# Patient Record
Sex: FEMALE | Race: WHITE | NOT HISPANIC OR LATINO | URBAN - METROPOLITAN AREA
[De-identification: names, ages, dates, MRNs, and addresses within clinical notes are randomized per-mention and may not be internally consistent; named-entity substitution may affect disease eponyms.]

---

## 2017-02-07 ENCOUNTER — INPATIENT (INPATIENT)
Facility: HOSPITAL | Age: 82
LOS: 5 days | Discharge: HOME HEALTH | End: 2017-02-13
Attending: INTERNAL MEDICINE | Admitting: INTERNAL MEDICINE
Payer: MEDICARE

## 2017-02-07 VITALS
RESPIRATION RATE: 18 BRPM | OXYGEN SATURATION: 98 % | TEMPERATURE: 98 F | SYSTOLIC BLOOD PRESSURE: 104 MMHG | HEART RATE: 75 BPM | HEIGHT: 59 IN | WEIGHT: 125 LBS | DIASTOLIC BLOOD PRESSURE: 43 MMHG

## 2017-02-07 LAB
ALBUMIN SERPL ELPH-MCNC: 3.6 G/DL — SIGNIFICANT CHANGE UP (ref 3.3–5)
ALP SERPL-CCNC: 57 U/L — SIGNIFICANT CHANGE UP (ref 40–120)
ALT FLD-CCNC: 18 U/L — SIGNIFICANT CHANGE UP (ref 12–78)
ANION GAP SERPL CALC-SCNC: 8 MMOL/L — SIGNIFICANT CHANGE UP (ref 5–17)
APPEARANCE UR: CLEAR — SIGNIFICANT CHANGE UP
APTT BLD: 27.6 SEC — SIGNIFICANT CHANGE UP (ref 27.5–37.4)
AST SERPL-CCNC: 24 U/L — SIGNIFICANT CHANGE UP (ref 15–37)
BASOPHILS # BLD AUTO: 0 K/UL — SIGNIFICANT CHANGE UP (ref 0–0.2)
BASOPHILS NFR BLD AUTO: 0.8 % — SIGNIFICANT CHANGE UP (ref 0–2)
BILIRUB SERPL-MCNC: 0.4 MG/DL — SIGNIFICANT CHANGE UP (ref 0.2–1.2)
BILIRUB UR-MCNC: NEGATIVE — SIGNIFICANT CHANGE UP
BUN SERPL-MCNC: 57 MG/DL — HIGH (ref 7–23)
CALCIUM SERPL-MCNC: 8.5 MG/DL — SIGNIFICANT CHANGE UP (ref 8.5–10.1)
CHLORIDE SERPL-SCNC: 102 MMOL/L — SIGNIFICANT CHANGE UP (ref 96–108)
CO2 SERPL-SCNC: 28 MMOL/L — SIGNIFICANT CHANGE UP (ref 22–31)
COLOR SPEC: YELLOW — SIGNIFICANT CHANGE UP
CREAT SERPL-MCNC: 2.01 MG/DL — HIGH (ref 0.5–1.3)
DIFF PNL FLD: NEGATIVE — SIGNIFICANT CHANGE UP
EOSINOPHIL # BLD AUTO: 0.4 K/UL — SIGNIFICANT CHANGE UP (ref 0–0.5)
EOSINOPHIL NFR BLD AUTO: 6.7 % — HIGH (ref 0–6)
GLUCOSE SERPL-MCNC: 213 MG/DL — HIGH (ref 70–99)
GLUCOSE UR QL: NEGATIVE MG/DL — SIGNIFICANT CHANGE UP
HCT VFR BLD CALC: 32.8 % — LOW (ref 34.5–45)
HGB BLD-MCNC: 12 G/DL — SIGNIFICANT CHANGE UP (ref 11.5–15.5)
INR BLD: 0.98 RATIO — SIGNIFICANT CHANGE UP (ref 0.88–1.16)
KETONES UR-MCNC: NEGATIVE — SIGNIFICANT CHANGE UP
LEUKOCYTE ESTERASE UR-ACNC: NEGATIVE — SIGNIFICANT CHANGE UP
LIDOCAIN IGE QN: 175 U/L — SIGNIFICANT CHANGE UP (ref 73–393)
LYMPHOCYTES # BLD AUTO: 1.9 K/UL — SIGNIFICANT CHANGE UP (ref 1–3.3)
LYMPHOCYTES # BLD AUTO: 35.2 % — SIGNIFICANT CHANGE UP (ref 13–44)
MCHC RBC-ENTMCNC: 32.6 PG — SIGNIFICANT CHANGE UP (ref 27–34)
MCHC RBC-ENTMCNC: 36.4 GM/DL — HIGH (ref 32–36)
MCV RBC AUTO: 89.6 FL — SIGNIFICANT CHANGE UP (ref 80–100)
MONOCYTES # BLD AUTO: 0.5 K/UL — SIGNIFICANT CHANGE UP (ref 0–0.9)
MONOCYTES NFR BLD AUTO: 10 % — SIGNIFICANT CHANGE UP (ref 2–14)
NEUTROPHILS # BLD AUTO: 2.5 K/UL — SIGNIFICANT CHANGE UP (ref 1.8–7.4)
NEUTROPHILS NFR BLD AUTO: 47.3 % — SIGNIFICANT CHANGE UP (ref 43–77)
NITRITE UR-MCNC: NEGATIVE — SIGNIFICANT CHANGE UP
NT-PROBNP SERPL-SCNC: 660 PG/ML — HIGH (ref 0–450)
PH UR: 7 — SIGNIFICANT CHANGE UP (ref 4.8–8)
PLATELET # BLD AUTO: 154 K/UL — SIGNIFICANT CHANGE UP (ref 150–400)
POTASSIUM SERPL-MCNC: 4.9 MMOL/L — SIGNIFICANT CHANGE UP (ref 3.5–5.3)
POTASSIUM SERPL-SCNC: 4.9 MMOL/L — SIGNIFICANT CHANGE UP (ref 3.5–5.3)
PROT SERPL-MCNC: 7.1 GM/DL — SIGNIFICANT CHANGE UP (ref 6–8.3)
PROT UR-MCNC: NEGATIVE MG/DL — SIGNIFICANT CHANGE UP
PROTHROM AB SERPL-ACNC: 11 SEC — SIGNIFICANT CHANGE UP (ref 10–13.1)
RBC # BLD: 3.67 M/UL — LOW (ref 3.8–5.2)
RBC # FLD: 11.2 % — SIGNIFICANT CHANGE UP (ref 11–15)
SODIUM SERPL-SCNC: 138 MMOL/L — SIGNIFICANT CHANGE UP (ref 135–145)
SP GR SPEC: 1 — LOW (ref 1.01–1.02)
TROPONIN I SERPL-MCNC: <.015 NG/ML — SIGNIFICANT CHANGE UP (ref 0.01–0.04)
UROBILINOGEN FLD QL: NEGATIVE MG/DL — SIGNIFICANT CHANGE UP
WBC # BLD: 5.4 K/UL — SIGNIFICANT CHANGE UP (ref 3.8–10.5)
WBC # FLD AUTO: 5.4 K/UL — SIGNIFICANT CHANGE UP (ref 3.8–10.5)

## 2017-02-07 PROCEDURE — 71010: CPT | Mod: 26

## 2017-02-07 PROCEDURE — 99285 EMERGENCY DEPT VISIT HI MDM: CPT

## 2017-02-07 RX ORDER — SIMVASTATIN 20 MG/1
20 TABLET, FILM COATED ORAL AT BEDTIME
Qty: 0 | Refills: 0 | Status: DISCONTINUED | OUTPATIENT
Start: 2017-02-07 | End: 2017-02-13

## 2017-02-07 RX ORDER — CLOPIDOGREL BISULFATE 75 MG/1
75 TABLET, FILM COATED ORAL DAILY
Qty: 0 | Refills: 0 | Status: DISCONTINUED | OUTPATIENT
Start: 2017-02-07 | End: 2017-02-13

## 2017-02-07 RX ORDER — SENNA PLUS 8.6 MG/1
2 TABLET ORAL AT BEDTIME
Qty: 0 | Refills: 0 | Status: DISCONTINUED | OUTPATIENT
Start: 2017-02-07 | End: 2017-02-13

## 2017-02-07 RX ORDER — PANTOPRAZOLE SODIUM 20 MG/1
40 TABLET, DELAYED RELEASE ORAL
Qty: 0 | Refills: 0 | Status: DISCONTINUED | OUTPATIENT
Start: 2017-02-07 | End: 2017-02-13

## 2017-02-07 RX ORDER — ASPIRIN/CALCIUM CARB/MAGNESIUM 324 MG
81 TABLET ORAL DAILY
Qty: 0 | Refills: 0 | Status: DISCONTINUED | OUTPATIENT
Start: 2017-02-07 | End: 2017-02-13

## 2017-02-07 RX ORDER — SODIUM CHLORIDE 9 MG/ML
3 INJECTION INTRAMUSCULAR; INTRAVENOUS; SUBCUTANEOUS EVERY 8 HOURS
Qty: 0 | Refills: 0 | Status: DISCONTINUED | OUTPATIENT
Start: 2017-02-07 | End: 2017-02-13

## 2017-02-07 RX ORDER — HEPARIN SODIUM 5000 [USP'U]/ML
5000 INJECTION INTRAVENOUS; SUBCUTANEOUS EVERY 12 HOURS
Qty: 0 | Refills: 0 | Status: DISCONTINUED | OUTPATIENT
Start: 2017-02-07 | End: 2017-02-13

## 2017-02-07 RX ORDER — METOPROLOL TARTRATE 50 MG
25 TABLET ORAL
Qty: 0 | Refills: 0 | Status: DISCONTINUED | OUTPATIENT
Start: 2017-02-07 | End: 2017-02-13

## 2017-02-07 RX ORDER — SPIRONOLACTONE 25 MG/1
25 TABLET, FILM COATED ORAL DAILY
Qty: 0 | Refills: 0 | Status: DISCONTINUED | OUTPATIENT
Start: 2017-02-07 | End: 2017-02-11

## 2017-02-07 RX ORDER — ONDANSETRON 8 MG/1
4 TABLET, FILM COATED ORAL EVERY 6 HOURS
Qty: 0 | Refills: 0 | Status: DISCONTINUED | OUTPATIENT
Start: 2017-02-07 | End: 2017-02-13

## 2017-02-07 RX ORDER — LISINOPRIL 2.5 MG/1
5 TABLET ORAL DAILY
Qty: 0 | Refills: 0 | Status: DISCONTINUED | OUTPATIENT
Start: 2017-02-07 | End: 2017-02-13

## 2017-02-07 RX ADMIN — SODIUM CHLORIDE 3 MILLILITER(S): 9 INJECTION INTRAMUSCULAR; INTRAVENOUS; SUBCUTANEOUS at 23:26

## 2017-02-07 RX ADMIN — SIMVASTATIN 20 MILLIGRAM(S): 20 TABLET, FILM COATED ORAL at 23:35

## 2017-02-07 NOTE — ED ADULT NURSE NOTE - PSH
Cataract  removal with lens implant right in 2000  Detached Retina, Left  laser surgery in 1995  S/P Carpal Tunnel Release  bilateral hands in 1990  S/P Laparoscopic Cholecystectomy  2008  S/P TKR (Total Knee Replacement)  left knee  Trigger Finger  release of middle and ring finger of right hand in 1990, and middle finger left hand in 2008

## 2017-02-07 NOTE — ED PROVIDER NOTE - OBJECTIVE STATEMENT
Pt is a 87 yo lady with a pmhx of HL, DM2, CAD sp CABG and MI, valve abnormality unspec who presents to the ED with an episode of syncope. She denies any chest pain, sob, weakness, lightheadedness, but when she went to the restroom she had an episode of syncope. No seizure activity, no head strike, no fall, son found her on toilet.

## 2017-02-07 NOTE — ED ADULT TRIAGE NOTE - CHIEF COMPLAINT QUOTE
dizziness with syncopal episode. Pt c/o LLQ abdominal pain with diarrhea. Pt had the flu and pneumonia

## 2017-02-07 NOTE — ED ADULT NURSE NOTE - OBJECTIVE STATEMENT
pt received alert and oriented x3, pt son states pt woke and went to bathroom, and she become spaced out on bathroom, after c/o dizziness, son denies pt falling, no visinle trauma. pt states he doctor diagnosed her with flu this past week,

## 2017-02-07 NOTE — ED PROVIDER NOTE - CARDIAC, MLM
Normal rate, regular rhythm.  Heart sounds S1, S2.  Has systolic murmur throughout precordium 4/6, no rubs or gallops.

## 2017-02-07 NOTE — ED PROVIDER NOTE - MEDICAL DECISION MAKING DETAILS
Ddx: Syncope/ Back to baseline mental status/ may be due to valvular abnormality  Plan: labs, troponin, ecg, cxr, admit

## 2017-02-07 NOTE — ED ADULT NURSE NOTE - PMH
Acute Mucous Pneumonia  4/2010, resolved ; no residual problems  Angina  in 2005, s/p angioplasty with stent placement, no recurrance, no sequalae  Arthritis, Infective, Knee    Basal Cell Cancer  removed from left neck 2009  Detached Retina  right eye  Diabetes Mellitus  type 2  History of Back Surgery  2010  Spinal Stenosis- lumbar

## 2017-02-08 DIAGNOSIS — E11.9 TYPE 2 DIABETES MELLITUS WITHOUT COMPLICATIONS: ICD-10-CM

## 2017-02-08 DIAGNOSIS — R55 SYNCOPE AND COLLAPSE: ICD-10-CM

## 2017-02-08 LAB
CULTURE RESULTS: NO GROWTH — SIGNIFICANT CHANGE UP
SPECIMEN SOURCE: SIGNIFICANT CHANGE UP

## 2017-02-08 RX ORDER — DEXTROSE 50 % IN WATER 50 %
12.5 SYRINGE (ML) INTRAVENOUS ONCE
Qty: 0 | Refills: 0 | Status: DISCONTINUED | OUTPATIENT
Start: 2017-02-08 | End: 2017-02-13

## 2017-02-08 RX ORDER — GLUCAGON INJECTION, SOLUTION 0.5 MG/.1ML
1 INJECTION, SOLUTION SUBCUTANEOUS ONCE
Qty: 0 | Refills: 0 | Status: DISCONTINUED | OUTPATIENT
Start: 2017-02-08 | End: 2017-02-13

## 2017-02-08 RX ORDER — DEXTROSE 50 % IN WATER 50 %
25 SYRINGE (ML) INTRAVENOUS ONCE
Qty: 0 | Refills: 0 | Status: DISCONTINUED | OUTPATIENT
Start: 2017-02-08 | End: 2017-02-13

## 2017-02-08 RX ORDER — ACETAMINOPHEN 500 MG
650 TABLET ORAL EVERY 6 HOURS
Qty: 0 | Refills: 0 | Status: DISCONTINUED | OUTPATIENT
Start: 2017-02-08 | End: 2017-02-13

## 2017-02-08 RX ORDER — INSULIN LISPRO 100/ML
VIAL (ML) SUBCUTANEOUS
Qty: 0 | Refills: 0 | Status: DISCONTINUED | OUTPATIENT
Start: 2017-02-08 | End: 2017-02-13

## 2017-02-08 RX ORDER — DEXTROSE 50 % IN WATER 50 %
1 SYRINGE (ML) INTRAVENOUS ONCE
Qty: 0 | Refills: 0 | Status: DISCONTINUED | OUTPATIENT
Start: 2017-02-08 | End: 2017-02-13

## 2017-02-08 RX ORDER — SODIUM CHLORIDE 9 MG/ML
1000 INJECTION, SOLUTION INTRAVENOUS
Qty: 0 | Refills: 0 | Status: DISCONTINUED | OUTPATIENT
Start: 2017-02-08 | End: 2017-02-13

## 2017-02-08 RX ADMIN — HEPARIN SODIUM 5000 UNIT(S): 5000 INJECTION INTRAVENOUS; SUBCUTANEOUS at 17:07

## 2017-02-08 RX ADMIN — CLOPIDOGREL BISULFATE 75 MILLIGRAM(S): 75 TABLET, FILM COATED ORAL at 11:14

## 2017-02-08 RX ADMIN — Medication 81 MILLIGRAM(S): at 11:14

## 2017-02-08 RX ADMIN — SIMVASTATIN 20 MILLIGRAM(S): 20 TABLET, FILM COATED ORAL at 22:38

## 2017-02-08 RX ADMIN — PANTOPRAZOLE SODIUM 40 MILLIGRAM(S): 20 TABLET, DELAYED RELEASE ORAL at 07:09

## 2017-02-08 RX ADMIN — SODIUM CHLORIDE 3 MILLILITER(S): 9 INJECTION INTRAMUSCULAR; INTRAVENOUS; SUBCUTANEOUS at 14:24

## 2017-02-08 RX ADMIN — Medication 4: at 16:30

## 2017-02-08 RX ADMIN — Medication 25 MILLIGRAM(S): at 17:07

## 2017-02-08 RX ADMIN — SPIRONOLACTONE 25 MILLIGRAM(S): 25 TABLET, FILM COATED ORAL at 07:09

## 2017-02-08 RX ADMIN — Medication 4: at 11:14

## 2017-02-08 RX ADMIN — SODIUM CHLORIDE 3 MILLILITER(S): 9 INJECTION INTRAMUSCULAR; INTRAVENOUS; SUBCUTANEOUS at 22:37

## 2017-02-08 RX ADMIN — SODIUM CHLORIDE 3 MILLILITER(S): 9 INJECTION INTRAMUSCULAR; INTRAVENOUS; SUBCUTANEOUS at 06:06

## 2017-02-08 RX ADMIN — HEPARIN SODIUM 5000 UNIT(S): 5000 INJECTION INTRAVENOUS; SUBCUTANEOUS at 07:09

## 2017-02-08 RX ADMIN — LISINOPRIL 5 MILLIGRAM(S): 2.5 TABLET ORAL at 07:09

## 2017-02-08 NOTE — PATIENT PROFILE ADULT. - VISION (WITH CORRECTIVE LENSES IF THE PATIENT USUALLY WEARS THEM):
right eye cataract surgery; glasses for reading and distance/Partially impaired: cannot see medication labels or newsprint, but can see obstacles in path, and the surrounding layout; can count fingers at arm's length

## 2017-02-08 NOTE — PHYSICAL THERAPY INITIAL EVALUATION ADULT - TINETTI BALANCE TEST, REHAB EVAL
Sitting Balance - 1  Rises From Chair -0  Attempts to rise -0   Immediate Standing Balance -1   Standing Balance - 1  Nudged - 1  Eyes Closed -0   Turning 360 Deg - 1  Sitting down - 1  Balance Score - 6/16

## 2017-02-08 NOTE — PHYSICAL THERAPY INITIAL EVALUATION ADULT - CRITERIA FOR SKILLED THERAPEUTIC INTERVENTIONS
rehab potential/functional limitations in following categories/therapy frequency/predicted duration of therapy intervention/impairments found/risk reduction/prevention

## 2017-02-08 NOTE — PHYSICAL THERAPY INITIAL EVALUATION ADULT - MODIFIED CLINICAL TEST OF SENSORY INTEGRATION IN BALANCE TEST
Barthel Index: Feeding Score- 10  Bathing Score- 0  Grooming Score- 5  Dressing Score- 5  Bowels Score- 10  Bladder Score- 10  Toilet Score- 5  Transfers Score- 5  Mobility Score-0   Stairs Score- 0    Total Score-  45/100

## 2017-02-08 NOTE — PHYSICAL THERAPY INITIAL EVALUATION ADULT - TINETTI GAIT TEST, REHAB EVAL
Indication of gait - 1  Step Length and height -2   Foot Clearance -2   Step Symmetry -0   Step Continuity -0   Path - 1   Trunk -1   Walking Time -0   Total Score - 7/12

## 2017-02-08 NOTE — PHYSICAL THERAPY INITIAL EVALUATION ADULT - ADDITIONAL COMMENTS
Pt lives in 2 story private house with approximately 8 stairs to enter with her two sons. Pt ambulated with rolling walker and needed assist for stairs and bathing prior to admission.

## 2017-02-09 LAB — HBA1C BLD-MCNC: 7.7 % — HIGH (ref 4–5.6)

## 2017-02-09 RX ADMIN — Medication 25 MILLIGRAM(S): at 06:39

## 2017-02-09 RX ADMIN — SODIUM CHLORIDE 3 MILLILITER(S): 9 INJECTION INTRAMUSCULAR; INTRAVENOUS; SUBCUTANEOUS at 22:31

## 2017-02-09 RX ADMIN — SPIRONOLACTONE 25 MILLIGRAM(S): 25 TABLET, FILM COATED ORAL at 06:39

## 2017-02-09 RX ADMIN — HEPARIN SODIUM 5000 UNIT(S): 5000 INJECTION INTRAVENOUS; SUBCUTANEOUS at 18:03

## 2017-02-09 RX ADMIN — SIMVASTATIN 20 MILLIGRAM(S): 20 TABLET, FILM COATED ORAL at 22:32

## 2017-02-09 RX ADMIN — Medication 25 MILLIGRAM(S): at 18:04

## 2017-02-09 RX ADMIN — SODIUM CHLORIDE 3 MILLILITER(S): 9 INJECTION INTRAMUSCULAR; INTRAVENOUS; SUBCUTANEOUS at 06:39

## 2017-02-09 RX ADMIN — Medication 81 MILLIGRAM(S): at 11:16

## 2017-02-09 RX ADMIN — SODIUM CHLORIDE 3 MILLILITER(S): 9 INJECTION INTRAMUSCULAR; INTRAVENOUS; SUBCUTANEOUS at 14:04

## 2017-02-09 RX ADMIN — Medication 4: at 12:30

## 2017-02-09 RX ADMIN — Medication 650 MILLIGRAM(S): at 22:34

## 2017-02-09 RX ADMIN — CLOPIDOGREL BISULFATE 75 MILLIGRAM(S): 75 TABLET, FILM COATED ORAL at 11:16

## 2017-02-09 RX ADMIN — LISINOPRIL 5 MILLIGRAM(S): 2.5 TABLET ORAL at 06:39

## 2017-02-09 RX ADMIN — HEPARIN SODIUM 5000 UNIT(S): 5000 INJECTION INTRAVENOUS; SUBCUTANEOUS at 06:38

## 2017-02-09 RX ADMIN — Medication 650 MILLIGRAM(S): at 23:34

## 2017-02-09 RX ADMIN — PANTOPRAZOLE SODIUM 40 MILLIGRAM(S): 20 TABLET, DELAYED RELEASE ORAL at 06:39

## 2017-02-10 RX ADMIN — CLOPIDOGREL BISULFATE 75 MILLIGRAM(S): 75 TABLET, FILM COATED ORAL at 14:04

## 2017-02-10 RX ADMIN — SPIRONOLACTONE 25 MILLIGRAM(S): 25 TABLET, FILM COATED ORAL at 05:55

## 2017-02-10 RX ADMIN — Medication 6: at 17:57

## 2017-02-10 RX ADMIN — Medication 81 MILLIGRAM(S): at 14:03

## 2017-02-10 RX ADMIN — SODIUM CHLORIDE 3 MILLILITER(S): 9 INJECTION INTRAMUSCULAR; INTRAVENOUS; SUBCUTANEOUS at 05:54

## 2017-02-10 RX ADMIN — Medication 6: at 14:05

## 2017-02-10 RX ADMIN — SIMVASTATIN 20 MILLIGRAM(S): 20 TABLET, FILM COATED ORAL at 22:30

## 2017-02-10 RX ADMIN — Medication 25 MILLIGRAM(S): at 17:58

## 2017-02-10 RX ADMIN — HEPARIN SODIUM 5000 UNIT(S): 5000 INJECTION INTRAVENOUS; SUBCUTANEOUS at 05:55

## 2017-02-10 RX ADMIN — Medication 650 MILLIGRAM(S): at 11:08

## 2017-02-10 RX ADMIN — Medication 25 MILLIGRAM(S): at 05:55

## 2017-02-10 RX ADMIN — Medication 650 MILLIGRAM(S): at 14:01

## 2017-02-10 RX ADMIN — PANTOPRAZOLE SODIUM 40 MILLIGRAM(S): 20 TABLET, DELAYED RELEASE ORAL at 05:55

## 2017-02-10 RX ADMIN — SODIUM CHLORIDE 3 MILLILITER(S): 9 INJECTION INTRAMUSCULAR; INTRAVENOUS; SUBCUTANEOUS at 14:05

## 2017-02-10 RX ADMIN — HEPARIN SODIUM 5000 UNIT(S): 5000 INJECTION INTRAVENOUS; SUBCUTANEOUS at 17:57

## 2017-02-10 RX ADMIN — SODIUM CHLORIDE 3 MILLILITER(S): 9 INJECTION INTRAMUSCULAR; INTRAVENOUS; SUBCUTANEOUS at 22:28

## 2017-02-10 RX ADMIN — LISINOPRIL 5 MILLIGRAM(S): 2.5 TABLET ORAL at 05:55

## 2017-02-11 PROCEDURE — 99223 1ST HOSP IP/OBS HIGH 75: CPT

## 2017-02-11 RX ADMIN — SODIUM CHLORIDE 3 MILLILITER(S): 9 INJECTION INTRAMUSCULAR; INTRAVENOUS; SUBCUTANEOUS at 13:51

## 2017-02-11 RX ADMIN — Medication 81 MILLIGRAM(S): at 13:50

## 2017-02-11 RX ADMIN — SPIRONOLACTONE 25 MILLIGRAM(S): 25 TABLET, FILM COATED ORAL at 05:42

## 2017-02-11 RX ADMIN — Medication 650 MILLIGRAM(S): at 15:10

## 2017-02-11 RX ADMIN — PANTOPRAZOLE SODIUM 40 MILLIGRAM(S): 20 TABLET, DELAYED RELEASE ORAL at 05:42

## 2017-02-11 RX ADMIN — SIMVASTATIN 20 MILLIGRAM(S): 20 TABLET, FILM COATED ORAL at 21:35

## 2017-02-11 RX ADMIN — SODIUM CHLORIDE 3 MILLILITER(S): 9 INJECTION INTRAMUSCULAR; INTRAVENOUS; SUBCUTANEOUS at 05:43

## 2017-02-11 RX ADMIN — HEPARIN SODIUM 5000 UNIT(S): 5000 INJECTION INTRAVENOUS; SUBCUTANEOUS at 05:41

## 2017-02-11 RX ADMIN — LISINOPRIL 5 MILLIGRAM(S): 2.5 TABLET ORAL at 05:40

## 2017-02-11 RX ADMIN — Medication 650 MILLIGRAM(S): at 23:20

## 2017-02-11 RX ADMIN — SODIUM CHLORIDE 3 MILLILITER(S): 9 INJECTION INTRAMUSCULAR; INTRAVENOUS; SUBCUTANEOUS at 21:33

## 2017-02-11 RX ADMIN — Medication 650 MILLIGRAM(S): at 16:24

## 2017-02-11 RX ADMIN — CLOPIDOGREL BISULFATE 75 MILLIGRAM(S): 75 TABLET, FILM COATED ORAL at 13:50

## 2017-02-11 RX ADMIN — Medication 25 MILLIGRAM(S): at 05:40

## 2017-02-11 RX ADMIN — HEPARIN SODIUM 5000 UNIT(S): 5000 INJECTION INTRAVENOUS; SUBCUTANEOUS at 18:48

## 2017-02-11 RX ADMIN — Medication 2: at 13:51

## 2017-02-12 PROCEDURE — 99232 SBSQ HOSP IP/OBS MODERATE 35: CPT

## 2017-02-12 RX ORDER — AZITHROMYCIN 500 MG/1
0 TABLET, FILM COATED ORAL
Qty: 0 | Refills: 0 | COMMUNITY

## 2017-02-12 RX ORDER — PIOGLITAZONE HYDROCHLORIDE 15 MG/1
1 TABLET ORAL
Qty: 0 | Refills: 0 | COMMUNITY

## 2017-02-12 RX ORDER — SPIRONOLACTONE 25 MG/1
0 TABLET, FILM COATED ORAL
Qty: 0 | Refills: 0 | COMMUNITY

## 2017-02-12 RX ADMIN — Medication 81 MILLIGRAM(S): at 11:16

## 2017-02-12 RX ADMIN — Medication 25 MILLIGRAM(S): at 05:46

## 2017-02-12 RX ADMIN — SODIUM CHLORIDE 3 MILLILITER(S): 9 INJECTION INTRAMUSCULAR; INTRAVENOUS; SUBCUTANEOUS at 05:41

## 2017-02-12 RX ADMIN — SIMVASTATIN 20 MILLIGRAM(S): 20 TABLET, FILM COATED ORAL at 21:05

## 2017-02-12 RX ADMIN — CLOPIDOGREL BISULFATE 75 MILLIGRAM(S): 75 TABLET, FILM COATED ORAL at 11:16

## 2017-02-12 RX ADMIN — Medication 650 MILLIGRAM(S): at 00:00

## 2017-02-12 RX ADMIN — Medication 25 MILLIGRAM(S): at 19:02

## 2017-02-12 RX ADMIN — SODIUM CHLORIDE 3 MILLILITER(S): 9 INJECTION INTRAMUSCULAR; INTRAVENOUS; SUBCUTANEOUS at 21:03

## 2017-02-12 RX ADMIN — Medication 100 MILLIGRAM(S): at 11:21

## 2017-02-12 RX ADMIN — HEPARIN SODIUM 5000 UNIT(S): 5000 INJECTION INTRAVENOUS; SUBCUTANEOUS at 05:46

## 2017-02-12 RX ADMIN — Medication 650 MILLIGRAM(S): at 15:43

## 2017-02-12 RX ADMIN — PANTOPRAZOLE SODIUM 40 MILLIGRAM(S): 20 TABLET, DELAYED RELEASE ORAL at 06:49

## 2017-02-12 RX ADMIN — LISINOPRIL 5 MILLIGRAM(S): 2.5 TABLET ORAL at 05:46

## 2017-02-12 RX ADMIN — Medication 100 MILLIGRAM(S): at 19:04

## 2017-02-12 RX ADMIN — SODIUM CHLORIDE 3 MILLILITER(S): 9 INJECTION INTRAMUSCULAR; INTRAVENOUS; SUBCUTANEOUS at 13:36

## 2017-02-12 RX ADMIN — Medication 4: at 11:19

## 2017-02-12 RX ADMIN — Medication 650 MILLIGRAM(S): at 14:43

## 2017-02-12 RX ADMIN — Medication 2: at 17:27

## 2017-02-12 RX ADMIN — HEPARIN SODIUM 5000 UNIT(S): 5000 INJECTION INTRAVENOUS; SUBCUTANEOUS at 19:01

## 2017-02-12 NOTE — DIETITIAN INITIAL EVALUATION ADULT. - OTHER INFO
Pt seen today due to RN referral. Pt c HgbA1c 7.7. She lives @ home c her son whom does the food shopping/cooking following low sugar/fat. He bakes and broils all foods for his mom. Pt does not weigh herself @ home and unaware of her usul weight. Her clothing all fits the same. She has decreased physical activity due to back problems. Last BM today 2/12. negative edema. Consuming 100% of meals.

## 2017-02-12 NOTE — DISCHARGE NOTE ADULT - MEDICATION SUMMARY - MEDICATIONS TO TAKE
I will START or STAY ON the medications listed below when I get home from the hospital:    aspirin 81 mg oral tablet  -- 1 tab(s) by mouth once a day  -- Indication: For SYNCOPE    traMADol 50 mg oral tablet  --  by mouth prn  -- Indication: For SYNCOPE    lisinopril 5 mg oral tablet  -- 1 tab(s) by mouth once a day  -- Indication: For SYNCOPE    simvastatin 20 mg oral tablet  -- 1 tab(s) by mouth once a day (at bedtime)  -- Indication: For Diabetes Mellitus    clopidogrel 75 mg oral tablet  -- 1 tab(s) by mouth once a day  -- Indication: For SYNCOPE    metoprolol tartrate 50 mg oral tablet  -- 40 milligram(s) by mouth once a day  -- Indication: For SYNCOPE    pantoprazole 40 mg oral delayed release tablet  --  by mouth   -- Indication: For Diabetes Mellitus    nitrofurantoin  --  by mouth   -- Indication: For Diabetes Mellitus

## 2017-02-12 NOTE — DISCHARGE NOTE ADULT - CARE PLAN
Principal Discharge DX:	Syncope, unspecified syncope type  Goal:	Adjust meds  Instructions for follow-up, activity and diet:	watch salt

## 2017-02-12 NOTE — DISCHARGE NOTE ADULT - PATIENT PORTAL LINK FT
“You can access the FollowHealth Patient Portal, offered by [facility], by registering with the following website: [email or web address]”

## 2017-02-12 NOTE — PROGRESS NOTE ADULT - BACK
No deformity or limitation of movement

## 2017-02-12 NOTE — DISCHARGE NOTE ADULT - VISION (WITH CORRECTIVE LENSES IF THE PATIENT USUALLY WEARS THEM):
Partially impaired: cannot see medication labels or newsprint, but can see obstacles in path, and the surrounding layout; can count fingers at arm's length/right eye cataract surgery; glasses for reading and distance

## 2017-02-13 VITALS
RESPIRATION RATE: 18 BRPM | SYSTOLIC BLOOD PRESSURE: 98 MMHG | TEMPERATURE: 98 F | DIASTOLIC BLOOD PRESSURE: 38 MMHG | OXYGEN SATURATION: 96 % | HEART RATE: 78 BPM

## 2017-02-13 RX ADMIN — CLOPIDOGREL BISULFATE 75 MILLIGRAM(S): 75 TABLET, FILM COATED ORAL at 11:24

## 2017-02-13 RX ADMIN — PANTOPRAZOLE SODIUM 40 MILLIGRAM(S): 20 TABLET, DELAYED RELEASE ORAL at 06:15

## 2017-02-13 RX ADMIN — Medication 81 MILLIGRAM(S): at 11:24

## 2017-02-13 RX ADMIN — Medication 4: at 11:24

## 2017-02-13 RX ADMIN — Medication 25 MILLIGRAM(S): at 05:17

## 2017-02-13 RX ADMIN — HEPARIN SODIUM 5000 UNIT(S): 5000 INJECTION INTRAVENOUS; SUBCUTANEOUS at 05:17

## 2017-02-13 RX ADMIN — LISINOPRIL 5 MILLIGRAM(S): 2.5 TABLET ORAL at 05:17

## 2017-02-13 RX ADMIN — SODIUM CHLORIDE 3 MILLILITER(S): 9 INJECTION INTRAMUSCULAR; INTRAVENOUS; SUBCUTANEOUS at 05:13

## 2017-02-13 NOTE — PROGRESS NOTE ADULT - CARDIOVASCULAR
Regular rate & rhythm, normal S1, S2; no murmurs, gallops or rubs; no S3, S4

## 2017-02-13 NOTE — PROGRESS NOTE ADULT - RESPIRATORY
Breath Sounds equal & clear to percussion & auscultation, no accessory muscle use

## 2017-02-13 NOTE — PROGRESS NOTE ADULT - EXTREMITIES
No cyanosis, clubbing or edema

## 2017-02-15 DIAGNOSIS — R55 SYNCOPE AND COLLAPSE: ICD-10-CM

## 2017-02-15 DIAGNOSIS — E11.9 TYPE 2 DIABETES MELLITUS WITHOUT COMPLICATIONS: ICD-10-CM

## 2017-02-15 DIAGNOSIS — Z96.652 PRESENCE OF LEFT ARTIFICIAL KNEE JOINT: ICD-10-CM

## 2017-02-15 DIAGNOSIS — I25.10 ATHEROSCLEROTIC HEART DISEASE OF NATIVE CORONARY ARTERY WITHOUT ANGINA PECTORIS: ICD-10-CM

## 2017-02-15 DIAGNOSIS — I35.0 NONRHEUMATIC AORTIC (VALVE) STENOSIS: ICD-10-CM

## 2017-02-15 DIAGNOSIS — Z79.82 LONG TERM (CURRENT) USE OF ASPIRIN: ICD-10-CM

## 2017-02-15 DIAGNOSIS — Z88.0 ALLERGY STATUS TO PENICILLIN: ICD-10-CM

## 2017-02-15 DIAGNOSIS — Z88.5 ALLERGY STATUS TO NARCOTIC AGENT: ICD-10-CM

## 2017-02-15 DIAGNOSIS — I25.2 OLD MYOCARDIAL INFARCTION: ICD-10-CM

## 2017-02-15 DIAGNOSIS — I10 ESSENTIAL (PRIMARY) HYPERTENSION: ICD-10-CM

## 2017-02-15 DIAGNOSIS — Z95.1 PRESENCE OF AORTOCORONARY BYPASS GRAFT: ICD-10-CM

## 2017-02-15 DIAGNOSIS — Z85.828 PERSONAL HISTORY OF OTHER MALIGNANT NEOPLASM OF SKIN: ICD-10-CM

## 2017-09-11 ENCOUNTER — INPATIENT (INPATIENT)
Facility: HOSPITAL | Age: 82
LOS: 3 days | Discharge: HOME | End: 2017-09-15
Attending: INTERNAL MEDICINE | Admitting: INTERNAL MEDICINE
Payer: MEDICARE

## 2017-09-11 VITALS
WEIGHT: 126.1 LBS | SYSTOLIC BLOOD PRESSURE: 67 MMHG | HEART RATE: 88 BPM | OXYGEN SATURATION: 95 % | HEIGHT: 59 IN | RESPIRATION RATE: 20 BRPM | TEMPERATURE: 97 F | DIASTOLIC BLOOD PRESSURE: 31 MMHG

## 2017-09-11 LAB
HCT VFR BLD CALC: 33.2 % — LOW (ref 34.5–45)
HGB BLD-MCNC: 11.2 G/DL — LOW (ref 11.5–15.5)
LACTATE SERPL-SCNC: 1.8 MMOL/L — SIGNIFICANT CHANGE UP (ref 0.7–2)
LIDOCAIN IGE QN: 55 U/L — LOW (ref 73–393)
MCHC RBC-ENTMCNC: 33 PG — SIGNIFICANT CHANGE UP (ref 27–34)
MCHC RBC-ENTMCNC: 33.7 GM/DL — SIGNIFICANT CHANGE UP (ref 32–36)
MCV RBC AUTO: 97.9 FL — SIGNIFICANT CHANGE UP (ref 80–100)
PLATELET # BLD AUTO: 220 K/UL — SIGNIFICANT CHANGE UP (ref 150–400)
RBC # BLD: 3.4 M/UL — LOW (ref 3.8–5.2)
RBC # FLD: 11.5 % — SIGNIFICANT CHANGE UP (ref 11–15)
WBC # BLD: 7.9 K/UL — SIGNIFICANT CHANGE UP (ref 3.8–10.5)
WBC # FLD AUTO: 7.9 K/UL — SIGNIFICANT CHANGE UP (ref 3.8–10.5)

## 2017-09-11 PROCEDURE — 99285 EMERGENCY DEPT VISIT HI MDM: CPT

## 2017-09-11 PROCEDURE — 71010: CPT | Mod: 26

## 2017-09-11 RX ORDER — SODIUM CHLORIDE 9 MG/ML
500 INJECTION INTRAMUSCULAR; INTRAVENOUS; SUBCUTANEOUS ONCE
Qty: 0 | Refills: 0 | Status: COMPLETED | OUTPATIENT
Start: 2017-09-11 | End: 2017-09-11

## 2017-09-11 RX ORDER — IOHEXOL 300 MG/ML
30 INJECTION, SOLUTION INTRAVENOUS ONCE
Qty: 0 | Refills: 0 | Status: COMPLETED | OUTPATIENT
Start: 2017-09-11 | End: 2017-09-11

## 2017-09-11 RX ORDER — SODIUM CHLORIDE 9 MG/ML
3 INJECTION INTRAMUSCULAR; INTRAVENOUS; SUBCUTANEOUS ONCE
Qty: 0 | Refills: 0 | Status: COMPLETED | OUTPATIENT
Start: 2017-09-11 | End: 2017-09-11

## 2017-09-11 RX ORDER — PANTOPRAZOLE SODIUM 20 MG/1
40 TABLET, DELAYED RELEASE ORAL ONCE
Qty: 0 | Refills: 0 | Status: COMPLETED | OUTPATIENT
Start: 2017-09-11 | End: 2017-09-11

## 2017-09-11 RX ORDER — ONDANSETRON 8 MG/1
8 TABLET, FILM COATED ORAL ONCE
Qty: 0 | Refills: 0 | Status: COMPLETED | OUTPATIENT
Start: 2017-09-11 | End: 2017-09-11

## 2017-09-11 RX ADMIN — ONDANSETRON 8 MILLIGRAM(S): 8 TABLET, FILM COATED ORAL at 22:22

## 2017-09-11 RX ADMIN — SODIUM CHLORIDE 3 MILLILITER(S): 9 INJECTION INTRAMUSCULAR; INTRAVENOUS; SUBCUTANEOUS at 22:24

## 2017-09-11 RX ADMIN — PANTOPRAZOLE SODIUM 40 MILLIGRAM(S): 20 TABLET, DELAYED RELEASE ORAL at 22:22

## 2017-09-11 RX ADMIN — IOHEXOL 30 MILLILITER(S): 300 INJECTION, SOLUTION INTRAVENOUS at 22:30

## 2017-09-11 RX ADMIN — SODIUM CHLORIDE 500 MILLILITER(S): 9 INJECTION INTRAMUSCULAR; INTRAVENOUS; SUBCUTANEOUS at 22:22

## 2017-09-11 NOTE — ED ADULT NURSE NOTE - OBJECTIVE STATEMENT
Pt c/o mid chest pain with N/V since friday. Denies diarrhea. No BM since friday pt reports. Derrick SOB. Weakness today, s/p fall today at home was caught by son. Denies hitting head. Abrasion to right shoulder noted. No active bleeding,

## 2017-09-11 NOTE — ED ADULT TRIAGE NOTE - CHIEF COMPLAINT QUOTE
Patient co sharp chest pain with no radiation since friday, dizziness, and vomiting  has not been eating since Friday, As per son, patient "gets delusional and shaky" FS at home 101 at 20:30 hours. Hx of open heart surgery last year.

## 2017-09-12 DIAGNOSIS — N17.9 ACUTE KIDNEY FAILURE, UNSPECIFIED: ICD-10-CM

## 2017-09-12 DIAGNOSIS — E11.9 TYPE 2 DIABETES MELLITUS WITHOUT COMPLICATIONS: ICD-10-CM

## 2017-09-12 LAB
ALBUMIN SERPL ELPH-MCNC: 3.8 G/DL — SIGNIFICANT CHANGE UP (ref 3.3–5)
ALP SERPL-CCNC: 70 U/L — SIGNIFICANT CHANGE UP (ref 40–120)
ALT FLD-CCNC: 33 U/L — SIGNIFICANT CHANGE UP (ref 12–78)
ANION GAP SERPL CALC-SCNC: 11 MMOL/L — SIGNIFICANT CHANGE UP (ref 5–17)
ANION GAP SERPL CALC-SCNC: 15 MMOL/L — SIGNIFICANT CHANGE UP (ref 5–17)
ANION GAP SERPL CALC-SCNC: 9 MMOL/L — SIGNIFICANT CHANGE UP (ref 5–17)
ANISOCYTOSIS BLD QL: SLIGHT — SIGNIFICANT CHANGE UP
APPEARANCE UR: ABNORMAL
APTT BLD: 24.4 SEC — LOW (ref 27.5–37.4)
AST SERPL-CCNC: 92 U/L — HIGH (ref 15–37)
BACTERIA # UR AUTO: ABNORMAL
BASOPHILS NFR BLD AUTO: 1 % — SIGNIFICANT CHANGE UP (ref 0–2)
BILIRUB SERPL-MCNC: 0.5 MG/DL — SIGNIFICANT CHANGE UP (ref 0.2–1.2)
BILIRUB UR-MCNC: NEGATIVE — SIGNIFICANT CHANGE UP
BUN SERPL-MCNC: 76 MG/DL — HIGH (ref 7–23)
BUN SERPL-MCNC: 83 MG/DL — HIGH (ref 7–23)
BUN SERPL-MCNC: 88 MG/DL — HIGH (ref 7–23)
CALCIUM SERPL-MCNC: 10.1 MG/DL — SIGNIFICANT CHANGE UP (ref 8.5–10.1)
CALCIUM SERPL-MCNC: 8.9 MG/DL — SIGNIFICANT CHANGE UP (ref 8.5–10.1)
CALCIUM SERPL-MCNC: 9.8 MG/DL — SIGNIFICANT CHANGE UP (ref 8.5–10.1)
CHLORIDE SERPL-SCNC: 103 MMOL/L — SIGNIFICANT CHANGE UP (ref 96–108)
CHLORIDE SERPL-SCNC: 108 MMOL/L — SIGNIFICANT CHANGE UP (ref 96–108)
CHLORIDE SERPL-SCNC: 98 MMOL/L — SIGNIFICANT CHANGE UP (ref 96–108)
CK MB BLD-MCNC: 0.4 % — SIGNIFICANT CHANGE UP (ref 0–3.5)
CK MB CFR SERPL CALC: 8.9 NG/ML — HIGH (ref 0.5–3.6)
CK SERPL-CCNC: 2127 U/L — HIGH (ref 26–192)
CO2 SERPL-SCNC: 21 MMOL/L — LOW (ref 22–31)
CO2 SERPL-SCNC: 23 MMOL/L — SIGNIFICANT CHANGE UP (ref 22–31)
CO2 SERPL-SCNC: 24 MMOL/L — SIGNIFICANT CHANGE UP (ref 22–31)
COLOR SPEC: YELLOW — SIGNIFICANT CHANGE UP
CREAT SERPL-MCNC: 4.61 MG/DL — HIGH (ref 0.5–1.3)
CREAT SERPL-MCNC: 4.91 MG/DL — HIGH (ref 0.5–1.3)
CREAT SERPL-MCNC: 5.33 MG/DL — HIGH (ref 0.5–1.3)
DIFF PNL FLD: ABNORMAL
EOSINOPHIL NFR BLD AUTO: 4 % — SIGNIFICANT CHANGE UP (ref 0–6)
EPI CELLS # UR: ABNORMAL
GLUCOSE SERPL-MCNC: 207 MG/DL — HIGH (ref 70–99)
GLUCOSE SERPL-MCNC: 82 MG/DL — SIGNIFICANT CHANGE UP (ref 70–99)
GLUCOSE SERPL-MCNC: 98 MG/DL — SIGNIFICANT CHANGE UP (ref 70–99)
GLUCOSE UR QL: 100 MG/DL
HYPOCHROMIA BLD QL: SLIGHT — SIGNIFICANT CHANGE UP
INR BLD: 1.02 RATIO — SIGNIFICANT CHANGE UP (ref 0.88–1.16)
KETONES UR-MCNC: NEGATIVE — SIGNIFICANT CHANGE UP
LEUKOCYTE ESTERASE UR-ACNC: ABNORMAL
LYMPHOCYTES # BLD AUTO: 17 % — SIGNIFICANT CHANGE UP (ref 13–44)
MACROCYTES BLD QL: SLIGHT — SIGNIFICANT CHANGE UP
MICROCYTES BLD QL: SLIGHT — SIGNIFICANT CHANGE UP
MONOCYTES NFR BLD AUTO: 14 % — SIGNIFICANT CHANGE UP (ref 2–14)
NEUTROPHILS NFR BLD AUTO: 64 % — SIGNIFICANT CHANGE UP (ref 43–77)
NITRITE UR-MCNC: NEGATIVE — SIGNIFICANT CHANGE UP
NT-PROBNP SERPL-SCNC: 5615 PG/ML — HIGH (ref 0–450)
PH UR: 6.5 — SIGNIFICANT CHANGE UP (ref 5–8)
PLAT MORPH BLD: NORMAL — SIGNIFICANT CHANGE UP
POTASSIUM SERPL-MCNC: 4.7 MMOL/L — SIGNIFICANT CHANGE UP (ref 3.5–5.3)
POTASSIUM SERPL-MCNC: 5.2 MMOL/L — SIGNIFICANT CHANGE UP (ref 3.5–5.3)
POTASSIUM SERPL-MCNC: 6.2 MMOL/L — CRITICAL HIGH (ref 3.5–5.3)
POTASSIUM SERPL-SCNC: 4.7 MMOL/L — SIGNIFICANT CHANGE UP (ref 3.5–5.3)
POTASSIUM SERPL-SCNC: 5.2 MMOL/L — SIGNIFICANT CHANGE UP (ref 3.5–5.3)
POTASSIUM SERPL-SCNC: 6.2 MMOL/L — CRITICAL HIGH (ref 3.5–5.3)
PROT SERPL-MCNC: 7.6 GM/DL — SIGNIFICANT CHANGE UP (ref 6–8.3)
PROT UR-MCNC: 30 MG/DL
PROTHROM AB SERPL-ACNC: 11.1 SEC — SIGNIFICANT CHANGE UP (ref 9.8–12.7)
RBC BLD AUTO: ABNORMAL
RBC CASTS # UR COMP ASSIST: ABNORMAL /HPF (ref 0–4)
SODIUM SERPL-SCNC: 134 MMOL/L — LOW (ref 135–145)
SODIUM SERPL-SCNC: 138 MMOL/L — SIGNIFICANT CHANGE UP (ref 135–145)
SODIUM SERPL-SCNC: 140 MMOL/L — SIGNIFICANT CHANGE UP (ref 135–145)
SP GR SPEC: 1.01 — SIGNIFICANT CHANGE UP (ref 1.01–1.02)
TROPONIN I SERPL-MCNC: 0.03 NG/ML — SIGNIFICANT CHANGE UP (ref 0.01–0.04)
UROBILINOGEN FLD QL: NEGATIVE MG/DL — SIGNIFICANT CHANGE UP
WBC UR QL: >50

## 2017-09-12 PROCEDURE — 93010 ELECTROCARDIOGRAM REPORT: CPT

## 2017-09-12 PROCEDURE — 74176 CT ABD & PELVIS W/O CONTRAST: CPT | Mod: 26

## 2017-09-12 RX ORDER — CLOPIDOGREL BISULFATE 75 MG/1
75 TABLET, FILM COATED ORAL DAILY
Qty: 0 | Refills: 0 | Status: DISCONTINUED | OUTPATIENT
Start: 2017-09-12 | End: 2017-09-15

## 2017-09-12 RX ORDER — ONDANSETRON 8 MG/1
4 TABLET, FILM COATED ORAL EVERY 6 HOURS
Qty: 0 | Refills: 0 | Status: DISCONTINUED | OUTPATIENT
Start: 2017-09-12 | End: 2017-09-15

## 2017-09-12 RX ORDER — INSULIN HUMAN 100 [IU]/ML
10 INJECTION, SOLUTION SUBCUTANEOUS ONCE
Qty: 0 | Refills: 0 | Status: COMPLETED | OUTPATIENT
Start: 2017-09-12 | End: 2017-09-12

## 2017-09-12 RX ORDER — INFLUENZA VIRUS VACCINE 15; 15; 15; 15 UG/.5ML; UG/.5ML; UG/.5ML; UG/.5ML
0.5 SUSPENSION INTRAMUSCULAR ONCE
Qty: 0 | Refills: 0 | Status: DISCONTINUED | OUTPATIENT
Start: 2017-09-12 | End: 2017-09-15

## 2017-09-12 RX ORDER — PANTOPRAZOLE SODIUM 20 MG/1
40 TABLET, DELAYED RELEASE ORAL
Qty: 0 | Refills: 0 | Status: DISCONTINUED | OUTPATIENT
Start: 2017-09-12 | End: 2017-09-15

## 2017-09-12 RX ORDER — DEXTROSE 50 % IN WATER 50 %
50 SYRINGE (ML) INTRAVENOUS ONCE
Qty: 0 | Refills: 0 | Status: COMPLETED | OUTPATIENT
Start: 2017-09-12 | End: 2017-09-12

## 2017-09-12 RX ORDER — ENOXAPARIN SODIUM 100 MG/ML
30 INJECTION SUBCUTANEOUS EVERY 24 HOURS
Qty: 0 | Refills: 0 | Status: DISCONTINUED | OUTPATIENT
Start: 2017-09-12 | End: 2017-09-15

## 2017-09-12 RX ORDER — SODIUM BICARBONATE 1 MEQ/ML
50 SYRINGE (ML) INTRAVENOUS ONCE
Qty: 0 | Refills: 0 | Status: COMPLETED | OUTPATIENT
Start: 2017-09-12 | End: 2017-09-12

## 2017-09-12 RX ORDER — CIPROFLOXACIN LACTATE 400MG/40ML
400 VIAL (ML) INTRAVENOUS ONCE
Qty: 0 | Refills: 0 | Status: COMPLETED | OUTPATIENT
Start: 2017-09-12 | End: 2017-09-12

## 2017-09-12 RX ORDER — SODIUM CHLORIDE 9 MG/ML
1000 INJECTION, SOLUTION INTRAVENOUS
Qty: 0 | Refills: 0 | Status: DISCONTINUED | OUTPATIENT
Start: 2017-09-12 | End: 2017-09-14

## 2017-09-12 RX ORDER — CALCIUM CHLORIDE
1000 POWDER (GRAM) MISCELLANEOUS ONCE
Qty: 0 | Refills: 0 | Status: COMPLETED | OUTPATIENT
Start: 2017-09-12 | End: 2017-09-12

## 2017-09-12 RX ORDER — SODIUM CHLORIDE 9 MG/ML
500 INJECTION INTRAMUSCULAR; INTRAVENOUS; SUBCUTANEOUS ONCE
Qty: 0 | Refills: 0 | Status: COMPLETED | OUTPATIENT
Start: 2017-09-12 | End: 2017-09-12

## 2017-09-12 RX ORDER — ASPIRIN/CALCIUM CARB/MAGNESIUM 324 MG
81 TABLET ORAL DAILY
Qty: 0 | Refills: 0 | Status: DISCONTINUED | OUTPATIENT
Start: 2017-09-12 | End: 2017-09-15

## 2017-09-12 RX ORDER — SODIUM CHLORIDE 9 MG/ML
1000 INJECTION INTRAMUSCULAR; INTRAVENOUS; SUBCUTANEOUS
Qty: 0 | Refills: 0 | Status: COMPLETED | OUTPATIENT
Start: 2017-09-12 | End: 2017-09-12

## 2017-09-12 RX ORDER — MIDODRINE HYDROCHLORIDE 2.5 MG/1
10 TABLET ORAL EVERY 8 HOURS
Qty: 0 | Refills: 0 | Status: DISCONTINUED | OUTPATIENT
Start: 2017-09-12 | End: 2017-09-15

## 2017-09-12 RX ORDER — SIMVASTATIN 20 MG/1
20 TABLET, FILM COATED ORAL AT BEDTIME
Qty: 0 | Refills: 0 | Status: DISCONTINUED | OUTPATIENT
Start: 2017-09-12 | End: 2017-09-15

## 2017-09-12 RX ORDER — SODIUM POLYSTYRENE SULFONATE 4.1 MEQ/G
30 POWDER, FOR SUSPENSION ORAL ONCE
Qty: 0 | Refills: 0 | Status: COMPLETED | OUTPATIENT
Start: 2017-09-12 | End: 2017-09-12

## 2017-09-12 RX ORDER — SENNA PLUS 8.6 MG/1
2 TABLET ORAL AT BEDTIME
Qty: 0 | Refills: 0 | Status: DISCONTINUED | OUTPATIENT
Start: 2017-09-12 | End: 2017-09-15

## 2017-09-12 RX ADMIN — Medication 1000 MILLIGRAM(S): at 01:05

## 2017-09-12 RX ADMIN — Medication 200 MILLIGRAM(S): at 03:17

## 2017-09-12 RX ADMIN — SODIUM CHLORIDE 500 MILLILITER(S): 9 INJECTION INTRAMUSCULAR; INTRAVENOUS; SUBCUTANEOUS at 16:48

## 2017-09-12 RX ADMIN — SIMVASTATIN 20 MILLIGRAM(S): 20 TABLET, FILM COATED ORAL at 22:06

## 2017-09-12 RX ADMIN — Medication 50 MILLIEQUIVALENT(S): at 01:05

## 2017-09-12 RX ADMIN — SODIUM CHLORIDE 2000 MILLILITER(S): 9 INJECTION INTRAMUSCULAR; INTRAVENOUS; SUBCUTANEOUS at 09:14

## 2017-09-12 RX ADMIN — MIDODRINE HYDROCHLORIDE 10 MILLIGRAM(S): 2.5 TABLET ORAL at 16:58

## 2017-09-12 RX ADMIN — INSULIN HUMAN 10 UNIT(S): 100 INJECTION, SOLUTION SUBCUTANEOUS at 01:06

## 2017-09-12 RX ADMIN — SODIUM CHLORIDE 500 MILLILITER(S): 9 INJECTION INTRAMUSCULAR; INTRAVENOUS; SUBCUTANEOUS at 01:06

## 2017-09-12 RX ADMIN — SODIUM CHLORIDE 2000 MILLILITER(S): 9 INJECTION INTRAMUSCULAR; INTRAVENOUS; SUBCUTANEOUS at 06:54

## 2017-09-12 RX ADMIN — ENOXAPARIN SODIUM 30 MILLIGRAM(S): 100 INJECTION SUBCUTANEOUS at 18:36

## 2017-09-12 RX ADMIN — SODIUM POLYSTYRENE SULFONATE 30 GRAM(S): 4.1 POWDER, FOR SUSPENSION ORAL at 01:48

## 2017-09-12 RX ADMIN — Medication 50 MILLILITER(S): at 01:05

## 2017-09-12 NOTE — H&P ADULT - HISTORY OF PRESENT ILLNESS
Patient is a 86 yo lady with AS and HTN with CABG now presents with SAE and dehydration. No complaints other than N and V. No diarrhea or F and Chills.

## 2017-09-12 NOTE — ED PROVIDER NOTE - CARE PLAN
Principal Discharge DX:	ARF (acute renal failure)  Secondary Diagnosis:	UTI (urinary tract infection)  Secondary Diagnosis:	UTI (urinary tract infection)

## 2017-09-12 NOTE — H&P ADULT - NSHPPHYSICALEXAM_GEN_ALL_CORE
Patient is mildly hypotensive with normal mentation    No JVD or LN    Chest is CTA with mild AS murmur.     Abdo is soft nontender.     No ECC     Neuro is grossly normal.

## 2017-09-12 NOTE — ED PROVIDER NOTE - OBJECTIVE STATEMENT
87yoF; with pmh signif for CAD (s/p 4V-CABG, 4 stents, s/p valvular replacement), HTN, HLD; now p/w chest pain--sscp, radiating from epigastric abd, to chest, burning, associated with nausea/vomiting x3 days.  diarrhea intermittently.  denies fever. c/o chills. c/o increased frequency and dysuria.  denies hematuria.  denies sick contacts. denies trauma. denies sick contacts.   denies sob/palp.

## 2017-09-12 NOTE — H&P ADULT - ASSESSMENT
Patient with new onset renal failure. with SAE. Will cont to hold ACEI and Diuretics. Cont with her other meds.

## 2017-09-12 NOTE — ED ADULT NURSE REASSESSMENT NOTE - NS ED NURSE REASSESS COMMENT FT1
Patient is a/o x3 with persistent low BP as documented on flow sheet, she denies dizziness or chest pains, only has mild 3/10 pains to right hip which she states might be from lying on the stretcher for long time. patient was repositioned and hygiene needs cared for(incontinent of liquid yellow stool). [place] catheter in place draining yellow urine, ivf infusing as ordered. DR. Pulido is aware of low BP. Attending is also aware.

## 2017-09-13 LAB
ALBUMIN SERPL ELPH-MCNC: 2.6 G/DL — LOW (ref 3.3–5)
ALP SERPL-CCNC: 50 U/L — SIGNIFICANT CHANGE UP (ref 40–120)
ALT FLD-CCNC: 23 U/L — SIGNIFICANT CHANGE UP (ref 12–78)
ANION GAP SERPL CALC-SCNC: 9 MMOL/L — SIGNIFICANT CHANGE UP (ref 5–17)
AST SERPL-CCNC: 43 U/L — HIGH (ref 15–37)
BILIRUB SERPL-MCNC: 0.3 MG/DL — SIGNIFICANT CHANGE UP (ref 0.2–1.2)
BUN SERPL-MCNC: 58 MG/DL — HIGH (ref 7–23)
CALCIUM SERPL-MCNC: 8 MG/DL — LOW (ref 8.5–10.1)
CHLORIDE SERPL-SCNC: 111 MMOL/L — HIGH (ref 96–108)
CO2 SERPL-SCNC: 22 MMOL/L — SIGNIFICANT CHANGE UP (ref 22–31)
CREAT SERPL-MCNC: 3.25 MG/DL — HIGH (ref 0.5–1.3)
GLUCOSE SERPL-MCNC: 165 MG/DL — HIGH (ref 70–99)
HCT VFR BLD CALC: 24.9 % — LOW (ref 34.5–45)
HGB BLD-MCNC: 8.4 G/DL — LOW (ref 11.5–15.5)
MCHC RBC-ENTMCNC: 32.4 PG — SIGNIFICANT CHANGE UP (ref 27–34)
MCHC RBC-ENTMCNC: 33.8 GM/DL — SIGNIFICANT CHANGE UP (ref 32–36)
MCV RBC AUTO: 95.6 FL — SIGNIFICANT CHANGE UP (ref 80–100)
PLATELET # BLD AUTO: 186 K/UL — SIGNIFICANT CHANGE UP (ref 150–400)
POTASSIUM SERPL-MCNC: 4.5 MMOL/L — SIGNIFICANT CHANGE UP (ref 3.5–5.3)
POTASSIUM SERPL-SCNC: 4.5 MMOL/L — SIGNIFICANT CHANGE UP (ref 3.5–5.3)
PROT SERPL-MCNC: 5.3 GM/DL — LOW (ref 6–8.3)
RBC # BLD: 2.6 M/UL — LOW (ref 3.8–5.2)
RBC # FLD: 11.6 % — SIGNIFICANT CHANGE UP (ref 11–15)
SODIUM SERPL-SCNC: 142 MMOL/L — SIGNIFICANT CHANGE UP (ref 135–145)
WBC # BLD: 7.4 K/UL — SIGNIFICANT CHANGE UP (ref 3.8–10.5)
WBC # FLD AUTO: 7.4 K/UL — SIGNIFICANT CHANGE UP (ref 3.8–10.5)

## 2017-09-13 RX ORDER — NYSTATIN CREAM 100000 [USP'U]/G
1 CREAM TOPICAL EVERY 12 HOURS
Qty: 0 | Refills: 0 | Status: DISCONTINUED | OUTPATIENT
Start: 2017-09-13 | End: 2017-09-15

## 2017-09-13 RX ORDER — ACETAMINOPHEN 500 MG
650 TABLET ORAL EVERY 6 HOURS
Qty: 0 | Refills: 0 | Status: DISCONTINUED | OUTPATIENT
Start: 2017-09-13 | End: 2017-09-15

## 2017-09-13 RX ADMIN — CLOPIDOGREL BISULFATE 75 MILLIGRAM(S): 75 TABLET, FILM COATED ORAL at 12:01

## 2017-09-13 RX ADMIN — SODIUM CHLORIDE 50 MILLILITER(S): 9 INJECTION, SOLUTION INTRAVENOUS at 06:26

## 2017-09-13 RX ADMIN — ENOXAPARIN SODIUM 30 MILLIGRAM(S): 100 INJECTION SUBCUTANEOUS at 18:28

## 2017-09-13 RX ADMIN — SIMVASTATIN 20 MILLIGRAM(S): 20 TABLET, FILM COATED ORAL at 21:53

## 2017-09-13 RX ADMIN — Medication 81 MILLIGRAM(S): at 12:01

## 2017-09-13 RX ADMIN — PANTOPRAZOLE SODIUM 40 MILLIGRAM(S): 20 TABLET, DELAYED RELEASE ORAL at 08:20

## 2017-09-13 RX ADMIN — NYSTATIN CREAM 1 APPLICATION(S): 100000 CREAM TOPICAL at 18:27

## 2017-09-13 NOTE — PHYSICAL THERAPY INITIAL EVALUATION ADULT - MODIFIED CLINICAL TEST OF SENSORY INTEGRATION IN BALANCE TEST
Barthel Index: Feeding Score _10__, Bathing Score _0__, Grooming Score _0__, Dressing Score _5__, Bowels Score _10__, Bladder Score _0__, Toilet Score _5__, Transfers Score _10__, Mobility Score _0__, Stairs Score _5__,     Total Score _45__

## 2017-09-13 NOTE — PHYSICAL THERAPY INITIAL EVALUATION ADULT - ADDITIONAL COMMENTS
Patient lives in a private house with 6 steps to enter with B handrails which are too far to utilize simultaneously. Patient's son Frank is the patient's primary caregiver. Patient lives with her 2 sons  including Frank. Patient prior to admission was able to ambulate homebound distances independently with use of rolling walker. Patient required assistance from her son to negotiate stairs prior to admission. Patient when exiting her home utilizes a wheelchair for activities that require community distance ambulation (attending [employer], shopping etc.).

## 2017-09-13 NOTE — PHYSICAL THERAPY INITIAL EVALUATION ADULT - CRITERIA FOR SKILLED THERAPEUTIC INTERVENTIONS
rehab potential/predicted duration of therapy intervention/anticipated discharge recommendation/Home with Home P.T./risk reduction/prevention/impairments found/functional limitations in following categories/therapy frequency

## 2017-09-13 NOTE — PHYSICAL THERAPY INITIAL EVALUATION ADULT - GENERAL OBSERVATIONS, REHAB EVAL
Patient seen supine in bed with IV and allen with coccygeal foam dressing clean, dry, and intact, with son at bedside with latonya

## 2017-09-13 NOTE — PHYSICAL THERAPY INITIAL EVALUATION ADULT - PLANNED THERAPY INTERVENTIONS, PT EVAL
balance training/bed mobility training/postural re-education/strengthening/gait training/transfer training

## 2017-09-14 DIAGNOSIS — E13.628 OTHER SPECIFIED DIABETES MELLITUS WITH OTHER SKIN COMPLICATIONS: ICD-10-CM

## 2017-09-14 LAB
-  AMIKACIN: SIGNIFICANT CHANGE UP
-  AMPICILLIN/SULBACTAM: SIGNIFICANT CHANGE UP
-  AMPICILLIN: SIGNIFICANT CHANGE UP
-  AZTREONAM: SIGNIFICANT CHANGE UP
-  CEFAZOLIN: SIGNIFICANT CHANGE UP
-  CEFEPIME: SIGNIFICANT CHANGE UP
-  CEFOXITIN: SIGNIFICANT CHANGE UP
-  CEFTAZIDIME: SIGNIFICANT CHANGE UP
-  CEFTRIAXONE: SIGNIFICANT CHANGE UP
-  CIPROFLOXACIN: SIGNIFICANT CHANGE UP
-  ERTAPENEM: SIGNIFICANT CHANGE UP
-  GENTAMICIN: SIGNIFICANT CHANGE UP
-  IMIPENEM: SIGNIFICANT CHANGE UP
-  LEVOFLOXACIN: SIGNIFICANT CHANGE UP
-  MEROPENEM: SIGNIFICANT CHANGE UP
-  NITROFURANTOIN: SIGNIFICANT CHANGE UP
-  PIPERACILLIN/TAZOBACTAM: SIGNIFICANT CHANGE UP
-  TOBRAMYCIN: SIGNIFICANT CHANGE UP
-  TRIMETHOPRIM/SULFAMETHOXAZOLE: SIGNIFICANT CHANGE UP
ANION GAP SERPL CALC-SCNC: 8 MMOL/L — SIGNIFICANT CHANGE UP (ref 5–17)
BUN SERPL-MCNC: 32 MG/DL — HIGH (ref 7–23)
CALCIUM SERPL-MCNC: 7.8 MG/DL — LOW (ref 8.5–10.1)
CHLORIDE SERPL-SCNC: 112 MMOL/L — HIGH (ref 96–108)
CO2 SERPL-SCNC: 23 MMOL/L — SIGNIFICANT CHANGE UP (ref 22–31)
CREAT SERPL-MCNC: 1.98 MG/DL — HIGH (ref 0.5–1.3)
CULTURE RESULTS: SIGNIFICANT CHANGE UP
GLUCOSE SERPL-MCNC: 198 MG/DL — HIGH (ref 70–99)
HCT VFR BLD CALC: 26.3 % — LOW (ref 34.5–45)
HGB BLD-MCNC: 8.6 G/DL — LOW (ref 11.5–15.5)
MCHC RBC-ENTMCNC: 32.4 PG — SIGNIFICANT CHANGE UP (ref 27–34)
MCHC RBC-ENTMCNC: 32.8 GM/DL — SIGNIFICANT CHANGE UP (ref 32–36)
MCV RBC AUTO: 98.9 FL — SIGNIFICANT CHANGE UP (ref 80–100)
METHOD TYPE: SIGNIFICANT CHANGE UP
ORGANISM # SPEC MICROSCOPIC CNT: SIGNIFICANT CHANGE UP
ORGANISM # SPEC MICROSCOPIC CNT: SIGNIFICANT CHANGE UP
PLATELET # BLD AUTO: 167 K/UL — SIGNIFICANT CHANGE UP (ref 150–400)
POTASSIUM SERPL-MCNC: 4.1 MMOL/L — SIGNIFICANT CHANGE UP (ref 3.5–5.3)
POTASSIUM SERPL-SCNC: 4.1 MMOL/L — SIGNIFICANT CHANGE UP (ref 3.5–5.3)
RBC # BLD: 2.66 M/UL — LOW (ref 3.8–5.2)
RBC # FLD: 11.8 % — SIGNIFICANT CHANGE UP (ref 11–15)
SODIUM SERPL-SCNC: 143 MMOL/L — SIGNIFICANT CHANGE UP (ref 135–145)
SPECIMEN SOURCE: SIGNIFICANT CHANGE UP
WBC # BLD: 6.4 K/UL — SIGNIFICANT CHANGE UP (ref 3.8–10.5)
WBC # FLD AUTO: 6.4 K/UL — SIGNIFICANT CHANGE UP (ref 3.8–10.5)

## 2017-09-14 RX ADMIN — PANTOPRAZOLE SODIUM 40 MILLIGRAM(S): 20 TABLET, DELAYED RELEASE ORAL at 08:33

## 2017-09-14 RX ADMIN — NYSTATIN CREAM 1 APPLICATION(S): 100000 CREAM TOPICAL at 05:28

## 2017-09-14 RX ADMIN — Medication 81 MILLIGRAM(S): at 12:01

## 2017-09-14 RX ADMIN — SODIUM CHLORIDE 50 MILLILITER(S): 9 INJECTION, SOLUTION INTRAVENOUS at 05:28

## 2017-09-14 RX ADMIN — CLOPIDOGREL BISULFATE 75 MILLIGRAM(S): 75 TABLET, FILM COATED ORAL at 12:01

## 2017-09-14 RX ADMIN — Medication 650 MILLIGRAM(S): at 00:24

## 2017-09-14 RX ADMIN — NYSTATIN CREAM 1 APPLICATION(S): 100000 CREAM TOPICAL at 17:36

## 2017-09-14 RX ADMIN — SIMVASTATIN 20 MILLIGRAM(S): 20 TABLET, FILM COATED ORAL at 21:34

## 2017-09-14 RX ADMIN — ENOXAPARIN SODIUM 30 MILLIGRAM(S): 100 INJECTION SUBCUTANEOUS at 17:36

## 2017-09-15 ENCOUNTER — TRANSCRIPTION ENCOUNTER (OUTPATIENT)
Age: 82
End: 2017-09-15

## 2017-09-15 VITALS
SYSTOLIC BLOOD PRESSURE: 128 MMHG | DIASTOLIC BLOOD PRESSURE: 70 MMHG | RESPIRATION RATE: 18 BRPM | OXYGEN SATURATION: 98 % | HEART RATE: 80 BPM | TEMPERATURE: 99 F

## 2017-09-15 LAB
ALBUMIN SERPL ELPH-MCNC: 2.6 G/DL — LOW (ref 3.3–5)
ALP SERPL-CCNC: 62 U/L — SIGNIFICANT CHANGE UP (ref 40–120)
ALT FLD-CCNC: 19 U/L — SIGNIFICANT CHANGE UP (ref 12–78)
ANION GAP SERPL CALC-SCNC: 10 MMOL/L — SIGNIFICANT CHANGE UP (ref 5–17)
AST SERPL-CCNC: 23 U/L — SIGNIFICANT CHANGE UP (ref 15–37)
BILIRUB SERPL-MCNC: 0.4 MG/DL — SIGNIFICANT CHANGE UP (ref 0.2–1.2)
BUN SERPL-MCNC: 17 MG/DL — SIGNIFICANT CHANGE UP (ref 7–23)
CALCIUM SERPL-MCNC: 8.1 MG/DL — LOW (ref 8.5–10.1)
CHLORIDE SERPL-SCNC: 111 MMOL/L — HIGH (ref 96–108)
CO2 SERPL-SCNC: 22 MMOL/L — SIGNIFICANT CHANGE UP (ref 22–31)
CREAT SERPL-MCNC: 1.41 MG/DL — HIGH (ref 0.5–1.3)
GLUCOSE SERPL-MCNC: 105 MG/DL — HIGH (ref 70–99)
HCT VFR BLD CALC: 28.4 % — LOW (ref 34.5–45)
HGB BLD-MCNC: 9.6 G/DL — LOW (ref 11.5–15.5)
MCHC RBC-ENTMCNC: 32 PG — SIGNIFICANT CHANGE UP (ref 27–34)
MCHC RBC-ENTMCNC: 33.8 GM/DL — SIGNIFICANT CHANGE UP (ref 32–36)
MCV RBC AUTO: 94.8 FL — SIGNIFICANT CHANGE UP (ref 80–100)
PLATELET # BLD AUTO: 199 K/UL — SIGNIFICANT CHANGE UP (ref 150–400)
POTASSIUM SERPL-MCNC: 4.1 MMOL/L — SIGNIFICANT CHANGE UP (ref 3.5–5.3)
POTASSIUM SERPL-SCNC: 4.1 MMOL/L — SIGNIFICANT CHANGE UP (ref 3.5–5.3)
PROT SERPL-MCNC: 5.8 GM/DL — LOW (ref 6–8.3)
RBC # BLD: 2.99 M/UL — LOW (ref 3.8–5.2)
RBC # FLD: 11.8 % — SIGNIFICANT CHANGE UP (ref 11–15)
SODIUM SERPL-SCNC: 143 MMOL/L — SIGNIFICANT CHANGE UP (ref 135–145)
WBC # BLD: 9 K/UL — SIGNIFICANT CHANGE UP (ref 3.8–10.5)
WBC # FLD AUTO: 9 K/UL — SIGNIFICANT CHANGE UP (ref 3.8–10.5)

## 2017-09-15 RX ORDER — LISINOPRIL 2.5 MG/1
2.5 TABLET ORAL DAILY
Qty: 0 | Refills: 0 | Status: DISCONTINUED | OUTPATIENT
Start: 2017-09-15 | End: 2017-09-15

## 2017-09-15 RX ORDER — SPIRONOLACTONE 25 MG/1
1 TABLET, FILM COATED ORAL
Qty: 0 | Refills: 0 | COMMUNITY

## 2017-09-15 RX ORDER — LISINOPRIL 2.5 MG/1
1 TABLET ORAL
Qty: 0 | Refills: 0 | COMMUNITY

## 2017-09-15 RX ORDER — SENNA PLUS 8.6 MG/1
2 TABLET ORAL
Qty: 0 | Refills: 0 | COMMUNITY
Start: 2017-09-15

## 2017-09-15 RX ORDER — NITROFURANTOIN MACROCRYSTAL 50 MG
50 CAPSULE ORAL
Qty: 0 | Refills: 0 | COMMUNITY

## 2017-09-15 RX ORDER — FUROSEMIDE 40 MG
1 TABLET ORAL
Qty: 0 | Refills: 0 | COMMUNITY

## 2017-09-15 RX ORDER — METOCLOPRAMIDE HCL 10 MG
1 TABLET ORAL
Qty: 10 | Refills: 0
Start: 2017-09-15 | End: 2017-09-20

## 2017-09-15 RX ORDER — LISINOPRIL 2.5 MG/1
1 TABLET ORAL
Qty: 0 | Refills: 0 | DISCHARGE
Start: 2017-09-15

## 2017-09-15 RX ORDER — METOCLOPRAMIDE HCL 10 MG
1 TABLET ORAL
Qty: 0 | Refills: 0 | COMMUNITY
Start: 2017-09-15

## 2017-09-15 RX ORDER — METOCLOPRAMIDE HCL 10 MG
10 TABLET ORAL
Qty: 0 | Refills: 0 | Status: DISCONTINUED | OUTPATIENT
Start: 2017-09-15 | End: 2017-09-15

## 2017-09-15 RX ADMIN — Medication 81 MILLIGRAM(S): at 12:45

## 2017-09-15 RX ADMIN — ONDANSETRON 4 MILLIGRAM(S): 8 TABLET, FILM COATED ORAL at 09:11

## 2017-09-15 RX ADMIN — NYSTATIN CREAM 1 APPLICATION(S): 100000 CREAM TOPICAL at 06:00

## 2017-09-15 RX ADMIN — PANTOPRAZOLE SODIUM 40 MILLIGRAM(S): 20 TABLET, DELAYED RELEASE ORAL at 08:18

## 2017-09-15 RX ADMIN — CLOPIDOGREL BISULFATE 75 MILLIGRAM(S): 75 TABLET, FILM COATED ORAL at 12:45

## 2017-09-15 NOTE — DISCHARGE NOTE ADULT - CARE PROVIDER_API CALL
Tomás Jauregui (MD), Medicine  [address] [place] [address]  Phone: [phone]  Fax: [phone] Tomás Jauregui (MD), Medicine  [address] [place] [address]  Phone: [phone]  Fax: [phone]    [facility],   [phone]  IN 1 WEEK  Phone: (   )    -  Fax: (   )    -

## 2017-09-15 NOTE — DISCHARGE NOTE ADULT - PATIENT PORTAL LINK FT
“You can access the FollowHealth Patient Portal, offered by [facility], by registering with the following website: [email or web address]”

## 2017-09-15 NOTE — DISCHARGE NOTE ADULT - HOSPITAL COURSE
88 yo lady with AS and HTN with CABG now presents with SAE and dehydration. Patient with new onset renal failure. with SAE. Will cont to hold ACEI and Diuretics. Cont with her other meds.

## 2017-09-15 NOTE — PROGRESS NOTE ADULT - PROBLEM SELECTOR PROBLEM 2
Diabetes Mellitus
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

## 2017-09-15 NOTE — DISCHARGE NOTE ADULT - MEDICATION SUMMARY - MEDICATIONS TO STOP TAKING
I will STOP taking the medications listed below when I get home from the hospital:    spironolactone 25 mg oral tablet  -- 1 tab(s) by mouth 2 times a day    Lasix 40 mg oral tablet  -- 1 tab(s) by mouth once a day

## 2017-09-15 NOTE — DISCHARGE NOTE ADULT - PROVIDER TOKENS
TOKEN:'[id number]' TOKEN:'[id number]',FREE:[LAST:[[facility]],PHONE:[(   )    -],FAX:[(   )    -],ADDRESS:[[phone]  IN 1 WEEK]]

## 2017-09-15 NOTE — PROGRESS NOTE ADULT - SUBJECTIVE AND OBJECTIVE BOX
86 yo lady with Nausea this am. Patient is better with her renal function.
88 yo lady with AS and SAE now doing better.
Patient is better this am. Less nausea.     labs noted with decrease in Bun/Crt. Also drop in Hct.

## 2017-09-15 NOTE — DISCHARGE NOTE ADULT - MEDICATION SUMMARY - MEDICATIONS TO TAKE
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, without long-term current use of insulin    lisinopril 2.5 mg oral tablet  -- 1 tab(s) by mouth once a day  -- Indication: For HTN    glimepiride 4 mg oral tablet  -- 1 tab(s) by mouth once a day  -- Indication: For Dm    metoclopramide 10 mg oral tablet  -- 1 tab(s) by mouth 2 times a day  -- Indication: For GI Stimulant    simvastatin 20 mg oral tablet  -- 1 tab(s) by mouth once a day (at bedtime)  -- Indication: For HLD    clopidogrel 75 mg oral tablet  -- 1 tab(s) by mouth once a day  -- Indication: For HTN    metoprolol tartrate 50 mg oral tablet  -- 50 milligram(s) by mouth once a day  -- Indication: For HTN    docusate sodium 100 mg oral capsule  -- 1 cap(s) by mouth 2 times a day  -- Indication: For Stool Softner    senna oral tablet  -- 2 tab(s) by mouth once a day (at bedtime), As needed, Constipation  -- Indication: For laxative    pantoprazole 40 mg oral delayed release tablet  -- 1 tab(s) by mouth once a day (before a meal)  -- Indication: For GERD

## 2017-09-15 NOTE — PROGRESS NOTE ADULT - ASSESSMENT
86 yo lady on meds. Labs improving. Renal function better. Cont with IVF. Hct to be repeated.
Patient is doing well. Cont with meds. DC IVF.
Mild nausea this am. Add reglan.

## 2017-09-15 NOTE — DISCHARGE NOTE ADULT - SECONDARY DIAGNOSIS.
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

## 2017-09-15 NOTE — DISCHARGE NOTE ADULT - CARE PLAN
Principal Discharge DX:	Acute renal failure, unspecified acute renal failure type  Goal:	improved  Instructions for follow-up, activity and diet:	Stop diuretics for now until you follow up with Dr. 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

## 2017-09-15 NOTE — DISCHARGE NOTE ADULT - PLAN OF CARE
improved Stop diuretics for now until you follow up with Dr. Jauregui in 1-2 weeks stable continue home medication resolved

## 2017-09-17 LAB
CULTURE RESULTS: SIGNIFICANT CHANGE UP
CULTURE RESULTS: SIGNIFICANT CHANGE UP
SPECIMEN SOURCE: SIGNIFICANT CHANGE UP
SPECIMEN SOURCE: SIGNIFICANT CHANGE UP

## 2017-09-18 DIAGNOSIS — Z95.5 PRESENCE OF CORONARY ANGIOPLASTY IMPLANT AND GRAFT: ICD-10-CM

## 2017-09-18 DIAGNOSIS — I35.0 NONRHEUMATIC AORTIC (VALVE) STENOSIS: ICD-10-CM

## 2017-09-18 DIAGNOSIS — Z91.040 LATEX ALLERGY STATUS: ICD-10-CM

## 2017-09-18 DIAGNOSIS — Z85.828 PERSONAL HISTORY OF OTHER MALIGNANT NEOPLASM OF SKIN: ICD-10-CM

## 2017-09-18 DIAGNOSIS — N17.9 ACUTE KIDNEY FAILURE, UNSPECIFIED: ICD-10-CM

## 2017-09-18 DIAGNOSIS — E78.5 HYPERLIPIDEMIA, UNSPECIFIED: ICD-10-CM

## 2017-09-18 DIAGNOSIS — I10 ESSENTIAL (PRIMARY) HYPERTENSION: ICD-10-CM

## 2017-09-18 DIAGNOSIS — Z79.82 LONG TERM (CURRENT) USE OF ASPIRIN: ICD-10-CM

## 2017-09-18 DIAGNOSIS — Z79.84 LONG TERM (CURRENT) USE OF ORAL HYPOGLYCEMIC DRUGS: ICD-10-CM

## 2017-09-18 DIAGNOSIS — E11.628 TYPE 2 DIABETES MELLITUS WITH OTHER SKIN COMPLICATIONS: ICD-10-CM

## 2017-09-18 DIAGNOSIS — Z95.2 PRESENCE OF PROSTHETIC HEART VALVE: ICD-10-CM

## 2017-09-18 DIAGNOSIS — E86.0 DEHYDRATION: ICD-10-CM

## 2017-09-18 DIAGNOSIS — Z95.1 PRESENCE OF AORTOCORONARY BYPASS GRAFT: ICD-10-CM

## 2017-09-18 DIAGNOSIS — R11.2 NAUSEA WITH VOMITING, UNSPECIFIED: ICD-10-CM

## 2017-09-18 DIAGNOSIS — Z91.041 RADIOGRAPHIC DYE ALLERGY STATUS: ICD-10-CM

## 2017-09-18 DIAGNOSIS — I25.10 ATHEROSCLEROTIC HEART DISEASE OF NATIVE CORONARY ARTERY WITHOUT ANGINA PECTORIS: ICD-10-CM

## 2017-09-18 DIAGNOSIS — Z88.0 ALLERGY STATUS TO PENICILLIN: ICD-10-CM

## 2017-09-18 DIAGNOSIS — N39.0 URINARY TRACT INFECTION, SITE NOT SPECIFIED: ICD-10-CM

## 2017-09-18 DIAGNOSIS — M48.06 SPINAL STENOSIS, LUMBAR REGION: ICD-10-CM

## 2017-09-18 DIAGNOSIS — Z88.5 ALLERGY STATUS TO NARCOTIC AGENT: ICD-10-CM

## 2017-09-18 DIAGNOSIS — Z91.09 OTHER ALLERGY STATUS, OTHER THAN TO DRUGS AND BIOLOGICAL SUBSTANCES: ICD-10-CM

## 2017-09-18 DIAGNOSIS — E87.5 HYPERKALEMIA: ICD-10-CM

## 2017-10-26 ENCOUNTER — APPOINTMENT (OUTPATIENT)
Dept: CARDIOLOGY | Facility: CLINIC | Age: 82
End: 2017-10-26

## 2017-11-21 ENCOUNTER — NON-APPOINTMENT (OUTPATIENT)
Age: 82
End: 2017-11-21

## 2017-11-21 ENCOUNTER — APPOINTMENT (OUTPATIENT)
Dept: CARDIOLOGY | Facility: CLINIC | Age: 82
End: 2017-11-21
Payer: MEDICARE

## 2017-11-21 VITALS
BODY MASS INDEX: 27.01 KG/M2 | WEIGHT: 134 LBS | HEART RATE: 81 BPM | OXYGEN SATURATION: 98 % | SYSTOLIC BLOOD PRESSURE: 110 MMHG | DIASTOLIC BLOOD PRESSURE: 60 MMHG | HEIGHT: 59 IN

## 2017-11-21 DIAGNOSIS — I35.0 NONRHEUMATIC AORTIC (VALVE) STENOSIS: ICD-10-CM

## 2017-11-21 DIAGNOSIS — R06.00 DYSPNEA, UNSPECIFIED: ICD-10-CM

## 2017-11-21 DIAGNOSIS — R06.09 OTHER FORMS OF DYSPNEA: ICD-10-CM

## 2017-11-21 DIAGNOSIS — I25.10 ATHEROSCLEROTIC HEART DISEASE OF NATIVE CORONARY ARTERY W/OUT ANGINA PECTORIS: ICD-10-CM

## 2017-11-21 DIAGNOSIS — D64.9 ANEMIA, UNSPECIFIED: ICD-10-CM

## 2017-11-21 PROCEDURE — 93000 ELECTROCARDIOGRAM COMPLETE: CPT

## 2017-11-21 PROCEDURE — 36415 COLL VENOUS BLD VENIPUNCTURE: CPT

## 2017-11-21 PROCEDURE — 99213 OFFICE O/P EST LOW 20 MIN: CPT

## 2017-11-22 LAB
ALBUMIN SERPL ELPH-MCNC: 4.5 G/DL
ALP BLD-CCNC: 83 U/L
ALT SERPL-CCNC: 19 U/L
ANION GAP SERPL CALC-SCNC: 18 MMOL/L
AST SERPL-CCNC: 25 U/L
BILIRUB SERPL-MCNC: 0.4 MG/DL
BUN SERPL-MCNC: 44 MG/DL
CHLORIDE SERPL-SCNC: 95 MMOL/L
CO2 SERPL-SCNC: 25 MMOL/L
CREAT SERPL-MCNC: 1.75 MG/DL
GLUCOSE SERPL-MCNC: 256 MG/DL
POTASSIUM SERPL-SCNC: 5 MMOL/L
PROT SERPL-MCNC: 7.3 G/DL
SODIUM SERPL-SCNC: 138 MMOL/L

## 2017-11-29 ENCOUNTER — APPOINTMENT (OUTPATIENT)
Dept: CARDIOLOGY | Facility: CLINIC | Age: 82
End: 2017-11-29
Payer: MEDICARE

## 2017-11-29 PROCEDURE — 93306 TTE W/DOPPLER COMPLETE: CPT

## 2017-12-06 LAB
BASOPHILS # BLD AUTO: 0.06 K/UL
BASOPHILS NFR BLD AUTO: 0.8 %
EOSINOPHIL # BLD AUTO: 0.19 K/UL
EOSINOPHIL NFR BLD AUTO: 2.6 %
HCT VFR BLD CALC: 37.7 %
HGB BLD-MCNC: 12 G/DL
IMM GRANULOCYTES NFR BLD AUTO: 0 %
LYMPHOCYTES # BLD AUTO: 2.98 K/UL
LYMPHOCYTES NFR BLD AUTO: 40.1 %
MAN DIFF?: NORMAL
MCHC RBC-ENTMCNC: 31.3 PG
MCHC RBC-ENTMCNC: 31.8 GM/DL
MCV RBC AUTO: 98.2 FL
MONOCYTES # BLD AUTO: 0.43 K/UL
MONOCYTES NFR BLD AUTO: 5.8 %
NEUTROPHILS # BLD AUTO: 3.78 K/UL
NEUTROPHILS NFR BLD AUTO: 50.7 %
PLATELET # BLD AUTO: 241 K/UL
RBC # BLD: 3.84 M/UL
RBC # FLD: 13.5 %
WBC # FLD AUTO: 7.44 K/UL

## 2018-07-09 ENCOUNTER — INPATIENT (INPATIENT)
Facility: HOSPITAL | Age: 83
LOS: 4 days | Discharge: HOME HEALTH | End: 2018-07-14
Attending: INTERNAL MEDICINE | Admitting: INTERNAL MEDICINE
Payer: MEDICARE

## 2018-07-09 VITALS
HEART RATE: 89 BPM | WEIGHT: 130.07 LBS | SYSTOLIC BLOOD PRESSURE: 181 MMHG | TEMPERATURE: 99 F | DIASTOLIC BLOOD PRESSURE: 73 MMHG | RESPIRATION RATE: 19 BRPM | HEIGHT: 59 IN | OXYGEN SATURATION: 96 %

## 2018-07-09 PROCEDURE — 99284 EMERGENCY DEPT VISIT MOD MDM: CPT | Mod: 25

## 2018-07-09 NOTE — ED ADULT TRIAGE NOTE - CHIEF COMPLAINT QUOTE
Pt was at home and she became nauseous, pt vomited one time earlier. Pt was then found by the son earlier this evening, laying on the floor. Unsure if the patient fell or placed herself on the floor. Pt was confused when she was found on the floor by her son, but quickly came back to, but she was mumbling. Normally the patient is verbal and conversive.

## 2018-07-10 LAB
ALBUMIN SERPL ELPH-MCNC: 3.8 G/DL — SIGNIFICANT CHANGE UP (ref 3.3–5)
ALP SERPL-CCNC: 85 U/L — SIGNIFICANT CHANGE UP (ref 40–120)
ALT FLD-CCNC: 23 U/L — SIGNIFICANT CHANGE UP (ref 12–78)
ANION GAP SERPL CALC-SCNC: 9 MMOL/L — SIGNIFICANT CHANGE UP (ref 5–17)
ANION GAP SERPL CALC-SCNC: 9 MMOL/L — SIGNIFICANT CHANGE UP (ref 5–17)
APPEARANCE UR: CLEAR — SIGNIFICANT CHANGE UP
APTT BLD: 30.7 SEC — SIGNIFICANT CHANGE UP (ref 27.5–37.4)
AST SERPL-CCNC: 30 U/L — SIGNIFICANT CHANGE UP (ref 15–37)
BASOPHILS # BLD AUTO: 0.03 K/UL — SIGNIFICANT CHANGE UP (ref 0–0.2)
BASOPHILS NFR BLD AUTO: 0.2 % — SIGNIFICANT CHANGE UP (ref 0–2)
BILIRUB SERPL-MCNC: 0.5 MG/DL — SIGNIFICANT CHANGE UP (ref 0.2–1.2)
BILIRUB UR-MCNC: NEGATIVE — SIGNIFICANT CHANGE UP
BUN SERPL-MCNC: 23 MG/DL — SIGNIFICANT CHANGE UP (ref 7–23)
BUN SERPL-MCNC: 26 MG/DL — HIGH (ref 7–23)
CALCIUM SERPL-MCNC: 8.2 MG/DL — LOW (ref 8.5–10.1)
CALCIUM SERPL-MCNC: 8.4 MG/DL — LOW (ref 8.5–10.1)
CHLORIDE SERPL-SCNC: 101 MMOL/L — SIGNIFICANT CHANGE UP (ref 96–108)
CHLORIDE SERPL-SCNC: 102 MMOL/L — SIGNIFICANT CHANGE UP (ref 96–108)
CK MB BLD-MCNC: 1.4 % — SIGNIFICANT CHANGE UP (ref 0–3.5)
CK MB CFR SERPL CALC: 2.3 NG/ML — SIGNIFICANT CHANGE UP (ref 0.5–3.6)
CK SERPL-CCNC: 168 U/L — SIGNIFICANT CHANGE UP (ref 26–192)
CO2 SERPL-SCNC: 24 MMOL/L — SIGNIFICANT CHANGE UP (ref 22–31)
CO2 SERPL-SCNC: 25 MMOL/L — SIGNIFICANT CHANGE UP (ref 22–31)
COLOR SPEC: YELLOW — SIGNIFICANT CHANGE UP
CREAT SERPL-MCNC: 1.34 MG/DL — HIGH (ref 0.5–1.3)
CREAT SERPL-MCNC: 1.52 MG/DL — HIGH (ref 0.5–1.3)
DIFF PNL FLD: ABNORMAL
EOSINOPHIL # BLD AUTO: 0 K/UL — SIGNIFICANT CHANGE UP (ref 0–0.5)
EOSINOPHIL NFR BLD AUTO: 0 % — SIGNIFICANT CHANGE UP (ref 0–6)
GLUCOSE SERPL-MCNC: 134 MG/DL — HIGH (ref 70–99)
GLUCOSE SERPL-MCNC: 137 MG/DL — HIGH (ref 70–99)
GLUCOSE UR QL: 50 MG/DL
HCT VFR BLD CALC: 31.5 % — LOW (ref 34.5–45)
HCT VFR BLD CALC: 34.2 % — LOW (ref 34.5–45)
HGB BLD-MCNC: 10.5 G/DL — LOW (ref 11.5–15.5)
HGB BLD-MCNC: 11.4 G/DL — LOW (ref 11.5–15.5)
IMM GRANULOCYTES NFR BLD AUTO: 0.4 % — SIGNIFICANT CHANGE UP (ref 0–1.5)
INR BLD: 0.98 RATIO — SIGNIFICANT CHANGE UP (ref 0.88–1.16)
KETONES UR-MCNC: ABNORMAL
LACTATE SERPL-SCNC: 1 MMOL/L — SIGNIFICANT CHANGE UP (ref 0.7–2)
LEUKOCYTE ESTERASE UR-ACNC: NEGATIVE — SIGNIFICANT CHANGE UP
LYMPHOCYTES # BLD AUTO: 17.2 % — SIGNIFICANT CHANGE UP (ref 13–44)
LYMPHOCYTES # BLD AUTO: 2.11 K/UL — SIGNIFICANT CHANGE UP (ref 1–3.3)
MCHC RBC-ENTMCNC: 31.4 PG — SIGNIFICANT CHANGE UP (ref 27–34)
MCHC RBC-ENTMCNC: 31.6 PG — SIGNIFICANT CHANGE UP (ref 27–34)
MCHC RBC-ENTMCNC: 33.3 GM/DL — SIGNIFICANT CHANGE UP (ref 32–36)
MCHC RBC-ENTMCNC: 33.3 GM/DL — SIGNIFICANT CHANGE UP (ref 32–36)
MCV RBC AUTO: 94.3 FL — SIGNIFICANT CHANGE UP (ref 80–100)
MCV RBC AUTO: 94.7 FL — SIGNIFICANT CHANGE UP (ref 80–100)
MONOCYTES # BLD AUTO: 0.59 K/UL — SIGNIFICANT CHANGE UP (ref 0–0.9)
MONOCYTES NFR BLD AUTO: 4.8 % — SIGNIFICANT CHANGE UP (ref 2–14)
NEUTROPHILS # BLD AUTO: 9.52 K/UL — HIGH (ref 1.8–7.4)
NEUTROPHILS NFR BLD AUTO: 77.4 % — HIGH (ref 43–77)
NITRITE UR-MCNC: NEGATIVE — SIGNIFICANT CHANGE UP
NRBC # BLD: 0 /100 WBCS — SIGNIFICANT CHANGE UP (ref 0–0)
NRBC # BLD: 0 /100 WBCS — SIGNIFICANT CHANGE UP (ref 0–0)
NT-PROBNP SERPL-SCNC: 1609 PG/ML — HIGH (ref 0–450)
PH UR: 6 — SIGNIFICANT CHANGE UP (ref 5–8)
PLATELET # BLD AUTO: 232 K/UL — SIGNIFICANT CHANGE UP (ref 150–400)
PLATELET # BLD AUTO: 236 K/UL — SIGNIFICANT CHANGE UP (ref 150–400)
POTASSIUM SERPL-MCNC: 4.2 MMOL/L — SIGNIFICANT CHANGE UP (ref 3.5–5.3)
POTASSIUM SERPL-MCNC: 4.5 MMOL/L — SIGNIFICANT CHANGE UP (ref 3.5–5.3)
POTASSIUM SERPL-SCNC: 4.2 MMOL/L — SIGNIFICANT CHANGE UP (ref 3.5–5.3)
POTASSIUM SERPL-SCNC: 4.5 MMOL/L — SIGNIFICANT CHANGE UP (ref 3.5–5.3)
PROT SERPL-MCNC: 7.6 GM/DL — SIGNIFICANT CHANGE UP (ref 6–8.3)
PROT UR-MCNC: 100 MG/DL
PROTHROM AB SERPL-ACNC: 10.7 SEC — SIGNIFICANT CHANGE UP (ref 9.8–12.7)
RBC # BLD: 3.34 M/UL — LOW (ref 3.8–5.2)
RBC # BLD: 3.61 M/UL — LOW (ref 3.8–5.2)
RBC # FLD: 13.8 % — SIGNIFICANT CHANGE UP (ref 10.3–14.5)
RBC # FLD: 14 % — SIGNIFICANT CHANGE UP (ref 10.3–14.5)
RBC CASTS # UR COMP ASSIST: SIGNIFICANT CHANGE UP /HPF (ref 0–4)
SODIUM SERPL-SCNC: 135 MMOL/L — SIGNIFICANT CHANGE UP (ref 135–145)
SODIUM SERPL-SCNC: 135 MMOL/L — SIGNIFICANT CHANGE UP (ref 135–145)
SP GR SPEC: 1.01 — SIGNIFICANT CHANGE UP (ref 1.01–1.02)
TROPONIN I SERPL-MCNC: <.015 NG/ML — SIGNIFICANT CHANGE UP (ref 0.01–0.04)
UROBILINOGEN FLD QL: NEGATIVE MG/DL — SIGNIFICANT CHANGE UP
WBC # BLD: 11.8 K/UL — HIGH (ref 3.8–10.5)
WBC # BLD: 12.3 K/UL — HIGH (ref 3.8–10.5)
WBC # FLD AUTO: 11.8 K/UL — HIGH (ref 3.8–10.5)
WBC # FLD AUTO: 12.3 K/UL — HIGH (ref 3.8–10.5)

## 2018-07-10 PROCEDURE — 74176 CT ABD & PELVIS W/O CONTRAST: CPT | Mod: 26

## 2018-07-10 PROCEDURE — 93010 ELECTROCARDIOGRAM REPORT: CPT

## 2018-07-10 PROCEDURE — 71045 X-RAY EXAM CHEST 1 VIEW: CPT | Mod: 26

## 2018-07-10 PROCEDURE — 70450 CT HEAD/BRAIN W/O DYE: CPT | Mod: 26

## 2018-07-10 PROCEDURE — 71250 CT THORAX DX C-: CPT | Mod: 26

## 2018-07-10 RX ORDER — CIPROFLOXACIN LACTATE 400MG/40ML
200 VIAL (ML) INTRAVENOUS EVERY 24 HOURS
Qty: 0 | Refills: 0 | Status: DISCONTINUED | OUTPATIENT
Start: 2018-07-11 | End: 2018-07-14

## 2018-07-10 RX ORDER — CIPROFLOXACIN LACTATE 400MG/40ML
200 VIAL (ML) INTRAVENOUS ONCE
Qty: 0 | Refills: 0 | Status: COMPLETED | OUTPATIENT
Start: 2018-07-10 | End: 2018-07-10

## 2018-07-10 RX ORDER — SODIUM CHLORIDE 9 MG/ML
1000 INJECTION INTRAMUSCULAR; INTRAVENOUS; SUBCUTANEOUS ONCE
Qty: 0 | Refills: 0 | Status: COMPLETED | OUTPATIENT
Start: 2018-07-10 | End: 2018-07-10

## 2018-07-10 RX ORDER — ONDANSETRON 8 MG/1
4 TABLET, FILM COATED ORAL ONCE
Qty: 0 | Refills: 0 | Status: COMPLETED | OUTPATIENT
Start: 2018-07-10 | End: 2018-07-10

## 2018-07-10 RX ORDER — SENNA PLUS 8.6 MG/1
2 TABLET ORAL AT BEDTIME
Qty: 0 | Refills: 0 | Status: DISCONTINUED | OUTPATIENT
Start: 2018-07-10 | End: 2018-07-14

## 2018-07-10 RX ORDER — CIPROFLOXACIN LACTATE 400MG/40ML
VIAL (ML) INTRAVENOUS
Qty: 0 | Refills: 0 | Status: DISCONTINUED | OUTPATIENT
Start: 2018-07-10 | End: 2018-07-14

## 2018-07-10 RX ORDER — METRONIDAZOLE 500 MG
500 TABLET ORAL ONCE
Qty: 0 | Refills: 0 | Status: COMPLETED | OUTPATIENT
Start: 2018-07-10 | End: 2018-07-10

## 2018-07-10 RX ORDER — SODIUM CHLORIDE 9 MG/ML
1000 INJECTION INTRAMUSCULAR; INTRAVENOUS; SUBCUTANEOUS
Qty: 0 | Refills: 0 | Status: DISCONTINUED | OUTPATIENT
Start: 2018-07-10 | End: 2018-07-14

## 2018-07-10 RX ORDER — CIPROFLOXACIN LACTATE 400MG/40ML
250 VIAL (ML) INTRAVENOUS EVERY 24 HOURS
Qty: 0 | Refills: 0 | Status: DISCONTINUED | OUTPATIENT
Start: 2018-07-10 | End: 2018-07-10

## 2018-07-10 RX ADMIN — Medication 100 MILLIGRAM(S): at 09:12

## 2018-07-10 RX ADMIN — SODIUM CHLORIDE 1000 MILLILITER(S): 9 INJECTION INTRAMUSCULAR; INTRAVENOUS; SUBCUTANEOUS at 01:05

## 2018-07-10 RX ADMIN — ONDANSETRON 4 MILLIGRAM(S): 8 TABLET, FILM COATED ORAL at 04:58

## 2018-07-10 RX ADMIN — Medication 100 MILLIGRAM(S): at 10:33

## 2018-07-10 RX ADMIN — SODIUM CHLORIDE 50 MILLILITER(S): 9 INJECTION INTRAMUSCULAR; INTRAVENOUS; SUBCUTANEOUS at 14:14

## 2018-07-10 RX ADMIN — Medication 100 MILLIGRAM(S): at 20:24

## 2018-07-10 RX ADMIN — SODIUM CHLORIDE 50 MILLILITER(S): 9 INJECTION INTRAMUSCULAR; INTRAVENOUS; SUBCUTANEOUS at 16:03

## 2018-07-10 NOTE — ED ADULT NURSE NOTE - OBJECTIVE STATEMENT
Pt was at home and she became nauseous, pt vomited one time earlier. Pt was then found by the son earlier this evening, laying on the floor. Unsure if the patient fell or placed herself on the floor. Pt was confused when she was found on the floor by her son, but quickly came back to, but she was mumbling. Normally the patient is verbal and conversive.  Patient unable to provide any information.

## 2018-07-10 NOTE — ED PROVIDER NOTE - OBJECTIVE STATEMENT
Pertinent PMH/PSH/FHx/SHx and Review of Systems contained within:  Patient presents to the ED for altered mental status.  Patient accompanied by son at bedside who says that about 24 hours ago patient started showing signs of lethargy and confusion, not getting out of bed, not verbally communicative as usual.  Patient slipped and fell out of her bed earlier in the evening.  Per son she has been having intermittent nonbloody diarrhea over the last week, had one episode of nonbloody vomit in the ER.

## 2018-07-10 NOTE — ED PROVIDER NOTE - MEDICAL DECISION MAKING DETAILS
Patient presents for altered mental status.  VSS.  Labs reviewed.  CT findings reviewed.  Colitis suggested on CTAP, patient receiving antibiotics and IVF.  Patient is to be admitted to the hospital and the case was discussed with the admitting physician.  Any changes in plan, additional imaging/labs, and further work up will be at the discretion of the admitting physician.

## 2018-07-10 NOTE — ED PROVIDER NOTE - PHYSICAL EXAMINATION
Gen: Altered, keeps eyes closed  Head: NC, AT, PERRL, EOMI, normal lids/conjunctiva  ENT: dry mucus membranes, unable to view oropharynx  Neck: +supple, no JVD, +Trachea midline  Pulm: Bilateral BS, normal resp effort, no wheeze/stridor/retractions  CV: RRR, no M/R/G, +dist pulses  Abd: soft, ND, +BS, no palpable masses  Mskel: no edema/erythema/cyanosis  Skin: no rash, warm/dry  Neuro: Moving all extremities, withdraws from pain

## 2018-07-11 DIAGNOSIS — K52.9 NONINFECTIVE GASTROENTERITIS AND COLITIS, UNSPECIFIED: ICD-10-CM

## 2018-07-11 DIAGNOSIS — E08.29 DIABETES MELLITUS DUE TO UNDERLYING CONDITION WITH OTHER DIABETIC KIDNEY COMPLICATION: ICD-10-CM

## 2018-07-11 LAB
CULTURE RESULTS: NO GROWTH — SIGNIFICANT CHANGE UP
SPECIMEN SOURCE: SIGNIFICANT CHANGE UP

## 2018-07-11 RX ADMIN — Medication 100 MILLIGRAM(S): at 18:53

## 2018-07-11 RX ADMIN — SODIUM CHLORIDE 50 MILLILITER(S): 9 INJECTION INTRAMUSCULAR; INTRAVENOUS; SUBCUTANEOUS at 13:56

## 2018-07-11 NOTE — PHYSICAL THERAPY INITIAL EVALUATION ADULT - ADDITIONAL COMMENTS
Pt ambulates c rolling walker. Pt has 7 steps to enter home c bilateral railings and 12 steps to enter second floor c L railing up/ R railing down.

## 2018-07-11 NOTE — PHYSICAL THERAPY INITIAL EVALUATION ADULT - GAIT DEVIATIONS NOTED, PT EVAL
increased time in double stance/decreased velocity of limb motion/decreased weight-shifting ability/decreased step length/decreased shavon/decreased stride length

## 2018-07-11 NOTE — PHYSICAL THERAPY INITIAL EVALUATION ADULT - CRITERIA FOR SKILLED THERAPEUTIC INTERVENTIONS
therapy frequency/predicted duration of therapy intervention/anticipated discharge recommendation/functional limitations in following categories/risk reduction/prevention/rehab potential/impairments found/subacute rehab

## 2018-07-12 LAB
ALBUMIN SERPL ELPH-MCNC: 3.4 G/DL — SIGNIFICANT CHANGE UP (ref 3.3–5)
ALP SERPL-CCNC: 62 U/L — SIGNIFICANT CHANGE UP (ref 40–120)
ALT FLD-CCNC: 19 U/L — SIGNIFICANT CHANGE UP (ref 12–78)
ANION GAP SERPL CALC-SCNC: 10 MMOL/L — SIGNIFICANT CHANGE UP (ref 5–17)
AST SERPL-CCNC: 16 U/L — SIGNIFICANT CHANGE UP (ref 15–37)
BILIRUB SERPL-MCNC: 0.4 MG/DL — SIGNIFICANT CHANGE UP (ref 0.2–1.2)
BUN SERPL-MCNC: 25 MG/DL — HIGH (ref 7–23)
CALCIUM SERPL-MCNC: 8.5 MG/DL — SIGNIFICANT CHANGE UP (ref 8.5–10.1)
CHLORIDE SERPL-SCNC: 106 MMOL/L — SIGNIFICANT CHANGE UP (ref 96–108)
CO2 SERPL-SCNC: 24 MMOL/L — SIGNIFICANT CHANGE UP (ref 22–31)
CREAT SERPL-MCNC: 1.43 MG/DL — HIGH (ref 0.5–1.3)
GLUCOSE SERPL-MCNC: 298 MG/DL — HIGH (ref 70–99)
HCT VFR BLD CALC: 30.8 % — LOW (ref 34.5–45)
HGB BLD-MCNC: 10.1 G/DL — LOW (ref 11.5–15.5)
MCHC RBC-ENTMCNC: 31.7 PG — SIGNIFICANT CHANGE UP (ref 27–34)
MCHC RBC-ENTMCNC: 32.8 GM/DL — SIGNIFICANT CHANGE UP (ref 32–36)
MCV RBC AUTO: 96.6 FL — SIGNIFICANT CHANGE UP (ref 80–100)
NRBC # BLD: 0 /100 WBCS — SIGNIFICANT CHANGE UP (ref 0–0)
PLATELET # BLD AUTO: 200 K/UL — SIGNIFICANT CHANGE UP (ref 150–400)
POTASSIUM SERPL-MCNC: 4.2 MMOL/L — SIGNIFICANT CHANGE UP (ref 3.5–5.3)
POTASSIUM SERPL-SCNC: 4.2 MMOL/L — SIGNIFICANT CHANGE UP (ref 3.5–5.3)
PROT SERPL-MCNC: 6.7 GM/DL — SIGNIFICANT CHANGE UP (ref 6–8.3)
RBC # BLD: 3.19 M/UL — LOW (ref 3.8–5.2)
RBC # FLD: 13.9 % — SIGNIFICANT CHANGE UP (ref 10.3–14.5)
SODIUM SERPL-SCNC: 140 MMOL/L — SIGNIFICANT CHANGE UP (ref 135–145)
WBC # BLD: 5.93 K/UL — SIGNIFICANT CHANGE UP (ref 3.8–10.5)
WBC # FLD AUTO: 5.93 K/UL — SIGNIFICANT CHANGE UP (ref 3.8–10.5)

## 2018-07-12 PROCEDURE — 70450 CT HEAD/BRAIN W/O DYE: CPT | Mod: 26

## 2018-07-12 PROCEDURE — 73522 X-RAY EXAM HIPS BI 3-4 VIEWS: CPT | Mod: 26,LT

## 2018-07-12 RX ORDER — ACETAMINOPHEN 500 MG
650 TABLET ORAL EVERY 6 HOURS
Qty: 0 | Refills: 0 | Status: DISCONTINUED | OUTPATIENT
Start: 2018-07-12 | End: 2018-07-14

## 2018-07-12 RX ADMIN — Medication 650 MILLIGRAM(S): at 16:00

## 2018-07-12 RX ADMIN — Medication 650 MILLIGRAM(S): at 15:29

## 2018-07-12 RX ADMIN — Medication 100 MILLIGRAM(S): at 19:00

## 2018-07-12 RX ADMIN — SODIUM CHLORIDE 50 MILLILITER(S): 9 INJECTION INTRAMUSCULAR; INTRAVENOUS; SUBCUTANEOUS at 22:40

## 2018-07-12 NOTE — CONSULT NOTE ADULT - ASSESSMENT
Subjective Complaints:      Consult requested by ER doctor:                  Attending:     History of Present Illness:  Chief Complaint/Reason for Admission:  History of Present Illness:  HPI: awake alert speech flue t no headache ct head shows   tiny left falx tiny subdural bleed no mass effect   no seziure hx of diabetes   arm leg 4/5         PAST MEDICAL & SURGICAL HISTORY:  Basal Cell Cancer: removed from left neck 2009  History of Back Surgery: 2010  Spinal Stenosis- lumbar  Acute Mucous Pneumonia: 4/2010, resolved ; no residual problems  Detached Retina: right eye  Arthritis, Infective, Knee  Diabetes Mellitus: type 2  Angina: in 2005, s/p angioplasty with stent placement, no recurrance, no sequalae  Cataract: removal with lens implant right in 2000  S/P TKR (Total Knee Replacement): left knee  S/P Laparoscopic Cholecystectomy: 2008  Trigger Finger: release of middle and ring finger of right hand in 1990, and middle finger left hand in 2008  S/P Carpal Tunnel Release: bilateral hands in 1990  Detached Retina, Left: laser surgery in 1995  88yFemale    MEDICATIONS  (STANDING):  ciprofloxacin   IVPB      ciprofloxacin   IVPB 200 milliGRAM(s) IV Intermittent every 24 hours  sodium chloride 0.9%. 1000 milliLiter(s) (50 mL/Hr) IV Continuous <Continuous>    MEDICATIONS  (PRN):  acetaminophen   Tablet. 650 milliGRAM(s) Oral every 6 hours PRN Mild Pain (1 - 3)  aluminum hydroxide/magnesium hydroxide/simethicone Suspension 30 milliLiter(s) Oral every 4 hours PRN Dyspepsia  senna 2 Tablet(s) Oral at bedtime PRN Constipation      Allergies    codeine (Vomiting)  contrast dye -  rash (Other)  iodine (Other)  latex (Rash)  penicillin (Rash)    Intolerances      FAMILY HISTORY:      REVIEW OF SYSTEMS:  General:  No wt loss, fevers, chills, night sweats  Eyes:  Good vision, no reported pain  ENT:  No sore throat, pain, runny nose, dysphagia  CV:  No pain, palpitatioins, hypo/hypertension  Resp:  No dyspnea, cough, tachypnea, wheezing  GI:  No pain, nausea, vomiting, diarrhea, constipatiion  :  No pain, bleeding, incontinence, nocturia  Muscle:  No pain, weakness  Breast:  No pain, abscess, mass, discharge  Neuro:  No weakness, tingling, memory problems  Psych:  No fatigue, insomnia, mood problems, depression  Endocrine:  No polyuria, polydypsia, cold/heat intolerance  Heme:  No petechiae, ecchymosis, easy bruisability  Skin:  No rash, tattoos, scars, edema      Vital Signs Last 24 Hrs  T(C): 36.9 (12 Jul 2018 18:01), Max: 36.9 (12 Jul 2018 18:01)  T(F): 98.5 (12 Jul 2018 18:01), Max: 98.5 (12 Jul 2018 18:01)  HR: 67 (12 Jul 2018 18:01) (67 - 86)  BP: 147/59 (12 Jul 2018 18:01) (136/55 - 156/71)  BP(mean): --  RR: 17 (12 Jul 2018 18:01) (16 - 18)  SpO2: 99% (12 Jul 2018 18:01) (84% - 99%)    GENERAL PHYSICAL EXAM:  General:  Appears stated age, well-groomed, well-nourished, no distress  HEENT:  NC/AT, patent nares w/ pink mucosa, OP clear w/o lesions, PERRL, EOMI, conjunctivae clear, no thyromegaly, nodules, adenopathy, no JVD  Chest:  Full & symmetric excursion, no increased effort, breath sounds clear  Cardiovascular:  Regular rhythm, S1, S2, no murmur/rub/S3/S4, no carotid/femoral/abdominal bruit, radial/pedal pulses 2+, no edema  Abdomen:  Soft, non-tender, non-distended, normoactive bowel sounds, no HSM  Extremities:  Gait & station:   Digits:   Nails:   Joints, Bones, Muscles:   ROM:   Stability:  Skin:  No rash/erythema/ecchymoses/petechiae/wounds/abscess/warm/dry  Musculoskeletal:  Full ROM in all joints w/o swelling/tenderness/effusion    NEUROLOGICAL EXAM:  HENT:  Normocephalic head; atraumatic head.  Neck supple.  ENT: normal looking.  Mental State:    Alert.  Fully oriented to person, place and date.  Coherent.  Speech clear and intact.  Cooperative.  Responds appropriately.    Cranial Nerves:  II-XII:   Pupils round and reactive to light and accommodation.  Extraocular movements full.  Visual fields full (no homonymous hemianopsia).  Visual acuity wnl.  Facial symmetry intact.  Tongue midline.  Motor Functions:  Moves all extremities.  No pronator drift of UE.  Claps hand well.  Hand  intact bilaterally.  Ambulatory.    Sensory Functions:   Intact to touch and pinprick to face and extremities.    Reflexes:  Deep tendon reflexes normoactive to biceps, knees and ankles.  Babinski absent (present).  Cerebellar Testing:    Finger to nose intact.  Nystagmus absent.  Neurovascular: Carotid auscultation full without bruits.      LABS:                        10.1   5.93  )-----------( 200      ( 12 Jul 2018 10:37 )             30.8     07-12    140  |  106  |  25<H>  ----------------------------<  298<H>  4.2   |  24  |  1.43<H>    Ca    8.5      12 Jul 2018 10:37    TPro  6.7  /  Alb  3.4  /  TBili  0.4  /  DBili  x   /  AST  16  /  ALT  19  /  AlkPhos  62  07-12            RADIOLOGY & ADDITIONAL STUDIES:      Assessment & Opinion: events noted  ct ehad noted v/ tiny left falx subdural bleed no shift  no seizure no headache  arm leg 4/5 hx of diabetes  will follow     Recommendations:  for  sub acute rehab

## 2018-07-12 NOTE — CHART NOTE - NSCHARTNOTEFT_GEN_A_CORE
Medicine Hospitalist PA    Was notified by RN pt fell. Witnessed by Aide. Aide states pt was sitting in a chair, then wanted to use the bathroom so stood her up by the sink, pt then lost balance and fell backwards on b/l buttocks with head trauma (no bleeding/open wound). Pt alert and oriented but with unsteady gait. States she feels ok, denies any pain on extremities or head, only with touch at left occipital lobe. Denies cp, sob, palpitations, headache, blurry vision, n/v, abd pain, pain in extremities, numbness, tingling, fevers, chills.    Vital Signs Last 24 Hrs  T(C): 35.9 (12 Jul 2018 11:20), Max: 36.8 (11 Jul 2018 17:47)  T(F): 96.7 (12 Jul 2018 11:20), Max: 98.3 (11 Jul 2018 17:47)  HR: 86 (12 Jul 2018 13:04) (65 - 86)  BP: 156/71 (12 Jul 2018 13:04) (133/48 - 156/71)  RR: 16 (12 Jul 2018 11:20) (16 - 18)  SpO2: 95% (12 Jul 2018 11:20) (84% - 98%)    General: awake, alert & oriented x 3  HEENT: EOM intact, BRENNEN, tongue midline   CV: S1 S2  Resp: Good air entry b/l, no wheezing, no rhonchi  Abd: Soft, NT/ND, BS+  Ext: no LE edema, pulses intact, no calf tenderness, no TTP on all 4 extremities. Mild TTP on Left hip  Neuro: good finger , no arm/leg drift, sensations intact, no facial droop   Head: left occipital lobe bump with ecchymosis s/p fall, no open wound or abrasions    A/P: 88 YOF admitted with multiple falls and colitis now s/p fall with head trauma.  - STAT CT HEAD  - STAT b/l hip Xrays  - Neuro checks q4hrs  - FALL RISK  - Enhanced observation  - Continue to monitor     Notified Dr. Jauregui Medicine Hospitalist PA    Was notified by RN pt fell. Witnessed by Aide. Aide states pt was sitting in a chair, then wanted to use the bathroom so stood her up by the sink, pt then lost balance and fell backwards on b/l buttocks with head trauma (no bleeding/open wound). Pt alert and oriented but with unsteady gait. States she feels ok, denies any pain on extremities or head, only with touch at left occipital lobe. Denies cp, sob, palpitations, headache, blurry vision, n/v, abd pain, pain in extremities, numbness, tingling, fevers, chills.    Vital Signs Last 24 Hrs  T(C): 35.9 (12 Jul 2018 11:20), Max: 36.8 (11 Jul 2018 17:47)  T(F): 96.7 (12 Jul 2018 11:20), Max: 98.3 (11 Jul 2018 17:47)  HR: 86 (12 Jul 2018 13:04) (65 - 86)  BP: 156/71 (12 Jul 2018 13:04) (133/48 - 156/71)  RR: 16 (12 Jul 2018 11:20) (16 - 18)  SpO2: 95% (12 Jul 2018 11:20) (84% - 98%)    General: awake, alert & oriented x 3  HEENT: EOM intact, BRENNEN, tongue midline   CV: S1 S2  Resp: Good air entry b/l, no wheezing, no rhonchi  Abd: Soft, NT/ND, BS+  Ext: no LE edema, pulses intact, no calf tenderness, no TTP on all 4 extremities. Mild TTP on Left hip  Neuro: good finger , no arm/leg drift, sensations intact, no facial droop   Head: left occipital lobe bump with ecchymosis s/p fall, no open wound or abrasions    A/P: 88 YOF admitted with multiple falls and colitis now s/p fall with head trauma.  - STAT CT HEAD  - STAT b/l hip Xrays  - Neuro checks q4hrs  - FALL RISK  - Enhanced observation  - Continue to monitor     Notified Dr. Jauregui    Addendum  CT head with tiny subdural hematoma. Notified Dr Solorzano neuro who will see pt. Dr Jauregui aware. Continue fall precautions.

## 2018-07-13 LAB
ANION GAP SERPL CALC-SCNC: 7 MMOL/L — SIGNIFICANT CHANGE UP (ref 5–17)
BUN SERPL-MCNC: 16 MG/DL — SIGNIFICANT CHANGE UP (ref 7–23)
CALCIUM SERPL-MCNC: 8.7 MG/DL — SIGNIFICANT CHANGE UP (ref 8.5–10.1)
CHLORIDE SERPL-SCNC: 109 MMOL/L — HIGH (ref 96–108)
CO2 SERPL-SCNC: 26 MMOL/L — SIGNIFICANT CHANGE UP (ref 22–31)
CREAT SERPL-MCNC: 0.99 MG/DL — SIGNIFICANT CHANGE UP (ref 0.5–1.3)
GLUCOSE BLDC GLUCOMTR-MCNC: 170 MG/DL — HIGH (ref 70–99)
GLUCOSE BLDC GLUCOMTR-MCNC: 202 MG/DL — HIGH (ref 70–99)
GLUCOSE BLDC GLUCOMTR-MCNC: 218 MG/DL — HIGH (ref 70–99)
GLUCOSE SERPL-MCNC: 195 MG/DL — HIGH (ref 70–99)
HCT VFR BLD CALC: 30.8 % — LOW (ref 34.5–45)
HGB BLD-MCNC: 9.9 G/DL — LOW (ref 11.5–15.5)
MCHC RBC-ENTMCNC: 31.2 PG — SIGNIFICANT CHANGE UP (ref 27–34)
MCHC RBC-ENTMCNC: 32.1 GM/DL — SIGNIFICANT CHANGE UP (ref 32–36)
MCV RBC AUTO: 97.2 FL — SIGNIFICANT CHANGE UP (ref 80–100)
NRBC # BLD: 0 /100 WBCS — SIGNIFICANT CHANGE UP (ref 0–0)
PLATELET # BLD AUTO: 205 K/UL — SIGNIFICANT CHANGE UP (ref 150–400)
POTASSIUM SERPL-MCNC: 5.2 MMOL/L — SIGNIFICANT CHANGE UP (ref 3.5–5.3)
POTASSIUM SERPL-SCNC: 5.2 MMOL/L — SIGNIFICANT CHANGE UP (ref 3.5–5.3)
RBC # BLD: 3.17 M/UL — LOW (ref 3.8–5.2)
RBC # FLD: 14 % — SIGNIFICANT CHANGE UP (ref 10.3–14.5)
SODIUM SERPL-SCNC: 142 MMOL/L — SIGNIFICANT CHANGE UP (ref 135–145)
WBC # BLD: 7.04 K/UL — SIGNIFICANT CHANGE UP (ref 3.8–10.5)
WBC # FLD AUTO: 7.04 K/UL — SIGNIFICANT CHANGE UP (ref 3.8–10.5)

## 2018-07-13 PROCEDURE — 70450 CT HEAD/BRAIN W/O DYE: CPT | Mod: 26

## 2018-07-13 RX ORDER — INSULIN LISPRO 100/ML
VIAL (ML) SUBCUTANEOUS AT BEDTIME
Qty: 0 | Refills: 0 | Status: DISCONTINUED | OUTPATIENT
Start: 2018-07-13 | End: 2018-07-14

## 2018-07-13 RX ORDER — DEXTROSE 50 % IN WATER 50 %
25 SYRINGE (ML) INTRAVENOUS ONCE
Qty: 0 | Refills: 0 | Status: DISCONTINUED | OUTPATIENT
Start: 2018-07-13 | End: 2018-07-14

## 2018-07-13 RX ORDER — ASPIRIN/CALCIUM CARB/MAGNESIUM 324 MG
81 TABLET ORAL DAILY
Qty: 0 | Refills: 0 | Status: DISCONTINUED | OUTPATIENT
Start: 2018-07-13 | End: 2018-07-14

## 2018-07-13 RX ORDER — SODIUM CHLORIDE 9 MG/ML
1000 INJECTION, SOLUTION INTRAVENOUS
Qty: 0 | Refills: 0 | Status: DISCONTINUED | OUTPATIENT
Start: 2018-07-13 | End: 2018-07-14

## 2018-07-13 RX ORDER — PANTOPRAZOLE SODIUM 20 MG/1
40 TABLET, DELAYED RELEASE ORAL
Qty: 0 | Refills: 0 | Status: DISCONTINUED | OUTPATIENT
Start: 2018-07-13 | End: 2018-07-14

## 2018-07-13 RX ORDER — DEXTROSE 50 % IN WATER 50 %
15 SYRINGE (ML) INTRAVENOUS ONCE
Qty: 0 | Refills: 0 | Status: DISCONTINUED | OUTPATIENT
Start: 2018-07-13 | End: 2018-07-14

## 2018-07-13 RX ORDER — INSULIN LISPRO 100/ML
VIAL (ML) SUBCUTANEOUS
Qty: 0 | Refills: 0 | Status: DISCONTINUED | OUTPATIENT
Start: 2018-07-13 | End: 2018-07-14

## 2018-07-13 RX ORDER — METOPROLOL TARTRATE 50 MG
50 TABLET ORAL DAILY
Qty: 0 | Refills: 0 | Status: DISCONTINUED | OUTPATIENT
Start: 2018-07-13 | End: 2018-07-14

## 2018-07-13 RX ORDER — SENNA PLUS 8.6 MG/1
2 TABLET ORAL AT BEDTIME
Qty: 0 | Refills: 0 | Status: DISCONTINUED | OUTPATIENT
Start: 2018-07-13 | End: 2018-07-13

## 2018-07-13 RX ORDER — DEXTROSE 50 % IN WATER 50 %
12.5 SYRINGE (ML) INTRAVENOUS ONCE
Qty: 0 | Refills: 0 | Status: DISCONTINUED | OUTPATIENT
Start: 2018-07-13 | End: 2018-07-14

## 2018-07-13 RX ORDER — CLOPIDOGREL BISULFATE 75 MG/1
75 TABLET, FILM COATED ORAL DAILY
Qty: 0 | Refills: 0 | Status: DISCONTINUED | OUTPATIENT
Start: 2018-07-13 | End: 2018-07-14

## 2018-07-13 RX ORDER — GLUCAGON INJECTION, SOLUTION 0.5 MG/.1ML
1 INJECTION, SOLUTION SUBCUTANEOUS ONCE
Qty: 0 | Refills: 0 | Status: DISCONTINUED | OUTPATIENT
Start: 2018-07-13 | End: 2018-07-14

## 2018-07-13 RX ADMIN — Medication 50 MILLIGRAM(S): at 16:24

## 2018-07-13 RX ADMIN — Medication 81 MILLIGRAM(S): at 11:19

## 2018-07-13 RX ADMIN — SODIUM CHLORIDE 50 MILLILITER(S): 9 INJECTION INTRAMUSCULAR; INTRAVENOUS; SUBCUTANEOUS at 15:46

## 2018-07-13 RX ADMIN — CLOPIDOGREL BISULFATE 75 MILLIGRAM(S): 75 TABLET, FILM COATED ORAL at 11:19

## 2018-07-13 RX ADMIN — Medication 100 MILLIGRAM(S): at 19:09

## 2018-07-13 RX ADMIN — PANTOPRAZOLE SODIUM 40 MILLIGRAM(S): 20 TABLET, DELAYED RELEASE ORAL at 07:55

## 2018-07-13 RX ADMIN — Medication 2: at 15:46

## 2018-07-13 NOTE — H&P ADULT - PROBLEM SELECTOR PROBLEM 2
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

## 2018-07-13 NOTE — PROGRESS NOTE ADULT - ASSESSMENT
Subjective Complaints:  Historian:         REVIEW OF SYSTEMS:  Eyes:  Good vision, no reported pain  ENT:  No sore throat, pain, runny nose, dysphagia  CV:  No pain, palpitatioins, hypo/hypertension  Resp:  No dyspnea, cough, tachypnea, wheezing  GI:  No pain, nausea, vomiting, diarrhea, constipatiion  Muscle:  No pain, weakness  Neuro:  No weakness, tingling, memory problems  Psych:  No fatigue, insomnia, mood problems, depression  Endocrine:  No polyuria, polydypsia, cold/heat intolerance    Vital Signs Last 24 Hrs  T(C): 36.7 (13 Jul 2018 12:20), Max: 36.9 (12 Jul 2018 18:01)  T(F): 98 (13 Jul 2018 12:20), Max: 98.5 (12 Jul 2018 18:01)  HR: 70 (13 Jul 2018 12:20) (65 - 70)  BP: 150/70 (13 Jul 2018 12:20) (147/59 - 152/58)  BP(mean): --  RR: 18 (13 Jul 2018 12:20) (17 - 18)  SpO2: 99% (13 Jul 2018 12:20) (99% - 100%)    GENERAL PHYSICAL EXAM:  General:  Appears stated age, well-groomed, well-nourished, no distress  HEENT:  NC/AT, patent nares w/ pink mucosa, OP clear w/o lesions, PERRL, EOMI, conjunctivae clear, no thyromegaly, nodules, adenopathy, no JVD  Chest:  Full & symmetric excursion, no increased effort, breath sounds clear  Cardiovascular:  Regular rhythm, S1, S2, no murmur/rub/S3/S4, no carotid/femoral/abdominal bruit, radial/pedal pulses 2+, no edema  Abdomen:  Soft, non-tender, non-distended, normoactive bowel sounds, no HSM  Extremities:  Gait & station:   Digits:   Nails:   Joints, Bones, Muscles:   ROM:   Stability:  Skin:  No rash/erythema/ecchymoses/petechiae/wounds/abscess/warm/dry  Musculoskeletal:  Full ROM in all joints w/o swelling/tenderness/effusion    NEUROLOGICAL EXAM:  HENT:  Normocephalic head; atraumatic head.  Neck supple.  ENT: normal looking.  Mental State:    Alert.  Fully oriented to person, place and date.  Coherent.  Speech clear and intact.  Cooperative.  Responds appropriately.    Cranial Nerves:  II-XII:   Pupils round and reactive to light and accommodation.  Extraocular movements full.  Visual fields full (no homonymous hemianopsia).  Visual acuity wnl.  Facial symmetry intact.  Tongue midline.  Motor Functions:  Moves all extremities.  No pronator drift of UE.  Claps hand well.  Hand  intact bilaterally.  Ambulatory.    Sensory Functions:   Intact to touch and pinprick to face and extremities.    Reflexes:  Deep tendon reflexes normoactive to biceps, knees and ankles.  Babinski absent (present).  Cerebellar Testing:    Finger to nose intact.  Nystagmus absent.  Neurovascular: Carotid auscultation full without bruits.      LABS:                        9.9    7.04  )-----------( 205      ( 13 Jul 2018 10:05 )             30.8     07-13    142  |  109<H>  |  16  ----------------------------<  195<H>  5.2   |  26  |  0.99    Ca    8.7      13 Jul 2018 09:58    TPro  6.7  /  Alb  3.4  /  TBili  0.4  /  DBili  x   /  AST  16  /  ALT  19  /  AlkPhos  62  07-12     ass= awake alert    speech flue t arm leg 3/5 no seziure repeat ctb head noted small left falx small subbdural no shift   will follow       RADIOLOGY & ADDITIONAL STUDIES:        Recommendations: for sub acute rehab no headache
Patient with probable viral gastroenteritis doing better.
87 yo lady with apparent GI complaints with dehydration. Improving.
Patient fell. Now noted to have small subdural. Will watch for today.

## 2018-07-13 NOTE — PROGRESS NOTE ADULT - PROBLEM SELECTOR PROBLEM 2
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

## 2018-07-13 NOTE — PROGRESS NOTE ADULT - SUBJECTIVE AND OBJECTIVE BOX
87 yo lady feeling better today. Poor appetite.
89 yo lady has been doing well since admission.
87 yo lady is feeling better. Apparently fell yesterday. CT noted.

## 2018-07-14 ENCOUNTER — TRANSCRIPTION ENCOUNTER (OUTPATIENT)
Age: 83
End: 2018-07-14

## 2018-07-14 VITALS
TEMPERATURE: 98 F | DIASTOLIC BLOOD PRESSURE: 78 MMHG | RESPIRATION RATE: 18 BRPM | OXYGEN SATURATION: 99 % | SYSTOLIC BLOOD PRESSURE: 150 MMHG | HEART RATE: 78 BPM

## 2018-07-14 LAB
GLUCOSE BLDC GLUCOMTR-MCNC: 135 MG/DL — HIGH (ref 70–99)
GLUCOSE BLDC GLUCOMTR-MCNC: 301 MG/DL — HIGH (ref 70–99)
HBA1C BLD-MCNC: 6.3 % — HIGH (ref 4–5.6)

## 2018-07-14 RX ADMIN — CLOPIDOGREL BISULFATE 75 MILLIGRAM(S): 75 TABLET, FILM COATED ORAL at 11:05

## 2018-07-14 RX ADMIN — Medication 50 MILLIGRAM(S): at 05:17

## 2018-07-14 RX ADMIN — SODIUM CHLORIDE 50 MILLILITER(S): 9 INJECTION INTRAMUSCULAR; INTRAVENOUS; SUBCUTANEOUS at 05:17

## 2018-07-14 RX ADMIN — Medication 4: at 11:05

## 2018-07-14 RX ADMIN — PANTOPRAZOLE SODIUM 40 MILLIGRAM(S): 20 TABLET, DELAYED RELEASE ORAL at 07:33

## 2018-07-14 RX ADMIN — Medication 81 MILLIGRAM(S): at 11:05

## 2018-07-14 NOTE — DISCHARGE NOTE ADULT - MEDICATION SUMMARY - MEDICATIONS TO TAKE
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, with long-term current use of insulin    metoclopramide 10 mg oral tablet  -- 1 tab(s) by mouth 2 times a day  -- Indication: For 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, with long-term current use of insulin    simvastatin 20 mg oral tablet  -- 1 tab(s) by mouth once a day (at bedtime)  -- Indication: For 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, with long-term current use of insulin    clopidogrel 75 mg oral tablet  -- 1 tab(s) by mouth once a day  -- Indication: For 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, with long-term current use of insulin    metoprolol tartrate 50 mg oral tablet  -- 50 milligram(s) by mouth once a day  -- Indication: For 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

## 2018-07-14 NOTE — DISCHARGE NOTE ADULT - MEDICATION SUMMARY - MEDICATIONS TO STOP TAKING
I will STOP taking the medications listed below when I get home from the hospital:    senna oral tablet  -- 2 tab(s) by mouth once a day (at bedtime), As needed, Constipation

## 2018-07-14 NOTE — DISCHARGE NOTE ADULT - CARE PROVIDER_API CALL
Tomás Jauregui (MD), Medicine  [address] [place] [address]  Phone: [phone]  Fax: [phone]

## 2018-07-14 NOTE — DISCHARGE NOTE ADULT - PATIENT PORTAL LINK FT
You can access the [facility] Patient Portal, offered by [facility], by registering with the following website: [email or web address]

## 2018-07-14 NOTE — DISCHARGE NOTE ADULT - HOSPITAL COURSE
Patient presented to the ED with change in MS and diarrhea. Started on ivf and cipro. She has responded well. She fell an suffered a small subdural. She has had no change in MS since admission. She cont with PT.

## 2018-07-14 NOTE — DISCHARGE NOTE ADULT - CARE PLAN
Principal Discharge DX:	Altered mental status  Goal:	Patient is back to baseline  Assessment and plan of treatment:	cont hydration  Secondary Diagnosis:	Colitis

## 2018-07-14 NOTE — DISCHARGE NOTE ADULT - NSFTFSERV1RD_GEN_ALL_CORE
rehabilitation services observation and assessment/medication teaching and assessment/rehabilitation services

## 2018-07-20 DIAGNOSIS — Z79.4 LONG TERM (CURRENT) USE OF INSULIN: ICD-10-CM

## 2018-07-20 DIAGNOSIS — R80.9 PROTEINURIA, UNSPECIFIED: ICD-10-CM

## 2018-07-20 DIAGNOSIS — Z79.02 LONG TERM (CURRENT) USE OF ANTITHROMBOTICS/ANTIPLATELETS: ICD-10-CM

## 2018-07-20 DIAGNOSIS — M48.061 SPINAL STENOSIS, LUMBAR REGION WITHOUT NEUROGENIC CLAUDICATION: ICD-10-CM

## 2018-07-20 DIAGNOSIS — Z95.5 PRESENCE OF CORONARY ANGIOPLASTY IMPLANT AND GRAFT: ICD-10-CM

## 2018-07-20 DIAGNOSIS — A08.4 VIRAL INTESTINAL INFECTION, UNSPECIFIED: ICD-10-CM

## 2018-07-20 DIAGNOSIS — Z91.81 HISTORY OF FALLING: ICD-10-CM

## 2018-07-20 DIAGNOSIS — Z88.0 ALLERGY STATUS TO PENICILLIN: ICD-10-CM

## 2018-07-20 DIAGNOSIS — Z91.041 RADIOGRAPHIC DYE ALLERGY STATUS: ICD-10-CM

## 2018-07-20 DIAGNOSIS — Z91.040 LATEX ALLERGY STATUS: ICD-10-CM

## 2018-07-20 DIAGNOSIS — S06.5X0A TRAUMATIC SUBDURAL HEMORRHAGE WITHOUT LOSS OF CONSCIOUSNESS, INITIAL ENCOUNTER: ICD-10-CM

## 2018-07-20 DIAGNOSIS — Z85.828 PERSONAL HISTORY OF OTHER MALIGNANT NEOPLASM OF SKIN: ICD-10-CM

## 2018-07-20 DIAGNOSIS — E08.9 DIABETES MELLITUS DUE TO UNDERLYING CONDITION WITHOUT COMPLICATIONS: ICD-10-CM

## 2018-07-20 DIAGNOSIS — Z79.82 LONG TERM (CURRENT) USE OF ASPIRIN: ICD-10-CM

## 2018-07-20 DIAGNOSIS — R41.82 ALTERED MENTAL STATUS, UNSPECIFIED: ICD-10-CM
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
## 2018-07-20 DIAGNOSIS — E86.0 DEHYDRATION: ICD-10-CM
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
## 2018-08-04 ENCOUNTER — EMERGENCY (EMERGENCY)
Facility: HOSPITAL | Age: 83
LOS: 0 days | Discharge: HOME | End: 2018-08-05
Attending: EMERGENCY MEDICINE
Payer: MEDICARE

## 2018-08-04 VITALS
WEIGHT: 130.07 LBS | OXYGEN SATURATION: 99 % | TEMPERATURE: 98 F | HEART RATE: 72 BPM | RESPIRATION RATE: 18 BRPM | SYSTOLIC BLOOD PRESSURE: 169 MMHG | DIASTOLIC BLOOD PRESSURE: 97 MMHG

## 2018-08-04 DIAGNOSIS — R40.0 SOMNOLENCE: ICD-10-CM

## 2018-08-04 DIAGNOSIS — I10 ESSENTIAL (PRIMARY) HYPERTENSION: ICD-10-CM

## 2018-08-04 DIAGNOSIS — I25.10 ATHEROSCLEROTIC HEART DISEASE OF NATIVE CORONARY ARTERY WITHOUT ANGINA PECTORIS: ICD-10-CM

## 2018-08-04 DIAGNOSIS — E11.9 TYPE 2 DIABETES MELLITUS WITHOUT COMPLICATIONS: ICD-10-CM

## 2018-08-04 DIAGNOSIS — R41.82 ALTERED MENTAL STATUS, UNSPECIFIED: ICD-10-CM

## 2018-08-04 DIAGNOSIS — Z88.0 ALLERGY STATUS TO PENICILLIN: ICD-10-CM

## 2018-08-04 DIAGNOSIS — Z91.040 LATEX ALLERGY STATUS: ICD-10-CM

## 2018-08-04 LAB
ALBUMIN SERPL ELPH-MCNC: 3.5 G/DL — SIGNIFICANT CHANGE UP (ref 3.3–5)
ALP SERPL-CCNC: 71 U/L — SIGNIFICANT CHANGE UP (ref 40–120)
ALT FLD-CCNC: 19 U/L — SIGNIFICANT CHANGE UP (ref 12–78)
ANION GAP SERPL CALC-SCNC: 13 MMOL/L — SIGNIFICANT CHANGE UP (ref 5–17)
APPEARANCE UR: CLEAR — SIGNIFICANT CHANGE UP
AST SERPL-CCNC: 17 U/L — SIGNIFICANT CHANGE UP (ref 15–37)
BASOPHILS # BLD AUTO: 0.07 K/UL — SIGNIFICANT CHANGE UP (ref 0–0.2)
BASOPHILS NFR BLD AUTO: 1 % — SIGNIFICANT CHANGE UP (ref 0–2)
BILIRUB SERPL-MCNC: 0.4 MG/DL — SIGNIFICANT CHANGE UP (ref 0.2–1.2)
BILIRUB UR-MCNC: NEGATIVE — SIGNIFICANT CHANGE UP
BUN SERPL-MCNC: 24 MG/DL — HIGH (ref 7–23)
CALCIUM SERPL-MCNC: 8.7 MG/DL — SIGNIFICANT CHANGE UP (ref 8.5–10.1)
CHLORIDE SERPL-SCNC: 106 MMOL/L — SIGNIFICANT CHANGE UP (ref 96–108)
CK MB BLD-MCNC: 2.4 % — SIGNIFICANT CHANGE UP (ref 0–3.5)
CK MB CFR SERPL CALC: 1.7 NG/ML — SIGNIFICANT CHANGE UP (ref 0.5–3.6)
CK SERPL-CCNC: 72 U/L — SIGNIFICANT CHANGE UP (ref 26–192)
CO2 SERPL-SCNC: 22 MMOL/L — SIGNIFICANT CHANGE UP (ref 22–31)
COLOR SPEC: YELLOW — SIGNIFICANT CHANGE UP
CREAT SERPL-MCNC: 1.15 MG/DL — SIGNIFICANT CHANGE UP (ref 0.5–1.3)
DIFF PNL FLD: NEGATIVE — SIGNIFICANT CHANGE UP
EOSINOPHIL # BLD AUTO: 0.26 K/UL — SIGNIFICANT CHANGE UP (ref 0–0.5)
EOSINOPHIL NFR BLD AUTO: 3.5 % — SIGNIFICANT CHANGE UP (ref 0–6)
EPI CELLS # UR: SIGNIFICANT CHANGE UP
GLUCOSE SERPL-MCNC: 127 MG/DL — HIGH (ref 70–99)
GLUCOSE UR QL: NEGATIVE MG/DL — SIGNIFICANT CHANGE UP
HCT VFR BLD CALC: 32.2 % — LOW (ref 34.5–45)
HGB BLD-MCNC: 10.5 G/DL — LOW (ref 11.5–15.5)
IMM GRANULOCYTES NFR BLD AUTO: 0.3 % — SIGNIFICANT CHANGE UP (ref 0–1.5)
INR BLD: 1 RATIO — SIGNIFICANT CHANGE UP (ref 0.88–1.16)
KETONES UR-MCNC: NEGATIVE — SIGNIFICANT CHANGE UP
LACTATE SERPL-SCNC: 1.1 MMOL/L — SIGNIFICANT CHANGE UP (ref 0.7–2)
LEUKOCYTE ESTERASE UR-ACNC: NEGATIVE — SIGNIFICANT CHANGE UP
LIDOCAIN IGE QN: 36 U/L — LOW (ref 73–393)
LYMPHOCYTES # BLD AUTO: 2.13 K/UL — SIGNIFICANT CHANGE UP (ref 1–3.3)
LYMPHOCYTES # BLD AUTO: 29.1 % — SIGNIFICANT CHANGE UP (ref 13–44)
MCHC RBC-ENTMCNC: 32 PG — SIGNIFICANT CHANGE UP (ref 27–34)
MCHC RBC-ENTMCNC: 32.6 GM/DL — SIGNIFICANT CHANGE UP (ref 32–36)
MCV RBC AUTO: 98.2 FL — SIGNIFICANT CHANGE UP (ref 80–100)
MONOCYTES # BLD AUTO: 0.77 K/UL — SIGNIFICANT CHANGE UP (ref 0–0.9)
MONOCYTES NFR BLD AUTO: 10.5 % — SIGNIFICANT CHANGE UP (ref 2–14)
NEUTROPHILS # BLD AUTO: 4.08 K/UL — SIGNIFICANT CHANGE UP (ref 1.8–7.4)
NEUTROPHILS NFR BLD AUTO: 55.6 % — SIGNIFICANT CHANGE UP (ref 43–77)
NITRITE UR-MCNC: NEGATIVE — SIGNIFICANT CHANGE UP
NRBC # BLD: 0 /100 WBCS — SIGNIFICANT CHANGE UP (ref 0–0)
NT-PROBNP SERPL-SCNC: 1879 PG/ML — HIGH (ref 0–450)
PH UR: 8 — SIGNIFICANT CHANGE UP (ref 5–8)
PLATELET # BLD AUTO: 232 K/UL — SIGNIFICANT CHANGE UP (ref 150–400)
POTASSIUM SERPL-MCNC: 4.5 MMOL/L — SIGNIFICANT CHANGE UP (ref 3.5–5.3)
POTASSIUM SERPL-SCNC: 4.5 MMOL/L — SIGNIFICANT CHANGE UP (ref 3.5–5.3)
PROT SERPL-MCNC: 7.3 GM/DL — SIGNIFICANT CHANGE UP (ref 6–8.3)
PROT UR-MCNC: 15 MG/DL
PROTHROM AB SERPL-ACNC: 10.9 SEC — SIGNIFICANT CHANGE UP (ref 9.8–12.7)
RBC # BLD: 3.28 M/UL — LOW (ref 3.8–5.2)
RBC # FLD: 13.2 % — SIGNIFICANT CHANGE UP (ref 10.3–14.5)
SODIUM SERPL-SCNC: 141 MMOL/L — SIGNIFICANT CHANGE UP (ref 135–145)
SP GR SPEC: 1.01 — SIGNIFICANT CHANGE UP (ref 1.01–1.02)
TROPONIN I SERPL-MCNC: <.015 NG/ML — SIGNIFICANT CHANGE UP (ref 0.01–0.04)
UROBILINOGEN FLD QL: NEGATIVE MG/DL — SIGNIFICANT CHANGE UP
WBC # BLD: 7.33 K/UL — SIGNIFICANT CHANGE UP (ref 3.8–10.5)
WBC # FLD AUTO: 7.33 K/UL — SIGNIFICANT CHANGE UP (ref 3.8–10.5)

## 2018-08-04 PROCEDURE — 71045 X-RAY EXAM CHEST 1 VIEW: CPT | Mod: 26

## 2018-08-04 PROCEDURE — 99284 EMERGENCY DEPT VISIT MOD MDM: CPT

## 2018-08-04 RX ORDER — SODIUM CHLORIDE 9 MG/ML
1000 INJECTION INTRAMUSCULAR; INTRAVENOUS; SUBCUTANEOUS ONCE
Qty: 0 | Refills: 0 | Status: COMPLETED | OUTPATIENT
Start: 2018-08-04 | End: 2018-08-04

## 2018-08-04 NOTE — ED ADULT NURSE REASSESSMENT NOTE - NS ED NURSE REASSESS COMMENT FT1
first encounter with pt, would barely open eyes, would not speak but followed commands, would eventually say she is tired, at this time, pt is alert and oriented to person, baseline per son, will continue to monitor

## 2018-08-04 NOTE — ED ADULT TRIAGE NOTE - CHIEF COMPLAINT QUOTE
BIB son with c/o with altered mental status ongoing since 2pm, denies fever chills, recent hospitalization for stomach issues and dehydration

## 2018-08-05 VITALS — TEMPERATURE: 99 F

## 2018-08-05 RX ADMIN — SODIUM CHLORIDE 1000 MILLILITER(S): 9 INJECTION INTRAMUSCULAR; INTRAVENOUS; SUBCUTANEOUS at 00:00

## 2018-08-05 NOTE — ED PROVIDER NOTE - OBJECTIVE STATEMENT
Pt is a 87 yo lady with a pmhx of HTN, HL, CAD, NIDM who presents to the ED with increased drowsiness. Son says she slid down off her bed and couldn't get back up. Did not hit her head, did not have loc. Has been on treatment for UTI. No fevers, no chest pain, no cough, no abdominal pain.

## 2018-08-05 NOTE — ED PROVIDER NOTE - PROGRESS NOTE DETAILS
Pt awoke, is alert and awake, and says she was just very tired, and now is feeling better. Says she wants to go home, denies any symptoms. Labs wnl, ua and cxr normal, pt is ok for d/c with her son, will f/u w pmd Dr. Arriola.

## 2018-08-06 LAB
CULTURE RESULTS: NO GROWTH — SIGNIFICANT CHANGE UP
SPECIMEN SOURCE: SIGNIFICANT CHANGE UP

## 2018-10-09 NOTE — PROGRESS NOTE ADULT - PROBLEM SELECTOR PROBLEM 1
Drug use: Yes     Types: Cocaine, Marijuana    Sexual activity: Yes     Birth control/ protection: Condom     Other Topics Concern    None     Social History Narrative    None           REVIEW OF SYSTEMS      No Known Allergies    No current facility-administered medications on file prior to encounter. No current outpatient prescriptions on file prior to encounter. General health:  Fairly good. No fever or chills. Skin:  No itching, redness or rash. Head, eyes, ears, nose, throat:  No headache, epistaxis, rhinorrhea hearing loss or sore throat. Neck:  No pain, stiffness or masses. Cardiovascular/Respiratory system:  No chest pain, palpitation, shortness of breath, coughing or expectoration. Gastrointestinal tract: No abdominal pain, nausea, vomiting, diarrhea or constipation. Genitourinary:  No burning on micturition. No hesitancy, urgency, frequency or discoloration of urine. Locomotor:  No bone or joint pains. No swelling or deformities. Neuropsychiatric:  See HPI. GENERAL PHYSICAL EXAM:     Vitals: /71   Pulse 90   Temp 98.2 °F (36.8 °C) (Oral)   Resp 14   Ht 5' 1\" (1.549 m)   Wt 168 lb (76.2 kg)   LMP 10/07/2018   SpO2 99%   BMI 31.74 kg/m²  Body mass index is 31.74 kg/m². Pt was examined with a nurse present in the room. GENERAL APPEARANCE:  Tamika Gandhi is 23 y.o.,  female, moderately obese, nourished, conscious, alert. Does not appear to be distress or pain at this time. SKIN:  Warm, dry, no cyanosis or jaundice. HEAD:  Normocephalic, atraumatic, no swelling or tenderness. EYES:  Pupils equal, reactive to light, Conjunctiva is clear, EOMs intact vic. eyelids WNL. EARS:  No discharge, no marked hearing loss. NOSE:  No rhinorrhea, epistaxis or septal deformity. THROAT:  Not congested. No ulceration bleeding or discharge.      NECK:  No stiffness, trachea central.
Syncope, unspecified syncope type

## 2019-03-16 NOTE — ED ADULT NURSE NOTE - CHIEF COMPLAINT
RX PROGRESS NOTE: Vancomycin Therapeutic Drug Monitoring      Indication for therapy: Skin and soft tissue infection and Osteomyelitis    ALLERGIES:  No Known Allergies    Most recent height and weight information:  Weight: 67.7 kg (03/16/19 1401)  Height: 5' 7\" (170.2 cm) (03/16/19 1401)    The Following are the calculated  Current Weights for Delaney Rdz     Adjusted Ideal        64 kg 61.6 kg               Labs:  Serum Creatinine and Creatinine Clearance:  Serum creatinine: 0.5 mg/dL (L) 03/16/19 1300  Estimated creatinine clearance: 114.9 mL/min (A)    Maximum Temperature (last 24 hours)     Value Max    Temp  98 °F (36.7 °C)        WBC (K/mcL)   Date/Time Value   03/16/2019 1300 7.0     Microbiology Results  (Last 10 results in the past 7 days)    Specimen   Gram Smear   Culture Result   Status      03/16/19  1320   03/16/19  1320  03/16/19  1320     BLOOD HAND, LEFT   PENDING PENDING    03/16/19  1300   03/16/19  1300  03/16/19  1300     BLOOD ANTECUBITAL,RIGHT   PENDING PENDING            Assessment/Plan:  Briefly, this is a 61 year old female started on vancomycin for Skin and soft tissue infection and Osteomyelitis, with a target serum trough concentration of 12-18 mcg/mL.  Patient received a dose of vancomycin 1250 mg at 1332 today.  Initial dosing regimen will be 1500 mg every 12 hours (maintenance dose).    Pharmacy will monitor levels as appropriate.    Pharmacy will continue to monitor patient (renal function, microbiology data, risk factors for adverse events, appropriate duration of therapy), will order/monitor serum levels as appropriate, and will adjust dose if/when necessary.      Thank you,    Thad Clarke [facility]  3/16/2019 3:10 PM     The patient is a 88y Female complaining of altered mental status.

## 2019-07-18 NOTE — PROGRESS NOTE ADULT - SUBJECTIVE AND OBJECTIVE BOX
Chief Complaint:  Patient is a 86y old  Female who presents with a chief complaint of Syncope (08 Feb 2017 13:28)      HPI:  87 yo with HTN, CAD, CABG, Type 2 DM, Moderate Aortic Stenosis now with acute episode of syncope in the bathroom (08 Feb 2017 13:28) Dry cough. Does not recall events leading up to syncope except she was using her walker and passed out while walking into the bathroom. Son placed her in wheelchair and took her to ED. No issues now.    Review of Systems:  General:  No wt loss, fevers, chills, night sweats  Eyes:  Good vision, no reported pain  ENT:  No sore throat, pain, runny nose, dysphagia  CV:  No pain, palpitations hypo/hypertension  Resp:  No dyspnea, cough, tachypnea, wheezing  GI:  No pain, nausea, vomiting, diarrhea, constipation  :  No pain, bleeding, incontinence, nocturia  Muscle:  No pain, weakness  Breast:  No pain, abscess, mass, discharge  Neuro:  No weakness, tingling, memory problems  Psych:  No fatigue, insomnia, mood problems, depression  Endocrine:  No polyuria, polydipsia cold/heat intolerance  Heme:  No petechiae, ecchymosis, easy bruisability  Skin:  No rash, edema      Physical Exam:    Vital Signs Last 24 Hrs  T(C): 37, Max: 37.1 (02-11 @ 18:34)  T(F): 98.6, Max: 98.8 (02-11 @ 18:34)  HR: 66 (66 - 78)  BP: 122/48 (98/42 - 141/54)  RR: 18 (16 - 18)  SpO2: 96% (96% - 99%)    Tele: off tele now.  General:  Appears stated age, well-groomed, well-nourished, no distress  HEENT:  NC/AT, patent nares w/ pink mucosa, OP clear w/o lesions, EOMI, conjunctivae clear, no thyromegaly, no JVD  Chest:  Full & symmetric excursion, no increased effort, breath sounds clear  Cardiovascular:  Regular rhythm, S1, S2, no murmur/rub/S3/S4, no carotid bruit, radial/pedal pulses 2+, no edema  Abdomen:  Soft, non-tender, non-distended, normoactive bowel sounds  Extremities:  No edema.  Skin:  No rash/erythema. Skin is warm/dry  Musculoskeletal:  Full ROM in all joints w/o swelling/tenderness/effusion  Neuro/Psych:  Alert, oriented, normal      Imaging:  cxr: IMPRESSION:  Clear  lungs.  No acute airspace disease suggested.    ECG: SR otherwise wnl.    Echo:    1. Left ventricular ejection fraction, by visual estimation, is 55 to   60%.   2. Spectral Doppler shows impaired relaxation pattern of left   ventricular myocardial filling (Grade I diastolic dysfunction).   3. Moderate mitral annular calcification.   4. Peak transaortic gradient equals 26.2 mmHg, mean transaortic gradient   equals 15.7 mmHg, the calculated aortic valve area equals 1.20 cm² by the   continuity equation consistent with moderate aortic stenosis.    Assessment:87 yo with HTN, CAD, CABG, Type 2 DM, Moderate Aortic Stenosis now with acute episode of syncope in the bathroom. Feeling better.    Plan:   Con't with:  heparin  Injectable 5000Unit(s) SubCutaneous every 12 hours  sodium chloride 0.9% lock flush 3milliLiter(s) IV Push every 8 hours  aspirin  chewable 81milliGRAM(s) Oral daily  lisinopril 5milliGRAM(s) Oral daily  simvastatin 20milliGRAM(s) Oral at bedtime  clopidogrel Tablet 75milliGRAM(s) Oral daily  pantoprazole    Tablet 40milliGRAM(s) Oral before breakfast  metoprolol 25milliGRAM(s) Oral two times a day  insulin lispro (HumaLOG) corrective regimen sliding scale  SubCutaneous three times a day before meals    MEDICATIONS  (PRN):  ondansetron Injectable 4milliGRAM(s) IV Push every 6 hours PRN Nausea  senna 2Tablet(s) Oral at bedtime PRN Constipation  acetaminophen   Tablet. 650milliGRAM(s) Oral every 6 hours PRN Mild Pain (1 - 3)  dextrose Gel 1Dose(s) Oral once PRN Blood Glucose LESS THAN 70 milliGRAM(s)/deciliter  glucagon  Injectable 1milliGRAM(s) IntraMuscular once PRN Glucose LESS THAN 70 milligrams/deciliter  benzonatate 100milliGRAM(s) Oral three times a day PRN Cough    Supportive care.    Ashok Christianson MD, FACC, FASE, FASNC, FACP  Director, Heart Failure Services  [facility]  , Department of Cardiology  [facility] [phone]

## 2020-12-17 NOTE — ED ADULT NURSE NOTE - ED STAT RN HANDOFF DETAILS
Called and left a voice message to schedule patient for an 8 week follow up visit and also labwork to be done as soon as possible. Patient left without stopping at  to make these appointments.  
Report given to Debra ACEVES for continuation of care.

## 2021-01-01 NOTE — PHYSICAL THERAPY INITIAL EVALUATION ADULT - FUNCTIONAL LIMITATIONS, PT EVAL
Please make an appointment to follow up with your pediatrician for 1-2 days after discharge.     Please dab alcohol wipes on the umbilicus for the next 4 days    Please allow the umbilicus dry in the air    Please return to the hospital if the patient has a temperature of 100.4 or more has stools that are pale or bloody.
self-care/home management/community/leisure

## 2021-03-02 NOTE — DIETITIAN INITIAL EVALUATION ADULT. - WEIGHT CHANGE
A self-expanding covered  stent was deployed in the left common iliac artery.   The stent was deployed at 3/2/2021 10:20 AM.   22 X 140 OVATION ILIAC STENT GRAFT  no

## 2021-04-14 NOTE — PHYSICAL THERAPY INITIAL EVALUATION ADULT - GAIT PATTERN USED, PT EVAL
04/14/21 0005   Team Members   Responding MD Muñoz   STAT VIET CAMP   Primary RN Kathryn CAMP   Rapid Response/ Neuro Alert   Alert Type Neuro Alert / Stroke   Significant Events admit to Neuro ICU, R SDH   Location / Unit at time of call 2L NICU    Team Arrival 0005   End Time 0015   Primary Reason for Call Suspected Stroke, signs and symptoms;Staff Concern   Call initiated by Team Member   Interventions During Call Other  (NIHSS; Dysphagia Screen)   Labs Drawn During Call Metered Blood Glucose   Recommendations/ Outcomes Stabilized remains on current unit   Admission Source TAP (Tertiary Access Program)  ([facility])   Stroke Last Known Well Date and Time   Last Known Well Date 04/13/21   Last Known Well Time   (?? - unwitnessed fall with unknown downtime per ; pt with hx of dementia)   Vitals   Temp 98.3 °F (36.8 °C)   Temp src Oral   Heart Rate 73   Heart Rate Source Monitor   Heart Rhythm NSR   Resp 16   SpO2 97 %   /65   MAP (mmHg) 93   BP Location RUE - Right upper extremity   BP Method Automatic   Patient Position Semi-Milton's   Activity Response No abnormal symptoms     Sepsis Screening Value (Date Time : User)   Is the history and exam suggestive of a new infection? Choose all that apply. No   Choose all manifestations of systemic infection (SIRS criteria) that apply.  None present (04/14/21 0005 : Ml Uribe RN)   Are any of the following organ dysfunction criteria present at a site remote from the site of the infection that are NOT considered to be chronic conditions? None present(AMS at baseline) (04/14/21 0005 : Ml Uribe RN)   Severe sepsis criteria met? No (04/14/21 0005 : Ml Uribe RN)     NIH Stroke Scale 0 (04/14/21 0005)    Assessment Interval  Initial   Level of Consciousness 0 Alert   LOC Questions 0 Answers both questions correctly   LOC Commands 0 Performs both tasks correctly   Best Gaze 0 Normal   Visual 0 No visual loss   Facial Palsy 0  Normal   Motor Arm-Left 0 No drift   Motor Arm-Right 0 No drift   Motor Leg-Left 0 No drift   Motor Leg-Right 0 No drift   Limb Ataxia 0 Absent   Sensory 0 Normal   Best Language 0 No aphasia   Dysarthria 0 Normal articulation   Extinction and Inattention 0 No abnormality   NIHSS Score 0        TAP patient seen on arrival from [facility]. Patient reportedly had an unwitnessed at home while letting her dog out but does not remember event. NIHSS as noted above. RN to continue to monitor.     3-point gait

## 2021-04-21 NOTE — ED PROVIDER NOTE - EYES NEGATIVE STATEMENT, MLM
no discharge, no irritation, no pain, no redness, and no visual changes. Cimetidine Counseling:  I discussed with the patient the risks of Cimetidine including but not limited to gynecomastia, headache, diarrhea, nausea, drowsiness, arrhythmias, pancreatitis, skin rashes, psychosis, bone marrow suppression and kidney toxicity.

## 2022-04-10 ENCOUNTER — INPATIENT (INPATIENT)
Facility: HOSPITAL | Age: 87
LOS: 5 days | Discharge: NURSING, REHAB OR LONG-TERM CARE | End: 2022-04-16
Attending: INTERNAL MEDICINE | Admitting: INTERNAL MEDICINE
Payer: MEDICARE

## 2022-04-10 VITALS
RESPIRATION RATE: 18 BRPM | DIASTOLIC BLOOD PRESSURE: 90 MMHG | OXYGEN SATURATION: 98 % | HEART RATE: 98 BPM | HEIGHT: 59 IN | WEIGHT: 130.07 LBS | SYSTOLIC BLOOD PRESSURE: 190 MMHG

## 2022-04-10 LAB
ALBUMIN SERPL ELPH-MCNC: 3.9 G/DL — SIGNIFICANT CHANGE UP (ref 3.3–5)
ALP SERPL-CCNC: 77 U/L — SIGNIFICANT CHANGE UP (ref 40–120)
ALT FLD-CCNC: 16 U/L — SIGNIFICANT CHANGE UP (ref 12–78)
ANION GAP SERPL CALC-SCNC: 6 MMOL/L — SIGNIFICANT CHANGE UP (ref 5–17)
APPEARANCE UR: ABNORMAL
APTT BLD: 27.1 SEC — LOW (ref 27.5–35.5)
AST SERPL-CCNC: 19 U/L — SIGNIFICANT CHANGE UP (ref 15–37)
BACTERIA # UR AUTO: ABNORMAL
BASOPHILS # BLD AUTO: 0.07 K/UL — SIGNIFICANT CHANGE UP (ref 0–0.2)
BASOPHILS NFR BLD AUTO: 0.8 % — SIGNIFICANT CHANGE UP (ref 0–2)
BILIRUB SERPL-MCNC: 0.4 MG/DL — SIGNIFICANT CHANGE UP (ref 0.2–1.2)
BILIRUB UR-MCNC: NEGATIVE — SIGNIFICANT CHANGE UP
BUN SERPL-MCNC: 37 MG/DL — HIGH (ref 7–23)
CALCIUM SERPL-MCNC: 9.8 MG/DL — SIGNIFICANT CHANGE UP (ref 8.5–10.1)
CHLORIDE SERPL-SCNC: 104 MMOL/L — SIGNIFICANT CHANGE UP (ref 96–108)
CK MB BLD-MCNC: 1.9 % — SIGNIFICANT CHANGE UP (ref 0–3.5)
CK MB CFR SERPL CALC: 1.1 NG/ML — SIGNIFICANT CHANGE UP (ref 0.5–3.6)
CK SERPL-CCNC: 59 U/L — SIGNIFICANT CHANGE UP (ref 26–192)
CO2 SERPL-SCNC: 27 MMOL/L — SIGNIFICANT CHANGE UP (ref 22–31)
COLOR SPEC: YELLOW — SIGNIFICANT CHANGE UP
CREAT SERPL-MCNC: 1.42 MG/DL — HIGH (ref 0.5–1.3)
DIFF PNL FLD: ABNORMAL
EGFR: 35 ML/MIN/1.73M2 — LOW
EOSINOPHIL # BLD AUTO: 0.11 K/UL — SIGNIFICANT CHANGE UP (ref 0–0.5)
EOSINOPHIL NFR BLD AUTO: 1.2 % — SIGNIFICANT CHANGE UP (ref 0–6)
EPI CELLS # UR: SIGNIFICANT CHANGE UP
FLUAV AG NPH QL: SIGNIFICANT CHANGE UP
FLUBV AG NPH QL: SIGNIFICANT CHANGE UP
GLUCOSE BLDC GLUCOMTR-MCNC: 188 MG/DL — HIGH (ref 70–99)
GLUCOSE SERPL-MCNC: 174 MG/DL — HIGH (ref 70–99)
GLUCOSE UR QL: NEGATIVE MG/DL — SIGNIFICANT CHANGE UP
HCT VFR BLD CALC: 39.1 % — SIGNIFICANT CHANGE UP (ref 34.5–45)
HGB BLD-MCNC: 13.3 G/DL — SIGNIFICANT CHANGE UP (ref 11.5–15.5)
IMM GRANULOCYTES NFR BLD AUTO: 0.2 % — SIGNIFICANT CHANGE UP (ref 0–1.5)
INR BLD: 0.95 RATIO — SIGNIFICANT CHANGE UP (ref 0.88–1.16)
KETONES UR-MCNC: NEGATIVE — SIGNIFICANT CHANGE UP
LACTATE SERPL-SCNC: 1.3 MMOL/L — SIGNIFICANT CHANGE UP (ref 0.7–2)
LEUKOCYTE ESTERASE UR-ACNC: ABNORMAL
LYMPHOCYTES # BLD AUTO: 3.12 K/UL — SIGNIFICANT CHANGE UP (ref 1–3.3)
LYMPHOCYTES # BLD AUTO: 34.4 % — SIGNIFICANT CHANGE UP (ref 13–44)
MCHC RBC-ENTMCNC: 32 PG — SIGNIFICANT CHANGE UP (ref 27–34)
MCHC RBC-ENTMCNC: 34 G/DL — SIGNIFICANT CHANGE UP (ref 32–36)
MCV RBC AUTO: 94.2 FL — SIGNIFICANT CHANGE UP (ref 80–100)
MONOCYTES # BLD AUTO: 0.65 K/UL — SIGNIFICANT CHANGE UP (ref 0–0.9)
MONOCYTES NFR BLD AUTO: 7.2 % — SIGNIFICANT CHANGE UP (ref 2–14)
NEUTROPHILS # BLD AUTO: 5.09 K/UL — SIGNIFICANT CHANGE UP (ref 1.8–7.4)
NEUTROPHILS NFR BLD AUTO: 56.2 % — SIGNIFICANT CHANGE UP (ref 43–77)
NITRITE UR-MCNC: NEGATIVE — SIGNIFICANT CHANGE UP
NRBC # BLD: 0 /100 WBCS — SIGNIFICANT CHANGE UP (ref 0–0)
PH UR: 7 — SIGNIFICANT CHANGE UP (ref 5–8)
PLATELET # BLD AUTO: 247 K/UL — SIGNIFICANT CHANGE UP (ref 150–400)
POTASSIUM SERPL-MCNC: 4.1 MMOL/L — SIGNIFICANT CHANGE UP (ref 3.5–5.3)
POTASSIUM SERPL-SCNC: 4.1 MMOL/L — SIGNIFICANT CHANGE UP (ref 3.5–5.3)
PROT SERPL-MCNC: 8 GM/DL — SIGNIFICANT CHANGE UP (ref 6–8.3)
PROT UR-MCNC: 100 MG/DL
PROTHROM AB SERPL-ACNC: 11.3 SEC — SIGNIFICANT CHANGE UP (ref 10.5–13.4)
RBC # BLD: 4.15 M/UL — SIGNIFICANT CHANGE UP (ref 3.8–5.2)
RBC # FLD: 12.7 % — SIGNIFICANT CHANGE UP (ref 10.3–14.5)
RBC CASTS # UR COMP ASSIST: ABNORMAL /HPF (ref 0–4)
SARS-COV-2 RNA SPEC QL NAA+PROBE: DETECTED
SODIUM SERPL-SCNC: 137 MMOL/L — SIGNIFICANT CHANGE UP (ref 135–145)
SP GR SPEC: 1.01 — SIGNIFICANT CHANGE UP (ref 1.01–1.02)
TROPONIN I, HIGH SENSITIVITY RESULT: 18.9 NG/L — SIGNIFICANT CHANGE UP
UROBILINOGEN FLD QL: NEGATIVE MG/DL — SIGNIFICANT CHANGE UP
WBC # BLD: 9.06 K/UL — SIGNIFICANT CHANGE UP (ref 3.8–10.5)
WBC # FLD AUTO: 9.06 K/UL — SIGNIFICANT CHANGE UP (ref 3.8–10.5)
WBC UR QL: >50

## 2022-04-10 PROCEDURE — 71045 X-RAY EXAM CHEST 1 VIEW: CPT | Mod: 26

## 2022-04-10 PROCEDURE — 93010 ELECTROCARDIOGRAM REPORT: CPT

## 2022-04-10 PROCEDURE — 99285 EMERGENCY DEPT VISIT HI MDM: CPT

## 2022-04-10 PROCEDURE — 99222 1ST HOSP IP/OBS MODERATE 55: CPT

## 2022-04-10 PROCEDURE — 74176 CT ABD & PELVIS W/O CONTRAST: CPT | Mod: 26,MA

## 2022-04-10 PROCEDURE — 70450 CT HEAD/BRAIN W/O DYE: CPT | Mod: 26,MA

## 2022-04-10 RX ORDER — DEXTROSE 50 % IN WATER 50 %
25 SYRINGE (ML) INTRAVENOUS ONCE
Refills: 0 | Status: DISCONTINUED | OUTPATIENT
Start: 2022-04-10 | End: 2022-04-16

## 2022-04-10 RX ORDER — HEPARIN SODIUM 5000 [USP'U]/ML
5000 INJECTION INTRAVENOUS; SUBCUTANEOUS EVERY 12 HOURS
Refills: 0 | Status: DISCONTINUED | OUTPATIENT
Start: 2022-04-10 | End: 2022-04-16

## 2022-04-10 RX ORDER — INSULIN LISPRO 100/ML
VIAL (ML) SUBCUTANEOUS
Refills: 0 | Status: DISCONTINUED | OUTPATIENT
Start: 2022-04-10 | End: 2022-04-16

## 2022-04-10 RX ORDER — GLUCAGON INJECTION, SOLUTION 0.5 MG/.1ML
1 INJECTION, SOLUTION SUBCUTANEOUS ONCE
Refills: 0 | Status: DISCONTINUED | OUTPATIENT
Start: 2022-04-10 | End: 2022-04-16

## 2022-04-10 RX ORDER — ASPIRIN/CALCIUM CARB/MAGNESIUM 324 MG
81 TABLET ORAL DAILY
Refills: 0 | Status: DISCONTINUED | OUTPATIENT
Start: 2022-04-10 | End: 2022-04-16

## 2022-04-10 RX ORDER — LISINOPRIL 2.5 MG/1
5 TABLET ORAL DAILY
Refills: 0 | Status: DISCONTINUED | OUTPATIENT
Start: 2022-04-10 | End: 2022-04-15

## 2022-04-10 RX ORDER — CEFTRIAXONE 500 MG/1
1000 INJECTION, POWDER, FOR SOLUTION INTRAMUSCULAR; INTRAVENOUS ONCE
Refills: 0 | Status: COMPLETED | OUTPATIENT
Start: 2022-04-10 | End: 2022-04-10

## 2022-04-10 RX ORDER — SODIUM CHLORIDE 9 MG/ML
1000 INJECTION, SOLUTION INTRAVENOUS
Refills: 0 | Status: DISCONTINUED | OUTPATIENT
Start: 2022-04-10 | End: 2022-04-16

## 2022-04-10 RX ORDER — SIMVASTATIN 20 MG/1
20 TABLET, FILM COATED ORAL AT BEDTIME
Refills: 0 | Status: DISCONTINUED | OUTPATIENT
Start: 2022-04-10 | End: 2022-04-16

## 2022-04-10 RX ORDER — DEXTROSE 50 % IN WATER 50 %
15 SYRINGE (ML) INTRAVENOUS ONCE
Refills: 0 | Status: DISCONTINUED | OUTPATIENT
Start: 2022-04-10 | End: 2022-04-16

## 2022-04-10 RX ORDER — SODIUM CHLORIDE 9 MG/ML
500 INJECTION, SOLUTION INTRAVENOUS ONCE
Refills: 0 | Status: COMPLETED | OUTPATIENT
Start: 2022-04-10 | End: 2022-04-10

## 2022-04-10 RX ORDER — DEXTROSE 50 % IN WATER 50 %
12.5 SYRINGE (ML) INTRAVENOUS ONCE
Refills: 0 | Status: DISCONTINUED | OUTPATIENT
Start: 2022-04-10 | End: 2022-04-16

## 2022-04-10 RX ORDER — METOPROLOL TARTRATE 50 MG
50 TABLET ORAL
Refills: 0 | Status: DISCONTINUED | OUTPATIENT
Start: 2022-04-10 | End: 2022-04-16

## 2022-04-10 RX ORDER — ONDANSETRON 8 MG/1
4 TABLET, FILM COATED ORAL ONCE
Refills: 0 | Status: COMPLETED | OUTPATIENT
Start: 2022-04-10 | End: 2022-04-10

## 2022-04-10 RX ORDER — CLOPIDOGREL BISULFATE 75 MG/1
75 TABLET, FILM COATED ORAL DAILY
Refills: 0 | Status: DISCONTINUED | OUTPATIENT
Start: 2022-04-10 | End: 2022-04-16

## 2022-04-10 RX ADMIN — SODIUM CHLORIDE 500 MILLILITER(S): 9 INJECTION, SOLUTION INTRAVENOUS at 21:42

## 2022-04-10 RX ADMIN — ONDANSETRON 4 MILLIGRAM(S): 8 TABLET, FILM COATED ORAL at 17:11

## 2022-04-10 RX ADMIN — CEFTRIAXONE 1000 MILLIGRAM(S): 500 INJECTION, POWDER, FOR SOLUTION INTRAMUSCULAR; INTRAVENOUS at 21:36

## 2022-04-10 RX ADMIN — CEFTRIAXONE 100 MILLIGRAM(S): 500 INJECTION, POWDER, FOR SOLUTION INTRAMUSCULAR; INTRAVENOUS at 21:04

## 2022-04-10 NOTE — ED ADULT TRIAGE NOTE - CHIEF COMPLAINT QUOTE
pt BIBA with c/o acute onset of AMS 3 hours ago, GCS of 11 in the field  in the field, need rectal temp

## 2022-04-10 NOTE — ED PROVIDER NOTE - NS ED MD DISPO SPECIAL CONSIDERATION1
1:1 Sitter/Fall Precaution/Suicide Precautions/Hearing Impaired/Dementia/Geriatrics/Frailty/Confirmed COVID-19 1:1 Sitter/Fall Precaution/Suicide Precautions/Geriatrics/Frailty/Confirmed COVID-19

## 2022-04-10 NOTE — ED PROVIDER NOTE - EMPLOYMENT
ED Chief Complaint/HPI





- Patient Information


Date Seen:: 07/31/19


Time Seen:: 11:00


Chief Complaint:: Abdominal Pain


History of Present Illness:: 





onset x one day of intermittent, dull, diffuse, crampy abdominal pain, N/V/D; 

no report of/pt denies trauma, H/As, S/T, neck pain, cough, C/P, SOB, A/C, fever

, chills, or urinary s/s


Allergies:: 


 Allergies











Allergy/AdvReac Type Severity Reaction Status Date / Time


 


No Known Allergies Allergy   Verified 07/31/19 11:15











Historian:: Patient, EMS


Review:: Nurse's Note Reviewed, Old Chart Reviewed, EMS run form Reviewed





ED Review of Systems





- Review of Systems


General/Constitutional: No fever, No chills, No weight loss, No weakness, No 

diaphoresis, No edema, No loss of appetite


Skin: No skin lesions, No rash, No bruising


Head: No headache, No light-headedness


Eyes: No loss of vision, No pain, No diplopia


ENT: No earache, No nasal drainage, No sore throat, No tinnitus


Neck: No neck pain, No swelling, No thyromegaly, No stiffness, No mass noted


Cardio Vascular: No chest pain, No palpitations, No PND, No orthopnea, No edema


Pulmonary: No SOB, No cough, No sputum, No wheezing


GI: Nausea, Vomiting, Diarrhea, Pain, No melena, No hematochezia, No 

constipation, No hematemesis


G/U: No dysuria, No frequency, No hematuria, No nacturia


Ob/Gyn: No vaginal discharge, No abnormal vaginal bleed, No contraction


Musculoskeletal: No bone or joint pain, No back pain, No muscle pain


Endocrine: No polyuria, No polydipsia


Psychiatric: No prior psych history, No depression, No anxiety, No suicidal 

ideation, No homicidal ideation, No auditory hallucination, No visual 

hallucination


Hematopoietic: No bruising, No lymphadenopathy


Allergic/Immuno: No urticaria, No angioedema


Neurological: No syncope, No focal symptoms, No weakness, No paresthesia, No 

headache, No seizure, No dizziness, No confusion, No vertigo





ED Past Medical History





- Past Medical History


Obtainable: Yes


Past Medical History: HTN, PUD/GERD, Other (Cirrhosis)


Family History: HTN


Social History: Smoker, Alcohol, No Drug Use, Single


Surgical History: Cholecystectomy


Psychiatricy History: None


Medication: Reviewed





Family Medical History





- Family Member


  ** Mother


History Unknown: Yes





ED Physical Exam





- Physical Examination


General/Constitutional: Awake, Well-developed, well-nourished, Alert, No 

distress, GCS 15, Non-toxic appearing, Ambulatory


Head: Atraumatic


Eyes: Lids, conjuctiva normal, PERRL, EOMI


Skin: Nl inspection, No rash, No skin lesions, No ecchymosis, Well hydrated, No 

lymphadenopathy


ENMT: External ears, nose nl, TM canals nl, Nasal exam nl, Lips, teeth, gums nl

, Oropharynx nl, Tonsils nl


Neck: Nontender, Full ROM w/o pain, No JVD, No nuchal rigidity, No bruit, No 

mass, No stridor


Respiratory: Nl effort/Exclusion, Clear to Auscultation, No Wheeze/Rhonchi/Rales


Cardio Vascular: RRR, No murmur, gallop, rubs, NL S1 S2, Carotid/Femoral/Distal 

pulses equal bilaterally


GI: No tenderness/rebounding/guarding, No organomegaly, No hernia, Normal BS's, 

Nondistended, No mass/bruits, No McBurney tenderness, Rectum exam nl


: No CVA tenderness


Extremities: No tenderness or effusion, Full ROM, normal strength in all 

extremities, No edema, Normal digits & nails


Neuro/Psych: Alert/oriented, DTR's symmetric, Normal sensory exam, Normal motor 

strength, Judgement/insight normal, Mood normal, Normal gait, No focal deficits


Misc: Normal back, No paraspinal tenderness





ED Labs/Radiology/EKG Results





- Lab Results


Comments:: 





Reviewed





- Radiology Results


Comments:: 





+ Air/Fluid Levels; + Cirrhosis





- EKG Interpretations


EKG Time:: 11:19


Rate & Rhythm: 73; NSR


Comments:: 





non-specific st-t changes





ED Septic Shock





- .


Is Septic Shock (SBP<90, OR Lactate>4 mmol\L) present?: No





ED Reassessment (Disposition)





- Reassessment


Reassessment Condition:: Improved





- Diagnosis


Diagnosis:: 





Abdominal Pain; N/V/D; AGE; Anemia; Leukopenia; Partial SBO; Hypoalbuminemia; 

Elevated LFTs; Hypokalemia; UTI; Sepsis; Cirrhosis; Substance Abuse





- Aftercare/Follow up Instructions


Aftercare/Follow-Up Instructions:: Counseled pt regarding lab results/diagnosis 

& need follow up, Counseled pt & family regarding lab results/diagnosis & need 

follow up





- Patient Disposition


Discharge/Transfer:: Acute Care w/in this hosp


Accepting Physician:: Dr. Fischer


Time Called:: 1300


Time Responded:: 13:00


Admitted to:: Telemetry


Spoke to:: Dr. Fischer


Admitting Medical Physician:: Dr. Fischer


Condition at Disposition:: Stable, Improved
N/A

## 2022-04-10 NOTE — ED PROVIDER NOTE - CARE PLAN
1 Principal Discharge DX:	Acute UTI  Secondary Diagnosis:	AMS (altered mental status)  Secondary Diagnosis:	Suicidal ideation  Secondary Diagnosis:	Pneumonia due to COVID-19 virus

## 2022-04-10 NOTE — ED PROVIDER NOTE - PHYSICAL EXAMINATION
Gen: no acute distress  Cardiac: regular rate and rhythm, +S1S2  Pulm: Clear to auscultation bilaterally  Abd: soft, nontender, nondistended, no guarding  Neuro: confused

## 2022-04-10 NOTE — ED ADULT NURSE NOTE - NSIMPLEMENTINTERV_GEN_ALL_ED
Implemented All Fall with Harm Risk Interventions:  [place] to call system. Call bell, personal items and telephone within reach. Instruct patient to call for assistance. Room bathroom lighting operational. Non-slip footwear when patient is off stretcher. Physically safe environment: no spills, clutter or unnecessary equipment. Stretcher in lowest position, wheels locked, appropriate side rails in place. Provide visual cue, wrist band, yellow gown, etc. Monitor gait and stability. Monitor for mental status changes and reorient to person, place, and time. Review medications for side effects contributing to fall risk. Reinforce activity limits and safety measures with patient and family. Provide visual clues: red socks.

## 2022-04-10 NOTE — ED ADULT NURSE NOTE - ED STAT RN HANDOFF DETAILS 2
Report given to receiving ED Hold RN Danette Baker, pts history, current condition and reason for admission discussed, safety concerns addressed and reviewed, pt currently in stable condition, IV flushes for patency and site shows no signs or symptoms of infiltrate, dressing is clean dry and intact. Pt is AMS, nonverbal a this time, responds to verbal. Pt is maintaining on 3L NC. Skin is intact. 1:1 observation ongoing for SI.

## 2022-04-10 NOTE — ED PROVIDER NOTE - PROGRESS NOTE DETAILS
SUSANNA SHEPPARD: discussed with ortho about t11 compression fx, to be seen in AM. No recommendations at this time. To be admitted for UTI and AMS. Placed on 1:1 for apparent SI shouting "I want to die". Questionable if secondary to dementia or confusion/ams from UTI. Otherwise CT HEAd negative. to be admitted, hemodynamically stable, update son in room.

## 2022-04-10 NOTE — ED PROVIDER NOTE - OBJECTIVE STATEMENT
89 yo female with PMH CABG, back surgery, knee replacement, DM, ?CVA presents to ED for evaluation   Per son, patient was altered  Awakened, took shower, ate breakfast and then became progressively more altered.   Last known normal 8am, around 9am she became more altered.   Denies fevers, chills. States she is on antibiotics for UTI. Denies abd pain, nausea vomiting, diarrhea. Normal speech  Baseline mental status: forgetful  Baseline ambulation: walker  Staying in bed today  Allergies: pcn  Has been vaccinated for covid. Denies recent travel or sick contacts.   Denies smoking, occasional EtOH use, denies illicit drug use  Son: Frank Burnham [phone] 89 yo female with PMH CABG, back surgery, knee replacement, DM, ?CVA presents to ED for evaluation ams  Per son, patient was altered  Awakened, took shower, ate breakfast and then became progressively more altered.   Last known normal 8am, around 9am she became more altered.   Denies fevers, chills. States she is on antibiotics for UTI. Denies abd pain, nausea vomiting, diarrhea. Normal speech  Baseline mental status: forgetful  Baseline ambulation: walker  Staying in bed today  Allergies: pcn  Has been vaccinated for covid. Denies recent travel or sick contacts.   Denies smoking, occasional EtOH use, denies illicit drug use  Son: Frank Burnham [phone]

## 2022-04-10 NOTE — ED ADULT NURSE NOTE - ED STAT RN HANDOFF DETAILS
Report received from KETAN Sales at 7pm. Assessment available on [facility]. Pt resting comfortably in bed, on cardiac monitor, pt started on 3L NC. 1:1 constant observation initiated for Suicidal ideation. Pending blood cultures. Family at bedside.  will continue to monitor.

## 2022-04-10 NOTE — ED ADULT NURSE NOTE - OBJECTIVE STATEMENT
Patient alert and responsive, oriented X 1,  son states patient was noted with altered mental status early this afternoon. Denies fevers, chills vomiting or diarrhea.

## 2022-04-11 ENCOUNTER — TRANSCRIPTION ENCOUNTER (OUTPATIENT)
Age: 87
End: 2022-04-11

## 2022-04-11 DIAGNOSIS — N39.0 URINARY TRACT INFECTION, SITE NOT SPECIFIED: ICD-10-CM

## 2022-04-11 DIAGNOSIS — Z29.9 ENCOUNTER FOR PROPHYLACTIC MEASURES, UNSPECIFIED: ICD-10-CM

## 2022-04-11 DIAGNOSIS — G93.41 METABOLIC ENCEPHALOPATHY: ICD-10-CM

## 2022-04-11 DIAGNOSIS — U07.1 COVID-19: ICD-10-CM

## 2022-04-11 DIAGNOSIS — E11.9 TYPE 2 DIABETES MELLITUS WITHOUT COMPLICATIONS: ICD-10-CM

## 2022-04-11 DIAGNOSIS — E78.5 HYPERLIPIDEMIA, UNSPECIFIED: ICD-10-CM

## 2022-04-11 DIAGNOSIS — I10 ESSENTIAL (PRIMARY) HYPERTENSION: ICD-10-CM

## 2022-04-11 DIAGNOSIS — S32.000A WEDGE COMPRESSION FRACTURE OF UNSPECIFIED LUMBAR VERTEBRA, INITIAL ENCOUNTER FOR CLOSED FRACTURE: ICD-10-CM

## 2022-04-11 DIAGNOSIS — K59.00 CONSTIPATION, UNSPECIFIED: ICD-10-CM

## 2022-04-11 LAB
A1C WITH ESTIMATED AVERAGE GLUCOSE RESULT: 8.8 % — HIGH (ref 4–5.6)
ESTIMATED AVERAGE GLUCOSE: 206 MG/DL — HIGH (ref 68–114)
GLUCOSE BLDC GLUCOMTR-MCNC: 133 MG/DL — HIGH (ref 70–99)
GLUCOSE BLDC GLUCOMTR-MCNC: 154 MG/DL — HIGH (ref 70–99)
GLUCOSE BLDC GLUCOMTR-MCNC: 165 MG/DL — HIGH (ref 70–99)
GLUCOSE BLDC GLUCOMTR-MCNC: 219 MG/DL — HIGH (ref 70–99)
GLUCOSE BLDC GLUCOMTR-MCNC: 92 MG/DL — SIGNIFICANT CHANGE UP (ref 70–99)

## 2022-04-11 PROCEDURE — 73562 X-RAY EXAM OF KNEE 3: CPT | Mod: 26,LT

## 2022-04-11 PROCEDURE — 72110 X-RAY EXAM L-2 SPINE 4/>VWS: CPT | Mod: 26

## 2022-04-11 PROCEDURE — 99233 SBSQ HOSP IP/OBS HIGH 50: CPT

## 2022-04-11 PROCEDURE — 72070 X-RAY EXAM THORAC SPINE 2VWS: CPT | Mod: 26

## 2022-04-11 PROCEDURE — 73590 X-RAY EXAM OF LOWER LEG: CPT | Mod: 26,LT

## 2022-04-11 RX ORDER — POLYETHYLENE GLYCOL 3350 17 G/17G
17 POWDER, FOR SOLUTION ORAL ONCE
Refills: 0 | Status: COMPLETED | OUTPATIENT
Start: 2022-04-11 | End: 2022-04-12

## 2022-04-11 RX ORDER — ACETAMINOPHEN 500 MG
650 TABLET ORAL EVERY 6 HOURS
Refills: 0 | Status: DISCONTINUED | OUTPATIENT
Start: 2022-04-11 | End: 2022-04-16

## 2022-04-11 RX ORDER — CEFTRIAXONE 500 MG/1
1000 INJECTION, POWDER, FOR SOLUTION INTRAMUSCULAR; INTRAVENOUS EVERY 24 HOURS
Refills: 0 | Status: COMPLETED | OUTPATIENT
Start: 2022-04-11 | End: 2022-04-13

## 2022-04-11 RX ORDER — METOPROLOL TARTRATE 50 MG
50 TABLET ORAL
Qty: 0 | Refills: 0 | DISCHARGE

## 2022-04-11 RX ADMIN — Medication 50 MILLIGRAM(S): at 06:54

## 2022-04-11 RX ADMIN — Medication 81 MILLIGRAM(S): at 13:28

## 2022-04-11 RX ADMIN — Medication 1: at 09:19

## 2022-04-11 RX ADMIN — CLOPIDOGREL BISULFATE 75 MILLIGRAM(S): 75 TABLET, FILM COATED ORAL at 13:28

## 2022-04-11 RX ADMIN — HEPARIN SODIUM 5000 UNIT(S): 5000 INJECTION INTRAVENOUS; SUBCUTANEOUS at 06:54

## 2022-04-11 RX ADMIN — HEPARIN SODIUM 5000 UNIT(S): 5000 INJECTION INTRAVENOUS; SUBCUTANEOUS at 18:42

## 2022-04-11 RX ADMIN — LISINOPRIL 5 MILLIGRAM(S): 2.5 TABLET ORAL at 06:54

## 2022-04-11 RX ADMIN — SIMVASTATIN 20 MILLIGRAM(S): 20 TABLET, FILM COATED ORAL at 21:56

## 2022-04-11 RX ADMIN — Medication 50 MILLIGRAM(S): at 18:42

## 2022-04-11 RX ADMIN — CEFTRIAXONE 100 MILLIGRAM(S): 500 INJECTION, POWDER, FOR SOLUTION INTRAMUSCULAR; INTRAVENOUS at 13:29

## 2022-04-11 NOTE — H&P ADULT - NSHPPHYSICALEXAM_GEN_ALL_CORE
Physical exam:  General: patient in no acute distress, resting comfortably  Head:  Atraumatic, Normocephalic  Eyes: EOMI, PERRLA, clear sclera  Neck: Supple, thyroid nontender, non enlarged  Cardio: deferred  Resp: deferred  GI: abdomen soft, nontender, non distended, no guarding, BS +ve x 4  Ext: no significant pedal edema  Neuro: awake but not alert, responds to light touch  Skin: No rashes or lesions

## 2022-04-11 NOTE — PROGRESS NOTE ADULT - SUBJECTIVE AND OBJECTIVE BOX
89 yo lady with multiple issues now with AMS and COVID. Remains confused on abx.     Vital Signs Last 24 Hrs  T(C): 36.7 (11 Apr 2022 17:00), Max: 37.1 (10 Apr 2022 23:02)  T(F): 98 (11 Apr 2022 17:00), Max: 98.7 (10 Apr 2022 23:02)  HR: 84 (11 Apr 2022 17:00) (57 - 95)  BP: 153/83 (11 Apr 2022 17:00) (101/40 - 173/73)  BP(mean): --  RR: 18 (11 Apr 2022 17:00) (12 - 19)  SpO2: 98% (11 Apr 2022 17:00) (93% - 100%)    MEDICATIONS  (STANDING):  aspirin  chewable 81 milliGRAM(s) Oral daily  cefTRIAXone   IVPB 1000 milliGRAM(s) IV Intermittent every 24 hours  clopidogrel Tablet 75 milliGRAM(s) Oral daily  dextrose 5%. 1000 milliLiter(s) (50 mL/Hr) IV Continuous <Continuous>  dextrose 5%. 1000 milliLiter(s) (100 mL/Hr) IV Continuous <Continuous>  dextrose 50% Injectable 25 Gram(s) IV Push once  dextrose 50% Injectable 12.5 Gram(s) IV Push once  dextrose 50% Injectable 25 Gram(s) IV Push once  glucagon  Injectable 1 milliGRAM(s) IntraMuscular once  heparin   Injectable 5000 Unit(s) SubCutaneous every 12 hours  insulin lispro (ADMELOG) corrective regimen sliding scale   SubCutaneous three times a day before meals  lisinopril 5 milliGRAM(s) Oral daily  metoprolol tartrate 50 milliGRAM(s) Oral two times a day  polyethylene glycol 3350 17 Gram(s) Oral once  simvastatin 20 milliGRAM(s) Oral at bedtime    MEDICATIONS  (PRN):  acetaminophen     Tablet .. 650 milliGRAM(s) Oral every 6 hours PRN Temp greater or equal to 38C (100.4F), Moderate Pain (4 - 6)  dextrose Oral Gel 15 Gram(s) Oral once PRN Blood Glucose LESS THAN 70 milliGRAM(s)/deciliter

## 2022-04-11 NOTE — PATIENT PROFILE ADULT - FUNCTIONAL SCREEN CURRENT LEVEL: COMMUNICATION, MLM
3 = unable to understand (not related to language barrier) 2 = difficulty understanding and speaking (not related to language barrier)

## 2022-04-11 NOTE — DISCHARGE NOTE NURSING/CASE MANAGEMENT/SOCIAL WORK - NSDCVIVACCINE_GEN_ALL_CORE_FT
Tdap; 02-May-2014 19:23; Eric Perez (RN); [id number]; IntraMuscular; Deltoid Left.; 0.5 milliLiter(s);

## 2022-04-11 NOTE — CONSULT NOTE ADULT - ASSESSMENT
89F with AI T11 VCF, left knee pain    Plan:  Medical management appreciated  Imaging reviewed with above findings appreciated  No evidence of acute cord compression at this time. Actively moving all extremities although muscle grading not possible at this time given altered mental status and inability to follow commands. No UMN signs present. No midline TTP suggests more likely chronic fracture than acute, as well as no reported trauma/falls, more likely incidental finding. Patient unable to provide much history but no reports of urinary/bowel incontinence, although currently being treated for UTI.   XR T/LSpine ordered for further evaluate VCF and for comparison at outpatient follow up  XR L Knee/TibFib ordered due to pain on secondary exam  Will discuss with Dr. Mccain regarding need for MRI imaging to determine if acute v subacute v chronic fracture, as well as if candidate for LSO brace (but no pain at this time)  Recommend pain control PRN  DVT ppx per primary team; however consider risks v benefits given possible acute VCF that could bleed and cause epidural hematoma (although suspicion for subacute/chronic VCF more likely)  If no further acute findings on imaging, No acute orthopedic surgical intervention indicated at this time. Ortho stable for discharge when medically appropriate. Patient to follow up with Dr. Mccain or previous spine surgeon as outpatient for further evaluation and treatment.  Will discuss with attending Dr. Mccain and advise if any changes to plan 89F with AI T11 VCF, left knee pain    Plan:  Medical management appreciated  Imaging reviewed with above findings appreciated  No evidence of acute cord compression at this time. Actively moving all extremities although muscle grading not possible at this time given altered mental status and inability to follow commands. No UMN signs present. No midline TTP suggests more likely chronic fracture than acute, as well as no reported trauma/falls, more likely incidental finding. Patient unable to provide much history but no reports of urinary/bowel incontinence, although currently being treated for UTI.   XR T/LSpine ordered for further evaluate VCF and for comparison at outpatient follow up  XR L Knee/TibFib ordered due to pain on secondary exam  No need for MRI imaging as no apparent cord compression or middle/posterior column involvement.   Not a good candidate for LSO bracing given dementia and age, as well as no complaint of back pain at this time, likely chronic incidental finding.  Recommend pain control PRN  DVT ppx per primary team; however consider risks v benefits given possible acute VCF that could bleed and cause epidural hematoma (although suspicion for subacute/chronic VCF more likely)  If no further acute findings on imaging, No acute orthopedic surgical intervention indicated at this time. Ortho stable for discharge when medically appropriate. Patient to follow up with Dr. Mccain or previous spine surgeon as outpatient for further evaluation and treatment.  Discussed with attending Dr. Mccain who agrees with plan

## 2022-04-11 NOTE — H&P ADULT - HISTORY OF PRESENT ILLNESS
Patient is a 89F with a PMH of HTN, HLD, CAD, DM who presents to the ED for AMS.  Patient currently awake but not alert, unable to provide history.  Per ED attending who spoke to family, patient was at her baseline mental status this morning but became worse.  Reportedly stated that she wanted to die to EMS personnel on the way to the ED, did not have a suicide plan.  Reportedly being treated for UTI at home.  Hypertensive in triage - 190/90.  Labs benign.  COVID swab positive in ED.  Will admit to tele.

## 2022-04-11 NOTE — PATIENT PROFILE ADULT - FALL HARM RISK - HARM RISK INTERVENTIONS
Communicate Risk of Fall with Harm to all staff/Reinforce activity limits and safety measures with patient and family/Tailored Fall Risk Interventions/Visual Cue: Yellow wristband and red socks/Bed in lowest position, wheels locked, appropriate side rails in place/Call bell, personal items and telephone in reach/Instruct patient to call for assistance before getting out of bed or chair/Non-slip footwear when patient is out of bed/[place] to call system/Physically safe environment - no spills, clutter or unnecessary equipment/Purposeful Proactive Rounding/Room/bathroom lighting operational, light cord in reach Assistance with ambulation/Assistance OOB with selected safe patient handling equipment/Communicate Risk of Fall with Harm to all staff/Discuss with provider need for PT consult/Monitor gait and stability/Provide patient with walking aids - walker, cane, crutches/Reinforce activity limits and safety measures with patient and family/Tailored Fall Risk Interventions/Visual Cue: Yellow wristband and red socks/Bed in lowest position, wheels locked, appropriate side rails in place/Call bell, personal items and telephone in reach/Instruct patient to call for assistance before getting out of bed or chair/Non-slip footwear when patient is out of bed/[place] to call system/Physically safe environment - no spills, clutter or unnecessary equipment/Purposeful Proactive Rounding/Room/bathroom lighting operational, light cord in reach

## 2022-04-11 NOTE — DISCHARGE NOTE NURSING/CASE MANAGEMENT/SOCIAL WORK - PATIENT PORTAL LINK FT
You can access the FollowMyHealth Patient Portal offered by [facility] by registering at the following website: [email or web address]. By joining [employer]’s FollowMyHealth portal, you will also be able to view your health information using other applications (apps) compatible with our system.

## 2022-04-11 NOTE — H&P ADULT - ASSESSMENT
Patient is a 89F with a PMH of HTN, HLD, CAD, DM who presents to the ED for AMS.  Patient currently awake but not alert, unable to provide history.  Per ED attending who spoke to family, patient was at her baseline mental status this morning but became worse.  Reportedly stated that she wanted to die to EMS personnel on the way to the ED, did not have a suicide plan.  Reportedly being treated for UTI at home.  Hypertensive in triage - 190/90.  Labs benign.  COVID swab positive in ED.  Will admit to tele.      IMPROVE VTE Individual Risk Assessment          RISK                                                          Points  [  ] Previous VTE                                                3  [  ] Thrombophilia                                             2  [  ] Lower limb paralysis                                    2        (unable to hold up >15 seconds)    [  ] Current Cancer                                             2         (within 6 months)  [  ] Immobilization > 24 hrs                              1  [  ] ICU/CCU stay > 24 hours                            1  [  ] Age > 60                                                    1    IMPROVE VTE Score - 1

## 2022-04-11 NOTE — PROGRESS NOTE ADULT - SUBJECTIVE AND OBJECTIVE BOX
Patient is a 89y old  Female who presents with a chief complaint of AMS (2022 17:05)    Patient    SUBJECTIVE & OBJECTIVE: Pt seen and examined at bedside.   PHYSICAL EXAM:  Vital Signs Last 24 Hrs  T(C): 36.7 (2022 17:00), Max: 37.1 (10 Apr 2022 23:02)  T(F): 98 (2022 17:00), Max: 98.7 (10 Apr 2022 23:02)  HR: 84 (2022 17:00) (57 - 95)  BP: 153/83 (2022 17:00) (101/40 - 173/73)  BP(mean): --  RR: 18 (:00) (12 - 19)  SpO2: 98% (:00) (93% - 100%)   Daily     Daily I&O's Detail    GENERAL: NAD, well-groomed, well-developed  HEAD:  Atraumatic, Normocephalic  EYES: EOMI, PERRLA, conjunctiva and sclera clear  ENMT: Moist mucous membranes  NECK: Supple, No JVD  NERVOUS SYSTEM:  Alert & Oriented X3, Motor Strength 5/5 B/L upper and lower extremities; DTRs 2+ intact and symmetric  CHEST/LUNG: Clear to auscultation bilaterally; No rales, rhonchi, wheezing, or rubs  HEART: Regular rate and rhythm; No murmurs, rubs, or gallops  ABDOMEN: Soft, Nontender, Nondistended; Bowel sounds present  EXTREMITIES:  2+ Peripheral Pulses, No clubbing, cyanosis, or edema  LABS:                        13.3   9.06  )-----------( 247      ( 10 Apr 2022 15:57 )             39.1   PT/INR - ( 10 Apr 2022 16:29 )   PT: 11.3 sec;   INR: 0.95 ratio         PTT - ( 10 Apr 2022 16:29 )  PTT:27.1 secUrinalysis Basic - ( 10 Apr 2022 18:24 )    Color: Yellow / Appearance: Slightly Turbid / S.010 / pH: x  Gluc: x / Ketone: Negative  / Bili: Negative / Urobili: Negative mg/dL   Blood: x / Protein: 100 mg/dL / Nitrite: Negative   Leuk Esterase: Moderate / RBC: 3-5 /HPF / WBC >50   Sq Epi: x / Non Sq Epi: Few / Bacteria: Many    CAPILLARY BLOOD GLUCOSE      POCT Blood Glucose.: 133 mg/dL (2022 12:01)  POCT Blood Glucose.: 165 mg/dL (2022 08:41)  POCT Blood Glucose.: 219 mg/dL (2022 01:24)        RADIOLOGY & ADDITIONAL TESTS:   Patient is a 89y old  Female who presents with a chief complaint of AMS (2022 17:05)  Patient seen and examined at bedside. No acute overnight events. Case discussed with nurse at bedside.   Unable to conduct ROS, patient only answers yes or no.    SUBJECTIVE & OBJECTIVE: Pt seen and examined at bedside.   PHYSICAL EXAM:  Vital Signs Last 24 Hrs  T(C): 36.7 (2022 17:00), Max: 37.1 (10 Apr 2022 23:02)  T(F): 98 (2022 17:00), Max: 98.7 (10 Apr 2022 23:02)  HR: 84 (:00) (57 - 95)  BP: 153/83 (:00) (101/40 - 173/73)  BP(mean): --  RR: 18 (2022 17:00) (12 - 19)  SpO2: 98% (:00) (93% - 100%)   Daily     Daily I&O's Detail    GENERAL: NAD, well-groomed, well-developed  HEAD:  Atraumatic, Normocephalic  EYES: EOMI, PERRLA, conjunctiva and sclera clear  ENMT: Moist mucous membranes  NECK: Supple, No JVD  NERVOUS SYSTEM:  Alert, no focal deficits.  CHEST/LUNG: Diminished at the bases, poor air movement; No rales, rhonchi, wheezing, or rubs  HEART: Regular rate and rhythm; No murmurs, rubs, or gallops  ABDOMEN: Soft, Nontender, Nondistended; Bowel sounds present  EXTREMITIES:  2+ Peripheral Pulses, No clubbing, cyanosis, or edema  LABS:       04-10    137  |  104  |  37<H>  ----------------------------<  174<H>  4.1   |  27  |  1.42<H>    Ca    9.8      10 Apr 2022 15:57    TPro  8.0  /  Alb  3.9  /  TBili  0.4  /  DBili  x   /  AST  19  /  ALT  16  /  AlkPhos  77  04-10                   13.3   9.06  )-----------( 247      ( 10 Apr 2022 15:57 )             39.1   PT/INR - ( 10 Apr 2022 16:29 )   PT: 11.3 sec;   INR: 0.95 ratio         PTT - ( 10 Apr 2022 16:29 )  PTT:27.1 secUrinalysis Basic - ( 10 Apr 2022 18:24 )    Color: Yellow / Appearance: Slightly Turbid / S.010 / pH: x  Gluc: x / Ketone: Negative  / Bili: Negative / Urobili: Negative mg/dL   Blood: x / Protein: 100 mg/dL / Nitrite: Negative   Leuk Esterase: Moderate / RBC: 3-5 /HPF / WBC >50   Sq Epi: x / Non Sq Epi: Few / Bacteria: Many    CAPILLARY BLOOD GLUCOSE  POCT Blood Glucose.: 133 mg/dL (2022 12:01)  POCT Blood Glucose.: 165 mg/dL (2022 08:41)  POCT Blood Glucose.: 219 mg/dL (2022 01:24)    RADIOLOGY & ADDITIONAL TESTS:  < from: CT Brain Stroke Protocol (04.10.22 @ 15:42) >  IMPRESSION: Moderate atrophy and small vessel white matter ischemic   changes. No hemorrhage. No change since 2018.    < end of copied text >      MEDICATIONS  (STANDING):  aspirin  chewable 81 milliGRAM(s) Oral daily  cefTRIAXone   IVPB 1000 milliGRAM(s) IV Intermittent every 24 hours  clopidogrel Tablet 75 milliGRAM(s) Oral daily  dextrose 5%. 1000 milliLiter(s) (50 mL/Hr) IV Continuous <Continuous>  dextrose 5%. 1000 milliLiter(s) (100 mL/Hr) IV Continuous <Continuous>  dextrose 50% Injectable 25 Gram(s) IV Push once  dextrose 50% Injectable 12.5 Gram(s) IV Push once  dextrose 50% Injectable 25 Gram(s) IV Push once  glucagon  Injectable 1 milliGRAM(s) IntraMuscular once  heparin   Injectable 5000 Unit(s) SubCutaneous every 12 hours  insulin lispro (ADMELOG) corrective regimen sliding scale   SubCutaneous three times a day before meals  lisinopril 5 milliGRAM(s) Oral daily  metoprolol tartrate 50 milliGRAM(s) Oral two times a day  polyethylene glycol 3350 17 Gram(s) Oral once  simvastatin 20 milliGRAM(s) Oral at bedtime    MEDICATIONS  (PRN):  acetaminophen     Tablet .. 650 milliGRAM(s) Oral every 6 hours PRN Temp greater or equal to 38C (100.4F), Moderate Pain (4 - 6)  dextrose Oral Gel 15 Gram(s) Oral once PRN Blood Glucose LESS THAN 70 milliGRAM(s)/deciliter

## 2022-04-11 NOTE — H&P ADULT - PROBLEM SELECTOR PLAN 3
COVID 19 swabbed in ED - +ve results.  Isolation precautions  Tylenol PRN fever  Will hold dexamethasone and remdesivir as patient not requiring supplemental oxygen

## 2022-04-11 NOTE — H&P ADULT - NSICDXPASTSURGICALHX_GEN_ALL_CORE_FT
PAST SURGICAL HISTORY:  Cataract removal with lens implant right in 2000    Detached Retina, Left laser surgery in 1995    S/P Carpal Tunnel Release bilateral hands in 1990    S/P Laparoscopic Cholecystectomy 2008    S/P TKR (Total Knee Replacement) left knee    Trigger Finger release of middle and ring finger of right hand in 1990, and middle finger left hand in 2008

## 2022-04-11 NOTE — DISCHARGE NOTE NURSING/CASE MANAGEMENT/SOCIAL WORK - NSDCPEFALRISK_GEN_ALL_CORE
For information on Fall & Injury Prevention, visit: [email or web address] OR  [email or web address] OR  https://www.cdc.gov/steadi/patient.html

## 2022-04-11 NOTE — H&P ADULT - NSHPLABSRESULTS_GEN_ALL_CORE
Recent Vitals  T(C): 37.1 (04-10-22 @ 23:02), Max: 37.1 (04-10-22 @ 23:02)  HR: 95 (04-10-22 @ 23:02) (88 - 98)  BP: 166/71 (04-10-22 @ 23:02) (166/71 - 190/90)  RR: 18 (04-10-22 @ 23:02) (18 - 19)  SpO2: 93% (04-10-22 @ 23:02) (93% - 98%)                        13.3   9.06  )-----------( 247      ( 10 Apr 2022 15:57 )             39.1     04-10    137  |  104  |  37<H>  ----------------------------<  174<H>  4.1   |  27  |  1.42<H>    Ca    9.8      10 Apr 2022 15:57    TPro  8.0  /  Alb  3.9  /  TBili  0.4  /  DBili  x   /  AST  19  /  ALT  16  /  AlkPhos  77  04-10    PT/INR - ( 10 Apr 2022 16:29 )   PT: 11.3 sec;   INR: 0.95 ratio         PTT - ( 10 Apr 2022 16:29 )  PTT:27.1 sec  LIVER FUNCTIONS - ( 10 Apr 2022 15:57 )  Alb: 3.9 g/dL / Pro: 8.0 gm/dL / ALK PHOS: 77 U/L / ALT: 16 U/L / AST: 19 U/L / GGT: x           Urinalysis Basic - ( 10 Apr 2022 18:24 )    Color: Yellow / Appearance: Slightly Turbid / S.010 / pH: x  Gluc: x / Ketone: Negative  / Bili: Negative / Urobili: Negative mg/dL   Blood: x / Protein: 100 mg/dL / Nitrite: Negative   Leuk Esterase: Moderate / RBC: 3-5 /HPF / WBC >50   Sq Epi: x / Non Sq Epi: Few / Bacteria: Many        Home Medications:  aspirin 81 mg oral tablet, chewable: 1 tab(s) orally once a day (10 Apr 2022 16:23)  clopidogrel 75 mg oral tablet: 1 tab(s) orally once a day (10 Apr 2022 16:23)  docusate sodium 100 mg oral capsule: 1 cap(s) orally 2 times a day (10 Apr 2022 16:23)  glimepiride 4 mg oral tablet: 1 tab(s) orally once a day (10 Apr 2022 16:23)  lisinopril 2.5 mg oral tablet: 1 tab(s) orally once a day (10 Apr 2022 16:23)  metoprolol tartrate 50 mg oral tablet: 50 milligram(s) orally once a day (04 Aug 2018 23:35)  pantoprazole 40 mg oral delayed release tablet: 1 tab(s) orally once a day (before a meal) (04 Aug 2018 23:35)  simvastatin 20 mg oral tablet: 1 tab(s) orally once a day (at bedtime) (04 Aug 2018 23:35)

## 2022-04-11 NOTE — H&P ADULT - NSICDXPASTMEDICALHX_GEN_ALL_CORE_FT
PAST MEDICAL HISTORY:  Acute Mucous Pneumonia 4/2010, resolved ; no residual problems    Angina in 2005, s/p angioplasty with stent placement, no recurrance, no sequalae    Arthritis, Infective, Knee     Basal Cell Cancer removed from left neck 2009    Detached Retina right eye    Diabetes Mellitus type 2    History of Back Surgery 2010    Spinal Stenosis- lumbar

## 2022-04-11 NOTE — CONSULT NOTE ADULT - SUBJECTIVE AND OBJECTIVE BOX
89F hx dementia/forgetfullness, CAD s/p stent on Plavix, DM, L TKA, L3-4 Laminectomy ('10) and LSp Spinal Stimulator ('11) who presents to [facility] Emergency Department for evaluation of altered mental status. History limited from patient due to altered mental status, increased from baseline per ED attending. Per ER attending and chart review, patient has been receiving treatment for UTI as outpatient. She became increasingly confused this morning per family so was brought in for evaluation. While in the ED, patient was found to be vomiting so CT A/P was ordered for evaluation. CT imaging revealed incidental AI T11 VCF new compared to previous imaging from 2018. Patient without complaint of back pain at this time. She only complains of left knee pain. Otherwise denies falls/trauma, numbness/tingling, weakness, or any other acute orthopedic complaints.     Vital Signs Last 24 Hrs  T(C): 36.8 (11 Apr 2022 00:44), Max: 37.1 (10 Apr 2022 23:02)  T(F): 98.3 (11 Apr 2022 00:44), Max: 98.7 (10 Apr 2022 23:02)  HR: 95 (11 Apr 2022 00:44) (88 - 98)  BP: 152/62 (11 Apr 2022 00:44) (152/62 - 190/90)  BP(mean): --  RR: 18 (11 Apr 2022 00:44) (18 - 19)  SpO2: 99% (11 Apr 2022 00:44) (93% - 99%)                          13.3   9.06  )-----------( 247      ( 10 Apr 2022 15:57 )             39.1     Exam:  General: Sleeping but arousable, confused, poor historian, no acute distress  Spine:   Skin intact. No obvious abrasions/lacerations or evidence of trauma.  No C/T/LSpine midline TTP. No paraspinal TTP. No palpable step off or deformity.   Negative Reyes. Negative Clonus. Negative Babinski.   Unable to follow commands to SLR but able to flex hips against resistance  Compartments soft and compressible  No calf TTP bilaterally    Motor exam limited due to patient inability to follow commands but grossly intact. Moving UEs in all nerve distributions. Grossly moving LEs but unable to grade muscle strength due to patient inability to follow commands. No motor deficit suspected.     Sensory:            C5         C6         C7      C8       T1        (0=absent, 1=impaired, 2=normal, NT=not testable)  R         2            2           2        2         2  L          2            2           2        2         2               L2          L3         L4      L5       S1         (0=absent, 1=impaired, 2=normal, NT=not testable)  R         2            2            2        2        2  L          2            2           2        2         2    Secondary Assessment:  NC/AT, NTTP of clavicles, NTTP of Pelvis  UEs: NTTP of Shoulders, Elbows, Wrists, Hands; NT with AROM/PROM of Shoulders, Elbows, Wrists, Hands; AIN/PIN/Med/Uln/Msc/Rad/Ax intact  LEs: NT with Log Roll, NT with Heel Strike, (+) diffuse TTP surrounding left knee increased over proximal tibia, (+) well healed anterior knee incision consistent with prior surgery; NTTP of R Knee, BL Hips, Ankles, Feet; NT with AROM/PROM of Hips, Knees, Ankles, Feet; Q/H/Gsc/TA/EHL/FHL intact    CT imaging reviewed with AI T11 VCF, no bony retropulsion

## 2022-04-12 DIAGNOSIS — F03.90 UNSPECIFIED DEMENTIA WITHOUT BEHAVIORAL DISTURBANCE: ICD-10-CM

## 2022-04-12 LAB
GLUCOSE BLDC GLUCOMTR-MCNC: 114 MG/DL — HIGH (ref 70–99)
GLUCOSE BLDC GLUCOMTR-MCNC: 143 MG/DL — HIGH (ref 70–99)
GLUCOSE BLDC GLUCOMTR-MCNC: 235 MG/DL — HIGH (ref 70–99)
GLUCOSE BLDC GLUCOMTR-MCNC: 261 MG/DL — HIGH (ref 70–99)

## 2022-04-12 PROCEDURE — 99232 SBSQ HOSP IP/OBS MODERATE 35: CPT

## 2022-04-12 PROCEDURE — 90792 PSYCH DIAG EVAL W/MED SRVCS: CPT

## 2022-04-12 RX ADMIN — Medication 50 MILLIGRAM(S): at 17:31

## 2022-04-12 RX ADMIN — Medication 50 MILLIGRAM(S): at 05:03

## 2022-04-12 RX ADMIN — SIMVASTATIN 20 MILLIGRAM(S): 20 TABLET, FILM COATED ORAL at 22:29

## 2022-04-12 RX ADMIN — HEPARIN SODIUM 5000 UNIT(S): 5000 INJECTION INTRAVENOUS; SUBCUTANEOUS at 17:31

## 2022-04-12 RX ADMIN — POLYETHYLENE GLYCOL 3350 17 GRAM(S): 17 POWDER, FOR SOLUTION ORAL at 06:53

## 2022-04-12 RX ADMIN — LISINOPRIL 5 MILLIGRAM(S): 2.5 TABLET ORAL at 05:03

## 2022-04-12 RX ADMIN — Medication 2: at 12:06

## 2022-04-12 RX ADMIN — HEPARIN SODIUM 5000 UNIT(S): 5000 INJECTION INTRAVENOUS; SUBCUTANEOUS at 05:03

## 2022-04-12 RX ADMIN — CLOPIDOGREL BISULFATE 75 MILLIGRAM(S): 75 TABLET, FILM COATED ORAL at 11:23

## 2022-04-12 RX ADMIN — CEFTRIAXONE 100 MILLIGRAM(S): 500 INJECTION, POWDER, FOR SOLUTION INTRAMUSCULAR; INTRAVENOUS at 15:14

## 2022-04-12 RX ADMIN — Medication 81 MILLIGRAM(S): at 11:22

## 2022-04-12 NOTE — BH CONSULTATION LIAISON ASSESSMENT NOTE - RISK ASSESSMENT
low risk for intentional, planned out and executed harm to self or others given advanced age, physical frailty and multi-domain cognitive deficits. More likely to unintentionally/accidentally lash out at caretakers during ADL assistance or hurt self by trying to climb out of bed/pulls lines etc secondary to her confused state.

## 2022-04-12 NOTE — BH CONSULTATION LIAISON ASSESSMENT NOTE - NSBHCHARTREVIEWLAB_PSY_A_CORE FT
04-10    137  |  104  |  37<H>  ----------------------------<  174<H>  4.1   |  27  |  1.42<H>    Ca    9.8      10 Apr 2022 15:57    TPro  8.0  /  Alb  3.9  /  TBili  0.4  /  DBili  x   /  AST  19  /  ALT  16  /  AlkPhos  77  04-10

## 2022-04-12 NOTE — BH CONSULTATION LIAISON ASSESSMENT NOTE - OTHER
impaired  lower end of fair  tenuous  deferred at this time  "ok ok"  varying  slightly confused but otherwise euthymic

## 2022-04-12 NOTE — PHYSICAL THERAPY INITIAL EVALUATION ADULT - TRANSFER TRAINING, PT EVAL
Patient will perform sit to stand transfers in 3-4 weeks with minimal assistance x 1 with rolling walker.

## 2022-04-12 NOTE — BH CONSULTATION LIAISON ASSESSMENT NOTE - NSBHCONSULTRECOMMENDOTHER_PSY_A_CORE FT
fast discharge home to familiar environment when medically cleared  fast discharge home to familiar environment when medically cleared   - pain management for compression fracture as needed  - bowel regimen given high stool burden in colon  - supportive care; assisted feeds; out of bed to chair

## 2022-04-12 NOTE — BH CONSULTATION LIAISON ASSESSMENT NOTE - SUMMARY
No clinical indication that Patient is actively suicidal. Made statement to verbalize her feelings and thoughts of getting hospitalized, being physically frail and relying on others for caretaking.  No clinical indication that Patient is actively suicidal. Made statement to verbalize her feelings and thoughts of getting hospitalized, being physically frail and relying on others for caretaking. Combination of dementia with juxtaposed delirium.

## 2022-04-12 NOTE — BH CONSULTATION LIAISON ASSESSMENT NOTE - CURRENT MEDICATION
MEDICATIONS  (STANDING):  aspirin  chewable 81 milliGRAM(s) Oral daily  cefTRIAXone   IVPB 1000 milliGRAM(s) IV Intermittent every 24 hours  clopidogrel Tablet 75 milliGRAM(s) Oral daily  dextrose 5%. 1000 milliLiter(s) (50 mL/Hr) IV Continuous <Continuous>  dextrose 5%. 1000 milliLiter(s) (100 mL/Hr) IV Continuous <Continuous>  dextrose 50% Injectable 25 Gram(s) IV Push once  dextrose 50% Injectable 12.5 Gram(s) IV Push once  dextrose 50% Injectable 25 Gram(s) IV Push once  glucagon  Injectable 1 milliGRAM(s) IntraMuscular once  heparin   Injectable 5000 Unit(s) SubCutaneous every 12 hours  insulin lispro (ADMELOG) corrective regimen sliding scale   SubCutaneous three times a day before meals  lisinopril 5 milliGRAM(s) Oral daily  metoprolol tartrate 50 milliGRAM(s) Oral two times a day  simvastatin 20 milliGRAM(s) Oral at bedtime    MEDICATIONS  (PRN):  acetaminophen     Tablet .. 650 milliGRAM(s) Oral every 6 hours PRN Temp greater or equal to 38C (100.4F), Moderate Pain (4 - 6)  dextrose Oral Gel 15 Gram(s) Oral once PRN Blood Glucose LESS THAN 70 milliGRAM(s)/deciliter

## 2022-04-12 NOTE — BH CONSULTATION LIAISON ASSESSMENT NOTE - HPI (INCLUDE ILLNESS QUALITY, SEVERITY, DURATION, TIMING, CONTEXT, MODIFYING FACTORS, ASSOCIATED SIGNS AND SYMPTOMS)
89yo  female, , noncaregiver, retired, domiciled with her sons in a private home, with Dementia/forgetfulness, CAD s/p stent on Plavix, DM, L TKA, L3-4 Laminectomy ('10) and LSp Spinal Stimulator ('11), recent UTI with ongoing tx, hx of subdural hematoma, BIB EMS from home for altered mental status after family found her more confused then usual.  Found to have Large fecal load on CT; + COVID-19 with no respiratory sxs; incidental finding of Lumbar compression fracture. Reportedly stated that she wanted to die to EMS personnel on the way to the ED, did not have a suicide plan, thus triggering a psychiatric consultation.     EXAM: awake, alert, lying on her side in bed, in no acute distress. Denies pain apart from a headache. Patient states that she is hungry - declined "apple sauce...I always get apple sauce." Patient asked about suicidal statement which she explained as not having any problems should she die as she does not want to suffer. She is "old and tired." Patient denies active suicidal ideation, intent or plan and names protective factors (family, jennifer).

## 2022-04-12 NOTE — BH CONSULTATION LIAISON ASSESSMENT NOTE - NSBHCHARTREVIEWVS_PSY_A_CORE FT
Vital Signs Last 24 Hrs  T(C): 36.9 (12 Apr 2022 10:44), Max: 37.3 (11 Apr 2022 23:53)  T(F): 98.4 (12 Apr 2022 10:44), Max: 99.2 (11 Apr 2022 23:53)  HR: 73 (12 Apr 2022 10:44) (65 - 84)  BP: 111/78 (12 Apr 2022 10:44) (111/78 - 160/61)  BP(mean): --  RR: 18 (12 Apr 2022 10:44) (18 - 18)  SpO2: 95% (12 Apr 2022 10:44) (93% - 100%)

## 2022-04-12 NOTE — PHYSICAL THERAPY INITIAL EVALUATION ADULT - GAIT TRAINING, PT EVAL
Patient will ambulate 200 feet with rolling walker with minimal assistance x 1 for community ambulation in 3-4 weeks.

## 2022-04-12 NOTE — PHYSICAL THERAPY INITIAL EVALUATION ADULT - PERTINENT HX OF CURRENT PROBLEM, REHAB EVAL
Patient admitted with AMS, found to be covid-19+. Patient found to have metabolic encephalopathy. Patient with noted multiple vertebral body compression fractures of indeterminate age, not a candidate for LSO per ortho due to age/dementia and no noted complaints of back pain. Patient also noted to have L distal femur osteomyelitis, per orthopedic resident RADHA Rodriguez.

## 2022-04-12 NOTE — PHYSICAL THERAPY INITIAL EVALUATION ADULT - ADDITIONAL COMMENTS
Per care coordination: Patient lives in a house, has a rolling walker, wheelchair (in disrepair) and rollator.

## 2022-04-12 NOTE — BH CONSULTATION LIAISON ASSESSMENT NOTE - NSBHCHARTREVIEWINVESTIGATE_PSY_A_CORE FT
CT head, Abdomen, pelvis (04.10.22) Moderate fecal burden throughout the colon; New compression fracture of T11; stable 3 mm deep left frontal falx subdural hematoma.

## 2022-04-12 NOTE — PHYSICAL THERAPY INITIAL EVALUATION ADULT - BED MOBILITY TRAINING, PT EVAL
Pt will perform all aspects of bed mobility with minimal assistance x 1 to help prevent pressure ulcers, by 2-3 weeks.

## 2022-04-12 NOTE — PROGRESS NOTE ADULT - SUBJECTIVE AND OBJECTIVE BOX
89 yo lady remains quite confused on abx. COVID positive.    Vital Signs Last 24 Hrs  T(C): 36.9 (12 Apr 2022 10:44), Max: 37.3 (11 Apr 2022 23:53)  T(F): 98.4 (12 Apr 2022 10:44), Max: 99.2 (11 Apr 2022 23:53)  HR: 73 (12 Apr 2022 10:44) (65 - 84)  BP: 111/78 (12 Apr 2022 10:44) (111/78 - 160/61)  BP(mean): --  RR: 18 (12 Apr 2022 10:44) (18 - 18)  SpO2: 95% (12 Apr 2022 10:44) (93% - 100%)    MEDICATIONS  (STANDING):  aspirin  chewable 81 milliGRAM(s) Oral daily  cefTRIAXone   IVPB 1000 milliGRAM(s) IV Intermittent every 24 hours  clopidogrel Tablet 75 milliGRAM(s) Oral daily  dextrose 5%. 1000 milliLiter(s) (50 mL/Hr) IV Continuous <Continuous>  dextrose 5%. 1000 milliLiter(s) (100 mL/Hr) IV Continuous <Continuous>  dextrose 50% Injectable 25 Gram(s) IV Push once  dextrose 50% Injectable 12.5 Gram(s) IV Push once  dextrose 50% Injectable 25 Gram(s) IV Push once  glucagon  Injectable 1 milliGRAM(s) IntraMuscular once  heparin   Injectable 5000 Unit(s) SubCutaneous every 12 hours  insulin lispro (ADMELOG) corrective regimen sliding scale   SubCutaneous three times a day before meals  lisinopril 5 milliGRAM(s) Oral daily  metoprolol tartrate 50 milliGRAM(s) Oral two times a day  simvastatin 20 milliGRAM(s) Oral at bedtime    MEDICATIONS  (PRN):  acetaminophen     Tablet .. 650 milliGRAM(s) Oral every 6 hours PRN Temp greater or equal to 38C (100.4F), Moderate Pain (4 - 6)  dextrose Oral Gel 15 Gram(s) Oral once PRN Blood Glucose LESS THAN 70 milliGRAM(s)/deciliter

## 2022-04-12 NOTE — BH CONSULTATION LIAISON ASSESSMENT NOTE - NSSUICPROTFACT_PSY_ALL_CORE
Responsibility to children, family, or others/Identifies reasons for living/Supportive social network of family or friends/Cultural, spiritual and/or moral attitudes against suicide/Orthodox beliefs

## 2022-04-12 NOTE — PROGRESS NOTE ADULT - SUBJECTIVE AND OBJECTIVE BOX
Patient is a 89y old  Female who presents with a chief complaint of AMS (2022 17:11)    Patient seen and examined at bedside. No acute overnight events. Case discussed with nurse at bedside.   Unable to do ROS as patient is not confused, states that she is doing and the President of the [place] is Luis Mtz. Patient states that she is now at her regular domicile.    SUBJECTIVE & OBJECTIVE: Pt seen and examined at bedside.   PHYSICAL EXAM:  Vital Signs Last 24 Hrs  T(C): 36.9 (2022 10:44), Max: 37.3 (2022 23:53)  T(F): 98.4 (2022 10:44), Max: 99.2 (2022 23:53)  HR: 73 (2022 10:44) (65 - 84)  BP: 111/78 (2022 10:44) (111/78 - 160/61)  BP(mean): --  RR: 18 (2022 10:44) (15 - 18)  SpO2: 95% (2022 10:44) (93% - 100%)   Daily     Daily Weight in k.2 (2022 04:04)I&O's Detail    2022 07:01  -  2022 07:00  --------------------------------------------------------  IN:    Oral Fluid: 118 mL  Total IN: 118 mL    OUT:  Total OUT: 0 mL    Total NET: 118 mL    Patient is calm and comfortable, alert but confused, pleasant and corporative.  Patient is able speak fluently but confused, trying to climb out of bed.    GENERAL: NAD, well-groomed, frail elderly lady   HEAD:  Atraumatic, Normocephalic  EYES: EOMI, PERRLA, conjunctiva and sclera clear  ENMT: Moist mucous membranes, missing teeth   NECK: Supple, No JVD  NERVOUS SYSTEM:  Alert but confused, Motor Strength 5/5 B/L upper and lower extremities; DTRs 2+ intact and symmetric  CHEST/LUNG: Clear to auscultation bilaterally; No rales, rhonchi, wheezing, or rubs  HEART: Regular rate and rhythm; No murmurs, rubs, or gallops  ABDOMEN: Soft, Nontender, Nondistended; Bowel sounds present  EXTREMITIES:  2+ Peripheral Pulses, No clubbing, cyanosis, or edema  SKIN: right shoulder with small skin tear.  LABS:           04-10    137  |  104  |  37<H>  ----------------------------<  174<H>  4.1   |  27  |  1.42<H>    Ca    9.8      10 Apr 2022 15:57  A1C with Estimated Average Glucose Result: 8.8:     TPro  8.0  /  Alb  3.9  /  TBili  0.4  /  DBili  x   /  AST  19  /  ALT  16  /  AlkPhos  77  04-10               13.3   9.06  )-----------( 247      ( 10 Apr 2022 15:57 )             39.1   PT/INR - ( 10 Apr 2022 16:29 )   PT: 11.3 sec;   INR: 0.95 ratio    PTT - ( 10 Apr 2022 16:29 )  PTT:27.1 secUrinalysis Basic - ( 10 Apr 2022 18:24 )    Color: Yellow / Appearance: Slightly Turbid / S.010 / pH: x  Gluc: x / Ketone: Negative  / Bili: Negative / Urobili: Negative mg/dL   Blood: x / Protein: 100 mg/dL / Nitrite: Negative   Leuk Esterase: Moderate / RBC: 3-5 /HPF / WBC >50   Sq Epi: x / Non Sq Epi: Few / Bacteria: Many    CAPILLARY BLOOD GLUCOSE  POCT Blood Glucose.: 114 mg/dL (2022 07:43)  POCT Blood Glucose.: 92 mg/dL (2022 21:12)  POCT Blood Glucose.: 154 mg/dL (2022 18:12)  POCT Blood Glucose.: 133 mg/dL (2022 12:01)      Culture - Urine (collected 2022 00:54)  Source: Clean Catch Clean Catch (Midstream)  Preliminary Report (2022 22:23):    >100,000 CFU/ml Escherichia coli    Culture - Blood (collected 2022 00:41)  Source: .Blood Blood-Peripheral  Preliminary Report (2022 01:01):    No growth to date.    Culture - Blood (collected 2022 00:41)  Source: .Blood Blood-Peripheral  Preliminary Report (2022 01:01):    No growth to date.    MEDICATIONS  (STANDING):  aspirin  chewable 81 milliGRAM(s) Oral daily  cefTRIAXone   IVPB 1000 milliGRAM(s) IV Intermittent every 24 hours  clopidogrel Tablet 75 milliGRAM(s) Oral daily  dextrose 5%. 1000 milliLiter(s) (50 mL/Hr) IV Continuous <Continuous>  dextrose 5%. 1000 milliLiter(s) (100 mL/Hr) IV Continuous <Continuous>  dextrose 50% Injectable 25 Gram(s) IV Push once  dextrose 50% Injectable 12.5 Gram(s) IV Push once  dextrose 50% Injectable 25 Gram(s) IV Push once  glucagon  Injectable 1 milliGRAM(s) IntraMuscular once  heparin   Injectable 5000 Unit(s) SubCutaneous every 12 hours  insulin lispro (ADMELOG) corrective regimen sliding scale   SubCutaneous three times a day before meals  lisinopril 5 milliGRAM(s) Oral daily  metoprolol tartrate 50 milliGRAM(s) Oral two times a day  simvastatin 20 milliGRAM(s) Oral at bedtime    MEDICATIONS  (PRN):  acetaminophen     Tablet .. 650 milliGRAM(s) Oral every 6 hours PRN Temp greater or equal to 38C (100.4F), Moderate Pain (4 - 6)  dextrose Oral Gel 15 Gram(s) Oral once PRN Blood Glucose LESS THAN 70 milliGRAM(s)/deciliter

## 2022-04-13 LAB
-  AMIKACIN: SIGNIFICANT CHANGE UP
-  AMOXICILLIN/CLAVULANIC ACID: SIGNIFICANT CHANGE UP
-  AMPICILLIN/SULBACTAM: SIGNIFICANT CHANGE UP
-  AMPICILLIN: SIGNIFICANT CHANGE UP
-  AZTREONAM: SIGNIFICANT CHANGE UP
-  CEFAZOLIN: SIGNIFICANT CHANGE UP
-  CEFEPIME: SIGNIFICANT CHANGE UP
-  CEFOXITIN: SIGNIFICANT CHANGE UP
-  CEFTRIAXONE: SIGNIFICANT CHANGE UP
-  CIPROFLOXACIN: SIGNIFICANT CHANGE UP
-  ERTAPENEM: SIGNIFICANT CHANGE UP
-  GENTAMICIN: SIGNIFICANT CHANGE UP
-  IMIPENEM: SIGNIFICANT CHANGE UP
-  LEVOFLOXACIN: SIGNIFICANT CHANGE UP
-  MEROPENEM: SIGNIFICANT CHANGE UP
-  NITROFURANTOIN: SIGNIFICANT CHANGE UP
-  PIPERACILLIN/TAZOBACTAM: SIGNIFICANT CHANGE UP
-  TIGECYCLINE: SIGNIFICANT CHANGE UP
-  TOBRAMYCIN: SIGNIFICANT CHANGE UP
-  TRIMETHOPRIM/SULFAMETHOXAZOLE: SIGNIFICANT CHANGE UP
ALBUMIN SERPL ELPH-MCNC: 2.9 G/DL — LOW (ref 3.3–5)
ALP SERPL-CCNC: 54 U/L — SIGNIFICANT CHANGE UP (ref 40–120)
ALT FLD-CCNC: 21 U/L — SIGNIFICANT CHANGE UP (ref 12–78)
ANION GAP SERPL CALC-SCNC: 8 MMOL/L — SIGNIFICANT CHANGE UP (ref 5–17)
AST SERPL-CCNC: 27 U/L — SIGNIFICANT CHANGE UP (ref 15–37)
BILIRUB SERPL-MCNC: 0.3 MG/DL — SIGNIFICANT CHANGE UP (ref 0.2–1.2)
BUN SERPL-MCNC: 32 MG/DL — HIGH (ref 7–23)
CALCIUM SERPL-MCNC: 9.2 MG/DL — SIGNIFICANT CHANGE UP (ref 8.5–10.1)
CHLORIDE SERPL-SCNC: 105 MMOL/L — SIGNIFICANT CHANGE UP (ref 96–108)
CO2 SERPL-SCNC: 26 MMOL/L — SIGNIFICANT CHANGE UP (ref 22–31)
CREAT SERPL-MCNC: 1.31 MG/DL — HIGH (ref 0.5–1.3)
CRP SERPL-MCNC: 36 MG/L — HIGH
CULTURE RESULTS: SIGNIFICANT CHANGE UP
EGFR: 38 ML/MIN/1.73M2 — LOW
ERYTHROCYTE [SEDIMENTATION RATE] IN BLOOD: 23 MM/HR — HIGH (ref 0–20)
FERRITIN SERPL-MCNC: 128 NG/ML — SIGNIFICANT CHANGE UP (ref 15–150)
GLUCOSE BLDC GLUCOMTR-MCNC: 130 MG/DL — HIGH (ref 70–99)
GLUCOSE BLDC GLUCOMTR-MCNC: 145 MG/DL — HIGH (ref 70–99)
GLUCOSE BLDC GLUCOMTR-MCNC: 159 MG/DL — HIGH (ref 70–99)
GLUCOSE BLDC GLUCOMTR-MCNC: 187 MG/DL — HIGH (ref 70–99)
GLUCOSE SERPL-MCNC: 165 MG/DL — HIGH (ref 70–99)
HCT VFR BLD CALC: 33.3 % — LOW (ref 34.5–45)
HGB BLD-MCNC: 11.4 G/DL — LOW (ref 11.5–15.5)
MCHC RBC-ENTMCNC: 32.3 PG — SIGNIFICANT CHANGE UP (ref 27–34)
MCHC RBC-ENTMCNC: 34.2 G/DL — SIGNIFICANT CHANGE UP (ref 32–36)
MCV RBC AUTO: 94.3 FL — SIGNIFICANT CHANGE UP (ref 80–100)
METHOD TYPE: SIGNIFICANT CHANGE UP
NRBC # BLD: 0 /100 WBCS — SIGNIFICANT CHANGE UP (ref 0–0)
ORGANISM # SPEC MICROSCOPIC CNT: SIGNIFICANT CHANGE UP
ORGANISM # SPEC MICROSCOPIC CNT: SIGNIFICANT CHANGE UP
PLATELET # BLD AUTO: 215 K/UL — SIGNIFICANT CHANGE UP (ref 150–400)
POTASSIUM SERPL-MCNC: 3.9 MMOL/L — SIGNIFICANT CHANGE UP (ref 3.5–5.3)
POTASSIUM SERPL-SCNC: 3.9 MMOL/L — SIGNIFICANT CHANGE UP (ref 3.5–5.3)
PROT SERPL-MCNC: 6.6 GM/DL — SIGNIFICANT CHANGE UP (ref 6–8.3)
RBC # BLD: 3.53 M/UL — LOW (ref 3.8–5.2)
RBC # FLD: 12.7 % — SIGNIFICANT CHANGE UP (ref 10.3–14.5)
SODIUM SERPL-SCNC: 139 MMOL/L — SIGNIFICANT CHANGE UP (ref 135–145)
SPECIMEN SOURCE: SIGNIFICANT CHANGE UP
WBC # BLD: 7.91 K/UL — SIGNIFICANT CHANGE UP (ref 3.8–10.5)
WBC # FLD AUTO: 7.91 K/UL — SIGNIFICANT CHANGE UP (ref 3.8–10.5)

## 2022-04-13 PROCEDURE — 99231 SBSQ HOSP IP/OBS SF/LOW 25: CPT

## 2022-04-13 PROCEDURE — 99232 SBSQ HOSP IP/OBS MODERATE 35: CPT

## 2022-04-13 RX ADMIN — CLOPIDOGREL BISULFATE 75 MILLIGRAM(S): 75 TABLET, FILM COATED ORAL at 11:38

## 2022-04-13 RX ADMIN — LISINOPRIL 5 MILLIGRAM(S): 2.5 TABLET ORAL at 05:49

## 2022-04-13 RX ADMIN — CEFTRIAXONE 100 MILLIGRAM(S): 500 INJECTION, POWDER, FOR SOLUTION INTRAMUSCULAR; INTRAVENOUS at 14:36

## 2022-04-13 RX ADMIN — HEPARIN SODIUM 5000 UNIT(S): 5000 INJECTION INTRAVENOUS; SUBCUTANEOUS at 05:48

## 2022-04-13 RX ADMIN — Medication 1: at 08:14

## 2022-04-13 RX ADMIN — Medication 1: at 17:45

## 2022-04-13 RX ADMIN — SIMVASTATIN 20 MILLIGRAM(S): 20 TABLET, FILM COATED ORAL at 22:25

## 2022-04-13 RX ADMIN — Medication 50 MILLIGRAM(S): at 17:46

## 2022-04-13 RX ADMIN — HEPARIN SODIUM 5000 UNIT(S): 5000 INJECTION INTRAVENOUS; SUBCUTANEOUS at 17:45

## 2022-04-13 RX ADMIN — Medication 50 MILLIGRAM(S): at 05:49

## 2022-04-13 RX ADMIN — Medication 81 MILLIGRAM(S): at 11:37

## 2022-04-13 NOTE — BH CONSULTATION LIAISON PROGRESS NOTE - NSBHFUPINTERVALHXFT_PSY_A_CORE
No significant interval events. Patient has not made any subsequent suicidal statements not did she demonstrate any such behavior. Same clinical presentation - EXAM: awake, alert, lying on her side in bed, in no acute distress. Pleasantly confused; but able to say she would not want to live is she was suffering.  She is "old and tired." Patient denies active suicidal ideation, intent or plan and names protective factors (family, jennifer).

## 2022-04-13 NOTE — PROGRESS NOTE ADULT - SUBJECTIVE AND OBJECTIVE BOX
Patient is a 89y old  Female who presents with a chief complaint of AMS (13 Apr 2022 12:27)    INTERVAL HPI/OVERNIGHT EVENTS:  Pt was seen and examined, no acute events.    MEDICATIONS  (STANDING):  aspirin  chewable 81 milliGRAM(s) Oral daily  cefTRIAXone   IVPB 1000 milliGRAM(s) IV Intermittent every 24 hours  clopidogrel Tablet 75 milliGRAM(s) Oral daily  dextrose 5%. 1000 milliLiter(s) (50 mL/Hr) IV Continuous <Continuous>  dextrose 5%. 1000 milliLiter(s) (100 mL/Hr) IV Continuous <Continuous>  dextrose 50% Injectable 25 Gram(s) IV Push once  dextrose 50% Injectable 12.5 Gram(s) IV Push once  dextrose 50% Injectable 25 Gram(s) IV Push once  glucagon  Injectable 1 milliGRAM(s) IntraMuscular once  heparin   Injectable 5000 Unit(s) SubCutaneous every 12 hours  insulin lispro (ADMELOG) corrective regimen sliding scale   SubCutaneous three times a day before meals  lisinopril 5 milliGRAM(s) Oral daily  metoprolol tartrate 50 milliGRAM(s) Oral two times a day  simvastatin 20 milliGRAM(s) Oral at bedtime    MEDICATIONS  (PRN):  acetaminophen     Tablet .. 650 milliGRAM(s) Oral every 6 hours PRN Temp greater or equal to 38C (100.4F), Moderate Pain (4 - 6)  dextrose Oral Gel 15 Gram(s) Oral once PRN Blood Glucose LESS THAN 70 milliGRAM(s)/deciliter      Allergies  codeine (Vomiting)  contrast dye -  rash (Other)  iodine (Other)  latex (Rash)  penicillin (Rash)        Vital Signs Last 24 Hrs  T(C): 36.4 (13 Apr 2022 11:09), Max: 37.1 (12 Apr 2022 17:00)  T(F): 97.6 (13 Apr 2022 11:09), Max: 98.7 (12 Apr 2022 17:00)  HR: 67 (13 Apr 2022 11:09) (65 - 72)  BP: 167/73 (13 Apr 2022 11:09) (137/69 - 167/73)  BP(mean): --  RR: 17 (13 Apr 2022 11:09) (17 - 18)  SpO2: 96% (13 Apr 2022 11:09) (94% - 97%)      PHYSICAL EXAM:  GENERAL: NAD  HEAD:  Atraumatic  EYES: PERRLA  NERVOUS SYSTEM:  Awake, confused.  CHEST/LUNG: Clear  HEART: RRR  ABDOMEN: Soft, non tender  EXTREMITIES:  no edema      LABS:                        11.4   7.91  )-----------( 215      ( 13 Apr 2022 09:05 )             33.3     04-13    139  |  105  |  32<H>  ----------------------------<  165<H>  3.9   |  26  |  1.31<H>    Ca    9.2      13 Apr 2022 09:05    TPro  6.6  /  Alb  2.9<L>  /  TBili  0.3  /  DBili  x   /  AST  27  /  ALT  21  /  AlkPhos  54  04-13        CAPILLARY BLOOD GLUCOSE      POCT Blood Glucose.: 130 mg/dL (13 Apr 2022 11:10)  POCT Blood Glucose.: 187 mg/dL (13 Apr 2022 07:53)  POCT Blood Glucose.: 261 mg/dL (12 Apr 2022 21:41)  POCT Blood Glucose.: 143 mg/dL (12 Apr 2022 16:57)      Culture - Urine (collected 11 Apr 2022 00:54)  Source: Clean Catch Clean Catch (Midstream)  Final Report (13 Apr 2022 10:24):    >100,000 CFU/ml Escherichia coli  Organism: Escherichia coli (13 Apr 2022 10:24)  Organism: Escherichia coli (13 Apr 2022 10:24)    Culture - Blood (collected 11 Apr 2022 00:41)  Source: .Blood Blood-Peripheral  Preliminary Report (12 Apr 2022 01:01):    No growth to date.    Culture - Blood (collected 11 Apr 2022 00:41)  Source: .Blood Blood-Peripheral  Preliminary Report (12 Apr 2022 01:01):    No growth to date.      RADIOLOGY & ADDITIONAL TESTS:    Imaging Personally Reviewed:  [ ] YES  [ ] NO    Consultant(s) Notes Reviewed:  [ ] YES  [ ] NO    Care Discussed with Consultants/Other Providers [ ] YES  [ ] NO

## 2022-04-13 NOTE — BH CONSULTATION LIAISON PROGRESS NOTE - NSBHCHARTREVIEWVS_PSY_A_CORE FT
Vital Signs Last 24 Hrs  T(C): 36.4 (13 Apr 2022 11:09), Max: 37.1 (12 Apr 2022 17:00)  T(F): 97.6 (13 Apr 2022 11:09), Max: 98.7 (12 Apr 2022 17:00)  HR: 67 (13 Apr 2022 11:09) (65 - 72)  BP: 167/73 (13 Apr 2022 11:09) (137/69 - 167/73)  BP(mean): --  RR: 17 (13 Apr 2022 11:09) (17 - 18)  SpO2: 96% (13 Apr 2022 11:09) (94% - 97%)

## 2022-04-13 NOTE — BH CONSULTATION LIAISON PROGRESS NOTE - NSBHASSESSMENTFT_PSY_ALL_CORE
No clinical indication that Patient is actively suicidal. Made statement to verbalize her feelings and thoughts of getting hospitalized, being physically frail and relying on others for caretaking. Combination of dementia with juxtaposed delirium.

## 2022-04-13 NOTE — BH CONSULTATION LIAISON PROGRESS NOTE - NSBHCONSULTRECOMMENDOTHER_PSY_A_CORE FT
fast discharge home to familiar environment when medically cleared   - pain management for compression fracture as needed  - bowel regimen given high stool burden in colon  - supportive care; assisted feeds; out of bed to chair

## 2022-04-13 NOTE — BH CONSULTATION LIAISON PROGRESS NOTE - NSBHCHARTREVIEWLAB_PSY_A_CORE FT
04-13    139  |  105  |  32<H>  ----------------------------<  165<H>  3.9   |  26  |  1.31<H>    Ca    9.2      13 Apr 2022 09:05    TPro  6.6  /  Alb  2.9<L>  /  TBili  0.3  /  DBili  x   /  AST  27  /  ALT  21  /  AlkPhos  54  04-13

## 2022-04-13 NOTE — BH CONSULTATION LIAISON PROGRESS NOTE - CURRENT MEDICATION
Patient is seen by Dr. Waldrop today.  Refill patient's Norco, Valium and Stool softener, sent to Jeff MENDIETA to sign and authorize.    Kwaku Romano RN     MEDICATIONS  (STANDING):  aspirin  chewable 81 milliGRAM(s) Oral daily  clopidogrel Tablet 75 milliGRAM(s) Oral daily  dextrose 5%. 1000 milliLiter(s) (50 mL/Hr) IV Continuous <Continuous>  dextrose 5%. 1000 milliLiter(s) (100 mL/Hr) IV Continuous <Continuous>  dextrose 50% Injectable 25 Gram(s) IV Push once  dextrose 50% Injectable 12.5 Gram(s) IV Push once  dextrose 50% Injectable 25 Gram(s) IV Push once  glucagon  Injectable 1 milliGRAM(s) IntraMuscular once  heparin   Injectable 5000 Unit(s) SubCutaneous every 12 hours  insulin lispro (ADMELOG) corrective regimen sliding scale   SubCutaneous three times a day before meals  lisinopril 5 milliGRAM(s) Oral daily  metoprolol tartrate 50 milliGRAM(s) Oral two times a day  simvastatin 20 milliGRAM(s) Oral at bedtime    MEDICATIONS  (PRN):  acetaminophen     Tablet .. 650 milliGRAM(s) Oral every 6 hours PRN Temp greater or equal to 38C (100.4F), Moderate Pain (4 - 6)  dextrose Oral Gel 15 Gram(s) Oral once PRN Blood Glucose LESS THAN 70 milliGRAM(s)/deciliter

## 2022-04-13 NOTE — PROGRESS NOTE ADULT - SUBJECTIVE AND OBJECTIVE BOX
89 yo lady much improved today. Responding appropriately.     ICU Vital Signs Last 24 Hrs  T(C): 36.4 (13 Apr 2022 11:09), Max: 37.1 (12 Apr 2022 17:00)  T(F): 97.6 (13 Apr 2022 11:09), Max: 98.7 (12 Apr 2022 17:00)  HR: 67 (13 Apr 2022 11:09) (65 - 72)  BP: 167/73 (13 Apr 2022 11:09) (137/69 - 167/73)  BP(mean): --  ABP: --  ABP(mean): --  RR: 17 (13 Apr 2022 11:09) (17 - 18)  SpO2: 96% (13 Apr 2022 11:09) (94% - 97%)    MEDICATIONS  (STANDING):  aspirin  chewable 81 milliGRAM(s) Oral daily  cefTRIAXone   IVPB 1000 milliGRAM(s) IV Intermittent every 24 hours  clopidogrel Tablet 75 milliGRAM(s) Oral daily  dextrose 5%. 1000 milliLiter(s) (50 mL/Hr) IV Continuous <Continuous>  dextrose 5%. 1000 milliLiter(s) (100 mL/Hr) IV Continuous <Continuous>  dextrose 50% Injectable 25 Gram(s) IV Push once  dextrose 50% Injectable 12.5 Gram(s) IV Push once  dextrose 50% Injectable 25 Gram(s) IV Push once  glucagon  Injectable 1 milliGRAM(s) IntraMuscular once  heparin   Injectable 5000 Unit(s) SubCutaneous every 12 hours  insulin lispro (ADMELOG) corrective regimen sliding scale   SubCutaneous three times a day before meals  lisinopril 5 milliGRAM(s) Oral daily  metoprolol tartrate 50 milliGRAM(s) Oral two times a day  simvastatin 20 milliGRAM(s) Oral at bedtime    MEDICATIONS  (PRN):  acetaminophen     Tablet .. 650 milliGRAM(s) Oral every 6 hours PRN Temp greater or equal to 38C (100.4F), Moderate Pain (4 - 6)  dextrose Oral Gel 15 Gram(s) Oral once PRN Blood Glucose LESS THAN 70 milliGRAM(s)/deciliter

## 2022-04-13 NOTE — BH CONSULTATION LIAISON PROGRESS NOTE - NSBHMSERELATED_PSY_A_CORE
Counseling and Referral of Other Preventative  (Italic type indicates deductible and co-insurance are waived)    Patient Name: Kalee Spain  Today's Date: 7/21/2020    Health Maintenance       Date Due Completion Date    Shingles Vaccine (2 of 3) 06/25/2015 4/30/2015    Influenza Vaccine (1) 09/01/2020 11/30/2019    Mammogram 12/26/2020 12/26/2018    Override on 12/15/2015: Done (Dari Emerson sent to scanning)    DEXA SCAN 12/21/2021 12/21/2018    Override on 12/15/2016: Done (Dr Sumit Emerson  [facility]  report sent to scanning   kb)    Override on 12/10/2014: Done (Dr Emerson sent to scanning)    Colorectal Cancer Screening 12/06/2024 12/6/2019    Override on 10/31/2014: Done (Dr Keene sent to scanning)    Override on 9/4/2009: Done    Lipid Panel 03/17/2025 3/17/2020    TETANUS VACCINE 02/18/2029 2/18/2019        No orders of the defined types were placed in this encounter.    The following information is provided to all patients.  This information is to help you find resources for any of the problems found today that may be affecting your health:                Living healthy guide: www.[facility].[place].gov      Understanding Diabetes: www.diabetes.org      Eating healthy: www.cdc.gov/healthyweight      CDC home safety checklist: www.cdc.gov/steadi/patient.html      Agency on Aging: www.goea.[place].[facility]      Alcoholics anonymous (AA): www.aa.org      Physical Activity: www.veda.nih.gov/tr0yapy      Tobacco use: www.quitwithusla.org      Other

## 2022-04-13 NOTE — BH CONSULTATION LIAISON PROGRESS NOTE - OTHER
deferred at this time  varying  lower end of fair  impaired  tenuous  slightly confused but otherwise euthymic  "ok ok"

## 2022-04-14 LAB
ANION GAP SERPL CALC-SCNC: 8 MMOL/L — SIGNIFICANT CHANGE UP (ref 5–17)
BUN SERPL-MCNC: 28 MG/DL — HIGH (ref 7–23)
CALCIUM SERPL-MCNC: 9.2 MG/DL — SIGNIFICANT CHANGE UP (ref 8.5–10.1)
CHLORIDE SERPL-SCNC: 106 MMOL/L — SIGNIFICANT CHANGE UP (ref 96–108)
CO2 SERPL-SCNC: 26 MMOL/L — SIGNIFICANT CHANGE UP (ref 22–31)
CREAT SERPL-MCNC: 1.37 MG/DL — HIGH (ref 0.5–1.3)
EGFR: 36 ML/MIN/1.73M2 — LOW
FLUAV AG NPH QL: SIGNIFICANT CHANGE UP
FLUBV AG NPH QL: SIGNIFICANT CHANGE UP
GLUCOSE BLDC GLUCOMTR-MCNC: 174 MG/DL — HIGH (ref 70–99)
GLUCOSE BLDC GLUCOMTR-MCNC: 180 MG/DL — HIGH (ref 70–99)
GLUCOSE BLDC GLUCOMTR-MCNC: 182 MG/DL — HIGH (ref 70–99)
GLUCOSE BLDC GLUCOMTR-MCNC: 263 MG/DL — HIGH (ref 70–99)
GLUCOSE SERPL-MCNC: 159 MG/DL — HIGH (ref 70–99)
HCT VFR BLD CALC: 35.1 % — SIGNIFICANT CHANGE UP (ref 34.5–45)
HGB BLD-MCNC: 11.8 G/DL — SIGNIFICANT CHANGE UP (ref 11.5–15.5)
MCHC RBC-ENTMCNC: 32 PG — SIGNIFICANT CHANGE UP (ref 27–34)
MCHC RBC-ENTMCNC: 33.6 G/DL — SIGNIFICANT CHANGE UP (ref 32–36)
MCV RBC AUTO: 95.1 FL — SIGNIFICANT CHANGE UP (ref 80–100)
NRBC # BLD: 0 /100 WBCS — SIGNIFICANT CHANGE UP (ref 0–0)
PLATELET # BLD AUTO: 231 K/UL — SIGNIFICANT CHANGE UP (ref 150–400)
POTASSIUM SERPL-MCNC: 4.1 MMOL/L — SIGNIFICANT CHANGE UP (ref 3.5–5.3)
POTASSIUM SERPL-SCNC: 4.1 MMOL/L — SIGNIFICANT CHANGE UP (ref 3.5–5.3)
RBC # BLD: 3.69 M/UL — LOW (ref 3.8–5.2)
RBC # FLD: 12.8 % — SIGNIFICANT CHANGE UP (ref 10.3–14.5)
SARS-COV-2 RNA SPEC QL NAA+PROBE: SIGNIFICANT CHANGE UP
SODIUM SERPL-SCNC: 140 MMOL/L — SIGNIFICANT CHANGE UP (ref 135–145)
WBC # BLD: 8.65 K/UL — SIGNIFICANT CHANGE UP (ref 3.8–10.5)
WBC # FLD AUTO: 8.65 K/UL — SIGNIFICANT CHANGE UP (ref 3.8–10.5)

## 2022-04-14 PROCEDURE — 99232 SBSQ HOSP IP/OBS MODERATE 35: CPT

## 2022-04-14 RX ADMIN — Medication 650 MILLIGRAM(S): at 23:19

## 2022-04-14 RX ADMIN — Medication 50 MILLIGRAM(S): at 17:52

## 2022-04-14 RX ADMIN — SIMVASTATIN 20 MILLIGRAM(S): 20 TABLET, FILM COATED ORAL at 21:52

## 2022-04-14 RX ADMIN — Medication 1: at 17:45

## 2022-04-14 RX ADMIN — Medication 50 MILLIGRAM(S): at 06:09

## 2022-04-14 RX ADMIN — Medication 3: at 12:19

## 2022-04-14 RX ADMIN — LISINOPRIL 5 MILLIGRAM(S): 2.5 TABLET ORAL at 06:09

## 2022-04-14 RX ADMIN — Medication 81 MILLIGRAM(S): at 12:20

## 2022-04-14 RX ADMIN — Medication 1: at 08:37

## 2022-04-14 RX ADMIN — CLOPIDOGREL BISULFATE 75 MILLIGRAM(S): 75 TABLET, FILM COATED ORAL at 12:20

## 2022-04-14 RX ADMIN — HEPARIN SODIUM 5000 UNIT(S): 5000 INJECTION INTRAVENOUS; SUBCUTANEOUS at 06:09

## 2022-04-14 RX ADMIN — HEPARIN SODIUM 5000 UNIT(S): 5000 INJECTION INTRAVENOUS; SUBCUTANEOUS at 17:53

## 2022-04-15 LAB
GLUCOSE BLDC GLUCOMTR-MCNC: 200 MG/DL — HIGH (ref 70–99)
RAPID RVP RESULT: SIGNIFICANT CHANGE UP
SARS-COV-2 RNA SPEC QL NAA+PROBE: SIGNIFICANT CHANGE UP

## 2022-04-15 PROCEDURE — 99233 SBSQ HOSP IP/OBS HIGH 50: CPT

## 2022-04-15 RX ORDER — SIMVASTATIN 20 MG/1
1 TABLET, FILM COATED ORAL
Qty: 0 | Refills: 0 | DISCHARGE
Start: 2022-04-15

## 2022-04-15 RX ORDER — CLOPIDOGREL BISULFATE 75 MG/1
1 TABLET, FILM COATED ORAL
Qty: 0 | Refills: 0 | DISCHARGE

## 2022-04-15 RX ORDER — SIMVASTATIN 20 MG/1
1 TABLET, FILM COATED ORAL
Qty: 0 | Refills: 0 | DISCHARGE

## 2022-04-15 RX ORDER — METOPROLOL TARTRATE 50 MG
1 TABLET ORAL
Qty: 0 | Refills: 0 | DISCHARGE
Start: 2022-04-15

## 2022-04-15 RX ORDER — CLOPIDOGREL BISULFATE 75 MG/1
1 TABLET, FILM COATED ORAL
Qty: 0 | Refills: 0 | DISCHARGE
Start: 2022-04-15

## 2022-04-15 RX ORDER — ACETAMINOPHEN 500 MG
2 TABLET ORAL
Qty: 0 | Refills: 0 | DISCHARGE
Start: 2022-04-15

## 2022-04-15 RX ORDER — NITROFURANTOIN MACROCRYSTAL 50 MG
0 CAPSULE ORAL
Qty: 0 | Refills: 0 | DISCHARGE

## 2022-04-15 RX ORDER — LISINOPRIL 2.5 MG/1
10 TABLET ORAL DAILY
Refills: 0 | Status: DISCONTINUED | OUTPATIENT
Start: 2022-04-16 | End: 2022-04-16

## 2022-04-15 RX ORDER — METOPROLOL TARTRATE 50 MG
1 TABLET ORAL
Qty: 0 | Refills: 0 | DISCHARGE

## 2022-04-15 RX ADMIN — Medication 50 MILLIGRAM(S): at 18:56

## 2022-04-15 RX ADMIN — HEPARIN SODIUM 5000 UNIT(S): 5000 INJECTION INTRAVENOUS; SUBCUTANEOUS at 06:03

## 2022-04-15 RX ADMIN — HEPARIN SODIUM 5000 UNIT(S): 5000 INJECTION INTRAVENOUS; SUBCUTANEOUS at 19:01

## 2022-04-15 RX ADMIN — LISINOPRIL 5 MILLIGRAM(S): 2.5 TABLET ORAL at 06:03

## 2022-04-15 RX ADMIN — Medication 650 MILLIGRAM(S): at 00:45

## 2022-04-15 RX ADMIN — Medication 1: at 11:39

## 2022-04-15 RX ADMIN — Medication 81 MILLIGRAM(S): at 11:40

## 2022-04-15 RX ADMIN — SIMVASTATIN 20 MILLIGRAM(S): 20 TABLET, FILM COATED ORAL at 21:46

## 2022-04-15 RX ADMIN — Medication 50 MILLIGRAM(S): at 06:03

## 2022-04-15 RX ADMIN — CLOPIDOGREL BISULFATE 75 MILLIGRAM(S): 75 TABLET, FILM COATED ORAL at 11:40

## 2022-04-15 NOTE — PROGRESS NOTE ADULT - PROBLEM SELECTOR PLAN 1
Likely 2/2  uti and covid infection  Improved
Likely 2/2  uti and covid infection  Improved
AMS - found to have uti and covid infection
Likely 2/2  uti and covid infection  Improved
AMS - found to have uti and covid infection

## 2022-04-15 NOTE — PROGRESS NOTE ADULT - PROBLEM SELECTOR PLAN 6
lisinopril 5 milliGRAM(s) Oral daily  metoprolol tartrate 50 milliGRAM(s) Oral two times a day
lisinopril 5 milliGRAM(s) Oral daily  metoprolol tartrate 50 milliGRAM(s) Oral two times a day
metoprolol, lisinopril
lisinopril 5 milliGRAM(s) Oral daily  metoprolol tartrate 50 milliGRAM(s) Oral two times a day
lisinopril 5 milliGRAM(s) Oral daily  metoprolol tartrate 50 milliGRAM(s) Oral two times a day

## 2022-04-15 NOTE — PROGRESS NOTE ADULT - PROBLEM SELECTOR PROBLEM 5
Lumbar compression fracture

## 2022-04-15 NOTE — PROGRESS NOTE ADULT - PROBLEM SELECTOR PLAN 8
simvastatin 20 milliGRAM(s) Oral at bedtime
simvastatin 20 milliGRAM(s) Oral at bedtime
simvastatin
simvastatin 20 milliGRAM(s) Oral at bedtime
simvastatin 20 milliGRAM(s) Oral at bedtime

## 2022-04-15 NOTE — PROGRESS NOTE ADULT - SUBJECTIVE AND OBJECTIVE BOX
Patient is a 89y old  Female who presents with a chief complaint of AMS (15 Apr 2022 08:56)    INTERVAL HPI/OVERNIGHT EVENTS:  Pt was seen and examined, no acute events.    MEDICATIONS  (STANDING):  aspirin  chewable 81 milliGRAM(s) Oral daily  clopidogrel Tablet 75 milliGRAM(s) Oral daily  dextrose 5%. 1000 milliLiter(s) (50 mL/Hr) IV Continuous <Continuous>  dextrose 5%. 1000 milliLiter(s) (100 mL/Hr) IV Continuous <Continuous>  dextrose 50% Injectable 25 Gram(s) IV Push once  dextrose 50% Injectable 12.5 Gram(s) IV Push once  dextrose 50% Injectable 25 Gram(s) IV Push once  glucagon  Injectable 1 milliGRAM(s) IntraMuscular once  heparin   Injectable 5000 Unit(s) SubCutaneous every 12 hours  insulin lispro (ADMELOG) corrective regimen sliding scale   SubCutaneous three times a day before meals  lisinopril 5 milliGRAM(s) Oral daily  metoprolol tartrate 50 milliGRAM(s) Oral two times a day  simvastatin 20 milliGRAM(s) Oral at bedtime    MEDICATIONS  (PRN):  acetaminophen     Tablet .. 650 milliGRAM(s) Oral every 6 hours PRN Temp greater or equal to 38C (100.4F), Moderate Pain (4 - 6)  dextrose Oral Gel 15 Gram(s) Oral once PRN Blood Glucose LESS THAN 70 milliGRAM(s)/deciliter      Allergies    codeine (Vomiting)  contrast dye -  rash (Other)  iodine (Other)  latex (Rash)  penicillin (Rash)    Intolerances          Vital Signs Last 24 Hrs  T(C): 36.7 (15 Apr 2022 10:37), Max: 37.5 (14 Apr 2022 14:04)  T(F): 98.1 (15 Apr 2022 10:37), Max: 99.5 (14 Apr 2022 14:04)  HR: 62 (15 Apr 2022 10:37) (62 - 71)  BP: 171/69 (15 Apr 2022 10:37) (132/74 - 173/97)  BP(mean): --  RR: 18 (15 Apr 2022 10:37) (17 - 18)  SpO2: 93% (15 Apr 2022 10:37) (93% - 96%)    PHYSICAL EXAM:  GENERAL: NAD  HEAD:  Atraumatic  EYES: PERRLA  NERVOUS SYSTEM:  Awake, confused.  CHEST/LUNG: Clear  HEART: RRR  ABDOMEN: Soft, non tender  EXTREMITIES:  no edema    LABS:                        11.8   8.65  )-----------( 231      ( 14 Apr 2022 07:57 )             35.1     04-14    140  |  106  |  28<H>  ----------------------------<  159<H>  4.1   |  26  |  1.37<H>    Ca    9.2      14 Apr 2022 07:57          CAPILLARY BLOOD GLUCOSE      POCT Blood Glucose.: 200 mg/dL (15 Apr 2022 11:37)  POCT Blood Glucose.: 182 mg/dL (14 Apr 2022 21:47)  POCT Blood Glucose.: 180 mg/dL (14 Apr 2022 17:10)      Culture - Urine (collected 11 Apr 2022 00:54)  Source: Clean Catch Clean Catch (Midstream)  Final Report (13 Apr 2022 10:24):    >100,000 CFU/ml Escherichia coli  Organism: Escherichia coli (13 Apr 2022 10:24)  Organism: Escherichia coli (13 Apr 2022 10:24)    Culture - Blood (collected 11 Apr 2022 00:41)  Source: .Blood Blood-Peripheral  Preliminary Report (12 Apr 2022 01:01):    No growth to date.    Culture - Blood (collected 11 Apr 2022 00:41)  Source: .Blood Blood-Peripheral  Preliminary Report (12 Apr 2022 01:01):    No growth to date.      RADIOLOGY & ADDITIONAL TESTS:    Imaging Personally Reviewed:  [ ] YES  [ ] NO    Consultant(s) Notes Reviewed:  [ ] YES  [ ] NO    Care Discussed with Consultants/Other Providers [ ] YES  [ ] NO

## 2022-04-15 NOTE — PROGRESS NOTE ADULT - PROBLEM SELECTOR PLAN 9
VTE prop: heparin sc q12 hrs

## 2022-04-15 NOTE — PROGRESS NOTE ADULT - PROBLEM SELECTOR PLAN 2
Urine clx with E coli  Continue ceftriaxone
Urine clx with E coli  Continue ceftriaxone
WBCs and leuk esterase in urine  Continue ceftriaxone for now as this is a recurrent infection need to check Urine Cx results and Sensitivity to adjust antibiotics if necessary.
WBCs and leuk esterase in urine  Deescalate antibiotic when cultures return
Urine clx with E coli  Completed ceftriaxone

## 2022-04-15 NOTE — PROGRESS NOTE ADULT - PROBLEM SELECTOR PLAN 7
A1C with Estimated Average Glucose Result: 8.8:   insulin corrective scale
A1C with Estimated Average Glucose Result: 8.8:   insulin corrective scale
insulin corrective scale
A1C with Estimated Average Glucose Result: 8.8:   insulin corrective scale
A1C with Estimated Average Glucose Result: 8.8:   insulin corrective scale

## 2022-04-15 NOTE — PROGRESS NOTE ADULT - PROBLEM SELECTOR PLAN 5
incidentally found on imaging  Ortho input appreciated, no intervention.  Outpatient follow up.

## 2022-04-15 NOTE — PROGRESS NOTE ADULT - PROBLEM SELECTOR PLAN 4
Large fecal load on CT  miralax in am  monitor for BM
Large fecal load on CT  had bowel movement
Large fecal load on CT  miralax in am  monitor for BM

## 2022-04-16 ENCOUNTER — TRANSCRIPTION ENCOUNTER (OUTPATIENT)
Age: 87
End: 2022-04-16

## 2022-04-16 VITALS
HEART RATE: 71 BPM | DIASTOLIC BLOOD PRESSURE: 69 MMHG | SYSTOLIC BLOOD PRESSURE: 125 MMHG | TEMPERATURE: 98 F | RESPIRATION RATE: 18 BRPM | OXYGEN SATURATION: 96 %

## 2022-04-16 LAB
ANION GAP SERPL CALC-SCNC: 8 MMOL/L — SIGNIFICANT CHANGE UP (ref 5–17)
BUN SERPL-MCNC: 27 MG/DL — HIGH (ref 7–23)
CALCIUM SERPL-MCNC: 9.3 MG/DL — SIGNIFICANT CHANGE UP (ref 8.5–10.1)
CHLORIDE SERPL-SCNC: 104 MMOL/L — SIGNIFICANT CHANGE UP (ref 96–108)
CO2 SERPL-SCNC: 25 MMOL/L — SIGNIFICANT CHANGE UP (ref 22–31)
CREAT SERPL-MCNC: 1.58 MG/DL — HIGH (ref 0.5–1.3)
CULTURE RESULTS: SIGNIFICANT CHANGE UP
CULTURE RESULTS: SIGNIFICANT CHANGE UP
EGFR: 31 ML/MIN/1.73M2 — LOW
GLUCOSE BLDC GLUCOMTR-MCNC: 207 MG/DL — HIGH (ref 70–99)
GLUCOSE BLDC GLUCOMTR-MCNC: 293 MG/DL — HIGH (ref 70–99)
GLUCOSE SERPL-MCNC: 211 MG/DL — HIGH (ref 70–99)
POTASSIUM SERPL-MCNC: 4.5 MMOL/L — SIGNIFICANT CHANGE UP (ref 3.5–5.3)
POTASSIUM SERPL-SCNC: 4.5 MMOL/L — SIGNIFICANT CHANGE UP (ref 3.5–5.3)
SODIUM SERPL-SCNC: 137 MMOL/L — SIGNIFICANT CHANGE UP (ref 135–145)
SPECIMEN SOURCE: SIGNIFICANT CHANGE UP
SPECIMEN SOURCE: SIGNIFICANT CHANGE UP

## 2022-04-16 PROCEDURE — 99239 HOSP IP/OBS DSCHRG MGMT >30: CPT

## 2022-04-16 RX ORDER — AMLODIPINE BESYLATE 2.5 MG/1
1 TABLET ORAL
Qty: 0 | Refills: 0 | DISCHARGE
Start: 2022-04-16

## 2022-04-16 RX ORDER — LISINOPRIL 2.5 MG/1
1 TABLET ORAL
Qty: 0 | Refills: 0 | DISCHARGE

## 2022-04-16 RX ORDER — AMLODIPINE BESYLATE 2.5 MG/1
5 TABLET ORAL DAILY
Refills: 0 | Status: DISCONTINUED | OUTPATIENT
Start: 2022-04-16 | End: 2022-04-16

## 2022-04-16 RX ORDER — LISINOPRIL 2.5 MG/1
1 TABLET ORAL
Qty: 0 | Refills: 0 | DISCHARGE
Start: 2022-04-16

## 2022-04-16 RX ADMIN — Medication 50 MILLIGRAM(S): at 06:00

## 2022-04-16 RX ADMIN — Medication 2: at 08:19

## 2022-04-16 RX ADMIN — Medication 3: at 12:36

## 2022-04-16 RX ADMIN — LISINOPRIL 10 MILLIGRAM(S): 2.5 TABLET ORAL at 06:00

## 2022-04-16 RX ADMIN — AMLODIPINE BESYLATE 5 MILLIGRAM(S): 2.5 TABLET ORAL at 10:21

## 2022-04-16 RX ADMIN — Medication 81 MILLIGRAM(S): at 12:37

## 2022-04-16 RX ADMIN — CLOPIDOGREL BISULFATE 75 MILLIGRAM(S): 75 TABLET, FILM COATED ORAL at 12:37

## 2022-04-16 RX ADMIN — HEPARIN SODIUM 5000 UNIT(S): 5000 INJECTION INTRAVENOUS; SUBCUTANEOUS at 06:00

## 2022-04-16 NOTE — PROGRESS NOTE ADULT - ASSESSMENT
Patient is a 89F with a PMH of HTN, HLD, CAD, DM who presents to the ED for AMS.  Patient is COVID positive not requiring any supplement oxygen thus not a candidate for dexa or remdisivir. Patient is stable but becoming delirious due to change in environment and metabolic encephalopathy secondary to UTI and COVID.  Patient has underlying dementia. Both caretakers Frank and Toby are not ready to take patient back home yet as Frank has tested positive for COVID himself and Toby is going for surgical procedure next week.   the family wants patient to go to [facility]. As patient is COVID positive will need to get negative COVID test before acceptance to [facility].  Neg X 1, will repeat in am.
Patient is a 89F with a PMH of HTN, HLD, CAD, DM who presents to the ED for AMS.  Patient is COVID positive not requiring any supplement oxygen thus not a candidate for dexa or remdisivir. Patient is stable but becoming delirious due to change in environment and metabolic encephalopathy secondary to UTI and COVID.  Patient has underlying dementia. Both caretakers Frank and Toby are not ready to take patient back home yet as Frank has tested positive for COVID himself and Toby is going for surgical procedure next week.   the family wants patient to go to [facility]. As patient is COVID positive will need to get negative COVID test before acceptance to [facility].  Now covid Neg X 2
Patient is stable on meds. ID evaluation fo r COVID treatment 
89 yo lady with DM and CAD now with acute confusion and COVID. 
89 yo lady slowly improving with abx. Urosepsis with septic shock is best fit for symptoms. ?COVID seems less important.
Patient is a 89F with a PMH of HTN, HLD, CAD, DM who presents to the ED for AMS.  Patient currently awake but not alert, unable to provide history.  Per ED attending who spoke to family, patient was at her baseline mental status this morning but became worse.  Reportedly stated that she wanted to die to EMS personnel on the way to the ED, did not have a suicide plan.  Reportedly being treated for UTI at home.  COVID swab positive in ED.  
Patient is a 89F with a PMH of HTN, HLD, CAD, DM who presents to the ED for AMS.  Patient is COVID positive not requiring any supplement oxygen thus not a candidate for dexa or remdisivir. Patient is stable but becoming delirious due to change in environment and metabolic encephalopathy secondary to UTI and COVID.  Patient has underlying dementia. Both caretakers Frank and Toby are not ready to take patient back home yet as Frank has tested positive for COVID himself and Tomsharri is going for surgical procedure next week.  Patient can not be discharged today. Thus, the family wants patient to go to [facility]. As patient is COVID positive will need to get negative COVID test before acceptance to [facility]. Explained in details to Toby the plan of care. 
89 yo lady stable for DC. Will follow up as OPT 
89 yo lady COVID pos. ?urinary infection. Remains on abx
Patient is a 89F with a PMH of HTN, HLD, CAD, DM who presents to the ED for AMS.  Patient is COVID positive not requiring any supplement oxygen thus not a candidate for dexa or remdisivir. Patient is stable but becoming delirious due to change in environment and metabolic encephalopathy secondary to UTI and COVID.  Patient has underlying dementia. Both caretakers Frank and Toby are not ready to take patient back home yet as Frank has tested positive for COVID himself and Toby is going for surgical procedure next week.   the family wants patient to go to [facility]. As patient is COVID positive will need to get negative COVID test before acceptance to [facility].

## 2022-04-16 NOTE — PROGRESS NOTE ADULT - SUBJECTIVE AND OBJECTIVE BOX
89 yo lady doing better. Going to rehab    Vital Signs Last 24 Hrs  T(C): 36.9 (16 Apr 2022 10:27), Max: 37.1 (15 Apr 2022 23:45)  T(F): 98.4 (16 Apr 2022 10:27), Max: 98.7 (15 Apr 2022 23:45)  HR: 71 (16 Apr 2022 10:27) (62 - 71)  BP: 125/69 (16 Apr 2022 10:27) (125/69 - 201/73)  BP(mean): --  RR: 18 (16 Apr 2022 10:27) (18 - 18)  SpO2: 96% (16 Apr 2022 10:27) (94% - 96%)    MEDICATIONS  (STANDING):  amLODIPine   Tablet 5 milliGRAM(s) Oral daily  aspirin  chewable 81 milliGRAM(s) Oral daily  clopidogrel Tablet 75 milliGRAM(s) Oral daily  dextrose 5%. 1000 milliLiter(s) (50 mL/Hr) IV Continuous <Continuous>  dextrose 5%. 1000 milliLiter(s) (100 mL/Hr) IV Continuous <Continuous>  dextrose 50% Injectable 25 Gram(s) IV Push once  dextrose 50% Injectable 12.5 Gram(s) IV Push once  dextrose 50% Injectable 25 Gram(s) IV Push once  glucagon  Injectable 1 milliGRAM(s) IntraMuscular once  heparin   Injectable 5000 Unit(s) SubCutaneous every 12 hours  insulin lispro (ADMELOG) corrective regimen sliding scale   SubCutaneous three times a day before meals  lisinopril 10 milliGRAM(s) Oral daily  metoprolol tartrate 50 milliGRAM(s) Oral two times a day  simvastatin 20 milliGRAM(s) Oral at bedtime    MEDICATIONS  (PRN):  acetaminophen     Tablet .. 650 milliGRAM(s) Oral every 6 hours PRN Temp greater or equal to 38C (100.4F), Moderate Pain (4 - 6)  dextrose Oral Gel 15 Gram(s) Oral once PRN Blood Glucose LESS THAN 70 milliGRAM(s)/deciliter

## 2022-04-16 NOTE — DISCHARGE NOTE PROVIDER - NSDCMRMEDTOKEN_GEN_ALL_CORE_FT
acetaminophen 325 mg oral tablet: 2 tab(s) orally every 6 hours, As needed, Temp greater or equal to 38C (100.4F), Moderate Pain (4 - 6)  amLODIPine 5 mg oral tablet: 1 tab(s) orally once a day  aspirin 81 mg oral tablet, chewable: 1 tab(s) orally once a day  clopidogrel 75 mg oral tablet: 1 tab(s) orally once a day  docusate sodium 100 mg oral capsule: 1 cap(s) orally 2 times a day  glimepiride 4 mg oral tablet: 1 tab(s) orally once a day  lisinopril 10 mg oral tablet: 1 tab(s) orally once a day  metoprolol tartrate 50 mg oral tablet: 1 tab(s) orally 2 times a day  pantoprazole 40 mg oral delayed release tablet: 1 tab(s) orally once a day (before a meal)  simvastatin 20 mg oral tablet: 1 tab(s) orally once a day (at bedtime)

## 2022-04-16 NOTE — DISCHARGE NOTE PROVIDER - NSDCCPCAREPLAN_GEN_ALL_CORE_FT
PRINCIPAL DISCHARGE DIAGNOSIS  Diagnosis: Acute UTI  Assessment and Plan of Treatment: Patient is a 89F with a PMH of HTN, HLD, CAD, DM who presents to the ED for AMS.  Patient is COVID positive not requiring any supplement oxygen thus not a candidate for dexa or remdisivir. Patient is stable but becoming delirious due to change in environment and metabolic encephalopathy secondary to UTI and COVID.  Patient has underlying dementia. Both caretakers Frank and Toby are not ready to take patient back home yet as Frank has tested positive for COVID himself and Toby is going for surgical procedure next week.   the family wants patient to go to [facility]. As patient is COVID positive will need to get negative COVID test before acceptance to [facility].  Now covid Neg X 2   Problem/Plan - 1:  ·  Problem: Metabolic encephalopathy.   ·  Plan: Likely 2/2  uti and covid infection  Improved.   Problem/Plan - 2:  ·  Problem: UTI (urinary tract infection).   ·  Plan: Urine clx with E coli  Completed ceftriaxone.   Problem/Plan - 3:  ·  Problem: 2019 novel coronavirus disease (COVID-19).   ·  Plan: COVID 19 swabbed in ED - +ve results.  Isolation precautions  Tylenol PRN fever  Will hold dexamethasone and remdesivir as patient not requiring supplemental oxygen.  Now neg   Problem/Plan - 4:  ·  Problem: Constipation.   ·  Plan: Large fecal load on CT  had bowel movement.   Problem/Plan - 5:  ·  Problem: Lumbar compression fracture.   ·  Plan: incidentally found on imaging  Ortho input appreciated, no intervention.  Outpatient follow up.   Problem/Plan - 6:  ·  Problem: Essential hypertension.   ·  Plan: lisinopril 5 milliGRAM(s) Oral daily  metoprolol tartrate 50 milliGRAM(s) Oral two times a day.  BP elevated , added amlodipine   Problem/Plan - 7:  ·  Problem: Diabetes mellitus.   ·  Plan: A1C with Estimated Average Glucose Result: 8.8:   Resume home med   Problem/Plan - 8:  ·  Problem: Hyperlipidemia.   ·  Plan: simvastatin 20 milliGRAM(s) Oral at bedtime.   Problem/Plan - 9:  ·  Problem: Preventive measure.   ·  Plan: VTE prop: heparin sc q12 hrs.        SECONDARY DISCHARGE DIAGNOSES  Diagnosis: AMS (altered mental status)  Assessment and Plan of Treatment:     Diagnosis: Suicidal ideation  Assessment and Plan of Treatment:     Diagnosis: Pneumonia due to COVID-19 virus  Assessment and Plan of Treatment:      PRINCIPAL DISCHARGE DIAGNOSIS  Diagnosis: Acute UTI  Assessment and Plan of Treatment: Patient is a 89F with a PMH of HTN, HLD, CAD, DM who presents to the ED for AMS.  Patient is COVID positive not requiring any supplement oxygen thus not a candidate for dexa or remdisivir. Patient is stable but becoming delirious due to change in environment and metabolic encephalopathy secondary to UTI and COVID.  Patient has underlying dementia. Both caretakers Frank and Toby are not ready to take patient back home yet as Frank has tested positive for COVID himself and Toby is going for surgical procedure next week.   the family wants patient to go to [facility]. As patient is COVID positive will need to get negative COVID test before acceptance to [facility].  Now covid Neg X 2   Problem/Plan - 1:  ·  Problem: Metabolic encephalopathy.   ·  Plan: Likely 2/2  uti and covid infection  Improved.   Problem/Plan - 2:  ·  Problem: UTI (urinary tract infection).   ·  Plan: Urine clx with E coli  Completed ceftriaxone.   Problem/Plan - 3:  ·  Problem: 2019 novel coronavirus disease (COVID-19).   ·  Plan: COVID 19 swabbed in ED - +ve results.  Isolation precautions  Tylenol PRN fever  Will hold dexamethasone and remdesivir as patient not requiring supplemental oxygen.  Now neg   Problem/Plan - 4:  ·  Problem: Constipation.   ·  Plan: Large fecal load on CT  had bowel movement.   Problem/Plan - 5:  ·  Problem: Lumbar compression fracture.   ·  Plan: incidentally found on imaging  Ortho input appreciated, no intervention.  Outpatient follow up.   Problem/Plan - 6:  ·  Problem: Essential hypertension.   ·  Plan: lisinopril 5 milliGRAM(s) Oral daily  metoprolol tartrate 50 milliGRAM(s) Oral two times a day.  BP elevated , added amlodipine   Problem/Plan - 7:  ·  Problem: Diabetes mellitus.   ·  Plan: A1C with Estimated Average Glucose Result: 8.8:   Resume home med   Problem/Plan - 8:  ·  Problem: Hyperlipidemia.   ·  Plan: simvastatin 20 milliGRAM(s) Oral at bedtime.   Problem/Plan - 9:  ·  Problem: Preventive measure.   ·  Plan: VTE prop: heparin sc q12 hrs.        SECONDARY DISCHARGE DIAGNOSES  Diagnosis: AMS (altered mental status)  Assessment and Plan of Treatment:     Diagnosis: COVID-19  Assessment and Plan of Treatment:

## 2022-04-16 NOTE — PROGRESS NOTE ADULT - RS GEN PE MLT RESP DETAILS PC
diminished breath sounds, L/diminished breath sounds, R

## 2022-04-16 NOTE — PROGRESS NOTE ADULT - PROBLEM SELECTOR PROBLEM 2
UTI (urinary tract infection)
UTI (urinary tract infection)
2019 novel coronavirus disease (COVID-19)
2019 novel coronavirus disease (COVID-19)
UTI (urinary tract infection)

## 2022-04-16 NOTE — DISCHARGE NOTE PROVIDER - HOSPITAL COURSE
Patient is a 89F with a PMH of HTN, HLD, CAD, DM who presents to the ED for AMS.  Patient is COVID positive not requiring any supplement oxygen thus not a candidate for dexa or remdisivir. Patient is stable but becoming delirious due to change in environment and metabolic encephalopathy secondary to UTI and COVID.  Patient has underlying dementia. Both caretakers Frank and Toby are not ready to take patient back home yet as Frank has tested positive for COVID himself and Toby is going for surgical procedure next week.   the family wants patient to go to [facility]. As patient is COVID positive will need to get negative COVID test before acceptance to [facility].  Now covid Neg X 2     Problem/Plan - 1:  ·  Problem: Metabolic encephalopathy.   ·  Plan: Likely 2/2  uti and covid infection  Improved.     Problem/Plan - 2:  ·  Problem: UTI (urinary tract infection).   ·  Plan: Urine clx with E coli  Completed ceftriaxone.     Problem/Plan - 3:  ·  Problem: 2019 novel coronavirus disease (COVID-19).   ·  Plan: COVID 19 swabbed in ED - +ve results.  Isolation precautions  Tylenol PRN fever  Will hold dexamethasone and remdesivir as patient not requiring supplemental oxygen.  Now neg     Problem/Plan - 4:  ·  Problem: Constipation.   ·  Plan: Large fecal load on CT  had bowel movement.     Problem/Plan - 5:  ·  Problem: Lumbar compression fracture.   ·  Plan: incidentally found on imaging  Ortho input appreciated, no intervention.  Outpatient follow up.     Problem/Plan - 6:  ·  Problem: Essential hypertension.   ·  Plan: lisinopril 5 milliGRAM(s) Oral daily  metoprolol tartrate 50 milliGRAM(s) Oral two times a day.  BP elevated , added amlodipine     Problem/Plan - 7:  ·  Problem: Diabetes mellitus.   ·  Plan: A1C with Estimated Average Glucose Result: 8.8:   Resume home med     Problem/Plan - 8:  ·  Problem: Hyperlipidemia.   ·  Plan: simvastatin 20 milliGRAM(s) Oral at bedtime.     Problem/Plan - 9:  ·  Problem: Preventive measure.   ·  Plan: VTE prop: heparin sc q12 hrs.
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
## 2022-04-16 NOTE — PROGRESS NOTE ADULT - PROBLEM SELECTOR PROBLEM 3
2019 novel coronavirus disease (COVID-19)
2019 novel coronavirus disease (COVID-19)
Essential hypertension
2019 novel coronavirus disease (COVID-19)
2019 novel coronavirus disease (COVID-19)
Essential hypertension
2019 novel coronavirus disease (COVID-19)

## 2022-04-16 NOTE — PROGRESS NOTE ADULT - RESPIRATORY
detailed exam
Breath Sounds equal & clear to percussion & auscultation, no accessory muscle use
detailed exam
Breath Sounds equal & clear to percussion & auscultation, no accessory muscle use
detailed exam

## 2022-04-16 NOTE — PROGRESS NOTE ADULT - PROVIDER SPECIALTY LIST ADULT
Hospitalist
Pulmonology
Hospitalist
Pulmonology
Internal Medicine
Pulmonology
Pulmonology
Hospitalist

## 2022-04-16 NOTE — PROGRESS NOTE ADULT - PROBLEM SELECTOR PROBLEM 1
Metabolic encephalopathy

## 2022-04-25 LAB — GLUCOSE BLDC GLUCOMTR-MCNC: 169 MG/DL — HIGH (ref 70–99)

## 2022-04-26 DIAGNOSIS — N39.0 URINARY TRACT INFECTION, SITE NOT SPECIFIED: ICD-10-CM

## 2022-04-26 DIAGNOSIS — M48.061 SPINAL STENOSIS, LUMBAR REGION WITHOUT NEUROGENIC CLAUDICATION: ICD-10-CM

## 2022-04-26 DIAGNOSIS — R45.851 SUICIDAL IDEATIONS: ICD-10-CM

## 2022-04-26 DIAGNOSIS — Z95.1 PRESENCE OF AORTOCORONARY BYPASS GRAFT: ICD-10-CM

## 2022-04-26 DIAGNOSIS — U07.1 COVID-19: ICD-10-CM

## 2022-04-26 DIAGNOSIS — E78.5 HYPERLIPIDEMIA, UNSPECIFIED: ICD-10-CM

## 2022-04-26 DIAGNOSIS — Z79.82 LONG TERM (CURRENT) USE OF ASPIRIN: ICD-10-CM

## 2022-04-26 DIAGNOSIS — I10 ESSENTIAL (PRIMARY) HYPERTENSION: ICD-10-CM

## 2022-04-26 DIAGNOSIS — I25.10 ATHEROSCLEROTIC HEART DISEASE OF NATIVE CORONARY ARTERY WITHOUT ANGINA PECTORIS: ICD-10-CM

## 2022-04-26 DIAGNOSIS — R41.0 DISORIENTATION, UNSPECIFIED: ICD-10-CM

## 2022-04-26 DIAGNOSIS — F03.90 UNSPECIFIED DEMENTIA WITHOUT BEHAVIORAL DISTURBANCE: ICD-10-CM

## 2022-04-26 DIAGNOSIS — Z88.0 ALLERGY STATUS TO PENICILLIN: ICD-10-CM

## 2022-04-26 DIAGNOSIS — R41.82 ALTERED MENTAL STATUS, UNSPECIFIED: ICD-10-CM

## 2022-04-26 DIAGNOSIS — K59.00 CONSTIPATION, UNSPECIFIED: ICD-10-CM

## 2022-04-26 DIAGNOSIS — J12.82 PNEUMONIA DUE TO CORONAVIRUS DISEASE 2019: ICD-10-CM

## 2022-04-26 DIAGNOSIS — G93.41 METABOLIC ENCEPHALOPATHY: ICD-10-CM

## 2022-04-26 DIAGNOSIS — Z79.02 LONG TERM (CURRENT) USE OF ANTITHROMBOTICS/ANTIPLATELETS: ICD-10-CM

## 2022-04-26 DIAGNOSIS — E11.9 TYPE 2 DIABETES MELLITUS WITHOUT COMPLICATIONS: ICD-10-CM

## 2023-01-01 ENCOUNTER — EMERGENCY (EMERGENCY)
Facility: HOSPITAL | Age: 88
LOS: 0 days | Discharge: HOME | End: 2023-10-30
Payer: MEDICARE

## 2023-01-01 ENCOUNTER — INPATIENT (INPATIENT)
Facility: HOSPITAL | Age: 88
LOS: 17 days | Discharge: NURSING, REHAB OR LONG-TERM CARE | End: 2024-01-02
Attending: STUDENT IN AN ORGANIZED HEALTH CARE EDUCATION/TRAINING PROGRAM | Admitting: STUDENT IN AN ORGANIZED HEALTH CARE EDUCATION/TRAINING PROGRAM
Payer: MEDICARE

## 2023-01-01 ENCOUNTER — TRANSCRIPTION ENCOUNTER (OUTPATIENT)
Age: 88
End: 2023-01-01

## 2023-01-01 VITALS
HEIGHT: 59 IN | TEMPERATURE: 98 F | OXYGEN SATURATION: 100 % | DIASTOLIC BLOOD PRESSURE: 56 MMHG | HEART RATE: 84 BPM | SYSTOLIC BLOOD PRESSURE: 165 MMHG | WEIGHT: 119.93 LBS | RESPIRATION RATE: 21 BRPM

## 2023-01-01 VITALS
DIASTOLIC BLOOD PRESSURE: 80 MMHG | HEIGHT: 59 IN | SYSTOLIC BLOOD PRESSURE: 130 MMHG | RESPIRATION RATE: 24 BRPM | HEART RATE: 73 BPM | OXYGEN SATURATION: 99 % | TEMPERATURE: 98 F | WEIGHT: 130.07 LBS

## 2023-01-01 DIAGNOSIS — E11.9 TYPE 2 DIABETES MELLITUS WITHOUT COMPLICATIONS: ICD-10-CM

## 2023-01-01 DIAGNOSIS — E78.5 HYPERLIPIDEMIA, UNSPECIFIED: ICD-10-CM

## 2023-01-01 DIAGNOSIS — M17.10 UNILATERAL PRIMARY OSTEOARTHRITIS, UNSPECIFIED KNEE: ICD-10-CM

## 2023-01-01 DIAGNOSIS — F03.90 UNSPECIFIED DEMENTIA WITHOUT BEHAVIORAL DISTURBANCE: ICD-10-CM

## 2023-01-01 DIAGNOSIS — I25.10 ATHEROSCLEROTIC HEART DISEASE OF NATIVE CORONARY ARTERY WITHOUT ANGINA PECTORIS: ICD-10-CM

## 2023-01-01 DIAGNOSIS — R63.0 ANOREXIA: ICD-10-CM

## 2023-01-01 DIAGNOSIS — Z88.5 ALLERGY STATUS TO NARCOTIC AGENT: ICD-10-CM

## 2023-01-01 DIAGNOSIS — Z79.82 LONG TERM (CURRENT) USE OF ASPIRIN: ICD-10-CM

## 2023-01-01 DIAGNOSIS — Z91.041 RADIOGRAPHIC DYE ALLERGY STATUS: ICD-10-CM

## 2023-01-01 DIAGNOSIS — R45.1 RESTLESSNESS AND AGITATION: ICD-10-CM

## 2023-01-01 DIAGNOSIS — R45.0 NERVOUSNESS: ICD-10-CM

## 2023-01-01 DIAGNOSIS — R41.82 ALTERED MENTAL STATUS, UNSPECIFIED: ICD-10-CM

## 2023-01-01 DIAGNOSIS — N39.0 URINARY TRACT INFECTION, SITE NOT SPECIFIED: ICD-10-CM

## 2023-01-01 DIAGNOSIS — R53.81 OTHER MALAISE: ICD-10-CM

## 2023-01-01 DIAGNOSIS — E44.0 MODERATE PROTEIN-CALORIE MALNUTRITION: ICD-10-CM

## 2023-01-01 DIAGNOSIS — Z95.1 PRESENCE OF AORTOCORONARY BYPASS GRAFT: ICD-10-CM

## 2023-01-01 DIAGNOSIS — Z88.0 ALLERGY STATUS TO PENICILLIN: ICD-10-CM

## 2023-01-01 DIAGNOSIS — Z79.84 LONG TERM (CURRENT) USE OF ORAL HYPOGLYCEMIC DRUGS: ICD-10-CM

## 2023-01-01 DIAGNOSIS — F03.90 UNSPECIFIED DEMENTIA, UNSPECIFIED SEVERITY, WITHOUT BEHAVIORAL DISTURBANCE, PSYCHOTIC DISTURBANCE, MOOD DISTURBANCE, AND ANXIETY: ICD-10-CM

## 2023-01-01 DIAGNOSIS — Z51.5 ENCOUNTER FOR PALLIATIVE CARE: ICD-10-CM

## 2023-01-01 DIAGNOSIS — I10 ESSENTIAL (PRIMARY) HYPERTENSION: ICD-10-CM

## 2023-01-01 DIAGNOSIS — N17.9 ACUTE KIDNEY FAILURE, UNSPECIFIED: ICD-10-CM

## 2023-01-01 DIAGNOSIS — E11.8 TYPE 2 DIABETES MELLITUS WITH UNSPECIFIED COMPLICATIONS: ICD-10-CM

## 2023-01-01 DIAGNOSIS — L89.90 PRESSURE ULCER OF UNSPECIFIED SITE, UNSPECIFIED STAGE: ICD-10-CM

## 2023-01-01 DIAGNOSIS — M48.061 SPINAL STENOSIS, LUMBAR REGION WITHOUT NEUROGENIC CLAUDICATION: ICD-10-CM

## 2023-01-01 DIAGNOSIS — D53.9 NUTRITIONAL ANEMIA, UNSPECIFIED: ICD-10-CM

## 2023-01-01 DIAGNOSIS — Z96.652 PRESENCE OF LEFT ARTIFICIAL KNEE JOINT: ICD-10-CM

## 2023-01-01 DIAGNOSIS — Z79.02 LONG TERM (CURRENT) USE OF ANTITHROMBOTICS/ANTIPLATELETS: ICD-10-CM

## 2023-01-01 DIAGNOSIS — E43 UNSPECIFIED SEVERE PROTEIN-CALORIE MALNUTRITION: ICD-10-CM

## 2023-01-01 DIAGNOSIS — R29.6 REPEATED FALLS: ICD-10-CM

## 2023-01-01 DIAGNOSIS — J96.01 ACUTE RESPIRATORY FAILURE WITH HYPOXIA: ICD-10-CM

## 2023-01-01 LAB
-  AMOXICILLIN/CLAVULANIC ACID: SIGNIFICANT CHANGE UP
-  AMOXICILLIN/CLAVULANIC ACID: SIGNIFICANT CHANGE UP
-  AMPICILLIN/SULBACTAM: SIGNIFICANT CHANGE UP
-  AMPICILLIN: SIGNIFICANT CHANGE UP
-  AMPICILLIN: SIGNIFICANT CHANGE UP
-  AZTREONAM: SIGNIFICANT CHANGE UP
-  AZTREONAM: SIGNIFICANT CHANGE UP
-  CEFAZOLIN: SIGNIFICANT CHANGE UP
-  CEFEPIME: SIGNIFICANT CHANGE UP
-  CEFEPIME: SIGNIFICANT CHANGE UP
-  CEFOXITIN: SIGNIFICANT CHANGE UP
-  CEFOXITIN: SIGNIFICANT CHANGE UP
-  CEFTRIAXONE: SIGNIFICANT CHANGE UP
-  CEFTRIAXONE: SIGNIFICANT CHANGE UP
-  CIPROFLOXACIN: SIGNIFICANT CHANGE UP
-  CIPROFLOXACIN: SIGNIFICANT CHANGE UP
-  CLINDAMYCIN: SIGNIFICANT CHANGE UP
-  CLINDAMYCIN: SIGNIFICANT CHANGE UP
-  ERTAPENEM: SIGNIFICANT CHANGE UP
-  ERTAPENEM: SIGNIFICANT CHANGE UP
-  ERYTHROMYCIN: SIGNIFICANT CHANGE UP
-  ERYTHROMYCIN: SIGNIFICANT CHANGE UP
-  GENTAMICIN: SIGNIFICANT CHANGE UP
-  IMIPENEM: SIGNIFICANT CHANGE UP
-  IMIPENEM: SIGNIFICANT CHANGE UP
-  LEVOFLOXACIN: SIGNIFICANT CHANGE UP
-  LEVOFLOXACIN: SIGNIFICANT CHANGE UP
-  MEROPENEM: SIGNIFICANT CHANGE UP
-  MEROPENEM: SIGNIFICANT CHANGE UP
-  OXACILLIN: SIGNIFICANT CHANGE UP
-  OXACILLIN: SIGNIFICANT CHANGE UP
-  PENICILLIN: SIGNIFICANT CHANGE UP
-  PENICILLIN: SIGNIFICANT CHANGE UP
-  PIPERACILLIN/TAZOBACTAM: SIGNIFICANT CHANGE UP
-  PIPERACILLIN/TAZOBACTAM: SIGNIFICANT CHANGE UP
-  RIFAMPIN: SIGNIFICANT CHANGE UP
-  RIFAMPIN: SIGNIFICANT CHANGE UP
-  TETRACYCLINE: SIGNIFICANT CHANGE UP
-  TETRACYCLINE: SIGNIFICANT CHANGE UP
-  TOBRAMYCIN: SIGNIFICANT CHANGE UP
-  TOBRAMYCIN: SIGNIFICANT CHANGE UP
-  TRIMETHOPRIM/SULFAMETHOXAZOLE: SIGNIFICANT CHANGE UP
-  VANCOMYCIN: SIGNIFICANT CHANGE UP
-  VANCOMYCIN: SIGNIFICANT CHANGE UP
A1C WITH ESTIMATED AVERAGE GLUCOSE RESULT: 6.6 % — HIGH (ref 4–5.6)
A1C WITH ESTIMATED AVERAGE GLUCOSE RESULT: 6.6 % — HIGH (ref 4–5.6)
ALBUMIN SERPL ELPH-MCNC: 1.9 G/DL — LOW (ref 3.3–5)
ALBUMIN SERPL ELPH-MCNC: 1.9 G/DL — LOW (ref 3.3–5)
ALBUMIN SERPL ELPH-MCNC: 2.7 G/DL — LOW (ref 3.3–5)
ALBUMIN SERPL ELPH-MCNC: 2.7 G/DL — LOW (ref 3.3–5)
ALBUMIN SERPL ELPH-MCNC: 3.1 G/DL — LOW (ref 3.3–5)
ALBUMIN SERPL ELPH-MCNC: 3.1 G/DL — LOW (ref 3.3–5)
ALBUMIN SERPL ELPH-MCNC: 3.7 G/DL — SIGNIFICANT CHANGE UP (ref 3.3–5)
ALBUMIN SERPL ELPH-MCNC: 3.7 G/DL — SIGNIFICANT CHANGE UP (ref 3.3–5)
ALP SERPL-CCNC: 76 U/L — SIGNIFICANT CHANGE UP (ref 40–120)
ALP SERPL-CCNC: 84 U/L — SIGNIFICANT CHANGE UP (ref 40–120)
ALP SERPL-CCNC: 84 U/L — SIGNIFICANT CHANGE UP (ref 40–120)
ALP SERPL-CCNC: 94 U/L — SIGNIFICANT CHANGE UP (ref 40–120)
ALP SERPL-CCNC: 94 U/L — SIGNIFICANT CHANGE UP (ref 40–120)
ALT FLD-CCNC: 19 U/L — SIGNIFICANT CHANGE UP (ref 12–78)
ALT FLD-CCNC: 19 U/L — SIGNIFICANT CHANGE UP (ref 12–78)
ALT FLD-CCNC: 23 U/L — SIGNIFICANT CHANGE UP (ref 12–78)
ALT FLD-CCNC: 23 U/L — SIGNIFICANT CHANGE UP (ref 12–78)
ALT FLD-CCNC: 24 U/L — SIGNIFICANT CHANGE UP (ref 12–78)
ALT FLD-CCNC: 24 U/L — SIGNIFICANT CHANGE UP (ref 12–78)
ALT FLD-CCNC: 33 U/L — SIGNIFICANT CHANGE UP (ref 12–78)
ALT FLD-CCNC: 33 U/L — SIGNIFICANT CHANGE UP (ref 12–78)
ANION GAP SERPL CALC-SCNC: 10 MMOL/L — SIGNIFICANT CHANGE UP (ref 5–17)
ANION GAP SERPL CALC-SCNC: 10 MMOL/L — SIGNIFICANT CHANGE UP (ref 5–17)
ANION GAP SERPL CALC-SCNC: 11 MMOL/L — SIGNIFICANT CHANGE UP (ref 5–17)
ANION GAP SERPL CALC-SCNC: 11 MMOL/L — SIGNIFICANT CHANGE UP (ref 5–17)
ANION GAP SERPL CALC-SCNC: 6 MMOL/L — SIGNIFICANT CHANGE UP (ref 5–17)
ANION GAP SERPL CALC-SCNC: 7 MMOL/L — SIGNIFICANT CHANGE UP (ref 5–17)
ANION GAP SERPL CALC-SCNC: 8 MMOL/L — SIGNIFICANT CHANGE UP (ref 5–17)
ANION GAP SERPL CALC-SCNC: 8 MMOL/L — SIGNIFICANT CHANGE UP (ref 5–17)
APPEARANCE UR: ABNORMAL
APPEARANCE UR: ABNORMAL
APPEARANCE UR: CLEAR — SIGNIFICANT CHANGE UP
APPEARANCE UR: CLEAR — SIGNIFICANT CHANGE UP
AST SERPL-CCNC: 18 U/L — SIGNIFICANT CHANGE UP (ref 15–37)
AST SERPL-CCNC: 18 U/L — SIGNIFICANT CHANGE UP (ref 15–37)
AST SERPL-CCNC: 27 U/L — SIGNIFICANT CHANGE UP (ref 15–37)
AST SERPL-CCNC: 27 U/L — SIGNIFICANT CHANGE UP (ref 15–37)
AST SERPL-CCNC: 33 U/L — SIGNIFICANT CHANGE UP (ref 15–37)
AST SERPL-CCNC: 33 U/L — SIGNIFICANT CHANGE UP (ref 15–37)
AST SERPL-CCNC: 35 U/L — SIGNIFICANT CHANGE UP (ref 15–37)
AST SERPL-CCNC: 35 U/L — SIGNIFICANT CHANGE UP (ref 15–37)
BACTERIA # UR AUTO: ABNORMAL
BACTERIA # UR AUTO: ABNORMAL
BACTERIA # UR AUTO: ABNORMAL /HPF
BACTERIA # UR AUTO: ABNORMAL /HPF
BASOPHILS # BLD AUTO: 0.07 K/UL — SIGNIFICANT CHANGE UP (ref 0–0.2)
BASOPHILS # BLD AUTO: 0.07 K/UL — SIGNIFICANT CHANGE UP (ref 0–0.2)
BASOPHILS # BLD AUTO: 0.12 K/UL — SIGNIFICANT CHANGE UP (ref 0–0.2)
BASOPHILS # BLD AUTO: 0.12 K/UL — SIGNIFICANT CHANGE UP (ref 0–0.2)
BASOPHILS NFR BLD AUTO: 0.8 % — SIGNIFICANT CHANGE UP (ref 0–2)
BILIRUB SERPL-MCNC: 0.4 MG/DL — SIGNIFICANT CHANGE UP (ref 0.2–1.2)
BILIRUB SERPL-MCNC: 0.6 MG/DL — SIGNIFICANT CHANGE UP (ref 0.2–1.2)
BILIRUB SERPL-MCNC: 0.6 MG/DL — SIGNIFICANT CHANGE UP (ref 0.2–1.2)
BILIRUB SERPL-MCNC: 0.7 MG/DL — SIGNIFICANT CHANGE UP (ref 0.2–1.2)
BILIRUB SERPL-MCNC: 0.7 MG/DL — SIGNIFICANT CHANGE UP (ref 0.2–1.2)
BILIRUB UR-MCNC: ABNORMAL
BILIRUB UR-MCNC: ABNORMAL
BILIRUB UR-MCNC: NEGATIVE — SIGNIFICANT CHANGE UP
BILIRUB UR-MCNC: NEGATIVE — SIGNIFICANT CHANGE UP
BLD GP AB SCN SERPL QL: SIGNIFICANT CHANGE UP
BUN SERPL-MCNC: 22 MG/DL — SIGNIFICANT CHANGE UP (ref 7–23)
BUN SERPL-MCNC: 23 MG/DL — SIGNIFICANT CHANGE UP (ref 7–23)
BUN SERPL-MCNC: 23 MG/DL — SIGNIFICANT CHANGE UP (ref 7–23)
BUN SERPL-MCNC: 24 MG/DL — HIGH (ref 7–23)
BUN SERPL-MCNC: 25 MG/DL — HIGH (ref 7–23)
BUN SERPL-MCNC: 25 MG/DL — HIGH (ref 7–23)
BUN SERPL-MCNC: 31 MG/DL — HIGH (ref 7–23)
BUN SERPL-MCNC: 31 MG/DL — HIGH (ref 7–23)
BUN SERPL-MCNC: 36 MG/DL — HIGH (ref 7–23)
BUN SERPL-MCNC: 36 MG/DL — HIGH (ref 7–23)
CALCIUM SERPL-MCNC: 8.4 MG/DL — LOW (ref 8.5–10.1)
CALCIUM SERPL-MCNC: 8.5 MG/DL — SIGNIFICANT CHANGE UP (ref 8.5–10.1)
CALCIUM SERPL-MCNC: 8.5 MG/DL — SIGNIFICANT CHANGE UP (ref 8.5–10.1)
CALCIUM SERPL-MCNC: 8.6 MG/DL — SIGNIFICANT CHANGE UP (ref 8.5–10.1)
CALCIUM SERPL-MCNC: 8.8 MG/DL — SIGNIFICANT CHANGE UP (ref 8.5–10.1)
CALCIUM SERPL-MCNC: 9 MG/DL — SIGNIFICANT CHANGE UP (ref 8.5–10.1)
CALCIUM SERPL-MCNC: 9 MG/DL — SIGNIFICANT CHANGE UP (ref 8.5–10.1)
CALCIUM SERPL-MCNC: 9.4 MG/DL — SIGNIFICANT CHANGE UP (ref 8.5–10.1)
CALCIUM SERPL-MCNC: 9.4 MG/DL — SIGNIFICANT CHANGE UP (ref 8.5–10.1)
CALCIUM SERPL-MCNC: 9.9 MG/DL — SIGNIFICANT CHANGE UP (ref 8.5–10.1)
CALCIUM SERPL-MCNC: 9.9 MG/DL — SIGNIFICANT CHANGE UP (ref 8.5–10.1)
CHLORIDE SERPL-SCNC: 100 MMOL/L — SIGNIFICANT CHANGE UP (ref 96–108)
CHLORIDE SERPL-SCNC: 100 MMOL/L — SIGNIFICANT CHANGE UP (ref 96–108)
CHLORIDE SERPL-SCNC: 103 MMOL/L — SIGNIFICANT CHANGE UP (ref 96–108)
CHLORIDE SERPL-SCNC: 105 MMOL/L — SIGNIFICANT CHANGE UP (ref 96–108)
CHLORIDE SERPL-SCNC: 105 MMOL/L — SIGNIFICANT CHANGE UP (ref 96–108)
CHLORIDE SERPL-SCNC: 106 MMOL/L — SIGNIFICANT CHANGE UP (ref 96–108)
CHLORIDE SERPL-SCNC: 106 MMOL/L — SIGNIFICANT CHANGE UP (ref 96–108)
CHLORIDE SERPL-SCNC: 108 MMOL/L — SIGNIFICANT CHANGE UP (ref 96–108)
CHLORIDE SERPL-SCNC: 109 MMOL/L — HIGH (ref 96–108)
CHLORIDE SERPL-SCNC: 109 MMOL/L — HIGH (ref 96–108)
CO2 SERPL-SCNC: 21 MMOL/L — LOW (ref 22–31)
CO2 SERPL-SCNC: 21 MMOL/L — LOW (ref 22–31)
CO2 SERPL-SCNC: 22 MMOL/L — SIGNIFICANT CHANGE UP (ref 22–31)
CO2 SERPL-SCNC: 22 MMOL/L — SIGNIFICANT CHANGE UP (ref 22–31)
CO2 SERPL-SCNC: 24 MMOL/L — SIGNIFICANT CHANGE UP (ref 22–31)
CO2 SERPL-SCNC: 25 MMOL/L — SIGNIFICANT CHANGE UP (ref 22–31)
CO2 SERPL-SCNC: 26 MMOL/L — SIGNIFICANT CHANGE UP (ref 22–31)
CO2 SERPL-SCNC: 26 MMOL/L — SIGNIFICANT CHANGE UP (ref 22–31)
CO2 SERPL-SCNC: 27 MMOL/L — SIGNIFICANT CHANGE UP (ref 22–31)
CO2 SERPL-SCNC: 27 MMOL/L — SIGNIFICANT CHANGE UP (ref 22–31)
CO2 SERPL-SCNC: 29 MMOL/L — SIGNIFICANT CHANGE UP (ref 22–31)
CO2 SERPL-SCNC: 29 MMOL/L — SIGNIFICANT CHANGE UP (ref 22–31)
CO2 SERPL-SCNC: 30 MMOL/L — SIGNIFICANT CHANGE UP (ref 22–31)
CO2 SERPL-SCNC: 30 MMOL/L — SIGNIFICANT CHANGE UP (ref 22–31)
COLOR SPEC: SIGNIFICANT CHANGE UP
COLOR SPEC: SIGNIFICANT CHANGE UP
COLOR SPEC: YELLOW — SIGNIFICANT CHANGE UP
COLOR SPEC: YELLOW — SIGNIFICANT CHANGE UP
CREAT SERPL-MCNC: 0.93 MG/DL — SIGNIFICANT CHANGE UP (ref 0.5–1.3)
CREAT SERPL-MCNC: 0.93 MG/DL — SIGNIFICANT CHANGE UP (ref 0.5–1.3)
CREAT SERPL-MCNC: 1.04 MG/DL — SIGNIFICANT CHANGE UP (ref 0.5–1.3)
CREAT SERPL-MCNC: 1.04 MG/DL — SIGNIFICANT CHANGE UP (ref 0.5–1.3)
CREAT SERPL-MCNC: 1.06 MG/DL — SIGNIFICANT CHANGE UP (ref 0.5–1.3)
CREAT SERPL-MCNC: 1.06 MG/DL — SIGNIFICANT CHANGE UP (ref 0.5–1.3)
CREAT SERPL-MCNC: 1.09 MG/DL — SIGNIFICANT CHANGE UP (ref 0.5–1.3)
CREAT SERPL-MCNC: 1.09 MG/DL — SIGNIFICANT CHANGE UP (ref 0.5–1.3)
CREAT SERPL-MCNC: 1.2 MG/DL — SIGNIFICANT CHANGE UP (ref 0.5–1.3)
CREAT SERPL-MCNC: 1.2 MG/DL — SIGNIFICANT CHANGE UP (ref 0.5–1.3)
CREAT SERPL-MCNC: 1.25 MG/DL — SIGNIFICANT CHANGE UP (ref 0.5–1.3)
CREAT SERPL-MCNC: 1.25 MG/DL — SIGNIFICANT CHANGE UP (ref 0.5–1.3)
CREAT SERPL-MCNC: 1.28 MG/DL — SIGNIFICANT CHANGE UP (ref 0.5–1.3)
CREAT SERPL-MCNC: 1.28 MG/DL — SIGNIFICANT CHANGE UP (ref 0.5–1.3)
CREAT SERPL-MCNC: 1.31 MG/DL — HIGH (ref 0.5–1.3)
CREAT SERPL-MCNC: 1.42 MG/DL — HIGH (ref 0.5–1.3)
CREAT SERPL-MCNC: 1.42 MG/DL — HIGH (ref 0.5–1.3)
CRP SERPL-MCNC: 172 MG/L — HIGH
CRP SERPL-MCNC: 172 MG/L — HIGH
CULTURE RESULTS: ABNORMAL
CULTURE RESULTS: ABNORMAL
CULTURE RESULTS: NO GROWTH — SIGNIFICANT CHANGE UP
CULTURE RESULTS: NO GROWTH — SIGNIFICANT CHANGE UP
CULTURE RESULTS: SIGNIFICANT CHANGE UP
DIFF PNL FLD: NEGATIVE — SIGNIFICANT CHANGE UP
EGFR: 34 ML/MIN/1.73M2 — LOW
EGFR: 34 ML/MIN/1.73M2 — LOW
EGFR: 38 ML/MIN/1.73M2 — LOW
EGFR: 39 ML/MIN/1.73M2 — LOW
EGFR: 39 ML/MIN/1.73M2 — LOW
EGFR: 40 ML/MIN/1.73M2 — LOW
EGFR: 40 ML/MIN/1.73M2 — LOW
EGFR: 42 ML/MIN/1.73M2 — LOW
EGFR: 42 ML/MIN/1.73M2 — LOW
EGFR: 47 ML/MIN/1.73M2 — LOW
EGFR: 47 ML/MIN/1.73M2 — LOW
EGFR: 49 ML/MIN/1.73M2 — LOW
EGFR: 49 ML/MIN/1.73M2 — LOW
EGFR: 50 ML/MIN/1.73M2 — LOW
EGFR: 50 ML/MIN/1.73M2 — LOW
EGFR: 57 ML/MIN/1.73M2 — LOW
EGFR: 57 ML/MIN/1.73M2 — LOW
EOSINOPHIL # BLD AUTO: 0.21 K/UL — SIGNIFICANT CHANGE UP (ref 0–0.5)
EOSINOPHIL # BLD AUTO: 0.21 K/UL — SIGNIFICANT CHANGE UP (ref 0–0.5)
EOSINOPHIL # BLD AUTO: 0.31 K/UL — SIGNIFICANT CHANGE UP (ref 0–0.5)
EOSINOPHIL # BLD AUTO: 0.31 K/UL — SIGNIFICANT CHANGE UP (ref 0–0.5)
EOSINOPHIL NFR BLD AUTO: 2.1 % — SIGNIFICANT CHANGE UP (ref 0–6)
EOSINOPHIL NFR BLD AUTO: 2.1 % — SIGNIFICANT CHANGE UP (ref 0–6)
EOSINOPHIL NFR BLD AUTO: 2.5 % — SIGNIFICANT CHANGE UP (ref 0–6)
EOSINOPHIL NFR BLD AUTO: 2.5 % — SIGNIFICANT CHANGE UP (ref 0–6)
EPI CELLS # UR: SIGNIFICANT CHANGE UP
EPI CELLS # UR: SIGNIFICANT CHANGE UP
ERYTHROCYTE [SEDIMENTATION RATE] IN BLOOD: 81 MM/HR — HIGH (ref 0–20)
ERYTHROCYTE [SEDIMENTATION RATE] IN BLOOD: 81 MM/HR — HIGH (ref 0–20)
ESTIMATED AVERAGE GLUCOSE: 143 MG/DL — HIGH (ref 68–114)
ESTIMATED AVERAGE GLUCOSE: 143 MG/DL — HIGH (ref 68–114)
FERRITIN SERPL-MCNC: 365 NG/ML — HIGH (ref 13–330)
FERRITIN SERPL-MCNC: 365 NG/ML — HIGH (ref 13–330)
FLUAV AG NPH QL: SIGNIFICANT CHANGE UP
FLUAV AG NPH QL: SIGNIFICANT CHANGE UP
FLUBV AG NPH QL: SIGNIFICANT CHANGE UP
FLUBV AG NPH QL: SIGNIFICANT CHANGE UP
FOLATE SERPL-MCNC: >20 NG/ML — SIGNIFICANT CHANGE UP
FOLATE SERPL-MCNC: >20 NG/ML — SIGNIFICANT CHANGE UP
GLUCOSE BLDC GLUCOMTR-MCNC: 106 MG/DL — HIGH (ref 70–99)
GLUCOSE BLDC GLUCOMTR-MCNC: 106 MG/DL — HIGH (ref 70–99)
GLUCOSE BLDC GLUCOMTR-MCNC: 108 MG/DL — HIGH (ref 70–99)
GLUCOSE BLDC GLUCOMTR-MCNC: 108 MG/DL — HIGH (ref 70–99)
GLUCOSE BLDC GLUCOMTR-MCNC: 109 MG/DL — HIGH (ref 70–99)
GLUCOSE BLDC GLUCOMTR-MCNC: 109 MG/DL — HIGH (ref 70–99)
GLUCOSE BLDC GLUCOMTR-MCNC: 111 MG/DL — HIGH (ref 70–99)
GLUCOSE BLDC GLUCOMTR-MCNC: 111 MG/DL — HIGH (ref 70–99)
GLUCOSE BLDC GLUCOMTR-MCNC: 119 MG/DL — HIGH (ref 70–99)
GLUCOSE BLDC GLUCOMTR-MCNC: 119 MG/DL — HIGH (ref 70–99)
GLUCOSE BLDC GLUCOMTR-MCNC: 124 MG/DL — HIGH (ref 70–99)
GLUCOSE BLDC GLUCOMTR-MCNC: 124 MG/DL — HIGH (ref 70–99)
GLUCOSE BLDC GLUCOMTR-MCNC: 130 MG/DL — HIGH (ref 70–99)
GLUCOSE BLDC GLUCOMTR-MCNC: 130 MG/DL — HIGH (ref 70–99)
GLUCOSE BLDC GLUCOMTR-MCNC: 140 MG/DL — HIGH (ref 70–99)
GLUCOSE BLDC GLUCOMTR-MCNC: 143 MG/DL — HIGH (ref 70–99)
GLUCOSE BLDC GLUCOMTR-MCNC: 143 MG/DL — HIGH (ref 70–99)
GLUCOSE BLDC GLUCOMTR-MCNC: 148 MG/DL — HIGH (ref 70–99)
GLUCOSE BLDC GLUCOMTR-MCNC: 148 MG/DL — HIGH (ref 70–99)
GLUCOSE BLDC GLUCOMTR-MCNC: 149 MG/DL — HIGH (ref 70–99)
GLUCOSE BLDC GLUCOMTR-MCNC: 149 MG/DL — HIGH (ref 70–99)
GLUCOSE BLDC GLUCOMTR-MCNC: 152 MG/DL — HIGH (ref 70–99)
GLUCOSE BLDC GLUCOMTR-MCNC: 154 MG/DL — HIGH (ref 70–99)
GLUCOSE BLDC GLUCOMTR-MCNC: 154 MG/DL — HIGH (ref 70–99)
GLUCOSE BLDC GLUCOMTR-MCNC: 156 MG/DL — HIGH (ref 70–99)
GLUCOSE BLDC GLUCOMTR-MCNC: 156 MG/DL — HIGH (ref 70–99)
GLUCOSE BLDC GLUCOMTR-MCNC: 157 MG/DL — HIGH (ref 70–99)
GLUCOSE BLDC GLUCOMTR-MCNC: 162 MG/DL — HIGH (ref 70–99)
GLUCOSE BLDC GLUCOMTR-MCNC: 163 MG/DL — HIGH (ref 70–99)
GLUCOSE BLDC GLUCOMTR-MCNC: 163 MG/DL — HIGH (ref 70–99)
GLUCOSE BLDC GLUCOMTR-MCNC: 164 MG/DL — HIGH (ref 70–99)
GLUCOSE BLDC GLUCOMTR-MCNC: 164 MG/DL — HIGH (ref 70–99)
GLUCOSE BLDC GLUCOMTR-MCNC: 165 MG/DL — HIGH (ref 70–99)
GLUCOSE BLDC GLUCOMTR-MCNC: 165 MG/DL — HIGH (ref 70–99)
GLUCOSE BLDC GLUCOMTR-MCNC: 166 MG/DL — HIGH (ref 70–99)
GLUCOSE BLDC GLUCOMTR-MCNC: 166 MG/DL — HIGH (ref 70–99)
GLUCOSE BLDC GLUCOMTR-MCNC: 167 MG/DL — HIGH (ref 70–99)
GLUCOSE BLDC GLUCOMTR-MCNC: 169 MG/DL — HIGH (ref 70–99)
GLUCOSE BLDC GLUCOMTR-MCNC: 169 MG/DL — HIGH (ref 70–99)
GLUCOSE BLDC GLUCOMTR-MCNC: 170 MG/DL — HIGH (ref 70–99)
GLUCOSE BLDC GLUCOMTR-MCNC: 171 MG/DL — HIGH (ref 70–99)
GLUCOSE BLDC GLUCOMTR-MCNC: 171 MG/DL — HIGH (ref 70–99)
GLUCOSE BLDC GLUCOMTR-MCNC: 173 MG/DL — HIGH (ref 70–99)
GLUCOSE BLDC GLUCOMTR-MCNC: 173 MG/DL — HIGH (ref 70–99)
GLUCOSE BLDC GLUCOMTR-MCNC: 175 MG/DL — HIGH (ref 70–99)
GLUCOSE BLDC GLUCOMTR-MCNC: 175 MG/DL — HIGH (ref 70–99)
GLUCOSE BLDC GLUCOMTR-MCNC: 179 MG/DL — HIGH (ref 70–99)
GLUCOSE BLDC GLUCOMTR-MCNC: 179 MG/DL — HIGH (ref 70–99)
GLUCOSE BLDC GLUCOMTR-MCNC: 180 MG/DL — HIGH (ref 70–99)
GLUCOSE BLDC GLUCOMTR-MCNC: 180 MG/DL — HIGH (ref 70–99)
GLUCOSE BLDC GLUCOMTR-MCNC: 181 MG/DL — HIGH (ref 70–99)
GLUCOSE BLDC GLUCOMTR-MCNC: 181 MG/DL — HIGH (ref 70–99)
GLUCOSE BLDC GLUCOMTR-MCNC: 182 MG/DL — HIGH (ref 70–99)
GLUCOSE BLDC GLUCOMTR-MCNC: 182 MG/DL — HIGH (ref 70–99)
GLUCOSE BLDC GLUCOMTR-MCNC: 183 MG/DL — HIGH (ref 70–99)
GLUCOSE BLDC GLUCOMTR-MCNC: 183 MG/DL — HIGH (ref 70–99)
GLUCOSE BLDC GLUCOMTR-MCNC: 184 MG/DL — HIGH (ref 70–99)
GLUCOSE BLDC GLUCOMTR-MCNC: 185 MG/DL — HIGH (ref 70–99)
GLUCOSE BLDC GLUCOMTR-MCNC: 186 MG/DL — HIGH (ref 70–99)
GLUCOSE BLDC GLUCOMTR-MCNC: 186 MG/DL — HIGH (ref 70–99)
GLUCOSE BLDC GLUCOMTR-MCNC: 187 MG/DL — HIGH (ref 70–99)
GLUCOSE BLDC GLUCOMTR-MCNC: 199 MG/DL — HIGH (ref 70–99)
GLUCOSE BLDC GLUCOMTR-MCNC: 199 MG/DL — HIGH (ref 70–99)
GLUCOSE BLDC GLUCOMTR-MCNC: 200 MG/DL — HIGH (ref 70–99)
GLUCOSE BLDC GLUCOMTR-MCNC: 200 MG/DL — HIGH (ref 70–99)
GLUCOSE BLDC GLUCOMTR-MCNC: 202 MG/DL — HIGH (ref 70–99)
GLUCOSE BLDC GLUCOMTR-MCNC: 202 MG/DL — HIGH (ref 70–99)
GLUCOSE BLDC GLUCOMTR-MCNC: 209 MG/DL — HIGH (ref 70–99)
GLUCOSE BLDC GLUCOMTR-MCNC: 209 MG/DL — HIGH (ref 70–99)
GLUCOSE BLDC GLUCOMTR-MCNC: 212 MG/DL — HIGH (ref 70–99)
GLUCOSE BLDC GLUCOMTR-MCNC: 212 MG/DL — HIGH (ref 70–99)
GLUCOSE BLDC GLUCOMTR-MCNC: 214 MG/DL — HIGH (ref 70–99)
GLUCOSE BLDC GLUCOMTR-MCNC: 214 MG/DL — HIGH (ref 70–99)
GLUCOSE BLDC GLUCOMTR-MCNC: 217 MG/DL — HIGH (ref 70–99)
GLUCOSE BLDC GLUCOMTR-MCNC: 217 MG/DL — HIGH (ref 70–99)
GLUCOSE BLDC GLUCOMTR-MCNC: 225 MG/DL — HIGH (ref 70–99)
GLUCOSE BLDC GLUCOMTR-MCNC: 225 MG/DL — HIGH (ref 70–99)
GLUCOSE BLDC GLUCOMTR-MCNC: 230 MG/DL — HIGH (ref 70–99)
GLUCOSE BLDC GLUCOMTR-MCNC: 230 MG/DL — HIGH (ref 70–99)
GLUCOSE BLDC GLUCOMTR-MCNC: 235 MG/DL — HIGH (ref 70–99)
GLUCOSE BLDC GLUCOMTR-MCNC: 235 MG/DL — HIGH (ref 70–99)
GLUCOSE BLDC GLUCOMTR-MCNC: 238 MG/DL — HIGH (ref 70–99)
GLUCOSE BLDC GLUCOMTR-MCNC: 238 MG/DL — HIGH (ref 70–99)
GLUCOSE BLDC GLUCOMTR-MCNC: 240 MG/DL — HIGH (ref 70–99)
GLUCOSE BLDC GLUCOMTR-MCNC: 240 MG/DL — HIGH (ref 70–99)
GLUCOSE BLDC GLUCOMTR-MCNC: 268 MG/DL — HIGH (ref 70–99)
GLUCOSE BLDC GLUCOMTR-MCNC: 268 MG/DL — HIGH (ref 70–99)
GLUCOSE BLDC GLUCOMTR-MCNC: 273 MG/DL — HIGH (ref 70–99)
GLUCOSE BLDC GLUCOMTR-MCNC: 273 MG/DL — HIGH (ref 70–99)
GLUCOSE BLDC GLUCOMTR-MCNC: 277 MG/DL — HIGH (ref 70–99)
GLUCOSE BLDC GLUCOMTR-MCNC: 277 MG/DL — HIGH (ref 70–99)
GLUCOSE BLDC GLUCOMTR-MCNC: 57 MG/DL — LOW (ref 70–99)
GLUCOSE BLDC GLUCOMTR-MCNC: 57 MG/DL — LOW (ref 70–99)
GLUCOSE BLDC GLUCOMTR-MCNC: 64 MG/DL — LOW (ref 70–99)
GLUCOSE BLDC GLUCOMTR-MCNC: 64 MG/DL — LOW (ref 70–99)
GLUCOSE BLDC GLUCOMTR-MCNC: 75 MG/DL — SIGNIFICANT CHANGE UP (ref 70–99)
GLUCOSE BLDC GLUCOMTR-MCNC: 75 MG/DL — SIGNIFICANT CHANGE UP (ref 70–99)
GLUCOSE BLDC GLUCOMTR-MCNC: 80 MG/DL — SIGNIFICANT CHANGE UP (ref 70–99)
GLUCOSE BLDC GLUCOMTR-MCNC: 80 MG/DL — SIGNIFICANT CHANGE UP (ref 70–99)
GLUCOSE BLDC GLUCOMTR-MCNC: 96 MG/DL — SIGNIFICANT CHANGE UP (ref 70–99)
GLUCOSE BLDC GLUCOMTR-MCNC: 96 MG/DL — SIGNIFICANT CHANGE UP (ref 70–99)
GLUCOSE BLDC GLUCOMTR-MCNC: 98 MG/DL — SIGNIFICANT CHANGE UP (ref 70–99)
GLUCOSE BLDC GLUCOMTR-MCNC: 98 MG/DL — SIGNIFICANT CHANGE UP (ref 70–99)
GLUCOSE SERPL-MCNC: 101 MG/DL — HIGH (ref 70–99)
GLUCOSE SERPL-MCNC: 101 MG/DL — HIGH (ref 70–99)
GLUCOSE SERPL-MCNC: 121 MG/DL — HIGH (ref 70–99)
GLUCOSE SERPL-MCNC: 121 MG/DL — HIGH (ref 70–99)
GLUCOSE SERPL-MCNC: 138 MG/DL — HIGH (ref 70–99)
GLUCOSE SERPL-MCNC: 138 MG/DL — HIGH (ref 70–99)
GLUCOSE SERPL-MCNC: 150 MG/DL — HIGH (ref 70–99)
GLUCOSE SERPL-MCNC: 161 MG/DL — HIGH (ref 70–99)
GLUCOSE SERPL-MCNC: 161 MG/DL — HIGH (ref 70–99)
GLUCOSE SERPL-MCNC: 164 MG/DL — HIGH (ref 70–99)
GLUCOSE SERPL-MCNC: 164 MG/DL — HIGH (ref 70–99)
GLUCOSE SERPL-MCNC: 181 MG/DL — HIGH (ref 70–99)
GLUCOSE SERPL-MCNC: 181 MG/DL — HIGH (ref 70–99)
GLUCOSE SERPL-MCNC: 184 MG/DL — HIGH (ref 70–99)
GLUCOSE SERPL-MCNC: 184 MG/DL — HIGH (ref 70–99)
GLUCOSE SERPL-MCNC: 208 MG/DL — HIGH (ref 70–99)
GLUCOSE SERPL-MCNC: 208 MG/DL — HIGH (ref 70–99)
GLUCOSE UR QL: NEGATIVE MG/DL — SIGNIFICANT CHANGE UP
GRAM STN FLD: ABNORMAL
HCT VFR BLD CALC: 23.2 % — LOW (ref 34.5–45)
HCT VFR BLD CALC: 23.2 % — LOW (ref 34.5–45)
HCT VFR BLD CALC: 25 % — LOW (ref 34.5–45)
HCT VFR BLD CALC: 25.2 % — LOW (ref 34.5–45)
HCT VFR BLD CALC: 25.2 % — LOW (ref 34.5–45)
HCT VFR BLD CALC: 26 % — LOW (ref 34.5–45)
HCT VFR BLD CALC: 27.7 % — LOW (ref 34.5–45)
HCT VFR BLD CALC: 27.7 % — LOW (ref 34.5–45)
HCT VFR BLD CALC: 29.6 % — LOW (ref 34.5–45)
HCT VFR BLD CALC: 29.6 % — LOW (ref 34.5–45)
HCT VFR BLD CALC: 30.6 % — LOW (ref 34.5–45)
HCT VFR BLD CALC: 30.6 % — LOW (ref 34.5–45)
HCT VFR BLD CALC: 31.4 % — LOW (ref 34.5–45)
HCT VFR BLD CALC: 31.4 % — LOW (ref 34.5–45)
HCT VFR BLD CALC: 36 % — SIGNIFICANT CHANGE UP (ref 34.5–45)
HCT VFR BLD CALC: 36 % — SIGNIFICANT CHANGE UP (ref 34.5–45)
HGB BLD-MCNC: 10.1 G/DL — LOW (ref 11.5–15.5)
HGB BLD-MCNC: 10.1 G/DL — LOW (ref 11.5–15.5)
HGB BLD-MCNC: 10.5 G/DL — LOW (ref 11.5–15.5)
HGB BLD-MCNC: 10.5 G/DL — LOW (ref 11.5–15.5)
HGB BLD-MCNC: 12.4 G/DL — SIGNIFICANT CHANGE UP (ref 11.5–15.5)
HGB BLD-MCNC: 12.4 G/DL — SIGNIFICANT CHANGE UP (ref 11.5–15.5)
HGB BLD-MCNC: 7.6 G/DL — LOW (ref 11.5–15.5)
HGB BLD-MCNC: 7.6 G/DL — LOW (ref 11.5–15.5)
HGB BLD-MCNC: 8.3 G/DL — LOW (ref 11.5–15.5)
HGB BLD-MCNC: 8.3 G/DL — LOW (ref 11.5–15.5)
HGB BLD-MCNC: 8.4 G/DL — LOW (ref 11.5–15.5)
HGB BLD-MCNC: 8.4 G/DL — LOW (ref 11.5–15.5)
HGB BLD-MCNC: 8.5 G/DL — LOW (ref 11.5–15.5)
HGB BLD-MCNC: 8.6 G/DL — LOW (ref 11.5–15.5)
HGB BLD-MCNC: 8.6 G/DL — LOW (ref 11.5–15.5)
HGB BLD-MCNC: 9.1 G/DL — LOW (ref 11.5–15.5)
HGB BLD-MCNC: 9.1 G/DL — LOW (ref 11.5–15.5)
HGB BLD-MCNC: 9.7 G/DL — LOW (ref 11.5–15.5)
HGB BLD-MCNC: 9.7 G/DL — LOW (ref 11.5–15.5)
IMM GRANULOCYTES NFR BLD AUTO: 0.4 % — SIGNIFICANT CHANGE UP (ref 0–0.9)
IMM GRANULOCYTES NFR BLD AUTO: 0.4 % — SIGNIFICANT CHANGE UP (ref 0–0.9)
IMM GRANULOCYTES NFR BLD AUTO: 0.5 % — SIGNIFICANT CHANGE UP (ref 0–0.9)
IMM GRANULOCYTES NFR BLD AUTO: 0.5 % — SIGNIFICANT CHANGE UP (ref 0–0.9)
IRON SATN MFR SERPL: 22 % — SIGNIFICANT CHANGE UP (ref 14–50)
IRON SATN MFR SERPL: 22 % — SIGNIFICANT CHANGE UP (ref 14–50)
IRON SATN MFR SERPL: 45 UG/DL — SIGNIFICANT CHANGE UP (ref 30–160)
IRON SATN MFR SERPL: 45 UG/DL — SIGNIFICANT CHANGE UP (ref 30–160)
KETONES UR-MCNC: 40 MG/DL
KETONES UR-MCNC: 40 MG/DL
KETONES UR-MCNC: NEGATIVE — SIGNIFICANT CHANGE UP
KETONES UR-MCNC: NEGATIVE — SIGNIFICANT CHANGE UP
LEUKOCYTE ESTERASE UR-ACNC: ABNORMAL
LEUKOCYTE ESTERASE UR-ACNC: ABNORMAL
LEUKOCYTE ESTERASE UR-ACNC: NEGATIVE — SIGNIFICANT CHANGE UP
LEUKOCYTE ESTERASE UR-ACNC: NEGATIVE — SIGNIFICANT CHANGE UP
LYMPHOCYTES # BLD AUTO: 18.5 % — SIGNIFICANT CHANGE UP (ref 13–44)
LYMPHOCYTES # BLD AUTO: 18.5 % — SIGNIFICANT CHANGE UP (ref 13–44)
LYMPHOCYTES # BLD AUTO: 2.67 K/UL — SIGNIFICANT CHANGE UP (ref 1–3.3)
LYMPHOCYTES # BLD AUTO: 2.67 K/UL — SIGNIFICANT CHANGE UP (ref 1–3.3)
LYMPHOCYTES # BLD AUTO: 2.85 K/UL — SIGNIFICANT CHANGE UP (ref 1–3.3)
LYMPHOCYTES # BLD AUTO: 2.85 K/UL — SIGNIFICANT CHANGE UP (ref 1–3.3)
LYMPHOCYTES # BLD AUTO: 33.6 % — SIGNIFICANT CHANGE UP (ref 13–44)
LYMPHOCYTES # BLD AUTO: 33.6 % — SIGNIFICANT CHANGE UP (ref 13–44)
MAGNESIUM SERPL-MCNC: 1.7 MG/DL — SIGNIFICANT CHANGE UP (ref 1.6–2.6)
MAGNESIUM SERPL-MCNC: 1.7 MG/DL — SIGNIFICANT CHANGE UP (ref 1.6–2.6)
MAGNESIUM SERPL-MCNC: 1.8 MG/DL — SIGNIFICANT CHANGE UP (ref 1.6–2.6)
MAGNESIUM SERPL-MCNC: 1.8 MG/DL — SIGNIFICANT CHANGE UP (ref 1.6–2.6)
MAGNESIUM SERPL-MCNC: 1.9 MG/DL — SIGNIFICANT CHANGE UP (ref 1.6–2.6)
MAGNESIUM SERPL-MCNC: 1.9 MG/DL — SIGNIFICANT CHANGE UP (ref 1.6–2.6)
MAGNESIUM SERPL-MCNC: 2.2 MG/DL — SIGNIFICANT CHANGE UP (ref 1.6–2.6)
MAGNESIUM SERPL-MCNC: 2.2 MG/DL — SIGNIFICANT CHANGE UP (ref 1.6–2.6)
MAGNESIUM SERPL-MCNC: 2.3 MG/DL — SIGNIFICANT CHANGE UP (ref 1.6–2.6)
MAGNESIUM SERPL-MCNC: 2.3 MG/DL — SIGNIFICANT CHANGE UP (ref 1.6–2.6)
MCHC RBC-ENTMCNC: 31.9 G/DL — LOW (ref 32–36)
MCHC RBC-ENTMCNC: 31.9 G/DL — LOW (ref 32–36)
MCHC RBC-ENTMCNC: 32.4 PG — SIGNIFICANT CHANGE UP (ref 27–34)
MCHC RBC-ENTMCNC: 32.4 PG — SIGNIFICANT CHANGE UP (ref 27–34)
MCHC RBC-ENTMCNC: 32.6 PG — SIGNIFICANT CHANGE UP (ref 27–34)
MCHC RBC-ENTMCNC: 32.6 PG — SIGNIFICANT CHANGE UP (ref 27–34)
MCHC RBC-ENTMCNC: 32.7 G/DL — SIGNIFICANT CHANGE UP (ref 32–36)
MCHC RBC-ENTMCNC: 32.7 G/DL — SIGNIFICANT CHANGE UP (ref 32–36)
MCHC RBC-ENTMCNC: 32.8 G/DL — SIGNIFICANT CHANGE UP (ref 32–36)
MCHC RBC-ENTMCNC: 32.8 PG — SIGNIFICANT CHANGE UP (ref 27–34)
MCHC RBC-ENTMCNC: 32.8 PG — SIGNIFICANT CHANGE UP (ref 27–34)
MCHC RBC-ENTMCNC: 32.9 G/DL — SIGNIFICANT CHANGE UP (ref 32–36)
MCHC RBC-ENTMCNC: 32.9 G/DL — SIGNIFICANT CHANGE UP (ref 32–36)
MCHC RBC-ENTMCNC: 33 G/DL — SIGNIFICANT CHANGE UP (ref 32–36)
MCHC RBC-ENTMCNC: 33 G/DL — SIGNIFICANT CHANGE UP (ref 32–36)
MCHC RBC-ENTMCNC: 33 PG — SIGNIFICANT CHANGE UP (ref 27–34)
MCHC RBC-ENTMCNC: 33 PG — SIGNIFICANT CHANGE UP (ref 27–34)
MCHC RBC-ENTMCNC: 33.4 G/DL — SIGNIFICANT CHANGE UP (ref 32–36)
MCHC RBC-ENTMCNC: 33.4 G/DL — SIGNIFICANT CHANGE UP (ref 32–36)
MCHC RBC-ENTMCNC: 33.4 PG — SIGNIFICANT CHANGE UP (ref 27–34)
MCHC RBC-ENTMCNC: 33.4 PG — SIGNIFICANT CHANGE UP (ref 27–34)
MCHC RBC-ENTMCNC: 33.5 PG — SIGNIFICANT CHANGE UP (ref 27–34)
MCHC RBC-ENTMCNC: 33.5 PG — SIGNIFICANT CHANGE UP (ref 27–34)
MCHC RBC-ENTMCNC: 33.6 G/DL — SIGNIFICANT CHANGE UP (ref 32–36)
MCHC RBC-ENTMCNC: 33.6 G/DL — SIGNIFICANT CHANGE UP (ref 32–36)
MCHC RBC-ENTMCNC: 33.6 PG — SIGNIFICANT CHANGE UP (ref 27–34)
MCHC RBC-ENTMCNC: 33.6 PG — SIGNIFICANT CHANGE UP (ref 27–34)
MCHC RBC-ENTMCNC: 33.7 PG — SIGNIFICANT CHANGE UP (ref 27–34)
MCHC RBC-ENTMCNC: 33.8 PG — SIGNIFICANT CHANGE UP (ref 27–34)
MCHC RBC-ENTMCNC: 33.8 PG — SIGNIFICANT CHANGE UP (ref 27–34)
MCHC RBC-ENTMCNC: 34 G/DL — SIGNIFICANT CHANGE UP (ref 32–36)
MCHC RBC-ENTMCNC: 34 G/DL — SIGNIFICANT CHANGE UP (ref 32–36)
MCHC RBC-ENTMCNC: 34.1 G/DL — SIGNIFICANT CHANGE UP (ref 32–36)
MCHC RBC-ENTMCNC: 34.1 G/DL — SIGNIFICANT CHANGE UP (ref 32–36)
MCHC RBC-ENTMCNC: 34.3 PG — HIGH (ref 27–34)
MCHC RBC-ENTMCNC: 34.3 PG — HIGH (ref 27–34)
MCHC RBC-ENTMCNC: 34.4 G/DL — SIGNIFICANT CHANGE UP (ref 32–36)
MCHC RBC-ENTMCNC: 34.4 G/DL — SIGNIFICANT CHANGE UP (ref 32–36)
MCV RBC AUTO: 100.4 FL — HIGH (ref 80–100)
MCV RBC AUTO: 100.6 FL — HIGH (ref 80–100)
MCV RBC AUTO: 100.6 FL — HIGH (ref 80–100)
MCV RBC AUTO: 101.3 FL — HIGH (ref 80–100)
MCV RBC AUTO: 101.3 FL — HIGH (ref 80–100)
MCV RBC AUTO: 101.6 FL — HIGH (ref 80–100)
MCV RBC AUTO: 101.6 FL — HIGH (ref 80–100)
MCV RBC AUTO: 102.8 FL — HIGH (ref 80–100)
MCV RBC AUTO: 102.8 FL — HIGH (ref 80–100)
MCV RBC AUTO: 98.1 FL — SIGNIFICANT CHANGE UP (ref 80–100)
MCV RBC AUTO: 98.1 FL — SIGNIFICANT CHANGE UP (ref 80–100)
MCV RBC AUTO: 98.8 FL — SIGNIFICANT CHANGE UP (ref 80–100)
MCV RBC AUTO: 98.8 FL — SIGNIFICANT CHANGE UP (ref 80–100)
MCV RBC AUTO: 99.6 FL — SIGNIFICANT CHANGE UP (ref 80–100)
METHOD TYPE: SIGNIFICANT CHANGE UP
MONOCYTES # BLD AUTO: 0.62 K/UL — SIGNIFICANT CHANGE UP (ref 0–0.9)
MONOCYTES # BLD AUTO: 0.62 K/UL — SIGNIFICANT CHANGE UP (ref 0–0.9)
MONOCYTES # BLD AUTO: 1.12 K/UL — HIGH (ref 0–0.9)
MONOCYTES # BLD AUTO: 1.12 K/UL — HIGH (ref 0–0.9)
MONOCYTES NFR BLD AUTO: 7.3 % — SIGNIFICANT CHANGE UP (ref 2–14)
MONOCYTES NFR BLD AUTO: 7.3 % — SIGNIFICANT CHANGE UP (ref 2–14)
MONOCYTES NFR BLD AUTO: 7.8 % — SIGNIFICANT CHANGE UP (ref 2–14)
MONOCYTES NFR BLD AUTO: 7.8 % — SIGNIFICANT CHANGE UP (ref 2–14)
MRSA PCR RESULT.: SIGNIFICANT CHANGE UP
MRSA PCR RESULT.: SIGNIFICANT CHANGE UP
NEUTROPHILS # BLD AUTO: 10.16 K/UL — HIGH (ref 1.8–7.4)
NEUTROPHILS # BLD AUTO: 10.16 K/UL — HIGH (ref 1.8–7.4)
NEUTROPHILS # BLD AUTO: 4.69 K/UL — SIGNIFICANT CHANGE UP (ref 1.8–7.4)
NEUTROPHILS # BLD AUTO: 4.69 K/UL — SIGNIFICANT CHANGE UP (ref 1.8–7.4)
NEUTROPHILS NFR BLD AUTO: 55.4 % — SIGNIFICANT CHANGE UP (ref 43–77)
NEUTROPHILS NFR BLD AUTO: 55.4 % — SIGNIFICANT CHANGE UP (ref 43–77)
NEUTROPHILS NFR BLD AUTO: 70.3 % — SIGNIFICANT CHANGE UP (ref 43–77)
NEUTROPHILS NFR BLD AUTO: 70.3 % — SIGNIFICANT CHANGE UP (ref 43–77)
NITRITE UR-MCNC: NEGATIVE — SIGNIFICANT CHANGE UP
NITRITE UR-MCNC: NEGATIVE — SIGNIFICANT CHANGE UP
NITRITE UR-MCNC: POSITIVE
NITRITE UR-MCNC: POSITIVE
NRBC # BLD: 0 /100 WBCS — SIGNIFICANT CHANGE UP (ref 0–0)
ORGANISM # SPEC MICROSCOPIC CNT: ABNORMAL
ORGANISM # SPEC MICROSCOPIC CNT: SIGNIFICANT CHANGE UP
ORGANISM # SPEC MICROSCOPIC CNT: SIGNIFICANT CHANGE UP
PH UR: 7 — SIGNIFICANT CHANGE UP (ref 5–8)
PH UR: 7 — SIGNIFICANT CHANGE UP (ref 5–8)
PH UR: 8 — SIGNIFICANT CHANGE UP (ref 5–8)
PH UR: 8 — SIGNIFICANT CHANGE UP (ref 5–8)
PHOSPHATE SERPL-MCNC: 2.4 MG/DL — LOW (ref 2.5–4.5)
PHOSPHATE SERPL-MCNC: 2.4 MG/DL — LOW (ref 2.5–4.5)
PHOSPHATE SERPL-MCNC: 2.6 MG/DL — SIGNIFICANT CHANGE UP (ref 2.5–4.5)
PHOSPHATE SERPL-MCNC: 2.6 MG/DL — SIGNIFICANT CHANGE UP (ref 2.5–4.5)
PHOSPHATE SERPL-MCNC: 3.1 MG/DL — SIGNIFICANT CHANGE UP (ref 2.5–4.5)
PHOSPHATE SERPL-MCNC: 3.1 MG/DL — SIGNIFICANT CHANGE UP (ref 2.5–4.5)
PHOSPHATE SERPL-MCNC: 3.2 MG/DL — SIGNIFICANT CHANGE UP (ref 2.5–4.5)
PHOSPHATE SERPL-MCNC: 3.2 MG/DL — SIGNIFICANT CHANGE UP (ref 2.5–4.5)
PLATELET # BLD AUTO: 300 K/UL — SIGNIFICANT CHANGE UP (ref 150–400)
PLATELET # BLD AUTO: 300 K/UL — SIGNIFICANT CHANGE UP (ref 150–400)
PLATELET # BLD AUTO: 312 K/UL — SIGNIFICANT CHANGE UP (ref 150–400)
PLATELET # BLD AUTO: 312 K/UL — SIGNIFICANT CHANGE UP (ref 150–400)
PLATELET # BLD AUTO: 321 K/UL — SIGNIFICANT CHANGE UP (ref 150–400)
PLATELET # BLD AUTO: 321 K/UL — SIGNIFICANT CHANGE UP (ref 150–400)
PLATELET # BLD AUTO: 335 K/UL — SIGNIFICANT CHANGE UP (ref 150–400)
PLATELET # BLD AUTO: 335 K/UL — SIGNIFICANT CHANGE UP (ref 150–400)
PLATELET # BLD AUTO: 342 K/UL — SIGNIFICANT CHANGE UP (ref 150–400)
PLATELET # BLD AUTO: 342 K/UL — SIGNIFICANT CHANGE UP (ref 150–400)
PLATELET # BLD AUTO: 361 K/UL — SIGNIFICANT CHANGE UP (ref 150–400)
PLATELET # BLD AUTO: 361 K/UL — SIGNIFICANT CHANGE UP (ref 150–400)
PLATELET # BLD AUTO: 373 K/UL — SIGNIFICANT CHANGE UP (ref 150–400)
PLATELET # BLD AUTO: 373 K/UL — SIGNIFICANT CHANGE UP (ref 150–400)
PLATELET # BLD AUTO: 394 K/UL — SIGNIFICANT CHANGE UP (ref 150–400)
PLATELET # BLD AUTO: 394 K/UL — SIGNIFICANT CHANGE UP (ref 150–400)
PLATELET # BLD AUTO: 411 K/UL — HIGH (ref 150–400)
PLATELET # BLD AUTO: 411 K/UL — HIGH (ref 150–400)
PLATELET # BLD AUTO: 413 K/UL — HIGH (ref 150–400)
PLATELET # BLD AUTO: 413 K/UL — HIGH (ref 150–400)
PLATELET # BLD AUTO: 431 K/UL — HIGH (ref 150–400)
PLATELET # BLD AUTO: 431 K/UL — HIGH (ref 150–400)
POTASSIUM SERPL-MCNC: 3.5 MMOL/L — SIGNIFICANT CHANGE UP (ref 3.5–5.3)
POTASSIUM SERPL-MCNC: 3.5 MMOL/L — SIGNIFICANT CHANGE UP (ref 3.5–5.3)
POTASSIUM SERPL-MCNC: 3.6 MMOL/L — SIGNIFICANT CHANGE UP (ref 3.5–5.3)
POTASSIUM SERPL-MCNC: 3.7 MMOL/L — SIGNIFICANT CHANGE UP (ref 3.5–5.3)
POTASSIUM SERPL-MCNC: 3.7 MMOL/L — SIGNIFICANT CHANGE UP (ref 3.5–5.3)
POTASSIUM SERPL-MCNC: 3.8 MMOL/L — SIGNIFICANT CHANGE UP (ref 3.5–5.3)
POTASSIUM SERPL-MCNC: 3.9 MMOL/L — SIGNIFICANT CHANGE UP (ref 3.5–5.3)
POTASSIUM SERPL-MCNC: 4.1 MMOL/L — SIGNIFICANT CHANGE UP (ref 3.5–5.3)
POTASSIUM SERPL-MCNC: 4.1 MMOL/L — SIGNIFICANT CHANGE UP (ref 3.5–5.3)
POTASSIUM SERPL-MCNC: 4.6 MMOL/L — SIGNIFICANT CHANGE UP (ref 3.5–5.3)
POTASSIUM SERPL-MCNC: 4.6 MMOL/L — SIGNIFICANT CHANGE UP (ref 3.5–5.3)
POTASSIUM SERPL-SCNC: 3.5 MMOL/L — SIGNIFICANT CHANGE UP (ref 3.5–5.3)
POTASSIUM SERPL-SCNC: 3.5 MMOL/L — SIGNIFICANT CHANGE UP (ref 3.5–5.3)
POTASSIUM SERPL-SCNC: 3.6 MMOL/L — SIGNIFICANT CHANGE UP (ref 3.5–5.3)
POTASSIUM SERPL-SCNC: 3.7 MMOL/L — SIGNIFICANT CHANGE UP (ref 3.5–5.3)
POTASSIUM SERPL-SCNC: 3.7 MMOL/L — SIGNIFICANT CHANGE UP (ref 3.5–5.3)
POTASSIUM SERPL-SCNC: 3.8 MMOL/L — SIGNIFICANT CHANGE UP (ref 3.5–5.3)
POTASSIUM SERPL-SCNC: 3.9 MMOL/L — SIGNIFICANT CHANGE UP (ref 3.5–5.3)
POTASSIUM SERPL-SCNC: 4.1 MMOL/L — SIGNIFICANT CHANGE UP (ref 3.5–5.3)
POTASSIUM SERPL-SCNC: 4.1 MMOL/L — SIGNIFICANT CHANGE UP (ref 3.5–5.3)
POTASSIUM SERPL-SCNC: 4.6 MMOL/L — SIGNIFICANT CHANGE UP (ref 3.5–5.3)
POTASSIUM SERPL-SCNC: 4.6 MMOL/L — SIGNIFICANT CHANGE UP (ref 3.5–5.3)
PROT SERPL-MCNC: 5.9 GM/DL — LOW (ref 6–8.3)
PROT SERPL-MCNC: 5.9 GM/DL — LOW (ref 6–8.3)
PROT SERPL-MCNC: 6.5 GM/DL — SIGNIFICANT CHANGE UP (ref 6–8.3)
PROT SERPL-MCNC: 6.5 GM/DL — SIGNIFICANT CHANGE UP (ref 6–8.3)
PROT SERPL-MCNC: 7.1 GM/DL — SIGNIFICANT CHANGE UP (ref 6–8.3)
PROT SERPL-MCNC: 7.1 GM/DL — SIGNIFICANT CHANGE UP (ref 6–8.3)
PROT SERPL-MCNC: 8 GM/DL — SIGNIFICANT CHANGE UP (ref 6–8.3)
PROT SERPL-MCNC: 8 GM/DL — SIGNIFICANT CHANGE UP (ref 6–8.3)
PROT UR-MCNC: 100 MG/DL
PROT UR-MCNC: 100 MG/DL
PROT UR-MCNC: 300 MG/DL
PROT UR-MCNC: 300 MG/DL
RBC # BLD: 2.33 M/UL — LOW (ref 3.8–5.2)
RBC # BLD: 2.33 M/UL — LOW (ref 3.8–5.2)
RBC # BLD: 2.51 M/UL — LOW (ref 3.8–5.2)
RBC # BLD: 2.53 M/UL — LOW (ref 3.8–5.2)
RBC # BLD: 2.53 M/UL — LOW (ref 3.8–5.2)
RBC # BLD: 2.56 M/UL — LOW (ref 3.8–5.2)
RBC # BLD: 2.56 M/UL — LOW (ref 3.8–5.2)
RBC # BLD: 2.59 M/UL — LOW (ref 3.8–5.2)
RBC # BLD: 2.59 M/UL — LOW (ref 3.8–5.2)
RBC # BLD: 2.76 M/UL — LOW (ref 3.8–5.2)
RBC # BLD: 2.76 M/UL — LOW (ref 3.8–5.2)
RBC # BLD: 2.88 M/UL — LOW (ref 3.8–5.2)
RBC # BLD: 2.88 M/UL — LOW (ref 3.8–5.2)
RBC # BLD: 3.02 M/UL — LOW (ref 3.8–5.2)
RBC # BLD: 3.02 M/UL — LOW (ref 3.8–5.2)
RBC # BLD: 3.12 M/UL — LOW (ref 3.8–5.2)
RBC # BLD: 3.12 M/UL — LOW (ref 3.8–5.2)
RBC # BLD: 3.67 M/UL — LOW (ref 3.8–5.2)
RBC # BLD: 3.67 M/UL — LOW (ref 3.8–5.2)
RBC # FLD: 12.6 % — SIGNIFICANT CHANGE UP (ref 10.3–14.5)
RBC # FLD: 12.6 % — SIGNIFICANT CHANGE UP (ref 10.3–14.5)
RBC # FLD: 12.8 % — SIGNIFICANT CHANGE UP (ref 10.3–14.5)
RBC # FLD: 12.9 % — SIGNIFICANT CHANGE UP (ref 10.3–14.5)
RBC # FLD: 13 % — SIGNIFICANT CHANGE UP (ref 10.3–14.5)
RBC # FLD: 13.1 % — SIGNIFICANT CHANGE UP (ref 10.3–14.5)
RBC # FLD: 13.2 % — SIGNIFICANT CHANGE UP (ref 10.3–14.5)
RBC # FLD: 13.2 % — SIGNIFICANT CHANGE UP (ref 10.3–14.5)
RBC CASTS # UR COMP ASSIST: NEGATIVE /HPF — SIGNIFICANT CHANGE UP (ref 0–4)
RBC CASTS # UR COMP ASSIST: NEGATIVE /HPF — SIGNIFICANT CHANGE UP (ref 0–4)
RBC CASTS # UR COMP ASSIST: SIGNIFICANT CHANGE UP /HPF
RBC CASTS # UR COMP ASSIST: SIGNIFICANT CHANGE UP /HPF
S AUREUS DNA NOSE QL NAA+PROBE: DETECTED
S AUREUS DNA NOSE QL NAA+PROBE: DETECTED
SARS-COV-2 RNA SPEC QL NAA+PROBE: SIGNIFICANT CHANGE UP
SARS-COV-2 RNA SPEC QL NAA+PROBE: SIGNIFICANT CHANGE UP
SODIUM SERPL-SCNC: 134 MMOL/L — LOW (ref 135–145)
SODIUM SERPL-SCNC: 134 MMOL/L — LOW (ref 135–145)
SODIUM SERPL-SCNC: 136 MMOL/L — SIGNIFICANT CHANGE UP (ref 135–145)
SODIUM SERPL-SCNC: 136 MMOL/L — SIGNIFICANT CHANGE UP (ref 135–145)
SODIUM SERPL-SCNC: 137 MMOL/L — SIGNIFICANT CHANGE UP (ref 135–145)
SODIUM SERPL-SCNC: 137 MMOL/L — SIGNIFICANT CHANGE UP (ref 135–145)
SODIUM SERPL-SCNC: 138 MMOL/L — SIGNIFICANT CHANGE UP (ref 135–145)
SODIUM SERPL-SCNC: 139 MMOL/L — SIGNIFICANT CHANGE UP (ref 135–145)
SODIUM SERPL-SCNC: 141 MMOL/L — SIGNIFICANT CHANGE UP (ref 135–145)
SODIUM SERPL-SCNC: 141 MMOL/L — SIGNIFICANT CHANGE UP (ref 135–145)
SP GR SPEC: 1.01 — SIGNIFICANT CHANGE UP (ref 1.01–1.02)
SP GR SPEC: 1.01 — SIGNIFICANT CHANGE UP (ref 1.01–1.02)
SP GR SPEC: >1.03 — HIGH (ref 1–1.03)
SP GR SPEC: >1.03 — HIGH (ref 1–1.03)
SPECIMEN SOURCE: SIGNIFICANT CHANGE UP
TIBC SERPL-MCNC: 211 UG/DL — LOW (ref 220–430)
TIBC SERPL-MCNC: 211 UG/DL — LOW (ref 220–430)
TROPONIN I, HIGH SENSITIVITY RESULT: 18.1 NG/L — SIGNIFICANT CHANGE UP
TROPONIN I, HIGH SENSITIVITY RESULT: 18.1 NG/L — SIGNIFICANT CHANGE UP
TSH SERPL-MCNC: 1.46 UIU/ML — SIGNIFICANT CHANGE UP (ref 0.36–3.74)
TSH SERPL-MCNC: 1.46 UIU/ML — SIGNIFICANT CHANGE UP (ref 0.36–3.74)
UIBC SERPL-MCNC: 166 UG/DL — SIGNIFICANT CHANGE UP (ref 110–370)
UIBC SERPL-MCNC: 166 UG/DL — SIGNIFICANT CHANGE UP (ref 110–370)
UROBILINOGEN FLD QL: 1 MG/DL — SIGNIFICANT CHANGE UP (ref 0.2–1)
UROBILINOGEN FLD QL: 1 MG/DL — SIGNIFICANT CHANGE UP (ref 0.2–1)
UROBILINOGEN FLD QL: NEGATIVE MG/DL — SIGNIFICANT CHANGE UP
UROBILINOGEN FLD QL: NEGATIVE MG/DL — SIGNIFICANT CHANGE UP
VANCOMYCIN TROUGH SERPL-MCNC: 14.3 UG/ML — SIGNIFICANT CHANGE UP (ref 10–20)
VANCOMYCIN TROUGH SERPL-MCNC: 14.3 UG/ML — SIGNIFICANT CHANGE UP (ref 10–20)
VIT B12 SERPL-MCNC: 1018 PG/ML — SIGNIFICANT CHANGE UP (ref 232–1245)
VIT B12 SERPL-MCNC: 1018 PG/ML — SIGNIFICANT CHANGE UP (ref 232–1245)
WBC # BLD: 10.76 K/UL — HIGH (ref 3.8–10.5)
WBC # BLD: 10.76 K/UL — HIGH (ref 3.8–10.5)
WBC # BLD: 10.85 K/UL — HIGH (ref 3.8–10.5)
WBC # BLD: 10.85 K/UL — HIGH (ref 3.8–10.5)
WBC # BLD: 11.43 K/UL — HIGH (ref 3.8–10.5)
WBC # BLD: 11.43 K/UL — HIGH (ref 3.8–10.5)
WBC # BLD: 14.32 K/UL — HIGH (ref 3.8–10.5)
WBC # BLD: 14.32 K/UL — HIGH (ref 3.8–10.5)
WBC # BLD: 14.45 K/UL — HIGH (ref 3.8–10.5)
WBC # BLD: 14.45 K/UL — HIGH (ref 3.8–10.5)
WBC # BLD: 15.25 K/UL — HIGH (ref 3.8–10.5)
WBC # BLD: 15.25 K/UL — HIGH (ref 3.8–10.5)
WBC # BLD: 15.41 K/UL — HIGH (ref 3.8–10.5)
WBC # BLD: 15.41 K/UL — HIGH (ref 3.8–10.5)
WBC # BLD: 16.27 K/UL — HIGH (ref 3.8–10.5)
WBC # BLD: 16.27 K/UL — HIGH (ref 3.8–10.5)
WBC # BLD: 17.61 K/UL — HIGH (ref 3.8–10.5)
WBC # BLD: 17.61 K/UL — HIGH (ref 3.8–10.5)
WBC # BLD: 8.47 K/UL — SIGNIFICANT CHANGE UP (ref 3.8–10.5)
WBC # BLD: 8.47 K/UL — SIGNIFICANT CHANGE UP (ref 3.8–10.5)
WBC # BLD: 9.46 K/UL — SIGNIFICANT CHANGE UP (ref 3.8–10.5)
WBC # BLD: 9.46 K/UL — SIGNIFICANT CHANGE UP (ref 3.8–10.5)
WBC # FLD AUTO: 10.76 K/UL — HIGH (ref 3.8–10.5)
WBC # FLD AUTO: 10.76 K/UL — HIGH (ref 3.8–10.5)
WBC # FLD AUTO: 10.85 K/UL — HIGH (ref 3.8–10.5)
WBC # FLD AUTO: 10.85 K/UL — HIGH (ref 3.8–10.5)
WBC # FLD AUTO: 11.43 K/UL — HIGH (ref 3.8–10.5)
WBC # FLD AUTO: 11.43 K/UL — HIGH (ref 3.8–10.5)
WBC # FLD AUTO: 14.32 K/UL — HIGH (ref 3.8–10.5)
WBC # FLD AUTO: 14.32 K/UL — HIGH (ref 3.8–10.5)
WBC # FLD AUTO: 14.45 K/UL — HIGH (ref 3.8–10.5)
WBC # FLD AUTO: 14.45 K/UL — HIGH (ref 3.8–10.5)
WBC # FLD AUTO: 15.25 K/UL — HIGH (ref 3.8–10.5)
WBC # FLD AUTO: 15.25 K/UL — HIGH (ref 3.8–10.5)
WBC # FLD AUTO: 15.41 K/UL — HIGH (ref 3.8–10.5)
WBC # FLD AUTO: 15.41 K/UL — HIGH (ref 3.8–10.5)
WBC # FLD AUTO: 16.27 K/UL — HIGH (ref 3.8–10.5)
WBC # FLD AUTO: 16.27 K/UL — HIGH (ref 3.8–10.5)
WBC # FLD AUTO: 17.61 K/UL — HIGH (ref 3.8–10.5)
WBC # FLD AUTO: 17.61 K/UL — HIGH (ref 3.8–10.5)
WBC # FLD AUTO: 8.47 K/UL — SIGNIFICANT CHANGE UP (ref 3.8–10.5)
WBC # FLD AUTO: 8.47 K/UL — SIGNIFICANT CHANGE UP (ref 3.8–10.5)
WBC # FLD AUTO: 9.46 K/UL — SIGNIFICANT CHANGE UP (ref 3.8–10.5)
WBC # FLD AUTO: 9.46 K/UL — SIGNIFICANT CHANGE UP (ref 3.8–10.5)
WBC UR QL: ABNORMAL /HPF (ref 0–5)
WBC UR QL: ABNORMAL /HPF (ref 0–5)
WBC UR QL: SIGNIFICANT CHANGE UP
WBC UR QL: SIGNIFICANT CHANGE UP

## 2023-01-01 PROCEDURE — 72125 CT NECK SPINE W/O DYE: CPT | Mod: 26,MA

## 2023-01-01 PROCEDURE — 99232 SBSQ HOSP IP/OBS MODERATE 35: CPT

## 2023-01-01 PROCEDURE — 99222 1ST HOSP IP/OBS MODERATE 55: CPT

## 2023-01-01 PROCEDURE — 73590 X-RAY EXAM OF LOWER LEG: CPT | Mod: 26,LT

## 2023-01-01 PROCEDURE — 73552 X-RAY EXAM OF FEMUR 2/>: CPT | Mod: 26,LT

## 2023-01-01 PROCEDURE — 74176 CT ABD & PELVIS W/O CONTRAST: CPT | Mod: 26,MA

## 2023-01-01 PROCEDURE — 99285 EMERGENCY DEPT VISIT HI MDM: CPT

## 2023-01-01 PROCEDURE — 70450 CT HEAD/BRAIN W/O DYE: CPT | Mod: 26,MA

## 2023-01-01 PROCEDURE — 73610 X-RAY EXAM OF ANKLE: CPT | Mod: 26,LT

## 2023-01-01 PROCEDURE — 93010 ELECTROCARDIOGRAM REPORT: CPT

## 2023-01-01 PROCEDURE — 93971 EXTREMITY STUDY: CPT | Mod: 26,LT

## 2023-01-01 PROCEDURE — 71250 CT THORAX DX C-: CPT | Mod: 26,MA

## 2023-01-01 PROCEDURE — 99233 SBSQ HOSP IP/OBS HIGH 50: CPT

## 2023-01-01 PROCEDURE — 71045 X-RAY EXAM CHEST 1 VIEW: CPT | Mod: 26

## 2023-01-01 PROCEDURE — 99223 1ST HOSP IP/OBS HIGH 75: CPT

## 2023-01-01 PROCEDURE — 73630 X-RAY EXAM OF FOOT: CPT | Mod: 26,LT

## 2023-01-01 PROCEDURE — 73502 X-RAY EXAM HIP UNI 2-3 VIEWS: CPT | Mod: 26,LT

## 2023-01-01 PROCEDURE — 99497 ADVNCD CARE PLAN 30 MIN: CPT | Mod: 25

## 2023-01-01 RX ORDER — CEFAZOLIN SODIUM 1 G
1000 VIAL (EA) INJECTION ONCE
Refills: 0 | Status: COMPLETED | OUTPATIENT
Start: 2023-01-01 | End: 2023-01-01

## 2023-01-01 RX ORDER — MORPHINE SULFATE 50 MG/1
2.5 CAPSULE, EXTENDED RELEASE ORAL EVERY 4 HOURS
Refills: 0 | Status: DISCONTINUED | OUTPATIENT
Start: 2023-01-01 | End: 2023-01-01

## 2023-01-01 RX ORDER — SODIUM CHLORIDE 9 MG/ML
1000 INJECTION, SOLUTION INTRAVENOUS
Refills: 0 | Status: DISCONTINUED | OUTPATIENT
Start: 2023-01-01 | End: 2023-01-01

## 2023-01-01 RX ORDER — DEXTROSE 50 % IN WATER 50 %
12.5 SYRINGE (ML) INTRAVENOUS ONCE
Refills: 0 | Status: DISCONTINUED | OUTPATIENT
Start: 2023-01-01 | End: 2024-01-01

## 2023-01-01 RX ORDER — DEXTROSE 50 % IN WATER 50 %
15 SYRINGE (ML) INTRAVENOUS ONCE
Refills: 0 | Status: DISCONTINUED | OUTPATIENT
Start: 2023-01-01 | End: 2024-01-01

## 2023-01-01 RX ORDER — ASPIRIN/CALCIUM CARB/MAGNESIUM 324 MG
81 TABLET ORAL DAILY
Refills: 0 | Status: DISCONTINUED | OUTPATIENT
Start: 2023-01-01 | End: 2024-01-01

## 2023-01-01 RX ORDER — MORPHINE SULFATE 50 MG/1
0.5 CAPSULE, EXTENDED RELEASE ORAL EVERY 8 HOURS
Refills: 0 | Status: DISCONTINUED | OUTPATIENT
Start: 2023-01-01 | End: 2023-01-01

## 2023-01-01 RX ORDER — SODIUM CHLORIDE 9 MG/ML
1000 INJECTION, SOLUTION INTRAVENOUS
Refills: 0 | Status: DISCONTINUED | OUTPATIENT
Start: 2023-01-01 | End: 2024-01-01

## 2023-01-01 RX ORDER — ATORVASTATIN CALCIUM 80 MG/1
10 TABLET, FILM COATED ORAL AT BEDTIME
Refills: 0 | Status: DISCONTINUED | OUTPATIENT
Start: 2023-01-01 | End: 2024-01-01

## 2023-01-01 RX ORDER — VANCOMYCIN HCL 1 G
750 VIAL (EA) INTRAVENOUS EVERY 24 HOURS
Refills: 0 | Status: DISCONTINUED | OUTPATIENT
Start: 2023-01-01 | End: 2023-01-01

## 2023-01-01 RX ORDER — CEFEPIME 1 G/1
INJECTION, POWDER, FOR SOLUTION INTRAMUSCULAR; INTRAVENOUS
Refills: 0 | Status: DISCONTINUED | OUTPATIENT
Start: 2023-01-01 | End: 2023-01-01

## 2023-01-01 RX ORDER — FUROSEMIDE 40 MG
40 TABLET ORAL DAILY
Refills: 0 | Status: DISCONTINUED | OUTPATIENT
Start: 2023-01-01 | End: 2024-01-01

## 2023-01-01 RX ORDER — AMLODIPINE BESYLATE 2.5 MG/1
10 TABLET ORAL DAILY
Refills: 0 | Status: DISCONTINUED | OUTPATIENT
Start: 2023-01-01 | End: 2024-01-01

## 2023-01-01 RX ORDER — HYDRALAZINE HCL 50 MG
10 TABLET ORAL EVERY 6 HOURS
Refills: 0 | Status: DISCONTINUED | OUTPATIENT
Start: 2023-01-01 | End: 2023-01-01

## 2023-01-01 RX ORDER — INSULIN LISPRO 100/ML
VIAL (ML) SUBCUTANEOUS AT BEDTIME
Refills: 0 | Status: DISCONTINUED | OUTPATIENT
Start: 2023-01-01 | End: 2023-01-01

## 2023-01-01 RX ORDER — MORPHINE SULFATE 50 MG/1
0.5 CAPSULE, EXTENDED RELEASE ORAL EVERY 4 HOURS
Refills: 0 | Status: DISCONTINUED | OUTPATIENT
Start: 2023-01-01 | End: 2023-01-01

## 2023-01-01 RX ORDER — SODIUM CHLORIDE 9 MG/ML
250 INJECTION, SOLUTION INTRAVENOUS
Refills: 0 | Status: COMPLETED | OUTPATIENT
Start: 2023-01-01 | End: 2023-01-01

## 2023-01-01 RX ORDER — GLUCAGON INJECTION, SOLUTION 0.5 MG/.1ML
1 INJECTION, SOLUTION SUBCUTANEOUS ONCE
Refills: 0 | Status: DISCONTINUED | OUTPATIENT
Start: 2023-01-01 | End: 2024-01-01

## 2023-01-01 RX ORDER — CEFUROXIME AXETIL 250 MG
500 TABLET ORAL EVERY 24 HOURS
Refills: 0 | Status: DISCONTINUED | OUTPATIENT
Start: 2023-01-01 | End: 2023-01-01

## 2023-01-01 RX ORDER — COLLAGENASE CLOSTRIDIUM HIST. 250 UNIT/G
1 OINTMENT (GRAM) TOPICAL DAILY
Refills: 0 | Status: DISCONTINUED | OUTPATIENT
Start: 2023-01-01 | End: 2024-01-01

## 2023-01-01 RX ORDER — IPRATROPIUM/ALBUTEROL SULFATE 18-103MCG
3 AEROSOL WITH ADAPTER (GRAM) INHALATION EVERY 6 HOURS
Refills: 0 | Status: DISCONTINUED | OUTPATIENT
Start: 2023-01-01 | End: 2024-01-01

## 2023-01-01 RX ORDER — AMLODIPINE BESYLATE 2.5 MG/1
5 TABLET ORAL DAILY
Refills: 0 | Status: DISCONTINUED | OUTPATIENT
Start: 2023-01-01 | End: 2023-01-01

## 2023-01-01 RX ORDER — FUROSEMIDE 40 MG
40 TABLET ORAL ONCE
Refills: 0 | Status: COMPLETED | OUTPATIENT
Start: 2023-01-01 | End: 2023-01-01

## 2023-01-01 RX ORDER — DEXTROSE 50 % IN WATER 50 %
12.5 SYRINGE (ML) INTRAVENOUS ONCE
Refills: 0 | Status: COMPLETED | OUTPATIENT
Start: 2023-01-01 | End: 2023-01-01

## 2023-01-01 RX ORDER — MORPHINE SULFATE 50 MG/1
5 CAPSULE, EXTENDED RELEASE ORAL EVERY 4 HOURS
Refills: 0 | Status: DISCONTINUED | OUTPATIENT
Start: 2023-01-01 | End: 2023-01-01

## 2023-01-01 RX ORDER — METOPROLOL TARTRATE 50 MG
5 TABLET ORAL ONCE
Refills: 0 | Status: COMPLETED | OUTPATIENT
Start: 2023-01-01 | End: 2023-01-01

## 2023-01-01 RX ORDER — POLYETHYLENE GLYCOL 3350 17 G/17G
17 POWDER, FOR SOLUTION ORAL DAILY
Refills: 0 | Status: DISCONTINUED | OUTPATIENT
Start: 2023-01-01 | End: 2024-01-01

## 2023-01-01 RX ORDER — MORPHINE SULFATE 50 MG/1
2.5 CAPSULE, EXTENDED RELEASE ORAL EVERY 4 HOURS
Refills: 0 | Status: DISCONTINUED | OUTPATIENT
Start: 2023-01-01 | End: 2024-01-01

## 2023-01-01 RX ORDER — FUROSEMIDE 40 MG
20 TABLET ORAL ONCE
Refills: 0 | Status: COMPLETED | OUTPATIENT
Start: 2023-01-01 | End: 2023-01-01

## 2023-01-01 RX ORDER — CEFEPIME 1 G/1
1000 INJECTION, POWDER, FOR SOLUTION INTRAMUSCULAR; INTRAVENOUS EVERY 24 HOURS
Refills: 0 | Status: DISCONTINUED | OUTPATIENT
Start: 2023-01-01 | End: 2023-01-01

## 2023-01-01 RX ORDER — AMLODIPINE BESYLATE 2.5 MG/1
5 TABLET ORAL ONCE
Refills: 0 | Status: DISCONTINUED | OUTPATIENT
Start: 2023-01-01 | End: 2023-01-01

## 2023-01-01 RX ORDER — ACETAMINOPHEN 500 MG
1000 TABLET ORAL ONCE
Refills: 0 | Status: COMPLETED | OUTPATIENT
Start: 2023-01-01 | End: 2023-01-01

## 2023-01-01 RX ORDER — MORPHINE SULFATE 50 MG/1
5 CAPSULE, EXTENDED RELEASE ORAL EVERY 4 HOURS
Refills: 0 | Status: DISCONTINUED | OUTPATIENT
Start: 2023-01-01 | End: 2024-01-01

## 2023-01-01 RX ORDER — CEFAZOLIN SODIUM 1 G
1000 VIAL (EA) INJECTION EVERY 12 HOURS
Refills: 0 | Status: DISCONTINUED | OUTPATIENT
Start: 2023-01-01 | End: 2024-01-01

## 2023-01-01 RX ORDER — LANOLIN ALCOHOL/MO/W.PET/CERES
3 CREAM (GRAM) TOPICAL AT BEDTIME
Refills: 0 | Status: DISCONTINUED | OUTPATIENT
Start: 2023-01-01 | End: 2024-01-01

## 2023-01-01 RX ORDER — PANTOPRAZOLE SODIUM 20 MG/1
40 TABLET, DELAYED RELEASE ORAL
Refills: 0 | Status: DISCONTINUED | OUTPATIENT
Start: 2023-01-01 | End: 2024-01-01

## 2023-01-01 RX ORDER — NYSTATIN 500MM UNIT
500000 POWDER (EA) MISCELLANEOUS
Refills: 0 | Status: COMPLETED | OUTPATIENT
Start: 2023-01-01 | End: 2023-01-01

## 2023-01-01 RX ORDER — SENNA PLUS 8.6 MG/1
2 TABLET ORAL AT BEDTIME
Refills: 0 | Status: DISCONTINUED | OUTPATIENT
Start: 2023-01-01 | End: 2024-01-01

## 2023-01-01 RX ORDER — INSULIN LISPRO 100/ML
VIAL (ML) SUBCUTANEOUS
Refills: 0 | Status: DISCONTINUED | OUTPATIENT
Start: 2023-01-01 | End: 2024-01-01

## 2023-01-01 RX ORDER — FUROSEMIDE 40 MG
10 TABLET ORAL
Refills: 0 | Status: DISCONTINUED | OUTPATIENT
Start: 2023-01-01 | End: 2023-01-01

## 2023-01-01 RX ORDER — CLOPIDOGREL BISULFATE 75 MG/1
75 TABLET, FILM COATED ORAL DAILY
Refills: 0 | Status: DISCONTINUED | OUTPATIENT
Start: 2023-01-01 | End: 2024-01-01

## 2023-01-01 RX ORDER — ACETAMINOPHEN 500 MG
650 TABLET ORAL EVERY 6 HOURS
Refills: 0 | Status: DISCONTINUED | OUTPATIENT
Start: 2023-01-01 | End: 2024-01-01

## 2023-01-01 RX ORDER — DEXTROSE 50 % IN WATER 50 %
25 SYRINGE (ML) INTRAVENOUS ONCE
Refills: 0 | Status: DISCONTINUED | OUTPATIENT
Start: 2023-01-01 | End: 2024-01-01

## 2023-01-01 RX ORDER — CEFAZOLIN SODIUM 1 G
VIAL (EA) INJECTION
Refills: 0 | Status: DISCONTINUED | OUTPATIENT
Start: 2023-01-01 | End: 2024-01-01

## 2023-01-01 RX ORDER — METOPROLOL TARTRATE 50 MG
50 TABLET ORAL
Refills: 0 | Status: DISCONTINUED | OUTPATIENT
Start: 2023-01-01 | End: 2024-01-01

## 2023-01-01 RX ORDER — MUPIROCIN 20 MG/G
1 OINTMENT TOPICAL
Qty: 30 | Refills: 0
Start: 2023-01-01 | End: 2023-01-01

## 2023-01-01 RX ORDER — SODIUM CHLORIDE 9 MG/ML
500 INJECTION INTRAMUSCULAR; INTRAVENOUS; SUBCUTANEOUS ONCE
Refills: 0 | Status: COMPLETED | OUTPATIENT
Start: 2023-01-01 | End: 2023-01-01

## 2023-01-01 RX ORDER — CEFEPIME 1 G/1
1000 INJECTION, POWDER, FOR SOLUTION INTRAMUSCULAR; INTRAVENOUS ONCE
Refills: 0 | Status: COMPLETED | OUTPATIENT
Start: 2023-01-01 | End: 2023-01-01

## 2023-01-01 RX ORDER — HEPARIN SODIUM 5000 [USP'U]/ML
5000 INJECTION INTRAVENOUS; SUBCUTANEOUS EVERY 12 HOURS
Refills: 0 | Status: DISCONTINUED | OUTPATIENT
Start: 2023-01-01 | End: 2024-01-01

## 2023-01-01 RX ORDER — NITROFURANTOIN MACROCRYSTAL 50 MG
100 CAPSULE ORAL
Refills: 0 | Status: DISCONTINUED | OUTPATIENT
Start: 2023-01-01 | End: 2023-01-01

## 2023-01-01 RX ORDER — VANCOMYCIN HCL 1 G
1000 VIAL (EA) INTRAVENOUS ONCE
Refills: 0 | Status: COMPLETED | OUTPATIENT
Start: 2023-01-01 | End: 2023-01-01

## 2023-01-01 RX ORDER — LABETALOL HCL 100 MG
10 TABLET ORAL EVERY 6 HOURS
Refills: 0 | Status: DISCONTINUED | OUTPATIENT
Start: 2023-01-01 | End: 2023-01-01

## 2023-01-01 RX ADMIN — MORPHINE SULFATE 0.5 MILLIGRAM(S): 50 CAPSULE, EXTENDED RELEASE ORAL at 07:03

## 2023-01-01 RX ADMIN — Medication 2: at 08:20

## 2023-01-01 RX ADMIN — CEFEPIME 100 MILLIGRAM(S): 1 INJECTION, POWDER, FOR SOLUTION INTRAMUSCULAR; INTRAVENOUS at 22:37

## 2023-01-01 RX ADMIN — MORPHINE SULFATE 0.5 MILLIGRAM(S): 50 CAPSULE, EXTENDED RELEASE ORAL at 02:16

## 2023-01-01 RX ADMIN — AMLODIPINE BESYLATE 10 MILLIGRAM(S): 2.5 TABLET ORAL at 06:51

## 2023-01-01 RX ADMIN — Medication 81 MILLIGRAM(S): at 13:13

## 2023-01-01 RX ADMIN — POLYETHYLENE GLYCOL 3350 17 GRAM(S): 17 POWDER, FOR SOLUTION ORAL at 12:03

## 2023-01-01 RX ADMIN — Medication 1000 MILLIGRAM(S): at 12:46

## 2023-01-01 RX ADMIN — Medication 0.5 MILLIGRAM(S): at 20:30

## 2023-01-01 RX ADMIN — Medication 1 APPLICATION(S): at 12:03

## 2023-01-01 RX ADMIN — Medication 50 MILLIGRAM(S): at 08:25

## 2023-01-01 RX ADMIN — HEPARIN SODIUM 5000 UNIT(S): 5000 INJECTION INTRAVENOUS; SUBCUTANEOUS at 18:48

## 2023-01-01 RX ADMIN — HEPARIN SODIUM 5000 UNIT(S): 5000 INJECTION INTRAVENOUS; SUBCUTANEOUS at 05:42

## 2023-01-01 RX ADMIN — Medication 1: at 16:53

## 2023-01-01 RX ADMIN — MORPHINE SULFATE 0.5 MILLIGRAM(S): 50 CAPSULE, EXTENDED RELEASE ORAL at 22:13

## 2023-01-01 RX ADMIN — Medication 50 MILLIGRAM(S): at 05:33

## 2023-01-01 RX ADMIN — Medication 81 MILLIGRAM(S): at 12:03

## 2023-01-01 RX ADMIN — SENNA PLUS 2 TABLET(S): 8.6 TABLET ORAL at 21:42

## 2023-01-01 RX ADMIN — POLYETHYLENE GLYCOL 3350 17 GRAM(S): 17 POWDER, FOR SOLUTION ORAL at 12:34

## 2023-01-01 RX ADMIN — Medication 81 MILLIGRAM(S): at 12:12

## 2023-01-01 RX ADMIN — SENNA PLUS 2 TABLET(S): 8.6 TABLET ORAL at 21:19

## 2023-01-01 RX ADMIN — Medication 81 MILLIGRAM(S): at 12:36

## 2023-01-01 RX ADMIN — MORPHINE SULFATE 0.5 MILLIGRAM(S): 50 CAPSULE, EXTENDED RELEASE ORAL at 06:05

## 2023-01-01 RX ADMIN — Medication 50 MILLIGRAM(S): at 06:51

## 2023-01-01 RX ADMIN — Medication 1: at 08:45

## 2023-01-01 RX ADMIN — CLOPIDOGREL BISULFATE 75 MILLIGRAM(S): 75 TABLET, FILM COATED ORAL at 13:13

## 2023-01-01 RX ADMIN — POLYETHYLENE GLYCOL 3350 17 GRAM(S): 17 POWDER, FOR SOLUTION ORAL at 12:39

## 2023-01-01 RX ADMIN — MORPHINE SULFATE 0.5 MILLIGRAM(S): 50 CAPSULE, EXTENDED RELEASE ORAL at 02:46

## 2023-01-01 RX ADMIN — Medication 50 MILLIGRAM(S): at 17:17

## 2023-01-01 RX ADMIN — ATORVASTATIN CALCIUM 10 MILLIGRAM(S): 80 TABLET, FILM COATED ORAL at 21:41

## 2023-01-01 RX ADMIN — SENNA PLUS 2 TABLET(S): 8.6 TABLET ORAL at 22:56

## 2023-01-01 RX ADMIN — SODIUM CHLORIDE 1000 MILLILITER(S): 9 INJECTION INTRAMUSCULAR; INTRAVENOUS; SUBCUTANEOUS at 11:04

## 2023-01-01 RX ADMIN — Medication 100 MILLIGRAM(S): at 18:36

## 2023-01-01 RX ADMIN — PANTOPRAZOLE SODIUM 40 MILLIGRAM(S): 20 TABLET, DELAYED RELEASE ORAL at 07:55

## 2023-01-01 RX ADMIN — CLOPIDOGREL BISULFATE 75 MILLIGRAM(S): 75 TABLET, FILM COATED ORAL at 12:25

## 2023-01-01 RX ADMIN — ATORVASTATIN CALCIUM 10 MILLIGRAM(S): 80 TABLET, FILM COATED ORAL at 22:13

## 2023-01-01 RX ADMIN — Medication 1 APPLICATION(S): at 11:42

## 2023-01-01 RX ADMIN — HEPARIN SODIUM 5000 UNIT(S): 5000 INJECTION INTRAVENOUS; SUBCUTANEOUS at 06:04

## 2023-01-01 RX ADMIN — AMLODIPINE BESYLATE 10 MILLIGRAM(S): 2.5 TABLET ORAL at 06:04

## 2023-01-01 RX ADMIN — Medication 1: at 08:50

## 2023-01-01 RX ADMIN — SODIUM CHLORIDE 75 MILLILITER(S): 9 INJECTION, SOLUTION INTRAVENOUS at 22:14

## 2023-01-01 RX ADMIN — Medication 50 MILLIGRAM(S): at 18:24

## 2023-01-01 RX ADMIN — Medication 50 MILLIGRAM(S): at 17:46

## 2023-01-01 RX ADMIN — Medication 40 MILLIGRAM(S): at 05:35

## 2023-01-01 RX ADMIN — MORPHINE SULFATE 0.5 MILLIGRAM(S): 50 CAPSULE, EXTENDED RELEASE ORAL at 22:43

## 2023-01-01 RX ADMIN — Medication 1: at 07:08

## 2023-01-01 RX ADMIN — MORPHINE SULFATE 0.5 MILLIGRAM(S): 50 CAPSULE, EXTENDED RELEASE ORAL at 10:25

## 2023-01-01 RX ADMIN — Medication 500000 UNIT(S): at 00:19

## 2023-01-01 RX ADMIN — Medication 1: at 12:09

## 2023-01-01 RX ADMIN — Medication 81 MILLIGRAM(S): at 13:18

## 2023-01-01 RX ADMIN — Medication 1: at 16:20

## 2023-01-01 RX ADMIN — SODIUM CHLORIDE 75 MILLILITER(S): 9 INJECTION, SOLUTION INTRAVENOUS at 13:24

## 2023-01-01 RX ADMIN — Medication 50 MILLIGRAM(S): at 17:41

## 2023-01-01 RX ADMIN — MORPHINE SULFATE 5 MILLIGRAM(S): 50 CAPSULE, EXTENDED RELEASE ORAL at 12:30

## 2023-01-01 RX ADMIN — MORPHINE SULFATE 0.5 MILLIGRAM(S): 50 CAPSULE, EXTENDED RELEASE ORAL at 10:47

## 2023-01-01 RX ADMIN — MORPHINE SULFATE 0.5 MILLIGRAM(S): 50 CAPSULE, EXTENDED RELEASE ORAL at 11:41

## 2023-01-01 RX ADMIN — Medication 2: at 17:54

## 2023-01-01 RX ADMIN — MORPHINE SULFATE 0.5 MILLIGRAM(S): 50 CAPSULE, EXTENDED RELEASE ORAL at 22:01

## 2023-01-01 RX ADMIN — Medication 100 MILLIGRAM(S): at 17:42

## 2023-01-01 RX ADMIN — Medication 400 MILLIGRAM(S): at 09:59

## 2023-01-01 RX ADMIN — CLOPIDOGREL BISULFATE 75 MILLIGRAM(S): 75 TABLET, FILM COATED ORAL at 12:34

## 2023-01-01 RX ADMIN — PANTOPRAZOLE SODIUM 40 MILLIGRAM(S): 20 TABLET, DELAYED RELEASE ORAL at 06:42

## 2023-01-01 RX ADMIN — MORPHINE SULFATE 2.5 MILLIGRAM(S): 50 CAPSULE, EXTENDED RELEASE ORAL at 15:20

## 2023-01-01 RX ADMIN — Medication 40 MILLIGRAM(S): at 11:29

## 2023-01-01 RX ADMIN — Medication 50 MILLIGRAM(S): at 17:47

## 2023-01-01 RX ADMIN — Medication 100 MILLIGRAM(S): at 06:05

## 2023-01-01 RX ADMIN — Medication 81 MILLIGRAM(S): at 12:34

## 2023-01-01 RX ADMIN — PANTOPRAZOLE SODIUM 40 MILLIGRAM(S): 20 TABLET, DELAYED RELEASE ORAL at 05:14

## 2023-01-01 RX ADMIN — SODIUM CHLORIDE 75 MILLILITER(S): 9 INJECTION, SOLUTION INTRAVENOUS at 17:56

## 2023-01-01 RX ADMIN — Medication 500000 UNIT(S): at 11:42

## 2023-01-01 RX ADMIN — SENNA PLUS 2 TABLET(S): 8.6 TABLET ORAL at 22:27

## 2023-01-01 RX ADMIN — HEPARIN SODIUM 5000 UNIT(S): 5000 INJECTION INTRAVENOUS; SUBCUTANEOUS at 17:43

## 2023-01-01 RX ADMIN — Medication 1: at 16:50

## 2023-01-01 RX ADMIN — Medication 1 APPLICATION(S): at 12:32

## 2023-01-01 RX ADMIN — PANTOPRAZOLE SODIUM 40 MILLIGRAM(S): 20 TABLET, DELAYED RELEASE ORAL at 06:22

## 2023-01-01 RX ADMIN — Medication 50 MILLIGRAM(S): at 18:08

## 2023-01-01 RX ADMIN — POLYETHYLENE GLYCOL 3350 17 GRAM(S): 17 POWDER, FOR SOLUTION ORAL at 11:42

## 2023-01-01 RX ADMIN — Medication 81 MILLIGRAM(S): at 11:39

## 2023-01-01 RX ADMIN — MORPHINE SULFATE 0.5 MILLIGRAM(S): 50 CAPSULE, EXTENDED RELEASE ORAL at 14:40

## 2023-01-01 RX ADMIN — POLYETHYLENE GLYCOL 3350 17 GRAM(S): 17 POWDER, FOR SOLUTION ORAL at 12:12

## 2023-01-01 RX ADMIN — Medication 0.5 MILLIGRAM(S): at 10:59

## 2023-01-01 RX ADMIN — Medication 40 MILLIGRAM(S): at 05:51

## 2023-01-01 RX ADMIN — Medication 20 MILLIGRAM(S): at 15:20

## 2023-01-01 RX ADMIN — Medication 1: at 08:13

## 2023-01-01 RX ADMIN — Medication 1: at 08:17

## 2023-01-01 RX ADMIN — CLOPIDOGREL BISULFATE 75 MILLIGRAM(S): 75 TABLET, FILM COATED ORAL at 11:06

## 2023-01-01 RX ADMIN — Medication 50 MILLIGRAM(S): at 05:35

## 2023-01-01 RX ADMIN — HEPARIN SODIUM 5000 UNIT(S): 5000 INJECTION INTRAVENOUS; SUBCUTANEOUS at 17:36

## 2023-01-01 RX ADMIN — Medication 12.5 GRAM(S): at 21:36

## 2023-01-01 RX ADMIN — HEPARIN SODIUM 5000 UNIT(S): 5000 INJECTION INTRAVENOUS; SUBCUTANEOUS at 18:08

## 2023-01-01 RX ADMIN — Medication 500000 UNIT(S): at 17:17

## 2023-01-01 RX ADMIN — MORPHINE SULFATE 0.5 MILLIGRAM(S): 50 CAPSULE, EXTENDED RELEASE ORAL at 22:30

## 2023-01-01 RX ADMIN — Medication 1: at 11:55

## 2023-01-01 RX ADMIN — Medication 650 MILLIGRAM(S): at 21:06

## 2023-01-01 RX ADMIN — PANTOPRAZOLE SODIUM 40 MILLIGRAM(S): 20 TABLET, DELAYED RELEASE ORAL at 08:00

## 2023-01-01 RX ADMIN — Medication 81 MILLIGRAM(S): at 12:47

## 2023-01-01 RX ADMIN — Medication 2: at 12:03

## 2023-01-01 RX ADMIN — ATORVASTATIN CALCIUM 10 MILLIGRAM(S): 80 TABLET, FILM COATED ORAL at 22:27

## 2023-01-01 RX ADMIN — Medication 250 MILLIGRAM(S): at 17:51

## 2023-01-01 RX ADMIN — SENNA PLUS 2 TABLET(S): 8.6 TABLET ORAL at 22:13

## 2023-01-01 RX ADMIN — HEPARIN SODIUM 5000 UNIT(S): 5000 INJECTION INTRAVENOUS; SUBCUTANEOUS at 06:11

## 2023-01-01 RX ADMIN — AMLODIPINE BESYLATE 5 MILLIGRAM(S): 2.5 TABLET ORAL at 06:21

## 2023-01-01 RX ADMIN — Medication 1: at 11:37

## 2023-01-01 RX ADMIN — SENNA PLUS 2 TABLET(S): 8.6 TABLET ORAL at 21:22

## 2023-01-01 RX ADMIN — Medication 100 MILLIGRAM(S): at 17:43

## 2023-01-01 RX ADMIN — HEPARIN SODIUM 5000 UNIT(S): 5000 INJECTION INTRAVENOUS; SUBCUTANEOUS at 18:52

## 2023-01-01 RX ADMIN — MORPHINE SULFATE 0.5 MILLIGRAM(S): 50 CAPSULE, EXTENDED RELEASE ORAL at 14:20

## 2023-01-01 RX ADMIN — CEFEPIME 100 MILLIGRAM(S): 1 INJECTION, POWDER, FOR SOLUTION INTRAMUSCULAR; INTRAVENOUS at 21:41

## 2023-01-01 RX ADMIN — Medication 2: at 12:31

## 2023-01-01 RX ADMIN — AMLODIPINE BESYLATE 10 MILLIGRAM(S): 2.5 TABLET ORAL at 05:43

## 2023-01-01 RX ADMIN — Medication 250 MILLIGRAM(S): at 16:46

## 2023-01-01 RX ADMIN — Medication 50 MILLIGRAM(S): at 06:04

## 2023-01-01 RX ADMIN — AMLODIPINE BESYLATE 10 MILLIGRAM(S): 2.5 TABLET ORAL at 08:23

## 2023-01-01 RX ADMIN — Medication 100 MILLIGRAM(S): at 18:25

## 2023-01-01 RX ADMIN — SODIUM CHLORIDE 75 MILLILITER(S): 9 INJECTION, SOLUTION INTRAVENOUS at 10:33

## 2023-01-01 RX ADMIN — POLYETHYLENE GLYCOL 3350 17 GRAM(S): 17 POWDER, FOR SOLUTION ORAL at 12:56

## 2023-01-01 RX ADMIN — SENNA PLUS 2 TABLET(S): 8.6 TABLET ORAL at 21:37

## 2023-01-01 RX ADMIN — POLYETHYLENE GLYCOL 3350 17 GRAM(S): 17 POWDER, FOR SOLUTION ORAL at 12:35

## 2023-01-01 RX ADMIN — HEPARIN SODIUM 5000 UNIT(S): 5000 INJECTION INTRAVENOUS; SUBCUTANEOUS at 17:16

## 2023-01-01 RX ADMIN — CLOPIDOGREL BISULFATE 75 MILLIGRAM(S): 75 TABLET, FILM COATED ORAL at 12:39

## 2023-01-01 RX ADMIN — Medication 1: at 21:20

## 2023-01-01 RX ADMIN — Medication 40 MILLIGRAM(S): at 06:52

## 2023-01-01 RX ADMIN — Medication 650 MILLIGRAM(S): at 01:02

## 2023-01-01 RX ADMIN — SENNA PLUS 2 TABLET(S): 8.6 TABLET ORAL at 21:41

## 2023-01-01 RX ADMIN — ATORVASTATIN CALCIUM 10 MILLIGRAM(S): 80 TABLET, FILM COATED ORAL at 21:38

## 2023-01-01 RX ADMIN — HEPARIN SODIUM 5000 UNIT(S): 5000 INJECTION INTRAVENOUS; SUBCUTANEOUS at 18:24

## 2023-01-01 RX ADMIN — Medication 50 MILLIGRAM(S): at 05:43

## 2023-01-01 RX ADMIN — PANTOPRAZOLE SODIUM 40 MILLIGRAM(S): 20 TABLET, DELAYED RELEASE ORAL at 08:18

## 2023-01-01 RX ADMIN — Medication 3 MILLIGRAM(S): at 00:05

## 2023-01-01 RX ADMIN — MORPHINE SULFATE 2.5 MILLIGRAM(S): 50 CAPSULE, EXTENDED RELEASE ORAL at 16:08

## 2023-01-01 RX ADMIN — HEPARIN SODIUM 5000 UNIT(S): 5000 INJECTION INTRAVENOUS; SUBCUTANEOUS at 06:51

## 2023-01-01 RX ADMIN — SENNA PLUS 2 TABLET(S): 8.6 TABLET ORAL at 21:36

## 2023-01-01 RX ADMIN — Medication 50 MILLIGRAM(S): at 17:29

## 2023-01-01 RX ADMIN — Medication 1: at 12:01

## 2023-01-01 RX ADMIN — Medication 50 MILLIGRAM(S): at 06:48

## 2023-01-01 RX ADMIN — CEFEPIME 100 MILLIGRAM(S): 1 INJECTION, POWDER, FOR SOLUTION INTRAMUSCULAR; INTRAVENOUS at 21:22

## 2023-01-01 RX ADMIN — Medication 1: at 08:32

## 2023-01-01 RX ADMIN — HEPARIN SODIUM 5000 UNIT(S): 5000 INJECTION INTRAVENOUS; SUBCUTANEOUS at 17:42

## 2023-01-01 RX ADMIN — MORPHINE SULFATE 0.5 MILLIGRAM(S): 50 CAPSULE, EXTENDED RELEASE ORAL at 17:39

## 2023-01-01 RX ADMIN — HEPARIN SODIUM 5000 UNIT(S): 5000 INJECTION INTRAVENOUS; SUBCUTANEOUS at 06:43

## 2023-01-01 RX ADMIN — HEPARIN SODIUM 5000 UNIT(S): 5000 INJECTION INTRAVENOUS; SUBCUTANEOUS at 17:40

## 2023-01-01 RX ADMIN — MORPHINE SULFATE 0.5 MILLIGRAM(S): 50 CAPSULE, EXTENDED RELEASE ORAL at 18:17

## 2023-01-01 RX ADMIN — CLOPIDOGREL BISULFATE 75 MILLIGRAM(S): 75 TABLET, FILM COATED ORAL at 12:03

## 2023-01-01 RX ADMIN — Medication 1 APPLICATION(S): at 16:48

## 2023-01-01 RX ADMIN — Medication 1: at 12:35

## 2023-01-01 RX ADMIN — Medication 2: at 17:17

## 2023-01-01 RX ADMIN — CEFEPIME 100 MILLIGRAM(S): 1 INJECTION, POWDER, FOR SOLUTION INTRAMUSCULAR; INTRAVENOUS at 22:56

## 2023-01-01 RX ADMIN — Medication 40 MILLIGRAM(S): at 08:24

## 2023-01-01 RX ADMIN — POLYETHYLENE GLYCOL 3350 17 GRAM(S): 17 POWDER, FOR SOLUTION ORAL at 11:21

## 2023-01-01 RX ADMIN — MORPHINE SULFATE 5 MILLIGRAM(S): 50 CAPSULE, EXTENDED RELEASE ORAL at 13:00

## 2023-01-01 RX ADMIN — AMLODIPINE BESYLATE 10 MILLIGRAM(S): 2.5 TABLET ORAL at 05:34

## 2023-01-01 RX ADMIN — Medication 1: at 08:10

## 2023-01-01 RX ADMIN — AMLODIPINE BESYLATE 5 MILLIGRAM(S): 2.5 TABLET ORAL at 06:22

## 2023-01-01 RX ADMIN — Medication 50 MILLIGRAM(S): at 17:30

## 2023-01-01 RX ADMIN — ATORVASTATIN CALCIUM 10 MILLIGRAM(S): 80 TABLET, FILM COATED ORAL at 21:19

## 2023-01-01 RX ADMIN — Medication 250 MILLIGRAM(S): at 17:16

## 2023-01-01 RX ADMIN — PANTOPRAZOLE SODIUM 40 MILLIGRAM(S): 20 TABLET, DELAYED RELEASE ORAL at 08:31

## 2023-01-01 RX ADMIN — MORPHINE SULFATE 0.5 MILLIGRAM(S): 50 CAPSULE, EXTENDED RELEASE ORAL at 10:11

## 2023-01-01 RX ADMIN — Medication 500000 UNIT(S): at 19:00

## 2023-01-01 RX ADMIN — CEFEPIME 100 MILLIGRAM(S): 1 INJECTION, POWDER, FOR SOLUTION INTRAMUSCULAR; INTRAVENOUS at 21:05

## 2023-01-01 RX ADMIN — SENNA PLUS 2 TABLET(S): 8.6 TABLET ORAL at 21:05

## 2023-01-01 RX ADMIN — POLYETHYLENE GLYCOL 3350 17 GRAM(S): 17 POWDER, FOR SOLUTION ORAL at 13:19

## 2023-01-01 RX ADMIN — Medication 650 MILLIGRAM(S): at 05:48

## 2023-01-01 RX ADMIN — HEPARIN SODIUM 5000 UNIT(S): 5000 INJECTION INTRAVENOUS; SUBCUTANEOUS at 18:23

## 2023-01-01 RX ADMIN — Medication 50 MILLIGRAM(S): at 05:34

## 2023-01-01 RX ADMIN — ATORVASTATIN CALCIUM 10 MILLIGRAM(S): 80 TABLET, FILM COATED ORAL at 21:07

## 2023-01-01 RX ADMIN — ATORVASTATIN CALCIUM 10 MILLIGRAM(S): 80 TABLET, FILM COATED ORAL at 21:43

## 2023-01-01 RX ADMIN — Medication 100 MILLIGRAM(S): at 06:02

## 2023-01-01 RX ADMIN — AMLODIPINE BESYLATE 10 MILLIGRAM(S): 2.5 TABLET ORAL at 05:33

## 2023-01-01 RX ADMIN — Medication 81 MILLIGRAM(S): at 11:06

## 2023-01-01 RX ADMIN — POLYETHYLENE GLYCOL 3350 17 GRAM(S): 17 POWDER, FOR SOLUTION ORAL at 13:13

## 2023-01-01 RX ADMIN — CLOPIDOGREL BISULFATE 75 MILLIGRAM(S): 75 TABLET, FILM COATED ORAL at 11:42

## 2023-01-01 RX ADMIN — CLOPIDOGREL BISULFATE 75 MILLIGRAM(S): 75 TABLET, FILM COATED ORAL at 12:36

## 2023-01-01 RX ADMIN — Medication 50 MILLIGRAM(S): at 18:48

## 2023-01-01 RX ADMIN — CLOPIDOGREL BISULFATE 75 MILLIGRAM(S): 75 TABLET, FILM COATED ORAL at 13:18

## 2023-01-01 RX ADMIN — CLOPIDOGREL BISULFATE 75 MILLIGRAM(S): 75 TABLET, FILM COATED ORAL at 12:47

## 2023-01-01 RX ADMIN — AMLODIPINE BESYLATE 10 MILLIGRAM(S): 2.5 TABLET ORAL at 05:14

## 2023-01-01 RX ADMIN — Medication 1 APPLICATION(S): at 13:25

## 2023-01-01 RX ADMIN — Medication 2: at 08:45

## 2023-01-01 RX ADMIN — Medication 81 MILLIGRAM(S): at 12:39

## 2023-01-01 RX ADMIN — Medication 50 MILLIGRAM(S): at 17:37

## 2023-01-01 RX ADMIN — CLOPIDOGREL BISULFATE 75 MILLIGRAM(S): 75 TABLET, FILM COATED ORAL at 11:39

## 2023-01-01 RX ADMIN — Medication 1 APPLICATION(S): at 12:35

## 2023-01-01 RX ADMIN — Medication 100 MILLIGRAM(S): at 18:46

## 2023-01-01 RX ADMIN — ATORVASTATIN CALCIUM 10 MILLIGRAM(S): 80 TABLET, FILM COATED ORAL at 21:22

## 2023-01-01 RX ADMIN — Medication 1: at 16:31

## 2023-01-01 RX ADMIN — Medication 50 MILLIGRAM(S): at 17:23

## 2023-01-01 RX ADMIN — Medication 1: at 16:36

## 2023-01-01 RX ADMIN — Medication 100 MILLIGRAM(S): at 06:52

## 2023-01-01 RX ADMIN — MORPHINE SULFATE 0.5 MILLIGRAM(S): 50 CAPSULE, EXTENDED RELEASE ORAL at 14:15

## 2023-01-01 RX ADMIN — Medication 500000 UNIT(S): at 12:36

## 2023-01-01 RX ADMIN — MORPHINE SULFATE 0.5 MILLIGRAM(S): 50 CAPSULE, EXTENDED RELEASE ORAL at 06:18

## 2023-01-01 RX ADMIN — Medication 10 MILLIGRAM(S): at 18:37

## 2023-01-01 RX ADMIN — Medication 1: at 16:28

## 2023-01-01 RX ADMIN — Medication 50 MILLIGRAM(S): at 05:49

## 2023-01-01 RX ADMIN — AMLODIPINE BESYLATE 10 MILLIGRAM(S): 2.5 TABLET ORAL at 05:49

## 2023-01-01 RX ADMIN — Medication 500000 UNIT(S): at 13:13

## 2023-01-01 RX ADMIN — MORPHINE SULFATE 5 MILLIGRAM(S): 50 CAPSULE, EXTENDED RELEASE ORAL at 00:26

## 2023-01-01 RX ADMIN — POLYETHYLENE GLYCOL 3350 17 GRAM(S): 17 POWDER, FOR SOLUTION ORAL at 11:05

## 2023-01-01 RX ADMIN — HEPARIN SODIUM 5000 UNIT(S): 5000 INJECTION INTRAVENOUS; SUBCUTANEOUS at 06:52

## 2023-01-01 RX ADMIN — Medication 100 MILLIGRAM(S): at 13:00

## 2023-01-01 RX ADMIN — SODIUM CHLORIDE 50 MILLILITER(S): 9 INJECTION, SOLUTION INTRAVENOUS at 17:45

## 2023-01-01 RX ADMIN — Medication 500000 UNIT(S): at 18:19

## 2023-01-01 RX ADMIN — Medication 40 MILLIGRAM(S): at 06:02

## 2023-01-01 RX ADMIN — Medication 500000 UNIT(S): at 13:25

## 2023-01-01 RX ADMIN — CLOPIDOGREL BISULFATE 75 MILLIGRAM(S): 75 TABLET, FILM COATED ORAL at 11:21

## 2023-01-01 RX ADMIN — POLYETHYLENE GLYCOL 3350 17 GRAM(S): 17 POWDER, FOR SOLUTION ORAL at 11:39

## 2023-01-01 RX ADMIN — Medication 81 MILLIGRAM(S): at 12:25

## 2023-01-01 RX ADMIN — Medication 81 MILLIGRAM(S): at 11:42

## 2023-01-01 RX ADMIN — ATORVASTATIN CALCIUM 10 MILLIGRAM(S): 80 TABLET, FILM COATED ORAL at 21:42

## 2023-01-01 RX ADMIN — Medication 50 MILLIGRAM(S): at 06:42

## 2023-01-01 RX ADMIN — Medication 5 MILLIGRAM(S): at 22:18

## 2023-01-01 RX ADMIN — Medication 1: at 17:14

## 2023-01-01 RX ADMIN — MORPHINE SULFATE 0.5 MILLIGRAM(S): 50 CAPSULE, EXTENDED RELEASE ORAL at 06:08

## 2023-01-01 RX ADMIN — Medication 1: at 11:28

## 2023-01-01 RX ADMIN — PANTOPRAZOLE SODIUM 40 MILLIGRAM(S): 20 TABLET, DELAYED RELEASE ORAL at 08:14

## 2023-01-01 RX ADMIN — Medication 250 MILLIGRAM(S): at 22:56

## 2023-01-01 RX ADMIN — PANTOPRAZOLE SODIUM 40 MILLIGRAM(S): 20 TABLET, DELAYED RELEASE ORAL at 08:25

## 2023-01-01 RX ADMIN — Medication 250 MILLIGRAM(S): at 17:45

## 2023-01-01 RX ADMIN — Medication 81 MILLIGRAM(S): at 11:28

## 2023-01-01 RX ADMIN — MORPHINE SULFATE 5 MILLIGRAM(S): 50 CAPSULE, EXTENDED RELEASE ORAL at 01:26

## 2023-01-01 RX ADMIN — Medication 50 MILLIGRAM(S): at 06:20

## 2023-01-01 RX ADMIN — ATORVASTATIN CALCIUM 10 MILLIGRAM(S): 80 TABLET, FILM COATED ORAL at 22:56

## 2023-01-01 RX ADMIN — Medication 50 MILLIGRAM(S): at 17:44

## 2023-01-01 RX ADMIN — Medication 100 MILLIGRAM(S): at 08:24

## 2023-01-01 RX ADMIN — CLOPIDOGREL BISULFATE 75 MILLIGRAM(S): 75 TABLET, FILM COATED ORAL at 11:29

## 2023-01-01 RX ADMIN — Medication 1: at 08:22

## 2023-01-01 RX ADMIN — Medication 40 MILLIGRAM(S): at 11:00

## 2023-01-01 RX ADMIN — Medication 50 MILLIGRAM(S): at 05:14

## 2023-01-01 RX ADMIN — ATORVASTATIN CALCIUM 10 MILLIGRAM(S): 80 TABLET, FILM COATED ORAL at 21:26

## 2023-01-01 RX ADMIN — PANTOPRAZOLE SODIUM 40 MILLIGRAM(S): 20 TABLET, DELAYED RELEASE ORAL at 08:53

## 2023-01-01 RX ADMIN — Medication 100 MILLIGRAM(S): at 05:35

## 2023-01-01 RX ADMIN — Medication 1 APPLICATION(S): at 11:28

## 2023-01-01 RX ADMIN — Medication 650 MILLIGRAM(S): at 16:54

## 2023-01-01 RX ADMIN — Medication 1: at 11:25

## 2023-01-01 RX ADMIN — PANTOPRAZOLE SODIUM 40 MILLIGRAM(S): 20 TABLET, DELAYED RELEASE ORAL at 08:16

## 2023-01-01 RX ADMIN — Medication 1 APPLICATION(S): at 14:07

## 2023-01-01 RX ADMIN — Medication 40 MILLIGRAM(S): at 06:04

## 2023-01-01 RX ADMIN — Medication 50 MILLIGRAM(S): at 19:40

## 2023-01-01 RX ADMIN — Medication 0.5 MILLIGRAM(S): at 21:22

## 2023-01-01 RX ADMIN — MORPHINE SULFATE 0.5 MILLIGRAM(S): 50 CAPSULE, EXTENDED RELEASE ORAL at 17:09

## 2023-01-01 RX ADMIN — POLYETHYLENE GLYCOL 3350 17 GRAM(S): 17 POWDER, FOR SOLUTION ORAL at 12:32

## 2023-01-01 RX ADMIN — Medication 650 MILLIGRAM(S): at 00:02

## 2023-01-01 RX ADMIN — Medication 2: at 12:14

## 2023-01-01 RX ADMIN — AMLODIPINE BESYLATE 10 MILLIGRAM(S): 2.5 TABLET ORAL at 06:42

## 2023-01-01 RX ADMIN — CLOPIDOGREL BISULFATE 75 MILLIGRAM(S): 75 TABLET, FILM COATED ORAL at 12:12

## 2023-01-01 RX ADMIN — ATORVASTATIN CALCIUM 10 MILLIGRAM(S): 80 TABLET, FILM COATED ORAL at 21:36

## 2023-01-01 RX ADMIN — Medication 1: at 16:46

## 2023-01-01 RX ADMIN — HEPARIN SODIUM 5000 UNIT(S): 5000 INJECTION INTRAVENOUS; SUBCUTANEOUS at 05:32

## 2023-01-01 RX ADMIN — Medication 500000 UNIT(S): at 17:42

## 2023-01-01 RX ADMIN — Medication 1: at 08:51

## 2023-01-01 RX ADMIN — Medication 100 MILLIGRAM(S): at 17:39

## 2023-01-01 RX ADMIN — SODIUM CHLORIDE 75 MILLILITER(S): 9 INJECTION, SOLUTION INTRAVENOUS at 06:22

## 2023-01-01 RX ADMIN — Medication 650 MILLIGRAM(S): at 05:42

## 2023-01-01 RX ADMIN — HEPARIN SODIUM 5000 UNIT(S): 5000 INJECTION INTRAVENOUS; SUBCUTANEOUS at 17:49

## 2023-01-01 RX ADMIN — HEPARIN SODIUM 5000 UNIT(S): 5000 INJECTION INTRAVENOUS; SUBCUTANEOUS at 06:01

## 2023-01-01 RX ADMIN — SENNA PLUS 2 TABLET(S): 8.6 TABLET ORAL at 22:00

## 2023-01-01 RX ADMIN — AMLODIPINE BESYLATE 10 MILLIGRAM(S): 2.5 TABLET ORAL at 05:35

## 2023-01-01 RX ADMIN — Medication 50 MILLIGRAM(S): at 17:55

## 2023-01-01 RX ADMIN — Medication 81 MILLIGRAM(S): at 11:21

## 2023-01-01 RX ADMIN — Medication 1: at 17:58

## 2023-01-01 RX ADMIN — ATORVASTATIN CALCIUM 10 MILLIGRAM(S): 80 TABLET, FILM COATED ORAL at 22:00

## 2023-01-01 RX ADMIN — Medication 500000 UNIT(S): at 05:32

## 2023-01-01 RX ADMIN — MORPHINE SULFATE 0.5 MILLIGRAM(S): 50 CAPSULE, EXTENDED RELEASE ORAL at 06:30

## 2023-01-01 RX ADMIN — HEPARIN SODIUM 5000 UNIT(S): 5000 INJECTION INTRAVENOUS; SUBCUTANEOUS at 05:35

## 2023-01-01 RX ADMIN — Medication 62.5 MILLIMOLE(S): at 16:46

## 2023-01-01 RX ADMIN — Medication 50 MILLIGRAM(S): at 18:10

## 2023-01-01 RX ADMIN — Medication 1 APPLICATION(S): at 18:37

## 2023-01-01 RX ADMIN — Medication 500000 UNIT(S): at 12:34

## 2023-01-01 RX ADMIN — Medication 100 MILLIGRAM(S): at 05:50

## 2023-01-01 RX ADMIN — Medication 50 MILLIGRAM(S): at 06:01

## 2023-01-01 RX ADMIN — PANTOPRAZOLE SODIUM 40 MILLIGRAM(S): 20 TABLET, DELAYED RELEASE ORAL at 08:42

## 2023-01-01 RX ADMIN — SENNA PLUS 2 TABLET(S): 8.6 TABLET ORAL at 21:25

## 2023-01-01 RX ADMIN — PANTOPRAZOLE SODIUM 40 MILLIGRAM(S): 20 TABLET, DELAYED RELEASE ORAL at 09:22

## 2023-01-01 RX ADMIN — HEPARIN SODIUM 5000 UNIT(S): 5000 INJECTION INTRAVENOUS; SUBCUTANEOUS at 05:51

## 2023-01-01 RX ADMIN — Medication 500000 UNIT(S): at 06:53

## 2023-01-01 RX ADMIN — HEPARIN SODIUM 5000 UNIT(S): 5000 INJECTION INTRAVENOUS; SUBCUTANEOUS at 08:24

## 2023-01-01 RX ADMIN — Medication 1 APPLICATION(S): at 13:13

## 2023-01-01 RX ADMIN — Medication 500 MILLIGRAM(S): at 21:22

## 2023-05-03 NOTE — ED PROVIDER NOTE - CROS ED CONS ALL NEG
negative... RGUJRAL 66-year-old male history presents today with dyspnea on exertion and persistent cough.  Patient had recent cardiac cath status post 2 stent placement in March. States symptoms have been unchanged and saw Dr. Kumar today and was sent into the hospital for further eval.  Patient denies any chest pain fevers chills.  Denies any abdominal pain nausea vomiting.  On exam, Patient is awake,alert,oriented x 3. Patient is well appearing and in no acute distress. Patient's chest is clear to ausculation, +s1s2. Abdomen is soft nd/nt +BS. Extremity with trace swelling.  Results reviewed from Q doc.  Will start patient on Lasix 20 mg IV obtain CTA to rule out PE versus pneumonia differential diagnosis heart failure.  Monitor and reevaluate.

## 2023-08-01 NOTE — PROGRESS NOTE ADULT - NS NEC GEN PE MLT EXAM PC
No bruits; no thyromegaly or nodules Patient requests all Lab, Cardiology, and Radiology Results on their Discharge Instructions

## 2023-08-10 NOTE — DISCHARGE NOTE ADULT - LAUNCH MEDICATION RECONCILIATION
Wound care to LLE:  Cleanse wound with Dakins.  Apply Dakins damp gauze to wound bed.  Apply ABD pad as cover dressing.  Secure with tape.  Change twice a day.   Elevate your leg at or above the level of the heart the majority of the day.    Monitor wounds for signs and symptoms of infection:   Increased redness, swelling or warmth around wound   Foul odor or increased drainage   Fever/chills/body aches  Nausea/vomiting     Please contact wound clinic with any questions or concerns.   We are available Monday through Friday 8am to 4:30pm.   Our phone number is [phone]    Thank you for choosing [facility] for your healthcare needs. If you receive a patient experience survey, please take the time to complete it, so we can continue to provide the best possible care for you and your family.     Want to Say “Thank You” to a Nurse?  The JAY Award® was created in memory of SEEMA Hammer by his family to say thank you to bedside nurses who provide an outstanding level of care.  Submit a nomination using any method below.     OR    [email or web address]       
<<-----Click here for Discharge Medication Review

## 2023-08-23 NOTE — ED PROVIDER NOTE - WET READ LAUNCH FT
LABOR PROGRESS NOTE     Subjective:  Patient walking around the room. States she is feeling more pressure with contractions, but declines pain at this time. MEREDITH prn. No other complaints.     Objective:  Vitals:    23 0956   BP: 114/67   Pulse: 81   Resp: 18   Temp: 97.9 °F (36.6 °C)       Fetal Heart Tracing:  Time of tracing reviewed: Start: 1150 End: 1250    Contraction frequency-  q2-3 min    Baseline: 130  Variability-  moderate  Accelerations: Present  Decelerations: None    Interpretation: Category: 1    Planned time of reassessment:  1500  Attending Physician Notified: No         SVE: Dilation (cm) : 2 (23 1041)  Effacement (%): 60 (23 104)  Station: -2 (23)      Assessment & Plan:  Kalpesh Rosas is a 32 year old  at 40w0d, presenting for IOL at term, currently in latent labor.    - FWB: Category 1  - GBS: negative  - Labor: latent   -s/p Cytotec(50)   -Pitocin per protocol, currently 6 mU/min  - Pain: MEREDITH prn     Attending: Dr. Slime Zacarias DO, PGY-1  Obstetrics & Gynecology     There are no Wet Read(s) to document. There is 1 Wet Read(s) to document.

## 2023-10-30 NOTE — ED PROVIDER NOTE - PATIENT PORTAL LINK FT
You can access the FollowMyHealth Patient Portal offered by [facility] by registering at the following website: [email or web address]. By joining [employer]’s FollowMyHealth portal, you will also be able to view your health information using other applications (apps) compatible with our system.

## 2023-10-30 NOTE — ED PROVIDER NOTE - OBJECTIVE STATEMENT
89-year-old female with PMH dementia A&O x1-2 at baseline, diabetes, CAD s/p CABG on Plavix/ASA, hyperlipidemia presents with son for increased agitation, decreased appetite for the last week or so.  Ambulatory as per baseline with rolling walker, no change in mental status, urinary symptoms, fever, cough, vomiting, diarrhea, complaints.

## 2023-10-30 NOTE — ED ADULT NURSE NOTE - NSICDXPASTMEDICALHX_GEN_ALL_CORE_FT
PAST MEDICAL HISTORY:  Acute Mucous Pneumonia 4/2010, resolved ; no residual problems    Angina in 2005, s/p angioplasty with stent placement, no recurrance, no sequalae    Arthritis, Infective, Knee     Basal Cell Cancer removed from left neck 2009    Dementia     Detached Retina right eye    Diabetes Mellitus type 2    History of Back Surgery 2010    Spinal Stenosis- lumbar

## 2023-10-30 NOTE — ED ADULT NURSE NOTE - NSFALLHARMRISKINTERV_ED_ALL_ED
Assistance OOB with selected safe patient handling equipment if applicable/Assistance with ambulation/Communicate risk of Fall with Harm to all staff, patient, and family/Monitor gait and stability/Provide patient with walking aids/Provide visual cue: red socks, yellow wristband, yellow gown, etc/Reinforce activity limits and safety measures with patient and family/Toileting schedule using arm’s reach rule for commode and bathroom/Bed in lowest position, wheels locked, appropriate side rails in place/Call bell, personal items and telephone in reach/Instruct patient to call for assistance before getting out of bed/chair/stretcher/Non-slip footwear applied when patient is off stretcher/[place] to call system/Physically safe environment - no spills, clutter or unnecessary equipment/Purposeful Proactive Rounding/Room/bathroom lighting operational, light cord in reach

## 2023-10-30 NOTE — ED PROVIDER NOTE - CLINICAL SUMMARY MEDICAL DECISION MAKING FREE TEXT BOX
+agitation at home with decreased appetite and family concerned she has a uti because of that  no change in baseline mental status-- she is typically A&Ox1-2 and has remained that including here in ed  offered family admission if they feel she is unsafe at home or if they want placement in snf, but they decline.   Reviewed all results and necessity for follow up. Counseled on red flags and to return for them.  Patient appears well on discharge.

## 2023-10-30 NOTE — ED ADULT TRIAGE NOTE - CHIEF COMPLAINT QUOTE
as per EMS " coming from home with increase confusion and lack of appetite x 1 week. " [ hx of dementia, Diabetes]

## 2023-10-30 NOTE — ED PROVIDER NOTE - CARE PROVIDERS DIRECT ADDRESSES
,DirectAddress_Unknown,[email or web address]

## 2023-10-30 NOTE — ED PROVIDER NOTE - CARE PROVIDER_API CALL
your pmd in 1-3 days,   Phone: (   )    -  Fax: (   )    -  Follow Up Time:     Jose Daniel Stanton  Neurology  [address] [facility], [facility] [address]  [place] [address]  Phone: [phone]  Fax: [phone]  Follow Up Time: 1-3 Days

## 2023-10-30 NOTE — ED PROVIDER NOTE - PROVIDER TOKENS
FREE:[LAST:[your pmd in 1-3 days],PHONE:[(   )    -],FAX:[(   )    -]],PROVIDER:[TOKEN:[[id number]],FOLLOWUP:[1-3 Days]]

## 2023-10-30 NOTE — ED PROVIDER NOTE - PHYSICAL EXAMINATION
PHYSICAL EXAM:    GENERAL: Alert, appears stated age, well appearing, non-toxic  SKIN: Warm, and dry.   HEAD: NC, AT, no step offs   EYE: Normal lids/conjunctiva, PERRL, EOMI  ENT: Normal hearing, patent oropharynx   NECK: +supple. No meningismus, or JVD, +Trachea midline. no tenderness/step offs.   Pulm: Bilateral BS, normal resp effort, no wheezes, stridor, or retractions  CV: RRR, no M/R/G, 2+and = radial pulses  Abd: soft, non-tender, non-distended, no rebound/guarding.   Mskel: no erythema, cyanosis, edema. no calf tenderness  Neuro: AAOx2, moving all extremities spontaneously.

## 2023-10-30 NOTE — ED ADULT NURSE NOTE - OBJECTIVE STATEMENT
received er bed 25 biba from home per son pt noted with decreased po intake and more agitated than usual over past 5-7 days denies any fever/chills hx dementia alert oriented to person/place at baseline mentally at present per her son c/o l shoulder pain pt son states chronic pain due to arthritis pt uses assistive devices (rollator, wheelchair) at home denies recent falls

## 2023-12-15 PROBLEM — F03.90 UNSPECIFIED DEMENTIA WITHOUT BEHAVIORAL DISTURBANCE: Chronic | Status: ACTIVE | Noted: 2023-01-01

## 2023-12-15 NOTE — ED PROVIDER NOTE - PHYSICAL EXAMINATION
Gen: aox 1-2 (knows self and situation, does not recall exact years, short term memory loss)  Head: NCAT  ENT: Airway patent, moist mucous membranes, nasal passageways clear   Cardiac: Normal rate, normal rhythm,   Respiratory: Lungs CTA B/L  Gastrointestinal: Abdomen soft, nontender, nondistended, no rebound, no guarding  MSK: no gross deformities, slight shortening and external rotation L hip, + TTP L hip but pt refusing to turn from laying on left side, + ttp left shin and left ankle with mild edema, and left foot with edema and tenderness, +bruising right breast and chest wall, no rib tenderness, no midline c-t tenderness, + TTP midline low  back approx L 3-5, + healing abrasion right shoulder.   HEME: Extremities warm, pulses intact and symmetrical in all four extremities  Skin: multiple bruises over all extremities and right chest wall in varying stages and healing abrasion posterior proximal right shoulder.   Neuro: No gross neurologic deficits

## 2023-12-15 NOTE — ED PROVIDER NOTE - ADMIT DISPOSITION PRESENT ON ADMISSION SEPSIS
Cantharidin Pregnancy And Lactation Text: The use of this medication during pregnancy or lactation is not recommended as there is insufficient data. No

## 2023-12-15 NOTE — H&P ADULT - PROBLEM SELECTOR PLAN 2
2 point drop in Hb compared to recent lab, elevated MCV  ? related to frequent fall resulting in bruises  Monitor H/H  Check B12, folate, iron studies with AM labs  Currently, no evidence of active ongoing bleed 2 point drop in Hb compared to recent lab, elevated MCV  ? related to frequent fall resulting in bruises  Monitor H/H  Check B12, folate, iron studies with AM labs  Currently, no evidence of active ongoing bleed  SCD for DVT prophylaxis

## 2023-12-15 NOTE — PATIENT PROFILE ADULT - FALL HARM RISK - HARM RISK INTERVENTIONS
Assistance with ambulation/Assistance OOB with selected safe patient handling equipment/Communicate Risk of Fall with Harm to all staff/Discuss with provider need for PT consult/Monitor for mental status changes/Monitor gait and stability/Move patient closer to nurses' station/Provide patient with walking aids - walker, cane, crutches/Reinforce activity limits and safety measures with patient and family/Reorient to person, place and time as needed/Tailored Fall Risk Interventions/Toileting schedule using arm’s reach rule for commode and bathroom/Use of alarms - bed, chair and/or voice tab/Visual Cue: Yellow wristband and red socks/Bed in lowest position, wheels locked, appropriate side rails in place/Call bell, personal items and telephone in reach/Instruct patient to call for assistance before getting out of bed or chair/Non-slip footwear when patient is out of bed/[place] to call system/Physically safe environment - no spills, clutter or unnecessary equipment/Purposeful Proactive Rounding/Room/bathroom lighting operational, light cord in reach Assistance with ambulation/Assistance OOB with selected safe patient handling equipment/Communicate Risk of Fall with Harm to all staff/Discuss with provider need for PT consult/Monitor for mental status changes/Monitor gait and stability/Move patient closer to nurses' station/Provide patient with walking aids - walker, cane, crutches/Reinforce activity limits and safety measures with patient and family/Reorient to person, place and time as needed/Tailored Fall Risk Interventions/Toileting schedule using arm’s reach rule for commode and bathroom/Use of alarms - bed, chair and/or voice tab/Visual Cue: Yellow wristband and red socks/Bed in lowest position, wheels locked, appropriate side rails in place/Call bell, personal items and telephone in reach/Instruct patient to call for assistance before getting out of bed or chair/Non-slip footwear when patient is out of bed/[place] to call system/Physically safe environment - no spills, clutter or unnecessary equipment/Purposeful Proactive Rounding/Room/bathroom lighting operational, light cord in reach

## 2023-12-15 NOTE — H&P ADULT - PROBLEM SELECTOR PLAN 1
Per son Toby ( [phone]) who stated that he is HCP. Patient has been having fall over last one years but more frequent lately. Patient normally ambulate short distance with walker with assistance and use wheelchair to go around. Patient usually attempts to get out of bed when family is not around resulting in falls  CT head  ( I personally review) with no acute pathology. CT C spine with no acute fracture. 4mm right upper lobe pul nodule. CT chest/abd/pelvis with no acute pathology. Emphysema +. Chronic fibrotic changes at lung apices. Questionable asymmetrical rectal wall thickening.   PT consult  SW consult---> likely need rehab  Fall precaution  Follow up Xray imaging official read. Wet read dose not appear to have fracture Per son Toby ( [phone]) who stated that he is HCP. Patient has been having fall over last one years but more frequent lately. Patient normally ambulate short distance with walker with assistance and use wheelchair to go around. Patient usually attempts to get out of bed when family is not around resulting in falls  CT head  ( I personally review) with no acute pathology. CT C spine with no acute fracture. 4mm right upper lobe pul nodule. CT chest/abd/pelvis with no acute pathology. Emphysema +. Chronic fibrotic changes at lung apices. Questionable asymmetrical rectal wall thickening.   PT consult  SW consult---> likely need rehab  Fall precaution  Follow up Xray imaging official read. Wet read dose not appear to have fracture

## 2023-12-15 NOTE — H&P ADULT - NSHPPHYSICALEXAM_GEN_ALL_CORE
CONSTITUTIONAL: alert and cooperative, no acute distress  EYES: PERRL, no scleral icterus  ENT: Mucosa moist, tongue normal  NECK: Neck supple, trachea midline, non-tender  CARDIAC: Normal S1 and S2. Regular rate and rhythms. No Pedal edema. Peripheral pulses intact  LUNGS: Equal air entry both lungs. No rales, rhonchi, wheezing. Normal respiratory effort.   ABDOMEN: Soft, nondistended, nontender. No guarding or rebound tenderness. No hepatomegaly or splenomegaly. Bowel sound normal.   MUSCULOSKELETAL: Normocephalic, atraumatic. No significant deformity or joint abnormality. No focal bony tenderness  NEUROLOGICAL: Move all extremities  SKIN: Multiple bruises noted, more on chest area  PSYCHIATRIC: A&O x 1-2, demented

## 2023-12-15 NOTE — ED PROVIDER NOTE - OBJECTIVE STATEMENT
89F pmhx CAD, HTN, HLD, CAD, DM, dementia, who presents with complaint of multiple falls, last about 1 week ago landing on the right shoulder and without hitting head,  with some falls unwitnessed while son was not at home. Patient has been weak and lethargic since last fall, refusing to eat, occasionally throwing food, decreased ambulatory tolerance, with right ankle/ hip/ leg pain and low back pain. Patient denies chest pain, abd pain, vomiting, shortness of breath, headache or neck pain. Patient is somewhat limited historian due to dementia.  [phone] pts son  Frank Burnham at bedside 89F pmhx CAD, HTN, HLD, CAD, DM, dementia, who presents with complaint of multiple falls, last about 1 week ago landing on the right shoulder and without hitting head,  with some falls unwitnessed while son was not at home. Patient has been weak and lethargic since last fall, refusing to eat, occasionally throwing food, decreased ambulatory tolerance, with right ankle/ hip/ leg pain and low back pain. Patient denies chest pain, abd pain, vomiting, shortness of breath, headache or neck pain. Patient is somewhat limited historian due to dementia.  [phone] pts son  Frank Burnham at bedside

## 2023-12-15 NOTE — ED ADULT TRIAGE NOTE - WEIGHT IN KG
---------------------  From: Lucio Crandall LPN   To: Appointment Pool ([id number]);     Sent: 10/26/2021 8:54:23 AM CDT !  Subject: General Message     Please schedule patient for curbside testing today.Scheduled   54.4

## 2023-12-15 NOTE — H&P ADULT - CONVERSATION DETAILS
Discussed with patient's son Toby ( [phone]) over the phone who stated that he is HCP and POA. Per son, patient has a living will stating that that patient wants everything to be done including CPR and intubation. Informed son to bring in copy of HCP, POA and patient's living will. Patient remain full code at this time Discussed with patient's son Toby ( [phone]) over the phone who stated that he is HCP and POA. Per son, patient has a living will stating that that patient wants everything to be done including CPR and intubation. Informed son to bring in copy of HCP, POA and patient's living will. Patient remain full code at this time

## 2023-12-15 NOTE — ED ADULT TRIAGE NOTE - CHIEF COMPLAINT QUOTE
Patient BIB EMS from with complaints of lethargic for the past few weeks, patient A&O 2-3, able to move all limbs. Son Frank reports patient fell last week sustained injury to R shoulder. PMx: DM, Dementia, Fall

## 2023-12-15 NOTE — ED ADULT NURSE NOTE - OBJECTIVE STATEMENT
Received pt in bed PR6. hx Dementia alert oriented to person/place at baseline mentally at present per her son. pt uses assistive devices (rollator, wheelchair) at home. Patient BIB EMS from with complaints of lethargic for the past few weeks, patient A&O 2-3, able to move all limbs. Son Frank reports patient fell last week sustained injury to R shoulder. PMx: DM, Dementia, Fall  Upon assessment multiple bruises noted at different stages of healing. Left ankle swelling noted. Son at bedside

## 2023-12-15 NOTE — H&P ADULT - HISTORY OF PRESENT ILLNESS
89 years old female with h/o HTN, HLD, CAD, DM, dementia, s/p spinal stimulator placement present to ED with generalized weakness and frequent fall. Patient at baseline ambulate with walker with assistance. For last 1-2 weeks, patient has been having generalized weakness and frequent fall. Have multiple bruises on body. Denied any fever, chills, nausea, vomiting or diarrhea.  Per son Toby ( [phone]) who stated that he is HCP. Patient has been having fall over last one years but more frequent lately. Patient normally ambulate short distance with walker with assistance and use wheelchair to go around. Patient usually attempts to get out of bed when family is not around resulting in fall. Patient also has been refusion to eat lately as well  Hemodynamically stable, afebrile, sat well at RA. WBC 14.45, Hb 10.5, .6, plt 413, Cr 1.42. CT head with no acute pathology. CT C spine with no acute fracture. 4mm right upper lobe pul nodule. CT chest/abd/pelvis with no acute pathology. Emphysema +. Chronic fibrotic changes at lung apices. Questional asymmetrical rectal wall thickening.  89 years old female with h/o HTN, HLD, CAD, DM, dementia, s/p spinal stimulator placement present to ED with generalized weakness and frequent fall. Patient at baseline ambulate with walker with assistance. For last 1-2 weeks, patient has been having generalized weakness and frequent fall. Have multiple bruises on body. Denied any fever, chills, nausea, vomiting or diarrhea.  Per son Toby ( [phone]) who stated that he is HCP. Patient has been having fall over last one years but more frequent lately. Patient normally ambulate short distance with walker with assistance and use wheelchair to go around. Patient usually attempts to get out of bed when family is not around resulting in fall. Patient also has been refusion to eat lately as well  Hemodynamically stable, afebrile, sat well at RA. WBC 14.45, Hb 10.5, .6, plt 413, Cr 1.42. CT head with no acute pathology. CT C spine with no acute fracture. 4mm right upper lobe pul nodule. CT chest/abd/pelvis with no acute pathology. Emphysema +. Chronic fibrotic changes at lung apices. Questional asymmetrical rectal wall thickening.

## 2023-12-15 NOTE — PHARMACOTHERAPY INTERVENTION NOTE - COMMENTS
Modified penicillin allergy to state that patient tolerated ceftriaxone 10/2016 and 04/2022 with no documented reaction.

## 2023-12-15 NOTE — H&P ADULT - PROBLEM SELECTOR PLAN 3
mild SAE with elevated BUN  likely due to refusing to eat lately  Gentle IV hydration  Monitor renal function  hold ACEI

## 2023-12-15 NOTE — ED ADULT NURSE NOTE - NSFALLDEVICES_ED_ALL_ED
Problem: At Risk for Falls  Goal: # Patient does not fall  Outcome: Outcome Met, Continue evaluating goal progress toward completion     Problem: Pain  Goal: #Acceptable pain level achieved/maintained at rest using NRS/Faces  Description: This goal is used for patients who can self-report.  Acceptable means the level is at or below the identified comfort/function goal.  Outcome: Outcome Met, Continue evaluating goal progress toward completion     Problem: Pressure Injury, Risk for  Goal: # Skin remains intact  Outcome: Outcome Met, Continue evaluating goal progress toward completion     Problem: Postoperative Care  Goal: Vital signs are maintained within parameters  Outcome: Outcome Met, Continue evaluating goal progress toward completion  Goal: Elimination status is maintained/returned to baseline  Outcome: Outcome Met, Continue evaluating goal progress toward completion     Problem: Activity Intolerance  Goal: # Functional status is maintained or returned to baseline  Outcome: Outcome Met, Continue evaluating goal progress toward completion     Problem: VTE, Risk for  Goal: # No s/s of VTE  Outcome: Outcome Met, Continue evaluating goal progress toward completion     Problem: Delirium, Risk for  Goal: # No symptoms of delirium  Description: Evaluate delirium symptoms under active problem when present  Outcome: Outcome Met, Continue evaluating goal progress toward completion     Problem: Impaired Physical Mobility  Goal: # Bed mobility, ambulation, and ADLs are maintained or returned to baseline during hospitalization  Outcome: Outcome Met, Continue evaluating goal progress toward completion     Problem: Breathing Pattern Ineffective  Goal: Air exchange is effective, demonstrated by Sp02 sat of greater then or = 92% (or as ordered)  Outcome: Outcome Met, Continue evaluating goal progress toward completion      Rollator/Wheelchair

## 2023-12-15 NOTE — ED PROVIDER NOTE - CLINICAL SUMMARY MEDICAL DECISION MAKING FREE TEXT BOX
89F pmhx CAD, HTN, HLD, CAD, DM, dementia, who presents with complaint of multiple falls, last about 1 week ago landing on the right shoulder and without hitting head,  with some falls unwitnessed while son was not at home. Patient has been weak and lethargic since last fall, refusing to eat, occasionally throwing food, decreased ambulatory tolerance, with right ankle/ hip/ leg pain and low back pain. Patient denies chest pain, abd pain, vomiting, shortness of breath, headache or neck pain. Patient is somewhat limited historian due to dementia.  - multiple varying stages of bruising and right leg edema and right hip ext rotation, pt poor historian, unknown if head trauma, more lethargic than usual but here aox2, no focal deficits - plan for trauma ct scans (limited as allergic to IV contrast), xrays lower extremities, analgesia, requires admission 89F pmhx CAD, HTN, HLD, CAD, DM, dementia, who presents with complaint of multiple falls, last about 1 week ago landing on the right shoulder and without hitting head,  with some falls unwitnessed while son was not at home. Patient has been weak and lethargic since last fall, refusing to eat, occasionally throwing food, decreased ambulatory tolerance, with right ankle/ hip/ leg pain and low back pain. Patient denies chest pain, abd pain, vomiting, shortness of breath, headache or neck pain. Patient is somewhat limited historian due to dementia.  - multiple varying stages of bruising and right leg edema and right hip ext rotation, pt poor historian, unknown if head trauma, more lethargic than usual but here aox2, no focal deficits - plan for trauma ct scans (limited as allergic to IV contrast), xrays lower extremities, analgesia, requires admission. pt spoken with alone and denies any abuse or concerns for safety at home, appears to have good relationship with son

## 2023-12-15 NOTE — H&P ADULT - ASSESSMENT
89 years old female with h/o HTN, HLD, CAD, DM, dementia, s/p spinal stimulator placement present to ED with generalized weakness and frequent fall. Patient at baseline ambulate with walker with assistance. For last 1-2 weeks, patient has been having generalized weakness and frequent fall. Have multiple bruises on body. Denied any fever, chills, nausea, vomiting or diarrhea.  Per son Toby ( [phone]) who stated that he is HCP. Patient has been having fall over last one years but more frequent lately. Patient normally ambulate short distance with walker with assistance and use wheelchair to go around. Patient usually attempts to get out of bed when family is not around resulting in fall. Patient also has been refusion to eat lately as well  Hemodynamically stable, afebrile, sat well at RA. WBC 14.45, Hb 10.5, .6, plt 413, Cr 1.42. CT head with no acute pathology. CT C spine with no acute fracture. 4mm right upper lobe pul nodule. CT chest/abd/pelvis with no acute pathology. Emphysema +. Chronic fibrotic changes at lung apices. Questional asymmetrical rectal wall thickening.  89 years old female with h/o HTN, HLD, CAD, DM, dementia, s/p spinal stimulator placement present to ED with generalized weakness and frequent fall. Patient at baseline ambulate with walker with assistance. For last 1-2 weeks, patient has been having generalized weakness and frequent fall. Have multiple bruises on body. Denied any fever, chills, nausea, vomiting or diarrhea.  Per son Toby ( [phone]) who stated that he is HCP. Patient has been having fall over last one years but more frequent lately. Patient normally ambulate short distance with walker with assistance and use wheelchair to go around. Patient usually attempts to get out of bed when family is not around resulting in fall. Patient also has been refusion to eat lately as well  Hemodynamically stable, afebrile, sat well at RA. WBC 14.45, Hb 10.5, .6, plt 413, Cr 1.42. CT head with no acute pathology. CT C spine with no acute fracture. 4mm right upper lobe pul nodule. CT chest/abd/pelvis with no acute pathology. Emphysema +. Chronic fibrotic changes at lung apices. Questional asymmetrical rectal wall thickening.

## 2023-12-15 NOTE — ED PROVIDER NOTE - INPATIENT RECORD SUMMARY
inpatient notes for [facility] april 2022 reviewed, ha UTI and covid as well as lumbar compression fx, also ED note reviwed 10/2023 was ambulatory with walker at time

## 2023-12-15 NOTE — ED ADULT NURSE NOTE - NSFALLHARMRISKINTERV_ED_ALL_ED
Assistance OOB with selected safe patient handling equipment if applicable/Assistance with ambulation/Communicate risk of Fall with Harm to all staff, patient, and family/Monitor gait and stability/Provide patient with walking aids/Provide visual cue: red socks, yellow wristband, yellow gown, etc/Reinforce activity limits and safety measures with patient and family/Toileting schedule using arm’s reach rule for commode and bathroom/Bed in lowest position, wheels locked, appropriate side rails in place/Call bell, personal items and telephone in reach/Instruct patient to call for assistance before getting out of bed/chair/stretcher/Non-slip footwear applied when patient is off stretcher/[place] to call system/Physically safe environment - no spills, clutter or unnecessary equipment/Purposeful Proactive Rounding/Room/bathroom lighting operational, light cord in reach Assistance OOB with selected safe patient handling equipment if applicable/Assistance with ambulation/Communicate risk of Fall with Harm to all staff, patient, and family/Monitor gait and stability/Provide patient with walking aids/Provide visual cue: red socks, yellow wristband, yellow gown, etc/Reinforce activity limits and safety measures with patient and family/Toileting schedule using arm’s reach rule for commode and bathroom/Bed in lowest position, wheels locked, appropriate side rails in place/Call bell, personal items and telephone in reach/Instruct patient to call for assistance before getting out of bed/chair/stretcher/Non-slip footwear applied when patient is off stretcher/[place] to call system/Physically safe environment - no spills, clutter or unnecessary equipment/Purposeful Proactive Rounding/Room/bathroom lighting operational, light cord in reach

## 2023-12-16 NOTE — PHYSICAL THERAPY INITIAL EVALUATION ADULT - PERTINENT HX OF CURRENT PROBLEM, REHAB EVAL
Patient is a 89 y/o female admitted to [facility] due to weakness, multiple falls. Patient is a 89 y/o female admitted to [facility] due to weakness, multiple falls.

## 2023-12-16 NOTE — PROGRESS NOTE ADULT - SUBJECTIVE AND OBJECTIVE BOX
Patient is a 89y old  Female who presents with a chief complaint of frequent fall (16 Dec 2023 10:13)      INTERVAL HPI/OVERNIGHT EVENTS: OVernight no acute events. Patient resting in bed today. Sleeping but arousable.     MEDICATIONS  (STANDING):  amLODIPine   Tablet 5 milliGRAM(s) Oral daily  aspirin  chewable 81 milliGRAM(s) Oral daily  atorvastatin 10 milliGRAM(s) Oral at bedtime  clopidogrel Tablet 75 milliGRAM(s) Oral daily  dextrose 5%. 1000 milliLiter(s) (50 mL/Hr) IV Continuous <Continuous>  dextrose 5%. 1000 milliLiter(s) (100 mL/Hr) IV Continuous <Continuous>  dextrose 50% Injectable 12.5 Gram(s) IV Push once  dextrose 50% Injectable 25 Gram(s) IV Push once  dextrose 50% Injectable 25 Gram(s) IV Push once  glucagon  Injectable 1 milliGRAM(s) IntraMuscular once  insulin lispro (ADMELOG) corrective regimen sliding scale   SubCutaneous three times a day before meals  insulin lispro (ADMELOG) corrective regimen sliding scale   SubCutaneous at bedtime  lactated ringers. 1000 milliLiter(s) (75 mL/Hr) IV Continuous <Continuous>  metoprolol tartrate 50 milliGRAM(s) Oral two times a day  pantoprazole    Tablet 40 milliGRAM(s) Oral before breakfast  polyethylene glycol 3350 17 Gram(s) Oral daily  senna 2 Tablet(s) Oral at bedtime    MEDICATIONS  (PRN):  acetaminophen     Tablet .. 650 milliGRAM(s) Oral every 6 hours PRN Mild Pain (1 - 3), Moderate Pain (4 - 6)  dextrose Oral Gel 15 Gram(s) Oral once PRN Blood Glucose LESS THAN 70 milliGRAM(s)/deciliter  melatonin 3 milliGRAM(s) Oral at bedtime PRN Insomnia      Allergies    iodine (Other)  contrast dye -  rash (Other)  Pineapple (Unknown)  penicillin (Rash)  codeine (Vomiting)  latex (Rash)    Intolerances        REVIEW OF SYSTEMS:  limited, patient tired    Vital Signs Last 24 Hrs  T(C): 36.8 (16 Dec 2023 11:59), Max: 37.8 (15 Dec 2023 23:33)  T(F): 98.2 (16 Dec 2023 11:59), Max: 100 (15 Dec 2023 23:33)  HR: 92 (16 Dec 2023 11:59) (92 - 110)  BP: 148/60 (16 Dec 2023 11:59) (134/77 - 162/63)  BP(mean): --  RR: 17 (16 Dec 2023 11:59) (17 - 18)  SpO2: 97% (16 Dec 2023 11:59) (95% - 97%)    Parameters below as of 16 Dec 2023 05:26  Patient On (Oxygen Delivery Method): room air        PHYSICAL EXAM:  GENERAL: NAD, thin, elderly female   HEAD:  Atraumatic   EYES: EOMI   ENMT: dry mucous membranes  NECK: Supple   NERVOUS SYSTEM:  sleeping  CHEST/LUNG: no accessory muscle use  ABDOMEN: Soft  EXTREMITIES:   No clubbing, cyanosis, or edema      LABS:                        10.1   10.76 )-----------( 361      ( 16 Dec 2023 07:30 )             30.6     12-16    141  |  108  |  31<H>  ----------------------------<  101<H>  3.5   |  27  |  1.20    Ca    9.0      16 Dec 2023 07:30  Phos  3.2     12-16  Mg     2.3     12-16    TPro  6.5  /  Alb  2.7<L>  /  TBili  0.4  /  DBili  x   /  AST  33  /  ALT  23  /  AlkPhos  76  12-16      Urinalysis Basic - ( 16 Dec 2023 07:30 )    Color: x / Appearance: x / SG: x / pH: x  Gluc: 101 mg/dL / Ketone: x  / Bili: x / Urobili: x   Blood: x / Protein: x / Nitrite: x   Leuk Esterase: x / RBC: x / WBC x   Sq Epi: x / Non Sq Epi: x / Bacteria: x      CAPILLARY BLOOD GLUCOSE      POCT Blood Glucose.: 181 mg/dL (16 Dec 2023 11:45)  POCT Blood Glucose.: 111 mg/dL (16 Dec 2023 07:55)  POCT Blood Glucose.: 98 mg/dL (15 Dec 2023 21:33)  POCT Blood Glucose.: 183 mg/dL (15 Dec 2023 17:34)      RADIOLOGY & ADDITIONAL TESTS:    Imaging Personally Reviewed:  [ X] YES  [ ] NO    Consultant(s) Notes Reviewed:  [ X] YES  [ ] NO    Care Discussed with Consultants/Other Providers [X ] YES  [ ] NO

## 2023-12-16 NOTE — PROGRESS NOTE ADULT - PROBLEM SELECTOR PLAN 1
Per son Toby ( [phone]) who stated that he is HCP. Patient has been having fall over last one years but more frequent lately. Patient normally ambulate short distance with walker with assistance and use wheelchair to go around. Patient usually attempts to get out of bed when family is not around resulting in falls  CT head  ( I personally review) with no acute pathology. CT C spine with no acute fracture. 4mm right upper lobe pul nodule. CT chest/abd/pelvis with no acute pathology. Emphysema +. Chronic fibrotic changes at lung apices. Questionable asymmetrical rectal wall thickening.   PT consult  SW consult---> likely need rehab  Fall precaution  Imaging without fracture Per son Toby ( [phone]) who stated that he is HCP. Patient has been having fall over last one years but more frequent lately. Patient normally ambulate short distance with walker with assistance and use wheelchair to go around. Patient usually attempts to get out of bed when family is not around resulting in falls  CT head  ( I personally review) with no acute pathology. CT C spine with no acute fracture. 4mm right upper lobe pul nodule. CT chest/abd/pelvis with no acute pathology. Emphysema +. Chronic fibrotic changes at lung apices. Questionable asymmetrical rectal wall thickening.   PT consult  SW consult---> likely need rehab  Fall precaution  Imaging without fracture

## 2023-12-16 NOTE — PROGRESS NOTE ADULT - PROBLEM SELECTOR PLAN 3
mild ASE with elevated BUN  likely due to refusing to eat lately  Gentle IV hydration  Monitor renal function  hold ACEI mild SAE with elevated BUN  likely due to refusing to eat lately  Gentle IV hydration  Monitor renal function  hold ACEI

## 2023-12-16 NOTE — PROGRESS NOTE ADULT - PROBLEM SELECTOR PLAN 2
2 point drop in Hb compared to recent lab, elevated MCV  ? related to frequent fall resulting in bruises  Monitor H/H  Iron panel relatively unremarkable, pending B12 and folate   Currently, no evidence of active ongoing bleed  SCD for DVT prophylaxis

## 2023-12-16 NOTE — PROGRESS NOTE ADULT - ASSESSMENT
89 years old female with h/o HTN, HLD, CAD, DM, dementia, s/p spinal stimulator placement present to ED with generalized weakness and frequent fall. Patient at baseline ambulate with walker with assistance. For last 1-2 weeks, patient has been having generalized weakness and frequent fall. Have multiple bruises on body. Denied any fever, chills, nausea, vomiting or diarrhea.  Per son Toby ( [phone]) who stated that he is HCP. Patient has been having fall over last one years but more frequent lately. Patient normally ambulate short distance with walker with assistance and use wheelchair to go around. Patient usually attempts to get out of bed when family is not around resulting in fall. Patient also has been refusion to eat lately as well  Hemodynamically stable, afebrile, sat well at RA. WBC 14.45, Hb 10.5, .6, plt 413, Cr 1.42. CT head with no acute pathology. CT C spine with no acute fracture. 4mm right upper lobe pul nodule. CT chest/abd/pelvis with no acute pathology. Emphysema +. Chronic fibrotic changes at lung apices. Questional asymmetrical rectal wall thickening.  89 years old female with h/o HTN, HLD, CAD, DM, dementia, s/p spinal stimulator placement present to ED with generalized weakness and frequent fall. Patient at baseline ambulate with walker with assistance. For last 1-2 weeks, patient has been having generalized weakness and frequent fall. Have multiple bruises on body. Denied any fever, chills, nausea, vomiting or diarrhea.  Per son Toby ( [phone]) who stated that he is HCP. Patient has been having fall over last one years but more frequent lately. Patient normally ambulate short distance with walker with assistance and use wheelchair to go around. Patient usually attempts to get out of bed when family is not around resulting in fall. Patient also has been refusion to eat lately as well  Hemodynamically stable, afebrile, sat well at RA. WBC 14.45, Hb 10.5, .6, plt 413, Cr 1.42. CT head with no acute pathology. CT C spine with no acute fracture. 4mm right upper lobe pul nodule. CT chest/abd/pelvis with no acute pathology. Emphysema +. Chronic fibrotic changes at lung apices. Questional asymmetrical rectal wall thickening.

## 2023-12-16 NOTE — PHYSICAL THERAPY INITIAL EVALUATION ADULT - RANGE OF MOTION EXAMINATION, REHAB EVAL
except LLE limited due to c/o pain./bilateral upper extremity ROM was WFL (within functional limits)/bilateral lower extremity ROM was WFL (within functional limits)/deficits as listed below

## 2023-12-17 NOTE — PROGRESS NOTE ADULT - SUBJECTIVE AND OBJECTIVE BOX
Patient is a 89y old  Female who presents with a chief complaint of frequent fall (17 Dec 2023 12:54)      INTERVAL HPI/OVERNIGHT EVENTS: Overnight no acute events. Resting in bed this AM. No complaints at this time. Answers questions appropriately. Son at bedside. Updated on plan of care.     MEDICATIONS  (STANDING):  amLODIPine   Tablet 5 milliGRAM(s) Oral once  amLODIPine   Tablet 10 milliGRAM(s) Oral daily  aspirin  chewable 81 milliGRAM(s) Oral daily  atorvastatin 10 milliGRAM(s) Oral at bedtime  clopidogrel Tablet 75 milliGRAM(s) Oral daily  dextrose 5%. 1000 milliLiter(s) (50 mL/Hr) IV Continuous <Continuous>  dextrose 5%. 1000 milliLiter(s) (100 mL/Hr) IV Continuous <Continuous>  dextrose 50% Injectable 12.5 Gram(s) IV Push once  dextrose 50% Injectable 25 Gram(s) IV Push once  dextrose 50% Injectable 25 Gram(s) IV Push once  glucagon  Injectable 1 milliGRAM(s) IntraMuscular once  insulin lispro (ADMELOG) corrective regimen sliding scale   SubCutaneous at bedtime  insulin lispro (ADMELOG) corrective regimen sliding scale   SubCutaneous three times a day before meals  lactated ringers. 1000 milliLiter(s) (75 mL/Hr) IV Continuous <Continuous>  metoprolol tartrate 50 milliGRAM(s) Oral two times a day  pantoprazole    Tablet 40 milliGRAM(s) Oral before breakfast  polyethylene glycol 3350 17 Gram(s) Oral daily  senna 2 Tablet(s) Oral at bedtime    MEDICATIONS  (PRN):  acetaminophen     Tablet .. 650 milliGRAM(s) Oral every 6 hours PRN Mild Pain (1 - 3), Moderate Pain (4 - 6)  dextrose Oral Gel 15 Gram(s) Oral once PRN Blood Glucose LESS THAN 70 milliGRAM(s)/deciliter  labetalol Injectable 10 milliGRAM(s) IV Push every 6 hours PRN Systolic blood pressure >160  melatonin 3 milliGRAM(s) Oral at bedtime PRN Insomnia      Allergies    iodine (Other)  contrast dye -  rash (Other)  Pineapple (Unknown)  penicillin (Rash)  codeine (Vomiting)  latex (Rash)    Intolerances        REVIEW OF SYSTEMS:  Limited due to clinical status    Vital Signs Last 24 Hrs  T(C): 37.8 (17 Dec 2023 10:28), Max: 37.8 (17 Dec 2023 10:28)  T(F): 100 (17 Dec 2023 10:28), Max: 100 (17 Dec 2023 10:28)  HR: 92 (17 Dec 2023 10:28) (91 - 97)  BP: 146/54 (17 Dec 2023 10:28) (146/54 - 170/88)  BP(mean): --  RR: 17 (17 Dec 2023 10:28) (17 - 18)  SpO2: 97% (17 Dec 2023 10:28) (95% - 98%)    Parameters below as of 16 Dec 2023 15:05  Patient On (Oxygen Delivery Method): room air        PHYSICAL EXAM:  GENERAL: NAD, thin, elderly female   HEAD:  Atraumatic   EYES: EOMI   ENMT: dry mucous membranes  NECK: Supple   NERVOUS SYSTEM:  sleeping  CHEST/LUNG: no accessory muscle use  ABDOMEN: Soft  EXTREMITIES:   LLE slightly tender to palpation, no swelling or erythema, some bruising    LABS:                        9.7    11.43 )-----------( 312      ( 17 Dec 2023 07:10 )             29.6     12-17    139  |  109<H>  |  22  ----------------------------<  150<H>  3.8   |  24  |  0.93    Ca    8.8      17 Dec 2023 07:10  Phos  3.2     12-16  Mg     2.3     12-16    TPro  6.5  /  Alb  2.7<L>  /  TBili  0.4  /  DBili  x   /  AST  33  /  ALT  23  /  AlkPhos  76  12-16      Urinalysis Basic - ( 17 Dec 2023 07:10 )    Color: x / Appearance: x / SG: x / pH: x  Gluc: 150 mg/dL / Ketone: x  / Bili: x / Urobili: x   Blood: x / Protein: x / Nitrite: x   Leuk Esterase: x / RBC: x / WBC x   Sq Epi: x / Non Sq Epi: x / Bacteria: x      CAPILLARY BLOOD GLUCOSE      POCT Blood Glucose.: 106 mg/dL (17 Dec 2023 11:28)  POCT Blood Glucose.: 163 mg/dL (17 Dec 2023 07:51)  POCT Blood Glucose.: 170 mg/dL (16 Dec 2023 21:23)  POCT Blood Glucose.: 156 mg/dL (16 Dec 2023 15:55)      RADIOLOGY & ADDITIONAL TESTS:    Imaging Personally Reviewed:  [ X] YES  [ ] NO    Consultant(s) Notes Reviewed:  [ X] YES  [ ] NO    Care Discussed with Consultants/Other Providers [X ] YES  [ ] NO

## 2023-12-17 NOTE — DIETITIAN INITIAL EVALUATION ADULT - ETIOLOGY
Inadequate protein-energy intake & increased needs related to hx of dementia, frequent falls, & pressure ulcer

## 2023-12-17 NOTE — DIETITIAN NUTRITION RISK NOTIFICATION - TREATMENT: THE FOLLOWING DIET HAS BEEN RECOMMENDED
Diet, Soft and Bite Sized:   Consistent Carbohydrate {Evening Snack}  Low Sodium  Supplement Feeding Modality:  Oral  Glucerna Shake Cans or Servings Per Day:  1       Frequency:  Three Times a day (12-17-23 @ 09:53) [Pending Verification By Attending]  Diet, Soft and Bite Sized:   Consistent Carbohydrate {No Snacks}  DASH/TLC {Sodium & Cholesterol Restricted} (12-15-23 @ 12:43) [Active]

## 2023-12-17 NOTE — DIETITIAN INITIAL EVALUATION ADULT - OTHER INFO
Pt with PMH of HTN, HLD, CAD, DM, dementia, & hx of frequent falls. Admitted for weakness and multiple falls.  Unable to interview pt due to cognitive impairment; no family present at bedside.  Per medical record, pt lives with son, requires assistance with most ADLs and uses assistive equipment. Per medical record, pt's son reports pt refusing to eat lately. Pt continues with poor PO intake during admission; consuming <50% of documented meals as per flow sheets. Requires total feeding assistance. Will add nutrition supplement for additional nutrition. Appears to be tolerating soft and bite sized consistency. No reports of any N/V/D/C.  Pt with T2DM (ChsU1q=2.6%); per home medications list, pt takes Glimepiride to control BG levels at home. Pt with PMH of HTN, HLD, CAD, DM, dementia, & hx of frequent falls. Admitted for weakness and multiple falls.  Unable to interview pt due to cognitive impairment; no family present at bedside.  Per medical record, pt lives with son, requires assistance with most ADLs and uses assistive equipment. Per medical record, pt's son reports pt refusing to eat lately. Pt continues with poor PO intake during admission; consuming <50% of documented meals as per flow sheets. Requires total feeding assistance. Will add nutrition supplement for additional nutrition. Appears to be tolerating soft and bite sized consistency. No reports of any N/V/D/C.  Pt with T2DM (UwhE9a=9.6%); per home medications list, pt takes Glimepiride to control BG levels at home.

## 2023-12-17 NOTE — PROGRESS NOTE ADULT - ASSESSMENT
89 years old female with h/o HTN, HLD, CAD, DM, dementia, s/p spinal stimulator placement present to ED with generalized weakness and frequent fall. Patient at baseline ambulate with walker with assistance. For last 1-2 weeks, patient has been having generalized weakness and frequent fall. Have multiple bruises on body. Denied any fever, chills, nausea, vomiting or diarrhea.  Per son Toby ( [phone]) who stated that he is HCP. Patient has been having fall over last one years but more frequent lately. Patient normally ambulate short distance with walker with assistance and use wheelchair to go around. Patient usually attempts to get out of bed when family is not around resulting in fall. Patient also has been refusion to eat lately as well  Hemodynamically stable, afebrile, sat well at RA. WBC 14.45, Hb 10.5, .6, plt 413, Cr 1.42. CT head with no acute pathology. CT C spine with no acute fracture. 4mm right upper lobe pul nodule. CT chest/abd/pelvis with no acute pathology. Emphysema +. Chronic fibrotic changes at lung apices. Questional asymmetrical rectal wall thickening.  89 years old female with h/o HTN, HLD, CAD, DM, dementia, s/p spinal stimulator placement present to ED with generalized weakness and frequent fall. Patient at baseline ambulate with walker with assistance. For last 1-2 weeks, patient has been having generalized weakness and frequent fall. Have multiple bruises on body. Denied any fever, chills, nausea, vomiting or diarrhea.  Per son Toby ( [phone]) who stated that he is HCP. Patient has been having fall over last one years but more frequent lately. Patient normally ambulate short distance with walker with assistance and use wheelchair to go around. Patient usually attempts to get out of bed when family is not around resulting in fall. Patient also has been refusion to eat lately as well  Hemodynamically stable, afebrile, sat well at RA. WBC 14.45, Hb 10.5, .6, plt 413, Cr 1.42. CT head with no acute pathology. CT C spine with no acute fracture. 4mm right upper lobe pul nodule. CT chest/abd/pelvis with no acute pathology. Emphysema +. Chronic fibrotic changes at lung apices. Questional asymmetrical rectal wall thickening.

## 2023-12-17 NOTE — DIETITIAN INITIAL EVALUATION ADULT - PHYSCIAL ASSESSMENT
Pt was lying on left side, however moved to straight position and was able to visually observe for signs of muscle wasting and fat depletion as follows: temples(mild), orbital(moderate), buccal(moderate), clavicle(moderate) Pt was lying on left side, however moved to straight position and was able to visually observe for signs of muscle wasting and fat depletion as follows: temples(mild), orbital(moderate), clavicle(moderate), triceps(moderate)

## 2023-12-17 NOTE — DIETITIAN INITIAL EVALUATION ADULT - NS FNS DIET ORDER
Diet, Soft and Bite Sized:   Consistent Carbohydrate {No Snacks}  DASH/TLC {Sodium & Cholesterol Restricted} (12-15-23 @ 12:43) [Active]

## 2023-12-17 NOTE — DIETITIAN INITIAL EVALUATION ADULT - PROBLEM SELECTOR PLAN 1
Per son Toby ( [phone]) who stated that he is HCP. Patient has been having fall over last one years but more frequent lately. Patient normally ambulate short distance with walker with assistance and use wheelchair to go around. Patient usually attempts to get out of bed when family is not around resulting in falls  CT head  ( I personally review) with no acute pathology. CT C spine with no acute fracture. 4mm right upper lobe pul nodule. CT chest/abd/pelvis with no acute pathology. Emphysema +. Chronic fibrotic changes at lung apices. Questionable asymmetrical rectal wall thickening.   PT consult  SW consult---> likely need rehab  Fall precaution  Follow up Xray imaging official read. Wet read dose not appear to have fracture Per son Toby ( [phone]) who stated that he is HCP. Patient has been having fall over last one years but more frequent lately. Patient normally ambulate short distance with walker with assistance and use wheelchair to go around. Patient usually attempts to get out of bed when family is not around resulting in falls  CT head  ( I personally review) with no acute pathology. CT C spine with no acute fracture. 4mm right upper lobe pul nodule. CT chest/abd/pelvis with no acute pathology. Emphysema +. Chronic fibrotic changes at lung apices. Questionable asymmetrical rectal wall thickening.   PT consult  SW consult---> likely need rehab  Fall precaution  Follow up Xray imaging official read. Wet read dose not appear to have fracture

## 2023-12-17 NOTE — PROGRESS NOTE ADULT - PROBLEM SELECTOR PLAN 1
Per son Toby ( [phone]) who stated that he is HCP. Patient has been having fall over last one years but more frequent lately. Patient normally ambulate short distance with walker with assistance and use wheelchair to go around. Patient usually attempts to get out of bed when family is not around resulting in falls  CT head  ( I personally review) with no acute pathology. CT C spine with no acute fracture. 4mm right upper lobe pul nodule. CT chest/abd/pelvis with no acute pathology. Emphysema +. Chronic fibrotic changes at lung apices. Questionable asymmetrical rectal wall thickening.   PT consult  SW consult---> likely need rehab  Fall precaution  Imaging without fracture  US doppler ordered for LLE tenderness Per son Toby ( [phone]) who stated that he is HCP. Patient has been having fall over last one years but more frequent lately. Patient normally ambulate short distance with walker with assistance and use wheelchair to go around. Patient usually attempts to get out of bed when family is not around resulting in falls  CT head  ( I personally review) with no acute pathology. CT C spine with no acute fracture. 4mm right upper lobe pul nodule. CT chest/abd/pelvis with no acute pathology. Emphysema +. Chronic fibrotic changes at lung apices. Questionable asymmetrical rectal wall thickening.   PT consult  SW consult---> likely need rehab  Fall precaution  Imaging without fracture  US doppler ordered for LLE tenderness

## 2023-12-17 NOTE — DIETITIAN INITIAL EVALUATION ADULT - PROBLEM SELECTOR PLAN 2
2 point drop in Hb compared to recent lab, elevated MCV  ? related to frequent fall resulting in bruises  Monitor H/H  Check B12, folate, iron studies with AM labs  Currently, no evidence of active ongoing bleed  SCD for DVT prophylaxis

## 2023-12-17 NOTE — DIETITIAN INITIAL EVALUATION ADULT - PERTINENT LABORATORY DATA
12-17    139  |  109<H>  |  22  ----------------------------<  150<H>  3.8   |  24  |  0.93    Ca    8.8      17 Dec 2023 07:10  Phos  3.2     12-16  Mg     2.3     12-16    TPro  6.5  /  Alb  2.7<L>  /  TBili  0.4  /  DBili  x   /  AST  33  /  ALT  23  /  AlkPhos  76  12-16  POCT Blood Glucose.: 163 mg/dL (12-17-23 @ 07:51)  A1C with Estimated Average Glucose Result: 6.6 % (12-16-23 @ 07:30)

## 2023-12-18 NOTE — PROGRESS NOTE ADULT - PROBLEM SELECTOR PLAN 2
2 point drop in Hb compared to recent lab, elevated MCV  Monitor H/H  Iron panel relatively unremarkable, b12, folate WNL--no role for supplementation   Currently, no evidence of active ongoing bleed  SCD for DVT prophylaxis

## 2023-12-18 NOTE — PROGRESS NOTE ADULT - PROBLEM SELECTOR PLAN 1
Per son Toby ( [phone]) who stated that he is HCP. Patient has been having fall over last one years but more frequent lately. Patient normally ambulate short distance with walker with assistance and use wheelchair to go around. Patient usually attempts to get out of bed when family is not around resulting in falls  CT head  ( I personally review) with no acute pathology. CT C spine with no acute fracture. 4mm right upper lobe pul nodule. CT chest/abd/pelvis with no acute pathology. Emphysema +. Chronic fibrotic changes at lung apices. Questionable asymmetrical rectal wall thickening.   PT consult- AR  SW consult  Fall precaution  Imaging without fracture  US doppler ordered for LLE tenderness-- negative for DVT Per son Toby ( [phone]) who stated that he is HCP. Patient has been having fall over last one years but more frequent lately. Patient normally ambulate short distance with walker with assistance and use wheelchair to go around. Patient usually attempts to get out of bed when family is not around resulting in falls  CT head  ( I personally review) with no acute pathology. CT C spine with no acute fracture. 4mm right upper lobe pul nodule. CT chest/abd/pelvis with no acute pathology. Emphysema +. Chronic fibrotic changes at lung apices. Questionable asymmetrical rectal wall thickening.   PT consult- AR  SW consult  Fall precaution  Imaging without fracture  US doppler ordered for LLE tenderness-- negative for DVT

## 2023-12-18 NOTE — PROGRESS NOTE ADULT - PROBLEM SELECTOR PLAN 3
mild SAE with elevated BUN  likely due to refusing to eat lately  Gentle IV hydration  Monitor renal function   Resolved

## 2023-12-18 NOTE — PROGRESS NOTE ADULT - ASSESSMENT
89 years old female with h/o HTN, HLD, CAD, DM, dementia, s/p spinal stimulator placement present to ED with generalized weakness and frequent fall. Patient at baseline ambulate with walker with assistance. For last 1-2 weeks, patient has been having generalized weakness and frequent fall. Have multiple bruises on body. Denied any fever, chills, nausea, vomiting or diarrhea.  Per son Toby ( [phone]) who stated that he is HCP. Patient has been having fall over last one years but more frequent lately. Patient normally ambulate short distance with walker with assistance and use wheelchair to go around. Patient usually attempts to get out of bed when family is not around resulting in fall. Patient also has been refusion to eat lately as well  Hemodynamically stable, afebrile, sat well at RA. WBC 14.45, Hb 10.5, .6, plt 413, Cr 1.42. CT head with no acute pathology. CT C spine with no acute fracture. 4mm right upper lobe pul nodule. CT chest/abd/pelvis with no acute pathology. Emphysema +. Chronic fibrotic changes at lung apices. Questional asymmetrical rectal wall thickening.  89 years old female with h/o HTN, HLD, CAD, DM, dementia, s/p spinal stimulator placement present to ED with generalized weakness and frequent fall. Patient at baseline ambulate with walker with assistance. For last 1-2 weeks, patient has been having generalized weakness and frequent fall. Have multiple bruises on body. Denied any fever, chills, nausea, vomiting or diarrhea.  Per son Toby ( [phone]) who stated that he is HCP. Patient has been having fall over last one years but more frequent lately. Patient normally ambulate short distance with walker with assistance and use wheelchair to go around. Patient usually attempts to get out of bed when family is not around resulting in fall. Patient also has been refusion to eat lately as well  Hemodynamically stable, afebrile, sat well at RA. WBC 14.45, Hb 10.5, .6, plt 413, Cr 1.42. CT head with no acute pathology. CT C spine with no acute fracture. 4mm right upper lobe pul nodule. CT chest/abd/pelvis with no acute pathology. Emphysema +. Chronic fibrotic changes at lung apices. Questional asymmetrical rectal wall thickening.

## 2023-12-19 NOTE — PROGRESS NOTE ADULT - PROBLEM SELECTOR PLAN 8
supportive care  At risk for delirium

## 2023-12-19 NOTE — PROGRESS NOTE ADULT - SUBJECTIVE AND OBJECTIVE BOX
Patient is a 89y old  Female who presents with a chief complaint of frequent fall (18 Dec 2023 09:36)      INTERVAL HPI/OVERNIGHT EVENTS: Overnight no acute events. Febrile overnight Tmax 100.7F with leukocytosis on AM labs. Patient endorses discomfort on urination.     MEDICATIONS  (STANDING):  amLODIPine   Tablet 5 milliGRAM(s) Oral once  amLODIPine   Tablet 10 milliGRAM(s) Oral daily  aspirin  chewable 81 milliGRAM(s) Oral daily  atorvastatin 10 milliGRAM(s) Oral at bedtime  clopidogrel Tablet 75 milliGRAM(s) Oral daily  dextrose 5%. 1000 milliLiter(s) (50 mL/Hr) IV Continuous <Continuous>  dextrose 5%. 1000 milliLiter(s) (100 mL/Hr) IV Continuous <Continuous>  dextrose 50% Injectable 12.5 Gram(s) IV Push once  dextrose 50% Injectable 25 Gram(s) IV Push once  dextrose 50% Injectable 25 Gram(s) IV Push once  glucagon  Injectable 1 milliGRAM(s) IntraMuscular once  insulin lispro (ADMELOG) corrective regimen sliding scale   SubCutaneous at bedtime  insulin lispro (ADMELOG) corrective regimen sliding scale   SubCutaneous three times a day before meals  lactated ringers. 1000 milliLiter(s) (75 mL/Hr) IV Continuous <Continuous>  metoprolol tartrate 50 milliGRAM(s) Oral two times a day  nitrofurantoin monohydrate/macrocrystals (MACROBID) 100 milliGRAM(s) Oral two times a day  pantoprazole    Tablet 40 milliGRAM(s) Oral before breakfast  polyethylene glycol 3350 17 Gram(s) Oral daily  senna 2 Tablet(s) Oral at bedtime    MEDICATIONS  (PRN):  acetaminophen     Tablet .. 650 milliGRAM(s) Oral every 6 hours PRN Mild Pain (1 - 3), Moderate Pain (4 - 6)  dextrose Oral Gel 15 Gram(s) Oral once PRN Blood Glucose LESS THAN 70 milliGRAM(s)/deciliter  labetalol Injectable 10 milliGRAM(s) IV Push every 6 hours PRN Systolic blood pressure >160  melatonin 3 milliGRAM(s) Oral at bedtime PRN Insomnia      Allergies    iodine (Other)  contrast dye -  rash (Other)  Pineapple (Unknown)  penicillin (Rash)  codeine (Vomiting)  latex (Rash)    Intolerances        REVIEW OF SYSTEMS:  Limited due to patient mentation.     Vital Signs Last 24 Hrs  T(C): 37.3 (19 Dec 2023 11:25), Max: 38.2 (19 Dec 2023 05:22)  T(F): 99.1 (19 Dec 2023 11:25), Max: 100.7 (19 Dec 2023 05:22)  HR: 84 (19 Dec 2023 11:25) (84 - 124)  BP: 129/68 (19 Dec 2023 11:25) (114/63 - 141/57)  BP(mean): --  RR: 18 (19 Dec 2023 11:25) (16 - 18)  SpO2: 93% (19 Dec 2023 11:25) (93% - 95%)    Parameters below as of 19 Dec 2023 11:25  Patient On (Oxygen Delivery Method): room air        PHYSICAL EXAM:  GENERAL: NAD, thin, elderly female   HEAD:  Atraumatic   EYES: EOMI   ENMT: dry mucous membranes  NECK: Supple   NERVOUS SYSTEM:  sleeping  CHEST/LUNG: no accessory muscle use  ABDOMEN: Soft  EXTREMITIES:   LLE slightly tender to palpation, no swelling or erythema     LABS:                        8.5    15.41 )-----------( 321      ( 19 Dec 2023 07:07 )             26.0     12-19    138  |  106  |  23  ----------------------------<  161<H>  3.7   |  22  |  1.06    Ca    8.5      19 Dec 2023 08:47        Urinalysis Basic - ( 19 Dec 2023 11:46 )    Color: Dark Yellow / Appearance: Cloudy / SG: >1.030 / pH: x  Gluc: x / Ketone: 40 mg/dL  / Bili: Small / Urobili: 1.0 mg/dL   Blood: x / Protein: 300 mg/dL / Nitrite: Positive   Leuk Esterase: Moderate / RBC: x / WBC x   Sq Epi: x / Non Sq Epi: x / Bacteria: x      CAPILLARY BLOOD GLUCOSE      POCT Blood Glucose.: 180 mg/dL (19 Dec 2023 11:21)  POCT Blood Glucose.: 166 mg/dL (19 Dec 2023 08:00)  POCT Blood Glucose.: 164 mg/dL (18 Dec 2023 21:39)  POCT Blood Glucose.: 186 mg/dL (18 Dec 2023 15:57)      RADIOLOGY & ADDITIONAL TESTS:    Imaging Personally Reviewed:  [ X] YES  [ ] NO    Consultant(s) Notes Reviewed:  [ X] YES  [ ] NO    Care Discussed with Consultants/Other Providers [X ] YES  [ ] NO

## 2023-12-19 NOTE — PROGRESS NOTE ADULT - PROBLEM SELECTOR PLAN 1
Per son Toby ( [phone]) who stated that he is HCP. Patient has been having fall over last one years but more frequent lately. Patient normally ambulate short distance with walker with assistance and use wheelchair to go around. Patient usually attempts to get out of bed when family is not around resulting in falls  CT head  ( I personally review) with no acute pathology. CT C spine with no acute fracture. 4mm right upper lobe pul nodule. CT chest/abd/pelvis with no acute pathology. Emphysema +. Chronic fibrotic changes at lung apices. Questionable asymmetrical rectal wall thickening.   PT consult- AR  SW consult  Fall precaution  Imaging without fracture  US doppler ordered for LLE tenderness-- negative for DVT Per son Toby ( [phone]) who stated that he is HCP. Patient has been having fall over last one years but more frequent lately. Patient normally ambulate short distance with walker with assistance and use wheelchair to go around. Patient usually attempts to get out of bed when family is not around resulting in falls  CT head  ( I personally review) with no acute pathology. CT C spine with no acute fracture. 4mm right upper lobe pul nodule. CT chest/abd/pelvis with no acute pathology. Emphysema +. Chronic fibrotic changes at lung apices. Questionable asymmetrical rectal wall thickening.   PT consult- AR  SW consult  Fall precaution  Imaging without fracture  US doppler ordered for LLE tenderness-- negative for DVT

## 2023-12-19 NOTE — PROGRESS NOTE ADULT - PROBLEM SELECTOR PLAN 7
ISS, hypoglycemia protocol, A1c
ISS, hypoglycemia protocol
ISS, hypoglycemia protocol
ISS, hypoglycemia protocol, A1c

## 2023-12-19 NOTE — PROGRESS NOTE ADULT - PROBLEM SELECTOR PLAN 9
Patient febrile with leukocytosis  UA + for leukocyte esterase and nitrites --urine cx ordered    Bladder scan ordered   Macrobid x 5 days   CBC

## 2023-12-19 NOTE — PROGRESS NOTE ADULT - ASSESSMENT
89 years old female with h/o HTN, HLD, CAD, DM, dementia, s/p spinal stimulator placement present to ED with generalized weakness and frequent fall. Patient at baseline ambulate with walker with assistance. For last 1-2 weeks, patient has been having generalized weakness and frequent fall. Have multiple bruises on body. Denied any fever, chills, nausea, vomiting or diarrhea.  Per son Toby ( [phone]) who stated that he is HCP. Patient has been having fall over last one years but more frequent lately. Patient normally ambulate short distance with walker with assistance and use wheelchair to go around. Patient usually attempts to get out of bed when family is not around resulting in fall. Patient also has been refusion to eat lately as well  Hemodynamically stable, afebrile, sat well at RA. WBC 14.45, Hb 10.5, .6, plt 413, Cr 1.42. CT head with no acute pathology. CT C spine with no acute fracture. 4mm right upper lobe pul nodule. CT chest/abd/pelvis with no acute pathology. Emphysema +. Chronic fibrotic changes at lung apices. Questional asymmetrical rectal wall thickening.  89 years old female with h/o HTN, HLD, CAD, DM, dementia, s/p spinal stimulator placement present to ED with generalized weakness and frequent fall. Patient at baseline ambulate with walker with assistance. For last 1-2 weeks, patient has been having generalized weakness and frequent fall. Have multiple bruises on body. Denied any fever, chills, nausea, vomiting or diarrhea.  Per son Toby ( [phone]) who stated that he is HCP. Patient has been having fall over last one years but more frequent lately. Patient normally ambulate short distance with walker with assistance and use wheelchair to go around. Patient usually attempts to get out of bed when family is not around resulting in fall. Patient also has been refusion to eat lately as well  Hemodynamically stable, afebrile, sat well at RA. WBC 14.45, Hb 10.5, .6, plt 413, Cr 1.42. CT head with no acute pathology. CT C spine with no acute fracture. 4mm right upper lobe pul nodule. CT chest/abd/pelvis with no acute pathology. Emphysema +. Chronic fibrotic changes at lung apices. Questional asymmetrical rectal wall thickening.

## 2023-12-20 NOTE — PROGRESS NOTE ADULT - SUBJECTIVE AND OBJECTIVE BOX
Patient is a 89y old  Female who presents with a chief complaint of frequent fall (18 Dec 2023 09:36)      INTERVAL HPI/OVERNIGHT EVENTS: Overnight no acute events. Febrile overnight Tmax 100.7F with leukocytosis on AM labs. Patient endorses discomfort on urination.     MEDICATIONS  (STANDING):  amLODIPine   Tablet 5 milliGRAM(s) Oral once  amLODIPine   Tablet 10 milliGRAM(s) Oral daily  aspirin  chewable 81 milliGRAM(s) Oral daily  atorvastatin 10 milliGRAM(s) Oral at bedtime  clopidogrel Tablet 75 milliGRAM(s) Oral daily  dextrose 5%. 1000 milliLiter(s) (50 mL/Hr) IV Continuous <Continuous>  dextrose 5%. 1000 milliLiter(s) (100 mL/Hr) IV Continuous <Continuous>  dextrose 50% Injectable 12.5 Gram(s) IV Push once  dextrose 50% Injectable 25 Gram(s) IV Push once  dextrose 50% Injectable 25 Gram(s) IV Push once  glucagon  Injectable 1 milliGRAM(s) IntraMuscular once  insulin lispro (ADMELOG) corrective regimen sliding scale   SubCutaneous at bedtime  insulin lispro (ADMELOG) corrective regimen sliding scale   SubCutaneous three times a day before meals  lactated ringers. 1000 milliLiter(s) (75 mL/Hr) IV Continuous <Continuous>  metoprolol tartrate 50 milliGRAM(s) Oral two times a day  nitrofurantoin monohydrate/macrocrystals (MACROBID) 100 milliGRAM(s) Oral two times a day  pantoprazole    Tablet 40 milliGRAM(s) Oral before breakfast  polyethylene glycol 3350 17 Gram(s) Oral daily  senna 2 Tablet(s) Oral at bedtime    MEDICATIONS  (PRN):  acetaminophen     Tablet .. 650 milliGRAM(s) Oral every 6 hours PRN Mild Pain (1 - 3), Moderate Pain (4 - 6)  dextrose Oral Gel 15 Gram(s) Oral once PRN Blood Glucose LESS THAN 70 milliGRAM(s)/deciliter  labetalol Injectable 10 milliGRAM(s) IV Push every 6 hours PRN Systolic blood pressure >160  melatonin 3 milliGRAM(s) Oral at bedtime PRN Insomnia      Allergies    iodine (Other)  contrast dye -  rash (Other)  Pineapple (Unknown)  penicillin (Rash)  codeine (Vomiting)  latex (Rash)    Intolerances        REVIEW OF SYSTEMS:  Limited due to patient mentation.     Vital Signs Last 24 Hrs  T(C): 37.3 (19 Dec 2023 11:25), Max: 38.2 (19 Dec 2023 05:22)  T(F): 99.1 (19 Dec 2023 11:25), Max: 100.7 (19 Dec 2023 05:22)  HR: 84 (19 Dec 2023 11:25) (84 - 124)  BP: 129/68 (19 Dec 2023 11:25) (114/63 - 141/57)  BP(mean): --  RR: 18 (19 Dec 2023 11:25) (16 - 18)  SpO2: 93% (19 Dec 2023 11:25) (93% - 95%)    Parameters below as of 19 Dec 2023 11:25  Patient On (Oxygen Delivery Method): room air        PHYSICAL EXAM:  GENERAL: NAD, thin, elderly female   HEAD:  Atraumatic   EYES: EOMI   ENMT: dry mucous membranes  NECK: Supple   NERVOUS SYSTEM:  sleeping  CHEST/LUNG: no accessory muscle use  ABDOMEN: Soft  EXTREMITIES:   LLE slightly tender to palpation, no swelling or erythema     LABS:                        8.5    15.41 )-----------( 321      ( 19 Dec 2023 07:07 )             26.0     12-19    138  |  106  |  23  ----------------------------<  161<H>  3.7   |  22  |  1.06    Ca    8.5      19 Dec 2023 08:47        Urinalysis Basic - ( 19 Dec 2023 11:46 )    Color: Dark Yellow / Appearance: Cloudy / SG: >1.030 / pH: x  Gluc: x / Ketone: 40 mg/dL  / Bili: Small / Urobili: 1.0 mg/dL   Blood: x / Protein: 300 mg/dL / Nitrite: Positive   Leuk Esterase: Moderate / RBC: x / WBC x   Sq Epi: x / Non Sq Epi: x / Bacteria: x      CAPILLARY BLOOD GLUCOSE      POCT Blood Glucose.: 180 mg/dL (19 Dec 2023 11:21)  POCT Blood Glucose.: 166 mg/dL (19 Dec 2023 08:00)  POCT Blood Glucose.: 164 mg/dL (18 Dec 2023 21:39)  POCT Blood Glucose.: 186 mg/dL (18 Dec 2023 15:57)      RADIOLOGY & ADDITIONAL TESTS:    Imaging Personally Reviewed:  [ X] YES  [ ] NO    Consultant(s) Notes Reviewed:  [ X] YES  [ ] NO    Care Discussed with Consultants/Other Providers [X ] YES  [ ] NO Patient is a 89y old  Female who presents with a chief complaint of frequent fall (18 Dec 2023 09:36)      INTERVAL HPI/OVERNIGHT EVENTS:   Patient seen and examined bedside.   no overnight events   Tmax this .7F   elevated WBC     poor historian     ROS: unable to examine due to [ ] Encephalopathy  [x ] Advanced Dementia  [ ] Expressive Aphasia  [ ] Non-verbal patient     MEDICATIONS  (STANDING):  amLODIPine   Tablet 5 milliGRAM(s) Oral once  amLODIPine   Tablet 10 milliGRAM(s) Oral daily  aspirin  chewable 81 milliGRAM(s) Oral daily  atorvastatin 10 milliGRAM(s) Oral at bedtime  clopidogrel Tablet 75 milliGRAM(s) Oral daily  dextrose 5%. 1000 milliLiter(s) (50 mL/Hr) IV Continuous <Continuous>  dextrose 5%. 1000 milliLiter(s) (100 mL/Hr) IV Continuous <Continuous>  dextrose 50% Injectable 12.5 Gram(s) IV Push once  dextrose 50% Injectable 25 Gram(s) IV Push once  dextrose 50% Injectable 25 Gram(s) IV Push once  glucagon  Injectable 1 milliGRAM(s) IntraMuscular once  insulin lispro (ADMELOG) corrective regimen sliding scale   SubCutaneous at bedtime  insulin lispro (ADMELOG) corrective regimen sliding scale   SubCutaneous three times a day before meals  lactated ringers. 1000 milliLiter(s) (75 mL/Hr) IV Continuous <Continuous>  metoprolol tartrate 50 milliGRAM(s) Oral two times a day  nitrofurantoin monohydrate/macrocrystals (MACROBID) 100 milliGRAM(s) Oral two times a day  pantoprazole    Tablet 40 milliGRAM(s) Oral before breakfast  polyethylene glycol 3350 17 Gram(s) Oral daily  senna 2 Tablet(s) Oral at bedtime    MEDICATIONS  (PRN):  acetaminophen     Tablet .. 650 milliGRAM(s) Oral every 6 hours PRN Mild Pain (1 - 3), Moderate Pain (4 - 6)  dextrose Oral Gel 15 Gram(s) Oral once PRN Blood Glucose LESS THAN 70 milliGRAM(s)/deciliter  labetalol Injectable 10 milliGRAM(s) IV Push every 6 hours PRN Systolic blood pressure >160  melatonin 3 milliGRAM(s) Oral at bedtime PRN Insomnia      Allergies    iodine (Other)  contrast dye -  rash (Other)  Pineapple (Unknown)  penicillin (Rash)  codeine (Vomiting)  latex (Rash)    Intolerances        Vital Signs Last 24 Hrs  T(C): 37.9 (20 Dec 2023 16:46), Max: 37.9 (20 Dec 2023 16:46)  T(F): 100.3 (20 Dec 2023 16:46), Max: 100.3 (20 Dec 2023 16:46)  HR: 115 (20 Dec 2023 16:46) (88 - 115)  BP: 136/55 (20 Dec 2023 16:46) (126/50 - 161/73)  BP(mean): --  RR: 20 (20 Dec 2023 16:46) (16 - 20)  SpO2: 90% (20 Dec 2023 16:46) (90% - 96%)    Parameters below as of 20 Dec 2023 16:46  Patient On (Oxygen Delivery Method): room air            PHYSICAL EXAM:  GENERAL: NAD, thin appearing,  no increased WOB  HEAD:  Atraumatic, Normocephalic  EYES: EOMI, PERRLA, conjunctiva and sclera clear  ENMT: No tonsillar erythema, exudates, or enlargement; Moist mucous membranes   NECK: Supple, No JVD   NERVOUS SYSTEM:  awake, agitated at times, moving extremities   CHEST/LUNG: CTAB;  No rales, rhonchi, wheezing, or rubs  HEART: Regular rate and rhythm; No murmurs, rubs, or gallops  ABDOMEN: Soft, Nontender, Nondistended; Bowel sounds present  EXTREMITIES:  2+ Peripheral Pulses b/l, No clubbing, cyanosis, calf tenderness or edema b/l   unstageable ulcer on left ankle >>draining purulent discharge and foul smell   right shoulder stage 1 appears clean and heeling   sacral IAD         LABS:                                   8.6    16.27 )-----------( 335      ( 20 Dec 2023 06:35 )             25.2   12-20    139  |  108  |  25<H>  ----------------------------<  181<H>  3.6   |  25  |  1.04    Ca    8.8      20 Dec 2023 06:35          Urinalysis Basic - ( 19 Dec 2023 11:46 )    Color: Dark Yellow / Appearance: Cloudy / SG: >1.030 / pH: x  Gluc: x / Ketone: 40 mg/dL  / Bili: Small / Urobili: 1.0 mg/dL   Blood: x / Protein: 300 mg/dL / Nitrite: Positive   Leuk Esterase: Moderate / RBC: x / WBC x   Sq Epi: x / Non Sq Epi: x / Bacteria: x          RADIOLOGY & ADDITIONAL TESTS:    Consultant(s) Notes Reveiwed [x ] Yes     Care Discussed with [x ] Consultants  [x ] Patient  [ x] Family>>son Frank at bedside  [x ] /   [ x] Other; RN  Care plan and all findings were discussed in detail with patient and son Frank .  All questions and concerns addressed

## 2023-12-20 NOTE — PHYSICAL THERAPY INITIAL EVALUATION ADULT - CRITERIA FOR SKILLED THERAPEUTIC INTERVENTIONS
impairments found/anticipated equipment needs at discharge/anticipated discharge recommendation
impairments found/functional limitations in following categories/risk reduction/prevention/rehab potential/therapy frequency/predicted duration of therapy intervention/anticipated discharge recommendation

## 2023-12-20 NOTE — PHYSICAL THERAPY INITIAL EVALUATION ADULT - IMPAIRMENTS FOUND, PT EVAL
integumentary integrity/joint integrity and mobility/muscle strength/poor safety awareness
aerobic capacity/endurance/muscle strength/neuromotor development and sensory integration/poor safety awareness/posture/ROM

## 2023-12-20 NOTE — PHYSICAL THERAPY INITIAL EVALUATION ADULT - MODALITIES TREATMENT COMMENTS
Left ankle- lateral malleolus- stage 4 pressure injury-4x3x0.3cms- 90% slough, 10% bone exposure- dressing recommendations in POC

## 2023-12-20 NOTE — PHYSICAL THERAPY INITIAL EVALUATION ADULT - ADDITIONAL COMMENTS
Pls refer functional eval on 16th, Dec, 2023
As per son, patient lives c son in the 1st floor pvt house(Other son lives on the 2nd floor) c 6 entry steps c B/L rails(far apart), 1 level inside home. Required assistance c most ADL's and bed<>w/c transfer c 1-2 person assist. Non-ambulatory at baseline. Son noted wheelchair is broken and uses the rollator at this time. Pt also has own rolling walker.

## 2023-12-20 NOTE — PHYSICAL THERAPY INITIAL EVALUATION ADULT - GENERAL OBSERVATIONS, REHAB EVAL
Pt was received in left lateral side lying, AxOX2, NAD, answers inconsistently
Chart (EMR) reviewed. Received sidelying to left side c HOB elevated, NAD. +IV intact. Alert and confused. Ox1. Able to follow simple commands/directions.

## 2023-12-20 NOTE — PROGRESS NOTE ADULT - ASSESSMENT
89 years old female with h/o HTN, HLD, CAD, DM, dementia, s/p spinal stimulator placement present to ED with generalized weakness and frequent fall. Patient at baseline ambulate with walker with assistance. For last 1-2 weeks, patient has been having generalized weakness and frequent fall. Have multiple bruises on body. Denied any fever, chills, nausea, vomiting or diarrhea.  Per son Toby ( [phone]) who stated that he is HCP. Patient has been having fall over last one years but more frequent lately. Patient normally ambulate short distance with walker with assistance and use wheelchair to go around. Patient usually attempts to get out of bed when family is not around resulting in fall. Patient also has been refusion to eat lately as well  Hemodynamically stable, afebrile, sat well at RA. WBC 14.45, Hb 10.5, .6, plt 413, Cr 1.42. CT head with no acute pathology. CT C spine with no acute fracture. 4mm right upper lobe pul nodule. CT chest/abd/pelvis with no acute pathology. Emphysema +. Chronic fibrotic changes at lung apices. Questional asymmetrical rectal wall thickening.     Assessment and Plan:   Problem/Plan - 1:  ·  Problem: Frequent falls.   ·  Plan: Per son Toby ( [phone]) who stated that he is HCP. Patient has been having fall over last one years but more frequent lately. Patient normally ambulate short distance with walker with assistance and use wheelchair to go around. Patient usually attempts to get out of bed when family is not around resulting in falls  CT head  ( I personally review) with no acute pathology. CT C spine with no acute fracture. 4mm right upper lobe pul nodule. CT chest/abd/pelvis with no acute pathology. Emphysema +. Chronic fibrotic changes at lung apices. Questionable asymmetrical rectal wall thickening.   PT consult- AR  SW consult  Fall precaution  Imaging without fracture  US doppler ordered for LLE tenderness-- negative for DVT.     Problem/Plan - 2:  ·  Problem: Macrocytic anemia.   ·  Plan: 2 point drop in Hb compared to recent lab, elevated MCV  Monitor H/H  Iron panel relatively unremarkable, b12, folate WNL--no role for supplementation   Currently, no evidence of active ongoing bleed  SCD for DVT prophylaxis.     Problem/Plan - 3:  ·  Problem: SAE (acute kidney injury).   ·  Plan: mild SAE with elevated BUN  likely due to refusing to eat lately  Gentle IV hydration  Monitor renal function   Resolved.     Problem/Plan - 4:  ·  Problem: Benign essential HTN.   ·  Plan: monitor BP and titrate BP med.     Problem/Plan - 5:  ·  Problem: Hyperlipidemia, unspecified.   ·  Plan: on atorvastatin 10mg hs.     Problem/Plan - 6:  ·  Problem: CAD (coronary artery disease).   ·  Plan: CAD s/p PCI s/p CABG  on aspirin, plavix, statin, BB.     Problem/Plan - 7:  ·  Problem: Type 2 diabetes mellitus with unspecified complications.   ·  Plan: ISS, hypoglycemia protocol.     Problem/Plan - 8:  ·  Problem: Dementia.   ·  Plan: supportive care  At risk for delirium.     Problem/Plan - 9:  ·  Problem: UTI (urinary tract infection).   ·  Plan: Patient febrile with leukocytosis  UA + for leukocyte esterase and nitrites --urine cx ordered    Bladder scan ordered   Macrobid x 5 days   CBC.     89 years old female with h/o HTN, HLD, CAD, DM, dementia, s/p spinal stimulator placement present to ED with generalized weakness and frequent fall. Patient at baseline ambulate with walker with assistance. For last 1-2 weeks, patient has been having generalized weakness and frequent fall. Have multiple bruises on body. Denied any fever, chills, nausea, vomiting or diarrhea.  Per son Toby ( [phone]) who stated that he is HCP. Patient has been having fall over last one years but more frequent lately. Patient normally ambulate short distance with walker with assistance and use wheelchair to go around. Patient usually attempts to get out of bed when family is not around resulting in fall. Patient also has been refusion to eat lately as well  Hemodynamically stable, afebrile, sat well at RA. WBC 14.45, Hb 10.5, .6, plt 413, Cr 1.42. CT head with no acute pathology. CT C spine with no acute fracture. 4mm right upper lobe pul nodule. CT chest/abd/pelvis with no acute pathology. Emphysema +. Chronic fibrotic changes at lung apices. Questional asymmetrical rectal wall thickening.     Assessment and Plan:   Problem/Plan - 1:  ·  Problem: Frequent falls.   ·  Plan: Per son Toby ( [phone]) who stated that he is HCP. Patient has been having fall over last one years but more frequent lately. Patient normally ambulate short distance with walker with assistance and use wheelchair to go around. Patient usually attempts to get out of bed when family is not around resulting in falls  CT head  ( I personally review) with no acute pathology. CT C spine with no acute fracture. 4mm right upper lobe pul nodule. CT chest/abd/pelvis with no acute pathology. Emphysema +. Chronic fibrotic changes at lung apices. Questionable asymmetrical rectal wall thickening.   PT consult- AR  SW consult  Fall precaution  Imaging without fracture  US doppler ordered for LLE tenderness-- negative for DVT.     Problem/Plan - 2:  ·  Problem: Macrocytic anemia.   ·  Plan: 2 point drop in Hb compared to recent lab, elevated MCV  Monitor H/H  Iron panel relatively unremarkable, b12, folate WNL--no role for supplementation   Currently, no evidence of active ongoing bleed  SCD for DVT prophylaxis.     Problem/Plan - 3:  ·  Problem: SAE (acute kidney injury).   ·  Plan: mild SAE with elevated BUN  likely due to refusing to eat lately  Gentle IV hydration  Monitor renal function   Resolved.     Problem/Plan - 4:  ·  Problem: Benign essential HTN.   ·  Plan: monitor BP and titrate BP med.     Problem/Plan - 5:  ·  Problem: Hyperlipidemia, unspecified.   ·  Plan: on atorvastatin 10mg hs.     Problem/Plan - 6:  ·  Problem: CAD (coronary artery disease).   ·  Plan: CAD s/p PCI s/p CABG  on aspirin, plavix, statin, BB.     Problem/Plan - 7:  ·  Problem: Type 2 diabetes mellitus with unspecified complications.   ·  Plan: ISS, hypoglycemia protocol.     Problem/Plan - 8:  ·  Problem: Dementia.   ·  Plan: supportive care  At risk for delirium.     Problem/Plan - 9:  ·  Problem: UTI (urinary tract infection).   ·  Plan: Patient febrile with leukocytosis  UA + for leukocyte esterase and nitrites --urine cx ordered    Bladder scan ordered   Macrobid x 5 days   CBC.     89 years old female with h/o HTN, HLD, CAD, DM, dementia, s/p spinal stimulator placement present to ED with generalized weakness and frequent fall. Patient at baseline ambulate with walker with assistance. For last 1-2 weeks, patient has been having generalized weakness and frequent fall. Have multiple bruises on body.  Per son Toby ( [phone]) who stated that he is HCP. Patient has been having fall over last one years but more frequent lately. Patient normally ambulate short distance with walker with assistance and use wheelchair to go around. Patient usually attempts to get out of bed when family is not around resulting in fall. Patient also has been refusion to eat lately as well.   Hemodynamically stable, afebrile, sat well at RA. WBC 14.45, Hb 10.5, .6, plt 413, Cr 1.42. CT head with no acute pathology. CT C spine with no acute fracture. 4mm right upper lobe pul nodule. CT chest/abd/pelvis with no acute pathology. Emphysema +. Chronic fibrotic changes at lung apices. Questional asymmetrical rectal wall thickening.     Assessment and Plan:     Left lateral malleolus infected appearing Ulcer   Sepsis   US doppler ordered for LLE tenderness-- negative for DVT  -- PT wound consulted   --MRSA/MSSA   --abx changed to Cefepime. given 1 dose of Vancomycin   --follow Blood Cx , RVP    Presumed UTI ? patient was complaining of dysuria , unclear though as she is a poor historian   abx as above   follow UCx      Frequent falls  advanced dementia , mostly bedbound , functional quadriplegia (wheelchair bound at home)   CT head    with no acute pathology. CT C spine with no acute fracture.       4mm right upper lobe pul nodule.   CT chest/abd/pelvis with no acute pathology. Emphysema +. Chronic fibrotic changes at lung apices.   no wheezing, lungs are CTAB       Questionable asymmetrical rectal wall thickening.   --per my discussion with son, given age and dementia, family prefers not to pursue further investigation /treatment or aggressive measures. Per family however have not decided on GOC , for now they want her to be FULL CODE       Macrocytic anemia.   Iron panel relatively unremarkable, b12, folate WNL--no role for supplementation   obtain TSH     SAE (acute kidney injury).  POA   resolved with IVF      Benign essential HTN/HLD   continue with current regimen       CAD (coronary artery disease).   --CAD s/p PCI s/p CABG  on aspirin, plavix, statin, BB.    Type 2 diabetes mellitus with unspecified complications. controlled   A1c 6.6%  continue with ISS   avoid hypoglycemia       Dementia. suspected vascular dementia   CT head showing multiple old chronic strokes, including cerebellar strokes which would explain the gait instability    supportive care    Moderate PCM   nutrition consult appreciated     Preventative Measures   heparin SQ-dvt ppx  fall, aspiration precautions   HOBE      89 years old female with h/o HTN, HLD, CAD, DM, dementia, s/p spinal stimulator placement present to ED with generalized weakness and frequent fall. Patient at baseline ambulate with walker with assistance. For last 1-2 weeks, patient has been having generalized weakness and frequent fall. Have multiple bruises on body.  Per son Toby ( [phone]) who stated that he is HCP. Patient has been having fall over last one years but more frequent lately. Patient normally ambulate short distance with walker with assistance and use wheelchair to go around. Patient usually attempts to get out of bed when family is not around resulting in fall. Patient also has been refusion to eat lately as well.   Hemodynamically stable, afebrile, sat well at RA. WBC 14.45, Hb 10.5, .6, plt 413, Cr 1.42. CT head with no acute pathology. CT C spine with no acute fracture. 4mm right upper lobe pul nodule. CT chest/abd/pelvis with no acute pathology. Emphysema +. Chronic fibrotic changes at lung apices. Questional asymmetrical rectal wall thickening.     Assessment and Plan:     Left lateral malleolus infected appearing Ulcer   Sepsis   US doppler ordered for LLE tenderness-- negative for DVT  -- PT wound consulted   --MRSA/MSSA   --abx changed to Cefepime. given 1 dose of Vancomycin   --follow Blood Cx , RVP    Presumed UTI ? patient was complaining of dysuria , unclear though as she is a poor historian   abx as above   follow UCx      Frequent falls  advanced dementia , mostly bedbound , functional quadriplegia (wheelchair bound at home)   CT head    with no acute pathology. CT C spine with no acute fracture.       4mm right upper lobe pul nodule.   CT chest/abd/pelvis with no acute pathology. Emphysema +. Chronic fibrotic changes at lung apices.   no wheezing, lungs are CTAB       Questionable asymmetrical rectal wall thickening.   --per my discussion with son, given age and dementia, family prefers not to pursue further investigation /treatment or aggressive measures. Per family however have not decided on GOC , for now they want her to be FULL CODE       Macrocytic anemia.   Iron panel relatively unremarkable, b12, folate WNL--no role for supplementation   obtain TSH     SAE (acute kidney injury).  POA   resolved with IVF      Benign essential HTN/HLD   continue with current regimen       CAD (coronary artery disease).   --CAD s/p PCI s/p CABG  on aspirin, plavix, statin, BB.    Type 2 diabetes mellitus with unspecified complications. controlled   A1c 6.6%  continue with ISS   avoid hypoglycemia       Dementia. suspected vascular dementia   CT head showing multiple old chronic strokes, including cerebellar strokes which would explain the gait instability    supportive care    Moderate PCM   nutrition consult appreciated     Preventative Measures   heparin SQ-dvt ppx  fall, aspiration precautions   HOBE

## 2023-12-21 NOTE — PROGRESS NOTE ADULT - ASSESSMENT
89 years old female with h/o HTN, HLD, CAD, DM, dementia, s/p spinal stimulator placement present to ED with generalized weakness and frequent fall. Patient at baseline ambulate with walker with assistance. For last 1-2 weeks, patient has been having generalized weakness and frequent fall. Have multiple bruises on body.  Per son Toby ( [phone]) who stated that he is HCP. Patient has been having fall over last one years but more frequent lately. Patient normally ambulate short distance with walker with assistance and use wheelchair to go around. Patient usually attempts to get out of bed when family is not around resulting in fall. Patient also has been refusion to eat lately as well.   Hemodynamically stable, afebrile, sat well at RA. WBC 14.45, Hb 10.5, .6, plt 413, Cr 1.42. CT head with no acute pathology. CT C spine with no acute fracture. 4mm right upper lobe pul nodule. CT chest/abd/pelvis with no acute pathology. Emphysema +. Chronic fibrotic changes at lung apices. Questional asymmetrical rectal wall thickening.     Assessment and Plan:     Left lateral malleolus infected appearing Ulcer   Sepsis   US doppler ordered for LLE tenderness-- negative for DVT  -- PT wound consulted   -ESR/CRP elevated   --MRSA/MSSA   --follow  Blood Cx , RVP  --Vascular , ID consulted   --c/w cefepime and Vancomycin     Presumed UTI ? patient was complaining of dysuria , unclear though as she is a poor historian   abx as above   follow UCx      Frequent falls  advanced dementia , mostly bedbound , functional quadriplegia (wheelchair bound at home)   CT head    with no acute pathology. CT C spine with no acute fracture.       4mm right upper lobe pul nodule.   CT chest/abd/pelvis with no acute pathology. Emphysema +. Chronic fibrotic changes at lung apices.   no wheezing, lungs are CTAB       Questionable asymmetrical rectal wall thickening.   --per my discussion with son, given age and dementia, family prefers not to pursue further investigation /treatment or aggressive measures. Per family however have not decided on GOC , for now they want her to be FULL CODE       Macrocytic anemia.   Iron panel relatively unremarkable, b12, folate WNL--no role for supplementation   TSH OK    SAE (acute kidney injury).  POA   resolved with IVF      Benign essential HTN/HLD   continue with current regimen       CAD (coronary artery disease).   --CAD s/p PCI s/p CABG  on aspirin, plavix, statin, BB.    Type 2 diabetes mellitus with unspecified complications. controlled   A1c 6.6%  continue with ISS   avoid hypoglycemia       Dementia. suspected vascular dementia   CT head showing multiple old chronic strokes, including cerebellar strokes which would explain the gait instability    supportive care    Moderate PCM   nutrition consult appreciated     Preventative Measures   heparin SQ-dvt ppx  fall, aspiration precautions   HOBE      89 years old female with h/o HTN, HLD, CAD, DM, dementia, s/p spinal stimulator placement present to ED with generalized weakness and frequent fall. Patient at baseline ambulate with walker with assistance. For last 1-2 weeks, patient has been having generalized weakness and frequent fall. Have multiple bruises on body.  Per son Toby ( [phone]) who stated that he is HCP. Patient has been having fall over last one years but more frequent lately. Patient normally ambulate short distance with walker with assistance and use wheelchair to go around. Patient usually attempts to get out of bed when family is not around resulting in fall. Patient also has been refusion to eat lately as well.   Hemodynamically stable, afebrile, sat well at RA. WBC 14.45, Hb 10.5, .6, plt 413, Cr 1.42. CT head with no acute pathology. CT C spine with no acute fracture. 4mm right upper lobe pul nodule. CT chest/abd/pelvis with no acute pathology. Emphysema +. Chronic fibrotic changes at lung apices. Questional asymmetrical rectal wall thickening.     Assessment and Plan:     Left lateral malleolus infected appearing Ulcer   Sepsis   US doppler ordered for LLE tenderness-- negative for DVT  -- PT wound consulted   -ESR/CRP elevated   --MRSA/MSSA   --follow  Blood Cx , RVP  --Vascular , ID consulted   --c/w cefepime and Vancomycin     Presumed UTI ? patient was complaining of dysuria , unclear though as she is a poor historian   abx as above   follow UCx      Frequent falls  advanced dementia , mostly bedbound , functional quadriplegia (wheelchair bound at home)   CT head    with no acute pathology. CT C spine with no acute fracture.       4mm right upper lobe pul nodule.   CT chest/abd/pelvis with no acute pathology. Emphysema +. Chronic fibrotic changes at lung apices.   no wheezing, lungs are CTAB       Questionable asymmetrical rectal wall thickening.   --per my discussion with son, given age and dementia, family prefers not to pursue further investigation /treatment or aggressive measures. Per family however have not decided on GOC , for now they want her to be FULL CODE       Macrocytic anemia.   Iron panel relatively unremarkable, b12, folate WNL--no role for supplementation   TSH OK    SAE (acute kidney injury).  POA   resolved with IVF      Benign essential HTN/HLD   continue with current regimen       CAD (coronary artery disease).   --CAD s/p PCI s/p CABG  on aspirin, plavix, statin, BB.    Type 2 diabetes mellitus with unspecified complications. controlled   A1c 6.6%  continue with ISS   avoid hypoglycemia       Dementia. suspected vascular dementia   CT head showing multiple old chronic strokes, including cerebellar strokes which would explain the gait instability    supportive care    Moderate PCM   nutrition consult appreciated     Preventative Measures   heparin SQ-dvt ppx  fall, aspiration precautions   HOBE

## 2023-12-21 NOTE — CONSULT NOTE ADULT - ASSESSMENT
Pt with stage 4 ulcer at right lateral malleolus  Poor candidate for OR, no obvious benefit for amputation of lower extremity  GOC discussion needed with son  would continue abx  discussed with Dr House

## 2023-12-21 NOTE — CONSULT NOTE ADULT - NS ATTEND AMEND GEN_ALL_CORE FT
89 yoF with advanced dementia, bedbound, contracted with left leg ulcer.  Patient not participating in physical exam.   Nonpalpable pedal pulses with ulcerated infection left lateral malleolus.   Leukocytosis.     Spoke w/ patient's sons via telephone.  Plan for reevaluation tomorrow w/ sons at bedside.  At this time, recommend continued local wound care, antibiotics.   Doubt benefit from proximal amputation but will reevaluate patient with sons at bedside.

## 2023-12-21 NOTE — CONSULT NOTE ADULT - SUBJECTIVE AND OBJECTIVE BOX
Chief Complaint:  Patient is a 89y old  Female who presents with a chief complaint of frequent fall (21 Dec 2023 09:38)      HPI:  89 years old female with h/o HTN, HLD, CAD, DM, dementia, s/p spinal stimulator placement present to ED with generalized weakness and frequent fall. Patient at baseline ambulate with walker with assistance. For last 1-2 weeks, patient has been having generalized weakness and frequent fall. Have multiple bruises on body. Denied any fever, chills, nausea, vomiting or diarrhea.  Per son Toby ( [phone]) who stated that he is HCP. Patient has been having fall over last one years but more frequent lately. Patient normally ambulate short distance with walker with assistance and use wheelchair to go around. Patient usually attempts to get out of bed when family is not around resulting in fall. Patient also has been refusion to eat lately as well  Hemodynamically stable, afebrile, sat well at RA. WBC 14.45, Hb 10.5, .6, plt 413, Cr 1.42. CT head with no acute pathology. CT C spine with no acute fracture. 4mm right upper lobe pul nodule. CT chest/abd/pelvis with no acute pathology. Emphysema +. Chronic fibrotic changes at lung apices. Questional asymmetrical rectal wall thickening.  (15 Dec 2023 12:56)      Pt seen at bedside with Dr House.  Pt is confused, pleasantly demented.  Son was bedside earlier but not in room now.      PMH/PSH:PAST MEDICAL & SURGICAL HISTORY:  Angina  in , s/p angioplasty with stent placement, no recurrance, no sequalae      Diabetes Mellitus  type 2      Arthritis, Infective, Knee      Detached Retina  right eye      Acute Mucous Pneumonia  2010, resolved ; no residual problems      Spinal Stenosis- lumbar      History of Back Surgery        Basal Cell Cancer  removed from left neck       Dementia      Detached Retina, Left  laser surgery in       S/P Carpal Tunnel Release  bilateral hands in       Trigger Finger  release of middle and ring finger of right hand in , and middle finger left hand in       S/P Laparoscopic Cholecystectomy        S/P TKR (Total Knee Replacement)  left knee      Cataract  removal with lens implant right in           Allergies:  iodine (Other)  contrast dye -  rash (Other)  Pineapple (Unknown)  penicillin (Rash)  Tolerates ceftriaxone, cefepime (Other)  codeine (Vomiting)  latex (Rash)      Medications:  acetaminophen     Tablet .. 650 milliGRAM(s) Oral every 6 hours PRN  amLODIPine   Tablet 10 milliGRAM(s) Oral daily  aspirin  chewable 81 milliGRAM(s) Oral daily  atorvastatin 10 milliGRAM(s) Oral at bedtime  cefepime   IVPB      cefepime   IVPB 1000 milliGRAM(s) IV Intermittent every 24 hours  clopidogrel Tablet 75 milliGRAM(s) Oral daily  collagenase Ointment 1 Application(s) Topical daily  dextrose 5%. 1000 milliLiter(s) IV Continuous <Continuous>  dextrose 5%. 1000 milliLiter(s) IV Continuous <Continuous>  dextrose 50% Injectable 12.5 Gram(s) IV Push once  dextrose 50% Injectable 25 Gram(s) IV Push once  dextrose 50% Injectable 25 Gram(s) IV Push once  dextrose Oral Gel 15 Gram(s) Oral once PRN  glucagon  Injectable 1 milliGRAM(s) IntraMuscular once  heparin   Injectable 5000 Unit(s) SubCutaneous every 12 hours  insulin lispro (ADMELOG) corrective regimen sliding scale   SubCutaneous three times a day before meals  LORazepam   Injectable 0.5 milliGRAM(s) IV Push every 8 hours PRN  melatonin 3 milliGRAM(s) Oral at bedtime PRN  metoprolol tartrate 50 milliGRAM(s) Oral two times a day  pantoprazole    Tablet 40 milliGRAM(s) Oral before breakfast  polyethylene glycol 3350 17 Gram(s) Oral daily  senna 2 Tablet(s) Oral at bedtime  sodium phosphate 15 milliMole(s)/250 mL IVPB 15 milliMole(s) IV Intermittent once  vancomycin  IVPB 750 milliGRAM(s) IV Intermittent every 24 hours      REVIEW OF SYSTEMS:  All other review of systems is negative unless indicated above.    Relevant Family History:   FAMILY HISTORY:  FH: HTN (hypertension)        Relevant Social History:  Denies ETOH or tobacco history    Physical Exam:    Vital Signs:  Vital Signs Last 24 Hrs  T(C): 37.3 (21 Dec 2023 12:43), Max: 37.9 (20 Dec 2023 16:46)  T(F): 99.2 (21 Dec 2023 12:43), Max: 100.3 (20 Dec 2023 16:46)  HR: 102 (21 Dec 2023 12:43) (87 - 115)  BP: 111/61 (21 Dec 2023 12:43) (111/61 - 136/55)  BP(mean): --  RR: 17 (21 Dec 2023 12:43) (17 - 20)  SpO2: 96% (21 Dec 2023 05:52) (90% - 96%)    Parameters below as of 21 Dec 2023 05:52  Patient On (Oxygen Delivery Method): room air      Daily     Daily Weight in k.4 (20 Dec 2023 23:47)    Constitutional: contracted, cachectic  Respiratory: Normal work of breathing  Extremities: contracted LE's with stage 4 ulcer at right lateral malleolus, TTP, stage one ulcer at dorsum ov foot  Vascular: palpable right femoral, DP/PT pulses, non palp on left LE  Skin: warm, dry and intact;     Laboratory:                          9.1    17.61 )-----------( 342      ( 21 Dec 2023 05:50 )             27.7     12    134<L>  |  103  |  24<H>  ----------------------------<  184<H>  3.8   |  24  |  1.31<H>    Ca    8.4<L>      21 Dec 2023 05:50  Phos  2.4       Mg     1.9               Urinalysis Basic - ( 21 Dec 2023 05:50 )    Color: x / Appearance: x / SG: x / pH: x  Gluc: 184 mg/dL / Ketone: x  / Bili: x / Urobili: x   Blood: x / Protein: x / Nitrite: x   Leuk Esterase: x / RBC: x / WBC x   Sq Epi: x / Non Sq Epi: x / Bacteria: x          Intake and Output    23 @ 07:01  -  23 @ 07:00  --------------------------------------------------------  IN: 360 mL / OUT: 0 mL / NET: 360 mL    23 @ 07:01  -  23 @ 16:35  --------------------------------------------------------  IN: 240 mL / OUT: 0 mL / NET: 240 mL       Chief Complaint:  Patient is a 89y old  Female who presents with a chief complaint of frequent fall (21 Dec 2023 09:38)      HPI:  89 years old female with h/o HTN, HLD, CAD, DM, dementia, s/p spinal stimulator placement present to ED with generalized weakness and frequent fall. Patient at baseline ambulate with walker with assistance. For last 1-2 weeks, patient has been having generalized weakness and frequent fall. Have multiple bruises on body. Denied any fever, chills, nausea, vomiting or diarrhea.  Per son Toby ( [phone]) who stated that he is HCP. Patient has been having fall over last one years but more frequent lately. Patient normally ambulate short distance with walker with assistance and use wheelchair to go around. Patient usually attempts to get out of bed when family is not around resulting in fall. Patient also has been refusion to eat lately as well  Hemodynamically stable, afebrile, sat well at RA. WBC 14.45, Hb 10.5, .6, plt 413, Cr 1.42. CT head with no acute pathology. CT C spine with no acute fracture. 4mm right upper lobe pul nodule. CT chest/abd/pelvis with no acute pathology. Emphysema +. Chronic fibrotic changes at lung apices. Questional asymmetrical rectal wall thickening.  (15 Dec 2023 12:56)      Pt seen at bedside with Dr House.  Pt is confused, pleasantly demented.  Son was bedside earlier but not in room now.      PMH/PSH:PAST MEDICAL & SURGICAL HISTORY:  Angina  in , s/p angioplasty with stent placement, no recurrance, no sequalae      Diabetes Mellitus  type 2      Arthritis, Infective, Knee      Detached Retina  right eye      Acute Mucous Pneumonia  2010, resolved ; no residual problems      Spinal Stenosis- lumbar      History of Back Surgery        Basal Cell Cancer  removed from left neck       Dementia      Detached Retina, Left  laser surgery in       S/P Carpal Tunnel Release  bilateral hands in       Trigger Finger  release of middle and ring finger of right hand in , and middle finger left hand in       S/P Laparoscopic Cholecystectomy        S/P TKR (Total Knee Replacement)  left knee      Cataract  removal with lens implant right in           Allergies:  iodine (Other)  contrast dye -  rash (Other)  Pineapple (Unknown)  penicillin (Rash)  Tolerates ceftriaxone, cefepime (Other)  codeine (Vomiting)  latex (Rash)      Medications:  acetaminophen     Tablet .. 650 milliGRAM(s) Oral every 6 hours PRN  amLODIPine   Tablet 10 milliGRAM(s) Oral daily  aspirin  chewable 81 milliGRAM(s) Oral daily  atorvastatin 10 milliGRAM(s) Oral at bedtime  cefepime   IVPB      cefepime   IVPB 1000 milliGRAM(s) IV Intermittent every 24 hours  clopidogrel Tablet 75 milliGRAM(s) Oral daily  collagenase Ointment 1 Application(s) Topical daily  dextrose 5%. 1000 milliLiter(s) IV Continuous <Continuous>  dextrose 5%. 1000 milliLiter(s) IV Continuous <Continuous>  dextrose 50% Injectable 12.5 Gram(s) IV Push once  dextrose 50% Injectable 25 Gram(s) IV Push once  dextrose 50% Injectable 25 Gram(s) IV Push once  dextrose Oral Gel 15 Gram(s) Oral once PRN  glucagon  Injectable 1 milliGRAM(s) IntraMuscular once  heparin   Injectable 5000 Unit(s) SubCutaneous every 12 hours  insulin lispro (ADMELOG) corrective regimen sliding scale   SubCutaneous three times a day before meals  LORazepam   Injectable 0.5 milliGRAM(s) IV Push every 8 hours PRN  melatonin 3 milliGRAM(s) Oral at bedtime PRN  metoprolol tartrate 50 milliGRAM(s) Oral two times a day  pantoprazole    Tablet 40 milliGRAM(s) Oral before breakfast  polyethylene glycol 3350 17 Gram(s) Oral daily  senna 2 Tablet(s) Oral at bedtime  sodium phosphate 15 milliMole(s)/250 mL IVPB 15 milliMole(s) IV Intermittent once  vancomycin  IVPB 750 milliGRAM(s) IV Intermittent every 24 hours      REVIEW OF SYSTEMS:  All other review of systems is negative unless indicated above.    Relevant Family History:   FAMILY HISTORY:  FH: HTN (hypertension)        Relevant Social History:  Denies ETOH or tobacco history    Physical Exam:    Vital Signs:  Vital Signs Last 24 Hrs  T(C): 37.3 (21 Dec 2023 12:43), Max: 37.9 (20 Dec 2023 16:46)  T(F): 99.2 (21 Dec 2023 12:43), Max: 100.3 (20 Dec 2023 16:46)  HR: 102 (21 Dec 2023 12:43) (87 - 115)  BP: 111/61 (21 Dec 2023 12:43) (111/61 - 136/55)  BP(mean): --  RR: 17 (21 Dec 2023 12:43) (17 - 20)  SpO2: 96% (21 Dec 2023 05:52) (90% - 96%)    Parameters below as of 21 Dec 2023 05:52  Patient On (Oxygen Delivery Method): room air      Daily     Daily Weight in k.4 (20 Dec 2023 23:47)    Constitutional: contracted, cachectic  Respiratory: Normal work of breathing  Extremities: contracted LE's with stage 4 ulcer at right lateral malleolus, TTP, stage one ulcer at dorsum ov foot  Vascular: palpable right femoral, DP/PT pulses, non palp on left LE  Skin: warm, dry and intact;     Laboratory:                          9.1    17.61 )-----------( 342      ( 21 Dec 2023 05:50 )             27.7     12    134<L>  |  103  |  24<H>  ----------------------------<  184<H>  3.8   |  24  |  1.31<H>    Ca    8.4<L>      21 Dec 2023 05:50  Phos  2.4       Mg     1.9               Urinalysis Basic - ( 21 Dec 2023 05:50 )    Color: x / Appearance: x / SG: x / pH: x  Gluc: 184 mg/dL / Ketone: x  / Bili: x / Urobili: x   Blood: x / Protein: x / Nitrite: x   Leuk Esterase: x / RBC: x / WBC x   Sq Epi: x / Non Sq Epi: x / Bacteria: x          Intake and Output    23 @ 07:01  -  23 @ 07:00  --------------------------------------------------------  IN: 360 mL / OUT: 0 mL / NET: 360 mL    23 @ 07:01  -  23 @ 16:35  --------------------------------------------------------  IN: 240 mL / OUT: 0 mL / NET: 240 mL

## 2023-12-21 NOTE — CONSULT NOTE ADULT - ASSESSMENT
A/P-  89 year old female with  h/o HTN, HLD, CAD s/p stent, DM, dementia, s/p spinal stimulator placement present to ED with generalized weakness and frequent fall.    + fever   + leukocytosis  + UA- with + nitrates and + LE  Left lateral ankle chronic nonhealing wound    plan-  await urine cx.  check 2 blood cx.  check ESr  advise to get MRI of the Left ankle and rule out OM.  advise podiatry eval .  continue with cefepime pending the urine cx result, however, the source of the fever could also be the nonhealing possibly OM of the left ankle and hence advise to add vanco empirically pending further results.  keep vanco trough <15    All above d/w patient's son who is at bedside and he verbalizes understanding of above and agrees with plan.  also d/w Hospitalist .    Edgar Tobias MD  Infectious Disease Attending    for any questions please do not hesitate to contact me either via teams or by calling [phone]   A/P-  89 year old female with  h/o HTN, HLD, CAD s/p stent, DM, dementia, s/p spinal stimulator placement present to ED with generalized weakness and frequent fall.    + fever   + leukocytosis  + UA- with + nitrates and + LE  Left lateral ankle chronic nonhealing wound    plan-  await urine cx.  check 2 blood cx.  check ESr  advise to get MRI of the Left ankle and rule out OM.  advise podiatry eval .  continue with cefepime pending the urine cx result, however, the source of the fever could also be the nonhealing possibly OM of the left ankle and hence advise to add vanco empirically pending further results.  keep vanco trough <15    All above d/w patient's son who is at bedside and he verbalizes understanding of above and agrees with plan.  also d/w Hospitalist .    Edgar Tobias MD  Infectious Disease Attending    for any questions please do not hesitate to contact me either via teams or by calling [phone]

## 2023-12-21 NOTE — CONSULT NOTE ADULT - SUBJECTIVE AND OBJECTIVE BOX
HPI:  89 years old female with h/o HTN, HLD, CAD, DM, dementia, s/p spinal stimulator placement present to ED with generalized weakness and frequent fall. Patient at baseline ambulate with walker with assistance. For last 1-2 weeks, patient has been having generalized weakness and frequent fall. Have multiple bruises on body. Denied any fever, chills, nausea, vomiting or diarrhea.  Per son Toby ( [phone]) who stated that he is HCP. Patient has been having fall over last one years but more frequent lately. Patient normally ambulate short distance with walker with assistance and use wheelchair to go around. Patient usually attempts to get out of bed when family is not around resulting in fall. Patient also has been refusion to eat lately as well  Hemodynamically stable, afebrile, sat well at RA. WBC 14.45, Hb 10.5, .6, plt 413, Cr 1.42. CT head with no acute pathology. CT C spine with no acute fracture. 4mm right upper lobe pul nodule. CT chest/abd/pelvis with no acute pathology. Emphysema +. Chronic fibrotic changes at lung apices. Questional asymmetrical rectal wall thickening.  (15 Dec 2023 12:56)      PAST MEDICAL & SURGICAL HISTORY:  Angina  in 2005, s/p angioplasty with stent placement, no recurrance, no sequalae      Diabetes Mellitus  type 2      Arthritis, Infective, Knee      Detached Retina  right eye      Acute Mucous Pneumonia  4/2010, resolved ; no residual problems      Spinal Stenosis- lumbar      History of Back Surgery  2010      Basal Cell Cancer  removed from left neck 2009      Dementia      Detached Retina, Left  laser surgery in 1995      S/P Carpal Tunnel Release  bilateral hands in 1990      Trigger Finger  release of middle and ring finger of right hand in 1990, and middle finger left hand in 2008      S/P Laparoscopic Cholecystectomy  2008      S/P TKR (Total Knee Replacement)  left knee      Cataract  removal with lens implant right in 2000          Allergies    iodine (Other)  contrast dye -  rash (Other)  Pineapple (Unknown)  penicillin (Rash)  codeine (Vomiting)  latex (Rash)    Intolerances        ANTIMICROBIALS:  cefepime   IVPB    cefepime   IVPB 1000 every 24 hours      OTHER MEDS:  acetaminophen     Tablet .. 650 milliGRAM(s) Oral every 6 hours PRN  amLODIPine   Tablet 10 milliGRAM(s) Oral daily  aspirin  chewable 81 milliGRAM(s) Oral daily  atorvastatin 10 milliGRAM(s) Oral at bedtime  clopidogrel Tablet 75 milliGRAM(s) Oral daily  collagenase Ointment 1 Application(s) Topical daily  dextrose 5%. 1000 milliLiter(s) IV Continuous <Continuous>  dextrose 5%. 1000 milliLiter(s) IV Continuous <Continuous>  dextrose 50% Injectable 25 Gram(s) IV Push once  dextrose 50% Injectable 12.5 Gram(s) IV Push once  dextrose 50% Injectable 25 Gram(s) IV Push once  dextrose Oral Gel 15 Gram(s) Oral once PRN  glucagon  Injectable 1 milliGRAM(s) IntraMuscular once  heparin   Injectable 5000 Unit(s) SubCutaneous every 12 hours  insulin lispro (ADMELOG) corrective regimen sliding scale   SubCutaneous three times a day before meals  LORazepam   Injectable 0.5 milliGRAM(s) IV Push every 8 hours PRN  melatonin 3 milliGRAM(s) Oral at bedtime PRN  metoprolol tartrate 50 milliGRAM(s) Oral two times a day  pantoprazole    Tablet 40 milliGRAM(s) Oral before breakfast  polyethylene glycol 3350 17 Gram(s) Oral daily  senna 2 Tablet(s) Oral at bedtime      SOCIAL HISTORY:    Marital Status:    Occupation:   Lives with:     Substance Use (street drugs):   Tobacco Usage:    Alcohol Usage: Social EtOH    FAMILY HISTORY:  FH: HTN (hypertension)        ROS:  Unobtainable because:   All other systems negative     Constitutional: no fever, no chills, no weight loss, no night sweats  Eye: no eye pain, no redness, no vision changes  ENT:  no sore throat, no rhinorrhea  Cardiovascular:  no chest pain, no palpitation  Respiratory:  no SOB, no cough  GI:  no abd pain, no vomiting, no diarrhea  urinary: no dysuria, no hematuria, no flank pain  : no  discharge or bleeding  musculoskeletal:  no joint pain, no joint swelling  skin:  no rash  neurology:  no headache, no seizure, no change in mental status  psych: no anxiety, no depression     Physical Exam:    General:    NAD, non toxic  Head: atraumatic, normocephalic  Eyes: normal sclera and conjunctiva  ENT:   no oropharyngeal lesions, no LAD, neck supple  Cardio:    regular S1,S2, no murmur  Respiratory:   clear to auscultation b/l, no wheezing  abd:   soft, BS +, not tender, no hepatosplenomegaly  :     no CVAT, no suprapubic tenderness, no allen  Musculoskeletal : no joint swelling, no edema  Skin:    no rash  vascular:  normal pulses  Neurologic:     no focal deficits  psych: normal affect, no suicidal ideation      Drug Dosing Weight  Height (cm): 149.9 (15 Dec 2023 09:00)  Weight (kg): 54.4 (15 Dec 2023 09:00)  BMI (kg/m2): 24.2 (15 Dec 2023 09:00)  BSA (m2): 1.48 (15 Dec 2023 09:00)    Vital Signs Last 24 Hrs  T(F): 97.5 (12-21-23 @ 05:52), Max: 100.7 (12-19-23 @ 05:22)    Vital Signs Last 24 Hrs  HR: 87 (12-21-23 @ 05:52) (87 - 115)  BP: 136/53 (12-21-23 @ 05:52) (126/50 - 136/55)  RR: 18 (12-21-23 @ 05:52)  SpO2: 96% (12-21-23 @ 05:52) (90% - 96%)  Wt(kg): --                          9.1    17.61 )-----------( 342      ( 21 Dec 2023 05:50 )             27.7       12-21    134<L>  |  103  |  24<H>  ----------------------------<  184<H>  3.8   |  24  |  1.31<H>    Ca    8.4<L>      21 Dec 2023 05:50  Phos  2.4     12-21  Mg     1.9     12-21        Urinalysis Basic - ( 21 Dec 2023 05:50 )    Color: x / Appearance: x / SG: x / pH: x  Gluc: 184 mg/dL / Ketone: x  / Bili: x / Urobili: x   Blood: x / Protein: x / Nitrite: x   Leuk Esterase: x / RBC: x / WBC x   Sq Epi: x / Non Sq Epi: x / Bacteria: x        MICROBIOLOGY:  v              RADIOLOGY:       HPI:  89 years old female with h/o HTN, HLD, CAD, DM, dementia, s/p spinal stimulator placement present to ED with generalized weakness and frequent fall. Patient at baseline ambulate with walker with assistance. For last 1-2 weeks, patient has been having generalized weakness and frequent fall. Have multiple bruises on body. Denied any fever, chills, nausea, vomiting or diarrhea.  Per son Toby ( [phone]) who stated that he is HCP. Patient has been having fall over last one years but more frequent lately. Patient normally ambulate short distance with walker with assistance and use wheelchair to go around. Patient usually attempts to get out of bed when family is not around resulting in fall. Patient also has been refusion to eat lately as well  Hemodynamically stable, afebrile, sat well at RA. WBC 14.45, Hb 10.5, .6, plt 413, Cr 1.42. CT head with no acute pathology. CT C spine with no acute fracture. 4mm right upper lobe pul nodule. CT chest/abd/pelvis with no acute pathology. Emphysema +. Chronic fibrotic changes at lung apices. Questional asymmetrical rectal wall thickening.  (15 Dec 2023 12:56)      PAST MEDICAL & SURGICAL HISTORY:  Angina  in 2005, s/p angioplasty with stent placement, no recurrance, no sequalae      Diabetes Mellitus  type 2      Arthritis, Infective, Knee      Detached Retina  right eye      Acute Mucous Pneumonia  4/2010, resolved ; no residual problems      Spinal Stenosis- lumbar      History of Back Surgery  2010      Basal Cell Cancer  removed from left neck 2009      Dementia      Detached Retina, Left  laser surgery in 1995      S/P Carpal Tunnel Release  bilateral hands in 1990      Trigger Finger  release of middle and ring finger of right hand in 1990, and middle finger left hand in 2008      S/P Laparoscopic Cholecystectomy  2008      S/P TKR (Total Knee Replacement)  left knee      Cataract  removal with lens implant right in 2000          Allergies    iodine (Other)  contrast dye -  rash (Other)  Pineapple (Unknown)  penicillin (Rash)  codeine (Vomiting)  latex (Rash)    Intolerances        ANTIMICROBIALS:  cefepime   IVPB    cefepime   IVPB 1000 every 24 hours      OTHER MEDS:  acetaminophen     Tablet .. 650 milliGRAM(s) Oral every 6 hours PRN  amLODIPine   Tablet 10 milliGRAM(s) Oral daily  aspirin  chewable 81 milliGRAM(s) Oral daily  atorvastatin 10 milliGRAM(s) Oral at bedtime  clopidogrel Tablet 75 milliGRAM(s) Oral daily  collagenase Ointment 1 Application(s) Topical daily  dextrose 5%. 1000 milliLiter(s) IV Continuous <Continuous>  dextrose 5%. 1000 milliLiter(s) IV Continuous <Continuous>  dextrose 50% Injectable 25 Gram(s) IV Push once  dextrose 50% Injectable 12.5 Gram(s) IV Push once  dextrose 50% Injectable 25 Gram(s) IV Push once  dextrose Oral Gel 15 Gram(s) Oral once PRN  glucagon  Injectable 1 milliGRAM(s) IntraMuscular once  heparin   Injectable 5000 Unit(s) SubCutaneous every 12 hours  insulin lispro (ADMELOG) corrective regimen sliding scale   SubCutaneous three times a day before meals  LORazepam   Injectable 0.5 milliGRAM(s) IV Push every 8 hours PRN  melatonin 3 milliGRAM(s) Oral at bedtime PRN  metoprolol tartrate 50 milliGRAM(s) Oral two times a day  pantoprazole    Tablet 40 milliGRAM(s) Oral before breakfast  polyethylene glycol 3350 17 Gram(s) Oral daily  senna 2 Tablet(s) Oral at bedtime      SOCIAL HISTORY:    Marital Status:    Occupation:   Lives with:     Substance Use (street drugs):   Tobacco Usage:    Alcohol Usage: Social EtOH    FAMILY HISTORY:  FH: HTN (hypertension)        ROS:  Unobtainable because:   All other systems negative     Constitutional: no fever, no chills, no weight loss, no night sweats  Eye: no eye pain, no redness, no vision changes  ENT:  no sore throat, no rhinorrhea  Cardiovascular:  no chest pain, no palpitation  Respiratory:  no SOB, no cough  GI:  no abd pain, no vomiting, no diarrhea  urinary: no dysuria, no hematuria, no flank pain  : no  discharge or bleeding  musculoskeletal:  no joint pain, no joint swelling  skin:  no rash  neurology:  no headache, no seizure, no change in mental status  psych: no anxiety, no depression     Physical Exam:    General:    NAD, non toxic  Head: atraumatic, normocephalic  Eyes: normal sclera and conjunctiva  ENT:   no oropharyngeal lesions, no LAD, neck supple  Cardio:    regular S1,S2, no murmur  Respiratory:   clear to auscultation b/l, no wheezing  abd:   soft, BS +, not tender, no hepatosplenomegaly  :     no CVAT, no suprapubic tenderness, no allen  Musculoskeletal : no joint swelling, no edema  Skin:    no rash  vascular:  normal pulses  Neurologic:     no focal deficits  psych: normal affect, no suicidal ideation      Drug Dosing Weight  Height (cm): 149.9 (15 Dec 2023 09:00)  Weight (kg): 54.4 (15 Dec 2023 09:00)  BMI (kg/m2): 24.2 (15 Dec 2023 09:00)  BSA (m2): 1.48 (15 Dec 2023 09:00)    Vital Signs Last 24 Hrs  T(F): 97.5 (12-21-23 @ 05:52), Max: 100.7 (12-19-23 @ 05:22)    Vital Signs Last 24 Hrs  HR: 87 (12-21-23 @ 05:52) (87 - 115)  BP: 136/53 (12-21-23 @ 05:52) (126/50 - 136/55)  RR: 18 (12-21-23 @ 05:52)  SpO2: 96% (12-21-23 @ 05:52) (90% - 96%)  Wt(kg): --                          9.1    17.61 )-----------( 342      ( 21 Dec 2023 05:50 )             27.7       12-21    134<L>  |  103  |  24<H>  ----------------------------<  184<H>  3.8   |  24  |  1.31<H>    Ca    8.4<L>      21 Dec 2023 05:50  Phos  2.4     12-21  Mg     1.9     12-21        Urinalysis Basic - ( 21 Dec 2023 05:50 )    Color: x / Appearance: x / SG: x / pH: x  Gluc: 184 mg/dL / Ketone: x  / Bili: x / Urobili: x   Blood: x / Protein: x / Nitrite: x   Leuk Esterase: x / RBC: x / WBC x   Sq Epi: x / Non Sq Epi: x / Bacteria: x        MICROBIOLOGY:  v              RADIOLOGY:       HPI:  89 years old female with h/o HTN, HLD, CAD, DM, dementia, s/p spinal stimulator placement present to ED with generalized weakness and frequent fall.    Infectious Disease Consultation is requested by hospitalist for left ankle nonhealing wound .  patient awake but seems to have some dementia, son is at her bedside, nurse also at bedside.  patient seems to be in contracted states with her LE.  she denies any fever or chills.  does not answer rest of the questions.        PAST MEDICAL & SURGICAL HISTORY:  Angina  in 2005, s/p angioplasty with stent placement, no recurrance, no sequalae      Diabetes Mellitus  type 2      Arthritis, Infective, Knee      Detached Retina  right eye      Acute Mucous Pneumonia  4/2010, resolved ; no residual problems      Spinal Stenosis- lumbar      History of Back Surgery  2010      Basal Cell Cancer  removed from left neck 2009      Dementia      Detached Retina, Left  laser surgery in 1995      S/P Carpal Tunnel Release  bilateral hands in 1990      Trigger Finger  release of middle and ring finger of right hand in 1990, and middle finger left hand in 2008      S/P Laparoscopic Cholecystectomy  2008      S/P TKR (Total Knee Replacement)  left knee      Cataract  removal with lens implant right in 2000          Allergies    iodine (Other)  contrast dye -  rash (Other)  Pineapple (Unknown)  penicillin (Rash)  codeine (Vomiting)  latex (Rash)    Intolerances        ANTIMICROBIALS:  cefepime   IVPB    cefepime   IVPB 1000 every 24 hours      OTHER MEDS:  acetaminophen     Tablet .. 650 milliGRAM(s) Oral every 6 hours PRN  amLODIPine   Tablet 10 milliGRAM(s) Oral daily  aspirin  chewable 81 milliGRAM(s) Oral daily  atorvastatin 10 milliGRAM(s) Oral at bedtime  clopidogrel Tablet 75 milliGRAM(s) Oral daily  collagenase Ointment 1 Application(s) Topical daily  dextrose 5%. 1000 milliLiter(s) IV Continuous <Continuous>  dextrose 5%. 1000 milliLiter(s) IV Continuous <Continuous>  dextrose 50% Injectable 25 Gram(s) IV Push once  dextrose 50% Injectable 12.5 Gram(s) IV Push once  dextrose 50% Injectable 25 Gram(s) IV Push once  dextrose Oral Gel 15 Gram(s) Oral once PRN  glucagon  Injectable 1 milliGRAM(s) IntraMuscular once  heparin   Injectable 5000 Unit(s) SubCutaneous every 12 hours  insulin lispro (ADMELOG) corrective regimen sliding scale   SubCutaneous three times a day before meals  LORazepam   Injectable 0.5 milliGRAM(s) IV Push every 8 hours PRN  melatonin 3 milliGRAM(s) Oral at bedtime PRN  metoprolol tartrate 50 milliGRAM(s) Oral two times a day  pantoprazole    Tablet 40 milliGRAM(s) Oral before breakfast  polyethylene glycol 3350 17 Gram(s) Oral daily  senna 2 Tablet(s) Oral at bedtime      SOCIAL HISTORY:    Lives with: son    FAMILY HISTORY:  FH: HTN (hypertension)        ROS:  Unobtainable because:   patient does not answer most questions but denies fever        Physical Exam:    General:    NAD, non toxic  Head: atraumatic, normocephalic  Eyes: normal sclera and conjunctiva  ENT:   no oropharyngeal lesions, no LAD, neck supple  Cardio:    regular S1,S2, no murmur  Respiratory:   clear to auscultation b/l, no wheezing  abd:   soft, BS +, not tender, no distention  :     no CVAT, no suprapubic tenderness  Musculoskeletal : Left Lateral malleolar region with open wound round about 4 x 5 cm, no malodor, does have some scant serous dishcarge  Skin:    no rash  vascular:  normal pulses  Neurologic:     contracted in LE        Drug Dosing Weight  Height (cm): 149.9 (15 Dec 2023 09:00)  Weight (kg): 54.4 (15 Dec 2023 09:00)  BMI (kg/m2): 24.2 (15 Dec 2023 09:00)  BSA (m2): 1.48 (15 Dec 2023 09:00)    Vital Signs Last 24 Hrs  T(F): 97.5 (12-21-23 @ 05:52), Max: 100.7 (12-19-23 @ 05:22)    Vital Signs Last 24 Hrs  HR: 87 (12-21-23 @ 05:52) (87 - 115)  BP: 136/53 (12-21-23 @ 05:52) (126/50 - 136/55)  RR: 18 (12-21-23 @ 05:52)  SpO2: 96% (12-21-23 @ 05:52) (90% - 96%)  Wt(kg): --                          9.1    17.61 )-----------( 342      ( 21 Dec 2023 05:50 )             27.7       12-21    134<L>  |  103  |  24<H>  ----------------------------<  184<H>  3.8   |  24  |  1.31<H>    Ca    8.4<L>      21 Dec 2023 05:50  Phos  2.4     12-21  Mg     1.9     12-21        Urinalysis Basic - ( 21 Dec 2023 05:50 )    Color: x / Appearance: x / SG: x / pH: x  Gluc: 184 mg/dL / Ketone: x  / Bili: x / Urobili: x   Blood: x / Protein: x / Nitrite: x   Leuk Esterase: x / RBC: x / WBC x   Sq Epi: x / Non Sq Epi: x / Bacteria: x        MICROBIOLOGY:      RADIOLOGY:  < from: Xray Foot AP + Lateral + Oblique, Left (12.15.23 @ 11:20) >  ACC: [id number] EXAM:  XR TIB FIB AP LAT 2 VIEWS LT   ORDERED BY: LEXII STRINGER     ACC: [id number] EXAM:  XR FOOT COMP MIN 3 VIEWS LT   ORDERED BY: LEXII STRINGER     ACC: [id number] EXAM:  XR ANKLE COMP MIN 3 VIEWS LT   ORDERED BY: LEXII STRIGNER     PROCEDURE DATE:  12/15/2023          INTERPRETATION:  Left ankle 2 views, left foot 2 views, left tib-fib 2   views    CLINICAL HISTORY: Unwitnessed fall with bruising, rule out fracture    FINDINGS: The bone is diffusely demineralized. Degenerative changes of   multiple interphalangeal joints are identified.    Patient has a left total knee replacement with components as visualized   in position.    No evidence of fractures or suspicious lesion on limited projections.    The soft tissues with vascular calcification and soft tissue wasting.    IMPRESSION: Demineralized bone. No definite fractures. Prior left total   knee replacement.    --- End of Report ---            LARS KEVIN MD; Attending Radiologist  This document has been electronically signed. Dec 16 2023  9:12AM    < end of copied text >  < from: CT Abdomen and Pelvis No Cont (12.15.23 @ 11:19) >  IMPRESSION:  Emphysema. Chronic fibrotic changes at the lung apices.    CABG. TAVR. Coronary artery calcifications and/or stents.    Questionable asymmetrical rectal wall thickening. Limited evaluation   without enteric contrast.    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   Atrophic pancreatic tail.            --- End of Report ---    ***Please see the addendum at the top of this report. It may contain   additional important information or changes.****        CHEY LUDWIG MD; Attending Radiologist  This document has been electronically signed. Dec 15 2023 11:43AM  1st Addendum: CHEY LUDWIG MD; Attending Radiologist  The first addendum was electronically signed on: Dec 15 2023 12:08PM.    < end of copied text >       HPI:  89 years old female with h/o HTN, HLD, CAD, DM, dementia, s/p spinal stimulator placement present to ED with generalized weakness and frequent fall.    Infectious Disease Consultation is requested by hospitalist for left ankle nonhealing wound .  patient awake but seems to have some dementia, son is at her bedside, nurse also at bedside.  patient seems to be in contracted states with her LE.  she denies any fever or chills.  does not answer rest of the questions.        PAST MEDICAL & SURGICAL HISTORY:  Angina  in 2005, s/p angioplasty with stent placement, no recurrance, no sequalae      Diabetes Mellitus  type 2      Arthritis, Infective, Knee      Detached Retina  right eye      Acute Mucous Pneumonia  4/2010, resolved ; no residual problems      Spinal Stenosis- lumbar      History of Back Surgery  2010      Basal Cell Cancer  removed from left neck 2009      Dementia      Detached Retina, Left  laser surgery in 1995      S/P Carpal Tunnel Release  bilateral hands in 1990      Trigger Finger  release of middle and ring finger of right hand in 1990, and middle finger left hand in 2008      S/P Laparoscopic Cholecystectomy  2008      S/P TKR (Total Knee Replacement)  left knee      Cataract  removal with lens implant right in 2000          Allergies    iodine (Other)  contrast dye -  rash (Other)  Pineapple (Unknown)  penicillin (Rash)  codeine (Vomiting)  latex (Rash)    Intolerances        ANTIMICROBIALS:  cefepime   IVPB    cefepime   IVPB 1000 every 24 hours      OTHER MEDS:  acetaminophen     Tablet .. 650 milliGRAM(s) Oral every 6 hours PRN  amLODIPine   Tablet 10 milliGRAM(s) Oral daily  aspirin  chewable 81 milliGRAM(s) Oral daily  atorvastatin 10 milliGRAM(s) Oral at bedtime  clopidogrel Tablet 75 milliGRAM(s) Oral daily  collagenase Ointment 1 Application(s) Topical daily  dextrose 5%. 1000 milliLiter(s) IV Continuous <Continuous>  dextrose 5%. 1000 milliLiter(s) IV Continuous <Continuous>  dextrose 50% Injectable 25 Gram(s) IV Push once  dextrose 50% Injectable 12.5 Gram(s) IV Push once  dextrose 50% Injectable 25 Gram(s) IV Push once  dextrose Oral Gel 15 Gram(s) Oral once PRN  glucagon  Injectable 1 milliGRAM(s) IntraMuscular once  heparin   Injectable 5000 Unit(s) SubCutaneous every 12 hours  insulin lispro (ADMELOG) corrective regimen sliding scale   SubCutaneous three times a day before meals  LORazepam   Injectable 0.5 milliGRAM(s) IV Push every 8 hours PRN  melatonin 3 milliGRAM(s) Oral at bedtime PRN  metoprolol tartrate 50 milliGRAM(s) Oral two times a day  pantoprazole    Tablet 40 milliGRAM(s) Oral before breakfast  polyethylene glycol 3350 17 Gram(s) Oral daily  senna 2 Tablet(s) Oral at bedtime      SOCIAL HISTORY:    Lives with: son    FAMILY HISTORY:  FH: HTN (hypertension)        ROS:  Unobtainable because:   patient does not answer most questions but denies fever        Physical Exam:    General:    NAD, non toxic  Head: atraumatic, normocephalic  Eyes: normal sclera and conjunctiva  ENT:   no oropharyngeal lesions, no LAD, neck supple  Cardio:    regular S1,S2, no murmur  Respiratory:   clear to auscultation b/l, no wheezing  abd:   soft, BS +, not tender, no distention  :     no CVAT, no suprapubic tenderness  Musculoskeletal : Left Lateral malleolar region with open wound round about 4 x 5 cm, no malodor, does have some scant serous dishcarge  Skin:    no rash  vascular:  normal pulses  Neurologic:     contracted in LE        Drug Dosing Weight  Height (cm): 149.9 (15 Dec 2023 09:00)  Weight (kg): 54.4 (15 Dec 2023 09:00)  BMI (kg/m2): 24.2 (15 Dec 2023 09:00)  BSA (m2): 1.48 (15 Dec 2023 09:00)    Vital Signs Last 24 Hrs  T(F): 97.5 (12-21-23 @ 05:52), Max: 100.7 (12-19-23 @ 05:22)    Vital Signs Last 24 Hrs  HR: 87 (12-21-23 @ 05:52) (87 - 115)  BP: 136/53 (12-21-23 @ 05:52) (126/50 - 136/55)  RR: 18 (12-21-23 @ 05:52)  SpO2: 96% (12-21-23 @ 05:52) (90% - 96%)  Wt(kg): --                          9.1    17.61 )-----------( 342      ( 21 Dec 2023 05:50 )             27.7       12-21    134<L>  |  103  |  24<H>  ----------------------------<  184<H>  3.8   |  24  |  1.31<H>    Ca    8.4<L>      21 Dec 2023 05:50  Phos  2.4     12-21  Mg     1.9     12-21        Urinalysis Basic - ( 21 Dec 2023 05:50 )    Color: x / Appearance: x / SG: x / pH: x  Gluc: 184 mg/dL / Ketone: x  / Bili: x / Urobili: x   Blood: x / Protein: x / Nitrite: x   Leuk Esterase: x / RBC: x / WBC x   Sq Epi: x / Non Sq Epi: x / Bacteria: x        MICROBIOLOGY:      RADIOLOGY:  < from: Xray Foot AP + Lateral + Oblique, Left (12.15.23 @ 11:20) >  ACC: [id number] EXAM:  XR TIB FIB AP LAT 2 VIEWS LT   ORDERED BY: LEXII STRINGER     ACC: [id number] EXAM:  XR FOOT COMP MIN 3 VIEWS LT   ORDERED BY: LEXII STRINGER     ACC: [id number] EXAM:  XR ANKLE COMP MIN 3 VIEWS LT   ORDERED BY: LEXII STRINGER     PROCEDURE DATE:  12/15/2023          INTERPRETATION:  Left ankle 2 views, left foot 2 views, left tib-fib 2   views    CLINICAL HISTORY: Unwitnessed fall with bruising, rule out fracture    FINDINGS: The bone is diffusely demineralized. Degenerative changes of   multiple interphalangeal joints are identified.    Patient has a left total knee replacement with components as visualized   in position.    No evidence of fractures or suspicious lesion on limited projections.    The soft tissues with vascular calcification and soft tissue wasting.    IMPRESSION: Demineralized bone. No definite fractures. Prior left total   knee replacement.    --- End of Report ---            LARS KEVIN MD; Attending Radiologist  This document has been electronically signed. Dec 16 2023  9:12AM    < end of copied text >  < from: CT Abdomen and Pelvis No Cont (12.15.23 @ 11:19) >  IMPRESSION:  Emphysema. Chronic fibrotic changes at the lung apices.    CABG. TAVR. Coronary artery calcifications and/or stents.    Questionable asymmetrical rectal wall thickening. Limited evaluation   without enteric contrast.    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   Atrophic pancreatic tail.            --- End of Report ---    ***Please see the addendum at the top of this report. It may contain   additional important information or changes.****        CHEY LUDWIG MD; Attending Radiologist  This document has been electronically signed. Dec 15 2023 11:43AM  1st Addendum: CHEY LUDWIG MD; Attending Radiologist  The first addendum was electronically signed on: Dec 15 2023 12:08PM.    < end of copied text >

## 2023-12-21 NOTE — PROGRESS NOTE ADULT - SUBJECTIVE AND OBJECTIVE BOX
Patient is a 89y old  Female who presents with a chief complaint of frequent fall (18 Dec 2023 09:36)      INTERVAL HPI/OVERNIGHT EVENTS:   Patient seen and examined bedside.   no overnight events   afebrile   WBC elevated     poor historian     ROS: unable to examine due to [ ] Encephalopathy  [x ] Advanced Dementia  [ ] Expressive Aphasia  [ ] Non-verbal patient     MEDICATIONS  (STANDING):  amLODIPine   Tablet 5 milliGRAM(s) Oral once  amLODIPine   Tablet 10 milliGRAM(s) Oral daily  aspirin  chewable 81 milliGRAM(s) Oral daily  atorvastatin 10 milliGRAM(s) Oral at bedtime  clopidogrel Tablet 75 milliGRAM(s) Oral daily  dextrose 5%. 1000 milliLiter(s) (50 mL/Hr) IV Continuous <Continuous>  dextrose 5%. 1000 milliLiter(s) (100 mL/Hr) IV Continuous <Continuous>  dextrose 50% Injectable 12.5 Gram(s) IV Push once  dextrose 50% Injectable 25 Gram(s) IV Push once  dextrose 50% Injectable 25 Gram(s) IV Push once  glucagon  Injectable 1 milliGRAM(s) IntraMuscular once  insulin lispro (ADMELOG) corrective regimen sliding scale   SubCutaneous at bedtime  insulin lispro (ADMELOG) corrective regimen sliding scale   SubCutaneous three times a day before meals  lactated ringers. 1000 milliLiter(s) (75 mL/Hr) IV Continuous <Continuous>  metoprolol tartrate 50 milliGRAM(s) Oral two times a day  nitrofurantoin monohydrate/macrocrystals (MACROBID) 100 milliGRAM(s) Oral two times a day  pantoprazole    Tablet 40 milliGRAM(s) Oral before breakfast  polyethylene glycol 3350 17 Gram(s) Oral daily  senna 2 Tablet(s) Oral at bedtime    MEDICATIONS  (PRN):  acetaminophen     Tablet .. 650 milliGRAM(s) Oral every 6 hours PRN Mild Pain (1 - 3), Moderate Pain (4 - 6)  dextrose Oral Gel 15 Gram(s) Oral once PRN Blood Glucose LESS THAN 70 milliGRAM(s)/deciliter  labetalol Injectable 10 milliGRAM(s) IV Push every 6 hours PRN Systolic blood pressure >160  melatonin 3 milliGRAM(s) Oral at bedtime PRN Insomnia      Allergies    iodine (Other)  contrast dye -  rash (Other)  Pineapple (Unknown)  penicillin (Rash)  codeine (Vomiting)  latex (Rash)    Intolerances        Vital Signs Last 24 Hrs  T(C): 37.9 (20 Dec 2023 16:46), Max: 37.9 (20 Dec 2023 16:46)  T(F): 100.3 (20 Dec 2023 16:46), Max: 100.3 (20 Dec 2023 16:46)  HR: 115 (20 Dec 2023 16:46) (88 - 115)  BP: 136/55 (20 Dec 2023 16:46) (126/50 - 161/73)  BP(mean): --  RR: 20 (20 Dec 2023 16:46) (16 - 20)  SpO2: 90% (20 Dec 2023 16:46) (90% - 96%)    Parameters below as of 20 Dec 2023 16:46  Patient On (Oxygen Delivery Method): room air            PHYSICAL EXAM:  GENERAL: NAD, thin appearing,  no increased WOB  HEAD:  Atraumatic, Normocephalic  EYES: EOMI, PERRLA, conjunctiva and sclera clear  ENMT: No tonsillar erythema, exudates, or enlargement; Moist mucous membranes   NECK: Supple, No JVD   NERVOUS SYSTEM:  awake, agitated at times, moving extremities   CHEST/LUNG: CTAB;  No rales, rhonchi, wheezing, or rubs  HEART: Regular rate and rhythm; No murmurs, rubs, or gallops  ABDOMEN: Soft, Nontender, Nondistended; Bowel sounds present  EXTREMITIES:  2+ Peripheral Pulses b/l, No clubbing, cyanosis, calf tenderness or edema b/l   unstageable ulcer on left ankle >>draining purulent discharge and foul smell   right shoulder stage 1 appears clean and heeling   sacral IAD         LABS:                                              9.1    17.61 )-----------( 342      ( 21 Dec 2023 05:50 )             27.7   12-21    134<L>  |  103  |  24<H>  ----------------------------<  184<H>  3.8   |  24  |  1.31<H>    Ca    8.4<L>      21 Dec 2023 05:50  Phos  2.4     12-21  Mg     1.9     12-21        Urinalysis Basic - ( 19 Dec 2023 11:46 )    Color: Dark Yellow / Appearance: Cloudy / SG: >1.030 / pH: x  Gluc: x / Ketone: 40 mg/dL  / Bili: Small / Urobili: 1.0 mg/dL   Blood: x / Protein: 300 mg/dL / Nitrite: Positive   Leuk Esterase: Moderate / RBC: x / WBC x   Sq Epi: x / Non Sq Epi: x / Bacteria: x          RADIOLOGY & ADDITIONAL TESTS:    Consultant(s) Notes Reviewed [x ] Yes     Care Discussed with [x ] Consultants  [x ] Patient  [ x] Family>>jing Marie at bedside  [x ] /   [ x] Other; RN  Care plan and all findings were discussed in detail with patient and son Frank .  All questions and concerns addressed  Patient is a 89y old  Female who presents with a chief complaint of frequent fall (18 Dec 2023 09:36)      INTERVAL HPI/OVERNIGHT EVENTS:   Patient seen and examined bedside.   no overnight events   afebrile   WBC elevated     poor historian     ROS: unable to examine due to [ ] Encephalopathy  [x ] Advanced Dementia  [ ] Expressive Aphasia  [ ] Non-verbal patient     MEDICATIONS  (STANDING):  amLODIPine   Tablet 5 milliGRAM(s) Oral once  amLODIPine   Tablet 10 milliGRAM(s) Oral daily  aspirin  chewable 81 milliGRAM(s) Oral daily  atorvastatin 10 milliGRAM(s) Oral at bedtime  clopidogrel Tablet 75 milliGRAM(s) Oral daily  dextrose 5%. 1000 milliLiter(s) (50 mL/Hr) IV Continuous <Continuous>  dextrose 5%. 1000 milliLiter(s) (100 mL/Hr) IV Continuous <Continuous>  dextrose 50% Injectable 12.5 Gram(s) IV Push once  dextrose 50% Injectable 25 Gram(s) IV Push once  dextrose 50% Injectable 25 Gram(s) IV Push once  glucagon  Injectable 1 milliGRAM(s) IntraMuscular once  insulin lispro (ADMELOG) corrective regimen sliding scale   SubCutaneous at bedtime  insulin lispro (ADMELOG) corrective regimen sliding scale   SubCutaneous three times a day before meals  lactated ringers. 1000 milliLiter(s) (75 mL/Hr) IV Continuous <Continuous>  metoprolol tartrate 50 milliGRAM(s) Oral two times a day  nitrofurantoin monohydrate/macrocrystals (MACROBID) 100 milliGRAM(s) Oral two times a day  pantoprazole    Tablet 40 milliGRAM(s) Oral before breakfast  polyethylene glycol 3350 17 Gram(s) Oral daily  senna 2 Tablet(s) Oral at bedtime    MEDICATIONS  (PRN):  acetaminophen     Tablet .. 650 milliGRAM(s) Oral every 6 hours PRN Mild Pain (1 - 3), Moderate Pain (4 - 6)  dextrose Oral Gel 15 Gram(s) Oral once PRN Blood Glucose LESS THAN 70 milliGRAM(s)/deciliter  labetalol Injectable 10 milliGRAM(s) IV Push every 6 hours PRN Systolic blood pressure >160  melatonin 3 milliGRAM(s) Oral at bedtime PRN Insomnia      Allergies    iodine (Other)  contrast dye -  rash (Other)  Pineapple (Unknown)  penicillin (Rash)  codeine (Vomiting)  latex (Rash)    Intolerances        Vital Signs Last 24 Hrs  T(C): 37.9 (20 Dec 2023 16:46), Max: 37.9 (20 Dec 2023 16:46)  T(F): 100.3 (20 Dec 2023 16:46), Max: 100.3 (20 Dec 2023 16:46)  HR: 115 (20 Dec 2023 16:46) (88 - 115)  BP: 136/55 (20 Dec 2023 16:46) (126/50 - 161/73)  BP(mean): --  RR: 20 (20 Dec 2023 16:46) (16 - 20)  SpO2: 90% (20 Dec 2023 16:46) (90% - 96%)    Parameters below as of 20 Dec 2023 16:46  Patient On (Oxygen Delivery Method): room air            PHYSICAL EXAM:  GENERAL: NAD, thin appearing,  no increased WOB  HEAD:  Atraumatic, Normocephalic  EYES: EOMI, PERRLA, conjunctiva and sclera clear  ENMT: No tonsillar erythema, exudates, or enlargement; Moist mucous membranes   NECK: Supple, No JVD   NERVOUS SYSTEM:  awake, agitated at times, moving extremities   CHEST/LUNG: CTAB;  No rales, rhonchi, wheezing, or rubs  HEART: Regular rate and rhythm; No murmurs, rubs, or gallops  ABDOMEN: Soft, Nontender, Nondistended; Bowel sounds present  EXTREMITIES:  2+ Peripheral Pulses b/l, No clubbing, cyanosis, calf tenderness or edema b/l   unstageable ulcer on left ankle >>draining purulent discharge and foul smell   right shoulder stage 1 appears clean and heeling   sacral IAD         LABS:                                              9.1    17.61 )-----------( 342      ( 21 Dec 2023 05:50 )             27.7   12-21    134<L>  |  103  |  24<H>  ----------------------------<  184<H>  3.8   |  24  |  1.31<H>    Ca    8.4<L>      21 Dec 2023 05:50  Phos  2.4     12-21  Mg     1.9     12-21        Urinalysis Basic - ( 19 Dec 2023 11:46 )    Color: Dark Yellow / Appearance: Cloudy / SG: >1.030 / pH: x  Gluc: x / Ketone: 40 mg/dL  / Bili: Small / Urobili: 1.0 mg/dL   Blood: x / Protein: 300 mg/dL / Nitrite: Positive   Leuk Esterase: Moderate / RBC: x / WBC x   Sq Epi: x / Non Sq Epi: x / Bacteria: x          RADIOLOGY & ADDITIONAL TESTS:    Consultant(s) Notes Reviewed [x ] Yes     Care Discussed with [x ] Consultants  [x ] Patient  [ x] Family>>jing Marei at bedside  [x ] /   [ x] Other; RN  Care plan and all findings were discussed in detail with patient and son Frank .  All questions and concerns addressed

## 2023-12-22 NOTE — CONSULT NOTE ADULT - ASSESSMENT
89 year old female PMH of dementia, HTN, HLD, CAD and DM presented to the ED with weakness and frequent fall.  Patient with advanced stage pressure ulcer of left lateral malleolus not a candidate for amputation. Palliative consulted for complex decision making.

## 2023-12-22 NOTE — PROGRESS NOTE ADULT - ASSESSMENT
A/P-  89 year old female with  h/o HTN, HLD, CAD s/p stent, DM, dementia, s/p spinal stimulator placement present to ED with generalized weakness and frequent fall.    afebrile since 12/20  + leukocytosis - improving 15.25  + UA- with + nitrates and + LE  UC with no growth  BCs x 2 - NGTD  Cr normalized   Left lateral ankle infected wound, probing to the bone   ESR 81  Pt was seen with vascular surgeon, no surgical intervention is planned at this time.     plan-  continue Vancomycin IV  monitor vancomycin levels, required  continue IV Cefepime IV  follow all culture data  trend temperatures and wbc  pt's wound is probing to the bone, MRI of left ankle only if within GOC  vascular surgery evaluation and follow up is appreciated  wound care   please have Zflow boots on at all times   GOC conversation was started by the vascular surgeon, palliative care consult  ordered Nystatin solution for thrush    will discuss with Dr. Tobias  discussed with Dr. House  discussed with palliative care NP  discussed with pt's family at the bedside

## 2023-12-22 NOTE — CONSULT NOTE ADULT - PROBLEM SELECTOR RECOMMENDATION 2
lives at home, frequent falls advancing dementia, lethargic and minimally responsive, mental status likely worse in setting of infection, unfamiliar environment, has been declining, eating less and sleeping more over the past month per son, Edward  promote sleep/wake cycles, family presence and [facility] care  hospice eligible, referral to be placed    Morphine solution 2.5mg q 4 hours PRN for moderate pain  morphine solution 5mg q 4 hours PRN for severe pain advancing dementia, lethargic and minimally responsive, mental status likely worse in setting of infection, unfamiliar environment, has been declining, eating less and sleeping more over the past month per son, Edward  promote sleep/wake cycles, family presence and [facility] care  hospice eligible, referral to be placed    Morphine solution 2.5mg q 4 hours PRN for moderate pain  morphine solution 5mg q 4 hours PRN for severe pain

## 2023-12-22 NOTE — PROGRESS NOTE ADULT - ASSESSMENT
89 years old female with h/o HTN, HLD, CAD, DM, dementia, s/p spinal stimulator placement present to ED with generalized weakness and frequent fall. Patient at baseline ambulate with walker with assistance. For last 1-2 weeks, patient has been having generalized weakness and frequent fall. Have multiple bruises on body.  Per son Toby ( [phone]) who stated that he is HCP. Patient has been having fall over last one years but more frequent lately. Patient normally ambulate short distance with walker with assistance and use wheelchair to go around. Patient usually attempts to get out of bed when family is not around resulting in fall. Patient also has been refusion to eat lately as well.   Hemodynamically stable, afebrile, sat well at RA. WBC 14.45, Hb 10.5, .6, plt 413, Cr 1.42. CT head with no acute pathology. CT C spine with no acute fracture. 4mm right upper lobe pul nodule. CT chest/abd/pelvis with no acute pathology. Emphysema +. Chronic fibrotic changes at lung apices. Questional asymmetrical rectal wall thickening.     Assessment and Plan:     Left lateral malleolus infected appearing Ulcer   Sepsis   leukocytosis improved   US doppler ordered for LLE tenderness-- negative for DVT  -- PT wound consulted, wound recs appreciated    -ESR/CRP elevated   -- MRSA neg   - Blood Cx--NGTD   --Vascular , ID, podiatry consults appreciated, no surgical intervention   --c/w cefepime and Vancomycin     Presumed UTI ? patient was complaining of dysuria , unclear though as she is a poor historian   abx as above   UCx NGTD      Frequent falls  advanced dementia , mostly bedbound , functional quadriplegia (wheelchair bound at home)   CT head    with no acute pathology. CT C spine with no acute fracture.       4mm right upper lobe pul nodule.   CT chest/abd/pelvis with no acute pathology. Emphysema +. Chronic fibrotic changes at lung apices.   no wheezing, lungs are CTAB       Questionable asymmetrical rectal wall thickening.   --per my discussion with son, given age and dementia, family prefers not to pursue further investigation /treatment or aggressive measures.      Macrocytic anemia.   Iron panel relatively unremarkable, b12, folate WNL--no role for supplementation   TSH OK    SAE (acute kidney injury).  POA   resolved with IVF      Benign essential HTN/HLD   continue with current regimen       CAD (coronary artery disease).   --CAD s/p PCI s/p CABG  on aspirin, plavix, statin, BB.    Type 2 diabetes mellitus with unspecified complications. controlled   A1c 6.6%  continue with ISS   avoid hypoglycemia       Dementia. suspected vascular dementia   CT head showing multiple old chronic strokes, including cerebellar strokes which would explain the gait instability    supportive care    Moderate PCM   nutrition consult appreciated     Preventative Measures   heparin SQ-dvt ppx  fall, aspiration precautions   HOBE     palliative consult appreciated >> patient DNR/DNI      89 years old female with h/o HTN, HLD, CAD, DM, dementia, s/p spinal stimulator placement present to ED with generalized weakness and frequent fall. Patient at baseline ambulate with walker with assistance. For last 1-2 weeks, patient has been having generalized weakness and frequent fall. Have multiple bruises on body.  Per son Toby ( [phone]) who stated that he is HCP. Patient has been having fall over last one years but more frequent lately. Patient normally ambulate short distance with walker with assistance and use wheelchair to go around. Patient usually attempts to get out of bed when family is not around resulting in fall. Patient also has been refusion to eat lately as well.   Hemodynamically stable, afebrile, sat well at RA. WBC 14.45, Hb 10.5, .6, plt 413, Cr 1.42. CT head with no acute pathology. CT C spine with no acute fracture. 4mm right upper lobe pul nodule. CT chest/abd/pelvis with no acute pathology. Emphysema +. Chronic fibrotic changes at lung apices. Questional asymmetrical rectal wall thickening.     Assessment and Plan:     Left lateral malleolus infected appearing Ulcer   Sepsis   leukocytosis improved   US doppler ordered for LLE tenderness-- negative for DVT  -- PT wound consulted, wound recs appreciated    -ESR/CRP elevated   -- MRSA neg   - Blood Cx--NGTD   --Vascular , ID, podiatry consults appreciated, no surgical intervention   --c/w cefepime and Vancomycin     Presumed UTI ? patient was complaining of dysuria , unclear though as she is a poor historian   abx as above   UCx NGTD      Frequent falls  advanced dementia , mostly bedbound , functional quadriplegia (wheelchair bound at home)   CT head    with no acute pathology. CT C spine with no acute fracture.       4mm right upper lobe pul nodule.   CT chest/abd/pelvis with no acute pathology. Emphysema +. Chronic fibrotic changes at lung apices.   no wheezing, lungs are CTAB       Questionable asymmetrical rectal wall thickening.   --per my discussion with son, given age and dementia, family prefers not to pursue further investigation /treatment or aggressive measures.      Macrocytic anemia.   Iron panel relatively unremarkable, b12, folate WNL--no role for supplementation   TSH OK    SAE (acute kidney injury).  POA   resolved with IVF      Benign essential HTN/HLD   continue with current regimen       CAD (coronary artery disease).   --CAD s/p PCI s/p CABG  on aspirin, plavix, statin, BB.    Type 2 diabetes mellitus with unspecified complications. controlled   A1c 6.6%  continue with ISS   avoid hypoglycemia       Dementia. suspected vascular dementia   CT head showing multiple old chronic strokes, including cerebellar strokes which would explain the gait instability    supportive care    Moderate PCM   nutrition consult appreciated     Preventative Measures   heparin SQ-dvt ppx  fall, aspiration precautions   HOBE     palliative consult appreciated >> patient DNR/DNI

## 2023-12-22 NOTE — PROGRESS NOTE ADULT - SUBJECTIVE AND OBJECTIVE BOX
Patient is a 89y old  Female who presents with a chief complaint of frequent fall (18 Dec 2023 09:36)      INTERVAL HPI/OVERNIGHT EVENTS:   Patient seen and examined bedside.   no overnight events        poor historian     ROS: unable to examine due to [ ] Encephalopathy  [x ] Advanced Dementia  [ ] Expressive Aphasia  [ ] Non-verbal patient     MEDICATIONS  (STANDING):  amLODIPine   Tablet 5 milliGRAM(s) Oral once  amLODIPine   Tablet 10 milliGRAM(s) Oral daily  aspirin  chewable 81 milliGRAM(s) Oral daily  atorvastatin 10 milliGRAM(s) Oral at bedtime  clopidogrel Tablet 75 milliGRAM(s) Oral daily  dextrose 5%. 1000 milliLiter(s) (50 mL/Hr) IV Continuous <Continuous>  dextrose 5%. 1000 milliLiter(s) (100 mL/Hr) IV Continuous <Continuous>  dextrose 50% Injectable 12.5 Gram(s) IV Push once  dextrose 50% Injectable 25 Gram(s) IV Push once  dextrose 50% Injectable 25 Gram(s) IV Push once  glucagon  Injectable 1 milliGRAM(s) IntraMuscular once  insulin lispro (ADMELOG) corrective regimen sliding scale   SubCutaneous at bedtime  insulin lispro (ADMELOG) corrective regimen sliding scale   SubCutaneous three times a day before meals  lactated ringers. 1000 milliLiter(s) (75 mL/Hr) IV Continuous <Continuous>  metoprolol tartrate 50 milliGRAM(s) Oral two times a day  nitrofurantoin monohydrate/macrocrystals (MACROBID) 100 milliGRAM(s) Oral two times a day  pantoprazole    Tablet 40 milliGRAM(s) Oral before breakfast  polyethylene glycol 3350 17 Gram(s) Oral daily  senna 2 Tablet(s) Oral at bedtime    MEDICATIONS  (PRN):  acetaminophen     Tablet .. 650 milliGRAM(s) Oral every 6 hours PRN Mild Pain (1 - 3), Moderate Pain (4 - 6)  dextrose Oral Gel 15 Gram(s) Oral once PRN Blood Glucose LESS THAN 70 milliGRAM(s)/deciliter  labetalol Injectable 10 milliGRAM(s) IV Push every 6 hours PRN Systolic blood pressure >160  melatonin 3 milliGRAM(s) Oral at bedtime PRN Insomnia      Allergies    iodine (Other)  contrast dye -  rash (Other)  Pineapple (Unknown)  penicillin (Rash)  codeine (Vomiting)  latex (Rash)    Intolerances        Vital Signs Last 24 Hrs  T(C): 36.6 (22 Dec 2023 11:44), Max: 37.1 (22 Dec 2023 00:01)  T(F): 97.9 (22 Dec 2023 11:44), Max: 98.8 (22 Dec 2023 00:01)  HR: 96 (22 Dec 2023 11:44) (96 - 110)  BP: 124/63 (22 Dec 2023 11:44) (100/58 - 132/77)  BP(mean): --  RR: 18 (22 Dec 2023 11:44) (18 - 18)  SpO2: 95% (22 Dec 2023 11:44) (95% - 96%)              PHYSICAL EXAM:  GENERAL: NAD, thin appearing,  no increased WOB  HEAD:  Atraumatic, Normocephalic  EYES: EOMI, PERRLA, conjunctiva and sclera clear  ENMT: No tonsillar erythema, exudates, or enlargement; Moist mucous membranes   NECK: Supple, No JVD   NERVOUS SYSTEM:  awake, agitated at times, moving extremities   CHEST/LUNG: CTAB;  No rales, rhonchi, wheezing, or rubs  HEART: Regular rate and rhythm; No murmurs, rubs, or gallops  ABDOMEN: Soft, Nontender, Nondistended; Bowel sounds present  EXTREMITIES:  2+ Peripheral Pulses b/l, No clubbing, cyanosis, calf tenderness or edema b/l   unstageable ulcer on left ankle >>draining purulent discharge and foul smell   right shoulder stage 1 appears clean and heeling   sacral IAD         LABS:                                                         8.5    15.25 )-----------( 373      ( 22 Dec 2023 09:42 )             25.0   12-22    137  |  105  |  24<H>  ----------------------------<  208<H>  3.6   |  21<L>  |  1.25    Ca    8.4<L>      22 Dec 2023 09:42  Phos  3.1     12-22  Mg     1.7     12-22    TPro  5.9<L>  /  Alb  1.9<L>  /  TBili  0.6  /  DBili  x   /  AST  35  /  ALT  33  /  AlkPhos  94  12-22          Urinalysis Basic - ( 19 Dec 2023 11:46 )    Color: Dark Yellow / Appearance: Cloudy / SG: >1.030 / pH: x  Gluc: x / Ketone: 40 mg/dL  / Bili: Small / Urobili: 1.0 mg/dL   Blood: x / Protein: 300 mg/dL / Nitrite: Positive   Leuk Esterase: Moderate / RBC: x / WBC x   Sq Epi: x / Non Sq Epi: x / Bacteria: x          RADIOLOGY & ADDITIONAL TESTS:    Consultant(s) Notes Reviewed [x ] Yes     Care Discussed with [x ] Consultants  [x ] Patient  [ x] Family>>jing Marie at bedside  [x ] /   [ x] Other; RN  Care plan and all findings were discussed in detail with patient and son Frank .  All questions and concerns addressed

## 2023-12-22 NOTE — CONSULT NOTE ADULT - SUBJECTIVE AND OBJECTIVE BOX
HPI:  89 years old female with h/o HTN, HLD, CAD, DM, dementia, s/p spinal stimulator placement present to ED with generalized weakness and frequent fall. Patient at baseline ambulate with walker with assistance. For last 1-2 weeks, patient has been having generalized weakness and frequent fall. Have multiple bruises on body. Denied any fever, chills, nausea, vomiting or diarrhea.  Per son Toby ( [phone]) who stated that he is HCP. Patient has been having fall over last one years but more frequent lately. Patient normally ambulate short distance with walker with assistance and use wheelchair to go around. Patient usually attempts to get out of bed when family is not around resulting in fall. Patient also has been refusion to eat lately as well  Hemodynamically stable, afebrile, sat well at RA. WBC 14.45, Hb 10.5, .6, plt 413, Cr 1.42. CT head with no acute pathology. CT C spine with no acute fracture. 4mm right upper lobe pul nodule. CT chest/abd/pelvis with no acute pathology. Emphysema +. Chronic fibrotic changes at lung apices. Questional asymmetrical rectal wall thickening.  (15 Dec 2023 12:56)    PERTINENT PM/SXH:   Angina    Diabetes Mellitus    Arthritis, Infective, Knee    Detached Retina    Acute Mucous Pneumonia    Spinal Stenosis- lumbar    History of Back Surgery    Basal Cell Cancer    Dementia      Detached Retina, Left    S/P Carpal Tunnel Release    Trigger Finger    S/P Laparoscopic Cholecystectomy    S/P TKR (Total Knee Replacement)    Cataract      FAMILY HISTORY:  FH: HTN (hypertension)      ITEMS NOT CHECKED ARE NOT PRESENT    SOCIAL HISTORY:   Significant other/partner:  [ ]  Children: yes [ ]  Anglican/Spirituality: Buddhism  Substance hx:  [ ]   Tobacco hx:  [ ]   Alcohol hx: [ ]   Home Opioid hx:  [ ] I-Stop Reference No: Reference #: [id number]  Living Situation: [ ]Home  [ ]Long term care  [ ]Rehab [ ]Other    ADVANCE DIRECTIVES:    DNR  MOLST  [ ]  Living Will  [ ]   DECISION MAKER(s):  [ ] Health Care Proxy(s)  [ x] Surrogate(s)  [ ] Guardian           Name(s): Phone Number(s): Toby [phone]    BASELINE (I)ADL(s) (prior to admission):  [place]: [ ]Total  [ ] Moderate [x ]Dependent    Allergies    iodine (Other)  contrast dye -  rash (Other)  Pineapple (Unknown)  penicillin (Rash)  codeine (Vomiting)  latex (Rash)    Intolerances    Tolerates ceftriaxone, cefepime (Other)  MEDICATIONS  (STANDING):  amLODIPine   Tablet 10 milliGRAM(s) Oral daily  aspirin  chewable 81 milliGRAM(s) Oral daily  atorvastatin 10 milliGRAM(s) Oral at bedtime  cefepime   IVPB 1000 milliGRAM(s) IV Intermittent every 24 hours  cefepime   IVPB      clopidogrel Tablet 75 milliGRAM(s) Oral daily  collagenase Ointment 1 Application(s) Topical daily  dextrose 5%. 1000 milliLiter(s) (50 mL/Hr) IV Continuous <Continuous>  dextrose 5%. 1000 milliLiter(s) (100 mL/Hr) IV Continuous <Continuous>  dextrose 50% Injectable 12.5 Gram(s) IV Push once  dextrose 50% Injectable 25 Gram(s) IV Push once  dextrose 50% Injectable 25 Gram(s) IV Push once  glucagon  Injectable 1 milliGRAM(s) IntraMuscular once  heparin   Injectable 5000 Unit(s) SubCutaneous every 12 hours  insulin lispro (ADMELOG) corrective regimen sliding scale   SubCutaneous three times a day before meals  metoprolol tartrate 50 milliGRAM(s) Oral two times a day  nystatin    Suspension 824427 Unit(s) Oral four times a day  pantoprazole    Tablet 40 milliGRAM(s) Oral before breakfast  polyethylene glycol 3350 17 Gram(s) Oral daily  senna 2 Tablet(s) Oral at bedtime  vancomycin  IVPB 750 milliGRAM(s) IV Intermittent every 24 hours    MEDICATIONS  (PRN):  acetaminophen     Tablet .. 650 milliGRAM(s) Oral every 6 hours PRN Mild Pain (1 - 3), Moderate Pain (4 - 6)  dextrose Oral Gel 15 Gram(s) Oral once PRN Blood Glucose LESS THAN 70 milliGRAM(s)/deciliter  LORazepam   Injectable 0.5 milliGRAM(s) IV Push every 8 hours PRN Agitation  melatonin 3 milliGRAM(s) Oral at bedtime PRN Insomnia    PRESENT SYMPTOMS: [x ]Unable to obtain due to poor mentation   Source if other than patient:  [ ]Family   [ ]Team     Pain: [ ] yes [ ] no  QOL impact -   Location -                    Aggravating factors -  Quality -  Radiation -  Timing-  Severity (0-10 scale):  Minimal acceptable level (0-10 scale):     PAIN AD Score: 1    [email or web address] (press ctrl +  left click to view)    Dyspnea:                           [ ]Mild [ ]Moderate [ ]Severe  Anxiety:                             [ ]Mild [ ]Moderate [ ]Severe  Fatigue:                             [ ]Mild [ ]Moderate [ ]Severe  Nausea:                             [ ]Mild [ ]Moderate [ ]Severe  Loss of appetite:              [ ]Mild [ ]Moderate [ ]Severe  Constipation:                    [ ]Mild [ ]Moderate [ ]Severe    Other Symptoms:  [ ]All other review of systems negative     Karnofsky Performance Score/Palliative Performance Status Version 2:     20-30    %    http://npcrc.org/files/news/palliative_performance_scale_ppsv2.pdf  PHYSICAL EXAM:  Vital Signs Last 24 Hrs  T(C): 36.6 (22 Dec 2023 11:44), Max: 37.1 (22 Dec 2023 00:01)  T(F): 97.9 (22 Dec 2023 11:44), Max: 98.8 (22 Dec 2023 00:01)  HR: 96 (22 Dec 2023 11:44) (96 - 110)  BP: 124/63 (22 Dec 2023 11:44) (100/58 - 132/77)  BP(mean): --  RR: 18 (22 Dec 2023 11:44) (18 - 18)  SpO2: 95% (22 Dec 2023 11:44) (95% - 96%)     I&O's Summary    21 Dec 2023 07:01  -  22 Dec 2023 07:00  --------------------------------------------------------  IN: 240 mL / OUT: 0 mL / NET: 240 mL    GENERAL:  [ ]Alert  [ ]Oriented x   [x ]Lethargic  [ ]Cachexia  [ ]Unarousable  [ ]Verbal  [x ]Non-Verbal  Behavioral:   [ ] Anxiety  [ ] Delirium [ ] Agitation [ ] Other  HEENT:  [ ]Normal   [x ]Dry mouth   [ ]ET Tube/Trach  [ ]Oral lesions  PULMONARY:   [ ]Clear [ ]Tachypnea  [ ]Audible excessive secretions   [ ]Rhonchi        [ ]Right [ ]Left [ ]Bilateral  [ ]Crackles        [ ]Right [ ]Left [ ]Bilateral  [ ]Wheezing     [ ]Right [ ]Left [ ]Bilateral  diminished breath sounds bilaterally   CARDIOVASCULAR:    [x ]Regular [ ]Irregular [ ]Tachy  [ ]Jack [ ]Murmur [ ]Other  GASTROINTESTINAL:  [ x]Soft  [ ]Distended   [ ]+BS  [ ]Non tender [ ]Tender  [ ]PEG [ ]OGT/ NGT  Last BM: 12/22/23 GENITOURINARY:  [ ]Normal [x ] Incontinent   [ ]Oliguria/Anuria   [ ][place]  MUSCULOSKELETAL:   [ ]Normal   [ ]Weakness  [ x]Bed/Wheelchair bound [ ]Edema  NEUROLOGIC:   [ ]No focal deficits  [x ] Cognitive impairment  [ ] Dysphagia [ ]Dysarthria [ ] Paresis [ ]Other   SKIN:   [ ]Normal   [x ]Pressure ulcer(s) sacrum unstageable, left lateral malleolus stage IV [ ]Rash    CRITICAL CARE:  [ ] Shock Present  [ ]Septic [ ]Cardiogenic [ ]Neurologic [ ]Hypovolemic  [ ]  Vasopressors [ ]  Inotropes   [ ] Respiratory failure present [ ] mechanical ventilation [ ] non-invasive ventilatory support [ ] High flow  [ ] Acute  [ ] Chronic [ ] Hypoxic  [ ] Hypercarbic [ ] Other  [ ] Other organ failure     LABS:                        8.5    15.25 )-----------( 373      ( 22 Dec 2023 09:42 )             25.0   12-22    137  |  105  |  24<H>  ----------------------------<  208<H>  3.6   |  21<L>  |  1.25    Ca    8.4<L>      22 Dec 2023 09:42  Phos  3.1     12-22  Mg     1.7     12-22    TPro  5.9<L>  /  Alb  1.9<L>  /  TBili  0.6  /  DBili  x   /  AST  35  /  ALT  33  /  AlkPhos  94  12-22    Culture - Urine (12.20.23 @ 16:50)    Specimen Source: Clean Catch Clean Catch (Midstream)   Culture Results:   No growth    Culture - Blood (12.20.23 @ 21:30)    Specimen Source: .Blood Blood-Peripheral   Culture Results:   No growth at 24 hours    RADIOLOGY & ADDITIONAL STUDIES:  < from: US Duplex Venous Lower Ext Ltd, Left (12.17.23 @ 16:03) >  IMPRESSION:  No evidence of left lower extremity deep venous thrombosis.  -- End of Report ---  < end of copied text >    < from: Xray Foot AP + Lateral + Oblique, Left (12.15.23 @ 11:20) >  IMPRESSION: Demineralized bone. No definite fractures. Prior left total   knee replacement.  --- End of Report ---  < end of copied text >    < from: CT Abdomen and Pelvis No Cont (12.15.23 @ 11:19) >  IMPRESSION:  Emphysema. Chronic fibrotic changes at the lung apices.  CABG. TAVR. Coronary artery calcifications and/or stents.  Questionable asymmetrical rectal wall thickening. Limited evaluation   without enteric contrast.  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   Atrophic pancreatic tail.  --- End of Report ---  < end of copied text >    < from: CT Cervical Spine No Cont (12.15.23 @ 11:18) >  IMPRESSION:  CT head: No acute intracranial hemorrhage or mass effect.  CT cervical spine:  1.  No evidence for acute displaced fracture or malalignment.  2.  A 4 mm right upper lobe pulmonary nodule (2:156).  --- End of Report ---  < end of copied text >    < from: TTE Echo Doppler w/o Cont (02.09.17 @ 12:21) >   Summary:   1. Left ventricular ejection fraction, by visual estimation, is 55 to   60%.   2. Spectral Doppler shows impaired relaxation pattern of left   ventricular myocardial filling (Grade I diastolic dysfunction).   3. Moderate mitral annular calcification.   4. Peak transaortic gradient equals 26.2 mmHg, mean transaortic gradient   equals 15.7 mmHg, the calculated aortic valve area equals 1.20 cm² by the   continuity equation consistent with moderate aortic stenosis.   [id number] Haider Gutierrez MD  Electronically signed on 2/10/2017 at 2:46:40 PM  < end of copied text >    PROTEIN CALORIE MALNUTRITION PRESENT: [x ] Yes [ ] No  [x ] PPSV2 < or = to 30% [ ] significant weight loss  [ x] poor nutritional intake [x ] catabolic state [ ] anasarca     Artificial Nutrition [ ]     REFERRALS:   [ ]Chaplaincy  [ ] Hospice  [ ]Child Life  [ ]Social Work  [ ]Case management [ ]Holistic Therapy     Goals of Care Document:    Progress Notes - Care Coordination [C. Provider] (12-20-23 @ 15:00)   HPI:  89 years old female with h/o HTN, HLD, CAD, DM, dementia, s/p spinal stimulator placement present to ED with generalized weakness and frequent fall. Patient at baseline ambulate with walker with assistance. For last 1-2 weeks, patient has been having generalized weakness and frequent fall. Have multiple bruises on body. Denied any fever, chills, nausea, vomiting or diarrhea.  Per son Toby ( [phone]) who stated that he is HCP. Patient has been having fall over last one years but more frequent lately. Patient normally ambulate short distance with walker with assistance and use wheelchair to go around. Patient usually attempts to get out of bed when family is not around resulting in fall. Patient also has been refusion to eat lately as well  Hemodynamically stable, afebrile, sat well at RA. WBC 14.45, Hb 10.5, .6, plt 413, Cr 1.42. CT head with no acute pathology. CT C spine with no acute fracture. 4mm right upper lobe pul nodule. CT chest/abd/pelvis with no acute pathology. Emphysema +. Chronic fibrotic changes at lung apices. Questional asymmetrical rectal wall thickening.  (15 Dec 2023 12:56)    PERTINENT PM/SXH:   Angina    Diabetes Mellitus    Arthritis, Infective, Knee    Detached Retina    Acute Mucous Pneumonia    Spinal Stenosis- lumbar    History of Back Surgery    Basal Cell Cancer    Dementia      Detached Retina, Left    S/P Carpal Tunnel Release    Trigger Finger    S/P Laparoscopic Cholecystectomy    S/P TKR (Total Knee Replacement)    Cataract      FAMILY HISTORY:  FH: HTN (hypertension)      ITEMS NOT CHECKED ARE NOT PRESENT    SOCIAL HISTORY:   Significant other/partner:  [ ]  Children: yes [ ]  Anabaptism/Spirituality: Mosque  Substance hx:  [ ]   Tobacco hx:  [ ]   Alcohol hx: [ ]   Home Opioid hx:  [ ] I-Stop Reference No: Reference #: [id number]  Living Situation: [ ]Home  [ ]Long term care  [ ]Rehab [ ]Other    ADVANCE DIRECTIVES:    DNR  MOLST  [ ]  Living Will  [ ]   DECISION MAKER(s):  [ ] Health Care Proxy(s)  [ x] Surrogate(s)  [ ] Guardian           Name(s): Phone Number(s): Toby [phone]    BASELINE (I)ADL(s) (prior to admission):  [place]: [ ]Total  [ ] Moderate [x ]Dependent    Allergies    iodine (Other)  contrast dye -  rash (Other)  Pineapple (Unknown)  penicillin (Rash)  codeine (Vomiting)  latex (Rash)    Intolerances    Tolerates ceftriaxone, cefepime (Other)  MEDICATIONS  (STANDING):  amLODIPine   Tablet 10 milliGRAM(s) Oral daily  aspirin  chewable 81 milliGRAM(s) Oral daily  atorvastatin 10 milliGRAM(s) Oral at bedtime  cefepime   IVPB 1000 milliGRAM(s) IV Intermittent every 24 hours  cefepime   IVPB      clopidogrel Tablet 75 milliGRAM(s) Oral daily  collagenase Ointment 1 Application(s) Topical daily  dextrose 5%. 1000 milliLiter(s) (50 mL/Hr) IV Continuous <Continuous>  dextrose 5%. 1000 milliLiter(s) (100 mL/Hr) IV Continuous <Continuous>  dextrose 50% Injectable 12.5 Gram(s) IV Push once  dextrose 50% Injectable 25 Gram(s) IV Push once  dextrose 50% Injectable 25 Gram(s) IV Push once  glucagon  Injectable 1 milliGRAM(s) IntraMuscular once  heparin   Injectable 5000 Unit(s) SubCutaneous every 12 hours  insulin lispro (ADMELOG) corrective regimen sliding scale   SubCutaneous three times a day before meals  metoprolol tartrate 50 milliGRAM(s) Oral two times a day  nystatin    Suspension 222974 Unit(s) Oral four times a day  pantoprazole    Tablet 40 milliGRAM(s) Oral before breakfast  polyethylene glycol 3350 17 Gram(s) Oral daily  senna 2 Tablet(s) Oral at bedtime  vancomycin  IVPB 750 milliGRAM(s) IV Intermittent every 24 hours    MEDICATIONS  (PRN):  acetaminophen     Tablet .. 650 milliGRAM(s) Oral every 6 hours PRN Mild Pain (1 - 3), Moderate Pain (4 - 6)  dextrose Oral Gel 15 Gram(s) Oral once PRN Blood Glucose LESS THAN 70 milliGRAM(s)/deciliter  LORazepam   Injectable 0.5 milliGRAM(s) IV Push every 8 hours PRN Agitation  melatonin 3 milliGRAM(s) Oral at bedtime PRN Insomnia    PRESENT SYMPTOMS: [x ]Unable to obtain due to poor mentation   Source if other than patient:  [ ]Family   [ ]Team     Pain: [ ] yes [ ] no  QOL impact -   Location -                    Aggravating factors -  Quality -  Radiation -  Timing-  Severity (0-10 scale):  Minimal acceptable level (0-10 scale):     PAIN AD Score: 1    [email or web address]/[email or web address].pdf (press ctrl +  left click to view)    Dyspnea:                           [ ]Mild [ ]Moderate [ ]Severe  Anxiety:                             [ ]Mild [ ]Moderate [ ]Severe  Fatigue:                             [ ]Mild [ ]Moderate [ ]Severe  Nausea:                             [ ]Mild [ ]Moderate [ ]Severe  Loss of appetite:              [ ]Mild [ ]Moderate [ ]Severe  Constipation:                    [ ]Mild [ ]Moderate [ ]Severe    Other Symptoms:  [ ]All other review of systems negative     Karnofsky Performance Score/Palliative Performance Status Version 2:     20-30    %    http://npcrc.org/files/news/palliative_performance_scale_ppsv2.pdf  PHYSICAL EXAM:  Vital Signs Last 24 Hrs  T(C): 36.6 (22 Dec 2023 11:44), Max: 37.1 (22 Dec 2023 00:01)  T(F): 97.9 (22 Dec 2023 11:44), Max: 98.8 (22 Dec 2023 00:01)  HR: 96 (22 Dec 2023 11:44) (96 - 110)  BP: 124/63 (22 Dec 2023 11:44) (100/58 - 132/77)  BP(mean): --  RR: 18 (22 Dec 2023 11:44) (18 - 18)  SpO2: 95% (22 Dec 2023 11:44) (95% - 96%)     I&O's Summary    21 Dec 2023 07:01  -  22 Dec 2023 07:00  --------------------------------------------------------  IN: 240 mL / OUT: 0 mL / NET: 240 mL    GENERAL:  [ ]Alert  [ ]Oriented x   [x ]Lethargic  [ ]Cachexia  [ ]Unarousable  [ ]Verbal  [x ]Non-Verbal  Behavioral:   [ ] Anxiety  [ ] Delirium [ ] Agitation [ ] Other  HEENT:  [ ]Normal   [x ]Dry mouth   [ ]ET Tube/Trach  [ ]Oral lesions  PULMONARY:   [ ]Clear [ ]Tachypnea  [ ]Audible excessive secretions   [ ]Rhonchi        [ ]Right [ ]Left [ ]Bilateral  [ ]Crackles        [ ]Right [ ]Left [ ]Bilateral  [ ]Wheezing     [ ]Right [ ]Left [ ]Bilateral  diminished breath sounds bilaterally   CARDIOVASCULAR:    [x ]Regular [ ]Irregular [ ]Tachy  [ ]Jack [ ]Murmur [ ]Other  GASTROINTESTINAL:  [ x]Soft  [ ]Distended   [ ]+BS  [ ]Non tender [ ]Tender  [ ]PEG [ ]OGT/ NGT  Last BM: 12/22/23 GENITOURINARY:  [ ]Normal [x ] Incontinent   [ ]Oliguria/Anuria   [ ][place]  MUSCULOSKELETAL:   [ ]Normal   [ ]Weakness  [ x]Bed/Wheelchair bound [ ]Edema  NEUROLOGIC:   [ ]No focal deficits  [x ] Cognitive impairment  [ ] Dysphagia [ ]Dysarthria [ ] Paresis [ ]Other   SKIN:   [ ]Normal   [x ]Pressure ulcer(s) sacrum unstageable, left lateral malleolus stage IV [ ]Rash    CRITICAL CARE:  [ ] Shock Present  [ ]Septic [ ]Cardiogenic [ ]Neurologic [ ]Hypovolemic  [ ]  Vasopressors [ ]  Inotropes   [ ] Respiratory failure present [ ] mechanical ventilation [ ] non-invasive ventilatory support [ ] High flow  [ ] Acute  [ ] Chronic [ ] Hypoxic  [ ] Hypercarbic [ ] Other  [ ] Other organ failure     LABS:                        8.5    15.25 )-----------( 373      ( 22 Dec 2023 09:42 )             25.0   12-22    137  |  105  |  24<H>  ----------------------------<  208<H>  3.6   |  21<L>  |  1.25    Ca    8.4<L>      22 Dec 2023 09:42  Phos  3.1     12-22  Mg     1.7     12-22    TPro  5.9<L>  /  Alb  1.9<L>  /  TBili  0.6  /  DBili  x   /  AST  35  /  ALT  33  /  AlkPhos  94  12-22    Culture - Urine (12.20.23 @ 16:50)    Specimen Source: Clean Catch Clean Catch (Midstream)   Culture Results:   No growth    Culture - Blood (12.20.23 @ 21:30)    Specimen Source: .Blood Blood-Peripheral   Culture Results:   No growth at 24 hours    RADIOLOGY & ADDITIONAL STUDIES:  < from: US Duplex Venous Lower Ext Ltd, Left (12.17.23 @ 16:03) >  IMPRESSION:  No evidence of left lower extremity deep venous thrombosis.  -- End of Report ---  < end of copied text >    < from: Xray Foot AP + Lateral + Oblique, Left (12.15.23 @ 11:20) >  IMPRESSION: Demineralized bone. No definite fractures. Prior left total   knee replacement.  --- End of Report ---  < end of copied text >    < from: CT Abdomen and Pelvis No Cont (12.15.23 @ 11:19) >  IMPRESSION:  Emphysema. Chronic fibrotic changes at the lung apices.  CABG. TAVR. Coronary artery calcifications and/or stents.  Questionable asymmetrical rectal wall thickening. Limited evaluation   without enteric contrast.  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   Atrophic pancreatic tail.  --- End of Report ---  < end of copied text >    < from: CT Cervical Spine No Cont (12.15.23 @ 11:18) >  IMPRESSION:  CT head: No acute intracranial hemorrhage or mass effect.  CT cervical spine:  1.  No evidence for acute displaced fracture or malalignment.  2.  A 4 mm right upper lobe pulmonary nodule (2:156).  --- End of Report ---  < end of copied text >    < from: TTE Echo Doppler w/o Cont (02.09.17 @ 12:21) >   Summary:   1. Left ventricular ejection fraction, by visual estimation, is 55 to   60%.   2. Spectral Doppler shows impaired relaxation pattern of left   ventricular myocardial filling (Grade I diastolic dysfunction).   3. Moderate mitral annular calcification.   4. Peak transaortic gradient equals 26.2 mmHg, mean transaortic gradient   equals 15.7 mmHg, the calculated aortic valve area equals 1.20 cm² by the   continuity equation consistent with moderate aortic stenosis.   [id number] Haider Gutierrez MD  Electronically signed on 2/10/2017 at 2:46:40 PM  < end of copied text >    PROTEIN CALORIE MALNUTRITION PRESENT: [x ] Yes [ ] No  [x ] PPSV2 < or = to 30% [ ] significant weight loss  [ x] poor nutritional intake [x ] catabolic state [ ] anasarca     Artificial Nutrition [ ]     REFERRALS:   [ ]Chaplaincy  [ ] Hospice  [ ]Child Life  [ ]Social Work  [ ]Case management [ ]Holistic Therapy     Goals of Care Document:    Progress Notes - Care Coordination [C. Provider] (12-20-23 @ 15:00)

## 2023-12-22 NOTE — CONSULT NOTE ADULT - PROBLEM SELECTOR RECOMMENDATION 9
pressure ulcer on left lateral malleolus, vascular consult appreciated, continue local care and abx no amputation  lower extremity contractures pressure on lateral malleolus, reposition as able  blood cultures pending, no growth at 24 hours pressure ulcer on left lateral malleolus, vascular consult appreciated, continue local care and abx no amputation  lower extremity contractures pressure placed on lateral malleolus, reposition as able  ability to achieve any wound healing is exceedingly remote  blood cultures pending, no growth at 24 hours

## 2023-12-22 NOTE — PROGRESS NOTE ADULT - SUBJECTIVE AND OBJECTIVE BOX
SURGERY PROGRESS HPI:  Pt seen and examined at bedside with Dr House.  Both sons at bedside.  Pt is less responsive today.  Only reacts to physical exam, saying "ow" whenever any part of body is touched.  Son's say this has been her baseline for months.        Vital Signs Last 24 Hrs  T(C): 36.6 (22 Dec 2023 11:44), Max: 37.3 (21 Dec 2023 12:43)  T(F): 97.9 (22 Dec 2023 11:44), Max: 99.2 (21 Dec 2023 12:43)  HR: 96 (22 Dec 2023 11:44) (96 - 110)  BP: 124/63 (22 Dec 2023 11:44) (100/58 - 132/77)  BP(mean): --  RR: 18 (22 Dec 2023 11:44) (17 - 18)  SpO2: 95% (22 Dec 2023 11:44) (95% - 96%)          PHYSICAL EXAM:    GENERAL: cachectic, contracted  HEAD:  Atraumatic, Normocephalic  CHEST/LUNG: normal work of breathing   EXTREMITIES:  non palpable pulses to both feet.  Stage 4 ulcer at left lateral malleolus

## 2023-12-22 NOTE — DISCHARGE NOTE PROVIDER - HOSPITAL COURSE
89 years old female with h/o HTN, HLD, CAD, s/p CABG, DM, dementia, mod AS, s/p spinal stimulator placement present to ED with generalized weakness and frequent fall. For last 1-2 weeks, patient has been having generalized weakness and frequent fall. Have multiple bruises on body, admitted for sepsis 2/2 to feet ulcers,  family requesting inpatient hospice/comfort care  - to be discharged to [facility]    Left lateral malleolus infected appearing Ulcer   Sepsis   leukocytosis improved   US doppler ordered for LLE tenderness-- negative for DVT  -- PT wound consulted, wound recs appreciated    -ESR/CRP elevated   -- MRSA neg   - Blood Cx--NGTD   --Vascular , ID, podiatry consults appreciated, no surgical intervention   --abd changed to ancef (wound Cx reviewed), needs 2 week course, can d/c on TMP-SMX DS BID (end date Jan 8)  --patient unable to tolerate MRI ankle Left     Presumed UTI ? patient was complaining of dysuria   abx as above   UCx NGTD     Shortness of breath   12/23 CXR showing pulmonary edema   no wheezing on exam , appears comfortable   added Lasix   supplemental O2 to maintain O2 sat >92%   SpO2 95% on 4L NC 12/25/23        Frequent falls  advanced dementia , mostly bedbound , functional quadriplegia (wheelchair bound at home)   CT head    with no acute pathology. CT C spine with no acute fracture.       4mm right upper lobe pul nodule.   CT chest/abd/pelvis with no acute pathology. Emphysema +. Chronic fibrotic changes at lung apices.   no wheezing, lungs are CTAB       Questionable asymmetrical rectal wall thickening.   --per discussion with son, given age and dementia, family prefers not to pursue further investigation /treatment or aggressive measures.      Macrocytic anemia.   Iron panel relatively unremarkable, b12, folate WNL--no role for supplementation     SAE (acute kidney injury).  POA   resolved with IVF      Benign essential HTN/HLD   continue with current regimen       CAD (coronary artery disease).   --CAD s/p PCI s/p CABG  on aspirin, plavix, statin, BB.    Type 2 diabetes mellitus with unspecified complications. controlled   A1c 6.6%  continue with ISS for now      Dementia. suspected vascular dementia   CT head showing multiple old chronic strokes, including cerebellar strokes which would explain the gait instability    supportive care    Moderate PCM   nutrition consult appreciated     Preventative Measures   heparin SQ-dvt ppx  fall, aspiration precautions   HOBE     palliative consult appreciated >> patient DNR/DNI    89 years old female with h/o HTN, HLD, CAD, s/p CABG, DM, dementia, mod AS, s/p spinal stimulator placement present to ED with generalized weakness and frequent fall. For last 1-2 weeks, patient has been having generalized weakness and frequent fall. Have multiple bruises on body, admitted for sepsis 2/2 to feet ulcers,  family requesting inpatient hospice/comfort care  - to be discharged to [facility]    Left lateral malleolus infected appearing Ulcer   Sepsis   leukocytosis improved   US doppler ordered for LLE tenderness-- negative for DVT  -- PT wound consulted, wound recs appreciated    -ESR/CRP elevated   -- MRSA neg   - Blood Cx--NGTD   --Vascular , ID, podiatry consults appreciated, no surgical intervention   --abd changed to ancef (wound Cx reviewed), needs 2 week course, can d/c on TMP-SMX DS BID (end date Jan 8)  --patient unable to tolerate MRI ankle Left     Presumed UTI ? patient was complaining of dysuria   abx as above   UCx NGTD     Shortness of breath   12/23 CXR showing pulmonary edema   no wheezing on exam , appears comfortable   added Lasix   supplemental O2 to maintain O2 sat >92%   SpO2 95% on 4L NC 12/25/23        Frequent falls  advanced dementia , mostly bedbound , functional quadriplegia (wheelchair bound at home)   CT head    with no acute pathology. CT C spine with no acute fracture.       4mm right upper lobe pul nodule.   CT chest/abd/pelvis with no acute pathology. Emphysema +. Chronic fibrotic changes at lung apices.   no wheezing, lungs are CTAB       Questionable asymmetrical rectal wall thickening.   --per discussion with son, given age and dementia, family prefers not to pursue further investigation /treatment or aggressive measures.      Macrocytic anemia.   Iron panel relatively unremarkable, b12, folate WNL--no role for supplementation     SAE (acute kidney injury).  POA   resolved with IVF      Benign essential HTN/HLD   continue with current regimen       CAD (coronary artery disease).   --CAD s/p PCI s/p CABG  on aspirin, plavix, statin, BB.    Type 2 diabetes mellitus with unspecified complications. controlled   A1c 6.6%  continue with ISS for now      Dementia. suspected vascular dementia   CT head showing multiple old chronic strokes, including cerebellar strokes which would explain the gait instability    supportive care    Moderate PCM   nutrition consult appreciated     Preventative Measures   heparin SQ-dvt ppx  fall, aspiration precautions   HOBE     palliative consult appreciated >> patient DNR/DNI

## 2023-12-22 NOTE — PROGRESS NOTE ADULT - ASSESSMENT
Pt admitted for frequent falls, seen by vascular surgery for ulcer to lateral malleolus on left foot.  Dressing changed and wound cleaned by Dr House  Pt is bedbound, unable to straighten left leg.  Extensive conversation had with family at bedside about pt prognosis and what interventions can be offered.  Due to pt's multiple comorbidities and age and bed bound status, wound care of the ulcer is the only realistic intervention  family was receptive to this  additionally the pt's prognosis was discussed and meeting with palliative care to discuss GOC and code status was brought up  family is receptive and son Toby said he would like to be called.    no further vascular surgery intervention  daily wound care, dressing change  continue antibiotics, ID recommendations  Palliative care consult  RESHMA harkins for LE's

## 2023-12-22 NOTE — CONSULT NOTE ADULT - CONVERSATION DETAILS
Spoke with patient's son, Toby via phone [phone] as he was not available to speak in person.  Patient was living at home, has been declining eating less and sleeping more over the past month.  He is aware of issue with her wound and explained that ability to achieve wound healing is exceedingly remote.  Discussed options of [place] vs. home hospice and explained hospice and their philosophy of care.  Family agreeable to hospice referral being placed for home hospice, aware they would not offer wound care services if she goes home with hospice, do not want [place] at this time as they feel it is too far.  Discussed interventions such as CPR and intubation and that these interventions given her advancing illness and severe wound as well as debility make interventions such as CPR more burdensome than beneficial.  He said he is the HCP and has a copy which he will provide for the chart.  He and his wife Audrey were present for the conversation and wish to proceed with MOLST indicating DNR/I and request hospice referral for home hospice.  Continue current medical management at this time. Spoke with patient's son, Toby via phone [phone] as he was not available to speak in person.  Patient was living at home, has been declining eating less and sleeping more over the past month.  He is aware of issue with her wound and explained that ability to achieve wound healing is exceedingly remote.  Discussed options of [place] vs. home hospice and explained hospice and their philosophy of care.  Family agreeable to hospice referral being placed for home hospice, aware they would not offer wound care services if she goes home with hospice, do not want [place] at this time as they feel it is too far.  Discussed interventions such as CPR and intubation and that these interventions given her advancing illness and severe wound as well as debility make interventions such as CPR more burdensome than beneficial.  He said he is the HCP and has a copy which he will provide for the chart.  He and his wife Audrey were present for the conversation and wish to proceed with MOLST indicating DNR/I and request hospice referral for home hospice.  Continue current medical management at this time.

## 2023-12-22 NOTE — DISCHARGE NOTE PROVIDER - NSDCMRMEDTOKEN_GEN_ALL_CORE_FT
acetaminophen 325 mg oral tablet: 2 tab(s) orally every 6 hours, As needed, Temp greater or equal to 38C (100.4F), Moderate Pain (4 - 6)  amLODIPine 5 mg oral tablet: 1 tab(s) orally once a day  aspirin 81 mg oral tablet, chewable: 1 tab(s) orally once a day  atorvastatin 10 mg oral tablet: 1 tab(s) orally  clopidogrel 75 mg oral tablet: 1 tab(s) orally once a day  docusate sodium 100 mg oral capsule: 1 cap(s) orally 2 times a day  enalapril 5 mg oral tablet: 1 tab(s) orally  glimepiride 4 mg oral tablet: 1 tab(s) orally once a day  metoprolol tartrate 50 mg oral tablet: 1 tab(s) orally 2 times a day  mupirocin 2% topical ointment: Apply topically to affected area 3 times a day to patch on R shoulder  pantoprazole 40 mg oral delayed release tablet: 1 tab(s) orally once a day (before a meal)  sodium bicarbonate:    acetaminophen 325 mg oral tablet: 2 tab(s) orally every 6 hours, As needed, Temp greater or equal to 38C (100.4F), Moderate Pain (4 - 6)  amLODIPine 10 mg oral tablet: 1 tab(s) orally once a day  aspirin 81 mg oral tablet, chewable: 1 tab(s) orally once a day  atorvastatin 10 mg oral tablet: 1 tab(s) orally once a day  clopidogrel 75 mg oral tablet: 1 tab(s) orally once a day  docusate sodium 100 mg oral capsule: 1 cap(s) orally 2 times a day  glimepiride 4 mg oral tablet: 1 tab(s) orally once a day  Lasix 40 mg oral tablet: 1 tab(s) orally once a day  metoprolol tartrate 50 mg oral tablet: 1 tab(s) orally 2 times a day  morphine 10 mg/5 mL oral solution: 1.25 milliliter(s) orally every 4 hours As needed Moderate Pain (4 - 6)  morphine 10 mg/5 mL oral solution: 2.5 milliliter(s) orally every 4 hours As needed Severe Pain (7 - 10)  pantoprazole 40 mg oral delayed release tablet: 1 tab(s) orally once a day (before a meal)  polyethylene glycol 3350 oral powder for reconstitution: 17 gram(s) orally once a day  senna leaf extract oral tablet: 2 tab(s) orally once a day (at bedtime)

## 2023-12-22 NOTE — PROGRESS NOTE ADULT - NS ATTEND AMEND GEN_ALL_CORE FT
89 yoF with advanced dementia, bedbound, contracted with left leg ulcer.  Patient not participating in physical exam.   Nonpalpable pedal pulses with ulcerated infection left lateral malleolus.   Leukocytosis.     Met with patients' sons and daughter in law at bedside.   On exam, patient is contracted with infected left lateral malleolus wound with drainage and foul odor and surrounding erythema. A second pressure wound is present on the medial first toe on the left foot.   Likely worsened pressure wound.   Limited options for management. Local wound care but with likely progression of wound given underlying PAD (nonpalpable pulses) and possible worsening sepsis vs primary amputation. However, I do not believe she is a candidate for amputation and family agree against amputation.   Recommend continued local wound care with daily saline cleansing and change of dressing. pressure boots in bed.  continue abx  palliative consult.

## 2023-12-22 NOTE — PROGRESS NOTE ADULT - SUBJECTIVE AND OBJECTIVE BOX
AARON GREEN  MRN-[id number]    Follow Up:  left ankle infected wound     Interval History: the pt was seen and examined earlier, not in acute distress, pt is not awake or alert, rousable, does not answer questions and does not follow commands, screams at times when being moved around, pt's family is present. Pt is afebrile, on NC, WBC better 15.25.    PAST MEDICAL & SURGICAL HISTORY:  Angina  in 2005, s/p angioplasty with stent placement, no recurrance, no sequalae      Diabetes Mellitus  type 2      Arthritis, Infective, Knee      Detached Retina  right eye      Acute Mucous Pneumonia  4/2010, resolved ; no residual problems      Spinal Stenosis- lumbar      History of Back Surgery  2010      Basal Cell Cancer  removed from left neck 2009      Dementia      Detached Retina, Left  laser surgery in 1995      S/P Carpal Tunnel Release  bilateral hands in 1990      Trigger Finger  release of middle and ring finger of right hand in 1990, and middle finger left hand in 2008      S/P Laparoscopic Cholecystectomy  2008      S/P TKR (Total Knee Replacement)  left knee      Cataract  removal with lens implant right in 2000          ROS:    [x ] Unobtainable because: dementia   [ ] All other systems negative    Constitutional: no fever, no chills  Head: no trauma  Eyes: no vision changes, no eye pain  ENT:  no sore throat, no rhinorrhea  Cardiovascular:  no chest pain, no palpitation  Respiratory:  no SOB, no cough  GI:  no abd pain, no vomiting, no diarrhea  urinary: no dysuria, no hematuria, no flank pain  musculoskeletal:  no joint pain, no joint swelling  skin:  no rash  neurology:  no headache, no seizure, no change in mental status  psych: no anxiety, no depression         Allergies  iodine (Other)  contrast dye -  rash (Other)  Pineapple (Unknown)  penicillin (Rash)  codeine (Vomiting)  latex (Rash)        ANTIMICROBIALS:  cefepime   IVPB    cefepime   IVPB 1000 every 24 hours  vancomycin  IVPB 750 every 24 hours      OTHER MEDS:  acetaminophen     Tablet .. 650 milliGRAM(s) Oral every 6 hours PRN  amLODIPine   Tablet 10 milliGRAM(s) Oral daily  aspirin  chewable 81 milliGRAM(s) Oral daily  atorvastatin 10 milliGRAM(s) Oral at bedtime  clopidogrel Tablet 75 milliGRAM(s) Oral daily  collagenase Ointment 1 Application(s) Topical daily  dextrose 5%. 1000 milliLiter(s) IV Continuous <Continuous>  dextrose 5%. 1000 milliLiter(s) IV Continuous <Continuous>  dextrose 50% Injectable 12.5 Gram(s) IV Push once  dextrose 50% Injectable 25 Gram(s) IV Push once  dextrose 50% Injectable 25 Gram(s) IV Push once  dextrose Oral Gel 15 Gram(s) Oral once PRN  glucagon  Injectable 1 milliGRAM(s) IntraMuscular once  heparin   Injectable 5000 Unit(s) SubCutaneous every 12 hours  insulin lispro (ADMELOG) corrective regimen sliding scale   SubCutaneous three times a day before meals  LORazepam   Injectable 0.5 milliGRAM(s) IV Push every 8 hours PRN  melatonin 3 milliGRAM(s) Oral at bedtime PRN  metoprolol tartrate 50 milliGRAM(s) Oral two times a day  pantoprazole    Tablet 40 milliGRAM(s) Oral before breakfast  polyethylene glycol 3350 17 Gram(s) Oral daily  senna 2 Tablet(s) Oral at bedtime      Vital Signs Last 24 Hrs  T(C): 36.6 (22 Dec 2023 11:44), Max: 37.1 (22 Dec 2023 00:01)  T(F): 97.9 (22 Dec 2023 11:44), Max: 98.8 (22 Dec 2023 00:01)  HR: 96 (22 Dec 2023 11:44) (96 - 110)  BP: 124/63 (22 Dec 2023 11:44) (100/58 - 132/77)  BP(mean): --  RR: 18 (22 Dec 2023 11:44) (18 - 18)  SpO2: 95% (22 Dec 2023 11:44) (95% - 96%)        Physical Exam:  General:    NAD, non toxic, NC  Head: atraumatic, normocephalic  Eyes: unable to assess pt's eyes due to non-compliance   ENT:   missing teeth, + thrush, no noted oral lesions, neck supple  Cardio:    regular S1,S2, no murmur  Respiratory:   clear to auscultation b/l, no wheezing  abd:   soft, BS +, not tender, no distention  :     no CVAT, no suprapubic tenderness  Musculoskeletal : Left Lateral malleolar region with open wound round about 4 x 5 cm, malodorous, drainage is present on the dressing, seems to be tender to touch, probing to the bone; contracted   Skin:    no rash, warm to touch, PIV ok  Neurologic:  does not answer questions, does not follow commands  Pscych: screams when moved around, otherwise calm     WBC Count: 15.25 K/uL (12-22 @ 09:42)  WBC Count: 17.61 K/uL (12-21 @ 05:50)  WBC Count: 16.27 K/uL (12-20 @ 06:35)  WBC Count: 15.41 K/uL (12-19 @ 07:07)  WBC Count: 11.43 K/uL (12-17 @ 07:10)  WBC Count: 10.76 K/uL (12-16 @ 07:30)                            8.5    15.25 )-----------( 373      ( 22 Dec 2023 09:42 )             25.0       12-22    137  |  105  |  24<H>  ----------------------------<  208<H>  3.6   |  21<L>  |  1.25    Ca    8.4<L>      22 Dec 2023 09:42  Phos  3.1     12-22  Mg     1.7     12-22    TPro  5.9<L>  /  Alb  1.9<L>  /  TBili  0.6  /  DBili  x   /  AST  35  /  ALT  33  /  AlkPhos  94  12-22      Urinalysis Basic - ( 22 Dec 2023 09:42 )    Color: x / Appearance: x / SG: x / pH: x  Gluc: 208 mg/dL / Ketone: x  / Bili: x / Urobili: x   Blood: x / Protein: x / Nitrite: x   Leuk Esterase: x / RBC: x / WBC x   Sq Epi: x / Non Sq Epi: x / Bacteria: x        Creatinine Trend: 1.25<--, 1.31<--, 1.04<--, 1.06<--, 0.93<--, 1.20<--      MICROBIOLOGY:  v  .Blood Blood-Peripheral  12-20-23   No growth at 24 hours  --  --      .Blood Blood-Peripheral  12-20-23   No growth at 24 hours  --  --      Clean Catch Clean Catch (Midstream)  12-20-23   No growth  --  --    C-Reactive Protein, Serum: 172 (12-21)    Ferritin: 365 (12-16)      SARS-CoV-2 Result: NotDetec (12-20-23 @ 14:39)      RADIOLOGY:     AARON GREEN  MRN-[id number]    Follow Up:  left ankle infected wound     Interval History: the pt was seen and examined earlier, not in acute distress, pt is not awake or alert, rousable, does not answer questions and does not follow commands, screams at times when being moved around, pt's family is present. Pt is afebrile, on NC, WBC better 15.25.    PAST MEDICAL & SURGICAL HISTORY:  Angina  in 2005, s/p angioplasty with stent placement, no recurrance, no sequalae      Diabetes Mellitus  type 2      Arthritis, Infective, Knee      Detached Retina  right eye      Acute Mucous Pneumonia  4/2010, resolved ; no residual problems      Spinal Stenosis- lumbar      History of Back Surgery  2010      Basal Cell Cancer  removed from left neck 2009      Dementia      Detached Retina, Left  laser surgery in 1995      S/P Carpal Tunnel Release  bilateral hands in 1990      Trigger Finger  release of middle and ring finger of right hand in 1990, and middle finger left hand in 2008      S/P Laparoscopic Cholecystectomy  2008      S/P TKR (Total Knee Replacement)  left knee      Cataract  removal with lens implant right in 2000          ROS:    [x ] Unobtainable because: dementia   [ ] All other systems negative    Constitutional: no fever, no chills  Head: no trauma  Eyes: no vision changes, no eye pain  ENT:  no sore throat, no rhinorrhea  Cardiovascular:  no chest pain, no palpitation  Respiratory:  no SOB, no cough  GI:  no abd pain, no vomiting, no diarrhea  urinary: no dysuria, no hematuria, no flank pain  musculoskeletal:  no joint pain, no joint swelling  skin:  no rash  neurology:  no headache, no seizure, no change in mental status  psych: no anxiety, no depression         Allergies  iodine (Other)  contrast dye -  rash (Other)  Pineapple (Unknown)  penicillin (Rash)  codeine (Vomiting)  latex (Rash)        ANTIMICROBIALS:  cefepime   IVPB    cefepime   IVPB 1000 every 24 hours  vancomycin  IVPB 750 every 24 hours      OTHER MEDS:  acetaminophen     Tablet .. 650 milliGRAM(s) Oral every 6 hours PRN  amLODIPine   Tablet 10 milliGRAM(s) Oral daily  aspirin  chewable 81 milliGRAM(s) Oral daily  atorvastatin 10 milliGRAM(s) Oral at bedtime  clopidogrel Tablet 75 milliGRAM(s) Oral daily  collagenase Ointment 1 Application(s) Topical daily  dextrose 5%. 1000 milliLiter(s) IV Continuous <Continuous>  dextrose 5%. 1000 milliLiter(s) IV Continuous <Continuous>  dextrose 50% Injectable 12.5 Gram(s) IV Push once  dextrose 50% Injectable 25 Gram(s) IV Push once  dextrose 50% Injectable 25 Gram(s) IV Push once  dextrose Oral Gel 15 Gram(s) Oral once PRN  glucagon  Injectable 1 milliGRAM(s) IntraMuscular once  heparin   Injectable 5000 Unit(s) SubCutaneous every 12 hours  insulin lispro (ADMELOG) corrective regimen sliding scale   SubCutaneous three times a day before meals  LORazepam   Injectable 0.5 milliGRAM(s) IV Push every 8 hours PRN  melatonin 3 milliGRAM(s) Oral at bedtime PRN  metoprolol tartrate 50 milliGRAM(s) Oral two times a day  pantoprazole    Tablet 40 milliGRAM(s) Oral before breakfast  polyethylene glycol 3350 17 Gram(s) Oral daily  senna 2 Tablet(s) Oral at bedtime      Vital Signs Last 24 Hrs  T(C): 36.6 (22 Dec 2023 11:44), Max: 37.1 (22 Dec 2023 00:01)  T(F): 97.9 (22 Dec 2023 11:44), Max: 98.8 (22 Dec 2023 00:01)  HR: 96 (22 Dec 2023 11:44) (96 - 110)  BP: 124/63 (22 Dec 2023 11:44) (100/58 - 132/77)  BP(mean): --  RR: 18 (22 Dec 2023 11:44) (18 - 18)  SpO2: 95% (22 Dec 2023 11:44) (95% - 96%)        Physical Exam:  General:    NAD, non toxic, NC  Head: atraumatic, normocephalic  Eyes: unable to assess pt's eyes due to non-compliance   ENT:   missing teeth, + thrush, no noted oral lesions, neck supple  Cardio:    regular S1,S2, no murmur  Respiratory:   clear to auscultation b/l, no wheezing  abd:   soft, BS +, not tender, no distention  :     no CVAT, no suprapubic tenderness  Musculoskeletal : Left Lateral malleolar region with open wound round about 4 x 5 cm, malodorous, drainage is present on the dressing, seems to be tender to touch, probing to the bone; contracted   Skin:    no rash, warm to touch, PIV ok  Neurologic:  does not answer questions, does not follow commands  Pscych: screams when moved around, otherwise calm     WBC Count: 15.25 K/uL (12-22 @ 09:42)  WBC Count: 17.61 K/uL (12-21 @ 05:50)  WBC Count: 16.27 K/uL (12-20 @ 06:35)  WBC Count: 15.41 K/uL (12-19 @ 07:07)  WBC Count: 11.43 K/uL (12-17 @ 07:10)  WBC Count: 10.76 K/uL (12-16 @ 07:30)                            8.5    15.25 )-----------( 373      ( 22 Dec 2023 09:42 )             25.0       12-22    137  |  105  |  24<H>  ----------------------------<  208<H>  3.6   |  21<L>  |  1.25    Ca    8.4<L>      22 Dec 2023 09:42  Phos  3.1     12-22  Mg     1.7     12-22    TPro  5.9<L>  /  Alb  1.9<L>  /  TBili  0.6  /  DBili  x   /  AST  35  /  ALT  33  /  AlkPhos  94  12-22      Urinalysis Basic - ( 22 Dec 2023 09:42 )    Color: x / Appearance: x / SG: x / pH: x  Gluc: 208 mg/dL / Ketone: x  / Bili: x / Urobili: x   Blood: x / Protein: x / Nitrite: x   Leuk Esterase: x / RBC: x / WBC x   Sq Epi: x / Non Sq Epi: x / Bacteria: x        Creatinine Trend: 1.25<--, 1.31<--, 1.04<--, 1.06<--, 0.93<--, 1.20<--      MICROBIOLOGY:  v  .Blood Blood-Peripheral  12-20-23   No growth at 24 hours  --  --      .Blood Blood-Peripheral  12-20-23   No growth at 24 hours  --  --      Clean Catch Clean Catch (Midstream)  12-20-23   No growth  --  --    C-Reactive Protein, Serum: 172 (12-21)    Ferritin: 365 (12-16)      SARS-CoV-2 Result: NotDetec (12-20-23 @ 14:39)      RADIOLOGY:

## 2023-12-22 NOTE — PROGRESS NOTE ADULT - NS ATTEND AMEND GEN_ALL_CORE FT
All labs and cultures and imaging and pertinent chart notes reviewed by me.    case d/w Np Bibi at length and agree with her assessment and plan.  Edgar Tobias MD  Infectious Disease Attending         is covering the ID service for the next 4 days and if any questions please contact him  either via teams or by calling [phone] All labs and cultures and imaging and pertinent chart notes reviewed by me.    case d/w Np Bibi at length and agree with her assessment and plan.  Edgar Tobias MD  Infectious Disease Attending         is covering the ID service for the next 4 days and if any questions please contact him  either via teams or by calling [phone]

## 2023-12-22 NOTE — CONSULT NOTE ADULT - PROBLEM SELECTOR RECOMMENDATION 5
See GOC above.  Patient's son, Toby FLORES, will provide for the chart.  MOLST drafted indicating DNR/I, home hospice referral requested.  Wish to continue IV abx at present.  Palliative to follow up with family on Wednesday.    Discussed with Dr. Scott

## 2023-12-22 NOTE — CONSULT NOTE ADULT - ASSESSMENT
89F presents with Left ankle lateral malleolus wound to bone   - Patient seen and evaluated   - Afebrile, WBC 15.25, ESR 81, CRP 17.2  - Left ankle lateral malleolus wound to bone, fibroischemic wound bed, periwound erythema, no malodor, no drainage. Left foot hallux medial eminence deep tissue injury. No open lesions or signs of infection of right lower extremity.   - Left lower extremity xray: no OM, no gas   - Left ankle MRI pending   - Left ankle wound cultured   - Ordered z flow boots, strict offloading of heels at all times   - Vasc recs, appreciated  - ID recs, appreciated   - No acute podiatric intervention   - Pod Plan local wound care, wound care orders placed   - Wound care instruction and follow up information in discharge note provider  - Discussed with attending  89F presents with Left ankle lateral malleolus wound to bone   - Patient seen and evaluated   - Afebrile, WBC 15.25, ESR 81, CRP 17.2  - Left ankle lateral malleolus wound to bone, fibroischemic wound bed, periwound erythema, no malodor, no drainage. Left foot hallux medial eminence deep tissue injury. No open lesions or signs of infection of right lower extremity.   - Left lower extremity xray: no OM, no gas   - Left ankle MRI pending, if no surgical intervention per vascular, not indicated   - Left ankle wound cultured   - Ordered z flow boots, strict offloading of heels at all times   - Vasc recs, appreciated  - ID recs, appreciated   - No acute podiatric intervention, please reconsult as necessary   - Pod Plan local wound care, wound care orders placed   - Wound care instruction and follow up information in discharge note provider  - Discussed with attending

## 2023-12-22 NOTE — CONSULT NOTE ADULT - SUBJECTIVE AND OBJECTIVE BOX
Patient is a 89y old  Female who presents with a chief complaint of frequent fall (22 Dec 2023 16:03)      HPI:  89 years old female with h/o HTN, HLD, CAD, DM, dementia, s/p spinal stimulator placement present to ED with generalized weakness and frequent fall. Patient at baseline ambulate with walker with assistance. For last 1-2 weeks, patient has been having generalized weakness and frequent fall. Have multiple bruises on body. Denied any fever, chills, nausea, vomiting or diarrhea.  Per son Toby ( [phone]) who stated that he is HCP. Patient has been having fall over last one years but more frequent lately. Patient normally ambulate short distance with walker with assistance and use wheelchair to go around. Patient usually attempts to get out of bed when family is not around resulting in fall. Patient also has been refusion to eat lately as well  Hemodynamically stable, afebrile, sat well at RA. WBC 14.45, Hb 10.5, .6, plt 413, Cr 1.42. CT head with no acute pathology. CT C spine with no acute fracture. 4mm right upper lobe pul nodule. CT chest/abd/pelvis with no acute pathology. Emphysema +. Chronic fibrotic changes at lung apices. Questional asymmetrical rectal wall thickening.  (15 Dec 2023 12:56)      PAST MEDICAL & SURGICAL HISTORY:  Angina  in 2005, s/p angioplasty with stent placement, no recurrance, no sequalae      Diabetes Mellitus  type 2      Arthritis, Infective, Knee      Detached Retina  right eye      Acute Mucous Pneumonia  4/2010, resolved ; no residual problems      Spinal Stenosis- lumbar      History of Back Surgery  2010      Basal Cell Cancer  removed from left neck 2009      Dementia      Detached Retina, Left  laser surgery in 1995      S/P Carpal Tunnel Release  bilateral hands in 1990      Trigger Finger  release of middle and ring finger of right hand in 1990, and middle finger left hand in 2008      S/P Laparoscopic Cholecystectomy  2008      S/P TKR (Total Knee Replacement)  left knee      Cataract  removal with lens implant right in 2000          MEDICATIONS  (STANDING):  amLODIPine   Tablet 10 milliGRAM(s) Oral daily  aspirin  chewable 81 milliGRAM(s) Oral daily  atorvastatin 10 milliGRAM(s) Oral at bedtime  cefepime   IVPB      cefepime   IVPB 1000 milliGRAM(s) IV Intermittent every 24 hours  clopidogrel Tablet 75 milliGRAM(s) Oral daily  collagenase Ointment 1 Application(s) Topical daily  dextrose 5%. 1000 milliLiter(s) (50 mL/Hr) IV Continuous <Continuous>  dextrose 5%. 1000 milliLiter(s) (100 mL/Hr) IV Continuous <Continuous>  dextrose 50% Injectable 12.5 Gram(s) IV Push once  dextrose 50% Injectable 25 Gram(s) IV Push once  dextrose 50% Injectable 25 Gram(s) IV Push once  glucagon  Injectable 1 milliGRAM(s) IntraMuscular once  heparin   Injectable 5000 Unit(s) SubCutaneous every 12 hours  insulin lispro (ADMELOG) corrective regimen sliding scale   SubCutaneous three times a day before meals  metoprolol tartrate 50 milliGRAM(s) Oral two times a day  nystatin    Suspension 388456 Unit(s) Oral four times a day  pantoprazole    Tablet 40 milliGRAM(s) Oral before breakfast  polyethylene glycol 3350 17 Gram(s) Oral daily  senna 2 Tablet(s) Oral at bedtime  vancomycin  IVPB 750 milliGRAM(s) IV Intermittent every 24 hours    MEDICATIONS  (PRN):  acetaminophen     Tablet .. 650 milliGRAM(s) Oral every 6 hours PRN Mild Pain (1 - 3), Moderate Pain (4 - 6)  dextrose Oral Gel 15 Gram(s) Oral once PRN Blood Glucose LESS THAN 70 milliGRAM(s)/deciliter  LORazepam   Injectable 0.5 milliGRAM(s) IV Push every 8 hours PRN Agitation  melatonin 3 milliGRAM(s) Oral at bedtime PRN Insomnia      Allergies    iodine (Other)  contrast dye -  rash (Other)  Pineapple (Unknown)  penicillin (Rash)  codeine (Vomiting)  latex (Rash)    Intolerances    Tolerates ceftriaxone, cefepime (Other)      VITALS:    Vital Signs Last 24 Hrs  T(C): 36.6 (22 Dec 2023 11:44), Max: 37.1 (22 Dec 2023 00:01)  T(F): 97.9 (22 Dec 2023 11:44), Max: 98.8 (22 Dec 2023 00:01)  HR: 96 (22 Dec 2023 11:44) (96 - 110)  BP: 124/63 (22 Dec 2023 11:44) (100/58 - 132/77)  BP(mean): --  RR: 18 (22 Dec 2023 11:44) (18 - 18)  SpO2: 95% (22 Dec 2023 11:44) (95% - 96%)        LABS:                          8.5    15.25 )-----------( 373      ( 22 Dec 2023 09:42 )             25.0       12-22    137  |  105  |  24<H>  ----------------------------<  208<H>  3.6   |  21<L>  |  1.25    Ca    8.4<L>      22 Dec 2023 09:42  Phos  3.1     12-22  Mg     1.7     12-22    TPro  5.9<L>  /  Alb  1.9<L>  /  TBili  0.6  /  DBili  x   /  AST  35  /  ALT  33  /  AlkPhos  94  12-22      CAPILLARY BLOOD GLUCOSE      POCT Blood Glucose.: 143 mg/dL (22 Dec 2023 16:26)  POCT Blood Glucose.: 175 mg/dL (22 Dec 2023 10:59)  POCT Blood Glucose.: 214 mg/dL (22 Dec 2023 08:20)  POCT Blood Glucose.: 217 mg/dL (21 Dec 2023 21:10)          LOWER EXTREMITY PHYSICAL EXAM:    Vascular: DP/PT 0/4, B/L, Temperature gradient warm to cool, B/L.   Neuro: unable to assess due to patient's condition   Musculoskeletal/Ortho: unable to assess due to patient's condition   Skin: Left ankle lateral malleolus wound to bone, fibroischemic wound bed, periwound erythema, no malodor, no drainage. Left foot hallux medial eminence deep tissue injury. No open lesions or signs of infection of right lower extremity.         RADIOLOGY & ADDITIONAL STUDIES:    < from: Xray Foot AP + Lateral + Oblique, Left (12.15.23 @ 11:20) >    ACC: [id number] EXAM:  XR TIB FIB AP LAT 2 VIEWS LT   ORDERED BY: LEXII STRINGER     ACC: [id number] EXAM:  XR FOOT COMP MIN 3 VIEWS LT   ORDERED BY: LEXII STRINGER     ACC: [id number] EXAM:  XR ANKLE COMP MIN 3 VIEWS LT   ORDERED BY: LEXII STRINGER     PROCEDURE DATE:  12/15/2023          INTERPRETATION:  Left ankle 2 views, left foot 2 views, left tib-fib 2   views    CLINICAL HISTORY: Unwitnessed fall with bruising, rule out fracture    FINDINGS: The bone is diffusely demineralized. Degenerative changes of   multiple interphalangeal joints are identified.    Patient has a left total knee replacement with components as visualized   in position.    No evidence of fractures or suspicious lesion on limited projections.    The soft tissues with vascular calcification and soft tissue wasting.    IMPRESSION: Demineralized bone. No definite fractures. Prior left total   knee replacement.    --- End of Report ---            LARS KEVIN MD; Attending Radiologist  This document has been electronically signed. Dec 16 2023  9:12AM    < end of copied text >   Patient is a 89y old  Female who presents with a chief complaint of frequent fall (22 Dec 2023 16:03)      HPI:  89 years old female with h/o HTN, HLD, CAD, DM, dementia, s/p spinal stimulator placement present to ED with generalized weakness and frequent fall. Patient at baseline ambulate with walker with assistance. For last 1-2 weeks, patient has been having generalized weakness and frequent fall. Have multiple bruises on body. Denied any fever, chills, nausea, vomiting or diarrhea.  Per son Toby ( [phone]) who stated that he is HCP. Patient has been having fall over last one years but more frequent lately. Patient normally ambulate short distance with walker with assistance and use wheelchair to go around. Patient usually attempts to get out of bed when family is not around resulting in fall. Patient also has been refusion to eat lately as well  Hemodynamically stable, afebrile, sat well at RA. WBC 14.45, Hb 10.5, .6, plt 413, Cr 1.42. CT head with no acute pathology. CT C spine with no acute fracture. 4mm right upper lobe pul nodule. CT chest/abd/pelvis with no acute pathology. Emphysema +. Chronic fibrotic changes at lung apices. Questional asymmetrical rectal wall thickening.  (15 Dec 2023 12:56)      PAST MEDICAL & SURGICAL HISTORY:  Angina  in 2005, s/p angioplasty with stent placement, no recurrance, no sequalae      Diabetes Mellitus  type 2      Arthritis, Infective, Knee      Detached Retina  right eye      Acute Mucous Pneumonia  4/2010, resolved ; no residual problems      Spinal Stenosis- lumbar      History of Back Surgery  2010      Basal Cell Cancer  removed from left neck 2009      Dementia      Detached Retina, Left  laser surgery in 1995      S/P Carpal Tunnel Release  bilateral hands in 1990      Trigger Finger  release of middle and ring finger of right hand in 1990, and middle finger left hand in 2008      S/P Laparoscopic Cholecystectomy  2008      S/P TKR (Total Knee Replacement)  left knee      Cataract  removal with lens implant right in 2000          MEDICATIONS  (STANDING):  amLODIPine   Tablet 10 milliGRAM(s) Oral daily  aspirin  chewable 81 milliGRAM(s) Oral daily  atorvastatin 10 milliGRAM(s) Oral at bedtime  cefepime   IVPB      cefepime   IVPB 1000 milliGRAM(s) IV Intermittent every 24 hours  clopidogrel Tablet 75 milliGRAM(s) Oral daily  collagenase Ointment 1 Application(s) Topical daily  dextrose 5%. 1000 milliLiter(s) (50 mL/Hr) IV Continuous <Continuous>  dextrose 5%. 1000 milliLiter(s) (100 mL/Hr) IV Continuous <Continuous>  dextrose 50% Injectable 12.5 Gram(s) IV Push once  dextrose 50% Injectable 25 Gram(s) IV Push once  dextrose 50% Injectable 25 Gram(s) IV Push once  glucagon  Injectable 1 milliGRAM(s) IntraMuscular once  heparin   Injectable 5000 Unit(s) SubCutaneous every 12 hours  insulin lispro (ADMELOG) corrective regimen sliding scale   SubCutaneous three times a day before meals  metoprolol tartrate 50 milliGRAM(s) Oral two times a day  nystatin    Suspension 195165 Unit(s) Oral four times a day  pantoprazole    Tablet 40 milliGRAM(s) Oral before breakfast  polyethylene glycol 3350 17 Gram(s) Oral daily  senna 2 Tablet(s) Oral at bedtime  vancomycin  IVPB 750 milliGRAM(s) IV Intermittent every 24 hours    MEDICATIONS  (PRN):  acetaminophen     Tablet .. 650 milliGRAM(s) Oral every 6 hours PRN Mild Pain (1 - 3), Moderate Pain (4 - 6)  dextrose Oral Gel 15 Gram(s) Oral once PRN Blood Glucose LESS THAN 70 milliGRAM(s)/deciliter  LORazepam   Injectable 0.5 milliGRAM(s) IV Push every 8 hours PRN Agitation  melatonin 3 milliGRAM(s) Oral at bedtime PRN Insomnia      Allergies    iodine (Other)  contrast dye -  rash (Other)  Pineapple (Unknown)  penicillin (Rash)  codeine (Vomiting)  latex (Rash)    Intolerances    Tolerates ceftriaxone, cefepime (Other)      VITALS:    Vital Signs Last 24 Hrs  T(C): 36.6 (22 Dec 2023 11:44), Max: 37.1 (22 Dec 2023 00:01)  T(F): 97.9 (22 Dec 2023 11:44), Max: 98.8 (22 Dec 2023 00:01)  HR: 96 (22 Dec 2023 11:44) (96 - 110)  BP: 124/63 (22 Dec 2023 11:44) (100/58 - 132/77)  BP(mean): --  RR: 18 (22 Dec 2023 11:44) (18 - 18)  SpO2: 95% (22 Dec 2023 11:44) (95% - 96%)        LABS:                          8.5    15.25 )-----------( 373      ( 22 Dec 2023 09:42 )             25.0       12-22    137  |  105  |  24<H>  ----------------------------<  208<H>  3.6   |  21<L>  |  1.25    Ca    8.4<L>      22 Dec 2023 09:42  Phos  3.1     12-22  Mg     1.7     12-22    TPro  5.9<L>  /  Alb  1.9<L>  /  TBili  0.6  /  DBili  x   /  AST  35  /  ALT  33  /  AlkPhos  94  12-22      CAPILLARY BLOOD GLUCOSE      POCT Blood Glucose.: 143 mg/dL (22 Dec 2023 16:26)  POCT Blood Glucose.: 175 mg/dL (22 Dec 2023 10:59)  POCT Blood Glucose.: 214 mg/dL (22 Dec 2023 08:20)  POCT Blood Glucose.: 217 mg/dL (21 Dec 2023 21:10)          LOWER EXTREMITY PHYSICAL EXAM:    Vascular: DP/PT 0/4, B/L, Temperature gradient warm to cool, B/L.   Neuro: unable to assess due to patient's condition   Musculoskeletal/Ortho: unable to assess due to patient's condition   Skin: Left ankle lateral malleolus wound to bone, fibroischemic wound bed, periwound erythema, no malodor, no drainage. Left foot hallux medial eminence deep tissue injury. No open lesions or signs of infection of right lower extremity.         RADIOLOGY & ADDITIONAL STUDIES:    < from: Xray Foot AP + Lateral + Oblique, Left (12.15.23 @ 11:20) >    ACC: [id number] EXAM:  XR TIB FIB AP LAT 2 VIEWS LT   ORDERED BY: LEXII STRINGER     ACC: [id number] EXAM:  XR FOOT COMP MIN 3 VIEWS LT   ORDERED BY: LEXII STRINGER     ACC: [id number] EXAM:  XR ANKLE COMP MIN 3 VIEWS LT   ORDERED BY: LEXII STRINGER     PROCEDURE DATE:  12/15/2023          INTERPRETATION:  Left ankle 2 views, left foot 2 views, left tib-fib 2   views    CLINICAL HISTORY: Unwitnessed fall with bruising, rule out fracture    FINDINGS: The bone is diffusely demineralized. Degenerative changes of   multiple interphalangeal joints are identified.    Patient has a left total knee replacement with components as visualized   in position.    No evidence of fractures or suspicious lesion on limited projections.    The soft tissues with vascular calcification and soft tissue wasting.    IMPRESSION: Demineralized bone. No definite fractures. Prior left total   knee replacement.    --- End of Report ---            LARS KEVIN MD; Attending Radiologist  This document has been electronically signed. Dec 16 2023  9:12AM    < end of copied text >

## 2023-12-22 NOTE — DISCHARGE NOTE PROVIDER - NSDCFUADDAPPT_GEN_ALL_CORE_FT
Podiatry Discharge Instructions:  Follow up: Please follow up with Dr. Waterhouse within 1 week of discharge from the hospital, please call [phone] for appointment and discuss that you recently were seen in the hospital.  Wound Care: Please apply betadine to left ankle lateral malleolus wound followed by 4x4 gauze, ABD pad, and tra daily.   Weight bearing: Please offload heels at all times with z flow boots.   Antibiotics: Please continue as instructed. Podiatry Discharge Instructions:  Follow up: Please follow up with Dr. Waterhouse within 1 week of discharge from the hospital, please call [phone] for appointment and discuss that you recently were seen in the hospital.  Wound Care: Please apply betadine to left ankle lateral malleolus wound followed by 4x4 gauze, ABD pad, and tra daily.   Weight bearing: Please offload heels at all times with z flow boots.   Antibiotics: Please continue as instructed.

## 2023-12-22 NOTE — DISCHARGE NOTE PROVIDER - DETAILS OF MALNUTRITION DIAGNOSIS/DIAGNOSES
This patient has been assessed with a concern for Malnutrition and was treated during this hospitalization for the following Nutrition diagnosis/diagnoses:     -  12/17/2023: Moderate protein-calorie malnutrition

## 2023-12-22 NOTE — DISCHARGE NOTE PROVIDER - NSDCCPCAREPLAN_GEN_ALL_CORE_FT
PRINCIPAL DISCHARGE DIAGNOSIS  Diagnosis: Weakness  Assessment and Plan of Treatment:       SECONDARY DISCHARGE DIAGNOSES  Diagnosis: Multiple falls  Assessment and Plan of Treatment:

## 2023-12-22 NOTE — CONSULT NOTE ADULT - PROBLEM SELECTOR RECOMMENDATION 3
advancing dementia, lethargic and minimally responsive, mental status likely worse in setting of infection, unfamiliar environment  appears appropriate for hospice if within GOC Clinical evidence indicates that the patient has Moderate protein calorie malnutrition/ 2nd degree  In context of Chronic Illness (>1 month)  Energy/Food intake <75% of estimated energy requirement >7 days  Weight loss: Mild  (lbs lost recently)  Body Fat loss: Mild    Muscle mass loss: Mild   Fluid Accumulation: Mild    Strength: weakened Mild     Recommend:   c/w soft diet, aspiration precautions, keep head of bed at least 45 degrees during feeding

## 2023-12-22 NOTE — DISCHARGE NOTE PROVIDER - DISCHARGE SERVICE FOR PATIENT
Patient tolerated procedure well. on the discharge service for the patient. I have reviewed and made amendments to the documentation where necessary. Patient tolerated procedure well.

## 2023-12-23 NOTE — PROGRESS NOTE ADULT - ASSESSMENT
89 years old female with h/o HTN, HLD, CAD, DM, dementia, s/p spinal stimulator placement present to ED with generalized weakness and frequent fall. Patient at baseline ambulate with walker with assistance. For last 1-2 weeks, patient has been having generalized weakness and frequent fall. Have multiple bruises on body.  Per son Toby ( [phone]) who stated that he is HCP. Patient has been having fall over last one years but more frequent lately. Patient normally ambulate short distance with walker with assistance and use wheelchair to go around. Patient usually attempts to get out of bed when family is not around resulting in fall. Patient also has been refusion to eat lately as well.   Hemodynamically stable, afebrile, sat well at RA. WBC 14.45, Hb 10.5, .6, plt 413, Cr 1.42. CT head with no acute pathology. CT C spine with no acute fracture. 4mm right upper lobe pul nodule. CT chest/abd/pelvis with no acute pathology. Emphysema +. Chronic fibrotic changes at lung apices. Questional asymmetrical rectal wall thickening.     Assessment and Plan:     Left lateral malleolus infected appearing Ulcer   Sepsis   leukocytosis improved   US doppler ordered for LLE tenderness-- negative for DVT  -- PT wound consulted, wound recs appreciated    -ESR/CRP elevated   -- MRSA neg   - Blood Cx--NGTD   --Vascular , ID, podiatry consults appreciated, no surgical intervention   --c/w cefepime and Vancomycin   --awaiting MRI ankle Left     Presumed UTI ? patient was complaining of dysuria , unclear though as she is a poor historian   abx as above   UCx NGTD      Frequent falls  advanced dementia , mostly bedbound , functional quadriplegia (wheelchair bound at home)   CT head    with no acute pathology. CT C spine with no acute fracture.       4mm right upper lobe pul nodule.   CT chest/abd/pelvis with no acute pathology. Emphysema +. Chronic fibrotic changes at lung apices.   no wheezing, lungs are CTAB       Questionable asymmetrical rectal wall thickening.   --per my discussion with son, given age and dementia, family prefers not to pursue further investigation /treatment or aggressive measures.      Macrocytic anemia.   Iron panel relatively unremarkable, b12, folate WNL--no role for supplementation   TSH OK    SAE (acute kidney injury).  POA   resolved with IVF      Benign essential HTN/HLD   continue with current regimen       CAD (coronary artery disease).   --CAD s/p PCI s/p CABG  on aspirin, plavix, statin, BB.    Type 2 diabetes mellitus with unspecified complications. controlled   A1c 6.6%  continue with ISS   avoid hypoglycemia       Dementia. suspected vascular dementia   CT head showing multiple old chronic strokes, including cerebellar strokes which would explain the gait instability    supportive care    Moderate PCM   nutrition consult appreciated     Preventative Measures   heparin SQ-dvt ppx  fall, aspiration precautions   HOBE     palliative consult appreciated >> patient DNR/DNI      89 years old female with h/o HTN, HLD, CAD, DM, dementia, s/p spinal stimulator placement present to ED with generalized weakness and frequent fall. Patient at baseline ambulate with walker with assistance. For last 1-2 weeks, patient has been having generalized weakness and frequent fall. Have multiple bruises on body.  Per son Toby ( [phone]) who stated that he is HCP. Patient has been having fall over last one years but more frequent lately. Patient normally ambulate short distance with walker with assistance and use wheelchair to go around. Patient usually attempts to get out of bed when family is not around resulting in fall. Patient also has been refusion to eat lately as well.   Hemodynamically stable, afebrile, sat well at RA. WBC 14.45, Hb 10.5, .6, plt 413, Cr 1.42. CT head with no acute pathology. CT C spine with no acute fracture. 4mm right upper lobe pul nodule. CT chest/abd/pelvis with no acute pathology. Emphysema +. Chronic fibrotic changes at lung apices. Questional asymmetrical rectal wall thickening.     Assessment and Plan:     Left lateral malleolus infected appearing Ulcer   Sepsis   leukocytosis improved   US doppler ordered for LLE tenderness-- negative for DVT  -- PT wound consulted, wound recs appreciated    -ESR/CRP elevated   -- MRSA neg   - Blood Cx--NGTD   --Vascular , ID, podiatry consults appreciated, no surgical intervention   --c/w cefepime and Vancomycin   --awaiting MRI ankle Left     Presumed UTI ? patient was complaining of dysuria , unclear though as she is a poor historian   abx as above   UCx NGTD      Frequent falls  advanced dementia , mostly bedbound , functional quadriplegia (wheelchair bound at home)   CT head    with no acute pathology. CT C spine with no acute fracture.       4mm right upper lobe pul nodule.   CT chest/abd/pelvis with no acute pathology. Emphysema +. Chronic fibrotic changes at lung apices.   no wheezing, lungs are CTAB       Questionable asymmetrical rectal wall thickening.   --per my discussion with son, given age and dementia, family prefers not to pursue further investigation /treatment or aggressive measures.      Macrocytic anemia.   Iron panel relatively unremarkable, b12, folate WNL--no role for supplementation   TSH OK    SAE (acute kidney injury).  POA   resolved with IVF      Benign essential HTN/HLD   continue with current regimen       CAD (coronary artery disease).   --CAD s/p PCI s/p CABG  on aspirin, plavix, statin, BB.    Type 2 diabetes mellitus with unspecified complications. controlled   A1c 6.6%  continue with ISS   avoid hypoglycemia       Dementia. suspected vascular dementia   CT head showing multiple old chronic strokes, including cerebellar strokes which would explain the gait instability    supportive care    Moderate PCM   nutrition consult appreciated     Preventative Measures   heparin SQ-dvt ppx  fall, aspiration precautions   HOBE     palliative consult appreciated >> patient DNR/DNI

## 2023-12-23 NOTE — PROGRESS NOTE ADULT - SUBJECTIVE AND OBJECTIVE BOX
Patient is a 89y old  Female who presents with a chief complaint of frequent fall (18 Dec 2023 09:36)      INTERVAL HPI/OVERNIGHT EVENTS:   Patient seen and examined bedside.   no overnight events        poor historian     ROS: unable to examine due to [ ] Encephalopathy  [x ] Advanced Dementia  [ ] Expressive Aphasia  [ ] Non-verbal patient     MEDICATIONS  (STANDING):  amLODIPine   Tablet 5 milliGRAM(s) Oral once  amLODIPine   Tablet 10 milliGRAM(s) Oral daily  aspirin  chewable 81 milliGRAM(s) Oral daily  atorvastatin 10 milliGRAM(s) Oral at bedtime  clopidogrel Tablet 75 milliGRAM(s) Oral daily  dextrose 5%. 1000 milliLiter(s) (50 mL/Hr) IV Continuous <Continuous>  dextrose 5%. 1000 milliLiter(s) (100 mL/Hr) IV Continuous <Continuous>  dextrose 50% Injectable 12.5 Gram(s) IV Push once  dextrose 50% Injectable 25 Gram(s) IV Push once  dextrose 50% Injectable 25 Gram(s) IV Push once  glucagon  Injectable 1 milliGRAM(s) IntraMuscular once  insulin lispro (ADMELOG) corrective regimen sliding scale   SubCutaneous at bedtime  insulin lispro (ADMELOG) corrective regimen sliding scale   SubCutaneous three times a day before meals  lactated ringers. 1000 milliLiter(s) (75 mL/Hr) IV Continuous <Continuous>  metoprolol tartrate 50 milliGRAM(s) Oral two times a day  nitrofurantoin monohydrate/macrocrystals (MACROBID) 100 milliGRAM(s) Oral two times a day  pantoprazole    Tablet 40 milliGRAM(s) Oral before breakfast  polyethylene glycol 3350 17 Gram(s) Oral daily  senna 2 Tablet(s) Oral at bedtime    MEDICATIONS  (PRN):  acetaminophen     Tablet .. 650 milliGRAM(s) Oral every 6 hours PRN Mild Pain (1 - 3), Moderate Pain (4 - 6)  dextrose Oral Gel 15 Gram(s) Oral once PRN Blood Glucose LESS THAN 70 milliGRAM(s)/deciliter  labetalol Injectable 10 milliGRAM(s) IV Push every 6 hours PRN Systolic blood pressure >160  melatonin 3 milliGRAM(s) Oral at bedtime PRN Insomnia      Allergies    iodine (Other)  contrast dye -  rash (Other)  Pineapple (Unknown)  penicillin (Rash)  codeine (Vomiting)  latex (Rash)    Intolerances        Vital Signs Last 24 Hrs  T(C): 36.9 (23 Dec 2023 10:45), Max: 36.9 (22 Dec 2023 23:44)  T(F): 98.4 (23 Dec 2023 10:45), Max: 98.5 (22 Dec 2023 23:44)  HR: 118 (23 Dec 2023 10:45) (116 - 121)  BP: 145/69 (23 Dec 2023 10:45) (126/77 - 145/69)  BP(mean): --  RR: 18 (23 Dec 2023 10:50) (18 - 18)  SpO2: 93% (23 Dec 2023 10:50) (88% - 95%)    Parameters below as of 23 Dec 2023 10:50  Patient On (Oxygen Delivery Method): nasal cannula                PHYSICAL EXAM:  GENERAL: NAD, thin appearing,  no increased WOB  HEAD:  Atraumatic, Normocephalic  EYES: EOMI, PERRLA, conjunctiva and sclera clear  ENMT: No tonsillar erythema, exudates, or enlargement; Moist mucous membranes   NECK: Supple, No JVD   NERVOUS SYSTEM:  awake, agitated at times, moving extremities   CHEST/LUNG: CTAB;  No rales, rhonchi, wheezing, or rubs  HEART: Regular rate and rhythm; No murmurs, rubs, or gallops  ABDOMEN: Soft, Nontender, Nondistended; Bowel sounds present  EXTREMITIES:  2+ Peripheral Pulses b/l, No clubbing, cyanosis, calf tenderness or edema b/l   unstageable ulcer on left ankle >>draining purulent discharge and foul smell   right shoulder stage 1 appears clean and heeling   sacral IAD         LABS:                                        8.4    14.32 )-----------( 411      ( 23 Dec 2023 08:08 )             25.0   12-22    137  |  105  |  24<H>  ----------------------------<  208<H>  3.6   |  21<L>  |  1.25    Ca    8.4<L>      22 Dec 2023 09:42  Phos  3.1     12-22  Mg     1.7     12-22    TPro  5.9<L>  /  Alb  1.9<L>  /  TBili  0.6  /  DBili  x   /  AST  35  /  ALT  33  /  AlkPhos  94  12-22            Urinalysis Basic - ( 19 Dec 2023 11:46 )    Color: Dark Yellow / Appearance: Cloudy / SG: >1.030 / pH: x  Gluc: x / Ketone: 40 mg/dL  / Bili: Small / Urobili: 1.0 mg/dL   Blood: x / Protein: 300 mg/dL / Nitrite: Positive   Leuk Esterase: Moderate / RBC: x / WBC x   Sq Epi: x / Non Sq Epi: x / Bacteria: x          RADIOLOGY & ADDITIONAL TESTS:    Consultant(s) Notes Reviewed [x ] Yes     Care Discussed with [x ] Consultants  [x ] Patient  [ x] Family>>jing Marie at bedside  [x ] /   [ x] Other; RN  Care plan and all findings were discussed in detail with patient and son Frank .  All questions and concerns addressed

## 2023-12-24 NOTE — PROGRESS NOTE ADULT - ASSESSMENT
89 years old female with h/o HTN, HLD, CAD, DM, dementia, s/p spinal stimulator placement present to ED with generalized weakness and frequent fall. Patient at baseline ambulate with walker with assistance. For last 1-2 weeks, patient has been having generalized weakness and frequent fall. Have multiple bruises on body.  Per son Toby ( [phone]) who stated that he is HCP. Patient has been having fall over last one years but more frequent lately. Patient normally ambulate short distance with walker with assistance and use wheelchair to go around. Patient usually attempts to get out of bed when family is not around resulting in fall. Patient also has been refusion to eat lately as well.   Hemodynamically stable, afebrile, sat well at RA. WBC 14.45, Hb 10.5, .6, plt 413, Cr 1.42. CT head with no acute pathology. CT C spine with no acute fracture. 4mm right upper lobe pul nodule. CT chest/abd/pelvis with no acute pathology. Emphysema +. Chronic fibrotic changes at lung apices. Questional asymmetrical rectal wall thickening.     Assessment and Plan:     Left lateral malleolus infected appearing Ulcer   Sepsis   leukocytosis improved   US doppler ordered for LLE tenderness-- negative for DVT  -- PT wound consulted, wound recs appreciated    -ESR/CRP elevated   -- MRSA neg   - Blood Cx--NGTD   --Vascular , ID, podiatry consults appreciated, no surgical intervention   --c/w cefepime and Vancomycin   --awaiting MRI ankle Left     Presumed UTI ? patient was complaining of dysuria , unclear though as she is a poor historian   abx as above   UCx NGTD     Shortness of breath   12/23 CXR showing pulmonary edema   no wheezing on exam , appears comfortable   added Lasix   supplemental O2 to maintain O2 sat >92%        Frequent falls  advanced dementia , mostly bedbound , functional quadriplegia (wheelchair bound at home)   CT head    with no acute pathology. CT C spine with no acute fracture.       4mm right upper lobe pul nodule.   CT chest/abd/pelvis with no acute pathology. Emphysema +. Chronic fibrotic changes at lung apices.   no wheezing, lungs are CTAB       Questionable asymmetrical rectal wall thickening.   --per my discussion with son, given age and dementia, family prefers not to pursue further investigation /treatment or aggressive measures.      Macrocytic anemia.   Iron panel relatively unremarkable, b12, folate WNL--no role for supplementation   TSH OK    SAE (acute kidney injury).  POA   resolved with IVF      Benign essential HTN/HLD   continue with current regimen       CAD (coronary artery disease).   --CAD s/p PCI s/p CABG  on aspirin, plavix, statin, BB.    Type 2 diabetes mellitus with unspecified complications. controlled   A1c 6.6%  continue with ISS   avoid hypoglycemia       Dementia. suspected vascular dementia   CT head showing multiple old chronic strokes, including cerebellar strokes which would explain the gait instability    supportive care    Moderate PCM   nutrition consult appreciated     Preventative Measures   heparin SQ-dvt ppx  fall, aspiration precautions   HOBE     palliative consult appreciated >> patient DNR/DNI      89 years old female with h/o HTN, HLD, CAD, DM, dementia, s/p spinal stimulator placement present to ED with generalized weakness and frequent fall. Patient at baseline ambulate with walker with assistance. For last 1-2 weeks, patient has been having generalized weakness and frequent fall. Have multiple bruises on body.  Per son Toby ( [phone]) who stated that he is HCP. Patient has been having fall over last one years but more frequent lately. Patient normally ambulate short distance with walker with assistance and use wheelchair to go around. Patient usually attempts to get out of bed when family is not around resulting in fall. Patient also has been refusion to eat lately as well.   Hemodynamically stable, afebrile, sat well at RA. WBC 14.45, Hb 10.5, .6, plt 413, Cr 1.42. CT head with no acute pathology. CT C spine with no acute fracture. 4mm right upper lobe pul nodule. CT chest/abd/pelvis with no acute pathology. Emphysema +. Chronic fibrotic changes at lung apices. Questional asymmetrical rectal wall thickening.     Assessment and Plan:     Left lateral malleolus infected appearing Ulcer   Sepsis   leukocytosis improved   US doppler ordered for LLE tenderness-- negative for DVT  -- PT wound consulted, wound recs appreciated    -ESR/CRP elevated   -- MRSA neg   - Blood Cx--NGTD   --Vascular , ID, podiatry consults appreciated, no surgical intervention   --c/w cefepime and Vancomycin   --awaiting MRI ankle Left     Presumed UTI ? patient was complaining of dysuria , unclear though as she is a poor historian   abx as above   UCx NGTD     Shortness of breath   12/23 CXR showing pulmonary edema   no wheezing on exam , appears comfortable   added Lasix   supplemental O2 to maintain O2 sat >92%        Frequent falls  advanced dementia , mostly bedbound , functional quadriplegia (wheelchair bound at home)   CT head    with no acute pathology. CT C spine with no acute fracture.       4mm right upper lobe pul nodule.   CT chest/abd/pelvis with no acute pathology. Emphysema +. Chronic fibrotic changes at lung apices.   no wheezing, lungs are CTAB       Questionable asymmetrical rectal wall thickening.   --per my discussion with son, given age and dementia, family prefers not to pursue further investigation /treatment or aggressive measures.      Macrocytic anemia.   Iron panel relatively unremarkable, b12, folate WNL--no role for supplementation   TSH OK    SAE (acute kidney injury).  POA   resolved with IVF      Benign essential HTN/HLD   continue with current regimen       CAD (coronary artery disease).   --CAD s/p PCI s/p CABG  on aspirin, plavix, statin, BB.    Type 2 diabetes mellitus with unspecified complications. controlled   A1c 6.6%  continue with ISS   avoid hypoglycemia       Dementia. suspected vascular dementia   CT head showing multiple old chronic strokes, including cerebellar strokes which would explain the gait instability    supportive care    Moderate PCM   nutrition consult appreciated     Preventative Measures   heparin SQ-dvt ppx  fall, aspiration precautions   HOBE     palliative consult appreciated >> patient DNR/DNI

## 2023-12-25 NOTE — PROGRESS NOTE ADULT - ASSESSMENT
89 years old female with h/o HTN, HLD, CAD, s/p CABG, DM, dementia, mod AS, s/p spinal stimulator placement present to ED with generalized weakness and frequent fall.For last 1-2 weeks, patient has been having generalized weakness and frequent fall. Have multiple bruises on body.  Per son Toby ( [phone]) who stated that he is HCP. Patient has been having fall over last one years but more frequent lately. Patient normally ambulate short distance with walker with assistance and use wheelchair to go around. She is mostly bedbound.  Patient usually attempts to get out of bed when family is not around resulting in fall. Patient also has been refusing to eat lately as well.   Hemodynamically stable, afebrile, sat well at RA. WBC 14.45, Hb 10.5, .6, plt 413, Cr 1.42. CT head with no acute pathology. CT C spine with no acute fracture. 4mm right upper lobe pul nodule. CT chest/abd/pelvis with no acute pathology. Emphysema +. Chronic fibrotic changes at lung apices. Questional asymmetrical rectal wall thickening.     family requesting inpatient hospice/comfort care     Assessment and Plan:     Left lateral malleolus infected appearing Ulcer   Sepsis   leukocytosis improved   US doppler ordered for LLE tenderness-- negative for DVT  -- PT wound consulted, wound recs appreciated    -ESR/CRP elevated   -- MRSA neg   - Blood Cx--NGTD   --Vascular , ID, podiatry consults appreciated, no surgical intervention   --abd changed to ancef (wound Cx reviewed)   --patient unable to tolerare MRI ankle Left     Presumed UTI ? patient was complaining of dysuria , unclear though as she is a poor historian   abx as above   UCx NGTD     Shortness of breath   12/23 CXR showing pulmonary edema   no wheezing on exam , appears comfortable   added Lasix   supplemental O2 to maintain O2 sat >92%   SpO2 95% on 4L NC 12/25/23        Frequent falls  advanced dementia , mostly bedbound , functional quadriplegia (wheelchair bound at home)   CT head    with no acute pathology. CT C spine with no acute fracture.       4mm right upper lobe pul nodule.   CT chest/abd/pelvis with no acute pathology. Emphysema +. Chronic fibrotic changes at lung apices.   no wheezing, lungs are CTAB       Questionable asymmetrical rectal wall thickening.   --per my discussion with son, given age and dementia, family prefers not to pursue further investigation /treatment or aggressive measures.      Macrocytic anemia.   Iron panel relatively unremarkable, b12, folate WNL--no role for supplementation   TSH OK    SAE (acute kidney injury).  POA   resolved with IVF      Benign essential HTN/HLD   continue with current regimen       CAD (coronary artery disease).   --CAD s/p PCI s/p CABG  on aspirin, plavix, statin, BB.    Type 2 diabetes mellitus with unspecified complications. controlled   A1c 6.6%  continue with ISS   avoid hypoglycemia       Dementia. suspected vascular dementia   CT head showing multiple old chronic strokes, including cerebellar strokes which would explain the gait instability    supportive care    Moderate PCM   nutrition consult appreciated     Preventative Measures   heparin SQ-dvt ppx  fall, aspiration precautions   HOBE     palliative consult appreciated >> patient DNR/DNI      89 years old female with h/o HTN, HLD, CAD, s/p CABG, DM, dementia, mod AS, s/p spinal stimulator placement present to ED with generalized weakness and frequent fall.For last 1-2 weeks, patient has been having generalized weakness and frequent fall. Have multiple bruises on body.  Per son Toby ( [phone]) who stated that he is HCP. Patient has been having fall over last one years but more frequent lately. Patient normally ambulate short distance with walker with assistance and use wheelchair to go around. She is mostly bedbound.  Patient usually attempts to get out of bed when family is not around resulting in fall. Patient also has been refusing to eat lately as well.   Hemodynamically stable, afebrile, sat well at RA. WBC 14.45, Hb 10.5, .6, plt 413, Cr 1.42. CT head with no acute pathology. CT C spine with no acute fracture. 4mm right upper lobe pul nodule. CT chest/abd/pelvis with no acute pathology. Emphysema +. Chronic fibrotic changes at lung apices. Questional asymmetrical rectal wall thickening.     family requesting inpatient hospice/comfort care     Assessment and Plan:     Left lateral malleolus infected appearing Ulcer   Sepsis   leukocytosis improved   US doppler ordered for LLE tenderness-- negative for DVT  -- PT wound consulted, wound recs appreciated    -ESR/CRP elevated   -- MRSA neg   - Blood Cx--NGTD   --Vascular , ID, podiatry consults appreciated, no surgical intervention   --abd changed to ancef (wound Cx reviewed)   --patient unable to tolerare MRI ankle Left     Presumed UTI ? patient was complaining of dysuria , unclear though as she is a poor historian   abx as above   UCx NGTD     Shortness of breath   12/23 CXR showing pulmonary edema   no wheezing on exam , appears comfortable   added Lasix   supplemental O2 to maintain O2 sat >92%   SpO2 95% on 4L NC 12/25/23        Frequent falls  advanced dementia , mostly bedbound , functional quadriplegia (wheelchair bound at home)   CT head    with no acute pathology. CT C spine with no acute fracture.       4mm right upper lobe pul nodule.   CT chest/abd/pelvis with no acute pathology. Emphysema +. Chronic fibrotic changes at lung apices.   no wheezing, lungs are CTAB       Questionable asymmetrical rectal wall thickening.   --per my discussion with son, given age and dementia, family prefers not to pursue further investigation /treatment or aggressive measures.      Macrocytic anemia.   Iron panel relatively unremarkable, b12, folate WNL--no role for supplementation   TSH OK    SAE (acute kidney injury).  POA   resolved with IVF      Benign essential HTN/HLD   continue with current regimen       CAD (coronary artery disease).   --CAD s/p PCI s/p CABG  on aspirin, plavix, statin, BB.    Type 2 diabetes mellitus with unspecified complications. controlled   A1c 6.6%  continue with ISS   avoid hypoglycemia       Dementia. suspected vascular dementia   CT head showing multiple old chronic strokes, including cerebellar strokes which would explain the gait instability    supportive care    Moderate PCM   nutrition consult appreciated     Preventative Measures   heparin SQ-dvt ppx  fall, aspiration precautions   HOBE     palliative consult appreciated >> patient DNR/DNI

## 2023-12-25 NOTE — PROGRESS NOTE ADULT - SUBJECTIVE AND OBJECTIVE BOX
Patient is a 89y old  Female who presents with a chief complaint of frequent fall (18 Dec 2023 09:36)      INTERVAL HPI/OVERNIGHT EVENTS:   Patient seen and examined bedside.   patient was hypoxic , was placed on NRB O2 sat improved to 100%, she appears comfortable   no wheezing on exam        poor historian     ROS: unable to examine due to [ ] Encephalopathy  [x ] Advanced Dementia  [ ] Expressive Aphasia  [ ] Non-verbal patient     MEDICATIONS  (STANDING):  amLODIPine   Tablet 5 milliGRAM(s) Oral once  amLODIPine   Tablet 10 milliGRAM(s) Oral daily  aspirin  chewable 81 milliGRAM(s) Oral daily  atorvastatin 10 milliGRAM(s) Oral at bedtime  clopidogrel Tablet 75 milliGRAM(s) Oral daily  dextrose 5%. 1000 milliLiter(s) (50 mL/Hr) IV Continuous <Continuous>  dextrose 5%. 1000 milliLiter(s) (100 mL/Hr) IV Continuous <Continuous>  dextrose 50% Injectable 12.5 Gram(s) IV Push once  dextrose 50% Injectable 25 Gram(s) IV Push once  dextrose 50% Injectable 25 Gram(s) IV Push once  glucagon  Injectable 1 milliGRAM(s) IntraMuscular once  insulin lispro (ADMELOG) corrective regimen sliding scale   SubCutaneous at bedtime  insulin lispro (ADMELOG) corrective regimen sliding scale   SubCutaneous three times a day before meals  lactated ringers. 1000 milliLiter(s) (75 mL/Hr) IV Continuous <Continuous>  metoprolol tartrate 50 milliGRAM(s) Oral two times a day  nitrofurantoin monohydrate/macrocrystals (MACROBID) 100 milliGRAM(s) Oral two times a day  pantoprazole    Tablet 40 milliGRAM(s) Oral before breakfast  polyethylene glycol 3350 17 Gram(s) Oral daily  senna 2 Tablet(s) Oral at bedtime    MEDICATIONS  (PRN):  acetaminophen     Tablet .. 650 milliGRAM(s) Oral every 6 hours PRN Mild Pain (1 - 3), Moderate Pain (4 - 6)  dextrose Oral Gel 15 Gram(s) Oral once PRN Blood Glucose LESS THAN 70 milliGRAM(s)/deciliter  labetalol Injectable 10 milliGRAM(s) IV Push every 6 hours PRN Systolic blood pressure >160  melatonin 3 milliGRAM(s) Oral at bedtime PRN Insomnia      Allergies    iodine (Other)  contrast dye -  rash (Other)  Pineapple (Unknown)  penicillin (Rash)  codeine (Vomiting)  latex (Rash)    Intolerances        Vital Signs Last 24 Hrs  T(C): 36.8 (25 Dec 2023 12:03), Max: 37.1 (24 Dec 2023 23:47)  T(F): 98.2 (25 Dec 2023 12:03), Max: 98.8 (24 Dec 2023 23:47)  HR: 100 (25 Dec 2023 12:03) (80 - 150)  BP: 135/66 (25 Dec 2023 12:03) (104/45 - 151/77)  BP(mean): --  RR: 18 (25 Dec 2023 12:03) (17 - 20)  SpO2: 91% (25 Dec 2023 12:03) (80% - 100%)    Parameters below as of 25 Dec 2023 05:13  Patient On (Oxygen Delivery Method): mask, nonrebreather          PHYSICAL EXAM:  GENERAL: NAD, thin appearing,  no increased WOB  HEAD:  Atraumatic, Normocephalic  EYES: EOMI, PERRLA, conjunctiva and sclera clear  ENMT: No tonsillar erythema, exudates, or enlargement; Moist mucous membranes   NECK: Supple, No JVD   NERVOUS SYSTEM:  awake, agitated at times, moving extremities   CHEST/LUNG: CTAB;  No rales, rhonchi, wheezing, or rubs  HEART: Regular rate and rhythm; No murmurs, rubs, or gallops  ABDOMEN: Soft, Nontender, Nondistended; Bowel sounds present  EXTREMITIES:  2+ Peripheral Pulses b/l, No clubbing, cyanosis, calf tenderness or edema b/l   unstageable ulcer on left ankle >>draining purulent discharge and foul smell   right shoulder stage 1 appears clean and heeling   sacral IAD         LABS:                                                   7.6    10.85 )-----------( 394      ( 24 Dec 2023 07:05 )             23.2   12-25    139  |  108  |  22  ----------------------------<  150<H>  4.6   |  25  |  1.28    Ca    8.6      25 Dec 2023 08:50          Urinalysis Basic - ( 19 Dec 2023 11:46 )    Color: Dark Yellow / Appearance: Cloudy / SG: >1.030 / pH: x  Gluc: x / Ketone: 40 mg/dL  / Bili: Small / Urobili: 1.0 mg/dL   Blood: x / Protein: 300 mg/dL / Nitrite: Positive   Leuk Esterase: Moderate / RBC: x / WBC x   Sq Epi: x / Non Sq Epi: x / Bacteria: x          RADIOLOGY & ADDITIONAL TESTS:    Consultant(s) Notes Reviewed [x ] Yes     Care Discussed with [x ] Consultants  [x ] Patient  [ x] Family>>son Frank at bedside  [x ] /   [ x] Other; RN  Care plan and all findings were discussed in detail with patient and son Toby and his wife .  All questions and concerns addressed

## 2023-12-26 NOTE — PROGRESS NOTE ADULT - SUBJECTIVE AND OBJECTIVE BOX
AARON GREEN  MRN-[id number]    Follow Up:  OM, ankle wound, thrush    Interval History: the pt was seen and examined earlier, not in acute distress, demented, does not answer questions, does not follow commands, makes attempts to move my hands away, pt's son is present. Pt is afebrile, wbc normalized.     PAST MEDICAL & SURGICAL HISTORY:  Angina  in 2005, s/p angioplasty with stent placement, no recurrance, no sequalae      Diabetes Mellitus  type 2      Arthritis, Infective, Knee      Detached Retina  right eye      Acute Mucous Pneumonia  4/2010, resolved ; no residual problems      Spinal Stenosis- lumbar      History of Back Surgery  2010      Basal Cell Cancer  removed from left neck 2009      Dementia      Detached Retina, Left  laser surgery in 1995      S/P Carpal Tunnel Release  bilateral hands in 1990      Trigger Finger  release of middle and ring finger of right hand in 1990, and middle finger left hand in 2008      S/P Laparoscopic Cholecystectomy  2008      S/P TKR (Total Knee Replacement)  left knee      Cataract  removal with lens implant right in 2000          ROS:    [x ] Unobtainable because: dementia   [ ] All other systems negative    Constitutional: no fever, no chills  Head: no trauma  Eyes: no vision changes, no eye pain  ENT:  no sore throat, no rhinorrhea  Cardiovascular:  no chest pain, no palpitation  Respiratory:  no SOB, no cough  GI:  no abd pain, no vomiting, no diarrhea  urinary: no dysuria, no hematuria, no flank pain  musculoskeletal:  no joint pain, no joint swelling  skin:  no rash  neurology:  no headache, no seizure, no change in mental status  psych: no anxiety, no depression         Allergies  iodine (Other)  contrast dye -  rash (Other)  Pineapple (Unknown)  penicillin (Rash)  codeine (Vomiting)  latex (Rash)        ANTIMICROBIALS:  ceFAZolin   IVPB    ceFAZolin   IVPB 1000 every 12 hours  nystatin    Suspension [id number] four times a day      OTHER MEDS:  acetaminophen     Tablet .. 650 milliGRAM(s) Oral every 6 hours PRN  albuterol/ipratropium for Nebulization 3 milliLiter(s) Nebulizer every 6 hours PRN  amLODIPine   Tablet 10 milliGRAM(s) Oral daily  aspirin  chewable 81 milliGRAM(s) Oral daily  atorvastatin 10 milliGRAM(s) Oral at bedtime  clopidogrel Tablet 75 milliGRAM(s) Oral daily  collagenase Ointment 1 Application(s) Topical daily  dextrose 5%. 1000 milliLiter(s) IV Continuous <Continuous>  dextrose 5%. 1000 milliLiter(s) IV Continuous <Continuous>  dextrose 50% Injectable 12.5 Gram(s) IV Push once  dextrose 50% Injectable 25 Gram(s) IV Push once  dextrose 50% Injectable 25 Gram(s) IV Push once  dextrose Oral Gel 15 Gram(s) Oral once PRN  furosemide   Injectable 40 milliGRAM(s) IV Push daily  glucagon  Injectable 1 milliGRAM(s) IntraMuscular once  heparin   Injectable 5000 Unit(s) SubCutaneous every 12 hours  insulin lispro (ADMELOG) corrective regimen sliding scale   SubCutaneous three times a day before meals  LORazepam   Injectable 0.5 milliGRAM(s) IV Push every 8 hours PRN  melatonin 3 milliGRAM(s) Oral at bedtime PRN  metoprolol tartrate 50 milliGRAM(s) Oral two times a day  morphine  - Injectable 0.5 milliGRAM(s) IV Push every 8 hours  pantoprazole    Tablet 40 milliGRAM(s) Oral before breakfast  polyethylene glycol 3350 17 Gram(s) Oral daily  senna 2 Tablet(s) Oral at bedtime      Vital Signs Last 24 Hrs  T(C): 37.4 (26 Dec 2023 16:56), Max: 37.7 (26 Dec 2023 05:27)  T(F): 99.3 (26 Dec 2023 16:56), Max: 99.9 (26 Dec 2023 05:27)  HR: 95 (26 Dec 2023 16:56) (80 - 95)  BP: 128/61 (26 Dec 2023 16:56) (128/61 - 135/53)  BP(mean): --  RR: 17 (26 Dec 2023 16:56) (17 - 18)  SpO2: 100% (26 Dec 2023 16:56) (94% - 100%)    Parameters below as of 26 Dec 2023 05:27  Patient On (Oxygen Delivery Method): nasal cannula        Physical Exam:  General:    NAD, non toxic, NC  Head: atraumatic, normocephalic  Eyes: unable to assess pt's eyes due to non-compliance   ENT:   missing teeth, + thrush is better, no noted oral lesions, neck supple  Cardio:    regular S1,S2, no murmur  Respiratory:   clear to auscultation b/l, no wheezing  abd:   soft, BS +, not tender, no distention  :     no CVAT, no suprapubic tenderness  Musculoskeletal : Left Lateral malleolar region with open wound round about 4 x 5 cm, drainage is present on the dressing, seems to be tender to touch, probing to the bone; contracted, Z-flow boots are on  Skin: no rash, warm to touch, PIV ok  Neurologic:  does not answer questions, does not follow commands  Pscych: screams when moved around, otherwise calm     WBC Count: 9.46 K/uL (12-26 @ 06:04)  WBC Count: 10.85 K/uL (12-24 @ 07:05)  WBC Count: 14.32 K/uL (12-23 @ 08:08)  WBC Count: 15.25 K/uL (12-22 @ 09:42)  WBC Count: 17.61 K/uL (12-21 @ 05:50)  WBC Count: 16.27 K/uL (12-20 @ 06:35)                            8.3    9.46  )-----------( 431      ( 26 Dec 2023 06:04 )             26.0       12-26    136  |  103  |  22  ----------------------------<  164<H>  3.9   |  26  |  1.31<H>    Ca    8.6      26 Dec 2023 06:04  Phos  2.6     12-26  Mg     1.8     12-26        Urinalysis Basic - ( 26 Dec 2023 06:04 )    Color: x / Appearance: x / SG: x / pH: x  Gluc: 164 mg/dL / Ketone: x  / Bili: x / Urobili: x   Blood: x / Protein: x / Nitrite: x   Leuk Esterase: x / RBC: x / WBC x   Sq Epi: x / Non Sq Epi: x / Bacteria: x        Creatinine Trend: 1.31<--, 1.28<--, 1.25<--, 1.31<--, 1.04<--, 1.06<--      MICROBIOLOGY:  v  .Abscess Left ankle wound  12-22-23   Few Staphylococcus aureus  Rare Escherichia coli  --  Staphylococcus aureus  Escherichia coli      .Blood Blood-Peripheral  12-21-23   No growth at 4 days  --  --      .Blood Blood-Peripheral  12-20-23   No growth at 5 days  --  --      .Blood Blood-Peripheral  12-20-23   No growth at 5 days  --  --      Clean Catch Clean Catch (Midstream)  12-20-23   No growth  --  --      C-Reactive Protein, Serum: 172 (12-21)    Ferritin: 365 (12-16)        RADIOLOGY:     AARON GREEN  MRN-[id number]    Follow Up:  OM, ankle wound, thrush    Interval History: the pt was seen and examined earlier, not in acute distress, demented, does not answer questions, does not follow commands, makes attempts to move my hands away, pt's son is present. Pt is afebrile, wbc normalized.     PAST MEDICAL & SURGICAL HISTORY:  Angina  in 2005, s/p angioplasty with stent placement, no recurrance, no sequalae      Diabetes Mellitus  type 2      Arthritis, Infective, Knee      Detached Retina  right eye      Acute Mucous Pneumonia  4/2010, resolved ; no residual problems      Spinal Stenosis- lumbar      History of Back Surgery  2010      Basal Cell Cancer  removed from left neck 2009      Dementia      Detached Retina, Left  laser surgery in 1995      S/P Carpal Tunnel Release  bilateral hands in 1990      Trigger Finger  release of middle and ring finger of right hand in 1990, and middle finger left hand in 2008      S/P Laparoscopic Cholecystectomy  2008      S/P TKR (Total Knee Replacement)  left knee      Cataract  removal with lens implant right in 2000          ROS:    [x ] Unobtainable because: dementia   [ ] All other systems negative    Constitutional: no fever, no chills  Head: no trauma  Eyes: no vision changes, no eye pain  ENT:  no sore throat, no rhinorrhea  Cardiovascular:  no chest pain, no palpitation  Respiratory:  no SOB, no cough  GI:  no abd pain, no vomiting, no diarrhea  urinary: no dysuria, no hematuria, no flank pain  musculoskeletal:  no joint pain, no joint swelling  skin:  no rash  neurology:  no headache, no seizure, no change in mental status  psych: no anxiety, no depression         Allergies  iodine (Other)  contrast dye -  rash (Other)  Pineapple (Unknown)  penicillin (Rash)  codeine (Vomiting)  latex (Rash)        ANTIMICROBIALS:  ceFAZolin   IVPB    ceFAZolin   IVPB 1000 every 12 hours  nystatin    Suspension [id number] four times a day      OTHER MEDS:  acetaminophen     Tablet .. 650 milliGRAM(s) Oral every 6 hours PRN  albuterol/ipratropium for Nebulization 3 milliLiter(s) Nebulizer every 6 hours PRN  amLODIPine   Tablet 10 milliGRAM(s) Oral daily  aspirin  chewable 81 milliGRAM(s) Oral daily  atorvastatin 10 milliGRAM(s) Oral at bedtime  clopidogrel Tablet 75 milliGRAM(s) Oral daily  collagenase Ointment 1 Application(s) Topical daily  dextrose 5%. 1000 milliLiter(s) IV Continuous <Continuous>  dextrose 5%. 1000 milliLiter(s) IV Continuous <Continuous>  dextrose 50% Injectable 12.5 Gram(s) IV Push once  dextrose 50% Injectable 25 Gram(s) IV Push once  dextrose 50% Injectable 25 Gram(s) IV Push once  dextrose Oral Gel 15 Gram(s) Oral once PRN  furosemide   Injectable 40 milliGRAM(s) IV Push daily  glucagon  Injectable 1 milliGRAM(s) IntraMuscular once  heparin   Injectable 5000 Unit(s) SubCutaneous every 12 hours  insulin lispro (ADMELOG) corrective regimen sliding scale   SubCutaneous three times a day before meals  LORazepam   Injectable 0.5 milliGRAM(s) IV Push every 8 hours PRN  melatonin 3 milliGRAM(s) Oral at bedtime PRN  metoprolol tartrate 50 milliGRAM(s) Oral two times a day  morphine  - Injectable 0.5 milliGRAM(s) IV Push every 8 hours  pantoprazole    Tablet 40 milliGRAM(s) Oral before breakfast  polyethylene glycol 3350 17 Gram(s) Oral daily  senna 2 Tablet(s) Oral at bedtime      Vital Signs Last 24 Hrs  T(C): 37.4 (26 Dec 2023 16:56), Max: 37.7 (26 Dec 2023 05:27)  T(F): 99.3 (26 Dec 2023 16:56), Max: 99.9 (26 Dec 2023 05:27)  HR: 95 (26 Dec 2023 16:56) (80 - 95)  BP: 128/61 (26 Dec 2023 16:56) (128/61 - 135/53)  BP(mean): --  RR: 17 (26 Dec 2023 16:56) (17 - 18)  SpO2: 100% (26 Dec 2023 16:56) (94% - 100%)    Parameters below as of 26 Dec 2023 05:27  Patient On (Oxygen Delivery Method): nasal cannula        Physical Exam:  General:    NAD, non toxic, NC  Head: atraumatic, normocephalic  Eyes: unable to assess pt's eyes due to non-compliance   ENT:   missing teeth, + thrush is better, no noted oral lesions, neck supple  Cardio:    regular S1,S2, no murmur  Respiratory:   clear to auscultation b/l, no wheezing  abd:   soft, BS +, not tender, no distention  :     no CVAT, no suprapubic tenderness  Musculoskeletal : Left Lateral malleolar region with open wound round about 4 x 5 cm, drainage is present on the dressing, seems to be tender to touch, probing to the bone; contracted, Z-flow boots are on  Skin: no rash, warm to touch, PIV ok  Neurologic:  does not answer questions, does not follow commands  Pscych: screams when moved around, otherwise calm     WBC Count: 9.46 K/uL (12-26 @ 06:04)  WBC Count: 10.85 K/uL (12-24 @ 07:05)  WBC Count: 14.32 K/uL (12-23 @ 08:08)  WBC Count: 15.25 K/uL (12-22 @ 09:42)  WBC Count: 17.61 K/uL (12-21 @ 05:50)  WBC Count: 16.27 K/uL (12-20 @ 06:35)                            8.3    9.46  )-----------( 431      ( 26 Dec 2023 06:04 )             26.0       12-26    136  |  103  |  22  ----------------------------<  164<H>  3.9   |  26  |  1.31<H>    Ca    8.6      26 Dec 2023 06:04  Phos  2.6     12-26  Mg     1.8     12-26        Urinalysis Basic - ( 26 Dec 2023 06:04 )    Color: x / Appearance: x / SG: x / pH: x  Gluc: 164 mg/dL / Ketone: x  / Bili: x / Urobili: x   Blood: x / Protein: x / Nitrite: x   Leuk Esterase: x / RBC: x / WBC x   Sq Epi: x / Non Sq Epi: x / Bacteria: x        Creatinine Trend: 1.31<--, 1.28<--, 1.25<--, 1.31<--, 1.04<--, 1.06<--      MICROBIOLOGY:  v  .Abscess Left ankle wound  12-22-23   Few Staphylococcus aureus  Rare Escherichia coli  --  Staphylococcus aureus  Escherichia coli      .Blood Blood-Peripheral  12-21-23   No growth at 4 days  --  --      .Blood Blood-Peripheral  12-20-23   No growth at 5 days  --  --      .Blood Blood-Peripheral  12-20-23   No growth at 5 days  --  --      Clean Catch Clean Catch (Midstream)  12-20-23   No growth  --  --      C-Reactive Protein, Serum: 172 (12-21)    Ferritin: 365 (12-16)        RADIOLOGY:

## 2023-12-26 NOTE — PROGRESS NOTE ADULT - SUBJECTIVE AND OBJECTIVE BOX
follow up on:  complex medical decision making related to goals of care      OVERNIGHT EVENTS: weekend events noted, currently appears comfortable on RA.     Review of systems:      Unable to obtain/Limited due to poor mental status    MEDICATIONS  (STANDING):  amLODIPine   Tablet 10 milliGRAM(s) Oral daily  aspirin  chewable 81 milliGRAM(s) Oral daily  atorvastatin 10 milliGRAM(s) Oral at bedtime  ceFAZolin   IVPB      ceFAZolin   IVPB 1000 milliGRAM(s) IV Intermittent every 12 hours  clopidogrel Tablet 75 milliGRAM(s) Oral daily  collagenase Ointment 1 Application(s) Topical daily  dextrose 5%. 1000 milliLiter(s) (50 mL/Hr) IV Continuous <Continuous>  dextrose 5%. 1000 milliLiter(s) (100 mL/Hr) IV Continuous <Continuous>  dextrose 50% Injectable 12.5 Gram(s) IV Push once  dextrose 50% Injectable 25 Gram(s) IV Push once  dextrose 50% Injectable 25 Gram(s) IV Push once  furosemide   Injectable 40 milliGRAM(s) IV Push daily  glucagon  Injectable 1 milliGRAM(s) IntraMuscular once  heparin   Injectable 5000 Unit(s) SubCutaneous every 12 hours  insulin lispro (ADMELOG) corrective regimen sliding scale   SubCutaneous three times a day before meals  metoprolol tartrate 50 milliGRAM(s) Oral two times a day  morphine  - Injectable 0.5 milliGRAM(s) IV Push every 8 hours  nystatin    Suspension 523852 Unit(s) Oral four times a day  pantoprazole    Tablet 40 milliGRAM(s) Oral before breakfast  polyethylene glycol 3350 17 Gram(s) Oral daily  senna 2 Tablet(s) Oral at bedtime    MEDICATIONS  (PRN):  acetaminophen     Tablet .. 650 milliGRAM(s) Oral every 6 hours PRN Mild Pain (1 - 3), Moderate Pain (4 - 6)  albuterol/ipratropium for Nebulization 3 milliLiter(s) Nebulizer every 6 hours PRN Shortness of Breath and/or Wheezing  dextrose Oral Gel 15 Gram(s) Oral once PRN Blood Glucose LESS THAN 70 milliGRAM(s)/deciliter  LORazepam   Injectable 0.5 milliGRAM(s) IV Push every 8 hours PRN Agitation  melatonin 3 milliGRAM(s) Oral at bedtime PRN Insomnia      PHYSICAL EXAM:  Vital Signs Last 24 Hrs  T(C): 37.1 (26 Dec 2023 11:33), Max: 37.7 (26 Dec 2023 05:27)  T(F): 98.7 (26 Dec 2023 11:33), Max: 99.9 (26 Dec 2023 05:27)  HR: 86 (26 Dec 2023 11:33) (80 - 97)  BP: 135/53 (26 Dec 2023 11:33) (121/65 - 135/53)  BP(mean): --  RR: 18 (26 Dec 2023 11:33) (18 - 18)  SpO2: 95% (26 Dec 2023 11:33) (94% - 97%)    Parameters below as of 26 Dec 2023 05:27  Patient On (Oxygen Delivery Method): nasal cannula          Palliative Performance Scale/Karnofsky Score: 20      GENERAL: alert, frail, NAD  HEENT: Atraumatic, oropharynx clear, neck supple  CHEST/LUNG: unlabored  HEART: Regular rate and rhythm    ABDOMEN: Soft, Nontender, Nondistended   MUSCULOSKELETAL:  No  edema,  bedbound  NERVOUS SYSTEM:  awake, not following commands  SKIN: No rashes or lesions noted  Oral intake: poor    LABS:                          8.3    9.46  )-----------( 431      ( 26 Dec 2023 06:04 )             26.0     12-26    136  |  103  |  22  ----------------------------<  164<H>  3.9   |  26  |  1.31<H>    Ca    8.6      26 Dec 2023 06:04  Phos  2.6     12-26  Mg     1.8     12-26      Urinalysis Basic - ( 26 Dec 2023 06:04 )    Color: x / Appearance: x / SG: x / pH: x  Gluc: 164 mg/dL / Ketone: x  / Bili: x / Urobili: x   Blood: x / Protein: x / Nitrite: x   Leuk Esterase: x / RBC: x / WBC x   Sq Epi: x / Non Sq Epi: x / Bacteria: x        RADIOLOGY & ADDITIONAL STUDIES: follow up on:  complex medical decision making related to goals of care      OVERNIGHT EVENTS: weekend events noted, currently appears comfortable on RA.     Review of systems:      Unable to obtain/Limited due to poor mental status    MEDICATIONS  (STANDING):  amLODIPine   Tablet 10 milliGRAM(s) Oral daily  aspirin  chewable 81 milliGRAM(s) Oral daily  atorvastatin 10 milliGRAM(s) Oral at bedtime  ceFAZolin   IVPB      ceFAZolin   IVPB 1000 milliGRAM(s) IV Intermittent every 12 hours  clopidogrel Tablet 75 milliGRAM(s) Oral daily  collagenase Ointment 1 Application(s) Topical daily  dextrose 5%. 1000 milliLiter(s) (50 mL/Hr) IV Continuous <Continuous>  dextrose 5%. 1000 milliLiter(s) (100 mL/Hr) IV Continuous <Continuous>  dextrose 50% Injectable 12.5 Gram(s) IV Push once  dextrose 50% Injectable 25 Gram(s) IV Push once  dextrose 50% Injectable 25 Gram(s) IV Push once  furosemide   Injectable 40 milliGRAM(s) IV Push daily  glucagon  Injectable 1 milliGRAM(s) IntraMuscular once  heparin   Injectable 5000 Unit(s) SubCutaneous every 12 hours  insulin lispro (ADMELOG) corrective regimen sliding scale   SubCutaneous three times a day before meals  metoprolol tartrate 50 milliGRAM(s) Oral two times a day  morphine  - Injectable 0.5 milliGRAM(s) IV Push every 8 hours  nystatin    Suspension 686185 Unit(s) Oral four times a day  pantoprazole    Tablet 40 milliGRAM(s) Oral before breakfast  polyethylene glycol 3350 17 Gram(s) Oral daily  senna 2 Tablet(s) Oral at bedtime    MEDICATIONS  (PRN):  acetaminophen     Tablet .. 650 milliGRAM(s) Oral every 6 hours PRN Mild Pain (1 - 3), Moderate Pain (4 - 6)  albuterol/ipratropium for Nebulization 3 milliLiter(s) Nebulizer every 6 hours PRN Shortness of Breath and/or Wheezing  dextrose Oral Gel 15 Gram(s) Oral once PRN Blood Glucose LESS THAN 70 milliGRAM(s)/deciliter  LORazepam   Injectable 0.5 milliGRAM(s) IV Push every 8 hours PRN Agitation  melatonin 3 milliGRAM(s) Oral at bedtime PRN Insomnia      PHYSICAL EXAM:  Vital Signs Last 24 Hrs  T(C): 37.1 (26 Dec 2023 11:33), Max: 37.7 (26 Dec 2023 05:27)  T(F): 98.7 (26 Dec 2023 11:33), Max: 99.9 (26 Dec 2023 05:27)  HR: 86 (26 Dec 2023 11:33) (80 - 97)  BP: 135/53 (26 Dec 2023 11:33) (121/65 - 135/53)  BP(mean): --  RR: 18 (26 Dec 2023 11:33) (18 - 18)  SpO2: 95% (26 Dec 2023 11:33) (94% - 97%)    Parameters below as of 26 Dec 2023 05:27  Patient On (Oxygen Delivery Method): nasal cannula          Palliative Performance Scale/Karnofsky Score: 20      GENERAL: alert, frail, NAD  HEENT: Atraumatic, oropharynx clear, neck supple  CHEST/LUNG: unlabored  HEART: Regular rate and rhythm    ABDOMEN: Soft, Nontender, Nondistended   MUSCULOSKELETAL:  No  edema,  bedbound  NERVOUS SYSTEM:  awake, not following commands  SKIN: No rashes or lesions noted  Oral intake: poor    LABS:                          8.3    9.46  )-----------( 431      ( 26 Dec 2023 06:04 )             26.0     12-26    136  |  103  |  22  ----------------------------<  164<H>  3.9   |  26  |  1.31<H>    Ca    8.6      26 Dec 2023 06:04  Phos  2.6     12-26  Mg     1.8     12-26      Urinalysis Basic - ( 26 Dec 2023 06:04 )    Color: x / Appearance: x / SG: x / pH: x  Gluc: 164 mg/dL / Ketone: x  / Bili: x / Urobili: x   Blood: x / Protein: x / Nitrite: x   Leuk Esterase: x / RBC: x / WBC x   Sq Epi: x / Non Sq Epi: x / Bacteria: x        RADIOLOGY & ADDITIONAL STUDIES:

## 2023-12-26 NOTE — PROGRESS NOTE ADULT - PROBLEM SELECTOR PLAN 1
req NRB over the weekend for pulm edema, hypoxia, s/p diuresis, currently appears comfortable on RA.   on morphine 0.5mg ivp q4h ATC, will decrease frequency and monitor for symptoms, transition to SL as tolerated  DNR/DNI on file

## 2023-12-26 NOTE — PROGRESS NOTE ADULT - PROBLEM SELECTOR PLAN 3
advancing dementia, lethargic and minimally responsive, mental status likely worse in setting of infection, unfamiliar environment, has been declining, eating less and sleeping more over the past month per son, Edward  promote sleep/wake cycles, family presence and cluster care  hospice eligible, referral placed

## 2023-12-26 NOTE — PROGRESS NOTE ADULT - ASSESSMENT
89 years old female with h/o HTN, HLD, CAD, s/p CABG, DM, dementia, mod AS, s/p spinal stimulator placement present to ED with generalized weakness and frequent fall.For last 1-2 weeks, patient has been having generalized weakness and frequent fall. Have multiple bruises on body.  Per son Toby ( [phone]) who stated that he is HCP. Patient has been having fall over last one years but more frequent lately. Patient normally ambulate short distance with walker with assistance and use wheelchair to go around. She is mostly bedbound.  Patient usually attempts to get out of bed when family is not around resulting in fall. Patient also has been refusing to eat lately as well.   Hemodynamically stable, afebrile, sat well at RA. WBC 14.45, Hb 10.5, .6, plt 413, Cr 1.42. CT head with no acute pathology. CT C spine with no acute fracture. 4mm right upper lobe pul nodule. CT chest/abd/pelvis with no acute pathology. Emphysema +. Chronic fibrotic changes at lung apices. Questional asymmetrical rectal wall thickening.     family requesting inpatient hospice/comfort care  - referral made. patient is more appropriate for home hospice.     Left lateral malleolus infected appearing Ulcer   Sepsis   leukocytosis improved   US doppler ordered for LLE tenderness-- negative for DVT  -- PT wound consulted, wound recs appreciated    -ESR/CRP elevated   -- MRSA neg   - Blood Cx--NGTD   --Vascular , ID, podiatry consults appreciated, no surgical intervention   --abd changed to ancef (wound Cx reviewed)   --patient unable to tolerare MRI ankle Left     Presumed UTI ? patient was complaining of dysuria , unclear though as she is a poor historian   abx as above   UCx NGTD     Shortness of breath   12/23 CXR showing pulmonary edema   no wheezing on exam , appears comfortable   added Lasix   supplemental O2 to maintain O2 sat >92%   SpO2 95% on 4L NC 12/25/23        Frequent falls  advanced dementia , mostly bedbound , functional quadriplegia (wheelchair bound at home)   CT head    with no acute pathology. CT C spine with no acute fracture.       4mm right upper lobe pul nodule.   CT chest/abd/pelvis with no acute pathology. Emphysema +. Chronic fibrotic changes at lung apices.   no wheezing, lungs are CTAB       Questionable asymmetrical rectal wall thickening.   --per my discussion with son, given age and dementia, family prefers not to pursue further investigation /treatment or aggressive measures.      Macrocytic anemia.   Iron panel relatively unremarkable, b12, folate WNL--no role for supplementation   TSH OK    SAE (acute kidney injury).  POA   resolved with IVF      Benign essential HTN/HLD   continue with current regimen       CAD (coronary artery disease).   --CAD s/p PCI s/p CABG  on aspirin, plavix, statin, BB.    Type 2 diabetes mellitus with unspecified complications. controlled   A1c 6.6%  continue with ISS   avoid hypoglycemia       Dementia. suspected vascular dementia   CT head showing multiple old chronic strokes, including cerebellar strokes which would explain the gait instability    supportive care    Moderate PCM   nutrition consult appreciated     Preventative Measures   heparin SQ-dvt ppx  fall, aspiration precautions   HOBE     palliative consult appreciated >> patient DNR/DNI      89 years old female with h/o HTN, HLD, CAD, s/p CABG, DM, dementia, mod AS, s/p spinal stimulator placement present to ED with generalized weakness and frequent fall.For last 1-2 weeks, patient has been having generalized weakness and frequent fall. Have multiple bruises on body.  Per son Toby ( [phone]) who stated that he is HCP. Patient has been having fall over last one years but more frequent lately. Patient normally ambulate short distance with walker with assistance and use wheelchair to go around. She is mostly bedbound.  Patient usually attempts to get out of bed when family is not around resulting in fall. Patient also has been refusing to eat lately as well.   Hemodynamically stable, afebrile, sat well at RA. WBC 14.45, Hb 10.5, .6, plt 413, Cr 1.42. CT head with no acute pathology. CT C spine with no acute fracture. 4mm right upper lobe pul nodule. CT chest/abd/pelvis with no acute pathology. Emphysema +. Chronic fibrotic changes at lung apices. Questional asymmetrical rectal wall thickening.     family requesting inpatient hospice/comfort care  - referral made. patient is more appropriate for home hospice.     Left lateral malleolus infected appearing Ulcer   Sepsis   leukocytosis improved   US doppler ordered for LLE tenderness-- negative for DVT  -- PT wound consulted, wound recs appreciated    -ESR/CRP elevated   -- MRSA neg   - Blood Cx--NGTD   --Vascular , ID, podiatry consults appreciated, no surgical intervention   --abd changed to ancef (wound Cx reviewed)   --patient unable to tolerare MRI ankle Left     Presumed UTI ? patient was complaining of dysuria , unclear though as she is a poor historian   abx as above   UCx NGTD     Shortness of breath   12/23 CXR showing pulmonary edema   no wheezing on exam , appears comfortable   added Lasix   supplemental O2 to maintain O2 sat >92%   SpO2 95% on 4L NC 12/25/23        Frequent falls  advanced dementia , mostly bedbound , functional quadriplegia (wheelchair bound at home)   CT head    with no acute pathology. CT C spine with no acute fracture.       4mm right upper lobe pul nodule.   CT chest/abd/pelvis with no acute pathology. Emphysema +. Chronic fibrotic changes at lung apices.   no wheezing, lungs are CTAB       Questionable asymmetrical rectal wall thickening.   --per my discussion with son, given age and dementia, family prefers not to pursue further investigation /treatment or aggressive measures.      Macrocytic anemia.   Iron panel relatively unremarkable, b12, folate WNL--no role for supplementation   TSH OK    SAE (acute kidney injury).  POA   resolved with IVF      Benign essential HTN/HLD   continue with current regimen       CAD (coronary artery disease).   --CAD s/p PCI s/p CABG  on aspirin, plavix, statin, BB.    Type 2 diabetes mellitus with unspecified complications. controlled   A1c 6.6%  continue with ISS   avoid hypoglycemia       Dementia. suspected vascular dementia   CT head showing multiple old chronic strokes, including cerebellar strokes which would explain the gait instability    supportive care    Moderate PCM   nutrition consult appreciated     Preventative Measures   heparin SQ-dvt ppx  fall, aspiration precautions   HOBE     palliative consult appreciated >> patient DNR/DNI

## 2023-12-26 NOTE — PROGRESS NOTE ADULT - ASSESSMENT
A/P-  89 year old female with  h/o HTN, HLD, CAD s/p stent, DM, dementia, s/p spinal stimulator placement present to ED with generalized weakness and frequent fall.    afebrile since 12/20  + leukocytosis - improved  + UA- with + nitrates and + LE  UC with no growth  BCs x 2 - NGTD  Cr normalized   Left lateral ankle infected wound, probing to the bone, culture grew MSSA and E. Coli  ESR 81  Pt was seen with vascular surgeon, no surgical intervention is planned at this time.     plan-  abx changed to Cefazolin IV over the weekend    follow all culture data  trend temperatures and wbc  MRI of left ankle - pt is unable tolerate   wound care   please have Z-flow boots on at all times   palliative care consult is appreciated - Pt is DNR/DNI   Thrush is better  discussed with pt's son, would like to continue abx     discussed with Dr. Nick  discussed with pt's son at the bedside

## 2023-12-26 NOTE — PROGRESS NOTE ADULT - NS ATTEND AMEND GEN_ALL_CORE FT
I have reviewed all pertinent clinical information and agree with the NP's note.   continue ancef for mssa and ecoli in wound overlying ankle wound with probe to bone   pending possible d/c to inpatient hospice-duration of antibiotics TBD depending on GOC with entrance into hospice   frequent turns, offloading, and nutrition optimization to avoid further  bedsores, protective heel/leg protectors   given age and comorbidities as well as a1c being less then 7, would suggest not too tight glycemic control, more liberal with glucose levels    Discussed with hospitalist    Abhay Nick, DO  Infectious Disease Attending  Reachable via [employer] Teams or ID office: [phone]  After 5pm/weekends please call [phone] for all inquiries and new consults I have reviewed all pertinent clinical information and agree with the NP's note.   continue ancef for mssa and ecoli in wound overlying ankle wound with probe to bone   pending possible d/c to inpatient hospice-duration of antibiotics TBD depending on GOC with entrance into hospice   frequent turns, offloading, and nutrition optimization to avoid further  bedsores, protective heel/leg protectors   given age and comorbidities as well as a1c being less then 7, would suggest not too tight glycemic control, more liberal with glucose levels    Discussed with hospitalist    Abhay Nick, DO  Infectious Disease Attending  Reachable via [employer] Teams or ID office: [phone]  After 5pm/weekends please call [phone] for all inquiries and new consults

## 2023-12-26 NOTE — PROGRESS NOTE ADULT - PROBLEM SELECTOR PLAN 2
pressure ulcer on left lateral malleolus, vascular consult appreciated, continue local care and abx no amputation  lower extremity contractures pressure placed on lateral malleolus, off loading  ability to achieve any wound healing is exceedingly remote  blood cultures neg to date  pain control, on morphine as above

## 2023-12-26 NOTE — PROGRESS NOTE ADULT - SUBJECTIVE AND OBJECTIVE BOX
[facility] Division of Hospital Medicine  Deidra Pham MD  Available via MS Teams  Pager: [phone]    SUBJECTIVE / OVERNIGHT EVENTS:  - no events overnight, has no acute complaints. wants to hold providers hand at bedside, smiles. son present at bedside.     MEDICATIONS  (STANDING):  amLODIPine   Tablet 10 milliGRAM(s) Oral daily  aspirin  chewable 81 milliGRAM(s) Oral daily  atorvastatin 10 milliGRAM(s) Oral at bedtime  ceFAZolin   IVPB      ceFAZolin   IVPB 1000 milliGRAM(s) IV Intermittent every 12 hours  clopidogrel Tablet 75 milliGRAM(s) Oral daily  collagenase Ointment 1 Application(s) Topical daily  dextrose 5%. 1000 milliLiter(s) (100 mL/Hr) IV Continuous <Continuous>  dextrose 5%. 1000 milliLiter(s) (50 mL/Hr) IV Continuous <Continuous>  dextrose 50% Injectable 12.5 Gram(s) IV Push once  dextrose 50% Injectable 25 Gram(s) IV Push once  dextrose 50% Injectable 25 Gram(s) IV Push once  furosemide   Injectable 40 milliGRAM(s) IV Push daily  glucagon  Injectable 1 milliGRAM(s) IntraMuscular once  heparin   Injectable 5000 Unit(s) SubCutaneous every 12 hours  insulin lispro (ADMELOG) corrective regimen sliding scale   SubCutaneous three times a day before meals  metoprolol tartrate 50 milliGRAM(s) Oral two times a day  morphine  - Injectable 0.5 milliGRAM(s) IV Push every 8 hours  nystatin    Suspension 001936 Unit(s) Oral four times a day  pantoprazole    Tablet 40 milliGRAM(s) Oral before breakfast  polyethylene glycol 3350 17 Gram(s) Oral daily  senna 2 Tablet(s) Oral at bedtime    MEDICATIONS  (PRN):  acetaminophen     Tablet .. 650 milliGRAM(s) Oral every 6 hours PRN Mild Pain (1 - 3), Moderate Pain (4 - 6)  albuterol/ipratropium for Nebulization 3 milliLiter(s) Nebulizer every 6 hours PRN Shortness of Breath and/or Wheezing  dextrose Oral Gel 15 Gram(s) Oral once PRN Blood Glucose LESS THAN 70 milliGRAM(s)/deciliter  LORazepam   Injectable 0.5 milliGRAM(s) IV Push every 8 hours PRN Agitation  melatonin 3 milliGRAM(s) Oral at bedtime PRN Insomnia      I&O's Summary    25 Dec 2023 07:01  -  26 Dec 2023 07:00  --------------------------------------------------------  IN: 0 mL / OUT: 800 mL / NET: -800 mL    26 Dec 2023 07:01  -  26 Dec 2023 16:32  --------------------------------------------------------  IN: 180 mL / OUT: 1000 mL / NET: -820 mL        PHYSICAL EXAM:  Vital Signs Last 24 Hrs  T(C): 37.1 (26 Dec 2023 11:33), Max: 37.7 (26 Dec 2023 05:27)  T(F): 98.7 (26 Dec 2023 11:33), Max: 99.9 (26 Dec 2023 05:27)  HR: 86 (26 Dec 2023 11:33) (80 - 97)  BP: 135/53 (26 Dec 2023 11:33) (121/65 - 135/53)  BP(mean): --  RR: 18 (26 Dec 2023 11:33) (18 - 18)  SpO2: 95% (26 Dec 2023 11:33) (94% - 97%)    Parameters below as of 26 Dec 2023 05:27  Patient On (Oxygen Delivery Method): nasal cannula      PHYSICAL EXAM:  GENERAL: NAD, thin appearing,  no increased WOB  HEAD:  Atraumatic, Normocephalic  EYES: EOMI, PERRLA, conjunctiva and sclera clear  ENMT: No tonsillar erythema, exudates, or enlargement; Moist mucous membranes   NECK: Supple, No JVD   NERVOUS SYSTEM:  awake, agitated at times, moving extremities   CHEST/LUNG: CTAB;  No rales, rhonchi, wheezing, or rubs  HEART: Regular rate and rhythm; No murmurs, rubs, or gallops  ABDOMEN: Soft, Nontender, Nondistended; Bowel sounds present  EXTREMITIES:  2+ Peripheral Pulses b/l, No clubbing, cyanosis, calf tenderness or edema has LE ulcers        LABS:                        8.3    9.46  )-----------( 431      ( 26 Dec 2023 06:04 )             26.0     12-26    136  |  103  |  22  ----------------------------<  164<H>  3.9   |  26  |  1.31<H>    Ca    8.6      26 Dec 2023 06:04  Phos  2.6     12-26  Mg     1.8     12-26            Urinalysis Basic - ( 26 Dec 2023 06:04 )    Color: x / Appearance: x / SG: x / pH: x  Gluc: 164 mg/dL / Ketone: x  / Bili: x / Urobili: x   Blood: x / Protein: x / Nitrite: x   Leuk Esterase: x / RBC: x / WBC x   Sq Epi: x / Non Sq Epi: x / Bacteria: x     [facility] Division of Hospital Medicine  Deidra Pham MD  Available via MS Teams  Pager: [phone]    SUBJECTIVE / OVERNIGHT EVENTS:  - no events overnight, has no acute complaints. wants to hold providers hand at bedside, smiles. son present at bedside.     MEDICATIONS  (STANDING):  amLODIPine   Tablet 10 milliGRAM(s) Oral daily  aspirin  chewable 81 milliGRAM(s) Oral daily  atorvastatin 10 milliGRAM(s) Oral at bedtime  ceFAZolin   IVPB      ceFAZolin   IVPB 1000 milliGRAM(s) IV Intermittent every 12 hours  clopidogrel Tablet 75 milliGRAM(s) Oral daily  collagenase Ointment 1 Application(s) Topical daily  dextrose 5%. 1000 milliLiter(s) (100 mL/Hr) IV Continuous <Continuous>  dextrose 5%. 1000 milliLiter(s) (50 mL/Hr) IV Continuous <Continuous>  dextrose 50% Injectable 12.5 Gram(s) IV Push once  dextrose 50% Injectable 25 Gram(s) IV Push once  dextrose 50% Injectable 25 Gram(s) IV Push once  furosemide   Injectable 40 milliGRAM(s) IV Push daily  glucagon  Injectable 1 milliGRAM(s) IntraMuscular once  heparin   Injectable 5000 Unit(s) SubCutaneous every 12 hours  insulin lispro (ADMELOG) corrective regimen sliding scale   SubCutaneous three times a day before meals  metoprolol tartrate 50 milliGRAM(s) Oral two times a day  morphine  - Injectable 0.5 milliGRAM(s) IV Push every 8 hours  nystatin    Suspension 371378 Unit(s) Oral four times a day  pantoprazole    Tablet 40 milliGRAM(s) Oral before breakfast  polyethylene glycol 3350 17 Gram(s) Oral daily  senna 2 Tablet(s) Oral at bedtime    MEDICATIONS  (PRN):  acetaminophen     Tablet .. 650 milliGRAM(s) Oral every 6 hours PRN Mild Pain (1 - 3), Moderate Pain (4 - 6)  albuterol/ipratropium for Nebulization 3 milliLiter(s) Nebulizer every 6 hours PRN Shortness of Breath and/or Wheezing  dextrose Oral Gel 15 Gram(s) Oral once PRN Blood Glucose LESS THAN 70 milliGRAM(s)/deciliter  LORazepam   Injectable 0.5 milliGRAM(s) IV Push every 8 hours PRN Agitation  melatonin 3 milliGRAM(s) Oral at bedtime PRN Insomnia      I&O's Summary    25 Dec 2023 07:01  -  26 Dec 2023 07:00  --------------------------------------------------------  IN: 0 mL / OUT: 800 mL / NET: -800 mL    26 Dec 2023 07:01  -  26 Dec 2023 16:32  --------------------------------------------------------  IN: 180 mL / OUT: 1000 mL / NET: -820 mL        PHYSICAL EXAM:  Vital Signs Last 24 Hrs  T(C): 37.1 (26 Dec 2023 11:33), Max: 37.7 (26 Dec 2023 05:27)  T(F): 98.7 (26 Dec 2023 11:33), Max: 99.9 (26 Dec 2023 05:27)  HR: 86 (26 Dec 2023 11:33) (80 - 97)  BP: 135/53 (26 Dec 2023 11:33) (121/65 - 135/53)  BP(mean): --  RR: 18 (26 Dec 2023 11:33) (18 - 18)  SpO2: 95% (26 Dec 2023 11:33) (94% - 97%)    Parameters below as of 26 Dec 2023 05:27  Patient On (Oxygen Delivery Method): nasal cannula      PHYSICAL EXAM:  GENERAL: NAD, thin appearing,  no increased WOB  HEAD:  Atraumatic, Normocephalic  EYES: EOMI, PERRLA, conjunctiva and sclera clear  ENMT: No tonsillar erythema, exudates, or enlargement; Moist mucous membranes   NECK: Supple, No JVD   NERVOUS SYSTEM:  awake, agitated at times, moving extremities   CHEST/LUNG: CTAB;  No rales, rhonchi, wheezing, or rubs  HEART: Regular rate and rhythm; No murmurs, rubs, or gallops  ABDOMEN: Soft, Nontender, Nondistended; Bowel sounds present  EXTREMITIES:  2+ Peripheral Pulses b/l, No clubbing, cyanosis, calf tenderness or edema has LE ulcers        LABS:                        8.3    9.46  )-----------( 431      ( 26 Dec 2023 06:04 )             26.0     12-26    136  |  103  |  22  ----------------------------<  164<H>  3.9   |  26  |  1.31<H>    Ca    8.6      26 Dec 2023 06:04  Phos  2.6     12-26  Mg     1.8     12-26            Urinalysis Basic - ( 26 Dec 2023 06:04 )    Color: x / Appearance: x / SG: x / pH: x  Gluc: 164 mg/dL / Ketone: x  / Bili: x / Urobili: x   Blood: x / Protein: x / Nitrite: x   Leuk Esterase: x / RBC: x / WBC x   Sq Epi: x / Non Sq Epi: x / Bacteria: x

## 2023-12-27 NOTE — PROGRESS NOTE ADULT - SUBJECTIVE AND OBJECTIVE BOX
SUBJECTIVE AND OBJECTIVE: Patient more alert today, able to say a few words at times, on room air  INTERVAL HPI/OVERNIGHT EVENTS:    DNR on chart: yes  Allergies    iodine (Other)  contrast dye -  rash (Other)  Pineapple (Unknown)  penicillin (Rash)  codeine (Vomiting)  latex (Rash)    Intolerances    Tolerates ceftriaxone, cefepime (Other)  MEDICATIONS  (STANDING):  amLODIPine   Tablet 10 milliGRAM(s) Oral daily  aspirin  chewable 81 milliGRAM(s) Oral daily  atorvastatin 10 milliGRAM(s) Oral at bedtime  ceFAZolin   IVPB      ceFAZolin   IVPB 1000 milliGRAM(s) IV Intermittent every 12 hours  clopidogrel Tablet 75 milliGRAM(s) Oral daily  collagenase Ointment 1 Application(s) Topical daily  dextrose 5%. 1000 milliLiter(s) (50 mL/Hr) IV Continuous <Continuous>  dextrose 5%. 1000 milliLiter(s) (100 mL/Hr) IV Continuous <Continuous>  dextrose 50% Injectable 12.5 Gram(s) IV Push once  dextrose 50% Injectable 25 Gram(s) IV Push once  dextrose 50% Injectable 25 Gram(s) IV Push once  furosemide   Injectable 40 milliGRAM(s) IV Push daily  glucagon  Injectable 1 milliGRAM(s) IntraMuscular once  heparin   Injectable 5000 Unit(s) SubCutaneous every 12 hours  insulin lispro (ADMELOG) corrective regimen sliding scale   SubCutaneous three times a day before meals  metoprolol tartrate 50 milliGRAM(s) Oral two times a day  pantoprazole    Tablet 40 milliGRAM(s) Oral before breakfast  polyethylene glycol 3350 17 Gram(s) Oral daily  senna 2 Tablet(s) Oral at bedtime    MEDICATIONS  (PRN):  acetaminophen     Tablet .. 650 milliGRAM(s) Oral every 6 hours PRN Mild Pain (1 - 3), Moderate Pain (4 - 6)  albuterol/ipratropium for Nebulization 3 milliLiter(s) Nebulizer every 6 hours PRN Shortness of Breath and/or Wheezing  dextrose Oral Gel 15 Gram(s) Oral once PRN Blood Glucose LESS THAN 70 milliGRAM(s)/deciliter  LORazepam   Injectable 0.5 milliGRAM(s) IV Push every 8 hours PRN Agitation  melatonin 3 milliGRAM(s) Oral at bedtime PRN Insomnia      ITEMS UNCHECKED ARE NOT PRESENT    PRESENT SYMPTOMS: [x ]Unable to obtain due to poor mentation   Source if other than patient:  [ ]Family   [ ]Team     Pain:  [ ]yes [ x]no  QOL impact -   Location -                    Aggravating factors -  Quality -  Radiation -  Timing-  Severity (0-10 scale):  Minimal acceptable level (0-10 scale):     Dyspnea:                           [ ]Mild [ ]Moderate [ ]Severe  Anxiety:                             [ ]Mild [ ]Moderate [ ]Severe  Fatigue:                             [ ]Mild [ ]Moderate [ ]Severe  Nausea:                             [ ]Mild [ ]Moderate [ ]Severe  Loss of appetite:              [ ]Mild [ ]Moderate [ ]Severe  Constipation:                    [ ]Mild [ ]Moderate [ ]Severe    PAIN AD Score:	  [email or web address].[email or web address]/painad.pdf (Ctrl + left click to view)    Other Symptoms:  [ ]All other review of systems negative     Palliative Performance Status Version 2:        30%      http://npcrc.org/files/news/palliative_performance_scale_ppsv2.pdf  PHYSICAL EXAM:  Vital Signs Last 24 Hrs  T(C): 37.8 (27 Dec 2023 11:48), Max: 37.8 (27 Dec 2023 11:48)  T(F): 100 (27 Dec 2023 11:48), Max: 100 (27 Dec 2023 11:48)  HR: 83 (27 Dec 2023 11:48) (83 - 98)  BP: 122/59 (27 Dec 2023 11:48) (122/59 - 145/60)  BP(mean): --  RR: 17 (27 Dec 2023 11:48) (16 - 18)  SpO2: 94% (27 Dec 2023 11:48) (90% - 100%)    Parameters below as of 27 Dec 2023 11:48  Patient On (Oxygen Delivery Method): room air     I&O's Summary    26 Dec 2023 07:01  -  27 Dec 2023 07:00  --------------------------------------------------------  IN: 180 mL / OUT: 1475 mL / NET: -1295 mL       GENERAL:  [x ]Alert  [ ]Oriented x   [ ]Lethargic  [ ]Cachexia  [ ]Unarousable  [ x]Verbal  [ ]Non-Verbal  Behavioral:   [ ]Anxiety  [ ]Delirium [ ]Agitation [ ]Other  HEENT:  [ ]Normal   [ x]Dry mouth   [ ]ET Tube/Trach  [ ]Oral lesions  PULMONARY:   [ ]Clear [ ]Tachypnea  [ ]Audible excessive secretions   [ ]Rhonchi        [ ]Right [ ]Left [ ]Bilateral  [ ]Crackles        [ ]Right [ ]Left [ ]Bilateral  [ ]Wheezing     [ ]Right [ ]Left [ ]Bilateral  [ ]Diminished BS [ ] Right [ ]Left [ ]Bilateral  CARDIOVASCULAR:    [ ]Regular [ ]Irregular [ ]Tachy  [ ]Jack [ ]Murmur [ ]Other  GASTROINTESTINAL:  [x ]Soft  [ ]Distended   [ ]+BS  [ ]Non tender [ ]Tender  [ ]PEG [ ]OGT/ NGT   Last BM:  10/23/23?  GENITOURINARY:  [ ]Normal [x ]Incontinent   [ ]Oliguria/Anuria   [ ][place]  MUSCULOSKELETAL:   [ ]Normal   [ ]Weakness  [x ]Bed/Wheelchair bound [ ]Edema  NEUROLOGIC:   [ ]No focal deficits  [ x] Cognitive impairment  [ ] Dysphagia [ ]Dysarthria [ ] Paresis [ ]Other   SKIN:   [ ]Normal  [ ]Rash   [ ]Pressure ulcer(s) [ ]y [ ]n present on admission    CRITICAL CARE:  [ ]Shock Present  [ ]Septic [ ]Cardiogenic [ ]Neurologic [ ]Hypovolemic  [ ]Vasopressors [ ]Inotropes  [ ]Respiratory failure present [ ]Mechanical Ventilation [ ]Non-invasive ventilatory support [ ]High-Flow  [ ]Acute  [ ]Chronic [ ]Hypoxic  [ ]Hypercarbic [ ]Other  [ ]Other organ failure     LABS:                        8.3    9.46  )-----------( 431      ( 26 Dec 2023 06:04 )             26.0   12-26    136  |  103  |  22  ----------------------------<  164<H>  3.9   |  26  |  1.31<H>    Ca    8.6      26 Dec 2023 06:04  Phos  2.6     12-26  Mg     1.8     12-26    Culture - Abscess with Gram Stain (12.22.23 @ 17:33)    Gram Stain:   No polymorphonuclear leukocytes seen per low power field  Rare Gram positive cocci in pairs seen per oil power field   -  Amoxicillin/Clavulanic Acid: S <=8/4   -  Ampicillin: S <=8 These ampicillin results predict results for amoxicillin   -  Ampicillin/Sulbactam: S <=4/2   -  Ampicillin/Sulbactam: S <=8/4   -  Aztreonam: S <=4   -  Cefazolin: S <=2   -  Cefazolin: S <=4   -  Cefepime: S <=2   -  Cefoxitin: S <=8   -  Ceftriaxone: S <=1   -  Ciprofloxacin: S <=0.25   -  Clindamycin: S <=0.25   -  Ertapenem: S <=0.5   -  Erythromycin: S <=0.25   -  Gentamicin: S <=1 Should not be used as monotherapy   -  Gentamicin: S <=2   -  Imipenem: S <=1   -  Levofloxacin: S <=0.5   -  Meropenem: S <=1   -  Oxacillin: S <=0.25 Oxacillin predicts susceptibility for dicloxacillin, methicillin, and nafcillin   -  Penicillin: R >8   -  Piperacillin/Tazobactam: S <=8   -  Rifampin: S <=1 Should not be used as monotherapy   -  Tetracycline: S <=1   -  Tobramycin: S <=2   -  Trimethoprim/Sulfamethoxazole: S <=0.5/9.5   -  Trimethoprim/Sulfamethoxazole: S <=0.5/9.5   -  Vancomycin: S 1   Specimen Source: .Abscess Left ankle wound   Culture Results:   Few Staphylococcus aureus  Rare Escherichia coli  Rare Finegoldia magna "Susceptibilities not performed"   Organism Identification: Staphylococcus aureus  Escherichia coli   Organism: Staphylococcus aureus   Organism: Escherichia coli   Method Type: SRIRAM   Method Type: SRIRAM    Culture - Blood (12.21.23 @ 16:14)    Specimen Source: .Blood Blood-Peripheral   Culture Results:   No growth at 5 days    RADIOLOGY & ADDITIONAL STUDIES:  < from: Xray Chest 1 View-PORTABLE IMMEDIATE (Xray Chest 1 View-PORTABLE IMMEDIATE .) (12.25.23 @ 11:27) >  Findings/  Impression: Status post open heart surgery. Status post endovascular   aortic valve replacement. Moderate pulmonary edema, unchanged compared to   prior. Trace bilateral pleural effusions.  --- End of Report ---  < end of copied text >    Protein Calorie Malnutrition Present: [ ]mild [ x]moderate [ ]severe [ ]underweight [ ]morbid obesity  https://www.andeal.org/vault/2440/web/files/ONC/Table_Clinical%20Characteristics%20to%20Document%20Malnutrition-White%20JV%20et%20al%202012.pdf    Height (cm): 149.9 (12-15-23 @ 09:00), 149.9 (10-30-23 @ 16:08)  Weight (kg): 54.4 (12-15-23 @ 09:00), 59 (10-30-23 @ 16:08)  BMI (kg/m2): 24.2 (12-15-23 @ 09:00), 26.3 (10-30-23 @ 16:08)      [ ]PPSV2 < or = 30%  [ ]significant weight loss [ ]poor nutritional intake [ ]anasarca    [ ]Artificial Nutrition    REFERRALS:   [ ]Chaplaincy  [ ]Hospice  [ ]Child Life  [ ]Social Work  [ ]Case management [ ]Holistic Therapy     Goals of Care Document:     SUBJECTIVE AND OBJECTIVE: Patient more alert today, able to say a few words at times, on room air  INTERVAL HPI/OVERNIGHT EVENTS:    DNR on chart: yes  Allergies    iodine (Other)  contrast dye -  rash (Other)  Pineapple (Unknown)  penicillin (Rash)  codeine (Vomiting)  latex (Rash)    Intolerances    Tolerates ceftriaxone, cefepime (Other)  MEDICATIONS  (STANDING):  amLODIPine   Tablet 10 milliGRAM(s) Oral daily  aspirin  chewable 81 milliGRAM(s) Oral daily  atorvastatin 10 milliGRAM(s) Oral at bedtime  ceFAZolin   IVPB      ceFAZolin   IVPB 1000 milliGRAM(s) IV Intermittent every 12 hours  clopidogrel Tablet 75 milliGRAM(s) Oral daily  collagenase Ointment 1 Application(s) Topical daily  dextrose 5%. 1000 milliLiter(s) (50 mL/Hr) IV Continuous <Continuous>  dextrose 5%. 1000 milliLiter(s) (100 mL/Hr) IV Continuous <Continuous>  dextrose 50% Injectable 12.5 Gram(s) IV Push once  dextrose 50% Injectable 25 Gram(s) IV Push once  dextrose 50% Injectable 25 Gram(s) IV Push once  furosemide   Injectable 40 milliGRAM(s) IV Push daily  glucagon  Injectable 1 milliGRAM(s) IntraMuscular once  heparin   Injectable 5000 Unit(s) SubCutaneous every 12 hours  insulin lispro (ADMELOG) corrective regimen sliding scale   SubCutaneous three times a day before meals  metoprolol tartrate 50 milliGRAM(s) Oral two times a day  pantoprazole    Tablet 40 milliGRAM(s) Oral before breakfast  polyethylene glycol 3350 17 Gram(s) Oral daily  senna 2 Tablet(s) Oral at bedtime    MEDICATIONS  (PRN):  acetaminophen     Tablet .. 650 milliGRAM(s) Oral every 6 hours PRN Mild Pain (1 - 3), Moderate Pain (4 - 6)  albuterol/ipratropium for Nebulization 3 milliLiter(s) Nebulizer every 6 hours PRN Shortness of Breath and/or Wheezing  dextrose Oral Gel 15 Gram(s) Oral once PRN Blood Glucose LESS THAN 70 milliGRAM(s)/deciliter  LORazepam   Injectable 0.5 milliGRAM(s) IV Push every 8 hours PRN Agitation  melatonin 3 milliGRAM(s) Oral at bedtime PRN Insomnia      ITEMS UNCHECKED ARE NOT PRESENT    PRESENT SYMPTOMS: [x ]Unable to obtain due to poor mentation   Source if other than patient:  [ ]Family   [ ]Team     Pain:  [ ]yes [ x]no  QOL impact -   Location -                    Aggravating factors -  Quality -  Radiation -  Timing-  Severity (0-10 scale):  Minimal acceptable level (0-10 scale):     Dyspnea:                           [ ]Mild [ ]Moderate [ ]Severe  Anxiety:                             [ ]Mild [ ]Moderate [ ]Severe  Fatigue:                             [ ]Mild [ ]Moderate [ ]Severe  Nausea:                             [ ]Mild [ ]Moderate [ ]Severe  Loss of appetite:              [ ]Mild [ ]Moderate [ ]Severe  Constipation:                    [ ]Mild [ ]Moderate [ ]Severe    PAIN AD Score:	  [email or web address].[email or web address]/painad.pdf (Ctrl + left click to view)    Other Symptoms:  [ ]All other review of systems negative     Palliative Performance Status Version 2:        30%      http://npcrc.org/files/news/palliative_performance_scale_ppsv2.pdf  PHYSICAL EXAM:  Vital Signs Last 24 Hrs  T(C): 37.8 (27 Dec 2023 11:48), Max: 37.8 (27 Dec 2023 11:48)  T(F): 100 (27 Dec 2023 11:48), Max: 100 (27 Dec 2023 11:48)  HR: 83 (27 Dec 2023 11:48) (83 - 98)  BP: 122/59 (27 Dec 2023 11:48) (122/59 - 145/60)  BP(mean): --  RR: 17 (27 Dec 2023 11:48) (16 - 18)  SpO2: 94% (27 Dec 2023 11:48) (90% - 100%)    Parameters below as of 27 Dec 2023 11:48  Patient On (Oxygen Delivery Method): room air     I&O's Summary    26 Dec 2023 07:01  -  27 Dec 2023 07:00  --------------------------------------------------------  IN: 180 mL / OUT: 1475 mL / NET: -1295 mL       GENERAL:  [x ]Alert  [ ]Oriented x   [ ]Lethargic  [ ]Cachexia  [ ]Unarousable  [ x]Verbal  [ ]Non-Verbal  Behavioral:   [ ]Anxiety  [ ]Delirium [ ]Agitation [ ]Other  HEENT:  [ ]Normal   [ x]Dry mouth   [ ]ET Tube/Trach  [ ]Oral lesions  PULMONARY:   [ ]Clear [ ]Tachypnea  [ ]Audible excessive secretions   [ ]Rhonchi        [ ]Right [ ]Left [ ]Bilateral  [ ]Crackles        [ ]Right [ ]Left [ ]Bilateral  [ ]Wheezing     [ ]Right [ ]Left [ ]Bilateral  [ ]Diminished BS [ ] Right [ ]Left [ ]Bilateral  CARDIOVASCULAR:    [ ]Regular [ ]Irregular [ ]Tachy  [ ]Jack [ ]Murmur [ ]Other  GASTROINTESTINAL:  [x ]Soft  [ ]Distended   [ ]+BS  [ ]Non tender [ ]Tender  [ ]PEG [ ]OGT/ NGT   Last BM:  10/23/23?  GENITOURINARY:  [ ]Normal [x ]Incontinent   [ ]Oliguria/Anuria   [ ][place]  MUSCULOSKELETAL:   [ ]Normal   [ ]Weakness  [x ]Bed/Wheelchair bound [ ]Edema  NEUROLOGIC:   [ ]No focal deficits  [ x] Cognitive impairment  [ ] Dysphagia [ ]Dysarthria [ ] Paresis [ ]Other   SKIN:   [ ]Normal  [ ]Rash   [ ]Pressure ulcer(s) [ ]y [ ]n present on admission    CRITICAL CARE:  [ ]Shock Present  [ ]Septic [ ]Cardiogenic [ ]Neurologic [ ]Hypovolemic  [ ]Vasopressors [ ]Inotropes  [ ]Respiratory failure present [ ]Mechanical Ventilation [ ]Non-invasive ventilatory support [ ]High-Flow  [ ]Acute  [ ]Chronic [ ]Hypoxic  [ ]Hypercarbic [ ]Other  [ ]Other organ failure     LABS:                        8.3    9.46  )-----------( 431      ( 26 Dec 2023 06:04 )             26.0   12-26    136  |  103  |  22  ----------------------------<  164<H>  3.9   |  26  |  1.31<H>    Ca    8.6      26 Dec 2023 06:04  Phos  2.6     12-26  Mg     1.8     12-26    Culture - Abscess with Gram Stain (12.22.23 @ 17:33)    Gram Stain:   No polymorphonuclear leukocytes seen per low power field  Rare Gram positive cocci in pairs seen per oil power field   -  Amoxicillin/Clavulanic Acid: S <=8/4   -  Ampicillin: S <=8 These ampicillin results predict results for amoxicillin   -  Ampicillin/Sulbactam: S <=4/2   -  Ampicillin/Sulbactam: S <=8/4   -  Aztreonam: S <=4   -  Cefazolin: S <=2   -  Cefazolin: S <=4   -  Cefepime: S <=2   -  Cefoxitin: S <=8   -  Ceftriaxone: S <=1   -  Ciprofloxacin: S <=0.25   -  Clindamycin: S <=0.25   -  Ertapenem: S <=0.5   -  Erythromycin: S <=0.25   -  Gentamicin: S <=1 Should not be used as monotherapy   -  Gentamicin: S <=2   -  Imipenem: S <=1   -  Levofloxacin: S <=0.5   -  Meropenem: S <=1   -  Oxacillin: S <=0.25 Oxacillin predicts susceptibility for dicloxacillin, methicillin, and nafcillin   -  Penicillin: R >8   -  Piperacillin/Tazobactam: S <=8   -  Rifampin: S <=1 Should not be used as monotherapy   -  Tetracycline: S <=1   -  Tobramycin: S <=2   -  Trimethoprim/Sulfamethoxazole: S <=0.5/9.5   -  Trimethoprim/Sulfamethoxazole: S <=0.5/9.5   -  Vancomycin: S 1   Specimen Source: .Abscess Left ankle wound   Culture Results:   Few Staphylococcus aureus  Rare Escherichia coli  Rare Finegoldia magna "Susceptibilities not performed"   Organism Identification: Staphylococcus aureus  Escherichia coli   Organism: Staphylococcus aureus   Organism: Escherichia coli   Method Type: SRIRAM   Method Type: SRIRAM    Culture - Blood (12.21.23 @ 16:14)    Specimen Source: .Blood Blood-Peripheral   Culture Results:   No growth at 5 days    RADIOLOGY & ADDITIONAL STUDIES:  < from: Xray Chest 1 View-PORTABLE IMMEDIATE (Xray Chest 1 View-PORTABLE IMMEDIATE .) (12.25.23 @ 11:27) >  Findings/  Impression: Status post open heart surgery. Status post endovascular   aortic valve replacement. Moderate pulmonary edema, unchanged compared to   prior. Trace bilateral pleural effusions.  --- End of Report ---  < end of copied text >    Protein Calorie Malnutrition Present: [ ]mild [ x]moderate [ ]severe [ ]underweight [ ]morbid obesity  https://www.andeal.org/vault/2440/web/files/ONC/Table_Clinical%20Characteristics%20to%20Document%20Malnutrition-White%20JV%20et%20al%202012.pdf    Height (cm): 149.9 (12-15-23 @ 09:00), 149.9 (10-30-23 @ 16:08)  Weight (kg): 54.4 (12-15-23 @ 09:00), 59 (10-30-23 @ 16:08)  BMI (kg/m2): 24.2 (12-15-23 @ 09:00), 26.3 (10-30-23 @ 16:08)      [ ]PPSV2 < or = 30%  [ ]significant weight loss [ ]poor nutritional intake [ ]anasarca    [ ]Artificial Nutrition    REFERRALS:   [ ]Chaplaincy  [ ]Hospice  [ ]Child Life  [ ]Social Work  [ ]Case management [ ]Holistic Therapy     Goals of Care Document:

## 2023-12-27 NOTE — PROGRESS NOTE ADULT - PROBLEM SELECTOR PLAN 1
pressure ulcer on left lateral malleolus, vascular consult appreciated, continue local care and abx no amputation  lower extremity contractures pressure placed on lateral malleolus, off loading  ability to achieve any wound healing is exceedingly remote  blood cultures neg to date    patient more alert, on room air, pain controlled, will transition morphine back to concentrate and as PRN  bowel regimen, recommending discontinuing Miralax considering overall poor po intake.  added dulcolax PRN

## 2023-12-27 NOTE — PROGRESS NOTE ADULT - PROBLEM SELECTOR PLAN 2
advancing dementia, lethargic and minimally responsive, mental status likely worse in setting of infection, unfamiliar environment, has been declining, eating less and sleeping more over the past month per son, Edward  promote sleep/wake cycles, family presence and cluster care  hospice eligible, family interested in [place], referral placed advancing dementia, lethargic and minimally responsive, mental status likely worse in setting of infection, unfamiliar environment, has been declining, eating less and sleeping more over the past month per son, Edward  promote sleep/wake cycles, family presence and cluster care  hospice eligible, family interested in [place], referral placed

## 2023-12-27 NOTE — PROGRESS NOTE ADULT - ASSESSMENT
89 years old female with h/o HTN, HLD, CAD, s/p CABG, DM, dementia, mod AS, s/p spinal stimulator placement present to ED with generalized weakness and frequent fall. For last 1-2 weeks, patient has been having generalized weakness and frequent fall. Have multiple bruises on body.  Per son Toby ( [phone]) who stated that he is HCP. Patient has been having fall over last one years but more frequent lately. Patient normally ambulate short distance with walker with assistance and use wheelchair to go around. She is mostly bedbound.  Patient usually attempts to get out of bed when family is not around resulting in fall. Patient also has been refusing to eat lately as well.   Hemodynamically stable, afebrile, sat well at RA. WBC 14.45, Hb 10.5, .6, plt 413, Cr 1.42. CT head with no acute pathology. CT C spine with no acute fracture. 4mm right upper lobe pul nodule. CT chest/abd/pelvis with no acute pathology. Emphysema +. Chronic fibrotic changes at lung apices. Questional asymmetrical rectal wall thickening.     family requesting inpatient hospice/comfort care  - referral made, pending auth to [facility] [place]     Left lateral malleolus infected appearing Ulcer   Sepsis   leukocytosis improved   US doppler ordered for LLE tenderness-- negative for DVT  -- PT wound consulted, wound recs appreciated    -ESR/CRP elevated   -- MRSA neg   - Blood Cx--NGTD   --Vascular , ID, podiatry consults appreciated, no surgical intervention   --abd changed to ancef (wound Cx reviewed)   --patient unable to tolerare MRI ankle Left     Presumed UTI ? patient was complaining of dysuria , unclear though as she is a poor historian   abx as above   UCx NGTD     Shortness of breath   12/23 CXR showing pulmonary edema   no wheezing on exam , appears comfortable   added Lasix   supplemental O2 to maintain O2 sat >92%   SpO2 95% on 4L NC 12/25/23        Frequent falls  advanced dementia , mostly bedbound , functional quadriplegia (wheelchair bound at home)   CT head    with no acute pathology. CT C spine with no acute fracture.       4mm right upper lobe pul nodule.   CT chest/abd/pelvis with no acute pathology. Emphysema +. Chronic fibrotic changes at lung apices.   no wheezing, lungs are CTAB       Questionable asymmetrical rectal wall thickening.   --per my discussion with son, given age and dementia, family prefers not to pursue further investigation /treatment or aggressive measures.      Macrocytic anemia.   Iron panel relatively unremarkable, b12, folate WNL--no role for supplementation   TSH OK    SAE (acute kidney injury).  POA   resolved with IVF      Benign essential HTN/HLD   continue with current regimen       CAD (coronary artery disease).   --CAD s/p PCI s/p CABG  on aspirin, plavix, statin, BB.    Type 2 diabetes mellitus with unspecified complications. controlled   A1c 6.6%  continue with ISS   avoid hypoglycemia       Dementia. suspected vascular dementia   CT head showing multiple old chronic strokes, including cerebellar strokes which would explain the gait instability    supportive care    Moderate PCM   nutrition consult appreciated     Preventative Measures   heparin SQ-dvt ppx  fall, aspiration precautions   HOBE     palliative consult appreciated >> patient DNR/DNI      89 years old female with h/o HTN, HLD, CAD, s/p CABG, DM, dementia, mod AS, s/p spinal stimulator placement present to ED with generalized weakness and frequent fall. For last 1-2 weeks, patient has been having generalized weakness and frequent fall. Have multiple bruises on body.  Per son Toby ( [phone]) who stated that he is HCP. Patient has been having fall over last one years but more frequent lately. Patient normally ambulate short distance with walker with assistance and use wheelchair to go around. She is mostly bedbound.  Patient usually attempts to get out of bed when family is not around resulting in fall. Patient also has been refusing to eat lately as well.   Hemodynamically stable, afebrile, sat well at RA. WBC 14.45, Hb 10.5, .6, plt 413, Cr 1.42. CT head with no acute pathology. CT C spine with no acute fracture. 4mm right upper lobe pul nodule. CT chest/abd/pelvis with no acute pathology. Emphysema +. Chronic fibrotic changes at lung apices. Questional asymmetrical rectal wall thickening.     family requesting inpatient hospice/comfort care  - referral made, pending auth to [facility]     Left lateral malleolus infected appearing Ulcer   Sepsis   leukocytosis improved   US doppler ordered for LLE tenderness-- negative for DVT  -- PT wound consulted, wound recs appreciated    -ESR/CRP elevated   -- MRSA neg   - Blood Cx--NGTD   --Vascular , ID, podiatry consults appreciated, no surgical intervention   --abd changed to ancef (wound Cx reviewed)   --patient unable to tolerare MRI ankle Left     Presumed UTI ? patient was complaining of dysuria , unclear though as she is a poor historian   abx as above   UCx NGTD     Shortness of breath   12/23 CXR showing pulmonary edema   no wheezing on exam , appears comfortable   added Lasix   supplemental O2 to maintain O2 sat >92%   SpO2 95% on 4L NC 12/25/23        Frequent falls  advanced dementia , mostly bedbound , functional quadriplegia (wheelchair bound at home)   CT head    with no acute pathology. CT C spine with no acute fracture.       4mm right upper lobe pul nodule.   CT chest/abd/pelvis with no acute pathology. Emphysema +. Chronic fibrotic changes at lung apices.   no wheezing, lungs are CTAB       Questionable asymmetrical rectal wall thickening.   --per my discussion with son, given age and dementia, family prefers not to pursue further investigation /treatment or aggressive measures.      Macrocytic anemia.   Iron panel relatively unremarkable, b12, folate WNL--no role for supplementation   TSH OK    SAE (acute kidney injury).  POA   resolved with IVF      Benign essential HTN/HLD   continue with current regimen       CAD (coronary artery disease).   --CAD s/p PCI s/p CABG  on aspirin, plavix, statin, BB.    Type 2 diabetes mellitus with unspecified complications. controlled   A1c 6.6%  continue with ISS   avoid hypoglycemia       Dementia. suspected vascular dementia   CT head showing multiple old chronic strokes, including cerebellar strokes which would explain the gait instability    supportive care    Moderate PCM   nutrition consult appreciated     Preventative Measures   heparin SQ-dvt ppx  fall, aspiration precautions   HOBE     palliative consult appreciated >> patient DNR/DNI

## 2023-12-27 NOTE — PROGRESS NOTE ADULT - PROBLEM SELECTOR PLAN 5
Received HCP (front page) listing Toby Burnham [phone] as HCP, he will look for back page.  Patient remains on IV abx, referral for [facility] sent pending response.  Discussed with Toby over the phone and explained they may not take patient.  If that is the case will discuss home with hospice vs. LTC facility with comfort.  [facility] on file indicating DNR/I and HCP placed on chart.      Message sent to Dr. Pham Received HCP (front page) listing Toby Burnham [phone] as HCP, he will look for back page.  Patient remains on IV abx, referral for [facility] sent pending response.  Discussed with Toby over the phone and explained they may not take patient.  If that is the case will discuss home with hospice vs. LTC facility with comfort.  [facility] on file indicating DNR/I and HCP placed on chart.      Message sent to Dr. Pham

## 2023-12-27 NOTE — CHART NOTE - NSCHARTNOTEFT_GEN_A_CORE
Pt with PMH of HTN, HLD, CAD, s/p CABG, DM, advancing dementia (mostly bedbound), mod AS, s/p spinal stimulator placement. Pt presented with generalized weakness & frequent fall. With advanced stage pressure ulcer of left lateral malleolus (infected; not a candidate for amputation), sepsis, presumed UTI, SOB (CXR showing pulmonary edema), lung nodule.  Palliative following for GOC. [facility] indicating DNR/DNI on file, hospice referral placed. Per palliative, pt appears most appropriate for home hospice.    Factors impacting intake: [x] change in mental status; dysphagia; self-feed difficulty; debility    Diet Prescription: Diet, Pureed:   Consistent Carbohydrate {Evening Snack} (12-18-23 @ 11:36)    Intake: mostly 26-75% of meals; requires total feeding assistance. Appears to be tolerating pureed consistency better than soft and bite sized.    Current Weight: 55.2 kg (12/22), 54.4 kg (12/15)  % Weight Change: 1.5% increase x 1 week    No edema noted    Pertinent Medications: MEDICATIONS  (STANDING):  amLODIPine   Tablet 10 milliGRAM(s) Oral daily  aspirin  chewable 81 milliGRAM(s) Oral daily  atorvastatin 10 milliGRAM(s) Oral at bedtime  ceFAZolin   IVPB      ceFAZolin   IVPB 1000 milliGRAM(s) IV Intermittent every 12 hours  clopidogrel Tablet 75 milliGRAM(s) Oral daily  collagenase Ointment 1 Application(s) Topical daily  dextrose 5%. 1000 milliLiter(s) (50 mL/Hr) IV Continuous <Continuous>  dextrose 5%. 1000 milliLiter(s) (100 mL/Hr) IV Continuous <Continuous>  dextrose 50% Injectable 12.5 Gram(s) IV Push once  dextrose 50% Injectable 25 Gram(s) IV Push once  dextrose 50% Injectable 25 Gram(s) IV Push once  furosemide   Injectable 40 milliGRAM(s) IV Push daily  glucagon  Injectable 1 milliGRAM(s) IntraMuscular once  heparin   Injectable 5000 Unit(s) SubCutaneous every 12 hours  insulin lispro (ADMELOG) corrective regimen sliding scale   SubCutaneous three times a day before meals  metoprolol tartrate 50 milliGRAM(s) Oral two times a day  morphine  - Injectable 0.5 milliGRAM(s) IV Push every 8 hours  nystatin    Suspension 115984 Unit(s) Oral four times a day  pantoprazole    Tablet 40 milliGRAM(s) Oral before breakfast  polyethylene glycol 3350 17 Gram(s) Oral daily  senna 2 Tablet(s) Oral at bedtime    MEDICATIONS  (PRN):  acetaminophen     Tablet .. 650 milliGRAM(s) Oral every 6 hours PRN Mild Pain (1 - 3), Moderate Pain (4 - 6)  albuterol/ipratropium for Nebulization 3 milliLiter(s) Nebulizer every 6 hours PRN Shortness of Breath and/or Wheezing  dextrose Oral Gel 15 Gram(s) Oral once PRN Blood Glucose LESS THAN 70 milliGRAM(s)/deciliter  LORazepam   Injectable 0.5 milliGRAM(s) IV Push every 8 hours PRN Agitation  melatonin 3 milliGRAM(s) Oral at bedtime PRN Insomnia    Pertinent Labs: 12-26 Na136 mmol/L Glu 164 mg/dL<H> K+ 3.9 mmol/L Cr  1.31 mg/dL<H> BUN 22 mg/dL 12-26 Phos 2.6 mg/dL 12-22 Alb 1.9 g/dL<L>  12-16 HgbA1c 6.6%    POCT Blood Glucose.: 167 mg/dL (27 Dec 2023 07:49)  POCT Blood Glucose.: 119 mg/dL (26 Dec 2023 21:12)  POCT Blood Glucose.: 154 mg/dL (26 Dec 2023 16:29)  POCT Blood Glucose.: 167 mg/dL (26 Dec 2023 11:16)    Skin: pressure ulcers x 2 as per flow sheets:  1. stage IV pressure ulcers; left lateral malleolus  2. unstageable pressure ulcer; sacrum    Estimated Needs:   [x] no change since previous assessment on 12/17  [ ] recalculated:     Previous Nutrition Diagnosis:   [x] Moderate malnutrition in context of chronic illness  Etiology: Inadequate protein-energy intake & increased needs related to hx of dementia, frequent falls, & pressure ulcer  Signs/Symptoms: Physical signs of mild-moderate muscle wasting and moderate fat depletion    Goal: Pt to consume >50% of meals/supplements - not consistently met    Nutrition Diagnosis is [x] ongoing  [ ] resolved [ ] not applicable     New Nutrition Diagnosis: [x] not applicable       Interventions:   Recommend  [ ] Change Diet To:  [x] Nutrition Supplement: add Glucerna x 3/day (60 kcal & 30 g protein)  [ ] Nutrition Support  [x] Other: Continue to provide encouragement and total assistance with PO intake    Monitoring and Evaluation:   [ x ] PO intake [ x ] Tolerance to diet prescription [ x ] weights [ x ] labs[ x ] follow up per protocol  [ ] other: Pt with PMH of HTN, HLD, CAD, s/p CABG, DM, advancing dementia (mostly bedbound), mod AS, s/p spinal stimulator placement. Pt presented with generalized weakness & frequent fall. With advanced stage pressure ulcer of left lateral malleolus (infected; not a candidate for amputation), sepsis, presumed UTI, SOB (CXR showing pulmonary edema), lung nodule.  Palliative following for GOC. [facility] indicating DNR/DNI on file, hospice referral placed. Per palliative, pt appears most appropriate for home hospice.    Factors impacting intake: [x] change in mental status; dysphagia; self-feed difficulty; debility    Diet Prescription: Diet, Pureed:   Consistent Carbohydrate {Evening Snack} (12-18-23 @ 11:36)    Intake: mostly 26-75% of meals; requires total feeding assistance. Appears to be tolerating pureed consistency better than soft and bite sized.    Current Weight: 55.2 kg (12/22), 54.4 kg (12/15)  % Weight Change: 1.5% increase x 1 week    No edema noted    Pertinent Medications: MEDICATIONS  (STANDING):  amLODIPine   Tablet 10 milliGRAM(s) Oral daily  aspirin  chewable 81 milliGRAM(s) Oral daily  atorvastatin 10 milliGRAM(s) Oral at bedtime  ceFAZolin   IVPB      ceFAZolin   IVPB 1000 milliGRAM(s) IV Intermittent every 12 hours  clopidogrel Tablet 75 milliGRAM(s) Oral daily  collagenase Ointment 1 Application(s) Topical daily  dextrose 5%. 1000 milliLiter(s) (50 mL/Hr) IV Continuous <Continuous>  dextrose 5%. 1000 milliLiter(s) (100 mL/Hr) IV Continuous <Continuous>  dextrose 50% Injectable 12.5 Gram(s) IV Push once  dextrose 50% Injectable 25 Gram(s) IV Push once  dextrose 50% Injectable 25 Gram(s) IV Push once  furosemide   Injectable 40 milliGRAM(s) IV Push daily  glucagon  Injectable 1 milliGRAM(s) IntraMuscular once  heparin   Injectable 5000 Unit(s) SubCutaneous every 12 hours  insulin lispro (ADMELOG) corrective regimen sliding scale   SubCutaneous three times a day before meals  metoprolol tartrate 50 milliGRAM(s) Oral two times a day  morphine  - Injectable 0.5 milliGRAM(s) IV Push every 8 hours  nystatin    Suspension 126634 Unit(s) Oral four times a day  pantoprazole    Tablet 40 milliGRAM(s) Oral before breakfast  polyethylene glycol 3350 17 Gram(s) Oral daily  senna 2 Tablet(s) Oral at bedtime    MEDICATIONS  (PRN):  acetaminophen     Tablet .. 650 milliGRAM(s) Oral every 6 hours PRN Mild Pain (1 - 3), Moderate Pain (4 - 6)  albuterol/ipratropium for Nebulization 3 milliLiter(s) Nebulizer every 6 hours PRN Shortness of Breath and/or Wheezing  dextrose Oral Gel 15 Gram(s) Oral once PRN Blood Glucose LESS THAN 70 milliGRAM(s)/deciliter  LORazepam   Injectable 0.5 milliGRAM(s) IV Push every 8 hours PRN Agitation  melatonin 3 milliGRAM(s) Oral at bedtime PRN Insomnia    Pertinent Labs: 12-26 Na136 mmol/L Glu 164 mg/dL<H> K+ 3.9 mmol/L Cr  1.31 mg/dL<H> BUN 22 mg/dL 12-26 Phos 2.6 mg/dL 12-22 Alb 1.9 g/dL<L>  12-16 HgbA1c 6.6%    POCT Blood Glucose.: 167 mg/dL (27 Dec 2023 07:49)  POCT Blood Glucose.: 119 mg/dL (26 Dec 2023 21:12)  POCT Blood Glucose.: 154 mg/dL (26 Dec 2023 16:29)  POCT Blood Glucose.: 167 mg/dL (26 Dec 2023 11:16)    Skin: pressure ulcers x 2 as per flow sheets:  1. stage IV pressure ulcers; left lateral malleolus  2. unstageable pressure ulcer; sacrum    Estimated Needs:   [x] no change since previous assessment on 12/17  [ ] recalculated:     Previous Nutrition Diagnosis:   [x] Moderate malnutrition in context of chronic illness  Etiology: Inadequate protein-energy intake & increased needs related to hx of dementia, frequent falls, & pressure ulcer  Signs/Symptoms: Physical signs of mild-moderate muscle wasting and moderate fat depletion    Goal: Pt to consume >50% of meals/supplements - not consistently met    Nutrition Diagnosis is [x] ongoing  [ ] resolved [ ] not applicable     New Nutrition Diagnosis: [x] not applicable       Interventions:   Recommend  [ ] Change Diet To:  [x] Nutrition Supplement: add Glucerna x 3/day (60 kcal & 30 g protein)  [ ] Nutrition Support  [x] Other: Continue to provide encouragement and total assistance with PO intake    Monitoring and Evaluation:   [ x ] PO intake [ x ] Tolerance to diet prescription [ x ] weights [ x ] labs[ x ] follow up per protocol  [ ] other: Pt with PMH of HTN, HLD, CAD, s/p CABG, DM, advancing dementia (mostly bedbound), mod AS, s/p spinal stimulator placement. Pt presented with generalized weakness & frequent falls. With advanced stage pressure ulcer of left lateral malleolus (infected; not a candidate for amputation), sepsis, presumed UTI, SOB (CXR showing pulmonary edema), lung nodule.  Palliative following for GOC. [facility] indicating DNR/DNI on file, hospice referral placed. Per palliative, pt appears most appropriate for home hospice.    Factors impacting intake: [x] change in mental status; dysphagia; self-feed difficulty; debility    Diet Prescription: Diet, Pureed:   Consistent Carbohydrate {Evening Snack} (12-18-23 @ 11:36)    Intake: mostly 26-75% of meals; requires total feeding assistance. Appears to be tolerating pureed consistency better than soft and bite sized.    Current Weight: 55.2 kg (12/22), 54.4 kg (12/15)  % Weight Change: 1.5% increase x 1 week    No edema noted    Pertinent Medications: MEDICATIONS  (STANDING):  amLODIPine   Tablet 10 milliGRAM(s) Oral daily  aspirin  chewable 81 milliGRAM(s) Oral daily  atorvastatin 10 milliGRAM(s) Oral at bedtime  ceFAZolin   IVPB      ceFAZolin   IVPB 1000 milliGRAM(s) IV Intermittent every 12 hours  clopidogrel Tablet 75 milliGRAM(s) Oral daily  collagenase Ointment 1 Application(s) Topical daily  dextrose 5%. 1000 milliLiter(s) (50 mL/Hr) IV Continuous <Continuous>  dextrose 5%. 1000 milliLiter(s) (100 mL/Hr) IV Continuous <Continuous>  dextrose 50% Injectable 12.5 Gram(s) IV Push once  dextrose 50% Injectable 25 Gram(s) IV Push once  dextrose 50% Injectable 25 Gram(s) IV Push once  furosemide   Injectable 40 milliGRAM(s) IV Push daily  glucagon  Injectable 1 milliGRAM(s) IntraMuscular once  heparin   Injectable 5000 Unit(s) SubCutaneous every 12 hours  insulin lispro (ADMELOG) corrective regimen sliding scale   SubCutaneous three times a day before meals  metoprolol tartrate 50 milliGRAM(s) Oral two times a day  morphine  - Injectable 0.5 milliGRAM(s) IV Push every 8 hours  nystatin    Suspension 979717 Unit(s) Oral four times a day  pantoprazole    Tablet 40 milliGRAM(s) Oral before breakfast  polyethylene glycol 3350 17 Gram(s) Oral daily  senna 2 Tablet(s) Oral at bedtime    MEDICATIONS  (PRN):  acetaminophen     Tablet .. 650 milliGRAM(s) Oral every 6 hours PRN Mild Pain (1 - 3), Moderate Pain (4 - 6)  albuterol/ipratropium for Nebulization 3 milliLiter(s) Nebulizer every 6 hours PRN Shortness of Breath and/or Wheezing  dextrose Oral Gel 15 Gram(s) Oral once PRN Blood Glucose LESS THAN 70 milliGRAM(s)/deciliter  LORazepam   Injectable 0.5 milliGRAM(s) IV Push every 8 hours PRN Agitation  melatonin 3 milliGRAM(s) Oral at bedtime PRN Insomnia    Pertinent Labs: 12-26 Na136 mmol/L Glu 164 mg/dL<H> K+ 3.9 mmol/L Cr  1.31 mg/dL<H> BUN 22 mg/dL 12-26 Phos 2.6 mg/dL 12-22 Alb 1.9 g/dL<L>  12-16 HgbA1c 6.6%    POCT Blood Glucose.: 167 mg/dL (27 Dec 2023 07:49)  POCT Blood Glucose.: 119 mg/dL (26 Dec 2023 21:12)  POCT Blood Glucose.: 154 mg/dL (26 Dec 2023 16:29)  POCT Blood Glucose.: 167 mg/dL (26 Dec 2023 11:16)    Skin: pressure ulcers x 2 as per flow sheets:  1. stage IV pressure ulcer; left lateral malleolus  2. unstageable pressure ulcer; sacrum    Estimated Needs:   [x] no change since previous assessment on 12/17  [ ] recalculated:     Previous Nutrition Diagnosis:   [x] Moderate malnutrition in context of chronic illness  Etiology: Inadequate protein-energy intake & increased needs related to hx of dementia, frequent falls, & pressure ulcer  Signs/Symptoms: Physical signs of mild-moderate muscle wasting and moderate fat depletion    Goal: Pt to consume >50% of meals/supplements - not consistently met    Nutrition Diagnosis is [x] ongoing  [ ] resolved [ ] not applicable     New Nutrition Diagnosis: [x] not applicable       Interventions:   Recommend  [ ] Change Diet To:  [x] Nutrition Supplement: add Glucerna x 3/day (660 kcal & 30 g protein)  [ ] Nutrition Support  [x] Other: Continue to provide encouragement and total assistance with PO intake    Monitoring and Evaluation:   [ x ] PO intake [ x ] Tolerance to diet prescription [ x ] weights [ x ] labs[ x ] follow up per protocol  [ ] other: Pt with PMH of HTN, HLD, CAD, s/p CABG, DM, advancing dementia (mostly bedbound), mod AS, s/p spinal stimulator placement. Pt presented with generalized weakness & frequent falls. With advanced stage pressure ulcer of left lateral malleolus (infected; not a candidate for amputation), sepsis, presumed UTI, SOB (CXR showing pulmonary edema), lung nodule.  Palliative following for GOC. [facility] indicating DNR/DNI on file, hospice referral placed. Per palliative, pt appears most appropriate for home hospice.    Factors impacting intake: [x] change in mental status; dysphagia; self-feed difficulty; debility    Diet Prescription: Diet, Pureed:   Consistent Carbohydrate {Evening Snack} (12-18-23 @ 11:36)    Intake: mostly 26-75% of meals; requires total feeding assistance. Appears to be tolerating pureed consistency better than soft and bite sized.    Current Weight: 55.2 kg (12/22), 54.4 kg (12/15)  % Weight Change: 1.5% increase x 1 week    No edema noted    Pertinent Medications: MEDICATIONS  (STANDING):  amLODIPine   Tablet 10 milliGRAM(s) Oral daily  aspirin  chewable 81 milliGRAM(s) Oral daily  atorvastatin 10 milliGRAM(s) Oral at bedtime  ceFAZolin   IVPB      ceFAZolin   IVPB 1000 milliGRAM(s) IV Intermittent every 12 hours  clopidogrel Tablet 75 milliGRAM(s) Oral daily  collagenase Ointment 1 Application(s) Topical daily  dextrose 5%. 1000 milliLiter(s) (50 mL/Hr) IV Continuous <Continuous>  dextrose 5%. 1000 milliLiter(s) (100 mL/Hr) IV Continuous <Continuous>  dextrose 50% Injectable 12.5 Gram(s) IV Push once  dextrose 50% Injectable 25 Gram(s) IV Push once  dextrose 50% Injectable 25 Gram(s) IV Push once  furosemide   Injectable 40 milliGRAM(s) IV Push daily  glucagon  Injectable 1 milliGRAM(s) IntraMuscular once  heparin   Injectable 5000 Unit(s) SubCutaneous every 12 hours  insulin lispro (ADMELOG) corrective regimen sliding scale   SubCutaneous three times a day before meals  metoprolol tartrate 50 milliGRAM(s) Oral two times a day  morphine  - Injectable 0.5 milliGRAM(s) IV Push every 8 hours  nystatin    Suspension [id number] Unit(s) Oral four times a day  pantoprazole    Tablet 40 milliGRAM(s) Oral before breakfast  polyethylene glycol 3350 17 Gram(s) Oral daily  senna 2 Tablet(s) Oral at bedtime    MEDICATIONS  (PRN):  acetaminophen     Tablet .. 650 milliGRAM(s) Oral every 6 hours PRN Mild Pain (1 - 3), Moderate Pain (4 - 6)  albuterol/ipratropium for Nebulization 3 milliLiter(s) Nebulizer every 6 hours PRN Shortness of Breath and/or Wheezing  dextrose Oral Gel 15 Gram(s) Oral once PRN Blood Glucose LESS THAN 70 milliGRAM(s)/deciliter  LORazepam   Injectable 0.5 milliGRAM(s) IV Push every 8 hours PRN Agitation  melatonin 3 milliGRAM(s) Oral at bedtime PRN Insomnia    Pertinent Labs: 12-26 Na136 mmol/L Glu 164 mg/dL<H> K+ 3.9 mmol/L Cr  1.31 mg/dL<H> BUN 22 mg/dL 12-26 Phos 2.6 mg/dL 12-22 Alb 1.9 g/dL<L>  12-16 HgbA1c 6.6%    POCT Blood Glucose.: 167 mg/dL (27 Dec 2023 07:49)  POCT Blood Glucose.: 119 mg/dL (26 Dec 2023 21:12)  POCT Blood Glucose.: 154 mg/dL (26 Dec 2023 16:29)  POCT Blood Glucose.: 167 mg/dL (26 Dec 2023 11:16)    Skin: pressure ulcers x 2 as per flow sheets:  1. stage IV pressure ulcer; left lateral malleolus  2. unstageable pressure ulcer; sacrum    Estimated Needs:   [x] no change since previous assessment on 12/17  [ ] recalculated:     Previous Nutrition Diagnosis:   [x] Moderate malnutrition in context of chronic illness  Etiology: Inadequate protein-energy intake & increased needs related to hx of dementia, frequent falls, & pressure ulcer  Signs/Symptoms: Physical signs of mild-moderate muscle wasting and moderate fat depletion    Goal: Pt to consume >50% of meals/supplements - not consistently met    Nutrition Diagnosis is [x] ongoing  [ ] resolved [ ] not applicable     New Nutrition Diagnosis: [x] not applicable       Interventions:   Recommend  [ ] Change Diet To:  [x] Nutrition Supplement: add Glucerna x 3/day (660 kcal & 30 g protein)  [ ] Nutrition Support  [x] Other: Continue to provide encouragement and total assistance with PO intake    Monitoring and Evaluation:   [ x ] PO intake [ x ] Tolerance to diet prescription [ x ] weights [ x ] labs[ x ] follow up per protocol  [ ] other:

## 2023-12-27 NOTE — PROGRESS NOTE ADULT - PROBLEM SELECTOR PLAN 3
Clinical evidence indicates that the patient has Moderate protein calorie malnutrition/ 2nd degree  In context of Chronic Illness (>1 month)  Energy/Food intake <75% of estimated energy requirement >7 days  Weight loss: Mild  (lbs lost recently)  Body Fat loss: Mild    Muscle mass loss: Mild   Fluid Accumulation: Mild    Strength: weakened Mild     Recommend:   c/w soft diet, aspiration precautions, keep head of bed at least 45 degrees during feeding.

## 2023-12-27 NOTE — PROGRESS NOTE ADULT - SUBJECTIVE AND OBJECTIVE BOX
[facility] Division of Hospital Medicine  Deidra Pham MD  Available via MS Teams  Pager: [phone]    SUBJECTIVE / OVERNIGHT EVENTS:  - no events overnight, denies any issues, seen with son at bedside, no acute concerns. patient is resting comfortably in bed.     MEDICATIONS  (STANDING):  amLODIPine   Tablet 10 milliGRAM(s) Oral daily  aspirin  chewable 81 milliGRAM(s) Oral daily  atorvastatin 10 milliGRAM(s) Oral at bedtime  ceFAZolin   IVPB      ceFAZolin   IVPB 1000 milliGRAM(s) IV Intermittent every 12 hours  clopidogrel Tablet 75 milliGRAM(s) Oral daily  collagenase Ointment 1 Application(s) Topical daily  dextrose 5%. 1000 milliLiter(s) (100 mL/Hr) IV Continuous <Continuous>  dextrose 5%. 1000 milliLiter(s) (50 mL/Hr) IV Continuous <Continuous>  dextrose 50% Injectable 25 Gram(s) IV Push once  dextrose 50% Injectable 12.5 Gram(s) IV Push once  dextrose 50% Injectable 25 Gram(s) IV Push once  furosemide   Injectable 40 milliGRAM(s) IV Push daily  glucagon  Injectable 1 milliGRAM(s) IntraMuscular once  heparin   Injectable 5000 Unit(s) SubCutaneous every 12 hours  insulin lispro (ADMELOG) corrective regimen sliding scale   SubCutaneous three times a day before meals  metoprolol tartrate 50 milliGRAM(s) Oral two times a day  pantoprazole    Tablet 40 milliGRAM(s) Oral before breakfast  polyethylene glycol 3350 17 Gram(s) Oral daily  senna 2 Tablet(s) Oral at bedtime    MEDICATIONS  (PRN):  acetaminophen     Tablet .. 650 milliGRAM(s) Oral every 6 hours PRN Mild Pain (1 - 3), Moderate Pain (4 - 6)  albuterol/ipratropium for Nebulization 3 milliLiter(s) Nebulizer every 6 hours PRN Shortness of Breath and/or Wheezing  bisacodyl Suppository 10 milliGRAM(s) Rectal daily PRN Constipation  dextrose Oral Gel 15 Gram(s) Oral once PRN Blood Glucose LESS THAN 70 milliGRAM(s)/deciliter  LORazepam    Concentrate 0.5 milliGRAM(s) Oral every 4 hours PRN Agitation  melatonin 3 milliGRAM(s) Oral at bedtime PRN Insomnia      I&O's Summary    26 Dec 2023 07:01  -  27 Dec 2023 07:00  --------------------------------------------------------  IN: 180 mL / OUT: 1475 mL / NET: -1295 mL        PHYSICAL EXAM:  Vital Signs Last 24 Hrs  T(C): 37.8 (27 Dec 2023 11:48), Max: 37.8 (27 Dec 2023 11:48)  T(F): 100 (27 Dec 2023 11:48), Max: 100 (27 Dec 2023 11:48)  HR: 83 (27 Dec 2023 11:48) (83 - 98)  BP: 122/59 (27 Dec 2023 11:48) (122/59 - 145/60)  BP(mean): --  RR: 17 (27 Dec 2023 11:48) (16 - 18)  SpO2: 94% (27 Dec 2023 11:48) (90% - 100%)    Parameters below as of 27 Dec 2023 11:48  Patient On (Oxygen Delivery Method): room air      PHYSICAL EXAM:  GENERAL: NAD, thin appearing,  no increased WOB  HEAD:  Atraumatic, Normocephalic  EYES: EOMI, PERRLA, conjunctiva and sclera clear  ENMT: No tonsillar erythema, exudates, or enlargement; Moist mucous membranes   NECK: Supple, No JVD   NERVOUS SYSTEM:  awake, agitated at times, moving extremities   CHEST/LUNG: CTAB;  No rales, rhonchi, wheezing, or rubs  HEART: Regular rate and rhythm; No murmurs, rubs, or gallops  ABDOMEN: Soft, Nontender, Nondistended; Bowel sounds present  EXTREMITIES:  2+ Peripheral Pulses b/l, No clubbing, cyanosis, calf tenderness or edema has LE ulcers    LABS:                        8.3    9.46  )-----------( 431      ( 26 Dec 2023 06:04 )             26.0     12-26    136  |  103  |  22  ----------------------------<  164<H>  3.9   |  26  |  1.31<H>    Ca    8.6      26 Dec 2023 06:04  Phos  2.6     12-26  Mg     1.8     12-26            Urinalysis Basic - ( 26 Dec 2023 06:04 )    Color: x / Appearance: x / SG: x / pH: x  Gluc: 164 mg/dL / Ketone: x  / Bili: x / Urobili: x   Blood: x / Protein: x / Nitrite: x   Leuk Esterase: x / RBC: x / WBC x   Sq Epi: x / Non Sq Epi: x / Bacteria: x     [facility] Division of Hospital Medicine  Deidra Pham MD  Available via MS Teams  Pager: [phone]    SUBJECTIVE / OVERNIGHT EVENTS:  - no events overnight, denies any issues, seen with son at bedside, no acute concerns. patient is resting comfortably in bed.     MEDICATIONS  (STANDING):  amLODIPine   Tablet 10 milliGRAM(s) Oral daily  aspirin  chewable 81 milliGRAM(s) Oral daily  atorvastatin 10 milliGRAM(s) Oral at bedtime  ceFAZolin   IVPB      ceFAZolin   IVPB 1000 milliGRAM(s) IV Intermittent every 12 hours  clopidogrel Tablet 75 milliGRAM(s) Oral daily  collagenase Ointment 1 Application(s) Topical daily  dextrose 5%. 1000 milliLiter(s) (100 mL/Hr) IV Continuous <Continuous>  dextrose 5%. 1000 milliLiter(s) (50 mL/Hr) IV Continuous <Continuous>  dextrose 50% Injectable 25 Gram(s) IV Push once  dextrose 50% Injectable 12.5 Gram(s) IV Push once  dextrose 50% Injectable 25 Gram(s) IV Push once  furosemide   Injectable 40 milliGRAM(s) IV Push daily  glucagon  Injectable 1 milliGRAM(s) IntraMuscular once  heparin   Injectable 5000 Unit(s) SubCutaneous every 12 hours  insulin lispro (ADMELOG) corrective regimen sliding scale   SubCutaneous three times a day before meals  metoprolol tartrate 50 milliGRAM(s) Oral two times a day  pantoprazole    Tablet 40 milliGRAM(s) Oral before breakfast  polyethylene glycol 3350 17 Gram(s) Oral daily  senna 2 Tablet(s) Oral at bedtime    MEDICATIONS  (PRN):  acetaminophen     Tablet .. 650 milliGRAM(s) Oral every 6 hours PRN Mild Pain (1 - 3), Moderate Pain (4 - 6)  albuterol/ipratropium for Nebulization 3 milliLiter(s) Nebulizer every 6 hours PRN Shortness of Breath and/or Wheezing  bisacodyl Suppository 10 milliGRAM(s) Rectal daily PRN Constipation  dextrose Oral Gel 15 Gram(s) Oral once PRN Blood Glucose LESS THAN 70 milliGRAM(s)/deciliter  LORazepam    Concentrate 0.5 milliGRAM(s) Oral every 4 hours PRN Agitation  melatonin 3 milliGRAM(s) Oral at bedtime PRN Insomnia      I&O's Summary    26 Dec 2023 07:01  -  27 Dec 2023 07:00  --------------------------------------------------------  IN: 180 mL / OUT: 1475 mL / NET: -1295 mL        PHYSICAL EXAM:  Vital Signs Last 24 Hrs  T(C): 37.8 (27 Dec 2023 11:48), Max: 37.8 (27 Dec 2023 11:48)  T(F): 100 (27 Dec 2023 11:48), Max: 100 (27 Dec 2023 11:48)  HR: 83 (27 Dec 2023 11:48) (83 - 98)  BP: 122/59 (27 Dec 2023 11:48) (122/59 - 145/60)  BP(mean): --  RR: 17 (27 Dec 2023 11:48) (16 - 18)  SpO2: 94% (27 Dec 2023 11:48) (90% - 100%)    Parameters below as of 27 Dec 2023 11:48  Patient On (Oxygen Delivery Method): room air      PHYSICAL EXAM:  GENERAL: NAD, thin appearing,  no increased WOB  HEAD:  Atraumatic, Normocephalic  EYES: EOMI, PERRLA, conjunctiva and sclera clear  ENMT: No tonsillar erythema, exudates, or enlargement; Moist mucous membranes   NECK: Supple, No JVD   NERVOUS SYSTEM:  awake, agitated at times, moving extremities   CHEST/LUNG: CTAB;  No rales, rhonchi, wheezing, or rubs  HEART: Regular rate and rhythm; No murmurs, rubs, or gallops  ABDOMEN: Soft, Nontender, Nondistended; Bowel sounds present  EXTREMITIES:  2+ Peripheral Pulses b/l, No clubbing, cyanosis, calf tenderness or edema has LE ulcers    LABS:                        8.3    9.46  )-----------( 431      ( 26 Dec 2023 06:04 )             26.0     12-26    136  |  103  |  22  ----------------------------<  164<H>  3.9   |  26  |  1.31<H>    Ca    8.6      26 Dec 2023 06:04  Phos  2.6     12-26  Mg     1.8     12-26            Urinalysis Basic - ( 26 Dec 2023 06:04 )    Color: x / Appearance: x / SG: x / pH: x  Gluc: 164 mg/dL / Ketone: x  / Bili: x / Urobili: x   Blood: x / Protein: x / Nitrite: x   Leuk Esterase: x / RBC: x / WBC x   Sq Epi: x / Non Sq Epi: x / Bacteria: x

## 2023-12-27 NOTE — PROGRESS NOTE ADULT - PROBLEM SELECTOR PLAN 4
Clinical evidence indicates that the patient has Severe protein calorie malnutrition/ 3rd degree    In context of  Chronic Illness (>1 month)    Energy/Food intake <50% of estimated energy requirement >5 days  Weight loss: Moderate - severe (lbs lost recently)  Body Fat loss: Severe   (Cachexia, temporal wasting, contracted, muscle atrophy)  Muscle mass loss: Severe  (Skin failure/pressure ulcers)  Fluid Accumulation: Severe (Fluid overload, ascites, pleural effusions)   Strength: weakened severe (bedbound)    Recommend:   pleasure feeds as tolerated - aspiration precautions, careful hand-feeding, teaching to caregivers  nutritional supplements as tolerated, nutrition consult
assistance with ADLs, mainly in bed, has had recurrent falls.
monitor BP and titrate BP med

## 2023-12-28 NOTE — PROGRESS NOTE ADULT - SUBJECTIVE AND OBJECTIVE BOX
[facility] Division of Hospital Medicine  Deidra Phma MD  Available via MS Teams  Pager: [phone]    SUBJECTIVE / OVERNIGHT EVENTS:    ADDITIONAL REVIEW OF SYSTEMS:    MEDICATIONS  (STANDING):  amLODIPine   Tablet 10 milliGRAM(s) Oral daily  aspirin  chewable 81 milliGRAM(s) Oral daily  atorvastatin 10 milliGRAM(s) Oral at bedtime  ceFAZolin   IVPB      ceFAZolin   IVPB 1000 milliGRAM(s) IV Intermittent every 12 hours  clopidogrel Tablet 75 milliGRAM(s) Oral daily  collagenase Ointment 1 Application(s) Topical daily  dextrose 5%. 1000 milliLiter(s) (100 mL/Hr) IV Continuous <Continuous>  dextrose 5%. 1000 milliLiter(s) (50 mL/Hr) IV Continuous <Continuous>  dextrose 50% Injectable 12.5 Gram(s) IV Push once  dextrose 50% Injectable 25 Gram(s) IV Push once  dextrose 50% Injectable 25 Gram(s) IV Push once  furosemide   Injectable 40 milliGRAM(s) IV Push daily  glucagon  Injectable 1 milliGRAM(s) IntraMuscular once  heparin   Injectable 5000 Unit(s) SubCutaneous every 12 hours  insulin lispro (ADMELOG) corrective regimen sliding scale   SubCutaneous three times a day before meals  metoprolol tartrate 50 milliGRAM(s) Oral two times a day  pantoprazole    Tablet 40 milliGRAM(s) Oral before breakfast  polyethylene glycol 3350 17 Gram(s) Oral daily  senna 2 Tablet(s) Oral at bedtime    MEDICATIONS  (PRN):  acetaminophen     Tablet .. 650 milliGRAM(s) Oral every 6 hours PRN Mild Pain (1 - 3), Moderate Pain (4 - 6)  albuterol/ipratropium for Nebulization 3 milliLiter(s) Nebulizer every 6 hours PRN Shortness of Breath and/or Wheezing  bisacodyl Suppository 10 milliGRAM(s) Rectal daily PRN Constipation  dextrose Oral Gel 15 Gram(s) Oral once PRN Blood Glucose LESS THAN 70 milliGRAM(s)/deciliter  LORazepam    Concentrate 0.5 milliGRAM(s) Oral every 4 hours PRN Agitation  melatonin 3 milliGRAM(s) Oral at bedtime PRN Insomnia  morphine   Solution 5 milliGRAM(s) Oral every 4 hours PRN Severe Pain (7 - 10)  morphine   Solution 2.5 milliGRAM(s) Oral every 4 hours PRN Moderate Pain (4 - 6)      I&O's Summary    28 Dec 2023 07:01  -  28 Dec 2023 22:30  --------------------------------------------------------  IN: 290 mL / OUT: 350 mL / NET: -60 mL        PHYSICAL EXAM:  Vital Signs Last 24 Hrs  T(C): 36.9 (28 Dec 2023 17:36), Max: 37.8 (27 Dec 2023 23:40)  T(F): 98.5 (28 Dec 2023 17:36), Max: 100 (27 Dec 2023 23:40)  HR: 85 (28 Dec 2023 17:36) (82 - 89)  BP: 153/64 (28 Dec 2023 17:36) (118/50 - 153/64)  BP(mean): --  RR: 17 (28 Dec 2023 17:36) (17 - 18)  SpO2: 95% (28 Dec 2023 17:36) (95% - 98%)    Parameters below as of 28 Dec 2023 10:54  Patient On (Oxygen Delivery Method): nasal cannula  O2 Flow (L/min): 2    PHYSICAL EXAM:  GENERAL: NAD, thin appearing,  no increased WOB  HEAD:  Atraumatic, Normocephalic  EYES: EOMI, PERRLA, conjunctiva and sclera clear  ENMT: No tonsillar erythema, exudates, or enlargement; Moist mucous membranes   NECK: Supple, No JVD   NERVOUS SYSTEM:  awake, agitated at times, moving extremities   CHEST/LUNG: CTAB;  No rales, rhonchi, wheezing, or rubs  HEART: Regular rate and rhythm; No murmurs, rubs, or gallops  ABDOMEN: Soft, Nontender, Nondistended; Bowel sounds present  EXTREMITIES:  2+ Peripheral Pulses b/l, No clubbing, cyanosis, calf tenderness or edema has LE ulcers      LABS:   none          RADIOLOGY & ADDITIONAL TESTS:  New Results Reviewed Today:   New Imaging Personally Reviewed Today:  New Electrocardiogram Personally Reviewed Today:  Prior or Outpatient Records Reviewed Today:    COMMUNICATION:  Care Discussed with Consultants/Other Providers and Details of Discussion:  Discussions with Patient/Family:  PCP Communication:     [facility] Division of Hospital Medicine  Deidra Pham MD  Available via MS Teams  Pager: [phone]    SUBJECTIVE / OVERNIGHT EVENTS:    ADDITIONAL REVIEW OF SYSTEMS:    MEDICATIONS  (STANDING):  amLODIPine   Tablet 10 milliGRAM(s) Oral daily  aspirin  chewable 81 milliGRAM(s) Oral daily  atorvastatin 10 milliGRAM(s) Oral at bedtime  ceFAZolin   IVPB      ceFAZolin   IVPB 1000 milliGRAM(s) IV Intermittent every 12 hours  clopidogrel Tablet 75 milliGRAM(s) Oral daily  collagenase Ointment 1 Application(s) Topical daily  dextrose 5%. 1000 milliLiter(s) (100 mL/Hr) IV Continuous <Continuous>  dextrose 5%. 1000 milliLiter(s) (50 mL/Hr) IV Continuous <Continuous>  dextrose 50% Injectable 12.5 Gram(s) IV Push once  dextrose 50% Injectable 25 Gram(s) IV Push once  dextrose 50% Injectable 25 Gram(s) IV Push once  furosemide   Injectable 40 milliGRAM(s) IV Push daily  glucagon  Injectable 1 milliGRAM(s) IntraMuscular once  heparin   Injectable 5000 Unit(s) SubCutaneous every 12 hours  insulin lispro (ADMELOG) corrective regimen sliding scale   SubCutaneous three times a day before meals  metoprolol tartrate 50 milliGRAM(s) Oral two times a day  pantoprazole    Tablet 40 milliGRAM(s) Oral before breakfast  polyethylene glycol 3350 17 Gram(s) Oral daily  senna 2 Tablet(s) Oral at bedtime    MEDICATIONS  (PRN):  acetaminophen     Tablet .. 650 milliGRAM(s) Oral every 6 hours PRN Mild Pain (1 - 3), Moderate Pain (4 - 6)  albuterol/ipratropium for Nebulization 3 milliLiter(s) Nebulizer every 6 hours PRN Shortness of Breath and/or Wheezing  bisacodyl Suppository 10 milliGRAM(s) Rectal daily PRN Constipation  dextrose Oral Gel 15 Gram(s) Oral once PRN Blood Glucose LESS THAN 70 milliGRAM(s)/deciliter  LORazepam    Concentrate 0.5 milliGRAM(s) Oral every 4 hours PRN Agitation  melatonin 3 milliGRAM(s) Oral at bedtime PRN Insomnia  morphine   Solution 5 milliGRAM(s) Oral every 4 hours PRN Severe Pain (7 - 10)  morphine   Solution 2.5 milliGRAM(s) Oral every 4 hours PRN Moderate Pain (4 - 6)      I&O's Summary    28 Dec 2023 07:01  -  28 Dec 2023 22:30  --------------------------------------------------------  IN: 290 mL / OUT: 350 mL / NET: -60 mL        PHYSICAL EXAM:  Vital Signs Last 24 Hrs  T(C): 36.9 (28 Dec 2023 17:36), Max: 37.8 (27 Dec 2023 23:40)  T(F): 98.5 (28 Dec 2023 17:36), Max: 100 (27 Dec 2023 23:40)  HR: 85 (28 Dec 2023 17:36) (82 - 89)  BP: 153/64 (28 Dec 2023 17:36) (118/50 - 153/64)  BP(mean): --  RR: 17 (28 Dec 2023 17:36) (17 - 18)  SpO2: 95% (28 Dec 2023 17:36) (95% - 98%)    Parameters below as of 28 Dec 2023 10:54  Patient On (Oxygen Delivery Method): nasal cannula  O2 Flow (L/min): 2    PHYSICAL EXAM:  GENERAL: NAD, thin appearing,  no increased WOB  HEAD:  Atraumatic, Normocephalic  EYES: EOMI, PERRLA, conjunctiva and sclera clear  ENMT: No tonsillar erythema, exudates, or enlargement; Moist mucous membranes   NECK: Supple, No JVD   NERVOUS SYSTEM:  awake, agitated at times, moving extremities   CHEST/LUNG: CTAB;  No rales, rhonchi, wheezing, or rubs  HEART: Regular rate and rhythm; No murmurs, rubs, or gallops  ABDOMEN: Soft, Nontender, Nondistended; Bowel sounds present  EXTREMITIES:  2+ Peripheral Pulses b/l, No clubbing, cyanosis, calf tenderness or edema has LE ulcers      LABS:   none          RADIOLOGY & ADDITIONAL TESTS:  New Results Reviewed Today:   New Imaging Personally Reviewed Today:  New Electrocardiogram Personally Reviewed Today:  Prior or Outpatient Records Reviewed Today:    COMMUNICATION:  Care Discussed with Consultants/Other Providers and Details of Discussion:  Discussions with Patient/Family:  PCP Communication:

## 2023-12-28 NOTE — PROGRESS NOTE ADULT - ASSESSMENT
89 years old female with h/o HTN, HLD, CAD, s/p CABG, DM, dementia, mod AS, s/p spinal stimulator placement present to ED with generalized weakness and frequent fall. For last 1-2 weeks, patient has been having generalized weakness and frequent fall. Have multiple bruises on body.  Per son Toby ( [phone]) who stated that he is HCP. Patient has been having fall over last one years but more frequent lately. Patient normally ambulate short distance with walker with assistance and use wheelchair to go around. She is mostly bedbound.  Patient usually attempts to get out of bed when family is not around resulting in fall. Patient also has been refusing to eat lately as well.   Hemodynamically stable, afebrile, sat well at RA. WBC 14.45, Hb 10.5, .6, plt 413, Cr 1.42. CT head with no acute pathology. CT C spine with no acute fracture. 4mm right upper lobe pul nodule. CT chest/abd/pelvis with no acute pathology. Emphysema +. Chronic fibrotic changes at lung apices. Questional asymmetrical rectal wall thickening.     family requesting inpatient hospice/comfort care  - referral made, pending auth to [facility]     Left lateral malleolus infected appearing Ulcer   Sepsis   leukocytosis improved   US doppler ordered for LLE tenderness-- negative for DVT  -- PT wound consulted, wound recs appreciated    -ESR/CRP elevated   -- MRSA neg   - Blood Cx--NGTD   --Vascular , ID, podiatry consults appreciated, no surgical intervention   --abd changed to ancef (wound Cx reviewed)   --patient unable to tolerare MRI ankle Left     Presumed UTI ? patient was complaining of dysuria , unclear though as she is a poor historian   abx as above   UCx NGTD     Shortness of breath   12/23 CXR showing pulmonary edema   no wheezing on exam , appears comfortable   added Lasix   supplemental O2 to maintain O2 sat >92%   SpO2 95% on 4L NC 12/25/23        Frequent falls  advanced dementia , mostly bedbound , functional quadriplegia (wheelchair bound at home)   CT head    with no acute pathology. CT C spine with no acute fracture.       4mm right upper lobe pul nodule.   CT chest/abd/pelvis with no acute pathology. Emphysema +. Chronic fibrotic changes at lung apices.   no wheezing, lungs are CTAB       Questionable asymmetrical rectal wall thickening.   --per my discussion with son, given age and dementia, family prefers not to pursue further investigation /treatment or aggressive measures.      Macrocytic anemia.   Iron panel relatively unremarkable, b12, folate WNL--no role for supplementation   TSH OK    SAE (acute kidney injury).  POA   resolved with IVF      Benign essential HTN/HLD   continue with current regimen       CAD (coronary artery disease).   --CAD s/p PCI s/p CABG  on aspirin, plavix, statin, BB.    Type 2 diabetes mellitus with unspecified complications. controlled   A1c 6.6%  continue with ISS   avoid hypoglycemia       Dementia. suspected vascular dementia   CT head showing multiple old chronic strokes, including cerebellar strokes which would explain the gait instability    supportive care    Moderate PCM   nutrition consult appreciated     Preventative Measures   heparin SQ-dvt ppx  fall, aspiration precautions   HOBE     palliative consult appreciated >> patient DNR/DNI      89 years old female with h/o HTN, HLD, CAD, s/p CABG, DM, dementia, mod AS, s/p spinal stimulator placement present to ED with generalized weakness and frequent fall. For last 1-2 weeks, patient has been having generalized weakness and frequent fall. Have multiple bruises on body.  Per son Toby ( [phone]) who stated that he is HCP. Patient has been having fall over last one years but more frequent lately. Patient normally ambulate short distance with walker with assistance and use wheelchair to go around. She is mostly bedbound.  Patient usually attempts to get out of bed when family is not around resulting in fall. Patient also has been refusing to eat lately as well.   Hemodynamically stable, afebrile, sat well at RA. WBC 14.45, Hb 10.5, .6, plt 413, Cr 1.42. CT head with no acute pathology. CT C spine with no acute fracture. 4mm right upper lobe pul nodule. CT chest/abd/pelvis with no acute pathology. Emphysema +. Chronic fibrotic changes at lung apices. Questional asymmetrical rectal wall thickening.     family requesting inpatient hospice/comfort care  - referral made, pending auth to [facility]     Left lateral malleolus infected appearing Ulcer   Sepsis   leukocytosis improved   US doppler ordered for LLE tenderness-- negative for DVT  -- PT wound consulted, wound recs appreciated    -ESR/CRP elevated   -- MRSA neg   - Blood Cx--NGTD   --Vascular , ID, podiatry consults appreciated, no surgical intervention   --abd changed to ancef (wound Cx reviewed)   --patient unable to tolerare MRI ankle Left     Presumed UTI ? patient was complaining of dysuria , unclear though as she is a poor historian   abx as above   UCx NGTD     Shortness of breath   12/23 CXR showing pulmonary edema   no wheezing on exam , appears comfortable   added Lasix   supplemental O2 to maintain O2 sat >92%   SpO2 95% on 4L NC 12/25/23        Frequent falls  advanced dementia , mostly bedbound , functional quadriplegia (wheelchair bound at home)   CT head    with no acute pathology. CT C spine with no acute fracture.       4mm right upper lobe pul nodule.   CT chest/abd/pelvis with no acute pathology. Emphysema +. Chronic fibrotic changes at lung apices.   no wheezing, lungs are CTAB       Questionable asymmetrical rectal wall thickening.   --per my discussion with son, given age and dementia, family prefers not to pursue further investigation /treatment or aggressive measures.      Macrocytic anemia.   Iron panel relatively unremarkable, b12, folate WNL--no role for supplementation   TSH OK    SAE (acute kidney injury).  POA   resolved with IVF      Benign essential HTN/HLD   continue with current regimen       CAD (coronary artery disease).   --CAD s/p PCI s/p CABG  on aspirin, plavix, statin, BB.    Type 2 diabetes mellitus with unspecified complications. controlled   A1c 6.6%  continue with ISS   avoid hypoglycemia       Dementia. suspected vascular dementia   CT head showing multiple old chronic strokes, including cerebellar strokes which would explain the gait instability    supportive care    Moderate PCM   nutrition consult appreciated     Preventative Measures   heparin SQ-dvt ppx  fall, aspiration precautions   HOBE     palliative consult appreciated >> patient DNR/DNI

## 2023-12-29 NOTE — PROGRESS NOTE ADULT - TIME BILLING
min spent reviewing patient's chart,  examining patient, discussing plan with patient and family and staff, reviewing consultant recommendations/communicating with consultants, writing progress note and placing orders.
- Ordering, reviewing, and interpreting labs, testing, and imaging.  - Independently obtaining a review of systems and performing a physical exam  - Reviewing consultant documentation/recommendations in addition to discussing plan of care with consultants.  - Counselling and educating patient and family regarding interpretation of aforementioned items and plan of care.
- Ordering, reviewing, and interpreting labs, testing, and imaging.  - Independently obtaining a review of systems and performing a physical exam  - Reviewing prior hospitalization and where necessary, outpatient records.  - Reviewing consultant recommendations/communicating with consultants  - Counselling and educating patient and family regarding interpretation of aforementioned items and plan of care.
min spent reviewing patient's chart,  examining patient, discussing plan with patient and family and staff, reviewing consultant recommendations/communicating with consultants, writing progress note and placing orders.
Evaluation of patient, review of medical records and collaboration with care team and family
min spent reviewing patient's chart,  examining patient, discussing plan with patient and family and staff, reviewing consultant recommendations/communicating with consultants, writing progress note and placing orders.
min spent reviewing patient's chart,  examining patient, discussing plan with patient and family and staff, reviewing consultant recommendations/communicating with consultants, writing progress note and placing orders.
patient exam, chart review, coordination of care w team,
- Ordering, reviewing, and interpreting labs, testing, and imaging.  - Independently obtaining a review of systems and performing a physical exam  - Reviewing consultant documentation/recommendations in addition to discussing plan of care with consultants.  - Counselling and educating patient and family regarding interpretation of aforementioned items and plan of care.
- Ordering, reviewing, and interpreting labs, testing, and imaging.  - Independently obtaining a review of systems and performing a physical exam  - Reviewing consultant documentation/recommendations in addition to discussing plan of care with consultants.  - Counselling and educating patient and family regarding interpretation of aforementioned items and plan of care.
- Ordering, reviewing, and interpreting labs, testing, and imaging.  - Independently obtaining a review of systems and performing a physical exam  - Reviewing prior hospitalization and where necessary, outpatient records.  - Reviewing consultant recommendations/communicating with consultants  - Counselling and educating patient and family regarding interpretation of aforementioned items and plan of care.
- Ordering, reviewing, and interpreting labs, testing, and imaging.  - Independently obtaining a review of systems and performing a physical exam  - Reviewing prior hospitalization and where necessary, outpatient records.  - Reviewing consultant recommendations/communicating with consultants  - Counselling and educating patient and family regarding interpretation of aforementioned items and plan of care.
min spent reviewing patient's chart,  examining patient, discussing plan with patient and family and staff, reviewing consultant recommendations/communicating with consultants, writing progress note and placing orders.
- Ordering, reviewing, and interpreting labs, testing, and imaging.  - Independently obtaining a review of systems and performing a physical exam  - Reviewing prior hospitalization and where necessary, outpatient records.  - Reviewing consultant recommendations/communicating with consultants  - Counselling and educating patient and family regarding interpretation of aforementioned items and plan of care.
min spent reviewing patient's chart,  examining patient, discussing plan with patient and family and staff, reviewing consultant recommendations/communicating with consultants, writing progress note and placing orders.

## 2023-12-29 NOTE — PROGRESS NOTE ADULT - SUBJECTIVE AND OBJECTIVE BOX
[facility]  INFECTIOUS DISEASES   [address] [place] [address]  Tel: [phone]     Fax: [phone]  ==============================================================================  MD Abhay Linares, DO Naomy Mtz, NP   ==============================================================================      AARON GREEN  MRN-[id number]  89y ([birth year])      Interval History:    ROS:    [ ] Unobtainable because:  [ ] All other systems negative except as noted    Constitutional: no fever, no chills  Head: no trauma  Eyes: no vision changes, no eye pain  ENT:  no sore throat, no rhinorrhea  Cardiovascular:  no chest pain, no palpitation  Respiratory:  no SOB, no cough  GI:  no abd pain, no vomiting, no diarrhea  urinary: no dysuria, no hematuria, no flank pain  musculoskeletal:  no joint pain, no joint swelling  skin:  no rash  neurology:  no headache, no seizure, no change in mental status  psych: no anxiety, no depression         Allergies  iodine (Other)  contrast dye -  rash (Other)  Pineapple (Unknown)  penicillin (Rash)  codeine (Vomiting)  latex (Rash)        ANTIMICROBIALS:  ceFAZolin   IVPB    ceFAZolin   IVPB 1000 every 12 hours        Physical Exam:  Vital Signs Last 24 Hrs  T(C): 36.7 (29 Dec 2023 05:17), Max: 36.9 (28 Dec 2023 17:36)  T(F): 98 (29 Dec 2023 05:17), Max: 98.5 (28 Dec 2023 17:36)  HR: 60 (29 Dec 2023 05:17) (60 - 85)  BP: 120/65 (29 Dec 2023 05:17) (120/65 - 153/64)  BP(mean): --  RR: 18 (29 Dec 2023 05:17) (17 - 18)  SpO2: 100% (29 Dec 2023 05:17) (91% - 100%)    Parameters below as of 29 Dec 2023 05:17  Patient On (Oxygen Delivery Method): nasal cannula  O2 Flow (L/min): 2      12-28-23 @ 07:01  -  12-29-23 @ 07:00  --------------------------------------------------------  IN: 290 mL / OUT: 350 mL / NET: -60 mL      General:    NAD,  non toxic  Head: atraumatic, normocephalic  Eye: normal sclera and conjunctiva  ENT:    no oral lesions, neck supple  Cardio:     regular S1, S2,  no murmur  Respiratory:    clear b/l,    no wheezing  abd:     soft,   BS +,   no tenderness  :   no CVAT,  no suprapubic tenderness,   no  allen  Musculoskeletal:   no joint swelling,   no edema  vascular: no central lines, +PIV   Skin:    no rash  Neurologic:     no focal deficit  psych: normal affect    WBC Count: 9.46 K/uL (12-26 @ 06:04)  WBC Count: 10.85 K/uL (12-24 @ 07:05)  WBC Count: 14.32 K/uL (12-23 @ 08:08)                        Creatinine Trend: 1.31<--, 1.28<--, 1.25<--, 1.31<--, 1.04<--, 1.06<--      MICROBIOLOGY:  v  .Abscess Left ankle wound  12-22-23   Few Staphylococcus aureus  Rare Escherichia coli  Rare Finegoldia magna "Susceptibilities not performed"  --  Staphylococcus aureus  Escherichia coli      .Blood Blood-Peripheral  12-21-23   No growth at 5 days  --  --      .Blood Blood-Peripheral  12-20-23   No growth at 5 days  --  --      .Blood Blood-Peripheral  12-20-23   No growth at 5 days  --  --      Clean Catch Clean Catch (Midstream)  12-20-23   No growth  --  --                  C-Reactive Protein, Serum: 172 (12-21)    Ferritin: 365 (12-16)                RADIOLOGY:   [facility]  INFECTIOUS DISEASES   [address] [place] [address]  Tel: [phone]     Fax: [phone]  ==============================================================================  MD Abhay Linares, DO Naomy Mtz, NP   ==============================================================================      AARON GREEN  MRN-[id number]  89y ([birth year])      Interval History:    ROS:    [ ] Unobtainable because:  [ ] All other systems negative except as noted    Constitutional: no fever, no chills  Head: no trauma  Eyes: no vision changes, no eye pain  ENT:  no sore throat, no rhinorrhea  Cardiovascular:  no chest pain, no palpitation  Respiratory:  no SOB, no cough  GI:  no abd pain, no vomiting, no diarrhea  urinary: no dysuria, no hematuria, no flank pain  musculoskeletal:  no joint pain, no joint swelling  skin:  no rash  neurology:  no headache, no seizure, no change in mental status  psych: no anxiety, no depression         Allergies  iodine (Other)  contrast dye -  rash (Other)  Pineapple (Unknown)  penicillin (Rash)  codeine (Vomiting)  latex (Rash)        ANTIMICROBIALS:  ceFAZolin   IVPB    ceFAZolin   IVPB 1000 every 12 hours        Physical Exam:  Vital Signs Last 24 Hrs  T(C): 36.7 (29 Dec 2023 05:17), Max: 36.9 (28 Dec 2023 17:36)  T(F): 98 (29 Dec 2023 05:17), Max: 98.5 (28 Dec 2023 17:36)  HR: 60 (29 Dec 2023 05:17) (60 - 85)  BP: 120/65 (29 Dec 2023 05:17) (120/65 - 153/64)  BP(mean): --  RR: 18 (29 Dec 2023 05:17) (17 - 18)  SpO2: 100% (29 Dec 2023 05:17) (91% - 100%)    Parameters below as of 29 Dec 2023 05:17  Patient On (Oxygen Delivery Method): nasal cannula  O2 Flow (L/min): 2      12-28-23 @ 07:01  -  12-29-23 @ 07:00  --------------------------------------------------------  IN: 290 mL / OUT: 350 mL / NET: -60 mL      General:    NAD,  non toxic  Head: atraumatic, normocephalic  Eye: normal sclera and conjunctiva  ENT:    no oral lesions, neck supple  Cardio:     regular S1, S2,  no murmur  Respiratory:    clear b/l,    no wheezing  abd:     soft,   BS +,   no tenderness  :   no CVAT,  no suprapubic tenderness,   no  allen  Musculoskeletal:   no joint swelling,   no edema  vascular: no central lines, +PIV   Skin:    no rash  Neurologic:     no focal deficit  psych: normal affect    WBC Count: 9.46 K/uL (12-26 @ 06:04)  WBC Count: 10.85 K/uL (12-24 @ 07:05)  WBC Count: 14.32 K/uL (12-23 @ 08:08)                        Creatinine Trend: 1.31<--, 1.28<--, 1.25<--, 1.31<--, 1.04<--, 1.06<--      MICROBIOLOGY:  v  .Abscess Left ankle wound  12-22-23   Few Staphylococcus aureus  Rare Escherichia coli  Rare Finegoldia magna "Susceptibilities not performed"  --  Staphylococcus aureus  Escherichia coli      .Blood Blood-Peripheral  12-21-23   No growth at 5 days  --  --      .Blood Blood-Peripheral  12-20-23   No growth at 5 days  --  --      .Blood Blood-Peripheral  12-20-23   No growth at 5 days  --  --      Clean Catch Clean Catch (Midstream)  12-20-23   No growth  --  --                  C-Reactive Protein, Serum: 172 (12-21)    Ferritin: 365 (12-16)                RADIOLOGY:   [facility]  INFECTIOUS DISEASES   [address] [place] [address]  Tel: [phone]     Fax: [phone]  ==============================================================================  MD Abhay Linares, DO Naomy Mtz, NP   ==============================================================================      AARON GREEN  MRN-[id number]  89y ([birth year])      Interval History:    has remained afebrile  no new events      ROS:      does not answer any of the questions         Allergies  iodine (Other)  contrast dye -  rash (Other)  Pineapple (Unknown)  penicillin (Rash)  codeine (Vomiting)  latex (Rash)        ANTIMICROBIALS:  ceFAZolin   IVPB    ceFAZolin   IVPB 1000 every 12 hours        Physical Exam:  Vital Signs Last 24 Hrs  T(C): 36.7 (29 Dec 2023 05:17), Max: 36.9 (28 Dec 2023 17:36)  T(F): 98 (29 Dec 2023 05:17), Max: 98.5 (28 Dec 2023 17:36)  HR: 60 (29 Dec 2023 05:17) (60 - 85)  BP: 120/65 (29 Dec 2023 05:17) (120/65 - 153/64)  BP(mean): --  RR: 18 (29 Dec 2023 05:17) (17 - 18)  SpO2: 100% (29 Dec 2023 05:17) (91% - 100%)    Parameters below as of 29 Dec 2023 05:17  Patient On (Oxygen Delivery Method): nasal cannula  O2 Flow (L/min): 2      12-28-23 @ 07:01  -  12-29-23 @ 07:00  --------------------------------------------------------  IN: 290 mL / OUT: 350 mL / NET: -60 mL      Physical Exam:  General:    NAD, non toxic, NC  Head: atraumatic, normocephalic  Eyes: unable to assess pt's eyes due to non-compliance   ENT:   missing teeth, + thrush is better, no noted oral lesions, neck supple  Cardio:    regular S1,S2, no murmur  Respiratory:   clear to auscultation b/l, no wheezing  abd:   soft, BS +, not tender, no distention  :     no CVAT, no suprapubic tenderness  Musculoskeletal : Left Lateral malleolar region with open wound round about 4 x 5 cm, scant drainage is present on the dressing, seems to be tender to touch,  contracted, Z-flow boots are on  Skin: no rash, warm to touch, PIV ok  Neurologic:  does not answer questions, does not follow commands  Pscych: screams when moved around, otherwise calm     WBC Count: 9.46 K/uL (12-26 @ 06:04)  WBC Count: 10.85 K/uL (12-24 @ 07:05)  WBC Count: 14.32 K/uL (12-23 @ 08:08)    Creatinine Trend: 1.31<--, 1.28<--, 1.25<--, 1.31<--, 1.04<--, 1.06<--      MICROBIOLOGY:  v  .Abscess Left ankle wound  12-22-23   Few Staphylococcus aureus  Rare Escherichia coli  Rare Finegoldia magna "Susceptibilities not performed"  --  Staphylococcus aureus  Escherichia coli      .Blood Blood-Peripheral  12-21-23   No growth at 5 days  --  --      .Blood Blood-Peripheral  12-20-23   No growth at 5 days  --  --      .Blood Blood-Peripheral  12-20-23   No growth at 5 days  --  --      Clean Catch Clean Catch (Midstream)  12-20-23   No growth  --  --          C-Reactive Protein, Serum: 172 (12-21)    Ferritin: 365 (12-16)             [facility]  INFECTIOUS DISEASES   [address] [place] [address]  Tel: [phone]     Fax: [phone]  ==============================================================================  MD Abhay Linares, DO Naomy Mtz, NP   ==============================================================================      AARON GREEN  MRN-[id number]  89y ([birth year])      Interval History:    has remained afebrile  no new events      ROS:      does not answer any of the questions         Allergies  iodine (Other)  contrast dye -  rash (Other)  Pineapple (Unknown)  penicillin (Rash)  codeine (Vomiting)  latex (Rash)        ANTIMICROBIALS:  ceFAZolin   IVPB    ceFAZolin   IVPB 1000 every 12 hours        Physical Exam:  Vital Signs Last 24 Hrs  T(C): 36.7 (29 Dec 2023 05:17), Max: 36.9 (28 Dec 2023 17:36)  T(F): 98 (29 Dec 2023 05:17), Max: 98.5 (28 Dec 2023 17:36)  HR: 60 (29 Dec 2023 05:17) (60 - 85)  BP: 120/65 (29 Dec 2023 05:17) (120/65 - 153/64)  BP(mean): --  RR: 18 (29 Dec 2023 05:17) (17 - 18)  SpO2: 100% (29 Dec 2023 05:17) (91% - 100%)    Parameters below as of 29 Dec 2023 05:17  Patient On (Oxygen Delivery Method): nasal cannula  O2 Flow (L/min): 2      12-28-23 @ 07:01  -  12-29-23 @ 07:00  --------------------------------------------------------  IN: 290 mL / OUT: 350 mL / NET: -60 mL      Physical Exam:  General:    NAD, non toxic, NC  Head: atraumatic, normocephalic  Eyes: unable to assess pt's eyes due to non-compliance   ENT:   missing teeth, + thrush is better, no noted oral lesions, neck supple  Cardio:    regular S1,S2, no murmur  Respiratory:   clear to auscultation b/l, no wheezing  abd:   soft, BS +, not tender, no distention  :     no CVAT, no suprapubic tenderness  Musculoskeletal : Left Lateral malleolar region with open wound round about 4 x 5 cm, scant drainage is present on the dressing, seems to be tender to touch,  contracted, Z-flow boots are on  Skin: no rash, warm to touch, PIV ok  Neurologic:  does not answer questions, does not follow commands  Pscych: screams when moved around, otherwise calm     WBC Count: 9.46 K/uL (12-26 @ 06:04)  WBC Count: 10.85 K/uL (12-24 @ 07:05)  WBC Count: 14.32 K/uL (12-23 @ 08:08)    Creatinine Trend: 1.31<--, 1.28<--, 1.25<--, 1.31<--, 1.04<--, 1.06<--      MICROBIOLOGY:  v  .Abscess Left ankle wound  12-22-23   Few Staphylococcus aureus  Rare Escherichia coli  Rare Finegoldia magna "Susceptibilities not performed"  --  Staphylococcus aureus  Escherichia coli      .Blood Blood-Peripheral  12-21-23   No growth at 5 days  --  --      .Blood Blood-Peripheral  12-20-23   No growth at 5 days  --  --      .Blood Blood-Peripheral  12-20-23   No growth at 5 days  --  --      Clean Catch Clean Catch (Midstream)  12-20-23   No growth  --  --          C-Reactive Protein, Serum: 172 (12-21)    Ferritin: 365 (12-16)

## 2023-12-29 NOTE — PROGRESS NOTE ADULT - SUBJECTIVE AND OBJECTIVE BOX
[facility] Division of Hospital Medicine  Deidra Pham MD  Available via MS Teams  Pager: [phone]    SUBJECTIVE / OVERNIGHT EVENTS:  - no events overnight, has no complaints this morning, resting comfortably in bed     MEDICATIONS  (STANDING):  amLODIPine   Tablet 10 milliGRAM(s) Oral daily  aspirin  chewable 81 milliGRAM(s) Oral daily  atorvastatin 10 milliGRAM(s) Oral at bedtime  ceFAZolin   IVPB      ceFAZolin   IVPB 1000 milliGRAM(s) IV Intermittent every 12 hours  clopidogrel Tablet 75 milliGRAM(s) Oral daily  collagenase Ointment 1 Application(s) Topical daily  dextrose 5%. 1000 milliLiter(s) (50 mL/Hr) IV Continuous <Continuous>  dextrose 5%. 1000 milliLiter(s) (100 mL/Hr) IV Continuous <Continuous>  dextrose 50% Injectable 25 Gram(s) IV Push once  dextrose 50% Injectable 12.5 Gram(s) IV Push once  dextrose 50% Injectable 25 Gram(s) IV Push once  furosemide   Injectable 40 milliGRAM(s) IV Push daily  glucagon  Injectable 1 milliGRAM(s) IntraMuscular once  heparin   Injectable 5000 Unit(s) SubCutaneous every 12 hours  insulin lispro (ADMELOG) corrective regimen sliding scale   SubCutaneous three times a day before meals  metoprolol tartrate 50 milliGRAM(s) Oral two times a day  pantoprazole    Tablet 40 milliGRAM(s) Oral before breakfast  polyethylene glycol 3350 17 Gram(s) Oral daily  senna 2 Tablet(s) Oral at bedtime    MEDICATIONS  (PRN):  acetaminophen     Tablet .. 650 milliGRAM(s) Oral every 6 hours PRN Mild Pain (1 - 3), Moderate Pain (4 - 6)  albuterol/ipratropium for Nebulization 3 milliLiter(s) Nebulizer every 6 hours PRN Shortness of Breath and/or Wheezing  bisacodyl Suppository 10 milliGRAM(s) Rectal daily PRN Constipation  dextrose Oral Gel 15 Gram(s) Oral once PRN Blood Glucose LESS THAN 70 milliGRAM(s)/deciliter  LORazepam    Concentrate 0.5 milliGRAM(s) Oral every 4 hours PRN Agitation  melatonin 3 milliGRAM(s) Oral at bedtime PRN Insomnia  morphine   Solution 5 milliGRAM(s) Oral every 4 hours PRN Severe Pain (7 - 10)  morphine   Solution 2.5 milliGRAM(s) Oral every 4 hours PRN Moderate Pain (4 - 6)      I&O's Summary    28 Dec 2023 07:01  -  29 Dec 2023 07:00  --------------------------------------------------------  IN: 290 mL / OUT: 350 mL / NET: -60 mL        PHYSICAL EXAM:  Vital Signs Last 24 Hrs  T(C): 36.6 (29 Dec 2023 11:32), Max: 36.9 (28 Dec 2023 17:36)  T(F): 97.8 (29 Dec 2023 11:32), Max: 98.5 (28 Dec 2023 17:36)  HR: 62 (29 Dec 2023 11:32) (60 - 85)  BP: 134/53 (29 Dec 2023 11:32) (120/65 - 153/64)  BP(mean): --  RR: 18 (29 Dec 2023 11:32) (17 - 18)  SpO2: 99% (29 Dec 2023 11:32) (91% - 100%)    Parameters below as of 29 Dec 2023 11:32  Patient On (Oxygen Delivery Method): nasal cannula  O2 Flow (L/min): 2    PHYSICAL EXAM:  GENERAL: NAD, thin appearing,  no increased WOB  HEAD:  Atraumatic, Normocephalic  EYES: EOMI, PERRLA, conjunctiva and sclera clear  ENMT: No tonsillar erythema, exudates, or enlargement; Moist mucous membranes   NERVOUS SYSTEM:  awake, agitated at times, moving extremities   CHEST/LUNG: CTAB;  No rales, rhonchi, wheezing, or rubs  HEART: Regular rate and rhythm; No murmurs, rubs, or gallops  ABDOMEN: Soft, Nontender, Nondistended; Bowel sounds present  EXTREMITIES:  2+ Peripheral Pulses b/l, No clubbing, cyanosis, calf tenderness or edema has LE ulcers    LABS:      RADIOLOGY & ADDITIONAL TESTS:  New Results Reviewed Today:   New Imaging Personally Reviewed Today:  New Electrocardiogram Personally Reviewed Today:  Prior or Outpatient Records Reviewed Today:    COMMUNICATION:  Care Discussed with Consultants/Other Providers and Details of Discussion:  Discussions with Patient/Family:  PCP Communication:     [facility] Division of Hospital Medicine  Deidra Pham MD  Available via MS Teams  Pager: [phone]    SUBJECTIVE / OVERNIGHT EVENTS:  - no events overnight, has no complaints this morning, resting comfortably in bed     MEDICATIONS  (STANDING):  amLODIPine   Tablet 10 milliGRAM(s) Oral daily  aspirin  chewable 81 milliGRAM(s) Oral daily  atorvastatin 10 milliGRAM(s) Oral at bedtime  ceFAZolin   IVPB      ceFAZolin   IVPB 1000 milliGRAM(s) IV Intermittent every 12 hours  clopidogrel Tablet 75 milliGRAM(s) Oral daily  collagenase Ointment 1 Application(s) Topical daily  dextrose 5%. 1000 milliLiter(s) (50 mL/Hr) IV Continuous <Continuous>  dextrose 5%. 1000 milliLiter(s) (100 mL/Hr) IV Continuous <Continuous>  dextrose 50% Injectable 25 Gram(s) IV Push once  dextrose 50% Injectable 12.5 Gram(s) IV Push once  dextrose 50% Injectable 25 Gram(s) IV Push once  furosemide   Injectable 40 milliGRAM(s) IV Push daily  glucagon  Injectable 1 milliGRAM(s) IntraMuscular once  heparin   Injectable 5000 Unit(s) SubCutaneous every 12 hours  insulin lispro (ADMELOG) corrective regimen sliding scale   SubCutaneous three times a day before meals  metoprolol tartrate 50 milliGRAM(s) Oral two times a day  pantoprazole    Tablet 40 milliGRAM(s) Oral before breakfast  polyethylene glycol 3350 17 Gram(s) Oral daily  senna 2 Tablet(s) Oral at bedtime    MEDICATIONS  (PRN):  acetaminophen     Tablet .. 650 milliGRAM(s) Oral every 6 hours PRN Mild Pain (1 - 3), Moderate Pain (4 - 6)  albuterol/ipratropium for Nebulization 3 milliLiter(s) Nebulizer every 6 hours PRN Shortness of Breath and/or Wheezing  bisacodyl Suppository 10 milliGRAM(s) Rectal daily PRN Constipation  dextrose Oral Gel 15 Gram(s) Oral once PRN Blood Glucose LESS THAN 70 milliGRAM(s)/deciliter  LORazepam    Concentrate 0.5 milliGRAM(s) Oral every 4 hours PRN Agitation  melatonin 3 milliGRAM(s) Oral at bedtime PRN Insomnia  morphine   Solution 5 milliGRAM(s) Oral every 4 hours PRN Severe Pain (7 - 10)  morphine   Solution 2.5 milliGRAM(s) Oral every 4 hours PRN Moderate Pain (4 - 6)      I&O's Summary    28 Dec 2023 07:01  -  29 Dec 2023 07:00  --------------------------------------------------------  IN: 290 mL / OUT: 350 mL / NET: -60 mL        PHYSICAL EXAM:  Vital Signs Last 24 Hrs  T(C): 36.6 (29 Dec 2023 11:32), Max: 36.9 (28 Dec 2023 17:36)  T(F): 97.8 (29 Dec 2023 11:32), Max: 98.5 (28 Dec 2023 17:36)  HR: 62 (29 Dec 2023 11:32) (60 - 85)  BP: 134/53 (29 Dec 2023 11:32) (120/65 - 153/64)  BP(mean): --  RR: 18 (29 Dec 2023 11:32) (17 - 18)  SpO2: 99% (29 Dec 2023 11:32) (91% - 100%)    Parameters below as of 29 Dec 2023 11:32  Patient On (Oxygen Delivery Method): nasal cannula  O2 Flow (L/min): 2    PHYSICAL EXAM:  GENERAL: NAD, thin appearing,  no increased WOB  HEAD:  Atraumatic, Normocephalic  EYES: EOMI, PERRLA, conjunctiva and sclera clear  ENMT: No tonsillar erythema, exudates, or enlargement; Moist mucous membranes   NERVOUS SYSTEM:  awake, agitated at times, moving extremities   CHEST/LUNG: CTAB;  No rales, rhonchi, wheezing, or rubs  HEART: Regular rate and rhythm; No murmurs, rubs, or gallops  ABDOMEN: Soft, Nontender, Nondistended; Bowel sounds present  EXTREMITIES:  2+ Peripheral Pulses b/l, No clubbing, cyanosis, calf tenderness or edema has LE ulcers    LABS:      RADIOLOGY & ADDITIONAL TESTS:  New Results Reviewed Today:   New Imaging Personally Reviewed Today:  New Electrocardiogram Personally Reviewed Today:  Prior or Outpatient Records Reviewed Today:    COMMUNICATION:  Care Discussed with Consultants/Other Providers and Details of Discussion:  Discussions with Patient/Family:  PCP Communication:

## 2023-12-29 NOTE — PROGRESS NOTE ADULT - ASSESSMENT
89 years old female with h/o HTN, HLD, CAD, s/p CABG, DM, dementia, mod AS, s/p spinal stimulator placement present to ED with generalized weakness and frequent fall. For last 1-2 weeks, patient has been having generalized weakness and frequent fall. Have multiple bruises on body, admitted for sepsis 2/2 to feet ulcers,  family requesting inpatient hospice/comfort care  - referral made, pending auth to [facility]     Left lateral malleolus infected appearing Ulcer   Sepsis   leukocytosis improved   US doppler ordered for LLE tenderness-- negative for DVT  -- PT wound consulted, wound recs appreciated    -ESR/CRP elevated   -- MRSA neg   - Blood Cx--NGTD   --Vascular , ID, podiatry consults appreciated, no surgical intervention   --abd changed to ancef (wound Cx reviewed)   --patient unable to tolerare MRI ankle Left     Presumed UTI ? patient was complaining of dysuria , unclear though as she is a poor historian   abx as above   UCx NGTD     Shortness of breath   12/23 CXR showing pulmonary edema   no wheezing on exam , appears comfortable   added Lasix   supplemental O2 to maintain O2 sat >92%   SpO2 95% on 4L NC 12/25/23        Frequent falls  advanced dementia , mostly bedbound , functional quadriplegia (wheelchair bound at home)   CT head    with no acute pathology. CT C spine with no acute fracture.       4mm right upper lobe pul nodule.   CT chest/abd/pelvis with no acute pathology. Emphysema +. Chronic fibrotic changes at lung apices.   no wheezing, lungs are CTAB       Questionable asymmetrical rectal wall thickening.   --per my discussion with son, given age and dementia, family prefers not to pursue further investigation /treatment or aggressive measures.      Macrocytic anemia.   Iron panel relatively unremarkable, b12, folate WNL--no role for supplementation     SAE (acute kidney injury).  POA   resolved with IVF      Benign essential HTN/HLD   continue with current regimen       CAD (coronary artery disease).   --CAD s/p PCI s/p CABG  on aspirin, plavix, statin, BB.    Type 2 diabetes mellitus with unspecified complications. controlled   A1c 6.6%  continue with ISS   avoid hypoglycemia       Dementia. suspected vascular dementia   CT head showing multiple old chronic strokes, including cerebellar strokes which would explain the gait instability    supportive care    Moderate PCM   nutrition consult appreciated     Preventative Measures   heparin SQ-dvt ppx  fall, aspiration precautions   HOBE     palliative consult appreciated >> patient DNR/DNI      89 years old female with h/o HTN, HLD, CAD, s/p CABG, DM, dementia, mod AS, s/p spinal stimulator placement present to ED with generalized weakness and frequent fall. For last 1-2 weeks, patient has been having generalized weakness and frequent fall. Have multiple bruises on body, admitted for sepsis 2/2 to feet ulcers,  family requesting inpatient hospice/comfort care  - referral made, pending auth to [facility]     Left lateral malleolus infected appearing Ulcer   Sepsis   leukocytosis improved   US doppler ordered for LLE tenderness-- negative for DVT  -- PT wound consulted, wound recs appreciated    -ESR/CRP elevated   -- MRSA neg   - Blood Cx--NGTD   --Vascular , ID, podiatry consults appreciated, no surgical intervention   --abd changed to ancef (wound Cx reviewed)   --patient unable to tolerare MRI ankle Left     Presumed UTI ? patient was complaining of dysuria , unclear though as she is a poor historian   abx as above   UCx NGTD     Shortness of breath   12/23 CXR showing pulmonary edema   no wheezing on exam , appears comfortable   added Lasix   supplemental O2 to maintain O2 sat >92%   SpO2 95% on 4L NC 12/25/23        Frequent falls  advanced dementia , mostly bedbound , functional quadriplegia (wheelchair bound at home)   CT head    with no acute pathology. CT C spine with no acute fracture.       4mm right upper lobe pul nodule.   CT chest/abd/pelvis with no acute pathology. Emphysema +. Chronic fibrotic changes at lung apices.   no wheezing, lungs are CTAB       Questionable asymmetrical rectal wall thickening.   --per my discussion with son, given age and dementia, family prefers not to pursue further investigation /treatment or aggressive measures.      Macrocytic anemia.   Iron panel relatively unremarkable, b12, folate WNL--no role for supplementation     SAE (acute kidney injury).  POA   resolved with IVF      Benign essential HTN/HLD   continue with current regimen       CAD (coronary artery disease).   --CAD s/p PCI s/p CABG  on aspirin, plavix, statin, BB.    Type 2 diabetes mellitus with unspecified complications. controlled   A1c 6.6%  continue with ISS   avoid hypoglycemia       Dementia. suspected vascular dementia   CT head showing multiple old chronic strokes, including cerebellar strokes which would explain the gait instability    supportive care    Moderate PCM   nutrition consult appreciated     Preventative Measures   heparin SQ-dvt ppx  fall, aspiration precautions   HOBE     palliative consult appreciated >> patient DNR/DNI

## 2023-12-29 NOTE — PROGRESS NOTE ADULT - ASSESSMENT
A/P-  89 year old female with  h/o HTN, HLD, CAD s/p stent, DM, dementia, s/p spinal stimulator placement present to ED with generalized weakness and frequent fall.    afebrile since 12/20  + leukocytosis -resolved  + UA- with + nitrates and + LE  UC with no growth  BCs x 2 - NGTD  Cr normalized   Left lateral ankle infected wound, probing to the bone, culture grew MSSA and E. Coli  patient can't tolerate MRI  ESR 81  Pt was seen with vascular surgeon, no surgical intervention is planned at this time.     plan-  has been on Cefazolin since 12/25  was on vanco and cefepime prior to that  follow all culture data  trend temperatures and wbc  MRI of left ankle - pt is unable tolerate   please have Z-flow boots on at all times   palliative care consult is appreciated - Pt is DNR/DNI   patient awaiting placement  at [facility]  hospice as per palliative care note as per family request.  if patient will be d/c to [facility] advise and since no surgical intervention recommended  by the vascular team, advise to d/c iv abx and patient can be d/c on po bactrim as this antibiotic would cover both organisms from wound cx.  advise 14 days of po bactrim DS BID.  also advise wound care .      we will sign off the case at this time.  please re-consult PRN.    Edgar Tobias MD  Infectious Disease Attending    for any questions please do not hesitate to contact me either via teams or by calling [phone]     A/P-  89 year old female with  h/o HTN, HLD, CAD s/p stent, DM, dementia, s/p spinal stimulator placement present to ED with generalized weakness and frequent fall.    afebrile since 12/20  + leukocytosis -resolved  + UA- with + nitrates and + LE  UC with no growth  BCs x 2 - NGTD  Cr normalized   Left lateral ankle infected wound, probing to the bone, culture grew MSSA and E. Coli  patient can't tolerate MRI  ESR 81  Pt was seen with vascular surgeon, no surgical intervention is planned at this time.     plan-  has been on Cefazolin since 12/25  was on vanco and cefepime prior to that  follow all culture data  trend temperatures and wbc  MRI of left ankle - pt is unable tolerate   please have Z-flow boots on at all times   palliative care consult is appreciated - Pt is DNR/DNI   patient awaiting placement  at [facility]  hospice as per palliative care note as per family request.  if patient will be d/c to [facility] advise and since no surgical intervention recommended  by the vascular team, advise to d/c iv abx and patient can be d/c on po bactrim as this antibiotic would cover both organisms from wound cx.  advise 14 days of po bactrim DS BID.  also advise wound care .      we will sign off the case at this time.  please re-consult PRN.    Edgar Tobias MD  Infectious Disease Attending    for any questions please do not hesitate to contact me either via teams or by calling [phone]

## 2023-12-30 NOTE — PROGRESS NOTE ADULT - ASSESSMENT
89 years old female with h/o HTN, HLD, CAD, s/p CABG, DM, dementia, mod AS, s/p spinal stimulator placement present to ED with generalized weakness and frequent fall. For last 1-2 weeks, patient has been having generalized weakness and frequent fall. Have multiple bruises on body, admitted for sepsis 2/2 to feet ulcers,  family requesting inpatient hospice/comfort care  - referral made, pending auth to [facility]     Left lateral malleolus infected appearing Ulcer   Sepsis   leukocytosis improved   US doppler ordered for LLE tenderness-- negative for DVT  -- PT wound consulted, wound recs appreciated    -ESR/CRP elevated   -- MRSA neg   - Blood Cx--NGTD   --Vascular , ID, podiatry consults appreciated, no surgical intervention   --abd changed to ancef (wound Cx reviewed)   --patient unable to tolerate MRI ankle Left     Presumed UTI ? patient was complaining of dysuria   abx as above   UCx NGTD     Shortness of breath   12/23 CXR showing pulmonary edema   no wheezing on exam , appears comfortable   added Lasix   supplemental O2 to maintain O2 sat >92%   SpO2 95% on 4L NC 12/25/23        Frequent falls  advanced dementia , mostly bedbound , functional quadriplegia (wheelchair bound at home)   CT head    with no acute pathology. CT C spine with no acute fracture.       4mm right upper lobe pul nodule.   CT chest/abd/pelvis with no acute pathology. Emphysema +. Chronic fibrotic changes at lung apices.   no wheezing, lungs are CTAB       Questionable asymmetrical rectal wall thickening.   --per discussion with son, given age and dementia, family prefers not to pursue further investigation /treatment or aggressive measures.      Macrocytic anemia.   Iron panel relatively unremarkable, b12, folate WNL--no role for supplementation     SAE (acute kidney injury).  POA   resolved with IVF      Benign essential HTN/HLD   continue with current regimen       CAD (coronary artery disease).   --CAD s/p PCI s/p CABG  on aspirin, plavix, statin, BB.    Type 2 diabetes mellitus with unspecified complications. controlled   A1c 6.6%  continue with ISS   avoid hypoglycemia       Dementia. suspected vascular dementia   CT head showing multiple old chronic strokes, including cerebellar strokes which would explain the gait instability    supportive care    Moderate PCM   nutrition consult appreciated     Preventative Measures   heparin SQ-dvt ppx  fall, aspiration precautions   HOBE     palliative consult appreciated >> patient DNR/DNI      89 years old female with h/o HTN, HLD, CAD, s/p CABG, DM, dementia, mod AS, s/p spinal stimulator placement present to ED with generalized weakness and frequent fall. For last 1-2 weeks, patient has been having generalized weakness and frequent fall. Have multiple bruises on body, admitted for sepsis 2/2 to feet ulcers,  family requesting inpatient hospice/comfort care  - referral made, pending auth to [facility]     Left lateral malleolus infected appearing Ulcer   Sepsis   leukocytosis improved   US doppler ordered for LLE tenderness-- negative for DVT  -- PT wound consulted, wound recs appreciated    -ESR/CRP elevated   -- MRSA neg   - Blood Cx--NGTD   --Vascular , ID, podiatry consults appreciated, no surgical intervention   --abd changed to ancef (wound Cx reviewed)   --patient unable to tolerate MRI ankle Left     Presumed UTI ? patient was complaining of dysuria   abx as above   UCx NGTD     Shortness of breath   12/23 CXR showing pulmonary edema   no wheezing on exam , appears comfortable   added Lasix   supplemental O2 to maintain O2 sat >92%   SpO2 95% on 4L NC 12/25/23        Frequent falls  advanced dementia , mostly bedbound , functional quadriplegia (wheelchair bound at home)   CT head    with no acute pathology. CT C spine with no acute fracture.       4mm right upper lobe pul nodule.   CT chest/abd/pelvis with no acute pathology. Emphysema +. Chronic fibrotic changes at lung apices.   no wheezing, lungs are CTAB       Questionable asymmetrical rectal wall thickening.   --per discussion with son, given age and dementia, family prefers not to pursue further investigation /treatment or aggressive measures.      Macrocytic anemia.   Iron panel relatively unremarkable, b12, folate WNL--no role for supplementation     SAE (acute kidney injury).  POA   resolved with IVF      Benign essential HTN/HLD   continue with current regimen       CAD (coronary artery disease).   --CAD s/p PCI s/p CABG  on aspirin, plavix, statin, BB.    Type 2 diabetes mellitus with unspecified complications. controlled   A1c 6.6%  continue with ISS   avoid hypoglycemia       Dementia. suspected vascular dementia   CT head showing multiple old chronic strokes, including cerebellar strokes which would explain the gait instability    supportive care    Moderate PCM   nutrition consult appreciated     Preventative Measures   heparin SQ-dvt ppx  fall, aspiration precautions   HOBE     palliative consult appreciated >> patient DNR/DNI

## 2023-12-30 NOTE — PROGRESS NOTE ADULT - SUBJECTIVE AND OBJECTIVE BOX
Patient is a 89y old  Female who presents with a chief complaint of frequent fall (29 Dec 2023 14:58)      SUBJECTIVE / OVERNIGHT EVENTS:    No events overnight. This AMNAYLA feels well. Has no n/v/d/cp/sob.      MEDICATIONS  (STANDING):  amLODIPine   Tablet 10 milliGRAM(s) Oral daily  aspirin  chewable 81 milliGRAM(s) Oral daily  atorvastatin 10 milliGRAM(s) Oral at bedtime  ceFAZolin   IVPB      ceFAZolin   IVPB 1000 milliGRAM(s) IV Intermittent every 12 hours  clopidogrel Tablet 75 milliGRAM(s) Oral daily  collagenase Ointment 1 Application(s) Topical daily  dextrose 5%. 1000 milliLiter(s) (50 mL/Hr) IV Continuous <Continuous>  dextrose 5%. 1000 milliLiter(s) (100 mL/Hr) IV Continuous <Continuous>  dextrose 50% Injectable 12.5 Gram(s) IV Push once  dextrose 50% Injectable 25 Gram(s) IV Push once  dextrose 50% Injectable 25 Gram(s) IV Push once  furosemide   Injectable 40 milliGRAM(s) IV Push daily  glucagon  Injectable 1 milliGRAM(s) IntraMuscular once  heparin   Injectable 5000 Unit(s) SubCutaneous every 12 hours  insulin lispro (ADMELOG) corrective regimen sliding scale   SubCutaneous three times a day before meals  metoprolol tartrate 50 milliGRAM(s) Oral two times a day  pantoprazole    Tablet 40 milliGRAM(s) Oral before breakfast  polyethylene glycol 3350 17 Gram(s) Oral daily  senna 2 Tablet(s) Oral at bedtime    MEDICATIONS  (PRN):  acetaminophen     Tablet .. 650 milliGRAM(s) Oral every 6 hours PRN Mild Pain (1 - 3), Moderate Pain (4 - 6)  albuterol/ipratropium for Nebulization 3 milliLiter(s) Nebulizer every 6 hours PRN Shortness of Breath and/or Wheezing  bisacodyl Suppository 10 milliGRAM(s) Rectal daily PRN Constipation  dextrose Oral Gel 15 Gram(s) Oral once PRN Blood Glucose LESS THAN 70 milliGRAM(s)/deciliter  LORazepam    Concentrate 0.5 milliGRAM(s) Oral every 4 hours PRN Agitation  melatonin 3 milliGRAM(s) Oral at bedtime PRN Insomnia  morphine   Solution 5 milliGRAM(s) Oral every 4 hours PRN Severe Pain (7 - 10)  morphine   Solution 2.5 milliGRAM(s) Oral every 4 hours PRN Moderate Pain (4 - 6)      PHYSICAL EXAM:  T(C): 36.9 (12-30-23 @ 05:02), Max: 37.3 (12-29-23 @ 17:01)  HR: 66 (12-30-23 @ 05:02) (62 - 100)  BP: 133/73 (12-30-23 @ 05:02) (130/81 - 134/53)  RR: 19 (12-30-23 @ 05:02) (17 - 19)  SpO2: 100% (12-30-23 @ 05:02) (96% - 100%)  I&O's Summary    30 Dec 2023 07:01  -  30 Dec 2023 11:24  --------------------------------------------------------  IN: 180 mL / OUT: 0 mL / NET: 180 mL      GENERAL: NAD, lying in bed  HEAD:  Atraumatic, Normocephalic, MMM  CHEST/LUNG: No use of accessory muscles, CTAB, breathing non-labored  COR: RR, no mrcg  ABD: Soft, ND/NT, +BS  PSYCH: AAOxself  NEUROLOGY: CN II-XII grossly intact, moving all extremities  SKIN: No rashes or lesions  EXT: wwp, no cce    LABS:  CAPILLARY BLOOD GLUCOSE      POCT Blood Glucose.: 165 mg/dL (30 Dec 2023 08:09)  POCT Blood Glucose.: 140 mg/dL (29 Dec 2023 21:41)  POCT Blood Glucose.: 149 mg/dL (29 Dec 2023 18:20)  POCT Blood Glucose.: 152 mg/dL (29 Dec 2023 16:24)                        RADIOLOGY & ADDITIONAL TESTS:    Telemetry Personally Reviewed -     Imaging Personally Reviewed -     Imaging Reviewed -     Consultant(s) Notes Reviewed -       Care Discussed with Consultants/Other Providers -

## 2023-12-31 NOTE — PROGRESS NOTE ADULT - ASSESSMENT
89 years old female with h/o HTN, HLD, CAD, s/p CABG, DM, dementia, mod AS, s/p spinal stimulator placement present to ED with generalized weakness and frequent fall. For last 1-2 weeks, patient has been having generalized weakness and frequent fall. Have multiple bruises on body, admitted for sepsis 2/2 to feet ulcers,  family requesting inpatient hospice/comfort care  - referral made, pending auth to [facility]     Left lateral malleolus infected appearing Ulcer   Sepsis   leukocytosis improved   US doppler ordered for LLE tenderness-- negative for DVT  -- PT wound consulted, wound recs appreciated    -ESR/CRP elevated   -- MRSA neg   - Blood Cx--NGTD   --Vascular , ID, podiatry consults appreciated, no surgical intervention   --abd changed to ancef (wound Cx reviewed), needs 2 week course, can d/c on TMP-SMX DS BID (end date Jan 8)  --patient unable to tolerate MRI ankle Left     Presumed UTI ? patient was complaining of dysuria   abx as above   UCx NGTD     Shortness of breath   12/23 CXR showing pulmonary edema   no wheezing on exam , appears comfortable   added Lasix   supplemental O2 to maintain O2 sat >92%   SpO2 95% on 4L NC 12/25/23        Frequent falls  advanced dementia , mostly bedbound , functional quadriplegia (wheelchair bound at home)   CT head    with no acute pathology. CT C spine with no acute fracture.       4mm right upper lobe pul nodule.   CT chest/abd/pelvis with no acute pathology. Emphysema +. Chronic fibrotic changes at lung apices.   no wheezing, lungs are CTAB       Questionable asymmetrical rectal wall thickening.   --per discussion with son, given age and dementia, family prefers not to pursue further investigation /treatment or aggressive measures.      Macrocytic anemia.   Iron panel relatively unremarkable, b12, folate WNL--no role for supplementation     SAE (acute kidney injury).  POA   resolved with IVF      Benign essential HTN/HLD   continue with current regimen       CAD (coronary artery disease).   --CAD s/p PCI s/p CABG  on aspirin, plavix, statin, BB.    Type 2 diabetes mellitus with unspecified complications. controlled   A1c 6.6%  continue with ISS   avoid hypoglycemia       Dementia. suspected vascular dementia   CT head showing multiple old chronic strokes, including cerebellar strokes which would explain the gait instability    supportive care    Moderate PCM   nutrition consult appreciated     Preventative Measures   heparin SQ-dvt ppx  fall, aspiration precautions   HOBE     palliative consult appreciated >> patient DNR/DNI      89 years old female with h/o HTN, HLD, CAD, s/p CABG, DM, dementia, mod AS, s/p spinal stimulator placement present to ED with generalized weakness and frequent fall. For last 1-2 weeks, patient has been having generalized weakness and frequent fall. Have multiple bruises on body, admitted for sepsis 2/2 to feet ulcers,  family requesting inpatient hospice/comfort care  - referral made, pending auth to [facility]     Left lateral malleolus infected appearing Ulcer   Sepsis   leukocytosis improved   US doppler ordered for LLE tenderness-- negative for DVT  -- PT wound consulted, wound recs appreciated    -ESR/CRP elevated   -- MRSA neg   - Blood Cx--NGTD   --Vascular , ID, podiatry consults appreciated, no surgical intervention   --abd changed to ancef (wound Cx reviewed), needs 2 week course, can d/c on TMP-SMX DS BID (end date Jan 8)  --patient unable to tolerate MRI ankle Left     Presumed UTI ? patient was complaining of dysuria   abx as above   UCx NGTD     Shortness of breath   12/23 CXR showing pulmonary edema   no wheezing on exam , appears comfortable   added Lasix   supplemental O2 to maintain O2 sat >92%   SpO2 95% on 4L NC 12/25/23        Frequent falls  advanced dementia , mostly bedbound , functional quadriplegia (wheelchair bound at home)   CT head    with no acute pathology. CT C spine with no acute fracture.       4mm right upper lobe pul nodule.   CT chest/abd/pelvis with no acute pathology. Emphysema +. Chronic fibrotic changes at lung apices.   no wheezing, lungs are CTAB       Questionable asymmetrical rectal wall thickening.   --per discussion with son, given age and dementia, family prefers not to pursue further investigation /treatment or aggressive measures.      Macrocytic anemia.   Iron panel relatively unremarkable, b12, folate WNL--no role for supplementation     SAE (acute kidney injury).  POA   resolved with IVF      Benign essential HTN/HLD   continue with current regimen       CAD (coronary artery disease).   --CAD s/p PCI s/p CABG  on aspirin, plavix, statin, BB.    Type 2 diabetes mellitus with unspecified complications. controlled   A1c 6.6%  continue with ISS   avoid hypoglycemia       Dementia. suspected vascular dementia   CT head showing multiple old chronic strokes, including cerebellar strokes which would explain the gait instability    supportive care    Moderate PCM   nutrition consult appreciated     Preventative Measures   heparin SQ-dvt ppx  fall, aspiration precautions   HOBE     palliative consult appreciated >> patient DNR/DNI

## 2023-12-31 NOTE — PROGRESS NOTE ADULT - SUBJECTIVE AND OBJECTIVE BOX
Patient is a 89y old  Female who presents with a chief complaint of frequent fall (30 Dec 2023 11:24)      SUBJECTIVE / OVERNIGHT EVENTS:    No events overnight. This AMNAYLA feels well. Has no n/v/d/cp/sob.      MEDICATIONS  (STANDING):  amLODIPine   Tablet 10 milliGRAM(s) Oral daily  aspirin  chewable 81 milliGRAM(s) Oral daily  atorvastatin 10 milliGRAM(s) Oral at bedtime  ceFAZolin   IVPB      ceFAZolin   IVPB 1000 milliGRAM(s) IV Intermittent every 12 hours  clopidogrel Tablet 75 milliGRAM(s) Oral daily  collagenase Ointment 1 Application(s) Topical daily  dextrose 5%. 1000 milliLiter(s) (100 mL/Hr) IV Continuous <Continuous>  dextrose 5%. 1000 milliLiter(s) (50 mL/Hr) IV Continuous <Continuous>  dextrose 50% Injectable 25 Gram(s) IV Push once  dextrose 50% Injectable 12.5 Gram(s) IV Push once  dextrose 50% Injectable 25 Gram(s) IV Push once  furosemide   Injectable 40 milliGRAM(s) IV Push daily  glucagon  Injectable 1 milliGRAM(s) IntraMuscular once  heparin   Injectable 5000 Unit(s) SubCutaneous every 12 hours  insulin lispro (ADMELOG) corrective regimen sliding scale   SubCutaneous three times a day before meals  metoprolol tartrate 50 milliGRAM(s) Oral two times a day  pantoprazole    Tablet 40 milliGRAM(s) Oral before breakfast  polyethylene glycol 3350 17 Gram(s) Oral daily  senna 2 Tablet(s) Oral at bedtime    MEDICATIONS  (PRN):  acetaminophen     Tablet .. 650 milliGRAM(s) Oral every 6 hours PRN Mild Pain (1 - 3), Moderate Pain (4 - 6)  albuterol/ipratropium for Nebulization 3 milliLiter(s) Nebulizer every 6 hours PRN Shortness of Breath and/or Wheezing  bisacodyl Suppository 10 milliGRAM(s) Rectal daily PRN Constipation  dextrose Oral Gel 15 Gram(s) Oral once PRN Blood Glucose LESS THAN 70 milliGRAM(s)/deciliter  LORazepam    Concentrate 0.5 milliGRAM(s) Oral every 4 hours PRN Agitation  melatonin 3 milliGRAM(s) Oral at bedtime PRN Insomnia  morphine   Solution 5 milliGRAM(s) Oral every 4 hours PRN Severe Pain (7 - 10)  morphine   Solution 2.5 milliGRAM(s) Oral every 4 hours PRN Moderate Pain (4 - 6)      PHYSICAL EXAM:  T(C): 36.5 (12-31-23 @ 05:35), Max: 36.7 (12-30-23 @ 17:41)  HR: 81 (12-31-23 @ 05:35) (70 - 81)  BP: 143/73 (12-31-23 @ 05:35) (129/55 - 143/73)  RR: 18 (12-31-23 @ 05:35) (17 - 18)  SpO2: 100% (12-31-23 @ 05:35) (94% - 100%)  I&O's Summary    30 Dec 2023 07:01  -  31 Dec 2023 07:00  --------------------------------------------------------  IN: 310 mL / OUT: 875 mL / NET: -565 mL      GENERAL: NAD, lying in bed  HEAD:  Atraumatic, Normocephalic, MMM  CHEST/LUNG: No use of accessory muscles, CTAB, breathing non-labored  COR: RR, no mrcg  ABD: Soft, ND/NT, +BS  PSYCH: AAOxself  NEUROLOGY: CN II-XII grossly intact, moving all extremities  SKIN: No rashes or lesions  EXT: wwp, no cce    LABS:  CAPILLARY BLOOD GLUCOSE      POCT Blood Glucose.: 225 mg/dL (31 Dec 2023 08:44)  POCT Blood Glucose.: 212 mg/dL (30 Dec 2023 21:23)  POCT Blood Glucose.: 162 mg/dL (30 Dec 2023 16:10)  POCT Blood Glucose.: 187 mg/dL (30 Dec 2023 11:45)                        RADIOLOGY & ADDITIONAL TESTS:    Telemetry Personally Reviewed -     Imaging Personally Reviewed -     Imaging Reviewed -     Consultant(s) Notes Reviewed -       Care Discussed with Consultants/Other Providers -

## 2024-01-01 ENCOUNTER — TRANSCRIPTION ENCOUNTER (OUTPATIENT)
Age: 89
End: 2024-01-01

## 2024-01-01 ENCOUNTER — RESULT REVIEW (OUTPATIENT)
Age: 89
End: 2024-01-01

## 2024-01-01 ENCOUNTER — INPATIENT (INPATIENT)
Facility: HOSPITAL | Age: 89
LOS: 22 days | End: 2024-02-16
Attending: INTERNAL MEDICINE | Admitting: INTERNAL MEDICINE
Payer: MEDICARE

## 2024-01-01 VITALS
HEART RATE: 80 BPM | DIASTOLIC BLOOD PRESSURE: 53 MMHG | OXYGEN SATURATION: 94 % | TEMPERATURE: 98 F | RESPIRATION RATE: 17 BRPM | SYSTOLIC BLOOD PRESSURE: 119 MMHG

## 2024-01-01 VITALS
SYSTOLIC BLOOD PRESSURE: 106 MMHG | OXYGEN SATURATION: 97 % | RESPIRATION RATE: 18 BRPM | HEART RATE: 116 BPM | DIASTOLIC BLOOD PRESSURE: 46 MMHG | HEIGHT: 59 IN | TEMPERATURE: 98 F

## 2024-01-01 DIAGNOSIS — I10 ESSENTIAL (PRIMARY) HYPERTENSION: ICD-10-CM

## 2024-01-01 DIAGNOSIS — Z91.041 RADIOGRAPHIC DYE ALLERGY STATUS: ICD-10-CM

## 2024-01-01 DIAGNOSIS — K62.9 DISEASE OF ANUS AND RECTUM, UNSPECIFIED: ICD-10-CM

## 2024-01-01 DIAGNOSIS — J96.90 RESPIRATORY FAILURE, UNSPECIFIED, UNSPECIFIED WHETHER WITH HYPOXIA OR HYPERCAPNIA: ICD-10-CM

## 2024-01-01 DIAGNOSIS — J81.1 CHRONIC PULMONARY EDEMA: ICD-10-CM

## 2024-01-01 DIAGNOSIS — Z95.5 PRESENCE OF CORONARY ANGIOPLASTY IMPLANT AND GRAFT: ICD-10-CM

## 2024-01-01 DIAGNOSIS — E78.5 HYPERLIPIDEMIA, UNSPECIFIED: ICD-10-CM

## 2024-01-01 DIAGNOSIS — Z90.49 ACQUIRED ABSENCE OF OTHER SPECIFIED PARTS OF DIGESTIVE TRACT: ICD-10-CM

## 2024-01-01 DIAGNOSIS — Z88.0 ALLERGY STATUS TO PENICILLIN: ICD-10-CM

## 2024-01-01 DIAGNOSIS — Z79.899 OTHER LONG TERM (CURRENT) DRUG THERAPY: ICD-10-CM

## 2024-01-01 DIAGNOSIS — Z91.09 OTHER ALLERGY STATUS, OTHER THAN TO DRUGS AND BIOLOGICAL SUBSTANCES: ICD-10-CM

## 2024-01-01 DIAGNOSIS — Z96.82 PRESENCE OF NEUROSTIMULATOR: ICD-10-CM

## 2024-01-01 DIAGNOSIS — Z91.81 HISTORY OF FALLING: ICD-10-CM

## 2024-01-01 DIAGNOSIS — R53.2 FUNCTIONAL QUADRIPLEGIA: ICD-10-CM

## 2024-01-01 DIAGNOSIS — Z91.040 LATEX ALLERGY STATUS: ICD-10-CM

## 2024-01-01 DIAGNOSIS — E11.9 TYPE 2 DIABETES MELLITUS WITHOUT COMPLICATIONS: ICD-10-CM

## 2024-01-01 DIAGNOSIS — E44.0 MODERATE PROTEIN-CALORIE MALNUTRITION: ICD-10-CM

## 2024-01-01 DIAGNOSIS — R91.1 SOLITARY PULMONARY NODULE: ICD-10-CM

## 2024-01-01 DIAGNOSIS — I70.209 UNSPECIFIED ATHEROSCLEROSIS OF NATIVE ARTERIES OF EXTREMITIES, UNSPECIFIED EXTREMITY: ICD-10-CM

## 2024-01-01 DIAGNOSIS — A41.9 SEPSIS, UNSPECIFIED ORGANISM: ICD-10-CM

## 2024-01-01 DIAGNOSIS — R53.1 WEAKNESS: ICD-10-CM

## 2024-01-01 DIAGNOSIS — J96.01 ACUTE RESPIRATORY FAILURE WITH HYPOXIA: ICD-10-CM

## 2024-01-01 DIAGNOSIS — Z96.1 PRESENCE OF INTRAOCULAR LENS: ICD-10-CM

## 2024-01-01 DIAGNOSIS — N17.9 ACUTE KIDNEY FAILURE, UNSPECIFIED: ICD-10-CM

## 2024-01-01 DIAGNOSIS — N39.0 URINARY TRACT INFECTION, SITE NOT SPECIFIED: ICD-10-CM

## 2024-01-01 DIAGNOSIS — Z66 DO NOT RESUSCITATE: ICD-10-CM
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
## 2024-01-01 DIAGNOSIS — Z88.5 ALLERGY STATUS TO NARCOTIC AGENT: ICD-10-CM

## 2024-01-01 DIAGNOSIS — I25.10 ATHEROSCLEROTIC HEART DISEASE OF NATIVE CORONARY ARTERY WITHOUT ANGINA PECTORIS: ICD-10-CM

## 2024-01-01 DIAGNOSIS — B95.61 METHICILLIN SUSCEPTIBLE STAPHYLOCOCCUS AUREUS INFECTION AS THE CAUSE OF DISEASES CLASSIFIED ELSEWHERE: ICD-10-CM

## 2024-01-01 DIAGNOSIS — Z11.52 ENCOUNTER FOR SCREENING FOR COVID-19: ICD-10-CM

## 2024-01-01 DIAGNOSIS — E11.649 TYPE 2 DIABETES MELLITUS WITH HYPOGLYCEMIA WITHOUT COMA: ICD-10-CM

## 2024-01-01 DIAGNOSIS — S20.01XA CONTUSION OF RIGHT BREAST, INITIAL ENCOUNTER: ICD-10-CM

## 2024-01-01 DIAGNOSIS — Z79.82 LONG TERM (CURRENT) USE OF ASPIRIN: ICD-10-CM

## 2024-01-01 DIAGNOSIS — Z51.5 ENCOUNTER FOR PALLIATIVE CARE: ICD-10-CM

## 2024-01-01 DIAGNOSIS — B96.20 UNSPECIFIED ESCHERICHIA COLI [E. COLI] AS THE CAUSE OF DISEASES CLASSIFIED ELSEWHERE: ICD-10-CM

## 2024-01-01 DIAGNOSIS — S80.10XA CONTUSION OF UNSPECIFIED LOWER LEG, INITIAL ENCOUNTER: ICD-10-CM

## 2024-01-01 DIAGNOSIS — D53.9 NUTRITIONAL ANEMIA, UNSPECIFIED: ICD-10-CM

## 2024-01-01 DIAGNOSIS — L89.520 PRESSURE ULCER OF LEFT ANKLE, UNSTAGEABLE: ICD-10-CM

## 2024-01-01 DIAGNOSIS — S20.20XA CONTUSION OF THORAX, UNSPECIFIED, INITIAL ENCOUNTER: ICD-10-CM

## 2024-01-01 DIAGNOSIS — Z71.89 OTHER SPECIFIED COUNSELING: ICD-10-CM

## 2024-01-01 DIAGNOSIS — Z98.41 CATARACT EXTRACTION STATUS, RIGHT EYE: ICD-10-CM

## 2024-01-01 DIAGNOSIS — E87.0 HYPEROSMOLALITY AND HYPERNATREMIA: ICD-10-CM

## 2024-01-01 DIAGNOSIS — D64.9 ANEMIA, UNSPECIFIED: ICD-10-CM

## 2024-01-01 DIAGNOSIS — Z79.02 LONG TERM (CURRENT) USE OF ANTITHROMBOTICS/ANTIPLATELETS: ICD-10-CM

## 2024-01-01 DIAGNOSIS — F01.50 VASCULAR DEMENTIA WITHOUT BEHAVIORAL DISTURBANCE: ICD-10-CM

## 2024-01-01 DIAGNOSIS — S91.309A UNSPECIFIED OPEN WOUND, UNSPECIFIED FOOT, INITIAL ENCOUNTER: ICD-10-CM

## 2024-01-01 DIAGNOSIS — R29.6 REPEATED FALLS: ICD-10-CM

## 2024-01-01 DIAGNOSIS — G47.00 INSOMNIA, UNSPECIFIED: ICD-10-CM

## 2024-01-01 DIAGNOSIS — Z86.73 PERSONAL HISTORY OF TRANSIENT ISCHEMIC ATTACK (TIA), AND CEREBRAL INFARCTION WITHOUT RESIDUAL DEFICITS: ICD-10-CM
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
## 2024-01-01 DIAGNOSIS — F03.90 UNSPECIFIED DEMENTIA, UNSPECIFIED SEVERITY, WITHOUT BEHAVIORAL DISTURBANCE, PSYCHOTIC DISTURBANCE, MOOD DISTURBANCE, AND ANXIETY: ICD-10-CM

## 2024-01-01 DIAGNOSIS — E87.8 OTHER DISORDERS OF ELECTROLYTE AND FLUID BALANCE, NOT ELSEWHERE CLASSIFIED: ICD-10-CM

## 2024-01-01 DIAGNOSIS — E87.20 ACIDOSIS, UNSPECIFIED: ICD-10-CM

## 2024-01-01 DIAGNOSIS — J43.9 EMPHYSEMA, UNSPECIFIED: ICD-10-CM

## 2024-01-01 DIAGNOSIS — Z79.84 LONG TERM (CURRENT) USE OF ORAL HYPOGLYCEMIC DRUGS: ICD-10-CM

## 2024-01-01 DIAGNOSIS — E11.51 TYPE 2 DIABETES MELLITUS WITH DIABETIC PERIPHERAL ANGIOPATHY WITHOUT GANGRENE: ICD-10-CM

## 2024-01-01 DIAGNOSIS — L89.514 PRESSURE ULCER OF RIGHT ANKLE, STAGE 4: ICD-10-CM

## 2024-01-01 DIAGNOSIS — Z96.652 PRESENCE OF LEFT ARTIFICIAL KNEE JOINT: ICD-10-CM

## 2024-01-01 DIAGNOSIS — Z74.01 BED CONFINEMENT STATUS: ICD-10-CM

## 2024-01-01 DIAGNOSIS — Z95.1 PRESENCE OF AORTOCORONARY BYPASS GRAFT: ICD-10-CM

## 2024-01-01 DIAGNOSIS — Z85.828 PERSONAL HISTORY OF OTHER MALIGNANT NEOPLASM OF SKIN: ICD-10-CM

## 2024-01-01 DIAGNOSIS — I48.91 UNSPECIFIED ATRIAL FIBRILLATION: ICD-10-CM

## 2024-01-01 DIAGNOSIS — Z29.9 ENCOUNTER FOR PROPHYLACTIC MEASURES, UNSPECIFIED: ICD-10-CM

## 2024-01-01 DIAGNOSIS — R26.81 UNSTEADINESS ON FEET: ICD-10-CM

## 2024-01-01 DIAGNOSIS — L89.891 PRESSURE ULCER OF OTHER SITE, STAGE 1: ICD-10-CM

## 2024-01-01 DIAGNOSIS — R52 PAIN, UNSPECIFIED: ICD-10-CM

## 2024-01-01 DIAGNOSIS — K59.00 CONSTIPATION, UNSPECIFIED: ICD-10-CM

## 2024-01-01 LAB
ALBUMIN SERPL ELPH-MCNC: 1.3 G/DL — LOW (ref 3.3–5)
ALBUMIN SERPL ELPH-MCNC: 1.4 G/DL — LOW (ref 3.3–5)
ALBUMIN SERPL ELPH-MCNC: 1.4 G/DL — LOW (ref 3.3–5)
ALBUMIN SERPL ELPH-MCNC: 1.5 G/DL — LOW (ref 3.3–5)
ALBUMIN SERPL ELPH-MCNC: 1.6 G/DL — LOW (ref 3.3–5)
ALBUMIN SERPL ELPH-MCNC: 1.6 G/DL — LOW (ref 3.3–5)
ALBUMIN SERPL ELPH-MCNC: 1.9 G/DL — LOW (ref 3.3–5)
ALBUMIN SERPL ELPH-MCNC: 2 G/DL — LOW (ref 3.3–5)
ALBUMIN SERPL ELPH-MCNC: 2.1 G/DL — LOW (ref 3.3–5)
ALBUMIN SERPL ELPH-MCNC: 2.2 G/DL — LOW (ref 3.3–5)
ALBUMIN SERPL ELPH-MCNC: 2.3 G/DL — LOW (ref 3.3–5)
ALP SERPL-CCNC: 104 U/L — SIGNIFICANT CHANGE UP (ref 40–120)
ALP SERPL-CCNC: 106 U/L — SIGNIFICANT CHANGE UP (ref 40–120)
ALP SERPL-CCNC: 107 U/L — SIGNIFICANT CHANGE UP (ref 40–120)
ALP SERPL-CCNC: 191 U/L — HIGH (ref 40–120)
ALP SERPL-CCNC: 221 U/L — HIGH (ref 40–120)
ALP SERPL-CCNC: 366 U/L — HIGH (ref 40–120)
ALP SERPL-CCNC: 409 U/L — HIGH (ref 40–120)
ALP SERPL-CCNC: 415 U/L — HIGH (ref 40–120)
ALP SERPL-CCNC: 424 U/L — HIGH (ref 40–120)
ALP SERPL-CCNC: 512 U/L — HIGH (ref 40–120)
ALP SERPL-CCNC: 598 U/L — HIGH (ref 40–120)
ALT FLD-CCNC: 10 U/L — SIGNIFICANT CHANGE UP (ref 4–33)
ALT FLD-CCNC: 11 U/L — SIGNIFICANT CHANGE UP (ref 4–33)
ALT FLD-CCNC: 11 U/L — SIGNIFICANT CHANGE UP (ref 4–33)
ALT FLD-CCNC: 14 U/L — SIGNIFICANT CHANGE UP (ref 4–33)
ALT FLD-CCNC: 15 U/L — SIGNIFICANT CHANGE UP (ref 4–33)
ALT FLD-CCNC: 15 U/L — SIGNIFICANT CHANGE UP (ref 4–33)
ALT FLD-CCNC: 17 U/L — SIGNIFICANT CHANGE UP (ref 4–33)
ALT FLD-CCNC: 7 U/L — SIGNIFICANT CHANGE UP (ref 4–33)
ALT FLD-CCNC: 8 U/L — SIGNIFICANT CHANGE UP (ref 4–33)
ALT FLD-CCNC: 9 U/L — SIGNIFICANT CHANGE UP (ref 4–33)
ALT FLD-CCNC: 9 U/L — SIGNIFICANT CHANGE UP (ref 4–33)
ANION GAP SERPL CALC-SCNC: 12 MMOL/L — SIGNIFICANT CHANGE UP (ref 7–14)
ANION GAP SERPL CALC-SCNC: 13 MMOL/L — SIGNIFICANT CHANGE UP (ref 7–14)
ANION GAP SERPL CALC-SCNC: 13 MMOL/L — SIGNIFICANT CHANGE UP (ref 7–14)
ANION GAP SERPL CALC-SCNC: 14 MMOL/L — SIGNIFICANT CHANGE UP (ref 7–14)
ANION GAP SERPL CALC-SCNC: 15 MMOL/L — HIGH (ref 7–14)
ANION GAP SERPL CALC-SCNC: 16 MMOL/L — HIGH (ref 7–14)
ANION GAP SERPL CALC-SCNC: 17 MMOL/L — HIGH (ref 7–14)
ANION GAP SERPL CALC-SCNC: 18 MMOL/L — HIGH (ref 7–14)
ANION GAP SERPL CALC-SCNC: 19 MMOL/L — HIGH (ref 7–14)
ANION GAP SERPL CALC-SCNC: 20 MMOL/L — HIGH (ref 7–14)
ANION GAP SERPL CALC-SCNC: 21 MMOL/L — HIGH (ref 7–14)
ANION GAP SERPL CALC-SCNC: 21 MMOL/L — HIGH (ref 7–14)
ANION GAP SERPL CALC-SCNC: 22 MMOL/L — HIGH (ref 7–14)
ANION GAP SERPL CALC-SCNC: 22 MMOL/L — HIGH (ref 7–14)
ANISOCYTOSIS BLD QL: SLIGHT — SIGNIFICANT CHANGE UP
APPEARANCE UR: ABNORMAL
APPEARANCE UR: ABNORMAL
APTT BLD: 23 SEC — LOW (ref 24.5–35.6)
APTT BLD: 27.5 SEC — SIGNIFICANT CHANGE UP (ref 24.5–35.6)
APTT BLD: 35 SEC — SIGNIFICANT CHANGE UP (ref 24.5–35.6)
AST SERPL-CCNC: 15 U/L — SIGNIFICANT CHANGE UP (ref 4–32)
AST SERPL-CCNC: 16 U/L — SIGNIFICANT CHANGE UP (ref 4–32)
AST SERPL-CCNC: 17 U/L — SIGNIFICANT CHANGE UP (ref 4–32)
AST SERPL-CCNC: 17 U/L — SIGNIFICANT CHANGE UP (ref 4–32)
AST SERPL-CCNC: 19 U/L — SIGNIFICANT CHANGE UP (ref 4–32)
AST SERPL-CCNC: 19 U/L — SIGNIFICANT CHANGE UP (ref 4–32)
AST SERPL-CCNC: 24 U/L — SIGNIFICANT CHANGE UP (ref 4–32)
AST SERPL-CCNC: 27 U/L — SIGNIFICANT CHANGE UP (ref 4–32)
AST SERPL-CCNC: 30 U/L — SIGNIFICANT CHANGE UP (ref 4–32)
AST SERPL-CCNC: 34 U/L — HIGH (ref 4–32)
AST SERPL-CCNC: 35 U/L — HIGH (ref 4–32)
B PERT DNA SPEC QL NAA+PROBE: SIGNIFICANT CHANGE UP
B PERT+PARAPERT DNA PNL SPEC NAA+PROBE: SIGNIFICANT CHANGE UP
B-OH-BUTYR SERPL-SCNC: 0.7 MMOL/L — HIGH (ref 0–0.4)
BACTERIA # UR AUTO: ABNORMAL /HPF
BASE EXCESS BLDA CALC-SCNC: -10.5 MMOL/L — LOW (ref -2–3)
BASE EXCESS BLDV CALC-SCNC: -11.4 MMOL/L — LOW (ref -2–3)
BASE EXCESS BLDV CALC-SCNC: -12.3 MMOL/L — LOW (ref -2–3)
BASE EXCESS BLDV CALC-SCNC: -13.1 MMOL/L — LOW (ref -2–3)
BASOPHILS # BLD AUTO: 0 K/UL — SIGNIFICANT CHANGE UP (ref 0–0.2)
BASOPHILS # BLD AUTO: 0.01 K/UL — SIGNIFICANT CHANGE UP (ref 0–0.2)
BASOPHILS # BLD AUTO: 0.02 K/UL — SIGNIFICANT CHANGE UP (ref 0–0.2)
BASOPHILS # BLD AUTO: 0.03 K/UL — SIGNIFICANT CHANGE UP (ref 0–0.2)
BASOPHILS # BLD AUTO: 0.03 K/UL — SIGNIFICANT CHANGE UP (ref 0–0.2)
BASOPHILS NFR BLD AUTO: 0 % — SIGNIFICANT CHANGE UP (ref 0–2)
BASOPHILS NFR BLD AUTO: 0.1 % — SIGNIFICANT CHANGE UP (ref 0–2)
BASOPHILS NFR BLD AUTO: 0.2 % — SIGNIFICANT CHANGE UP (ref 0–2)
BILIRUB SERPL-MCNC: 0.3 MG/DL — SIGNIFICANT CHANGE UP (ref 0.2–1.2)
BILIRUB SERPL-MCNC: 0.4 MG/DL — SIGNIFICANT CHANGE UP (ref 0.2–1.2)
BILIRUB SERPL-MCNC: 0.4 MG/DL — SIGNIFICANT CHANGE UP (ref 0.2–1.2)
BILIRUB SERPL-MCNC: 0.6 MG/DL — SIGNIFICANT CHANGE UP (ref 0.2–1.2)
BILIRUB UR-MCNC: NEGATIVE — SIGNIFICANT CHANGE UP
BILIRUB UR-MCNC: NEGATIVE — SIGNIFICANT CHANGE UP
BLD GP AB SCN SERPL QL: NEGATIVE — SIGNIFICANT CHANGE UP
BLOOD GAS ARTERIAL - LYTES,HGB,ICA,LACT RESULT: SIGNIFICANT CHANGE UP
BLOOD GAS ARTERIAL COMPREHENSIVE RESULT: SIGNIFICANT CHANGE UP
BLOOD GAS VENOUS COMPREHENSIVE RESULT: SIGNIFICANT CHANGE UP
BLOOD GAS VENOUS COMPREHENSIVE RESULT: SIGNIFICANT CHANGE UP
BORDETELLA PARAPERTUSSIS (RAPRVP): SIGNIFICANT CHANGE UP
BUN SERPL-MCNC: 103 MG/DL — HIGH (ref 7–23)
BUN SERPL-MCNC: 104 MG/DL — HIGH (ref 7–23)
BUN SERPL-MCNC: 107 MG/DL — HIGH (ref 7–23)
BUN SERPL-MCNC: 110 MG/DL — HIGH (ref 7–23)
BUN SERPL-MCNC: 111 MG/DL — HIGH (ref 7–23)
BUN SERPL-MCNC: 111 MG/DL — HIGH (ref 7–23)
BUN SERPL-MCNC: 112 MG/DL — HIGH (ref 7–23)
BUN SERPL-MCNC: 113 MG/DL — HIGH (ref 7–23)
BUN SERPL-MCNC: 117 MG/DL — HIGH (ref 7–23)
BUN SERPL-MCNC: 119 MG/DL — HIGH (ref 7–23)
BUN SERPL-MCNC: 42 MG/DL — HIGH (ref 7–23)
BUN SERPL-MCNC: 44 MG/DL — HIGH (ref 7–23)
BUN SERPL-MCNC: 45 MG/DL — HIGH (ref 7–23)
BUN SERPL-MCNC: 46 MG/DL — HIGH (ref 7–23)
BUN SERPL-MCNC: 48 MG/DL — HIGH (ref 7–23)
BUN SERPL-MCNC: 48 MG/DL — HIGH (ref 7–23)
BUN SERPL-MCNC: 52 MG/DL — HIGH (ref 7–23)
BUN SERPL-MCNC: 54 MG/DL — HIGH (ref 7–23)
BUN SERPL-MCNC: 58 MG/DL — HIGH (ref 7–23)
BUN SERPL-MCNC: 61 MG/DL — HIGH (ref 7–23)
BUN SERPL-MCNC: 63 MG/DL — HIGH (ref 7–23)
BUN SERPL-MCNC: 65 MG/DL — HIGH (ref 7–23)
BUN SERPL-MCNC: 66 MG/DL — HIGH (ref 7–23)
BUN SERPL-MCNC: 70 MG/DL — HIGH (ref 7–23)
BUN SERPL-MCNC: 73 MG/DL — HIGH (ref 7–23)
BUN SERPL-MCNC: 77 MG/DL — HIGH (ref 7–23)
BUN SERPL-MCNC: 79 MG/DL — HIGH (ref 7–23)
BUN SERPL-MCNC: 86 MG/DL — HIGH (ref 7–23)
BUN SERPL-MCNC: 91 MG/DL — HIGH (ref 7–23)
BUN SERPL-MCNC: 93 MG/DL — HIGH (ref 7–23)
BUN SERPL-MCNC: 98 MG/DL — HIGH (ref 7–23)
BURR CELLS BLD QL SMEAR: PRESENT — SIGNIFICANT CHANGE UP
BURR CELLS BLD QL SMEAR: SLIGHT — SIGNIFICANT CHANGE UP
C PNEUM DNA SPEC QL NAA+PROBE: SIGNIFICANT CHANGE UP
CA-I SERPL-SCNC: 1.27 MMOL/L — SIGNIFICANT CHANGE UP (ref 1.15–1.33)
CA-I SERPL-SCNC: 1.3 MMOL/L — SIGNIFICANT CHANGE UP (ref 1.15–1.33)
CALCIUM SERPL-MCNC: 6 MG/DL — CRITICAL LOW (ref 8.4–10.5)
CALCIUM SERPL-MCNC: 6.3 MG/DL — CRITICAL LOW (ref 8.4–10.5)
CALCIUM SERPL-MCNC: 7.2 MG/DL — LOW (ref 8.4–10.5)
CALCIUM SERPL-MCNC: 7.7 MG/DL — LOW (ref 8.4–10.5)
CALCIUM SERPL-MCNC: 7.8 MG/DL — LOW (ref 8.4–10.5)
CALCIUM SERPL-MCNC: 7.9 MG/DL — LOW (ref 8.4–10.5)
CALCIUM SERPL-MCNC: 8 MG/DL — LOW (ref 8.4–10.5)
CALCIUM SERPL-MCNC: 8 MG/DL — LOW (ref 8.4–10.5)
CALCIUM SERPL-MCNC: 8.1 MG/DL — LOW (ref 8.4–10.5)
CALCIUM SERPL-MCNC: 8.2 MG/DL — LOW (ref 8.4–10.5)
CALCIUM SERPL-MCNC: 8.3 MG/DL — LOW (ref 8.4–10.5)
CALCIUM SERPL-MCNC: 8.4 MG/DL — SIGNIFICANT CHANGE UP (ref 8.4–10.5)
CALCIUM SERPL-MCNC: 8.5 MG/DL — SIGNIFICANT CHANGE UP (ref 8.4–10.5)
CALCIUM SERPL-MCNC: 8.7 MG/DL — SIGNIFICANT CHANGE UP (ref 8.4–10.5)
CAST: 21 /LPF — HIGH (ref 0–4)
CHLORIDE BLDV-SCNC: 108 MMOL/L — SIGNIFICANT CHANGE UP (ref 96–108)
CHLORIDE BLDV-SCNC: 109 MMOL/L — HIGH (ref 96–108)
CHLORIDE BLDV-SCNC: 118 MMOL/L — HIGH (ref 96–108)
CHLORIDE SERPL-SCNC: 100 MMOL/L — SIGNIFICANT CHANGE UP (ref 98–107)
CHLORIDE SERPL-SCNC: 102 MMOL/L — SIGNIFICANT CHANGE UP (ref 98–107)
CHLORIDE SERPL-SCNC: 104 MMOL/L — SIGNIFICANT CHANGE UP (ref 98–107)
CHLORIDE SERPL-SCNC: 105 MMOL/L — SIGNIFICANT CHANGE UP (ref 98–107)
CHLORIDE SERPL-SCNC: 105 MMOL/L — SIGNIFICANT CHANGE UP (ref 98–107)
CHLORIDE SERPL-SCNC: 106 MMOL/L — SIGNIFICANT CHANGE UP (ref 98–107)
CHLORIDE SERPL-SCNC: 106 MMOL/L — SIGNIFICANT CHANGE UP (ref 98–107)
CHLORIDE SERPL-SCNC: 107 MMOL/L — SIGNIFICANT CHANGE UP (ref 98–107)
CHLORIDE SERPL-SCNC: 113 MMOL/L — HIGH (ref 98–107)
CHLORIDE SERPL-SCNC: 114 MMOL/L — HIGH (ref 98–107)
CHLORIDE SERPL-SCNC: 115 MMOL/L — HIGH (ref 98–107)
CHLORIDE SERPL-SCNC: 115 MMOL/L — HIGH (ref 98–107)
CHLORIDE SERPL-SCNC: 118 MMOL/L — HIGH (ref 98–107)
CHLORIDE SERPL-SCNC: 121 MMOL/L — HIGH (ref 98–107)
CHLORIDE SERPL-SCNC: 122 MMOL/L — HIGH (ref 98–107)
CHLORIDE SERPL-SCNC: 125 MMOL/L — HIGH (ref 98–107)
CHLORIDE SERPL-SCNC: 125 MMOL/L — HIGH (ref 98–107)
CHLORIDE SERPL-SCNC: 126 MMOL/L — HIGH (ref 98–107)
CHLORIDE SERPL-SCNC: 128 MMOL/L — HIGH (ref 98–107)
CHLORIDE SERPL-SCNC: 96 MMOL/L — LOW (ref 98–107)
CHLORIDE SERPL-SCNC: 97 MMOL/L — LOW (ref 98–107)
CLOSURE TME COLL+EPINEP BLD: 94 K/UL — LOW (ref 150–400)
CO2 BLDA-SCNC: 13 MMOL/L — LOW (ref 19–24)
CO2 BLDV-SCNC: 11.9 MMOL/L — LOW (ref 22–26)
CO2 BLDV-SCNC: 14.1 MMOL/L — LOW (ref 22–26)
CO2 BLDV-SCNC: 16.8 MMOL/L — LOW (ref 22–26)
CO2 SERPL-SCNC: 12 MMOL/L — LOW (ref 22–31)
CO2 SERPL-SCNC: 13 MMOL/L — LOW (ref 22–31)
CO2 SERPL-SCNC: 14 MMOL/L — LOW (ref 22–31)
CO2 SERPL-SCNC: 15 MMOL/L — LOW (ref 22–31)
CO2 SERPL-SCNC: 16 MMOL/L — LOW (ref 22–31)
CO2 SERPL-SCNC: 17 MMOL/L — LOW (ref 22–31)
CO2 SERPL-SCNC: 18 MMOL/L — LOW (ref 22–31)
CO2 SERPL-SCNC: 19 MMOL/L — LOW (ref 22–31)
CO2 SERPL-SCNC: 20 MMOL/L — LOW (ref 22–31)
CO2 SERPL-SCNC: 22 MMOL/L — SIGNIFICANT CHANGE UP (ref 22–31)
COLOR SPEC: SIGNIFICANT CHANGE UP
COLOR SPEC: YELLOW — SIGNIFICANT CHANGE UP
CREAT SERPL-MCNC: 1.91 MG/DL — HIGH (ref 0.5–1.3)
CREAT SERPL-MCNC: 1.91 MG/DL — HIGH (ref 0.5–1.3)
CREAT SERPL-MCNC: 1.92 MG/DL — HIGH (ref 0.5–1.3)
CREAT SERPL-MCNC: 1.94 MG/DL — HIGH (ref 0.5–1.3)
CREAT SERPL-MCNC: 1.95 MG/DL — HIGH (ref 0.5–1.3)
CREAT SERPL-MCNC: 1.96 MG/DL — HIGH (ref 0.5–1.3)
CREAT SERPL-MCNC: 1.97 MG/DL — HIGH (ref 0.5–1.3)
CREAT SERPL-MCNC: 1.99 MG/DL — HIGH (ref 0.5–1.3)
CREAT SERPL-MCNC: 1.99 MG/DL — HIGH (ref 0.5–1.3)
CREAT SERPL-MCNC: 2.02 MG/DL — HIGH (ref 0.5–1.3)
CREAT SERPL-MCNC: 2.05 MG/DL — HIGH (ref 0.5–1.3)
CREAT SERPL-MCNC: 2.15 MG/DL — HIGH (ref 0.5–1.3)
CREAT SERPL-MCNC: 2.25 MG/DL — HIGH (ref 0.5–1.3)
CREAT SERPL-MCNC: 2.31 MG/DL — HIGH (ref 0.5–1.3)
CREAT SERPL-MCNC: 2.5 MG/DL — HIGH (ref 0.5–1.3)
CREAT SERPL-MCNC: 2.53 MG/DL — HIGH (ref 0.5–1.3)
CREAT SERPL-MCNC: 2.55 MG/DL — HIGH (ref 0.5–1.3)
CREAT SERPL-MCNC: 2.59 MG/DL — HIGH (ref 0.5–1.3)
CREAT SERPL-MCNC: 2.76 MG/DL — HIGH (ref 0.5–1.3)
CREAT SERPL-MCNC: 2.78 MG/DL — HIGH (ref 0.5–1.3)
CREAT SERPL-MCNC: 3.03 MG/DL — HIGH (ref 0.5–1.3)
CREAT SERPL-MCNC: 3.26 MG/DL — HIGH (ref 0.5–1.3)
CREAT SERPL-MCNC: 3.33 MG/DL — HIGH (ref 0.5–1.3)
CREAT SERPL-MCNC: 3.35 MG/DL — HIGH (ref 0.5–1.3)
CREAT SERPL-MCNC: 3.6 MG/DL — HIGH (ref 0.5–1.3)
CREAT SERPL-MCNC: 3.73 MG/DL — HIGH (ref 0.5–1.3)
CREAT SERPL-MCNC: 3.74 MG/DL — HIGH (ref 0.5–1.3)
CREAT SERPL-MCNC: 3.76 MG/DL — HIGH (ref 0.5–1.3)
CREAT SERPL-MCNC: 3.81 MG/DL — HIGH (ref 0.5–1.3)
CREAT SERPL-MCNC: 3.81 MG/DL — HIGH (ref 0.5–1.3)
CREAT SERPL-MCNC: 3.84 MG/DL — HIGH (ref 0.5–1.3)
CRP SERPL-MCNC: 288.3 MG/L — HIGH
CULTURE RESULTS: ABNORMAL
CULTURE RESULTS: SIGNIFICANT CHANGE UP
DIFF PNL FLD: ABNORMAL
DIFF PNL FLD: ABNORMAL
EGFR: 10 ML/MIN/1.73M2 — LOW
EGFR: 11 ML/MIN/1.73M2 — LOW
EGFR: 12 ML/MIN/1.73M2 — LOW
EGFR: 12 ML/MIN/1.73M2 — LOW
EGFR: 13 ML/MIN/1.73M2 — LOW
EGFR: 14 ML/MIN/1.73M2 — LOW
EGFR: 15 ML/MIN/1.73M2 — LOW
EGFR: 16 ML/MIN/1.73M2 — LOW
EGFR: 17 ML/MIN/1.73M2 — LOW
EGFR: 19 ML/MIN/1.73M2 — LOW
EGFR: 20 ML/MIN/1.73M2 — LOW
EGFR: 21 ML/MIN/1.73M2 — LOW
EGFR: 22 ML/MIN/1.73M2 — LOW
EGFR: 23 ML/MIN/1.73M2 — LOW
EGFR: 24 ML/MIN/1.73M2 — LOW
EOSINOPHIL # BLD AUTO: 0.02 K/UL — SIGNIFICANT CHANGE UP (ref 0–0.5)
EOSINOPHIL # BLD AUTO: 0.03 K/UL — SIGNIFICANT CHANGE UP (ref 0–0.5)
EOSINOPHIL # BLD AUTO: 0.03 K/UL — SIGNIFICANT CHANGE UP (ref 0–0.5)
EOSINOPHIL # BLD AUTO: 0.04 K/UL — SIGNIFICANT CHANGE UP (ref 0–0.5)
EOSINOPHIL # BLD AUTO: 0.06 K/UL — SIGNIFICANT CHANGE UP (ref 0–0.5)
EOSINOPHIL # BLD AUTO: 0.07 K/UL — SIGNIFICANT CHANGE UP (ref 0–0.5)
EOSINOPHIL # BLD AUTO: 0.1 K/UL — SIGNIFICANT CHANGE UP (ref 0–0.5)
EOSINOPHIL # BLD AUTO: 0.12 K/UL — SIGNIFICANT CHANGE UP (ref 0–0.5)
EOSINOPHIL # BLD AUTO: 0.12 K/UL — SIGNIFICANT CHANGE UP (ref 0–0.5)
EOSINOPHIL # BLD AUTO: 0.14 K/UL — SIGNIFICANT CHANGE UP (ref 0–0.5)
EOSINOPHIL # BLD AUTO: 0.16 K/UL — SIGNIFICANT CHANGE UP (ref 0–0.5)
EOSINOPHIL NFR BLD AUTO: 0.1 % — SIGNIFICANT CHANGE UP (ref 0–6)
EOSINOPHIL NFR BLD AUTO: 0.2 % — SIGNIFICANT CHANGE UP (ref 0–6)
EOSINOPHIL NFR BLD AUTO: 0.2 % — SIGNIFICANT CHANGE UP (ref 0–6)
EOSINOPHIL NFR BLD AUTO: 0.3 % — SIGNIFICANT CHANGE UP (ref 0–6)
EOSINOPHIL NFR BLD AUTO: 0.4 % — SIGNIFICANT CHANGE UP (ref 0–6)
EOSINOPHIL NFR BLD AUTO: 0.5 % — SIGNIFICANT CHANGE UP (ref 0–6)
EOSINOPHIL NFR BLD AUTO: 0.6 % — SIGNIFICANT CHANGE UP (ref 0–6)
EOSINOPHIL NFR BLD AUTO: 0.8 % — SIGNIFICANT CHANGE UP (ref 0–6)
EOSINOPHIL NFR BLD AUTO: 0.9 % — SIGNIFICANT CHANGE UP (ref 0–6)
EOSINOPHIL NFR BLD AUTO: 1.1 % — SIGNIFICANT CHANGE UP (ref 0–6)
EOSINOPHIL NFR BLD AUTO: 1.3 % — SIGNIFICANT CHANGE UP (ref 0–6)
ERYTHROCYTE [SEDIMENTATION RATE] IN BLOOD: 107 MM/HR — HIGH (ref 4–25)
FLUAV SUBTYP SPEC NAA+PROBE: SIGNIFICANT CHANGE UP
FLUBV RNA SPEC QL NAA+PROBE: SIGNIFICANT CHANGE UP
GAS PNL BLDA: SIGNIFICANT CHANGE UP
GAS PNL BLDV: 132 MMOL/L — LOW (ref 136–145)
GAS PNL BLDV: 133 MMOL/L — LOW (ref 136–145)
GAS PNL BLDV: 142 MMOL/L — SIGNIFICANT CHANGE UP (ref 136–145)
GAS PNL BLDV: SIGNIFICANT CHANGE UP
GIANT PLATELETS BLD QL SMEAR: PRESENT — SIGNIFICANT CHANGE UP
GLUCOSE BLDC GLUCOMTR-MCNC: 104 MG/DL — HIGH (ref 70–99)
GLUCOSE BLDC GLUCOMTR-MCNC: 105 MG/DL — HIGH (ref 70–99)
GLUCOSE BLDC GLUCOMTR-MCNC: 105 MG/DL — HIGH (ref 70–99)
GLUCOSE BLDC GLUCOMTR-MCNC: 107 MG/DL — HIGH (ref 70–99)
GLUCOSE BLDC GLUCOMTR-MCNC: 114 MG/DL — HIGH (ref 70–99)
GLUCOSE BLDC GLUCOMTR-MCNC: 119 MG/DL — HIGH (ref 70–99)
GLUCOSE BLDC GLUCOMTR-MCNC: 124 MG/DL — HIGH (ref 70–99)
GLUCOSE BLDC GLUCOMTR-MCNC: 124 MG/DL — HIGH (ref 70–99)
GLUCOSE BLDC GLUCOMTR-MCNC: 126 MG/DL — HIGH (ref 70–99)
GLUCOSE BLDC GLUCOMTR-MCNC: 128 MG/DL — HIGH (ref 70–99)
GLUCOSE BLDC GLUCOMTR-MCNC: 130 MG/DL — HIGH (ref 70–99)
GLUCOSE BLDC GLUCOMTR-MCNC: 131 MG/DL — HIGH (ref 70–99)
GLUCOSE BLDC GLUCOMTR-MCNC: 131 MG/DL — HIGH (ref 70–99)
GLUCOSE BLDC GLUCOMTR-MCNC: 138 MG/DL — HIGH (ref 70–99)
GLUCOSE BLDC GLUCOMTR-MCNC: 138 MG/DL — HIGH (ref 70–99)
GLUCOSE BLDC GLUCOMTR-MCNC: 139 MG/DL — HIGH (ref 70–99)
GLUCOSE BLDC GLUCOMTR-MCNC: 139 MG/DL — HIGH (ref 70–99)
GLUCOSE BLDC GLUCOMTR-MCNC: 141 MG/DL — HIGH (ref 70–99)
GLUCOSE BLDC GLUCOMTR-MCNC: 142 MG/DL — HIGH (ref 70–99)
GLUCOSE BLDC GLUCOMTR-MCNC: 142 MG/DL — HIGH (ref 70–99)
GLUCOSE BLDC GLUCOMTR-MCNC: 148 MG/DL — HIGH (ref 70–99)
GLUCOSE BLDC GLUCOMTR-MCNC: 148 MG/DL — HIGH (ref 70–99)
GLUCOSE BLDC GLUCOMTR-MCNC: 150 MG/DL — HIGH (ref 70–99)
GLUCOSE BLDC GLUCOMTR-MCNC: 154 MG/DL — HIGH (ref 70–99)
GLUCOSE BLDC GLUCOMTR-MCNC: 158 MG/DL — HIGH (ref 70–99)
GLUCOSE BLDC GLUCOMTR-MCNC: 160 MG/DL — HIGH (ref 70–99)
GLUCOSE BLDC GLUCOMTR-MCNC: 161 MG/DL — HIGH (ref 70–99)
GLUCOSE BLDC GLUCOMTR-MCNC: 165 MG/DL — HIGH (ref 70–99)
GLUCOSE BLDC GLUCOMTR-MCNC: 167 MG/DL — HIGH (ref 70–99)
GLUCOSE BLDC GLUCOMTR-MCNC: 169 MG/DL — HIGH (ref 70–99)
GLUCOSE BLDC GLUCOMTR-MCNC: 171 MG/DL — HIGH (ref 70–99)
GLUCOSE BLDC GLUCOMTR-MCNC: 171 MG/DL — HIGH (ref 70–99)
GLUCOSE BLDC GLUCOMTR-MCNC: 172 MG/DL — HIGH (ref 70–99)
GLUCOSE BLDC GLUCOMTR-MCNC: 173 MG/DL — HIGH (ref 70–99)
GLUCOSE BLDC GLUCOMTR-MCNC: 173 MG/DL — HIGH (ref 70–99)
GLUCOSE BLDC GLUCOMTR-MCNC: 174 MG/DL — HIGH (ref 70–99)
GLUCOSE BLDC GLUCOMTR-MCNC: 175 MG/DL — HIGH (ref 70–99)
GLUCOSE BLDC GLUCOMTR-MCNC: 176 MG/DL — HIGH (ref 70–99)
GLUCOSE BLDC GLUCOMTR-MCNC: 176 MG/DL — HIGH (ref 70–99)
GLUCOSE BLDC GLUCOMTR-MCNC: 178 MG/DL — HIGH (ref 70–99)
GLUCOSE BLDC GLUCOMTR-MCNC: 179 MG/DL — HIGH (ref 70–99)
GLUCOSE BLDC GLUCOMTR-MCNC: 180 MG/DL — HIGH (ref 70–99)
GLUCOSE BLDC GLUCOMTR-MCNC: 182 MG/DL — HIGH (ref 70–99)
GLUCOSE BLDC GLUCOMTR-MCNC: 182 MG/DL — HIGH (ref 70–99)
GLUCOSE BLDC GLUCOMTR-MCNC: 183 MG/DL — HIGH (ref 70–99)
GLUCOSE BLDC GLUCOMTR-MCNC: 185 MG/DL — HIGH (ref 70–99)
GLUCOSE BLDC GLUCOMTR-MCNC: 187 MG/DL — HIGH (ref 70–99)
GLUCOSE BLDC GLUCOMTR-MCNC: 189 MG/DL — HIGH (ref 70–99)
GLUCOSE BLDC GLUCOMTR-MCNC: 190 MG/DL — HIGH (ref 70–99)
GLUCOSE BLDC GLUCOMTR-MCNC: 194 MG/DL — HIGH (ref 70–99)
GLUCOSE BLDC GLUCOMTR-MCNC: 195 MG/DL — HIGH (ref 70–99)
GLUCOSE BLDC GLUCOMTR-MCNC: 197 MG/DL — HIGH (ref 70–99)
GLUCOSE BLDC GLUCOMTR-MCNC: 201 MG/DL — HIGH (ref 70–99)
GLUCOSE BLDC GLUCOMTR-MCNC: 201 MG/DL — HIGH (ref 70–99)
GLUCOSE BLDC GLUCOMTR-MCNC: 202 MG/DL — HIGH (ref 70–99)
GLUCOSE BLDC GLUCOMTR-MCNC: 210 MG/DL — HIGH (ref 70–99)
GLUCOSE BLDC GLUCOMTR-MCNC: 211 MG/DL — HIGH (ref 70–99)
GLUCOSE BLDC GLUCOMTR-MCNC: 211 MG/DL — HIGH (ref 70–99)
GLUCOSE BLDC GLUCOMTR-MCNC: 213 MG/DL — HIGH (ref 70–99)
GLUCOSE BLDC GLUCOMTR-MCNC: 217 MG/DL — HIGH (ref 70–99)
GLUCOSE BLDC GLUCOMTR-MCNC: 217 MG/DL — HIGH (ref 70–99)
GLUCOSE BLDC GLUCOMTR-MCNC: 219 MG/DL — HIGH (ref 70–99)
GLUCOSE BLDC GLUCOMTR-MCNC: 219 MG/DL — HIGH (ref 70–99)
GLUCOSE BLDC GLUCOMTR-MCNC: 221 MG/DL — HIGH (ref 70–99)
GLUCOSE BLDC GLUCOMTR-MCNC: 221 MG/DL — HIGH (ref 70–99)
GLUCOSE BLDC GLUCOMTR-MCNC: 222 MG/DL — HIGH (ref 70–99)
GLUCOSE BLDC GLUCOMTR-MCNC: 222 MG/DL — HIGH (ref 70–99)
GLUCOSE BLDC GLUCOMTR-MCNC: 223 MG/DL — HIGH (ref 70–99)
GLUCOSE BLDC GLUCOMTR-MCNC: 224 MG/DL — HIGH (ref 70–99)
GLUCOSE BLDC GLUCOMTR-MCNC: 225 MG/DL — HIGH (ref 70–99)
GLUCOSE BLDC GLUCOMTR-MCNC: 230 MG/DL — HIGH (ref 70–99)
GLUCOSE BLDC GLUCOMTR-MCNC: 234 MG/DL — HIGH (ref 70–99)
GLUCOSE BLDC GLUCOMTR-MCNC: 235 MG/DL — HIGH (ref 70–99)
GLUCOSE BLDC GLUCOMTR-MCNC: 239 MG/DL — HIGH (ref 70–99)
GLUCOSE BLDC GLUCOMTR-MCNC: 241 MG/DL — HIGH (ref 70–99)
GLUCOSE BLDC GLUCOMTR-MCNC: 242 MG/DL — HIGH (ref 70–99)
GLUCOSE BLDC GLUCOMTR-MCNC: 245 MG/DL — HIGH (ref 70–99)
GLUCOSE BLDC GLUCOMTR-MCNC: 247 MG/DL — HIGH (ref 70–99)
GLUCOSE BLDC GLUCOMTR-MCNC: 256 MG/DL — HIGH (ref 70–99)
GLUCOSE BLDC GLUCOMTR-MCNC: 257 MG/DL — HIGH (ref 70–99)
GLUCOSE BLDC GLUCOMTR-MCNC: 261 MG/DL — HIGH (ref 70–99)
GLUCOSE BLDC GLUCOMTR-MCNC: 262 MG/DL — HIGH (ref 70–99)
GLUCOSE BLDC GLUCOMTR-MCNC: 263 MG/DL — HIGH (ref 70–99)
GLUCOSE BLDC GLUCOMTR-MCNC: 272 MG/DL — HIGH (ref 70–99)
GLUCOSE BLDC GLUCOMTR-MCNC: 274 MG/DL — HIGH (ref 70–99)
GLUCOSE BLDC GLUCOMTR-MCNC: 282 MG/DL — HIGH (ref 70–99)
GLUCOSE BLDC GLUCOMTR-MCNC: 283 MG/DL — HIGH (ref 70–99)
GLUCOSE BLDC GLUCOMTR-MCNC: 286 MG/DL — HIGH (ref 70–99)
GLUCOSE BLDC GLUCOMTR-MCNC: 288 MG/DL — HIGH (ref 70–99)
GLUCOSE BLDC GLUCOMTR-MCNC: 289 MG/DL — HIGH (ref 70–99)
GLUCOSE BLDC GLUCOMTR-MCNC: 289 MG/DL — HIGH (ref 70–99)
GLUCOSE BLDC GLUCOMTR-MCNC: 293 MG/DL — HIGH (ref 70–99)
GLUCOSE BLDC GLUCOMTR-MCNC: 293 MG/DL — HIGH (ref 70–99)
GLUCOSE BLDC GLUCOMTR-MCNC: 298 MG/DL — HIGH (ref 70–99)
GLUCOSE BLDC GLUCOMTR-MCNC: 305 MG/DL — HIGH (ref 70–99)
GLUCOSE BLDC GLUCOMTR-MCNC: 323 MG/DL — HIGH (ref 70–99)
GLUCOSE BLDC GLUCOMTR-MCNC: 59 MG/DL — LOW (ref 70–99)
GLUCOSE BLDC GLUCOMTR-MCNC: 77 MG/DL — SIGNIFICANT CHANGE UP (ref 70–99)
GLUCOSE BLDC GLUCOMTR-MCNC: 77 MG/DL — SIGNIFICANT CHANGE UP (ref 70–99)
GLUCOSE BLDC GLUCOMTR-MCNC: 96 MG/DL — SIGNIFICANT CHANGE UP (ref 70–99)
GLUCOSE BLDC GLUCOMTR-MCNC: <25 MG/DL — CRITICAL LOW (ref 70–99)
GLUCOSE BLDV-MCNC: 158 MG/DL — HIGH (ref 70–99)
GLUCOSE BLDV-MCNC: 174 MG/DL — HIGH (ref 70–99)
GLUCOSE BLDV-MCNC: 195 MG/DL — HIGH (ref 70–99)
GLUCOSE SERPL-MCNC: 112 MG/DL — HIGH (ref 70–99)
GLUCOSE SERPL-MCNC: 112 MG/DL — HIGH (ref 70–99)
GLUCOSE SERPL-MCNC: 119 MG/DL — HIGH (ref 70–99)
GLUCOSE SERPL-MCNC: 119 MG/DL — HIGH (ref 70–99)
GLUCOSE SERPL-MCNC: 134 MG/DL — HIGH (ref 70–99)
GLUCOSE SERPL-MCNC: 146 MG/DL — HIGH (ref 70–99)
GLUCOSE SERPL-MCNC: 148 MG/DL — HIGH (ref 70–99)
GLUCOSE SERPL-MCNC: 148 MG/DL — HIGH (ref 70–99)
GLUCOSE SERPL-MCNC: 154 MG/DL — HIGH (ref 70–99)
GLUCOSE SERPL-MCNC: 155 MG/DL — HIGH (ref 70–99)
GLUCOSE SERPL-MCNC: 157 MG/DL — HIGH (ref 70–99)
GLUCOSE SERPL-MCNC: 160 MG/DL — HIGH (ref 70–99)
GLUCOSE SERPL-MCNC: 167 MG/DL — HIGH (ref 70–99)
GLUCOSE SERPL-MCNC: 170 MG/DL — HIGH (ref 70–99)
GLUCOSE SERPL-MCNC: 178 MG/DL — HIGH (ref 70–99)
GLUCOSE SERPL-MCNC: 178 MG/DL — HIGH (ref 70–99)
GLUCOSE SERPL-MCNC: 180 MG/DL — HIGH (ref 70–99)
GLUCOSE SERPL-MCNC: 184 MG/DL — HIGH (ref 70–99)
GLUCOSE SERPL-MCNC: 206 MG/DL — HIGH (ref 70–99)
GLUCOSE SERPL-MCNC: 206 MG/DL — HIGH (ref 70–99)
GLUCOSE SERPL-MCNC: 207 MG/DL — HIGH (ref 70–99)
GLUCOSE SERPL-MCNC: 222 MG/DL — HIGH (ref 70–99)
GLUCOSE SERPL-MCNC: 234 MG/DL — HIGH (ref 70–99)
GLUCOSE SERPL-MCNC: 235 MG/DL — HIGH (ref 70–99)
GLUCOSE SERPL-MCNC: 240 MG/DL — HIGH (ref 70–99)
GLUCOSE SERPL-MCNC: 263 MG/DL — HIGH (ref 70–99)
GLUCOSE SERPL-MCNC: 276 MG/DL — HIGH (ref 70–99)
GLUCOSE SERPL-MCNC: 292 MG/DL — HIGH (ref 70–99)
GLUCOSE SERPL-MCNC: 293 MG/DL — HIGH (ref 70–99)
GLUCOSE SERPL-MCNC: 489 MG/DL — CRITICAL HIGH (ref 70–99)
GLUCOSE SERPL-MCNC: 97 MG/DL — SIGNIFICANT CHANGE UP (ref 70–99)
GLUCOSE UR QL: NEGATIVE MG/DL — SIGNIFICANT CHANGE UP
GLUCOSE UR QL: NEGATIVE MG/DL — SIGNIFICANT CHANGE UP
HADV DNA SPEC QL NAA+PROBE: SIGNIFICANT CHANGE UP
HCO3 BLDA-SCNC: 13 MMOL/L — LOW (ref 21–28)
HCO3 BLDV-SCNC: 11 MMOL/L — LOW (ref 22–29)
HCO3 BLDV-SCNC: 13 MMOL/L — LOW (ref 22–29)
HCO3 BLDV-SCNC: 16 MMOL/L — LOW (ref 22–29)
HCOV 229E RNA SPEC QL NAA+PROBE: SIGNIFICANT CHANGE UP
HCOV HKU1 RNA SPEC QL NAA+PROBE: SIGNIFICANT CHANGE UP
HCOV NL63 RNA SPEC QL NAA+PROBE: SIGNIFICANT CHANGE UP
HCOV OC43 RNA SPEC QL NAA+PROBE: SIGNIFICANT CHANGE UP
HCT VFR BLD CALC: 19.1 % — CRITICAL LOW (ref 34.5–45)
HCT VFR BLD CALC: 19.6 % — CRITICAL LOW (ref 34.5–45)
HCT VFR BLD CALC: 21 % — CRITICAL LOW (ref 34.5–45)
HCT VFR BLD CALC: 21.1 % — LOW (ref 34.5–45)
HCT VFR BLD CALC: 22 % — LOW (ref 34.5–45)
HCT VFR BLD CALC: 22.3 % — LOW (ref 34.5–45)
HCT VFR BLD CALC: 22.7 % — LOW (ref 34.5–45)
HCT VFR BLD CALC: 22.9 % — LOW (ref 34.5–45)
HCT VFR BLD CALC: 23 % — LOW (ref 34.5–45)
HCT VFR BLD CALC: 23.2 % — LOW (ref 34.5–45)
HCT VFR BLD CALC: 23.2 % — LOW (ref 34.5–45)
HCT VFR BLD CALC: 23.3 % — LOW (ref 34.5–45)
HCT VFR BLD CALC: 23.3 % — LOW (ref 34.5–45)
HCT VFR BLD CALC: 23.6 % — LOW (ref 34.5–45)
HCT VFR BLD CALC: 23.7 % — LOW (ref 34.5–45)
HCT VFR BLD CALC: 24.8 % — LOW (ref 34.5–45)
HCT VFR BLD CALC: 25 % — LOW (ref 34.5–45)
HCT VFR BLD CALC: 25.6 % — LOW (ref 34.5–45)
HCT VFR BLD CALC: 26.3 % — LOW (ref 34.5–45)
HCT VFR BLD CALC: 26.4 % — LOW (ref 34.5–45)
HCT VFR BLD CALC: 26.9 % — LOW (ref 34.5–45)
HCT VFR BLD CALC: 27.9 % — LOW (ref 34.5–45)
HCT VFR BLD CALC: 30.2 % — LOW (ref 34.5–45)
HCT VFR BLD CALC: 30.5 % — LOW (ref 34.5–45)
HCT VFR BLDA CALC: 23 % — LOW (ref 34.5–46.5)
HCT VFR BLDA CALC: 24 % — LOW (ref 34.5–46.5)
HCT VFR BLDA CALC: 27 % — LOW (ref 34.5–46.5)
HEPARIN-PF4 AB RESULT: <0.6 U/ML — SIGNIFICANT CHANGE UP (ref 0–0.9)
HGB BLD CALC-MCNC: 7.5 G/DL — LOW (ref 11.7–16.1)
HGB BLD CALC-MCNC: 8 G/DL — LOW (ref 11.7–16.1)
HGB BLD CALC-MCNC: 9.1 G/DL — LOW (ref 11.7–16.1)
HGB BLD-MCNC: 10.1 G/DL — LOW (ref 11.5–15.5)
HGB BLD-MCNC: 6.2 G/DL — CRITICAL LOW (ref 11.5–15.5)
HGB BLD-MCNC: 6.5 G/DL — CRITICAL LOW (ref 11.5–15.5)
HGB BLD-MCNC: 6.5 G/DL — CRITICAL LOW (ref 11.5–15.5)
HGB BLD-MCNC: 7 G/DL — CRITICAL LOW (ref 11.5–15.5)
HGB BLD-MCNC: 7.1 G/DL — LOW (ref 11.5–15.5)
HGB BLD-MCNC: 7.2 G/DL — LOW (ref 11.5–15.5)
HGB BLD-MCNC: 7.3 G/DL — LOW (ref 11.5–15.5)
HGB BLD-MCNC: 7.4 G/DL — LOW (ref 11.5–15.5)
HGB BLD-MCNC: 7.4 G/DL — LOW (ref 11.5–15.5)
HGB BLD-MCNC: 7.5 G/DL — LOW (ref 11.5–15.5)
HGB BLD-MCNC: 7.6 G/DL — LOW (ref 11.5–15.5)
HGB BLD-MCNC: 7.6 G/DL — LOW (ref 11.5–15.5)
HGB BLD-MCNC: 7.7 G/DL — LOW (ref 11.5–15.5)
HGB BLD-MCNC: 7.8 G/DL — LOW (ref 11.5–15.5)
HGB BLD-MCNC: 7.8 G/DL — LOW (ref 11.5–15.5)
HGB BLD-MCNC: 7.9 G/DL — LOW (ref 11.5–15.5)
HGB BLD-MCNC: 8.3 G/DL — LOW (ref 11.5–15.5)
HGB BLD-MCNC: 8.5 G/DL — LOW (ref 11.5–15.5)
HGB BLD-MCNC: 8.5 G/DL — LOW (ref 11.5–15.5)
HGB BLD-MCNC: 8.6 G/DL — LOW (ref 11.5–15.5)
HGB BLD-MCNC: 8.9 G/DL — LOW (ref 11.5–15.5)
HGB BLD-MCNC: 9 G/DL — LOW (ref 11.5–15.5)
HGB BLD-MCNC: 9.3 G/DL — LOW (ref 11.5–15.5)
HMPV RNA SPEC QL NAA+PROBE: SIGNIFICANT CHANGE UP
HOROWITZ INDEX BLDA+IHG-RTO: 50 — SIGNIFICANT CHANGE UP
HPIV1 RNA SPEC QL NAA+PROBE: SIGNIFICANT CHANGE UP
HPIV2 RNA SPEC QL NAA+PROBE: SIGNIFICANT CHANGE UP
HPIV3 RNA SPEC QL NAA+PROBE: SIGNIFICANT CHANGE UP
HPIV4 RNA SPEC QL NAA+PROBE: SIGNIFICANT CHANGE UP
HYALINE CASTS # UR AUTO: PRESENT
IANC: 10.55 K/UL — HIGH (ref 1.8–7.4)
IANC: 10.87 K/UL — HIGH (ref 1.8–7.4)
IANC: 11.11 K/UL — HIGH (ref 1.8–7.4)
IANC: 11.3 K/UL — HIGH (ref 1.8–7.4)
IANC: 11.84 K/UL — HIGH (ref 1.8–7.4)
IANC: 11.91 K/UL — HIGH (ref 1.8–7.4)
IANC: 11.96 K/UL — HIGH (ref 1.8–7.4)
IANC: 12.14 K/UL — HIGH (ref 1.8–7.4)
IANC: 12.56 K/UL — HIGH (ref 1.8–7.4)
IANC: 13.79 K/UL — HIGH (ref 1.8–7.4)
IANC: 15.01 K/UL — HIGH (ref 1.8–7.4)
IMM GRANULOCYTES NFR BLD AUTO: 0.6 % — SIGNIFICANT CHANGE UP (ref 0–0.9)
IMM GRANULOCYTES NFR BLD AUTO: 0.7 % — SIGNIFICANT CHANGE UP (ref 0–0.9)
IMM GRANULOCYTES NFR BLD AUTO: 0.8 % — SIGNIFICANT CHANGE UP (ref 0–0.9)
IMM GRANULOCYTES NFR BLD AUTO: 1.4 % — HIGH (ref 0–0.9)
INR BLD: 1.17 RATIO — SIGNIFICANT CHANGE UP (ref 0.85–1.18)
INR BLD: 1.2 RATIO — HIGH (ref 0.85–1.18)
INR BLD: 1.21 RATIO — HIGH (ref 0.85–1.18)
INR BLD: 1.26 RATIO — HIGH (ref 0.85–1.18)
INR BLD: 1.34 RATIO — HIGH (ref 0.85–1.18)
KETONES UR-MCNC: ABNORMAL MG/DL
KETONES UR-MCNC: NEGATIVE MG/DL — SIGNIFICANT CHANGE UP
LACTATE BLDV-MCNC: 1.4 MMOL/L — SIGNIFICANT CHANGE UP (ref 0.5–2)
LACTATE BLDV-MCNC: 1.8 MMOL/L — SIGNIFICANT CHANGE UP (ref 0.5–2)
LACTATE BLDV-MCNC: 2.4 MMOL/L — HIGH (ref 0.5–2)
LACTATE SERPL-SCNC: 2 MMOL/L — SIGNIFICANT CHANGE UP (ref 0.5–2)
LACTATE SERPL-SCNC: 2.5 MMOL/L — HIGH (ref 0.5–2)
LEUKOCYTE ESTERASE UR-ACNC: ABNORMAL
LEUKOCYTE ESTERASE UR-ACNC: ABNORMAL
LYMPHOCYTES # BLD AUTO: 0 % — LOW (ref 13–44)
LYMPHOCYTES # BLD AUTO: 0 K/UL — LOW (ref 1–3.3)
LYMPHOCYTES # BLD AUTO: 0.62 K/UL — LOW (ref 1–3.3)
LYMPHOCYTES # BLD AUTO: 0.86 K/UL — LOW (ref 1–3.3)
LYMPHOCYTES # BLD AUTO: 0.87 K/UL — LOW (ref 1–3.3)
LYMPHOCYTES # BLD AUTO: 0.94 K/UL — LOW (ref 1–3.3)
LYMPHOCYTES # BLD AUTO: 0.96 K/UL — LOW (ref 1–3.3)
LYMPHOCYTES # BLD AUTO: 0.97 K/UL — LOW (ref 1–3.3)
LYMPHOCYTES # BLD AUTO: 1.01 K/UL — SIGNIFICANT CHANGE UP (ref 1–3.3)
LYMPHOCYTES # BLD AUTO: 1.02 K/UL — SIGNIFICANT CHANGE UP (ref 1–3.3)
LYMPHOCYTES # BLD AUTO: 1.07 K/UL — SIGNIFICANT CHANGE UP (ref 1–3.3)
LYMPHOCYTES # BLD AUTO: 1.49 K/UL — SIGNIFICANT CHANGE UP (ref 1–3.3)
LYMPHOCYTES # BLD AUTO: 10.4 % — LOW (ref 13–44)
LYMPHOCYTES # BLD AUTO: 4.9 % — LOW (ref 13–44)
LYMPHOCYTES # BLD AUTO: 6.4 % — LOW (ref 13–44)
LYMPHOCYTES # BLD AUTO: 6.9 % — LOW (ref 13–44)
LYMPHOCYTES # BLD AUTO: 7 % — LOW (ref 13–44)
LYMPHOCYTES # BLD AUTO: 7.1 % — LOW (ref 13–44)
LYMPHOCYTES # BLD AUTO: 7.3 % — LOW (ref 13–44)
LYMPHOCYTES # BLD AUTO: 8.2 % — LOW (ref 13–44)
M PNEUMO DNA SPEC QL NAA+PROBE: SIGNIFICANT CHANGE UP
MACROCYTES BLD QL: SLIGHT — SIGNIFICANT CHANGE UP
MAGNESIUM SERPL-MCNC: 1.4 MG/DL — LOW (ref 1.6–2.6)
MAGNESIUM SERPL-MCNC: 1.4 MG/DL — LOW (ref 1.6–2.6)
MAGNESIUM SERPL-MCNC: 1.5 MG/DL — LOW (ref 1.6–2.6)
MAGNESIUM SERPL-MCNC: 1.6 MG/DL — SIGNIFICANT CHANGE UP (ref 1.6–2.6)
MAGNESIUM SERPL-MCNC: 1.6 MG/DL — SIGNIFICANT CHANGE UP (ref 1.6–2.6)
MAGNESIUM SERPL-MCNC: 1.7 MG/DL — SIGNIFICANT CHANGE UP (ref 1.6–2.6)
MAGNESIUM SERPL-MCNC: 1.8 MG/DL — SIGNIFICANT CHANGE UP (ref 1.6–2.6)
MAGNESIUM SERPL-MCNC: 1.9 MG/DL — SIGNIFICANT CHANGE UP (ref 1.6–2.6)
MAGNESIUM SERPL-MCNC: 2 MG/DL — SIGNIFICANT CHANGE UP (ref 1.6–2.6)
MAGNESIUM SERPL-MCNC: 2.1 MG/DL — SIGNIFICANT CHANGE UP (ref 1.6–2.6)
MAGNESIUM SERPL-MCNC: 2.2 MG/DL — SIGNIFICANT CHANGE UP (ref 1.6–2.6)
MCHC RBC-ENTMCNC: 28.6 PG — SIGNIFICANT CHANGE UP (ref 27–34)
MCHC RBC-ENTMCNC: 29 PG — SIGNIFICANT CHANGE UP (ref 27–34)
MCHC RBC-ENTMCNC: 29.3 PG — SIGNIFICANT CHANGE UP (ref 27–34)
MCHC RBC-ENTMCNC: 29.4 PG — SIGNIFICANT CHANGE UP (ref 27–34)
MCHC RBC-ENTMCNC: 29.5 PG — SIGNIFICANT CHANGE UP (ref 27–34)
MCHC RBC-ENTMCNC: 29.5 PG — SIGNIFICANT CHANGE UP (ref 27–34)
MCHC RBC-ENTMCNC: 29.7 PG — SIGNIFICANT CHANGE UP (ref 27–34)
MCHC RBC-ENTMCNC: 29.7 PG — SIGNIFICANT CHANGE UP (ref 27–34)
MCHC RBC-ENTMCNC: 29.8 PG — SIGNIFICANT CHANGE UP (ref 27–34)
MCHC RBC-ENTMCNC: 30 PG — SIGNIFICANT CHANGE UP (ref 27–34)
MCHC RBC-ENTMCNC: 30.1 PG — SIGNIFICANT CHANGE UP (ref 27–34)
MCHC RBC-ENTMCNC: 30.1 PG — SIGNIFICANT CHANGE UP (ref 27–34)
MCHC RBC-ENTMCNC: 30.2 PG — SIGNIFICANT CHANGE UP (ref 27–34)
MCHC RBC-ENTMCNC: 30.3 PG — SIGNIFICANT CHANGE UP (ref 27–34)
MCHC RBC-ENTMCNC: 30.4 PG — SIGNIFICANT CHANGE UP (ref 27–34)
MCHC RBC-ENTMCNC: 30.7 PG — SIGNIFICANT CHANGE UP (ref 27–34)
MCHC RBC-ENTMCNC: 30.8 GM/DL — LOW (ref 32–36)
MCHC RBC-ENTMCNC: 31 GM/DL — LOW (ref 32–36)
MCHC RBC-ENTMCNC: 31.1 PG — SIGNIFICANT CHANGE UP (ref 27–34)
MCHC RBC-ENTMCNC: 31.2 GM/DL — LOW (ref 32–36)
MCHC RBC-ENTMCNC: 31.4 GM/DL — LOW (ref 32–36)
MCHC RBC-ENTMCNC: 31.5 PG — SIGNIFICANT CHANGE UP (ref 27–34)
MCHC RBC-ENTMCNC: 31.6 PG — SIGNIFICANT CHANGE UP (ref 27–34)
MCHC RBC-ENTMCNC: 31.7 PG — SIGNIFICANT CHANGE UP (ref 27–34)
MCHC RBC-ENTMCNC: 31.8 GM/DL — LOW (ref 32–36)
MCHC RBC-ENTMCNC: 31.8 PG — SIGNIFICANT CHANGE UP (ref 27–34)
MCHC RBC-ENTMCNC: 31.9 GM/DL — LOW (ref 32–36)
MCHC RBC-ENTMCNC: 32 PG — SIGNIFICANT CHANGE UP (ref 27–34)
MCHC RBC-ENTMCNC: 32.2 GM/DL — SIGNIFICANT CHANGE UP (ref 32–36)
MCHC RBC-ENTMCNC: 32.3 GM/DL — SIGNIFICANT CHANGE UP (ref 32–36)
MCHC RBC-ENTMCNC: 32.5 GM/DL — SIGNIFICANT CHANGE UP (ref 32–36)
MCHC RBC-ENTMCNC: 32.6 GM/DL — SIGNIFICANT CHANGE UP (ref 32–36)
MCHC RBC-ENTMCNC: 33 GM/DL — SIGNIFICANT CHANGE UP (ref 32–36)
MCHC RBC-ENTMCNC: 33.1 GM/DL — SIGNIFICANT CHANGE UP (ref 32–36)
MCHC RBC-ENTMCNC: 33.1 GM/DL — SIGNIFICANT CHANGE UP (ref 32–36)
MCHC RBC-ENTMCNC: 33.2 GM/DL — SIGNIFICANT CHANGE UP (ref 32–36)
MCHC RBC-ENTMCNC: 33.6 GM/DL — SIGNIFICANT CHANGE UP (ref 32–36)
MCHC RBC-ENTMCNC: 33.6 GM/DL — SIGNIFICANT CHANGE UP (ref 32–36)
MCHC RBC-ENTMCNC: 33.8 GM/DL — SIGNIFICANT CHANGE UP (ref 32–36)
MCHC RBC-ENTMCNC: 34 GM/DL — SIGNIFICANT CHANGE UP (ref 32–36)
MCHC RBC-ENTMCNC: 34.1 GM/DL — SIGNIFICANT CHANGE UP (ref 32–36)
MCV RBC AUTO: 100 FL — SIGNIFICANT CHANGE UP (ref 80–100)
MCV RBC AUTO: 100.5 FL — HIGH (ref 80–100)
MCV RBC AUTO: 100.8 FL — HIGH (ref 80–100)
MCV RBC AUTO: 100.9 FL — HIGH (ref 80–100)
MCV RBC AUTO: 102.7 FL — HIGH (ref 80–100)
MCV RBC AUTO: 86.8 FL — SIGNIFICANT CHANGE UP (ref 80–100)
MCV RBC AUTO: 87.1 FL — SIGNIFICANT CHANGE UP (ref 80–100)
MCV RBC AUTO: 88.5 FL — SIGNIFICANT CHANGE UP (ref 80–100)
MCV RBC AUTO: 88.5 FL — SIGNIFICANT CHANGE UP (ref 80–100)
MCV RBC AUTO: 89.6 FL — SIGNIFICANT CHANGE UP (ref 80–100)
MCV RBC AUTO: 89.7 FL — SIGNIFICANT CHANGE UP (ref 80–100)
MCV RBC AUTO: 89.7 FL — SIGNIFICANT CHANGE UP (ref 80–100)
MCV RBC AUTO: 90.1 FL — SIGNIFICANT CHANGE UP (ref 80–100)
MCV RBC AUTO: 90.3 FL — SIGNIFICANT CHANGE UP (ref 80–100)
MCV RBC AUTO: 90.3 FL — SIGNIFICANT CHANGE UP (ref 80–100)
MCV RBC AUTO: 90.5 FL — SIGNIFICANT CHANGE UP (ref 80–100)
MCV RBC AUTO: 90.6 FL — SIGNIFICANT CHANGE UP (ref 80–100)
MCV RBC AUTO: 91.4 FL — SIGNIFICANT CHANGE UP (ref 80–100)
MCV RBC AUTO: 91.9 FL — SIGNIFICANT CHANGE UP (ref 80–100)
MCV RBC AUTO: 93.1 FL — SIGNIFICANT CHANGE UP (ref 80–100)
MCV RBC AUTO: 93.3 FL — SIGNIFICANT CHANGE UP (ref 80–100)
MCV RBC AUTO: 93.6 FL — SIGNIFICANT CHANGE UP (ref 80–100)
MCV RBC AUTO: 94.4 FL — SIGNIFICANT CHANGE UP (ref 80–100)
MCV RBC AUTO: 96.6 FL — SIGNIFICANT CHANGE UP (ref 80–100)
MONOCYTES # BLD AUTO: 0.12 K/UL — SIGNIFICANT CHANGE UP (ref 0–0.9)
MONOCYTES # BLD AUTO: 0.14 K/UL — SIGNIFICANT CHANGE UP (ref 0–0.9)
MONOCYTES # BLD AUTO: 0.44 K/UL — SIGNIFICANT CHANGE UP (ref 0–0.9)
MONOCYTES # BLD AUTO: 0.56 K/UL — SIGNIFICANT CHANGE UP (ref 0–0.9)
MONOCYTES # BLD AUTO: 0.57 K/UL — SIGNIFICANT CHANGE UP (ref 0–0.9)
MONOCYTES # BLD AUTO: 0.58 K/UL — SIGNIFICANT CHANGE UP (ref 0–0.9)
MONOCYTES # BLD AUTO: 0.65 K/UL — SIGNIFICANT CHANGE UP (ref 0–0.9)
MONOCYTES # BLD AUTO: 0.7 K/UL — SIGNIFICANT CHANGE UP (ref 0–0.9)
MONOCYTES # BLD AUTO: 0.75 K/UL — SIGNIFICANT CHANGE UP (ref 0–0.9)
MONOCYTES # BLD AUTO: 0.76 K/UL — SIGNIFICANT CHANGE UP (ref 0–0.9)
MONOCYTES # BLD AUTO: 0.81 K/UL — SIGNIFICANT CHANGE UP (ref 0–0.9)
MONOCYTES NFR BLD AUTO: 0.9 % — LOW (ref 2–14)
MONOCYTES NFR BLD AUTO: 1.1 % — LOW (ref 2–14)
MONOCYTES NFR BLD AUTO: 3.3 % — SIGNIFICANT CHANGE UP (ref 2–14)
MONOCYTES NFR BLD AUTO: 3.6 % — SIGNIFICANT CHANGE UP (ref 2–14)
MONOCYTES NFR BLD AUTO: 4 % — SIGNIFICANT CHANGE UP (ref 2–14)
MONOCYTES NFR BLD AUTO: 4.3 % — SIGNIFICANT CHANGE UP (ref 2–14)
MONOCYTES NFR BLD AUTO: 4.7 % — SIGNIFICANT CHANGE UP (ref 2–14)
MONOCYTES NFR BLD AUTO: 4.7 % — SIGNIFICANT CHANGE UP (ref 2–14)
MONOCYTES NFR BLD AUTO: 5.4 % — SIGNIFICANT CHANGE UP (ref 2–14)
MONOCYTES NFR BLD AUTO: 5.5 % — SIGNIFICANT CHANGE UP (ref 2–14)
MONOCYTES NFR BLD AUTO: 6.5 % — SIGNIFICANT CHANGE UP (ref 2–14)
MRSA PCR RESULT.: DETECTED
NEUTROPHILS # BLD AUTO: 10.55 K/UL — HIGH (ref 1.8–7.4)
NEUTROPHILS # BLD AUTO: 10.87 K/UL — HIGH (ref 1.8–7.4)
NEUTROPHILS # BLD AUTO: 11.11 K/UL — HIGH (ref 1.8–7.4)
NEUTROPHILS # BLD AUTO: 11.3 K/UL — HIGH (ref 1.8–7.4)
NEUTROPHILS # BLD AUTO: 11.84 K/UL — HIGH (ref 1.8–7.4)
NEUTROPHILS # BLD AUTO: 11.91 K/UL — HIGH (ref 1.8–7.4)
NEUTROPHILS # BLD AUTO: 11.96 K/UL — HIGH (ref 1.8–7.4)
NEUTROPHILS # BLD AUTO: 12.14 K/UL — HIGH (ref 1.8–7.4)
NEUTROPHILS # BLD AUTO: 13.44 K/UL — HIGH (ref 1.8–7.4)
NEUTROPHILS # BLD AUTO: 13.79 K/UL — HIGH (ref 1.8–7.4)
NEUTROPHILS # BLD AUTO: 15.01 K/UL — HIGH (ref 1.8–7.4)
NEUTROPHILS NFR BLD AUTO: 84.5 % — HIGH (ref 43–77)
NEUTROPHILS NFR BLD AUTO: 85.9 % — HIGH (ref 43–77)
NEUTROPHILS NFR BLD AUTO: 86.2 % — HIGH (ref 43–77)
NEUTROPHILS NFR BLD AUTO: 86.6 % — HIGH (ref 43–77)
NEUTROPHILS NFR BLD AUTO: 86.7 % — HIGH (ref 43–77)
NEUTROPHILS NFR BLD AUTO: 87.2 % — HIGH (ref 43–77)
NEUTROPHILS NFR BLD AUTO: 87.3 % — HIGH (ref 43–77)
NEUTROPHILS NFR BLD AUTO: 88.3 % — HIGH (ref 43–77)
NEUTROPHILS NFR BLD AUTO: 89 % — HIGH (ref 43–77)
NEUTROPHILS NFR BLD AUTO: 89.9 % — HIGH (ref 43–77)
NEUTROPHILS NFR BLD AUTO: 98.2 % — HIGH (ref 43–77)
NITRITE UR-MCNC: NEGATIVE — SIGNIFICANT CHANGE UP
NITRITE UR-MCNC: NEGATIVE — SIGNIFICANT CHANGE UP
NRBC # BLD: 0 /100 WBCS — SIGNIFICANT CHANGE UP (ref 0–0)
NRBC # BLD: 1 /100 WBCS — HIGH (ref 0–0)
NRBC # BLD: 3 /100 WBCS — HIGH (ref 0–0)
NRBC # FLD: 0 K/UL — SIGNIFICANT CHANGE UP (ref 0–0)
NRBC # FLD: 0.02 K/UL — HIGH (ref 0–0)
NRBC # FLD: 0.02 K/UL — HIGH (ref 0–0)
NRBC # FLD: 0.03 K/UL — HIGH (ref 0–0)
NRBC # FLD: 0.04 K/UL — HIGH (ref 0–0)
NRBC # FLD: 0.05 K/UL — HIGH (ref 0–0)
NRBC # FLD: 0.05 K/UL — HIGH (ref 0–0)
NRBC # FLD: 0.06 K/UL — HIGH (ref 0–0)
NRBC # FLD: 0.07 K/UL — HIGH (ref 0–0)
NRBC # FLD: 0.09 K/UL — HIGH (ref 0–0)
NRBC # FLD: 0.1 K/UL — HIGH (ref 0–0)
NRBC # FLD: 0.12 K/UL — HIGH (ref 0–0)
NRBC # FLD: 0.16 K/UL — HIGH (ref 0–0)
OVALOCYTES BLD QL SMEAR: SLIGHT — SIGNIFICANT CHANGE UP
PCO2 BLDA: 22 MMHG — LOW (ref 32–45)
PCO2 BLDV: 21 MMHG — LOW (ref 39–52)
PCO2 BLDV: 28 MMHG — LOW (ref 39–52)
PCO2 BLDV: 39 MMHG — SIGNIFICANT CHANGE UP (ref 39–52)
PF4 HEPARIN CMPLX AB SER-ACNC: NEGATIVE — SIGNIFICANT CHANGE UP
PH BLDA: 7.37 — SIGNIFICANT CHANGE UP (ref 7.35–7.45)
PH BLDV: 7.21 — LOW (ref 7.32–7.43)
PH BLDV: 7.28 — LOW (ref 7.32–7.43)
PH BLDV: 7.34 — SIGNIFICANT CHANGE UP (ref 7.32–7.43)
PH UR: 5.5 — SIGNIFICANT CHANGE UP (ref 5–8)
PH UR: 6.5 — SIGNIFICANT CHANGE UP (ref 5–8)
PHOSPHATE SERPL-MCNC: 2.2 MG/DL — LOW (ref 2.5–4.5)
PHOSPHATE SERPL-MCNC: 2.4 MG/DL — LOW (ref 2.5–4.5)
PHOSPHATE SERPL-MCNC: 2.5 MG/DL — SIGNIFICANT CHANGE UP (ref 2.5–4.5)
PHOSPHATE SERPL-MCNC: 2.6 MG/DL — SIGNIFICANT CHANGE UP (ref 2.5–4.5)
PHOSPHATE SERPL-MCNC: 2.7 MG/DL — SIGNIFICANT CHANGE UP (ref 2.5–4.5)
PHOSPHATE SERPL-MCNC: 2.7 MG/DL — SIGNIFICANT CHANGE UP (ref 2.5–4.5)
PHOSPHATE SERPL-MCNC: 2.8 MG/DL — SIGNIFICANT CHANGE UP (ref 2.5–4.5)
PHOSPHATE SERPL-MCNC: 3.2 MG/DL — SIGNIFICANT CHANGE UP (ref 2.5–4.5)
PHOSPHATE SERPL-MCNC: 3.3 MG/DL — SIGNIFICANT CHANGE UP (ref 2.5–4.5)
PHOSPHATE SERPL-MCNC: 3.5 MG/DL — SIGNIFICANT CHANGE UP (ref 2.5–4.5)
PHOSPHATE SERPL-MCNC: 3.6 MG/DL — SIGNIFICANT CHANGE UP (ref 2.5–4.5)
PHOSPHATE SERPL-MCNC: 3.8 MG/DL — SIGNIFICANT CHANGE UP (ref 2.5–4.5)
PHOSPHATE SERPL-MCNC: 3.8 MG/DL — SIGNIFICANT CHANGE UP (ref 2.5–4.5)
PHOSPHATE SERPL-MCNC: 4.1 MG/DL — SIGNIFICANT CHANGE UP (ref 2.5–4.5)
PHOSPHATE SERPL-MCNC: 4.3 MG/DL — SIGNIFICANT CHANGE UP (ref 2.5–4.5)
PHOSPHATE SERPL-MCNC: 4.4 MG/DL — SIGNIFICANT CHANGE UP (ref 2.5–4.5)
PHOSPHATE SERPL-MCNC: 4.4 MG/DL — SIGNIFICANT CHANGE UP (ref 2.5–4.5)
PHOSPHATE SERPL-MCNC: 4.5 MG/DL — SIGNIFICANT CHANGE UP (ref 2.5–4.5)
PHOSPHATE SERPL-MCNC: 4.6 MG/DL — HIGH (ref 2.5–4.5)
PHOSPHATE SERPL-MCNC: 4.6 MG/DL — HIGH (ref 2.5–4.5)
PHOSPHATE SERPL-MCNC: 4.9 MG/DL — HIGH (ref 2.5–4.5)
PHOSPHATE SERPL-MCNC: 5.4 MG/DL — HIGH (ref 2.5–4.5)
PLAT MORPH BLD: NORMAL — SIGNIFICANT CHANGE UP
PLATELET # BLD AUTO: 104 K/UL — LOW (ref 150–400)
PLATELET # BLD AUTO: 108 K/UL — LOW (ref 150–400)
PLATELET # BLD AUTO: 110 K/UL — LOW (ref 150–400)
PLATELET # BLD AUTO: 111 K/UL — LOW (ref 150–400)
PLATELET # BLD AUTO: 125 K/UL — LOW (ref 150–400)
PLATELET # BLD AUTO: 125 K/UL — LOW (ref 150–400)
PLATELET # BLD AUTO: 126 K/UL — LOW (ref 150–400)
PLATELET # BLD AUTO: 127 K/UL — LOW (ref 150–400)
PLATELET # BLD AUTO: 128 K/UL — LOW (ref 150–400)
PLATELET # BLD AUTO: 131 K/UL — LOW (ref 150–400)
PLATELET # BLD AUTO: 132 K/UL — LOW (ref 150–400)
PLATELET # BLD AUTO: 133 K/UL — LOW (ref 150–400)
PLATELET # BLD AUTO: 140 K/UL — LOW (ref 150–400)
PLATELET # BLD AUTO: 140 K/UL — LOW (ref 150–400)
PLATELET # BLD AUTO: 146 K/UL — LOW (ref 150–400)
PLATELET # BLD AUTO: 149 K/UL — LOW (ref 150–400)
PLATELET # BLD AUTO: 153 K/UL — SIGNIFICANT CHANGE UP (ref 150–400)
PLATELET # BLD AUTO: 154 K/UL — SIGNIFICANT CHANGE UP (ref 150–400)
PLATELET # BLD AUTO: 159 K/UL — SIGNIFICANT CHANGE UP (ref 150–400)
PLATELET # BLD AUTO: 165 K/UL — SIGNIFICANT CHANGE UP (ref 150–400)
PLATELET # BLD AUTO: 170 K/UL — SIGNIFICANT CHANGE UP (ref 150–400)
PLATELET # BLD AUTO: 170 K/UL — SIGNIFICANT CHANGE UP (ref 150–400)
PLATELET # BLD AUTO: 183 K/UL — SIGNIFICANT CHANGE UP (ref 150–400)
PLATELET # BLD AUTO: 92 K/UL — LOW (ref 150–400)
PLATELET COUNT - ESTIMATE: NORMAL — SIGNIFICANT CHANGE UP
PO2 BLDA: 211 MMHG — HIGH (ref 83–108)
PO2 BLDV: 157 MMHG — HIGH (ref 25–45)
PO2 BLDV: 45 MMHG — SIGNIFICANT CHANGE UP (ref 25–45)
PO2 BLDV: 74 MMHG — HIGH (ref 25–45)
POIKILOCYTOSIS BLD QL AUTO: SIGNIFICANT CHANGE UP
POLYCHROMASIA BLD QL SMEAR: SLIGHT — SIGNIFICANT CHANGE UP
POTASSIUM BLDV-SCNC: 3.9 MMOL/L — SIGNIFICANT CHANGE UP (ref 3.5–5.1)
POTASSIUM BLDV-SCNC: 4.1 MMOL/L — SIGNIFICANT CHANGE UP (ref 3.5–5.1)
POTASSIUM BLDV-SCNC: 5.4 MMOL/L — HIGH (ref 3.5–5.1)
POTASSIUM SERPL-MCNC: 3 MMOL/L — LOW (ref 3.5–5.3)
POTASSIUM SERPL-MCNC: 3.1 MMOL/L — LOW (ref 3.5–5.3)
POTASSIUM SERPL-MCNC: 3.3 MMOL/L — LOW (ref 3.5–5.3)
POTASSIUM SERPL-MCNC: 3.4 MMOL/L — LOW (ref 3.5–5.3)
POTASSIUM SERPL-MCNC: 3.5 MMOL/L — SIGNIFICANT CHANGE UP (ref 3.5–5.3)
POTASSIUM SERPL-MCNC: 3.5 MMOL/L — SIGNIFICANT CHANGE UP (ref 3.5–5.3)
POTASSIUM SERPL-MCNC: 3.7 MMOL/L — SIGNIFICANT CHANGE UP (ref 3.5–5.3)
POTASSIUM SERPL-MCNC: 3.7 MMOL/L — SIGNIFICANT CHANGE UP (ref 3.5–5.3)
POTASSIUM SERPL-MCNC: 3.9 MMOL/L — SIGNIFICANT CHANGE UP (ref 3.5–5.3)
POTASSIUM SERPL-MCNC: 4 MMOL/L — SIGNIFICANT CHANGE UP (ref 3.5–5.3)
POTASSIUM SERPL-MCNC: 4.1 MMOL/L — SIGNIFICANT CHANGE UP (ref 3.5–5.3)
POTASSIUM SERPL-MCNC: 4.1 MMOL/L — SIGNIFICANT CHANGE UP (ref 3.5–5.3)
POTASSIUM SERPL-MCNC: 4.3 MMOL/L — SIGNIFICANT CHANGE UP (ref 3.5–5.3)
POTASSIUM SERPL-MCNC: 4.5 MMOL/L — SIGNIFICANT CHANGE UP (ref 3.5–5.3)
POTASSIUM SERPL-MCNC: 4.7 MMOL/L — SIGNIFICANT CHANGE UP (ref 3.5–5.3)
POTASSIUM SERPL-MCNC: 4.8 MMOL/L — SIGNIFICANT CHANGE UP (ref 3.5–5.3)
POTASSIUM SERPL-MCNC: 4.9 MMOL/L — SIGNIFICANT CHANGE UP (ref 3.5–5.3)
POTASSIUM SERPL-MCNC: 4.9 MMOL/L — SIGNIFICANT CHANGE UP (ref 3.5–5.3)
POTASSIUM SERPL-MCNC: 5.1 MMOL/L — SIGNIFICANT CHANGE UP (ref 3.5–5.3)
POTASSIUM SERPL-MCNC: 5.2 MMOL/L — SIGNIFICANT CHANGE UP (ref 3.5–5.3)
POTASSIUM SERPL-MCNC: 5.2 MMOL/L — SIGNIFICANT CHANGE UP (ref 3.5–5.3)
POTASSIUM SERPL-MCNC: 5.4 MMOL/L — HIGH (ref 3.5–5.3)
POTASSIUM SERPL-SCNC: 3 MMOL/L — LOW (ref 3.5–5.3)
POTASSIUM SERPL-SCNC: 3.1 MMOL/L — LOW (ref 3.5–5.3)
POTASSIUM SERPL-SCNC: 3.3 MMOL/L — LOW (ref 3.5–5.3)
POTASSIUM SERPL-SCNC: 3.4 MMOL/L — LOW (ref 3.5–5.3)
POTASSIUM SERPL-SCNC: 3.5 MMOL/L — SIGNIFICANT CHANGE UP (ref 3.5–5.3)
POTASSIUM SERPL-SCNC: 3.5 MMOL/L — SIGNIFICANT CHANGE UP (ref 3.5–5.3)
POTASSIUM SERPL-SCNC: 3.7 MMOL/L — SIGNIFICANT CHANGE UP (ref 3.5–5.3)
POTASSIUM SERPL-SCNC: 3.7 MMOL/L — SIGNIFICANT CHANGE UP (ref 3.5–5.3)
POTASSIUM SERPL-SCNC: 3.9 MMOL/L — SIGNIFICANT CHANGE UP (ref 3.5–5.3)
POTASSIUM SERPL-SCNC: 4 MMOL/L — SIGNIFICANT CHANGE UP (ref 3.5–5.3)
POTASSIUM SERPL-SCNC: 4.1 MMOL/L — SIGNIFICANT CHANGE UP (ref 3.5–5.3)
POTASSIUM SERPL-SCNC: 4.1 MMOL/L — SIGNIFICANT CHANGE UP (ref 3.5–5.3)
POTASSIUM SERPL-SCNC: 4.3 MMOL/L — SIGNIFICANT CHANGE UP (ref 3.5–5.3)
POTASSIUM SERPL-SCNC: 4.5 MMOL/L — SIGNIFICANT CHANGE UP (ref 3.5–5.3)
POTASSIUM SERPL-SCNC: 4.7 MMOL/L — SIGNIFICANT CHANGE UP (ref 3.5–5.3)
POTASSIUM SERPL-SCNC: 4.8 MMOL/L — SIGNIFICANT CHANGE UP (ref 3.5–5.3)
POTASSIUM SERPL-SCNC: 4.9 MMOL/L — SIGNIFICANT CHANGE UP (ref 3.5–5.3)
POTASSIUM SERPL-SCNC: 4.9 MMOL/L — SIGNIFICANT CHANGE UP (ref 3.5–5.3)
POTASSIUM SERPL-SCNC: 5.1 MMOL/L — SIGNIFICANT CHANGE UP (ref 3.5–5.3)
POTASSIUM SERPL-SCNC: 5.2 MMOL/L — SIGNIFICANT CHANGE UP (ref 3.5–5.3)
POTASSIUM SERPL-SCNC: 5.2 MMOL/L — SIGNIFICANT CHANGE UP (ref 3.5–5.3)
POTASSIUM SERPL-SCNC: 5.4 MMOL/L — HIGH (ref 3.5–5.3)
PROCALCITONIN SERPL-MCNC: 2.1 NG/ML — HIGH (ref 0.02–0.1)
PROT SERPL-MCNC: 4.6 G/DL — LOW (ref 6–8.3)
PROT SERPL-MCNC: 4.6 G/DL — LOW (ref 6–8.3)
PROT SERPL-MCNC: 4.8 G/DL — LOW (ref 6–8.3)
PROT SERPL-MCNC: 5 G/DL — LOW (ref 6–8.3)
PROT SERPL-MCNC: 5.1 G/DL — LOW (ref 6–8.3)
PROT SERPL-MCNC: 5.2 G/DL — LOW (ref 6–8.3)
PROT SERPL-MCNC: 5.2 G/DL — LOW (ref 6–8.3)
PROT SERPL-MCNC: 5.3 G/DL — LOW (ref 6–8.3)
PROT SERPL-MCNC: 5.8 G/DL — LOW (ref 6–8.3)
PROT SERPL-MCNC: 6.1 G/DL — SIGNIFICANT CHANGE UP (ref 6–8.3)
PROT SERPL-MCNC: 6.7 G/DL — SIGNIFICANT CHANGE UP (ref 6–8.3)
PROT UR-MCNC: 30 MG/DL
PROT UR-MCNC: 300 MG/DL
PROTHROM AB SERPL-ACNC: 13.2 SEC — HIGH (ref 9.5–13)
PROTHROM AB SERPL-ACNC: 13.4 SEC — HIGH (ref 9.5–13)
PROTHROM AB SERPL-ACNC: 13.6 SEC — HIGH (ref 9.5–13)
PROTHROM AB SERPL-ACNC: 14 SEC — HIGH (ref 9.5–13)
PROTHROM AB SERPL-ACNC: 14.9 SEC — HIGH (ref 9.5–13)
RAPID RVP RESULT: SIGNIFICANT CHANGE UP
RBC # BLD: 2.03 M/UL — LOW (ref 3.8–5.2)
RBC # BLD: 2.09 M/UL — LOW (ref 3.8–5.2)
RBC # BLD: 2.11 M/UL — LOW (ref 3.8–5.2)
RBC # BLD: 2.2 M/UL — LOW (ref 3.8–5.2)
RBC # BLD: 2.34 M/UL — LOW (ref 3.8–5.2)
RBC # BLD: 2.35 M/UL — LOW (ref 3.8–5.2)
RBC # BLD: 2.47 M/UL — LOW (ref 3.8–5.2)
RBC # BLD: 2.5 M/UL — LOW (ref 3.8–5.2)
RBC # BLD: 2.52 M/UL — LOW (ref 3.8–5.2)
RBC # BLD: 2.52 M/UL — LOW (ref 3.8–5.2)
RBC # BLD: 2.56 M/UL — LOW (ref 3.8–5.2)
RBC # BLD: 2.58 M/UL — LOW (ref 3.8–5.2)
RBC # BLD: 2.58 M/UL — LOW (ref 3.8–5.2)
RBC # BLD: 2.59 M/UL — LOW (ref 3.8–5.2)
RBC # BLD: 2.62 M/UL — LOW (ref 3.8–5.2)
RBC # BLD: 2.63 M/UL — LOW (ref 3.8–5.2)
RBC # BLD: 2.7 M/UL — LOW (ref 3.8–5.2)
RBC # BLD: 2.8 M/UL — LOW (ref 3.8–5.2)
RBC # BLD: 2.81 M/UL — LOW (ref 3.8–5.2)
RBC # BLD: 2.88 M/UL — LOW (ref 3.8–5.2)
RBC # BLD: 2.94 M/UL — LOW (ref 3.8–5.2)
RBC # BLD: 2.99 M/UL — LOW (ref 3.8–5.2)
RBC # BLD: 3.04 M/UL — LOW (ref 3.8–5.2)
RBC # BLD: 3.4 M/UL — LOW (ref 3.8–5.2)
RBC # FLD: 14 % — SIGNIFICANT CHANGE UP (ref 10.3–14.5)
RBC # FLD: 14.1 % — SIGNIFICANT CHANGE UP (ref 10.3–14.5)
RBC # FLD: 14.1 % — SIGNIFICANT CHANGE UP (ref 10.3–14.5)
RBC # FLD: 14.4 % — SIGNIFICANT CHANGE UP (ref 10.3–14.5)
RBC # FLD: 14.5 % — SIGNIFICANT CHANGE UP (ref 10.3–14.5)
RBC # FLD: 14.5 % — SIGNIFICANT CHANGE UP (ref 10.3–14.5)
RBC # FLD: 15.9 % — HIGH (ref 10.3–14.5)
RBC # FLD: 16.1 % — HIGH (ref 10.3–14.5)
RBC # FLD: 16.2 % — HIGH (ref 10.3–14.5)
RBC # FLD: 16.3 % — HIGH (ref 10.3–14.5)
RBC # FLD: 16.4 % — HIGH (ref 10.3–14.5)
RBC # FLD: 16.4 % — HIGH (ref 10.3–14.5)
RBC # FLD: 16.5 % — HIGH (ref 10.3–14.5)
RBC # FLD: 16.6 % — HIGH (ref 10.3–14.5)
RBC # FLD: 16.6 % — HIGH (ref 10.3–14.5)
RBC # FLD: 16.7 % — HIGH (ref 10.3–14.5)
RBC # FLD: 16.8 % — HIGH (ref 10.3–14.5)
RBC # FLD: 17 % — HIGH (ref 10.3–14.5)
RBC BLD AUTO: ABNORMAL
RBC CASTS # UR COMP ASSIST: 331 /HPF — HIGH (ref 0–4)
REVIEW: SIGNIFICANT CHANGE UP
RH IG SCN BLD-IMP: NEGATIVE — SIGNIFICANT CHANGE UP
RSV RNA SPEC QL NAA+PROBE: SIGNIFICANT CHANGE UP
RV+EV RNA SPEC QL NAA+PROBE: SIGNIFICANT CHANGE UP
S AUREUS DNA NOSE QL NAA+PROBE: DETECTED
SAO2 % BLDA: 98.6 % — HIGH (ref 94–98)
SAO2 % BLDV: 69 % — SIGNIFICANT CHANGE UP (ref 67–88)
SAO2 % BLDV: 94.4 % — HIGH (ref 67–88)
SAO2 % BLDV: 97.6 % — HIGH (ref 67–88)
SARS-COV-2 RNA SPEC QL NAA+PROBE: SIGNIFICANT CHANGE UP
SODIUM SERPL-SCNC: 133 MMOL/L — LOW (ref 135–145)
SODIUM SERPL-SCNC: 134 MMOL/L — LOW (ref 135–145)
SODIUM SERPL-SCNC: 135 MMOL/L — SIGNIFICANT CHANGE UP (ref 135–145)
SODIUM SERPL-SCNC: 137 MMOL/L — SIGNIFICANT CHANGE UP (ref 135–145)
SODIUM SERPL-SCNC: 138 MMOL/L — SIGNIFICANT CHANGE UP (ref 135–145)
SODIUM SERPL-SCNC: 140 MMOL/L — SIGNIFICANT CHANGE UP (ref 135–145)
SODIUM SERPL-SCNC: 141 MMOL/L — SIGNIFICANT CHANGE UP (ref 135–145)
SODIUM SERPL-SCNC: 141 MMOL/L — SIGNIFICANT CHANGE UP (ref 135–145)
SODIUM SERPL-SCNC: 142 MMOL/L — SIGNIFICANT CHANGE UP (ref 135–145)
SODIUM SERPL-SCNC: 142 MMOL/L — SIGNIFICANT CHANGE UP (ref 135–145)
SODIUM SERPL-SCNC: 143 MMOL/L — SIGNIFICANT CHANGE UP (ref 135–145)
SODIUM SERPL-SCNC: 143 MMOL/L — SIGNIFICANT CHANGE UP (ref 135–145)
SODIUM SERPL-SCNC: 144 MMOL/L — SIGNIFICANT CHANGE UP (ref 135–145)
SODIUM SERPL-SCNC: 144 MMOL/L — SIGNIFICANT CHANGE UP (ref 135–145)
SODIUM SERPL-SCNC: 149 MMOL/L — HIGH (ref 135–145)
SODIUM SERPL-SCNC: 153 MMOL/L — HIGH (ref 135–145)
SODIUM SERPL-SCNC: 154 MMOL/L — HIGH (ref 135–145)
SODIUM SERPL-SCNC: 154 MMOL/L — HIGH (ref 135–145)
SODIUM SERPL-SCNC: 159 MMOL/L — HIGH (ref 135–145)
SODIUM SERPL-SCNC: 160 MMOL/L — CRITICAL HIGH (ref 135–145)
SODIUM SERPL-SCNC: 163 MMOL/L — CRITICAL HIGH (ref 135–145)
SP GR SPEC: 1.01 — SIGNIFICANT CHANGE UP (ref 1–1.03)
SP GR SPEC: 1.02 — SIGNIFICANT CHANGE UP (ref 1–1.03)
SPECIMEN SOURCE: SIGNIFICANT CHANGE UP
SQUAMOUS # UR AUTO: 14 /HPF — HIGH (ref 0–5)
UNFRACTIONATED HEPARIN INTERPRETATION: SIGNIFICANT CHANGE UP
UNFRACTIONATED HEPARIN RESULT: NEGATIVE — SIGNIFICANT CHANGE UP
UNFRACTIONATED HEPARIN-HIGH DOSE: 0 % — SIGNIFICANT CHANGE UP
UNFRACTIONATED HEPARIN-LOW DOSE: 0 % — SIGNIFICANT CHANGE UP
UROBILINOGEN FLD QL: 0.2 MG/DL — SIGNIFICANT CHANGE UP (ref 0.2–1)
UROBILINOGEN FLD QL: 0.2 MG/DL — SIGNIFICANT CHANGE UP (ref 0.2–1)
VANCOMYCIN FLD-MCNC: 32.3 UG/ML
WBC # BLD: 12.25 K/UL — HIGH (ref 3.8–10.5)
WBC # BLD: 12.54 K/UL — HIGH (ref 3.8–10.5)
WBC # BLD: 12.57 K/UL — HIGH (ref 3.8–10.5)
WBC # BLD: 12.58 K/UL — HIGH (ref 3.8–10.5)
WBC # BLD: 12.94 K/UL — HIGH (ref 3.8–10.5)
WBC # BLD: 13.36 K/UL — HIGH (ref 3.8–10.5)
WBC # BLD: 13.39 K/UL — HIGH (ref 3.8–10.5)
WBC # BLD: 13.41 K/UL — HIGH (ref 3.8–10.5)
WBC # BLD: 13.69 K/UL — HIGH (ref 3.8–10.5)
WBC # BLD: 13.7 K/UL — HIGH (ref 3.8–10.5)
WBC # BLD: 13.73 K/UL — HIGH (ref 3.8–10.5)
WBC # BLD: 13.76 K/UL — HIGH (ref 3.8–10.5)
WBC # BLD: 14.37 K/UL — HIGH (ref 3.8–10.5)
WBC # BLD: 14.94 K/UL — HIGH (ref 3.8–10.5)
WBC # BLD: 15.31 K/UL — HIGH (ref 3.8–10.5)
WBC # BLD: 15.5 K/UL — HIGH (ref 3.8–10.5)
WBC # BLD: 15.82 K/UL — HIGH (ref 3.8–10.5)
WBC # BLD: 15.89 K/UL — HIGH (ref 3.8–10.5)
WBC # BLD: 16.36 K/UL — HIGH (ref 3.8–10.5)
WBC # BLD: 16.68 K/UL — HIGH (ref 3.8–10.5)
WBC # BLD: 16.79 K/UL — HIGH (ref 3.8–10.5)
WBC # BLD: 16.85 K/UL — HIGH (ref 3.8–10.5)
WBC # BLD: 17.11 K/UL — HIGH (ref 3.8–10.5)
WBC # BLD: 21.02 K/UL — HIGH (ref 3.8–10.5)
WBC # FLD AUTO: 12.25 K/UL — HIGH (ref 3.8–10.5)
WBC # FLD AUTO: 12.54 K/UL — HIGH (ref 3.8–10.5)
WBC # FLD AUTO: 12.57 K/UL — HIGH (ref 3.8–10.5)
WBC # FLD AUTO: 12.58 K/UL — HIGH (ref 3.8–10.5)
WBC # FLD AUTO: 12.94 K/UL — HIGH (ref 3.8–10.5)
WBC # FLD AUTO: 13.36 K/UL — HIGH (ref 3.8–10.5)
WBC # FLD AUTO: 13.39 K/UL — HIGH (ref 3.8–10.5)
WBC # FLD AUTO: 13.41 K/UL — HIGH (ref 3.8–10.5)
WBC # FLD AUTO: 13.69 K/UL — HIGH (ref 3.8–10.5)
WBC # FLD AUTO: 13.7 K/UL — HIGH (ref 3.8–10.5)
WBC # FLD AUTO: 13.73 K/UL — HIGH (ref 3.8–10.5)
WBC # FLD AUTO: 13.76 K/UL — HIGH (ref 3.8–10.5)
WBC # FLD AUTO: 14.37 K/UL — HIGH (ref 3.8–10.5)
WBC # FLD AUTO: 14.94 K/UL — HIGH (ref 3.8–10.5)
WBC # FLD AUTO: 15.31 K/UL — HIGH (ref 3.8–10.5)
WBC # FLD AUTO: 15.5 K/UL — HIGH (ref 3.8–10.5)
WBC # FLD AUTO: 15.82 K/UL — HIGH (ref 3.8–10.5)
WBC # FLD AUTO: 15.89 K/UL — HIGH (ref 3.8–10.5)
WBC # FLD AUTO: 16.36 K/UL — HIGH (ref 3.8–10.5)
WBC # FLD AUTO: 16.68 K/UL — HIGH (ref 3.8–10.5)
WBC # FLD AUTO: 16.79 K/UL — HIGH (ref 3.8–10.5)
WBC # FLD AUTO: 16.85 K/UL — HIGH (ref 3.8–10.5)
WBC # FLD AUTO: 17.11 K/UL — HIGH (ref 3.8–10.5)
WBC # FLD AUTO: 21.02 K/UL — HIGH (ref 3.8–10.5)
WBC UR QL: 2257 /HPF — HIGH (ref 0–5)
YEAST-LIKE CELLS: PRESENT

## 2024-01-01 PROCEDURE — 99232 SBSQ HOSP IP/OBS MODERATE 35: CPT

## 2024-01-01 PROCEDURE — 99223 1ST HOSP IP/OBS HIGH 75: CPT

## 2024-01-01 PROCEDURE — 71045 X-RAY EXAM CHEST 1 VIEW: CPT | Mod: 26

## 2024-01-01 PROCEDURE — 73620 X-RAY EXAM OF FOOT: CPT | Mod: 26,LT

## 2024-01-01 PROCEDURE — 93010 ELECTROCARDIOGRAM REPORT: CPT

## 2024-01-01 PROCEDURE — 99232 SBSQ HOSP IP/OBS MODERATE 35: CPT | Mod: GC

## 2024-01-01 PROCEDURE — 99233 SBSQ HOSP IP/OBS HIGH 50: CPT | Mod: GC

## 2024-01-01 PROCEDURE — 99497 ADVNCD CARE PLAN 30 MIN: CPT | Mod: 25

## 2024-01-01 PROCEDURE — 99233 SBSQ HOSP IP/OBS HIGH 50: CPT

## 2024-01-01 PROCEDURE — 99498 ADVNCD CARE PLAN ADDL 30 MIN: CPT

## 2024-01-01 PROCEDURE — 99222 1ST HOSP IP/OBS MODERATE 55: CPT

## 2024-01-01 PROCEDURE — 99233 SBSQ HOSP IP/OBS HIGH 50: CPT | Mod: 25

## 2024-01-01 PROCEDURE — 99223 1ST HOSP IP/OBS HIGH 75: CPT | Mod: GC

## 2024-01-01 PROCEDURE — 99291 CRITICAL CARE FIRST HOUR: CPT | Mod: GC

## 2024-01-01 PROCEDURE — 99285 EMERGENCY DEPT VISIT HI MDM: CPT

## 2024-01-01 PROCEDURE — 99221 1ST HOSP IP/OBS SF/LOW 40: CPT

## 2024-01-01 PROCEDURE — 99239 HOSP IP/OBS DSCHRG MGMT >30: CPT

## 2024-01-01 PROCEDURE — 93970 EXTREMITY STUDY: CPT | Mod: 26

## 2024-01-01 PROCEDURE — 93306 TTE W/DOPPLER COMPLETE: CPT | Mod: 26

## 2024-01-01 PROCEDURE — 99497 ADVNCD CARE PLAN 30 MIN: CPT

## 2024-01-01 PROCEDURE — 73610 X-RAY EXAM OF ANKLE: CPT | Mod: 26,LT

## 2024-01-01 RX ORDER — CEFEPIME 1 G/1
1000 INJECTION, POWDER, FOR SOLUTION INTRAMUSCULAR; INTRAVENOUS EVERY 24 HOURS
Refills: 0 | Status: DISCONTINUED | OUTPATIENT
Start: 2024-01-01 | End: 2024-01-01

## 2024-01-01 RX ORDER — PROPOFOL 10 MG/ML
5 INJECTION, EMULSION INTRAVENOUS
Qty: 1000 | Refills: 0 | Status: DISCONTINUED | OUTPATIENT
Start: 2024-01-01 | End: 2024-01-01

## 2024-01-01 RX ORDER — PANTOPRAZOLE SODIUM 20 MG/1
40 TABLET, DELAYED RELEASE ORAL DAILY
Refills: 0 | Status: DISCONTINUED | OUTPATIENT
Start: 2024-01-01 | End: 2024-01-01

## 2024-01-01 RX ORDER — SENNA PLUS 8.6 MG/1
2 TABLET ORAL AT BEDTIME
Refills: 0 | Status: DISCONTINUED | OUTPATIENT
Start: 2024-01-01 | End: 2024-01-01

## 2024-01-01 RX ORDER — DEXTROSE 50 % IN WATER 50 %
25 SYRINGE (ML) INTRAVENOUS ONCE
Refills: 0 | Status: DISCONTINUED | OUTPATIENT
Start: 2024-01-01 | End: 2024-01-01

## 2024-01-01 RX ORDER — METRONIDAZOLE 500 MG
500 TABLET ORAL EVERY 12 HOURS
Refills: 0 | Status: DISCONTINUED | OUTPATIENT
Start: 2024-01-01 | End: 2024-01-01

## 2024-01-01 RX ORDER — INSULIN GLARGINE 100 [IU]/ML
6 INJECTION, SOLUTION SUBCUTANEOUS EVERY MORNING
Refills: 0 | Status: DISCONTINUED | OUTPATIENT
Start: 2024-01-01 | End: 2024-01-01

## 2024-01-01 RX ORDER — ALBUMIN HUMAN 25 %
100 VIAL (ML) INTRAVENOUS ONCE
Refills: 0 | Status: COMPLETED | OUTPATIENT
Start: 2024-01-01 | End: 2024-01-01

## 2024-01-01 RX ORDER — ATORVASTATIN CALCIUM 80 MG/1
1 TABLET, FILM COATED ORAL
Qty: 0 | Refills: 0 | DISCHARGE

## 2024-01-01 RX ORDER — ASPIRIN/CALCIUM CARB/MAGNESIUM 324 MG
1 TABLET ORAL
Qty: 0 | Refills: 0 | DISCHARGE

## 2024-01-01 RX ORDER — METOPROLOL TARTRATE 50 MG
5 TABLET ORAL ONCE
Refills: 0 | Status: COMPLETED | OUTPATIENT
Start: 2024-01-01 | End: 2024-01-01

## 2024-01-01 RX ORDER — SODIUM CHLORIDE 9 MG/ML
1000 INJECTION, SOLUTION INTRAVENOUS
Refills: 0 | Status: DISCONTINUED | OUTPATIENT
Start: 2024-01-01 | End: 2024-01-01

## 2024-01-01 RX ORDER — POTASSIUM CHLORIDE 20 MEQ
10 PACKET (EA) ORAL
Refills: 0 | Status: DISCONTINUED | OUTPATIENT
Start: 2024-01-01 | End: 2024-01-01

## 2024-01-01 RX ORDER — CEFEPIME 1 G/1
1000 INJECTION, POWDER, FOR SOLUTION INTRAMUSCULAR; INTRAVENOUS EVERY 12 HOURS
Refills: 0 | Status: DISCONTINUED | OUTPATIENT
Start: 2024-01-01 | End: 2024-01-01

## 2024-01-01 RX ORDER — AMLODIPINE BESYLATE 2.5 MG/1
1 TABLET ORAL
Qty: 0 | Refills: 0 | DISCHARGE
Start: 2024-01-01

## 2024-01-01 RX ORDER — POTASSIUM CHLORIDE 20 MEQ
20 PACKET (EA) ORAL
Refills: 0 | Status: COMPLETED | OUTPATIENT
Start: 2024-01-01 | End: 2024-01-01

## 2024-01-01 RX ORDER — POLYETHYLENE GLYCOL 3350 17 G/17G
17 POWDER, FOR SOLUTION ORAL
Qty: 0 | Refills: 0 | DISCHARGE
Start: 2024-01-01

## 2024-01-01 RX ORDER — SODIUM ZIRCONIUM CYCLOSILICATE 10 G/10G
5 POWDER, FOR SUSPENSION ORAL ONCE
Refills: 0 | Status: COMPLETED | OUTPATIENT
Start: 2024-01-01 | End: 2024-01-01

## 2024-01-01 RX ORDER — GLIMEPIRIDE 1 MG
1 TABLET ORAL
Qty: 0 | Refills: 0 | DISCHARGE

## 2024-01-01 RX ORDER — CITRIC ACID/SODIUM CITRATE 300-500 MG
30 SOLUTION, ORAL ORAL EVERY 8 HOURS
Refills: 0 | Status: COMPLETED | OUTPATIENT
Start: 2024-01-01 | End: 2024-01-01

## 2024-01-01 RX ORDER — METOPROLOL TARTRATE 50 MG
5 TABLET ORAL EVERY 6 HOURS
Refills: 0 | Status: DISCONTINUED | OUTPATIENT
Start: 2024-01-01 | End: 2024-01-01

## 2024-01-01 RX ORDER — POTASSIUM CHLORIDE 20 MEQ
10 PACKET (EA) ORAL
Refills: 0 | Status: COMPLETED | OUTPATIENT
Start: 2024-01-01 | End: 2024-01-01

## 2024-01-01 RX ORDER — DEXTROSE 50 % IN WATER 50 %
12.5 SYRINGE (ML) INTRAVENOUS ONCE
Refills: 0 | Status: DISCONTINUED | OUTPATIENT
Start: 2024-01-01 | End: 2024-01-01

## 2024-01-01 RX ORDER — SENNA PLUS 8.6 MG/1
2 TABLET ORAL
Qty: 0 | Refills: 0 | DISCHARGE
Start: 2024-01-01

## 2024-01-01 RX ORDER — CALCIUM GLUCONATE 100 MG/ML
2 VIAL (ML) INTRAVENOUS ONCE
Refills: 0 | Status: DISCONTINUED | OUTPATIENT
Start: 2024-01-01 | End: 2024-01-01

## 2024-01-01 RX ORDER — INSULIN LISPRO 100/ML
VIAL (ML) SUBCUTANEOUS
Refills: 0 | Status: DISCONTINUED | OUTPATIENT
Start: 2024-01-01 | End: 2024-01-01

## 2024-01-01 RX ORDER — CHLORHEXIDINE GLUCONATE 213 G/1000ML
1 SOLUTION TOPICAL DAILY
Refills: 0 | Status: DISCONTINUED | OUTPATIENT
Start: 2024-01-01 | End: 2024-01-01

## 2024-01-01 RX ORDER — SODIUM CHLORIDE 9 MG/ML
4 INJECTION INTRAMUSCULAR; INTRAVENOUS; SUBCUTANEOUS EVERY 4 HOURS
Refills: 0 | Status: DISCONTINUED | OUTPATIENT
Start: 2024-01-01 | End: 2024-01-01

## 2024-01-01 RX ORDER — INSULIN LISPRO 100/ML
VIAL (ML) SUBCUTANEOUS AT BEDTIME
Refills: 0 | Status: DISCONTINUED | OUTPATIENT
Start: 2024-01-01 | End: 2024-01-01

## 2024-01-01 RX ORDER — POLYETHYLENE GLYCOL 3350 17 G/17G
17 POWDER, FOR SOLUTION ORAL DAILY
Refills: 0 | Status: DISCONTINUED | OUTPATIENT
Start: 2024-01-01 | End: 2024-01-01

## 2024-01-01 RX ORDER — FUROSEMIDE 40 MG
1 TABLET ORAL
Qty: 30 | Refills: 0
Start: 2024-01-01 | End: 2024-01-01

## 2024-01-01 RX ORDER — SODIUM CHLORIDE 9 MG/ML
4 INJECTION INTRAMUSCULAR; INTRAVENOUS; SUBCUTANEOUS ONCE
Refills: 0 | Status: COMPLETED | OUTPATIENT
Start: 2024-01-01 | End: 2024-01-01

## 2024-01-01 RX ORDER — SODIUM CHLORIDE 9 MG/ML
1000 INJECTION, SOLUTION INTRAVENOUS ONCE
Refills: 0 | Status: COMPLETED | OUTPATIENT
Start: 2024-01-01 | End: 2024-01-01

## 2024-01-01 RX ORDER — WATER FOR INHALATION
1000 VIAL, NEBULIZER (ML) INHALATION
Refills: 0 | Status: DISCONTINUED | OUTPATIENT
Start: 2024-01-01 | End: 2024-01-01

## 2024-01-01 RX ORDER — IPRATROPIUM/ALBUTEROL SULFATE 18-103MCG
3 AEROSOL WITH ADAPTER (GRAM) INHALATION ONCE
Refills: 0 | Status: COMPLETED | OUTPATIENT
Start: 2024-01-01 | End: 2024-01-01

## 2024-01-01 RX ORDER — MORPHINE SULFATE 50 MG/1
2.5 CAPSULE, EXTENDED RELEASE ORAL
Qty: 0 | Refills: 0 | DISCHARGE
Start: 2024-01-01

## 2024-01-01 RX ORDER — SODIUM ZIRCONIUM CYCLOSILICATE 10 G/10G
10 POWDER, FOR SUSPENSION ORAL DAILY
Refills: 0 | Status: DISCONTINUED | OUTPATIENT
Start: 2024-01-01 | End: 2024-01-01

## 2024-01-01 RX ORDER — MAGNESIUM SULFATE 500 MG/ML
2 VIAL (ML) INJECTION ONCE
Refills: 0 | Status: COMPLETED | OUTPATIENT
Start: 2024-01-01 | End: 2024-01-01

## 2024-01-01 RX ORDER — METOPROLOL TARTRATE 50 MG
2.5 TABLET ORAL ONCE
Refills: 0 | Status: COMPLETED | OUTPATIENT
Start: 2024-01-01 | End: 2024-01-01

## 2024-01-01 RX ORDER — DEXTROSE 50 % IN WATER 50 %
15 SYRINGE (ML) INTRAVENOUS ONCE
Refills: 0 | Status: DISCONTINUED | OUTPATIENT
Start: 2024-01-01 | End: 2024-01-01

## 2024-01-01 RX ORDER — SODIUM BICARBONATE 1 MEQ/ML
50 SYRINGE (ML) INTRAVENOUS ONCE
Refills: 0 | Status: DISCONTINUED | OUTPATIENT
Start: 2024-01-01 | End: 2024-01-01

## 2024-01-01 RX ORDER — METRONIDAZOLE 500 MG
500 TABLET ORAL ONCE
Refills: 0 | Status: COMPLETED | OUTPATIENT
Start: 2024-01-01 | End: 2024-01-01

## 2024-01-01 RX ORDER — CEFTRIAXONE 500 MG/1
1000 INJECTION, POWDER, FOR SOLUTION INTRAMUSCULAR; INTRAVENOUS EVERY 24 HOURS
Refills: 0 | Status: DISCONTINUED | OUTPATIENT
Start: 2024-01-01 | End: 2024-01-01

## 2024-01-01 RX ORDER — ACETAMINOPHEN 500 MG
600 TABLET ORAL ONCE
Refills: 0 | Status: COMPLETED | OUTPATIENT
Start: 2024-01-01 | End: 2024-01-01

## 2024-01-01 RX ORDER — HYDROMORPHONE HYDROCHLORIDE 2 MG/ML
0.25 INJECTION INTRAMUSCULAR; INTRAVENOUS; SUBCUTANEOUS EVERY 6 HOURS
Refills: 0 | Status: DISCONTINUED | OUTPATIENT
Start: 2024-01-01 | End: 2024-01-01

## 2024-01-01 RX ORDER — CALCIUM GLUCONATE 100 MG/ML
1 VIAL (ML) INTRAVENOUS ONCE
Refills: 0 | Status: COMPLETED | OUTPATIENT
Start: 2024-01-01 | End: 2024-01-01

## 2024-01-01 RX ORDER — GLUCAGON INJECTION, SOLUTION 0.5 MG/.1ML
1 INJECTION, SOLUTION SUBCUTANEOUS ONCE
Refills: 0 | Status: DISCONTINUED | OUTPATIENT
Start: 2024-01-01 | End: 2024-01-01

## 2024-01-01 RX ORDER — SENNA PLUS 8.6 MG/1
5 TABLET ORAL AT BEDTIME
Refills: 0 | Status: DISCONTINUED | OUTPATIENT
Start: 2024-01-01 | End: 2024-01-01

## 2024-01-01 RX ORDER — METOPROLOL TARTRATE 50 MG
5 TABLET ORAL ONCE
Refills: 0 | Status: DISCONTINUED | OUTPATIENT
Start: 2024-01-01 | End: 2024-01-01

## 2024-01-01 RX ORDER — ACETAMINOPHEN 500 MG
650 TABLET ORAL EVERY 6 HOURS
Refills: 0 | Status: DISCONTINUED | OUTPATIENT
Start: 2024-01-01 | End: 2024-01-01

## 2024-01-01 RX ORDER — MIDODRINE HYDROCHLORIDE 2.5 MG/1
20 TABLET ORAL EVERY 8 HOURS
Refills: 0 | Status: DISCONTINUED | OUTPATIENT
Start: 2024-01-01 | End: 2024-01-01

## 2024-01-01 RX ORDER — SODIUM BICARBONATE 1 MEQ/ML
1300 SYRINGE (ML) INTRAVENOUS THREE TIMES A DAY
Refills: 0 | Status: DISCONTINUED | OUTPATIENT
Start: 2024-01-01 | End: 2024-01-01

## 2024-01-01 RX ORDER — SODIUM CHLORIDE 9 MG/ML
500 INJECTION INTRAMUSCULAR; INTRAVENOUS; SUBCUTANEOUS ONCE
Refills: 0 | Status: COMPLETED | OUTPATIENT
Start: 2024-01-01 | End: 2024-01-01

## 2024-01-01 RX ORDER — INSULIN LISPRO 100/ML
VIAL (ML) SUBCUTANEOUS EVERY 6 HOURS
Refills: 0 | Status: DISCONTINUED | OUTPATIENT
Start: 2024-01-01 | End: 2024-01-01

## 2024-01-01 RX ORDER — MIDODRINE HYDROCHLORIDE 2.5 MG/1
30 TABLET ORAL EVERY 8 HOURS
Refills: 0 | Status: DISCONTINUED | OUTPATIENT
Start: 2024-01-01 | End: 2024-01-01

## 2024-01-01 RX ORDER — MAGNESIUM SULFATE 500 MG/ML
1 VIAL (ML) INJECTION ONCE
Refills: 0 | Status: COMPLETED | OUTPATIENT
Start: 2024-01-01 | End: 2024-01-01

## 2024-01-01 RX ORDER — CHLORHEXIDINE GLUCONATE 213 G/1000ML
15 SOLUTION TOPICAL EVERY 12 HOURS
Refills: 0 | Status: DISCONTINUED | OUTPATIENT
Start: 2024-01-01 | End: 2024-01-01

## 2024-01-01 RX ORDER — VANCOMYCIN HCL 1 G
1000 VIAL (EA) INTRAVENOUS ONCE
Refills: 0 | Status: COMPLETED | OUTPATIENT
Start: 2024-01-01 | End: 2024-01-01

## 2024-01-01 RX ORDER — MAGNESIUM OXIDE 400 MG ORAL TABLET 241.3 MG
400 TABLET ORAL EVERY 4 HOURS
Refills: 0 | Status: COMPLETED | OUTPATIENT
Start: 2024-01-01 | End: 2024-01-01

## 2024-01-01 RX ORDER — POTASSIUM CHLORIDE 20 MEQ
20 PACKET (EA) ORAL EVERY 4 HOURS
Refills: 0 | Status: COMPLETED | OUTPATIENT
Start: 2024-01-01 | End: 2024-01-01

## 2024-01-01 RX ORDER — MEROPENEM 1 G/30ML
500 INJECTION INTRAVENOUS EVERY 24 HOURS
Refills: 0 | Status: DISCONTINUED | OUTPATIENT
Start: 2024-01-01 | End: 2024-01-01

## 2024-01-01 RX ORDER — HYDROMORPHONE HYDROCHLORIDE 2 MG/ML
1 INJECTION INTRAMUSCULAR; INTRAVENOUS; SUBCUTANEOUS EVERY 4 HOURS
Refills: 0 | Status: DISCONTINUED | OUTPATIENT
Start: 2024-01-01 | End: 2024-01-01

## 2024-01-01 RX ORDER — METOPROLOL TARTRATE 50 MG
25 TABLET ORAL
Refills: 0 | Status: DISCONTINUED | OUTPATIENT
Start: 2024-01-01 | End: 2024-01-01

## 2024-01-01 RX ORDER — SODIUM BICARBONATE 1 MEQ/ML
0 SYRINGE (ML) INTRAVENOUS
Refills: 0 | DISCHARGE

## 2024-01-01 RX ORDER — NOREPINEPHRINE BITARTRATE/D5W 8 MG/250ML
0.05 PLASTIC BAG, INJECTION (ML) INTRAVENOUS
Qty: 8 | Refills: 0 | Status: DISCONTINUED | OUTPATIENT
Start: 2024-01-01 | End: 2024-01-01

## 2024-01-01 RX ORDER — MORPHINE SULFATE 50 MG/1
1.25 CAPSULE, EXTENDED RELEASE ORAL
Qty: 0 | Refills: 0 | DISCHARGE
Start: 2024-01-01

## 2024-01-01 RX ORDER — LEVALBUTEROL 1.25 MG/.5ML
0.63 SOLUTION, CONCENTRATE RESPIRATORY (INHALATION) EVERY 6 HOURS
Refills: 0 | Status: COMPLETED | OUTPATIENT
Start: 2024-01-01 | End: 2024-01-01

## 2024-01-01 RX ORDER — HEPARIN SODIUM 5000 [USP'U]/ML
5000 INJECTION INTRAVENOUS; SUBCUTANEOUS EVERY 12 HOURS
Refills: 0 | Status: DISCONTINUED | OUTPATIENT
Start: 2024-01-01 | End: 2024-01-01

## 2024-01-01 RX ORDER — CEFTRIAXONE 500 MG/1
1000 INJECTION, POWDER, FOR SOLUTION INTRAMUSCULAR; INTRAVENOUS EVERY 24 HOURS
Refills: 0 | Status: COMPLETED | OUTPATIENT
Start: 2024-01-01 | End: 2024-01-01

## 2024-01-01 RX ORDER — SODIUM HYPOCHLORITE 0.125 %
1 SOLUTION, NON-ORAL MISCELLANEOUS DAILY
Refills: 0 | Status: DISCONTINUED | OUTPATIENT
Start: 2024-01-01 | End: 2024-01-01

## 2024-01-01 RX ORDER — FENTANYL CITRATE 50 UG/ML
0.5 INJECTION INTRAVENOUS
Qty: 2500 | Refills: 0 | Status: DISCONTINUED | OUTPATIENT
Start: 2024-01-01 | End: 2024-01-01

## 2024-01-01 RX ORDER — AMLODIPINE BESYLATE 2.5 MG/1
10 TABLET ORAL DAILY
Refills: 0 | Status: DISCONTINUED | OUTPATIENT
Start: 2024-01-01 | End: 2024-01-01

## 2024-01-01 RX ORDER — CEFTRIAXONE 500 MG/1
INJECTION, POWDER, FOR SOLUTION INTRAMUSCULAR; INTRAVENOUS
Refills: 0 | Status: DISCONTINUED | OUTPATIENT
Start: 2024-01-01 | End: 2024-01-01

## 2024-01-01 RX ORDER — SODIUM BICARBONATE 1 MEQ/ML
0.28 SYRINGE (ML) INTRAVENOUS
Qty: 150 | Refills: 0 | Status: COMPLETED | OUTPATIENT
Start: 2024-01-01 | End: 2024-01-01

## 2024-01-01 RX ORDER — DOCUSATE SODIUM 100 MG
1 CAPSULE ORAL
Qty: 0 | Refills: 0 | DISCHARGE

## 2024-01-01 RX ORDER — CEFTRIAXONE 500 MG/1
1000 INJECTION, POWDER, FOR SOLUTION INTRAMUSCULAR; INTRAVENOUS ONCE
Refills: 0 | Status: COMPLETED | OUTPATIENT
Start: 2024-01-01 | End: 2024-01-01

## 2024-01-01 RX ORDER — PANTOPRAZOLE SODIUM 20 MG/1
40 TABLET, DELAYED RELEASE ORAL
Refills: 0 | Status: DISCONTINUED | OUTPATIENT
Start: 2024-01-01 | End: 2024-01-01

## 2024-01-01 RX ORDER — SODIUM HYPOCHLORITE 0.125 %
1 SOLUTION, NON-ORAL MISCELLANEOUS
Refills: 0 | Status: DISCONTINUED | OUTPATIENT
Start: 2024-01-01 | End: 2024-01-01

## 2024-01-01 RX ORDER — METOPROLOL TARTRATE 50 MG
50 TABLET ORAL
Refills: 0 | Status: DISCONTINUED | OUTPATIENT
Start: 2024-01-01 | End: 2024-01-01

## 2024-01-01 RX ORDER — IPRATROPIUM/ALBUTEROL SULFATE 18-103MCG
3 AEROSOL WITH ADAPTER (GRAM) INHALATION EVERY 6 HOURS
Refills: 0 | Status: DISCONTINUED | OUTPATIENT
Start: 2024-01-01 | End: 2024-01-01

## 2024-01-01 RX ORDER — SODIUM CHLORIDE 9 MG/ML
500 INJECTION, SOLUTION INTRAVENOUS ONCE
Refills: 0 | Status: COMPLETED | OUTPATIENT
Start: 2024-01-01 | End: 2024-01-01

## 2024-01-01 RX ORDER — PANTOPRAZOLE SODIUM 20 MG/1
1 TABLET, DELAYED RELEASE ORAL
Qty: 0 | Refills: 0 | DISCHARGE

## 2024-01-01 RX ORDER — SODIUM CHLORIDE 9 MG/ML
1000 INJECTION INTRAMUSCULAR; INTRAVENOUS; SUBCUTANEOUS
Refills: 0 | Status: DISCONTINUED | OUTPATIENT
Start: 2024-01-01 | End: 2024-01-01

## 2024-01-01 RX ADMIN — Medication 3.79 MICROGRAM(S)/KG/MIN: at 08:05

## 2024-01-01 RX ADMIN — Medication 1: at 23:24

## 2024-01-01 RX ADMIN — Medication 100 MILLIEQUIVALENT(S): at 08:06

## 2024-01-01 RX ADMIN — SODIUM CHLORIDE 4 MILLILITER(S): 9 INJECTION INTRAMUSCULAR; INTRAVENOUS; SUBCUTANEOUS at 11:49

## 2024-01-01 RX ADMIN — Medication 100 GRAM(S): at 12:37

## 2024-01-01 RX ADMIN — Medication 50 MILLIGRAM(S): at 18:01

## 2024-01-01 RX ADMIN — Medication 2: at 17:20

## 2024-01-01 RX ADMIN — Medication 100 MILLIEQUIVALENT(S): at 12:18

## 2024-01-01 RX ADMIN — Medication 25 GRAM(S): at 17:38

## 2024-01-01 RX ADMIN — Medication 1 APPLICATION(S): at 05:21

## 2024-01-01 RX ADMIN — Medication 81 MILLIGRAM(S): at 12:35

## 2024-01-01 RX ADMIN — Medication 100 MILLIGRAM(S): at 06:13

## 2024-01-01 RX ADMIN — Medication 100 MILLIGRAM(S): at 17:31

## 2024-01-01 RX ADMIN — Medication 1300 MILLIGRAM(S): at 12:51

## 2024-01-01 RX ADMIN — LEVALBUTEROL 0.63 MILLIGRAM(S): 1.25 SOLUTION, CONCENTRATE RESPIRATORY (INHALATION) at 20:37

## 2024-01-01 RX ADMIN — HEPARIN SODIUM 5000 UNIT(S): 5000 INJECTION INTRAVENOUS; SUBCUTANEOUS at 17:33

## 2024-01-01 RX ADMIN — Medication 4: at 00:24

## 2024-01-01 RX ADMIN — MAGNESIUM OXIDE 400 MG ORAL TABLET 400 MILLIGRAM(S): 241.3 TABLET ORAL at 06:40

## 2024-01-01 RX ADMIN — Medication 1300 MILLIGRAM(S): at 22:01

## 2024-01-01 RX ADMIN — Medication 100 MILLIGRAM(S): at 18:25

## 2024-01-01 RX ADMIN — Medication 100 MILLIGRAM(S): at 21:47

## 2024-01-01 RX ADMIN — Medication 1300 MILLIGRAM(S): at 05:15

## 2024-01-01 RX ADMIN — HEPARIN SODIUM 5000 UNIT(S): 5000 INJECTION INTRAVENOUS; SUBCUTANEOUS at 05:15

## 2024-01-01 RX ADMIN — PANTOPRAZOLE SODIUM 40 MILLIGRAM(S): 20 TABLET, DELAYED RELEASE ORAL at 11:54

## 2024-01-01 RX ADMIN — CHLORHEXIDINE GLUCONATE 15 MILLILITER(S): 213 SOLUTION TOPICAL at 17:42

## 2024-01-01 RX ADMIN — HEPARIN SODIUM 5000 UNIT(S): 5000 INJECTION INTRAVENOUS; SUBCUTANEOUS at 17:09

## 2024-01-01 RX ADMIN — Medication 1: at 12:29

## 2024-01-01 RX ADMIN — Medication 100 MILLIGRAM(S): at 17:09

## 2024-01-01 RX ADMIN — Medication 25 MILLIGRAM(S): at 05:17

## 2024-01-01 RX ADMIN — Medication 1300 MILLIGRAM(S): at 14:37

## 2024-01-01 RX ADMIN — CHLORHEXIDINE GLUCONATE 15 MILLILITER(S): 213 SOLUTION TOPICAL at 05:54

## 2024-01-01 RX ADMIN — Medication 1300 MILLIGRAM(S): at 22:14

## 2024-01-01 RX ADMIN — HEPARIN SODIUM 5000 UNIT(S): 5000 INJECTION INTRAVENOUS; SUBCUTANEOUS at 05:04

## 2024-01-01 RX ADMIN — Medication 1: at 12:01

## 2024-01-01 RX ADMIN — CHLORHEXIDINE GLUCONATE 15 MILLILITER(S): 213 SOLUTION TOPICAL at 05:19

## 2024-01-01 RX ADMIN — Medication 100 MILLIGRAM(S): at 05:23

## 2024-01-01 RX ADMIN — Medication 1: at 12:52

## 2024-01-01 RX ADMIN — Medication 1300 MILLIGRAM(S): at 14:48

## 2024-01-01 RX ADMIN — Medication 3.79 MICROGRAM(S)/KG/MIN: at 19:09

## 2024-01-01 RX ADMIN — Medication 100 MILLIGRAM(S): at 05:42

## 2024-01-01 RX ADMIN — Medication 3: at 07:16

## 2024-01-01 RX ADMIN — Medication 3: at 07:37

## 2024-01-01 RX ADMIN — Medication 1: at 13:11

## 2024-01-01 RX ADMIN — Medication 1300 MILLIGRAM(S): at 21:45

## 2024-01-01 RX ADMIN — Medication 1 APPLICATION(S): at 12:40

## 2024-01-01 RX ADMIN — Medication 1 APPLICATION(S): at 12:33

## 2024-01-01 RX ADMIN — PROPOFOL 1.21 MICROGRAM(S)/KG/MIN: 10 INJECTION, EMULSION INTRAVENOUS at 17:09

## 2024-01-01 RX ADMIN — HEPARIN SODIUM 5000 UNIT(S): 5000 INJECTION INTRAVENOUS; SUBCUTANEOUS at 05:06

## 2024-01-01 RX ADMIN — Medication 100 MILLIGRAM(S): at 19:00

## 2024-01-01 RX ADMIN — Medication 25 MILLIGRAM(S): at 06:40

## 2024-01-01 RX ADMIN — CEFTRIAXONE 100 MILLIGRAM(S): 500 INJECTION, POWDER, FOR SOLUTION INTRAMUSCULAR; INTRAVENOUS at 17:35

## 2024-01-01 RX ADMIN — Medication 1300 MILLIGRAM(S): at 21:46

## 2024-01-01 RX ADMIN — CLOPIDOGREL BISULFATE 75 MILLIGRAM(S): 75 TABLET, FILM COATED ORAL at 12:35

## 2024-01-01 RX ADMIN — HEPARIN SODIUM 5000 UNIT(S): 5000 INJECTION INTRAVENOUS; SUBCUTANEOUS at 05:39

## 2024-01-01 RX ADMIN — Medication 100 MILLIGRAM(S): at 23:31

## 2024-01-01 RX ADMIN — SODIUM CHLORIDE 75 MILLILITER(S): 9 INJECTION, SOLUTION INTRAVENOUS at 06:50

## 2024-01-01 RX ADMIN — Medication 25 MILLIGRAM(S): at 18:11

## 2024-01-01 RX ADMIN — Medication 1300 MILLIGRAM(S): at 16:21

## 2024-01-01 RX ADMIN — CHLORHEXIDINE GLUCONATE 15 MILLILITER(S): 213 SOLUTION TOPICAL at 05:06

## 2024-01-01 RX ADMIN — HEPARIN SODIUM 5000 UNIT(S): 5000 INJECTION INTRAVENOUS; SUBCUTANEOUS at 17:19

## 2024-01-01 RX ADMIN — HEPARIN SODIUM 5000 UNIT(S): 5000 INJECTION INTRAVENOUS; SUBCUTANEOUS at 07:19

## 2024-01-01 RX ADMIN — SODIUM CHLORIDE 70 MILLILITER(S): 9 INJECTION, SOLUTION INTRAVENOUS at 00:38

## 2024-01-01 RX ADMIN — Medication 3.79 MICROGRAM(S)/KG/MIN: at 09:46

## 2024-01-01 RX ADMIN — FENTANYL CITRATE 2.02 MICROGRAM(S)/KG/HR: 50 INJECTION INTRAVENOUS at 08:14

## 2024-01-01 RX ADMIN — Medication 3 MILLILITER(S): at 14:14

## 2024-01-01 RX ADMIN — PANTOPRAZOLE SODIUM 40 MILLIGRAM(S): 20 TABLET, DELAYED RELEASE ORAL at 12:01

## 2024-01-01 RX ADMIN — HEPARIN SODIUM 5000 UNIT(S): 5000 INJECTION INTRAVENOUS; SUBCUTANEOUS at 05:24

## 2024-01-01 RX ADMIN — Medication 81 MILLIGRAM(S): at 12:00

## 2024-01-01 RX ADMIN — Medication 1300 MILLIGRAM(S): at 14:51

## 2024-01-01 RX ADMIN — SODIUM CHLORIDE 4 MILLILITER(S): 9 INJECTION INTRAMUSCULAR; INTRAVENOUS; SUBCUTANEOUS at 22:08

## 2024-01-01 RX ADMIN — Medication 2: at 05:08

## 2024-01-01 RX ADMIN — Medication 6: at 00:51

## 2024-01-01 RX ADMIN — Medication 1300 MILLIGRAM(S): at 05:08

## 2024-01-01 RX ADMIN — MIDODRINE HYDROCHLORIDE 20 MILLIGRAM(S): 2.5 TABLET ORAL at 14:33

## 2024-01-01 RX ADMIN — Medication 240 MILLIGRAM(S): at 04:46

## 2024-01-01 RX ADMIN — Medication 2: at 00:46

## 2024-01-01 RX ADMIN — HEPARIN SODIUM 5000 UNIT(S): 5000 INJECTION INTRAVENOUS; SUBCUTANEOUS at 05:42

## 2024-01-01 RX ADMIN — HEPARIN SODIUM 5000 UNIT(S): 5000 INJECTION INTRAVENOUS; SUBCUTANEOUS at 05:20

## 2024-01-01 RX ADMIN — Medication 75 MEQ/KG/HR: at 17:19

## 2024-01-01 RX ADMIN — MIDODRINE HYDROCHLORIDE 30 MILLIGRAM(S): 2.5 TABLET ORAL at 05:20

## 2024-01-01 RX ADMIN — POLYETHYLENE GLYCOL 3350 17 GRAM(S): 17 POWDER, FOR SOLUTION ORAL at 12:00

## 2024-01-01 RX ADMIN — CHLORHEXIDINE GLUCONATE 15 MILLILITER(S): 213 SOLUTION TOPICAL at 05:26

## 2024-01-01 RX ADMIN — AMLODIPINE BESYLATE 10 MILLIGRAM(S): 2.5 TABLET ORAL at 06:30

## 2024-01-01 RX ADMIN — AMLODIPINE BESYLATE 10 MILLIGRAM(S): 2.5 TABLET ORAL at 06:34

## 2024-01-01 RX ADMIN — Medication 1300 MILLIGRAM(S): at 13:51

## 2024-01-01 RX ADMIN — FENTANYL CITRATE 2.02 MICROGRAM(S)/KG/HR: 50 INJECTION INTRAVENOUS at 19:03

## 2024-01-01 RX ADMIN — SODIUM CHLORIDE 1000 MILLILITER(S): 9 INJECTION, SOLUTION INTRAVENOUS at 16:31

## 2024-01-01 RX ADMIN — Medication 100 MILLIGRAM(S): at 06:52

## 2024-01-01 RX ADMIN — HEPARIN SODIUM 5000 UNIT(S): 5000 INJECTION INTRAVENOUS; SUBCUTANEOUS at 05:11

## 2024-01-01 RX ADMIN — FENTANYL CITRATE 2.02 MICROGRAM(S)/KG/HR: 50 INJECTION INTRAVENOUS at 07:54

## 2024-01-01 RX ADMIN — Medication 600 MILLIGRAM(S): at 19:30

## 2024-01-01 RX ADMIN — Medication 1 APPLICATION(S): at 05:32

## 2024-01-01 RX ADMIN — Medication 1: at 06:54

## 2024-01-01 RX ADMIN — CEFTRIAXONE 100 MILLIGRAM(S): 500 INJECTION, POWDER, FOR SOLUTION INTRAMUSCULAR; INTRAVENOUS at 17:46

## 2024-01-01 RX ADMIN — MIDODRINE HYDROCHLORIDE 30 MILLIGRAM(S): 2.5 TABLET ORAL at 21:28

## 2024-01-01 RX ADMIN — MAGNESIUM OXIDE 400 MG ORAL TABLET 400 MILLIGRAM(S): 241.3 TABLET ORAL at 02:44

## 2024-01-01 RX ADMIN — SODIUM CHLORIDE 75 MILLILITER(S): 9 INJECTION, SOLUTION INTRAVENOUS at 03:15

## 2024-01-01 RX ADMIN — SODIUM CHLORIDE 4 MILLILITER(S): 9 INJECTION INTRAMUSCULAR; INTRAVENOUS; SUBCUTANEOUS at 07:41

## 2024-01-01 RX ADMIN — Medication 1: at 23:56

## 2024-01-01 RX ADMIN — MIDODRINE HYDROCHLORIDE 30 MILLIGRAM(S): 2.5 TABLET ORAL at 22:10

## 2024-01-01 RX ADMIN — MORPHINE SULFATE 2.5 MILLIGRAM(S): 50 CAPSULE, EXTENDED RELEASE ORAL at 21:54

## 2024-01-01 RX ADMIN — LEVALBUTEROL 0.63 MILLIGRAM(S): 1.25 SOLUTION, CONCENTRATE RESPIRATORY (INHALATION) at 22:37

## 2024-01-01 RX ADMIN — Medication 100 MILLIGRAM(S): at 17:35

## 2024-01-01 RX ADMIN — Medication 100 MILLIGRAM(S): at 21:40

## 2024-01-01 RX ADMIN — Medication 100 MILLIGRAM(S): at 17:59

## 2024-01-01 RX ADMIN — MEROPENEM 100 MILLIGRAM(S): 1 INJECTION INTRAVENOUS at 10:07

## 2024-01-01 RX ADMIN — Medication 1300 MILLIGRAM(S): at 22:32

## 2024-01-01 RX ADMIN — Medication 1300 MILLIGRAM(S): at 22:02

## 2024-01-01 RX ADMIN — PROPOFOL 1.21 MICROGRAM(S)/KG/MIN: 10 INJECTION, EMULSION INTRAVENOUS at 09:05

## 2024-01-01 RX ADMIN — Medication 2: at 17:49

## 2024-01-01 RX ADMIN — Medication 3.79 MICROGRAM(S)/KG/MIN: at 07:54

## 2024-01-01 RX ADMIN — Medication 650 MILLIGRAM(S): at 10:44

## 2024-01-01 RX ADMIN — SODIUM CHLORIDE 70 MILLILITER(S): 9 INJECTION, SOLUTION INTRAVENOUS at 15:10

## 2024-01-01 RX ADMIN — Medication 1 APPLICATION(S): at 04:31

## 2024-01-01 RX ADMIN — Medication 1 APPLICATION(S): at 17:10

## 2024-01-01 RX ADMIN — LEVALBUTEROL 0.63 MILLIGRAM(S): 1.25 SOLUTION, CONCENTRATE RESPIRATORY (INHALATION) at 03:24

## 2024-01-01 RX ADMIN — Medication 100 MILLIGRAM(S): at 17:15

## 2024-01-01 RX ADMIN — Medication 4: at 23:25

## 2024-01-01 RX ADMIN — PANTOPRAZOLE SODIUM 40 MILLIGRAM(S): 20 TABLET, DELAYED RELEASE ORAL at 11:34

## 2024-01-01 RX ADMIN — Medication 1: at 18:59

## 2024-01-01 RX ADMIN — HEPARIN SODIUM 5000 UNIT(S): 5000 INJECTION INTRAVENOUS; SUBCUTANEOUS at 05:55

## 2024-01-01 RX ADMIN — Medication 100 MILLIGRAM(S): at 05:05

## 2024-01-01 RX ADMIN — Medication 30 MILLILITER(S): at 21:44

## 2024-01-01 RX ADMIN — Medication 3.79 MICROGRAM(S)/KG/MIN: at 17:34

## 2024-01-01 RX ADMIN — HEPARIN SODIUM 5000 UNIT(S): 5000 INJECTION INTRAVENOUS; SUBCUTANEOUS at 18:11

## 2024-01-01 RX ADMIN — SENNA PLUS 2 TABLET(S): 8.6 TABLET ORAL at 21:55

## 2024-01-01 RX ADMIN — HEPARIN SODIUM 5000 UNIT(S): 5000 INJECTION INTRAVENOUS; SUBCUTANEOUS at 18:00

## 2024-01-01 RX ADMIN — Medication 2.5 MILLIGRAM(S): at 22:31

## 2024-01-01 RX ADMIN — Medication 1 APPLICATION(S): at 17:42

## 2024-01-01 RX ADMIN — Medication 100 MILLIGRAM(S): at 18:36

## 2024-01-01 RX ADMIN — INSULIN GLARGINE 6 UNIT(S): 100 INJECTION, SOLUTION SUBCUTANEOUS at 08:34

## 2024-01-01 RX ADMIN — Medication 100 MILLIGRAM(S): at 05:02

## 2024-01-01 RX ADMIN — PROPOFOL 1.21 MICROGRAM(S)/KG/MIN: 10 INJECTION, EMULSION INTRAVENOUS at 08:32

## 2024-01-01 RX ADMIN — Medication 1: at 07:55

## 2024-01-01 RX ADMIN — Medication 1300 MILLIGRAM(S): at 22:10

## 2024-01-01 RX ADMIN — Medication 1: at 11:54

## 2024-01-01 RX ADMIN — Medication 1300 MILLIGRAM(S): at 05:54

## 2024-01-01 RX ADMIN — Medication 100 MILLIGRAM(S): at 17:47

## 2024-01-01 RX ADMIN — MEROPENEM 100 MILLIGRAM(S): 1 INJECTION INTRAVENOUS at 09:44

## 2024-01-01 RX ADMIN — Medication 2: at 23:34

## 2024-01-01 RX ADMIN — Medication 40 MILLIGRAM(S): at 06:34

## 2024-01-01 RX ADMIN — Medication 25 MILLIGRAM(S): at 18:00

## 2024-01-01 RX ADMIN — HEPARIN SODIUM 5000 UNIT(S): 5000 INJECTION INTRAVENOUS; SUBCUTANEOUS at 06:40

## 2024-01-01 RX ADMIN — Medication 2: at 12:01

## 2024-01-01 RX ADMIN — Medication 1 APPLICATION(S): at 18:01

## 2024-01-01 RX ADMIN — Medication 100 MILLIGRAM(S): at 05:07

## 2024-01-01 RX ADMIN — Medication 5 MILLIGRAM(S): at 20:38

## 2024-01-01 RX ADMIN — Medication 2: at 05:53

## 2024-01-01 RX ADMIN — Medication 100 MILLIGRAM(S): at 18:10

## 2024-01-01 RX ADMIN — Medication 25 MILLIGRAM(S): at 05:05

## 2024-01-01 RX ADMIN — PANTOPRAZOLE SODIUM 40 MILLIGRAM(S): 20 TABLET, DELAYED RELEASE ORAL at 13:07

## 2024-01-01 RX ADMIN — MIDODRINE HYDROCHLORIDE 30 MILLIGRAM(S): 2.5 TABLET ORAL at 13:10

## 2024-01-01 RX ADMIN — INSULIN GLARGINE 6 UNIT(S): 100 INJECTION, SOLUTION SUBCUTANEOUS at 08:10

## 2024-01-01 RX ADMIN — Medication 3.79 MICROGRAM(S)/KG/MIN: at 07:48

## 2024-01-01 RX ADMIN — Medication 1: at 16:35

## 2024-01-01 RX ADMIN — Medication 1300 MILLIGRAM(S): at 15:01

## 2024-01-01 RX ADMIN — Medication 100 MILLIGRAM(S): at 06:28

## 2024-01-01 RX ADMIN — CEFTRIAXONE 100 MILLIGRAM(S): 500 INJECTION, POWDER, FOR SOLUTION INTRAMUSCULAR; INTRAVENOUS at 17:32

## 2024-01-01 RX ADMIN — INSULIN GLARGINE 6 UNIT(S): 100 INJECTION, SOLUTION SUBCUTANEOUS at 09:06

## 2024-01-01 RX ADMIN — Medication 2: at 00:28

## 2024-01-01 RX ADMIN — Medication 1 APPLICATION(S): at 13:43

## 2024-01-01 RX ADMIN — HEPARIN SODIUM 5000 UNIT(S): 5000 INJECTION INTRAVENOUS; SUBCUTANEOUS at 05:07

## 2024-01-01 RX ADMIN — Medication 100 MILLIEQUIVALENT(S): at 16:19

## 2024-01-01 RX ADMIN — Medication 50 MILLIEQUIVALENT(S): at 14:16

## 2024-01-01 RX ADMIN — SENNA PLUS 2 TABLET(S): 8.6 TABLET ORAL at 21:45

## 2024-01-01 RX ADMIN — Medication 5 MILLIGRAM(S): at 18:53

## 2024-01-01 RX ADMIN — Medication 100 MILLIEQUIVALENT(S): at 15:04

## 2024-01-01 RX ADMIN — CHLORHEXIDINE GLUCONATE 15 MILLILITER(S): 213 SOLUTION TOPICAL at 17:08

## 2024-01-01 RX ADMIN — MIDODRINE HYDROCHLORIDE 30 MILLIGRAM(S): 2.5 TABLET ORAL at 05:07

## 2024-01-01 RX ADMIN — MAGNESIUM OXIDE 400 MG ORAL TABLET 400 MILLIGRAM(S): 241.3 TABLET ORAL at 17:34

## 2024-01-01 RX ADMIN — SODIUM CHLORIDE 75 MILLILITER(S): 9 INJECTION, SOLUTION INTRAVENOUS at 08:38

## 2024-01-01 RX ADMIN — Medication 25 MILLIGRAM(S): at 05:06

## 2024-01-01 RX ADMIN — Medication 6: at 17:14

## 2024-01-01 RX ADMIN — Medication 1300 MILLIGRAM(S): at 21:12

## 2024-01-01 RX ADMIN — HEPARIN SODIUM 5000 UNIT(S): 5000 INJECTION INTRAVENOUS; SUBCUTANEOUS at 17:21

## 2024-01-01 RX ADMIN — SENNA PLUS 5 MILLILITER(S): 8.6 TABLET ORAL at 22:18

## 2024-01-01 RX ADMIN — Medication 1 APPLICATION(S): at 05:27

## 2024-01-01 RX ADMIN — Medication 25 MILLIGRAM(S): at 16:37

## 2024-01-01 RX ADMIN — HYDROMORPHONE HYDROCHLORIDE 0.25 MILLIGRAM(S): 2 INJECTION INTRAMUSCULAR; INTRAVENOUS; SUBCUTANEOUS at 04:03

## 2024-01-01 RX ADMIN — Medication 100 MILLIGRAM(S): at 05:30

## 2024-01-01 RX ADMIN — Medication 2: at 12:46

## 2024-01-01 RX ADMIN — Medication 4: at 05:02

## 2024-01-01 RX ADMIN — Medication 100 MILLIGRAM(S): at 05:29

## 2024-01-01 RX ADMIN — POLYETHYLENE GLYCOL 3350 17 GRAM(S): 17 POWDER, FOR SOLUTION ORAL at 12:39

## 2024-01-01 RX ADMIN — SODIUM CHLORIDE 1000 MILLILITER(S): 9 INJECTION, SOLUTION INTRAVENOUS at 13:05

## 2024-01-01 RX ADMIN — Medication 3: at 05:21

## 2024-01-01 RX ADMIN — PROPOFOL 1.21 MICROGRAM(S)/KG/MIN: 10 INJECTION, EMULSION INTRAVENOUS at 07:53

## 2024-01-01 RX ADMIN — Medication 25 GRAM(S): at 00:48

## 2024-01-01 RX ADMIN — Medication 1300 MILLIGRAM(S): at 05:06

## 2024-01-01 RX ADMIN — Medication 100 MILLIGRAM(S): at 17:40

## 2024-01-01 RX ADMIN — Medication 100 MILLIGRAM(S): at 05:54

## 2024-01-01 RX ADMIN — Medication 2: at 23:32

## 2024-01-01 RX ADMIN — HEPARIN SODIUM 5000 UNIT(S): 5000 INJECTION INTRAVENOUS; SUBCUTANEOUS at 18:07

## 2024-01-01 RX ADMIN — Medication 100 MILLIGRAM(S): at 06:33

## 2024-01-01 RX ADMIN — Medication 25 MILLIGRAM(S): at 17:40

## 2024-01-01 RX ADMIN — SODIUM ZIRCONIUM CYCLOSILICATE 5 GRAM(S): 10 POWDER, FOR SUSPENSION ORAL at 09:00

## 2024-01-01 RX ADMIN — CEFTRIAXONE 100 MILLIGRAM(S): 500 INJECTION, POWDER, FOR SOLUTION INTRAMUSCULAR; INTRAVENOUS at 08:37

## 2024-01-01 RX ADMIN — Medication 3.79 MICROGRAM(S)/KG/MIN: at 17:08

## 2024-01-01 RX ADMIN — Medication 100 MILLIGRAM(S): at 18:01

## 2024-01-01 RX ADMIN — HEPARIN SODIUM 5000 UNIT(S): 5000 INJECTION INTRAVENOUS; SUBCUTANEOUS at 17:52

## 2024-01-01 RX ADMIN — Medication 1 APPLICATION(S): at 12:29

## 2024-01-01 RX ADMIN — CEFTRIAXONE 100 MILLIGRAM(S): 500 INJECTION, POWDER, FOR SOLUTION INTRAMUSCULAR; INTRAVENOUS at 09:25

## 2024-01-01 RX ADMIN — Medication 1300 MILLIGRAM(S): at 05:55

## 2024-01-01 RX ADMIN — Medication 2: at 17:57

## 2024-01-01 RX ADMIN — MIDODRINE HYDROCHLORIDE 30 MILLIGRAM(S): 2.5 TABLET ORAL at 14:37

## 2024-01-01 RX ADMIN — Medication 100 MILLIGRAM(S): at 17:57

## 2024-01-01 RX ADMIN — HYDROMORPHONE HYDROCHLORIDE 0.25 MILLIGRAM(S): 2 INJECTION INTRAMUSCULAR; INTRAVENOUS; SUBCUTANEOUS at 14:38

## 2024-01-01 RX ADMIN — Medication 1: at 17:34

## 2024-01-01 RX ADMIN — HEPARIN SODIUM 5000 UNIT(S): 5000 INJECTION INTRAVENOUS; SUBCUTANEOUS at 18:36

## 2024-01-01 RX ADMIN — Medication 50 MILLIEQUIVALENT(S): at 12:29

## 2024-01-01 RX ADMIN — PROPOFOL 1.21 MICROGRAM(S)/KG/MIN: 10 INJECTION, EMULSION INTRAVENOUS at 08:14

## 2024-01-01 RX ADMIN — FENTANYL CITRATE 2.02 MICROGRAM(S)/KG/HR: 50 INJECTION INTRAVENOUS at 08:33

## 2024-01-01 RX ADMIN — CEFTRIAXONE 100 MILLIGRAM(S): 500 INJECTION, POWDER, FOR SOLUTION INTRAMUSCULAR; INTRAVENOUS at 10:09

## 2024-01-01 RX ADMIN — Medication 1: at 17:50

## 2024-01-01 RX ADMIN — SODIUM ZIRCONIUM CYCLOSILICATE 10 GRAM(S): 10 POWDER, FOR SUSPENSION ORAL at 22:53

## 2024-01-01 RX ADMIN — Medication 1300 MILLIGRAM(S): at 05:01

## 2024-01-01 RX ADMIN — Medication 1300 MILLIGRAM(S): at 05:04

## 2024-01-01 RX ADMIN — PANTOPRAZOLE SODIUM 40 MILLIGRAM(S): 20 TABLET, DELAYED RELEASE ORAL at 12:40

## 2024-01-01 RX ADMIN — HEPARIN SODIUM 5000 UNIT(S): 5000 INJECTION INTRAVENOUS; SUBCUTANEOUS at 17:41

## 2024-01-01 RX ADMIN — HEPARIN SODIUM 5000 UNIT(S): 5000 INJECTION INTRAVENOUS; SUBCUTANEOUS at 17:30

## 2024-01-01 RX ADMIN — Medication 3 MILLILITER(S): at 22:09

## 2024-01-01 RX ADMIN — Medication 1300 MILLIGRAM(S): at 05:17

## 2024-01-01 RX ADMIN — PROPOFOL 1.21 MICROGRAM(S)/KG/MIN: 10 INJECTION, EMULSION INTRAVENOUS at 02:28

## 2024-01-01 RX ADMIN — SODIUM CHLORIDE 4 MILLILITER(S): 9 INJECTION INTRAMUSCULAR; INTRAVENOUS; SUBCUTANEOUS at 01:19

## 2024-01-01 RX ADMIN — Medication 3.79 MICROGRAM(S)/KG/MIN: at 08:14

## 2024-01-01 RX ADMIN — FENTANYL CITRATE 2.02 MICROGRAM(S)/KG/HR: 50 INJECTION INTRAVENOUS at 08:04

## 2024-01-01 RX ADMIN — Medication 75 MILLILITER(S): at 10:54

## 2024-01-01 RX ADMIN — PROPOFOL 1.21 MICROGRAM(S)/KG/MIN: 10 INJECTION, EMULSION INTRAVENOUS at 07:48

## 2024-01-01 RX ADMIN — MIDODRINE HYDROCHLORIDE 30 MILLIGRAM(S): 2.5 TABLET ORAL at 13:51

## 2024-01-01 RX ADMIN — PANTOPRAZOLE SODIUM 40 MILLIGRAM(S): 20 TABLET, DELAYED RELEASE ORAL at 12:13

## 2024-01-01 RX ADMIN — SODIUM CHLORIDE 54 MILLILITER(S): 9 INJECTION, SOLUTION INTRAVENOUS at 14:10

## 2024-01-01 RX ADMIN — PANTOPRAZOLE SODIUM 40 MILLIGRAM(S): 20 TABLET, DELAYED RELEASE ORAL at 11:40

## 2024-01-01 RX ADMIN — FENTANYL CITRATE 2.02 MICROGRAM(S)/KG/HR: 50 INJECTION INTRAVENOUS at 19:09

## 2024-01-01 RX ADMIN — POLYETHYLENE GLYCOL 3350 17 GRAM(S): 17 POWDER, FOR SOLUTION ORAL at 12:54

## 2024-01-01 RX ADMIN — Medication 50 MILLIGRAM(S): at 06:34

## 2024-01-01 RX ADMIN — Medication 100 MILLIEQUIVALENT(S): at 11:36

## 2024-01-01 RX ADMIN — Medication 6: at 17:20

## 2024-01-01 RX ADMIN — SODIUM CHLORIDE 500 MILLILITER(S): 9 INJECTION INTRAMUSCULAR; INTRAVENOUS; SUBCUTANEOUS at 06:47

## 2024-01-01 RX ADMIN — Medication 100 MILLIGRAM(S): at 17:51

## 2024-01-01 RX ADMIN — HEPARIN SODIUM 5000 UNIT(S): 5000 INJECTION INTRAVENOUS; SUBCUTANEOUS at 17:36

## 2024-01-01 RX ADMIN — Medication 30 MILLILITER(S): at 16:20

## 2024-01-01 RX ADMIN — Medication 5 MILLIGRAM(S): at 13:37

## 2024-01-01 RX ADMIN — MIDODRINE HYDROCHLORIDE 30 MILLIGRAM(S): 2.5 TABLET ORAL at 05:01

## 2024-01-01 RX ADMIN — PANTOPRAZOLE SODIUM 40 MILLIGRAM(S): 20 TABLET, DELAYED RELEASE ORAL at 16:22

## 2024-01-01 RX ADMIN — Medication 2: at 12:28

## 2024-01-01 RX ADMIN — SODIUM ZIRCONIUM CYCLOSILICATE 10 GRAM(S): 10 POWDER, FOR SUSPENSION ORAL at 14:32

## 2024-01-01 RX ADMIN — Medication 1300 MILLIGRAM(S): at 21:19

## 2024-01-01 RX ADMIN — Medication 75 MEQ/KG/HR: at 07:49

## 2024-01-01 RX ADMIN — MIDODRINE HYDROCHLORIDE 20 MILLIGRAM(S): 2.5 TABLET ORAL at 05:55

## 2024-01-01 RX ADMIN — Medication 2: at 13:09

## 2024-01-01 RX ADMIN — Medication 1: at 08:15

## 2024-01-01 RX ADMIN — CEFEPIME 100 MILLIGRAM(S): 1 INJECTION, POWDER, FOR SOLUTION INTRAMUSCULAR; INTRAVENOUS at 20:13

## 2024-01-01 RX ADMIN — SENNA PLUS 5 MILLILITER(S): 8.6 TABLET ORAL at 21:46

## 2024-01-01 RX ADMIN — MIDODRINE HYDROCHLORIDE 30 MILLIGRAM(S): 2.5 TABLET ORAL at 05:06

## 2024-01-01 RX ADMIN — Medication 1: at 06:55

## 2024-01-01 RX ADMIN — Medication 100 MILLIGRAM(S): at 17:49

## 2024-01-01 RX ADMIN — Medication 100 MILLIEQUIVALENT(S): at 17:34

## 2024-01-01 RX ADMIN — Medication 25 MILLIGRAM(S): at 05:43

## 2024-01-01 RX ADMIN — Medication 2: at 12:38

## 2024-01-01 RX ADMIN — Medication 1: at 06:20

## 2024-01-01 RX ADMIN — HEPARIN SODIUM 5000 UNIT(S): 5000 INJECTION INTRAVENOUS; SUBCUTANEOUS at 05:22

## 2024-01-01 RX ADMIN — Medication 1 APPLICATION(S): at 12:55

## 2024-01-01 RX ADMIN — HEPARIN SODIUM 5000 UNIT(S): 5000 INJECTION INTRAVENOUS; SUBCUTANEOUS at 06:31

## 2024-01-01 RX ADMIN — MORPHINE SULFATE 2.5 MILLIGRAM(S): 50 CAPSULE, EXTENDED RELEASE ORAL at 22:54

## 2024-01-01 RX ADMIN — Medication 6: at 12:20

## 2024-01-01 RX ADMIN — Medication 3.79 MICROGRAM(S)/KG/MIN: at 08:33

## 2024-01-01 RX ADMIN — Medication 1 APPLICATION(S): at 17:23

## 2024-01-01 RX ADMIN — Medication 650 MILLIGRAM(S): at 18:15

## 2024-01-01 RX ADMIN — SODIUM CHLORIDE 54 MILLILITER(S): 9 INJECTION, SOLUTION INTRAVENOUS at 06:11

## 2024-01-01 RX ADMIN — MIDODRINE HYDROCHLORIDE 30 MILLIGRAM(S): 2.5 TABLET ORAL at 05:26

## 2024-01-01 RX ADMIN — Medication 100 MILLIGRAM(S): at 05:19

## 2024-01-01 RX ADMIN — Medication 100 MILLIGRAM(S): at 08:22

## 2024-01-01 RX ADMIN — Medication 50 MILLIEQUIVALENT(S): at 16:34

## 2024-01-01 RX ADMIN — Medication 3: at 17:53

## 2024-01-01 RX ADMIN — Medication 4: at 05:21

## 2024-01-01 RX ADMIN — Medication 1 APPLICATION(S): at 16:54

## 2024-01-01 RX ADMIN — FENTANYL CITRATE 2.02 MICROGRAM(S)/KG/HR: 50 INJECTION INTRAVENOUS at 19:19

## 2024-01-01 RX ADMIN — LEVALBUTEROL 0.63 MILLIGRAM(S): 1.25 SOLUTION, CONCENTRATE RESPIRATORY (INHALATION) at 15:02

## 2024-01-01 RX ADMIN — Medication 100 MILLIEQUIVALENT(S): at 21:59

## 2024-01-01 RX ADMIN — HEPARIN SODIUM 5000 UNIT(S): 5000 INJECTION INTRAVENOUS; SUBCUTANEOUS at 06:33

## 2024-01-01 RX ADMIN — HEPARIN SODIUM 5000 UNIT(S): 5000 INJECTION INTRAVENOUS; SUBCUTANEOUS at 05:26

## 2024-01-01 RX ADMIN — Medication 2: at 18:56

## 2024-01-01 RX ADMIN — Medication 1300 MILLIGRAM(S): at 05:42

## 2024-01-01 RX ADMIN — HYDROMORPHONE HYDROCHLORIDE 0.25 MILLIGRAM(S): 2 INJECTION INTRAMUSCULAR; INTRAVENOUS; SUBCUTANEOUS at 15:00

## 2024-01-01 RX ADMIN — CLOPIDOGREL BISULFATE 75 MILLIGRAM(S): 75 TABLET, FILM COATED ORAL at 12:00

## 2024-01-01 RX ADMIN — Medication 100 MILLIGRAM(S): at 18:59

## 2024-01-01 RX ADMIN — Medication 150 GRAM(S): at 05:34

## 2024-01-01 RX ADMIN — Medication 100 MILLIGRAM(S): at 18:58

## 2024-01-01 RX ADMIN — Medication 3.79 MICROGRAM(S)/KG/MIN: at 19:20

## 2024-01-01 RX ADMIN — Medication 1: at 22:08

## 2024-01-01 RX ADMIN — Medication 5 MILLIGRAM(S): at 05:39

## 2024-01-01 RX ADMIN — LEVALBUTEROL 0.63 MILLIGRAM(S): 1.25 SOLUTION, CONCENTRATE RESPIRATORY (INHALATION) at 04:07

## 2024-01-01 RX ADMIN — PROPOFOL 1.21 MICROGRAM(S)/KG/MIN: 10 INJECTION, EMULSION INTRAVENOUS at 19:03

## 2024-01-01 RX ADMIN — SODIUM CHLORIDE 50 MILLILITER(S): 9 INJECTION, SOLUTION INTRAVENOUS at 09:27

## 2024-01-01 RX ADMIN — Medication 1 APPLICATION(S): at 17:36

## 2024-01-01 RX ADMIN — Medication 1300 MILLIGRAM(S): at 14:33

## 2024-01-01 RX ADMIN — CHLORHEXIDINE GLUCONATE 15 MILLILITER(S): 213 SOLUTION TOPICAL at 05:02

## 2024-01-01 RX ADMIN — CHLORHEXIDINE GLUCONATE 15 MILLILITER(S): 213 SOLUTION TOPICAL at 17:33

## 2024-01-01 RX ADMIN — SODIUM ZIRCONIUM CYCLOSILICATE 10 GRAM(S): 10 POWDER, FOR SUSPENSION ORAL at 12:38

## 2024-01-01 RX ADMIN — Medication 3 MILLILITER(S): at 07:41

## 2024-01-01 RX ADMIN — Medication 100 MILLIEQUIVALENT(S): at 13:17

## 2024-01-01 RX ADMIN — SODIUM ZIRCONIUM CYCLOSILICATE 10 GRAM(S): 10 POWDER, FOR SUSPENSION ORAL at 13:07

## 2024-01-01 RX ADMIN — Medication 1300 MILLIGRAM(S): at 21:23

## 2024-01-01 RX ADMIN — Medication 50 MILLIEQUIVALENT(S): at 06:59

## 2024-01-01 RX ADMIN — SODIUM CHLORIDE 500 MILLILITER(S): 9 INJECTION, SOLUTION INTRAVENOUS at 04:00

## 2024-01-01 RX ADMIN — Medication 1 APPLICATION(S): at 05:07

## 2024-01-01 RX ADMIN — CEFTRIAXONE 100 MILLIGRAM(S): 500 INJECTION, POWDER, FOR SOLUTION INTRAMUSCULAR; INTRAVENOUS at 18:12

## 2024-01-01 RX ADMIN — SODIUM CHLORIDE 85 MILLILITER(S): 9 INJECTION, SOLUTION INTRAVENOUS at 14:57

## 2024-01-01 RX ADMIN — CHLORHEXIDINE GLUCONATE 15 MILLILITER(S): 213 SOLUTION TOPICAL at 17:09

## 2024-01-01 RX ADMIN — SODIUM CHLORIDE 85 MILLILITER(S): 9 INJECTION, SOLUTION INTRAVENOUS at 08:00

## 2024-01-01 RX ADMIN — Medication 150 GRAM(S): at 14:52

## 2024-01-01 RX ADMIN — PROPOFOL 1.21 MICROGRAM(S)/KG/MIN: 10 INJECTION, EMULSION INTRAVENOUS at 09:46

## 2024-01-01 RX ADMIN — Medication 1300 MILLIGRAM(S): at 12:34

## 2024-01-01 RX ADMIN — Medication 1 APPLICATION(S): at 16:39

## 2024-01-01 RX ADMIN — MEROPENEM 100 MILLIGRAM(S): 1 INJECTION INTRAVENOUS at 10:45

## 2024-01-01 RX ADMIN — Medication 100 MILLIGRAM(S): at 17:04

## 2024-01-01 RX ADMIN — Medication 3: at 17:33

## 2024-01-01 RX ADMIN — Medication 40 MILLIGRAM(S): at 06:30

## 2024-01-01 RX ADMIN — PROPOFOL 1.21 MICROGRAM(S)/KG/MIN: 10 INJECTION, EMULSION INTRAVENOUS at 17:34

## 2024-01-01 RX ADMIN — Medication 100 MILLIGRAM(S): at 06:16

## 2024-01-01 RX ADMIN — SODIUM CHLORIDE 75 MILLILITER(S): 9 INJECTION, SOLUTION INTRAVENOUS at 11:54

## 2024-01-01 RX ADMIN — Medication 100 MILLIGRAM(S): at 17:02

## 2024-01-01 RX ADMIN — PROPOFOL 1.21 MICROGRAM(S)/KG/MIN: 10 INJECTION, EMULSION INTRAVENOUS at 19:19

## 2024-01-01 RX ADMIN — CHLORHEXIDINE GLUCONATE 15 MILLILITER(S): 213 SOLUTION TOPICAL at 05:15

## 2024-01-01 RX ADMIN — Medication 100 MILLIEQUIVALENT(S): at 02:14

## 2024-01-01 RX ADMIN — Medication 100 MILLIGRAM(S): at 05:53

## 2024-01-01 RX ADMIN — MIDODRINE HYDROCHLORIDE 30 MILLIGRAM(S): 2.5 TABLET ORAL at 13:49

## 2024-01-01 RX ADMIN — Medication 1: at 18:04

## 2024-01-01 RX ADMIN — Medication 8: at 12:08

## 2024-01-01 RX ADMIN — CHLORHEXIDINE GLUCONATE 15 MILLILITER(S): 213 SOLUTION TOPICAL at 17:21

## 2024-01-01 RX ADMIN — Medication 100 MILLIGRAM(S): at 06:14

## 2024-01-01 RX ADMIN — Medication 1 APPLICATION(S): at 12:06

## 2024-01-01 RX ADMIN — Medication 3.79 MICROGRAM(S)/KG/MIN: at 23:33

## 2024-01-01 RX ADMIN — SODIUM CHLORIDE 75 MILLILITER(S): 9 INJECTION, SOLUTION INTRAVENOUS at 19:35

## 2024-01-01 RX ADMIN — Medication 100 MILLIGRAM(S): at 17:42

## 2024-01-01 RX ADMIN — Medication 2: at 23:45

## 2024-01-01 RX ADMIN — Medication 2: at 13:06

## 2024-01-01 RX ADMIN — Medication 25 GRAM(S): at 23:13

## 2024-01-01 RX ADMIN — CEFTRIAXONE 100 MILLIGRAM(S): 500 INJECTION, POWDER, FOR SOLUTION INTRAMUSCULAR; INTRAVENOUS at 12:41

## 2024-01-01 RX ADMIN — HEPARIN SODIUM 5000 UNIT(S): 5000 INJECTION INTRAVENOUS; SUBCUTANEOUS at 17:35

## 2024-01-01 RX ADMIN — HEPARIN SODIUM 5000 UNIT(S): 5000 INJECTION INTRAVENOUS; SUBCUTANEOUS at 06:14

## 2024-01-01 RX ADMIN — Medication 20 MILLIEQUIVALENT(S): at 17:34

## 2024-01-01 RX ADMIN — Medication 1300 MILLIGRAM(S): at 12:39

## 2024-01-01 RX ADMIN — PANTOPRAZOLE SODIUM 40 MILLIGRAM(S): 20 TABLET, DELAYED RELEASE ORAL at 11:56

## 2024-01-01 RX ADMIN — HEPARIN SODIUM 5000 UNIT(S): 5000 INJECTION INTRAVENOUS; SUBCUTANEOUS at 05:29

## 2024-01-01 RX ADMIN — SODIUM ZIRCONIUM CYCLOSILICATE 5 GRAM(S): 10 POWDER, FOR SUSPENSION ORAL at 11:53

## 2024-01-01 RX ADMIN — PANTOPRAZOLE SODIUM 40 MILLIGRAM(S): 20 TABLET, DELAYED RELEASE ORAL at 12:33

## 2024-01-01 RX ADMIN — PANTOPRAZOLE SODIUM 40 MILLIGRAM(S): 20 TABLET, DELAYED RELEASE ORAL at 12:38

## 2024-01-01 RX ADMIN — Medication 50 MILLIEQUIVALENT(S): at 18:09

## 2024-01-01 RX ADMIN — Medication 25 MILLIGRAM(S): at 17:56

## 2024-01-01 RX ADMIN — PROPOFOL 1.21 MICROGRAM(S)/KG/MIN: 10 INJECTION, EMULSION INTRAVENOUS at 19:09

## 2024-01-01 RX ADMIN — Medication 100 MILLIEQUIVALENT(S): at 05:51

## 2024-01-01 RX ADMIN — PROPOFOL 1.21 MICROGRAM(S)/KG/MIN: 10 INJECTION, EMULSION INTRAVENOUS at 03:50

## 2024-01-01 RX ADMIN — Medication 25 MILLIGRAM(S): at 05:54

## 2024-01-01 RX ADMIN — HEPARIN SODIUM 5000 UNIT(S): 5000 INJECTION INTRAVENOUS; SUBCUTANEOUS at 17:39

## 2024-01-01 RX ADMIN — Medication 3.79 MICROGRAM(S)/KG/MIN: at 19:03

## 2024-01-01 RX ADMIN — HEPARIN SODIUM 5000 UNIT(S): 5000 INJECTION INTRAVENOUS; SUBCUTANEOUS at 05:10

## 2024-01-01 RX ADMIN — SODIUM ZIRCONIUM CYCLOSILICATE 10 GRAM(S): 10 POWDER, FOR SUSPENSION ORAL at 11:56

## 2024-01-01 RX ADMIN — HEPARIN SODIUM 5000 UNIT(S): 5000 INJECTION INTRAVENOUS; SUBCUTANEOUS at 05:21

## 2024-01-01 RX ADMIN — PANTOPRAZOLE SODIUM 40 MILLIGRAM(S): 20 TABLET, DELAYED RELEASE ORAL at 11:26

## 2024-01-01 RX ADMIN — Medication 100 MILLIEQUIVALENT(S): at 01:13

## 2024-01-01 RX ADMIN — Medication 3.79 MICROGRAM(S)/KG/MIN: at 20:09

## 2024-01-01 RX ADMIN — PANTOPRAZOLE SODIUM 40 MILLIGRAM(S): 20 TABLET, DELAYED RELEASE ORAL at 12:57

## 2024-01-01 RX ADMIN — Medication 1: at 16:54

## 2024-01-01 RX ADMIN — PANTOPRAZOLE SODIUM 40 MILLIGRAM(S): 20 TABLET, DELAYED RELEASE ORAL at 12:39

## 2024-01-01 RX ADMIN — Medication 100 MILLIGRAM(S): at 05:21

## 2024-01-01 RX ADMIN — CEFEPIME 100 MILLIGRAM(S): 1 INJECTION, POWDER, FOR SOLUTION INTRAMUSCULAR; INTRAVENOUS at 18:53

## 2024-01-01 RX ADMIN — Medication 1 APPLICATION(S): at 17:14

## 2024-01-01 RX ADMIN — Medication 240 MILLIGRAM(S): at 07:45

## 2024-01-01 RX ADMIN — Medication 650 MILLIGRAM(S): at 10:57

## 2024-01-01 RX ADMIN — Medication 1 APPLICATION(S): at 12:56

## 2024-01-01 RX ADMIN — Medication 100 MILLIGRAM(S): at 17:19

## 2024-01-01 RX ADMIN — SODIUM CHLORIDE 75 MILLILITER(S): 9 INJECTION, SOLUTION INTRAVENOUS at 09:22

## 2024-01-01 RX ADMIN — Medication 1: at 18:08

## 2024-01-01 RX ADMIN — Medication 100 MILLIGRAM(S): at 18:53

## 2024-01-01 RX ADMIN — Medication 3.79 MICROGRAM(S)/KG/MIN: at 19:19

## 2024-01-01 RX ADMIN — FENTANYL CITRATE 2.02 MICROGRAM(S)/KG/HR: 50 INJECTION INTRAVENOUS at 20:09

## 2024-01-01 RX ADMIN — SODIUM ZIRCONIUM CYCLOSILICATE 10 GRAM(S): 10 POWDER, FOR SUSPENSION ORAL at 12:19

## 2024-01-01 RX ADMIN — Medication 100 MILLIGRAM(S): at 06:34

## 2024-01-01 RX ADMIN — PROPOFOL 1.21 MICROGRAM(S)/KG/MIN: 10 INJECTION, EMULSION INTRAVENOUS at 20:09

## 2024-01-01 RX ADMIN — Medication 100 MILLIGRAM(S): at 05:52

## 2024-01-01 RX ADMIN — HYDROMORPHONE HYDROCHLORIDE 0.25 MILLIGRAM(S): 2 INJECTION INTRAMUSCULAR; INTRAVENOUS; SUBCUTANEOUS at 04:18

## 2024-01-01 RX ADMIN — Medication 1300 MILLIGRAM(S): at 21:28

## 2024-01-01 RX ADMIN — Medication 3.79 MICROGRAM(S)/KG/MIN: at 04:53

## 2024-01-01 RX ADMIN — Medication 2: at 12:27

## 2024-01-01 RX ADMIN — MIDODRINE HYDROCHLORIDE 30 MILLIGRAM(S): 2.5 TABLET ORAL at 21:19

## 2024-01-01 RX ADMIN — CEFTRIAXONE 100 MILLIGRAM(S): 500 INJECTION, POWDER, FOR SOLUTION INTRAMUSCULAR; INTRAVENOUS at 09:56

## 2024-01-01 RX ADMIN — MIDODRINE HYDROCHLORIDE 30 MILLIGRAM(S): 2.5 TABLET ORAL at 14:48

## 2024-01-01 RX ADMIN — Medication 1: at 23:53

## 2024-01-01 RX ADMIN — Medication 1300 MILLIGRAM(S): at 13:49

## 2024-01-01 RX ADMIN — HEPARIN SODIUM 5000 UNIT(S): 5000 INJECTION INTRAVENOUS; SUBCUTANEOUS at 05:54

## 2024-01-01 RX ADMIN — Medication 75 MEQ/KG/HR: at 19:03

## 2024-01-01 RX ADMIN — MEROPENEM 100 MILLIGRAM(S): 1 INJECTION INTRAVENOUS at 09:56

## 2024-01-01 RX ADMIN — MIDODRINE HYDROCHLORIDE 30 MILLIGRAM(S): 2.5 TABLET ORAL at 22:14

## 2024-01-01 RX ADMIN — Medication 250 MILLIGRAM(S): at 12:39

## 2024-01-01 RX ADMIN — Medication 1 APPLICATION(S): at 11:44

## 2024-01-01 RX ADMIN — Medication 50 MILLIGRAM(S): at 06:30

## 2024-01-01 RX ADMIN — Medication 1: at 12:39

## 2024-01-01 RX ADMIN — FENTANYL CITRATE 2.02 MICROGRAM(S)/KG/HR: 50 INJECTION INTRAVENOUS at 13:49

## 2024-01-01 RX ADMIN — LEVALBUTEROL 0.63 MILLIGRAM(S): 1.25 SOLUTION, CONCENTRATE RESPIRATORY (INHALATION) at 10:07

## 2024-01-01 RX ADMIN — CHLORHEXIDINE GLUCONATE 1 APPLICATION(S): 213 SOLUTION TOPICAL at 11:24

## 2024-01-01 RX ADMIN — Medication 4: at 01:05

## 2024-01-01 RX ADMIN — HEPARIN SODIUM 5000 UNIT(S): 5000 INJECTION INTRAVENOUS; SUBCUTANEOUS at 17:06

## 2024-01-01 RX ADMIN — Medication 100 MILLIGRAM(S): at 06:31

## 2024-01-01 RX ADMIN — SODIUM ZIRCONIUM CYCLOSILICATE 10 GRAM(S): 10 POWDER, FOR SUSPENSION ORAL at 12:34

## 2024-01-01 RX ADMIN — MIDODRINE HYDROCHLORIDE 30 MILLIGRAM(S): 2.5 TABLET ORAL at 22:03

## 2024-01-01 RX ADMIN — Medication 3.79 MICROGRAM(S)/KG/MIN: at 09:06

## 2024-01-01 RX ADMIN — CHLORHEXIDINE GLUCONATE 1 APPLICATION(S): 213 SOLUTION TOPICAL at 11:56

## 2024-01-01 RX ADMIN — Medication 2: at 17:04

## 2024-01-01 RX ADMIN — Medication 500 MILLIGRAM(S): at 06:16

## 2024-01-01 RX ADMIN — HEPARIN SODIUM 5000 UNIT(S): 5000 INJECTION INTRAVENOUS; SUBCUTANEOUS at 17:13

## 2024-01-01 RX ADMIN — Medication 100 MILLIGRAM(S): at 17:39

## 2024-01-01 RX ADMIN — Medication 100 MILLIGRAM(S): at 17:21

## 2024-01-01 RX ADMIN — MIDODRINE HYDROCHLORIDE 20 MILLIGRAM(S): 2.5 TABLET ORAL at 21:25

## 2024-01-01 RX ADMIN — Medication 100 MILLIGRAM(S): at 05:27

## 2024-01-01 RX ADMIN — Medication 1300 MILLIGRAM(S): at 06:40

## 2024-01-01 RX ADMIN — MAGNESIUM OXIDE 400 MG ORAL TABLET 400 MILLIGRAM(S): 241.3 TABLET ORAL at 22:32

## 2024-01-01 RX ADMIN — Medication 1300 MILLIGRAM(S): at 13:10

## 2024-01-01 RX ADMIN — POLYETHYLENE GLYCOL 3350 17 GRAM(S): 17 POWDER, FOR SOLUTION ORAL at 11:54

## 2024-01-01 RX ADMIN — Medication 600 MILLIGRAM(S): at 05:30

## 2024-01-01 RX ADMIN — Medication 100 MILLIEQUIVALENT(S): at 15:12

## 2024-01-01 RX ADMIN — Medication 100 GRAM(S): at 00:02

## 2024-01-01 RX ADMIN — Medication 3.79 MICROGRAM(S)/KG/MIN: at 20:38

## 2024-01-01 RX ADMIN — Medication 100 MILLIGRAM(S): at 18:26

## 2024-01-01 RX ADMIN — Medication 100 MILLIGRAM(S): at 05:58

## 2024-01-01 RX ADMIN — CHLORHEXIDINE GLUCONATE 15 MILLILITER(S): 213 SOLUTION TOPICAL at 17:13

## 2024-01-01 RX ADMIN — ATORVASTATIN CALCIUM 10 MILLIGRAM(S): 80 TABLET, FILM COATED ORAL at 21:55

## 2024-01-01 RX ADMIN — Medication 100 MILLIGRAM(S): at 05:09

## 2024-01-01 RX ADMIN — Medication 1: at 06:52

## 2024-01-01 RX ADMIN — Medication 100 MILLIGRAM(S): at 09:29

## 2024-01-01 RX ADMIN — PANTOPRAZOLE SODIUM 40 MILLIGRAM(S): 20 TABLET, DELAYED RELEASE ORAL at 12:29

## 2024-01-01 RX ADMIN — Medication 1: at 05:04

## 2024-01-01 RX ADMIN — HEPARIN SODIUM 5000 UNIT(S): 5000 INJECTION INTRAVENOUS; SUBCUTANEOUS at 17:11

## 2024-01-01 RX ADMIN — Medication 100 MILLIGRAM(S): at 05:20

## 2024-01-01 RX ADMIN — Medication 100 MILLIEQUIVALENT(S): at 22:53

## 2024-01-01 RX ADMIN — Medication 1 APPLICATION(S): at 17:49

## 2024-01-01 RX ADMIN — Medication 1300 MILLIGRAM(S): at 21:10

## 2024-01-01 RX ADMIN — SENNA PLUS 2 TABLET(S): 8.6 TABLET ORAL at 21:47

## 2024-01-01 RX ADMIN — CEFTRIAXONE 100 MILLIGRAM(S): 500 INJECTION, POWDER, FOR SOLUTION INTRAMUSCULAR; INTRAVENOUS at 17:37

## 2024-01-01 RX ADMIN — Medication 50 MILLIEQUIVALENT(S): at 10:27

## 2024-01-01 RX ADMIN — SENNA PLUS 2 TABLET(S): 8.6 TABLET ORAL at 22:32

## 2024-01-01 RX ADMIN — CHLORHEXIDINE GLUCONATE 15 MILLILITER(S): 213 SOLUTION TOPICAL at 05:07

## 2024-01-01 RX ADMIN — Medication 100 MILLIGRAM(S): at 19:04

## 2024-01-01 RX ADMIN — PANTOPRAZOLE SODIUM 40 MILLIGRAM(S): 20 TABLET, DELAYED RELEASE ORAL at 07:55

## 2024-01-01 RX ADMIN — Medication 30 MILLILITER(S): at 06:41

## 2024-01-01 RX ADMIN — Medication 1300 MILLIGRAM(S): at 05:26

## 2024-01-01 RX ADMIN — Medication 100 MILLIGRAM(S): at 05:22

## 2024-01-01 RX ADMIN — Medication 100 MILLIEQUIVALENT(S): at 14:35

## 2024-01-01 RX ADMIN — PANTOPRAZOLE SODIUM 40 MILLIGRAM(S): 20 TABLET, DELAYED RELEASE ORAL at 12:54

## 2024-01-01 RX ADMIN — PANTOPRAZOLE SODIUM 40 MILLIGRAM(S): 20 TABLET, DELAYED RELEASE ORAL at 12:20

## 2024-01-01 RX ADMIN — PANTOPRAZOLE SODIUM 40 MILLIGRAM(S): 20 TABLET, DELAYED RELEASE ORAL at 14:33

## 2024-01-01 RX ADMIN — PANTOPRAZOLE SODIUM 40 MILLIGRAM(S): 20 TABLET, DELAYED RELEASE ORAL at 06:34

## 2024-01-01 RX ADMIN — Medication 2: at 05:05

## 2024-01-01 RX ADMIN — Medication 2: at 11:44

## 2024-01-01 RX ADMIN — Medication 25 MILLIGRAM(S): at 17:21

## 2024-01-01 RX ADMIN — Medication 1 APPLICATION(S): at 05:01

## 2024-01-01 RX ADMIN — CEFTRIAXONE 100 MILLIGRAM(S): 500 INJECTION, POWDER, FOR SOLUTION INTRAMUSCULAR; INTRAVENOUS at 09:00

## 2024-01-01 RX ADMIN — Medication 75 MEQ/KG/HR: at 19:20

## 2024-01-01 RX ADMIN — Medication 1 APPLICATION(S): at 17:08

## 2024-01-01 RX ADMIN — MEROPENEM 100 MILLIGRAM(S): 1 INJECTION INTRAVENOUS at 10:21

## 2024-01-01 RX ADMIN — Medication 20 MILLIEQUIVALENT(S): at 22:33

## 2024-01-01 RX ADMIN — Medication 1: at 23:32

## 2024-01-01 RX ADMIN — Medication 2.5 MILLIGRAM(S): at 01:22

## 2024-01-01 RX ADMIN — Medication 3: at 12:01

## 2024-01-01 RX ADMIN — Medication 2.5 MILLIGRAM(S): at 13:43

## 2024-01-01 RX ADMIN — SODIUM CHLORIDE 75 MILLILITER(S): 9 INJECTION, SOLUTION INTRAVENOUS at 11:51

## 2024-01-01 RX ADMIN — SODIUM CHLORIDE 4 MILLILITER(S): 9 INJECTION INTRAMUSCULAR; INTRAVENOUS; SUBCUTANEOUS at 04:33

## 2024-01-01 RX ADMIN — Medication 100 MILLIEQUIVALENT(S): at 16:31

## 2024-01-01 RX ADMIN — HEPARIN SODIUM 5000 UNIT(S): 5000 INJECTION INTRAVENOUS; SUBCUTANEOUS at 17:56

## 2024-01-01 RX ADMIN — POLYETHYLENE GLYCOL 3350 17 GRAM(S): 17 POWDER, FOR SOLUTION ORAL at 16:20

## 2024-01-01 RX ADMIN — Medication 1 APPLICATION(S): at 11:54

## 2024-01-01 RX ADMIN — Medication 100 MILLIGRAM(S): at 21:48

## 2024-01-01 RX ADMIN — PROPOFOL 1.21 MICROGRAM(S)/KG/MIN: 10 INJECTION, EMULSION INTRAVENOUS at 08:04

## 2024-01-01 RX ADMIN — Medication 100 MILLIGRAM(S): at 05:55

## 2024-01-01 RX ADMIN — Medication 50 MILLILITER(S): at 11:36

## 2024-01-01 RX ADMIN — SODIUM CHLORIDE 85 MILLILITER(S): 9 INJECTION, SOLUTION INTRAVENOUS at 21:51

## 2024-01-01 RX ADMIN — Medication 1300 MILLIGRAM(S): at 05:20

## 2024-01-01 RX ADMIN — POLYETHYLENE GLYCOL 3350 17 GRAM(S): 17 POWDER, FOR SOLUTION ORAL at 12:36

## 2024-01-01 RX ADMIN — HEPARIN SODIUM 5000 UNIT(S): 5000 INJECTION INTRAVENOUS; SUBCUTANEOUS at 05:27

## 2024-01-01 RX ADMIN — Medication 100 MILLIEQUIVALENT(S): at 07:11

## 2024-01-01 RX ADMIN — Medication 100 MILLIGRAM(S): at 17:18

## 2024-01-01 RX ADMIN — Medication 100 MILLIGRAM(S): at 11:59

## 2024-01-01 RX ADMIN — Medication 3 MILLILITER(S): at 01:30

## 2024-01-01 RX ADMIN — Medication 1300 MILLIGRAM(S): at 21:25

## 2024-01-01 RX ADMIN — Medication 100 MILLIGRAM(S): at 18:29

## 2024-01-01 RX ADMIN — Medication 1: at 12:56

## 2024-01-01 RX ADMIN — CEFTRIAXONE 100 MILLIGRAM(S): 500 INJECTION, POWDER, FOR SOLUTION INTRAMUSCULAR; INTRAVENOUS at 16:46

## 2024-01-01 RX ADMIN — SODIUM ZIRCONIUM CYCLOSILICATE 10 GRAM(S): 10 POWDER, FOR SUSPENSION ORAL at 11:34

## 2024-01-01 RX ADMIN — CHLORHEXIDINE GLUCONATE 15 MILLILITER(S): 213 SOLUTION TOPICAL at 17:19

## 2024-01-01 RX ADMIN — Medication 6: at 00:35

## 2024-01-01 RX ADMIN — Medication 5 MILLIGRAM(S): at 07:37

## 2024-01-01 RX ADMIN — Medication 2: at 18:25

## 2024-01-01 RX ADMIN — HEPARIN SODIUM 5000 UNIT(S): 5000 INJECTION INTRAVENOUS; SUBCUTANEOUS at 17:34

## 2024-01-01 RX ADMIN — Medication 100 MILLIEQUIVALENT(S): at 00:39

## 2024-01-01 RX ADMIN — Medication 25 GRAM(S): at 12:32

## 2024-01-01 RX ADMIN — Medication 1: at 23:33

## 2024-01-01 RX ADMIN — MEROPENEM 100 MILLIGRAM(S): 1 INJECTION INTRAVENOUS at 09:46

## 2024-01-01 RX ADMIN — HEPARIN SODIUM 5000 UNIT(S): 5000 INJECTION INTRAVENOUS; SUBCUTANEOUS at 17:32

## 2024-01-01 RX ADMIN — Medication 30 MILLILITER(S): at 05:23

## 2024-01-01 RX ADMIN — CEFTRIAXONE 100 MILLIGRAM(S): 500 INJECTION, POWDER, FOR SOLUTION INTRAMUSCULAR; INTRAVENOUS at 17:33

## 2024-01-01 RX ADMIN — Medication 100 MILLIGRAM(S): at 17:41

## 2024-01-01 RX ADMIN — PANTOPRAZOLE SODIUM 40 MILLIGRAM(S): 20 TABLET, DELAYED RELEASE ORAL at 12:07

## 2024-01-01 RX ADMIN — Medication 100 MILLIGRAM(S): at 17:29

## 2024-01-01 RX ADMIN — Medication 100 MILLIEQUIVALENT(S): at 03:16

## 2024-01-01 RX ADMIN — FENTANYL CITRATE 2.02 MICROGRAM(S)/KG/HR: 50 INJECTION INTRAVENOUS at 07:48

## 2024-01-01 RX ADMIN — Medication 650 MILLIGRAM(S): at 17:56

## 2024-01-01 RX ADMIN — POLYETHYLENE GLYCOL 3350 17 GRAM(S): 17 POWDER, FOR SOLUTION ORAL at 12:01

## 2024-01-01 RX ADMIN — FENTANYL CITRATE 2.02 MICROGRAM(S)/KG/HR: 50 INJECTION INTRAVENOUS at 20:38

## 2024-01-01 RX ADMIN — Medication 6: at 05:26

## 2024-01-01 RX ADMIN — Medication 30 MILLILITER(S): at 22:34

## 2024-01-01 NOTE — PROGRESS NOTE ADULT - SUBJECTIVE AND OBJECTIVE BOX
Patient is a 89y old  Female who presents with a chief complaint of frequent fall (31 Dec 2023 10:56)      SUBJECTIVE / OVERNIGHT EVENTS:    Hypoglycemic overnight due to reduced oral intake, given dextrose with subsequent improvement in POC glucose. This AM, NAYLA NORMA feels well. Has no n/v/d/cp/sob.      MEDICATIONS  (STANDING):  amLODIPine   Tablet 10 milliGRAM(s) Oral daily  aspirin  chewable 81 milliGRAM(s) Oral daily  atorvastatin 10 milliGRAM(s) Oral at bedtime  ceFAZolin   IVPB      ceFAZolin   IVPB 1000 milliGRAM(s) IV Intermittent every 12 hours  clopidogrel Tablet 75 milliGRAM(s) Oral daily  collagenase Ointment 1 Application(s) Topical daily  dextrose 5%. 1000 milliLiter(s) (100 mL/Hr) IV Continuous <Continuous>  dextrose 5%. 1000 milliLiter(s) (50 mL/Hr) IV Continuous <Continuous>  dextrose 50% Injectable 12.5 Gram(s) IV Push once  dextrose 50% Injectable 25 Gram(s) IV Push once  dextrose 50% Injectable 25 Gram(s) IV Push once  furosemide   Injectable 40 milliGRAM(s) IV Push daily  glucagon  Injectable 1 milliGRAM(s) IntraMuscular once  heparin   Injectable 5000 Unit(s) SubCutaneous every 12 hours  insulin lispro (ADMELOG) corrective regimen sliding scale   SubCutaneous three times a day before meals  metoprolol tartrate 50 milliGRAM(s) Oral two times a day  pantoprazole    Tablet 40 milliGRAM(s) Oral before breakfast  polyethylene glycol 3350 17 Gram(s) Oral daily  senna 2 Tablet(s) Oral at bedtime    MEDICATIONS  (PRN):  acetaminophen     Tablet .. 650 milliGRAM(s) Oral every 6 hours PRN Mild Pain (1 - 3), Moderate Pain (4 - 6)  albuterol/ipratropium for Nebulization 3 milliLiter(s) Nebulizer every 6 hours PRN Shortness of Breath and/or Wheezing  bisacodyl Suppository 10 milliGRAM(s) Rectal daily PRN Constipation  dextrose Oral Gel 15 Gram(s) Oral once PRN Blood Glucose LESS THAN 70 milliGRAM(s)/deciliter  LORazepam    Concentrate 0.5 milliGRAM(s) Oral every 4 hours PRN Agitation  melatonin 3 milliGRAM(s) Oral at bedtime PRN Insomnia  morphine   Solution 5 milliGRAM(s) Oral every 4 hours PRN Severe Pain (7 - 10)  morphine   Solution 2.5 milliGRAM(s) Oral every 4 hours PRN Moderate Pain (4 - 6)      PHYSICAL EXAM:  T(C): 36.3 (12-31-23 @ 23:37), Max: 36.7 (12-31-23 @ 17:12)  HR: 66 (12-31-23 @ 23:37) (66 - 87)  BP: 107/59 (12-31-23 @ 23:37) (105/58 - 121/54)  RR: 18 (12-31-23 @ 23:37) (16 - 18)  SpO2: 97% (12-31-23 @ 23:37) (97% - 98%)  I&O's Summary    GENERAL: NAD, lying in bed  HEAD:  Atraumatic, Normocephalic, MMM  CHEST/LUNG: No use of accessory muscles, CTAB, breathing non-labored  COR: RR, no mrcg  ABD: Soft, ND/NT, +BS  PSYCH: AAOxperson, place  NEUROLOGY: CN II-XII grossly intact, moving all extremities  SKIN: No rashes or lesions  EXT: wwp, no cce    LABS:  CAPILLARY BLOOD GLUCOSE      POCT Blood Glucose.: 178 mg/dL (01 Jan 2024 08:04)  POCT Blood Glucose.: 140 mg/dL (31 Dec 2023 22:06)  POCT Blood Glucose.: 64 mg/dL (31 Dec 2023 21:22)  POCT Blood Glucose.: 57 mg/dL (31 Dec 2023 21:21)  POCT Blood Glucose.: 75 mg/dL (31 Dec 2023 16:26)  POCT Blood Glucose.: 209 mg/dL (31 Dec 2023 11:36)                        RADIOLOGY & ADDITIONAL TESTS:    Telemetry Personally Reviewed -     Imaging Personally Reviewed -     Imaging Reviewed -     Consultant(s) Notes Reviewed -       Care Discussed with Consultants/Other Providers -

## 2024-01-01 NOTE — PROGRESS NOTE ADULT - ASSESSMENT
89 years old female with h/o HTN, HLD, CAD, s/p CABG, DM, dementia, mod AS, s/p spinal stimulator placement present to ED with generalized weakness and frequent fall. For last 1-2 weeks, patient has been having generalized weakness and frequent fall. Have multiple bruises on body, admitted for sepsis 2/2 to feet ulcers,  family requesting inpatient hospice/comfort care  - referral made, pending auth to [facility]     Left lateral malleolus infected appearing Ulcer   Sepsis   leukocytosis improved   US doppler ordered for LLE tenderness-- negative for DVT  -- PT wound consulted, wound recs appreciated    -ESR/CRP elevated   -- MRSA neg   - Blood Cx--NGTD   --Vascular , ID, podiatry consults appreciated, no surgical intervention   --abd changed to ancef (wound Cx reviewed), needs 2 week course, can d/c on TMP-SMX DS BID (end date Jan 8)  --patient unable to tolerate MRI ankle Left     Presumed UTI ? patient was complaining of dysuria   abx as above   UCx NGTD     Shortness of breath   12/23 CXR showing pulmonary edema   no wheezing on exam , appears comfortable   added Lasix   supplemental O2 to maintain O2 sat >92%   SpO2 95% on 4L NC 12/25/23        Frequent falls  advanced dementia , mostly bedbound , functional quadriplegia (wheelchair bound at home)   CT head    with no acute pathology. CT C spine with no acute fracture.       4mm right upper lobe pul nodule.   CT chest/abd/pelvis with no acute pathology. Emphysema +. Chronic fibrotic changes at lung apices.   no wheezing, lungs are CTAB       Questionable asymmetrical rectal wall thickening.   --per discussion with son, given age and dementia, family prefers not to pursue further investigation /treatment or aggressive measures.      Macrocytic anemia.   Iron panel relatively unremarkable, b12, folate WNL--no role for supplementation     SAE (acute kidney injury).  POA   resolved with IVF      Benign essential HTN/HLD   continue with current regimen       CAD (coronary artery disease).   --CAD s/p PCI s/p CABG  on aspirin, plavix, statin, BB.    Type 2 diabetes mellitus with unspecified complications. controlled   A1c 6.6%  continue with ISS for now      Dementia. suspected vascular dementia   CT head showing multiple old chronic strokes, including cerebellar strokes which would explain the gait instability    supportive care    Moderate PCM   nutrition consult appreciated     Preventative Measures   heparin SQ-dvt ppx  fall, aspiration precautions   HOBE     palliative consult appreciated >> patient DNR/DNI      89 years old female with h/o HTN, HLD, CAD, s/p CABG, DM, dementia, mod AS, s/p spinal stimulator placement present to ED with generalized weakness and frequent fall. For last 1-2 weeks, patient has been having generalized weakness and frequent fall. Have multiple bruises on body, admitted for sepsis 2/2 to feet ulcers,  family requesting inpatient hospice/comfort care  - referral made, pending auth to [facility]     Left lateral malleolus infected appearing Ulcer   Sepsis   leukocytosis improved   US doppler ordered for LLE tenderness-- negative for DVT  -- PT wound consulted, wound recs appreciated    -ESR/CRP elevated   -- MRSA neg   - Blood Cx--NGTD   --Vascular , ID, podiatry consults appreciated, no surgical intervention   --abd changed to ancef (wound Cx reviewed), needs 2 week course, can d/c on TMP-SMX DS BID (end date Jan 8)  --patient unable to tolerate MRI ankle Left     Presumed UTI ? patient was complaining of dysuria   abx as above   UCx NGTD     Shortness of breath   12/23 CXR showing pulmonary edema   no wheezing on exam , appears comfortable   added Lasix   supplemental O2 to maintain O2 sat >92%   SpO2 95% on 4L NC 12/25/23        Frequent falls  advanced dementia , mostly bedbound , functional quadriplegia (wheelchair bound at home)   CT head    with no acute pathology. CT C spine with no acute fracture.       4mm right upper lobe pul nodule.   CT chest/abd/pelvis with no acute pathology. Emphysema +. Chronic fibrotic changes at lung apices.   no wheezing, lungs are CTAB       Questionable asymmetrical rectal wall thickening.   --per discussion with son, given age and dementia, family prefers not to pursue further investigation /treatment or aggressive measures.      Macrocytic anemia.   Iron panel relatively unremarkable, b12, folate WNL--no role for supplementation     SAE (acute kidney injury).  POA   resolved with IVF      Benign essential HTN/HLD   continue with current regimen       CAD (coronary artery disease).   --CAD s/p PCI s/p CABG  on aspirin, plavix, statin, BB.    Type 2 diabetes mellitus with unspecified complications. controlled   A1c 6.6%  continue with ISS for now      Dementia. suspected vascular dementia   CT head showing multiple old chronic strokes, including cerebellar strokes which would explain the gait instability    supportive care    Moderate PCM   nutrition consult appreciated     Preventative Measures   heparin SQ-dvt ppx  fall, aspiration precautions   HOBE     palliative consult appreciated >> patient DNR/DNI

## 2024-01-02 NOTE — PROGRESS NOTE ADULT - PROBLEM SELECTOR PROBLEM 4
Benign essential HTN
Severe protein-calorie malnutrition
Benign essential HTN
Debility
Benign essential HTN

## 2024-01-02 NOTE — DISCHARGE NOTE PROVIDER - CARE PROVIDERS DIRECT ADDRESSES
Please advise patient to schedule fasting lab work on or after March 11, 2024 and follow-up 1 week later.  No need for lab work in January.   ,DirectAddress_Unknown

## 2024-01-02 NOTE — PROGRESS NOTE ADULT - NUTRITIONAL ASSESSMENT
This patient has been assessed with a concern for Malnutrition and has been determined to have a diagnosis/diagnoses of Moderate protein-calorie malnutrition.    This patient is being managed with:   Diet Pureed-  Consistent Carbohydrate {Evening Snack}  Entered: Dec 18 2023 11:35AM  
This patient has been assessed with a concern for Malnutrition and has been determined to have a diagnosis/diagnoses of Moderate protein-calorie malnutrition.    This patient is being managed with:   Diet Soft and Bite Sized-  Consistent Carbohydrate {Evening Snack}  Low Sodium  Supplement Feeding Modality:  Oral  Glucerna Shake Cans or Servings Per Day:  1       Frequency:  Three Times a day  Entered: Dec 17 2023  9:52AM  
This patient has been assessed with a concern for Malnutrition and has been determined to have a diagnosis/diagnoses of Moderate protein-calorie malnutrition.    This patient is being managed with:   Diet Pureed-  Consistent Carbohydrate {Evening Snack}  Entered: Dec 18 2023 11:35AM  
This patient has been assessed with a concern for Malnutrition and has been determined to have a diagnosis/diagnoses of Moderate protein-calorie malnutrition.    This patient is being managed with:   Diet Pureed-  Consistent Carbohydrate {Evening Snack}  Entered: Dec 18 2023 11:35AM  
This patient has been assessed with a concern for Malnutrition and has been determined to have a diagnosis/diagnoses of Moderate protein-calorie malnutrition.    This patient is being managed with:   Diet Pureed-  Consistent Carbohydrate {Evening Snack}  Supplement Feeding Modality:  Oral  Glucerna Shake Cans or Servings Per Day:  1       Frequency:  Three Times a day  Entered: Dec 27 2023 11:19AM  
This patient has been assessed with a concern for Malnutrition and has been determined to have a diagnosis/diagnoses of Moderate protein-calorie malnutrition.    This patient is being managed with:   Diet Pureed-  Consistent Carbohydrate {Evening Snack}  Supplement Feeding Modality:  Oral  Glucerna Shake Cans or Servings Per Day:  1       Frequency:  Three Times a day  Entered: Dec 27 2023 11:19AM    Diet Pureed-  Consistent Carbohydrate {Evening Snack}  Entered: Dec 18 2023 11:35AM    The following pending diet order is being considered for treatment of Moderate protein-calorie malnutrition:null
This patient has been assessed with a concern for Malnutrition and has been determined to have a diagnosis/diagnoses of Moderate protein-calorie malnutrition.    This patient is being managed with:   Diet Pureed-  Consistent Carbohydrate {Evening Snack}  Entered: Dec 18 2023 11:35AM  
This patient has been assessed with a concern for Malnutrition and has been determined to have a diagnosis/diagnoses of Moderate protein-calorie malnutrition.    This patient is being managed with:   Diet Pureed-  Consistent Carbohydrate {Evening Snack}  Supplement Feeding Modality:  Oral  Glucerna Shake Cans or Servings Per Day:  1       Frequency:  Three Times a day  Entered: Dec 27 2023 11:19AM  
This patient has been assessed with a concern for Malnutrition and has been determined to have a diagnosis/diagnoses of Moderate protein-calorie malnutrition.    This patient is being managed with:   Diet Pureed-  Consistent Carbohydrate {Evening Snack}  Entered: Dec 18 2023 11:35AM  
This patient has been assessed with a concern for Malnutrition and has been determined to have a diagnosis/diagnoses of Moderate protein-calorie malnutrition.    This patient is being managed with:   Diet Pureed-  Consistent Carbohydrate {Evening Snack}  Entered: Dec 18 2023 11:35AM  
This patient has been assessed with a concern for Malnutrition and has been determined to have a diagnosis/diagnoses of Moderate protein-calorie malnutrition.    This patient is being managed with:   Diet Pureed-  Consistent Carbohydrate {Evening Snack}  Supplement Feeding Modality:  Oral  Glucerna Shake Cans or Servings Per Day:  1       Frequency:  Three Times a day  Entered: Dec 27 2023 11:19AM  
This patient has been assessed with a concern for Malnutrition and has been determined to have a diagnosis/diagnoses of Moderate protein-calorie malnutrition.    This patient is being managed with:   Diet Pureed-  Consistent Carbohydrate {Evening Snack}  Entered: Dec 18 2023 11:35AM  
This patient has been assessed with a concern for Malnutrition and has been determined to have a diagnosis/diagnoses of Moderate protein-calorie malnutrition.    This patient is being managed with:   Diet Pureed-  Consistent Carbohydrate {Evening Snack}  Supplement Feeding Modality:  Oral  Glucerna Shake Cans or Servings Per Day:  1       Frequency:  Three Times a day  Entered: Dec 27 2023 11:19AM  
This patient has been assessed with a concern for Malnutrition and has been determined to have a diagnosis/diagnoses of Moderate protein-calorie malnutrition.    This patient is being managed with:   Diet Pureed-  Consistent Carbohydrate {Evening Snack}  Entered: Dec 18 2023 11:35AM  
This patient has been assessed with a concern for Malnutrition and has been determined to have a diagnosis/diagnoses of Moderate protein-calorie malnutrition.    This patient is being managed with:   Diet Soft and Bite Sized-  Consistent Carbohydrate {Evening Snack}  Low Sodium  Supplement Feeding Modality:  Oral  Glucerna Shake Cans or Servings Per Day:  1       Frequency:  Three Times a day  Entered: Dec 17 2023  9:52AM

## 2024-01-02 NOTE — PROGRESS NOTE ADULT - SUBJECTIVE AND OBJECTIVE BOX
89 years old female with h/o HTN, HLD, CAD, s/p CABG, DM, dementia, mod AS, s/p spinal stimulator placement present to ED with generalized weakness and frequent falls.      Patient seen and examined at bedside  No acute distress  Son at the bedside  Case discussed with CM - patient to be DC to inpatient palliative care at [facility]/      MEDICATIONS  (STANDING):  amLODIPine   Tablet 10 milliGRAM(s) Oral daily  aspirin  chewable 81 milliGRAM(s) Oral daily  atorvastatin 10 milliGRAM(s) Oral at bedtime  ceFAZolin   IVPB      ceFAZolin   IVPB 1000 milliGRAM(s) IV Intermittent every 12 hours  clopidogrel Tablet 75 milliGRAM(s) Oral daily  collagenase Ointment 1 Application(s) Topical daily  dextrose 5%. 1000 milliLiter(s) (100 mL/Hr) IV Continuous <Continuous>  dextrose 5%. 1000 milliLiter(s) (50 mL/Hr) IV Continuous <Continuous>  dextrose 50% Injectable 12.5 Gram(s) IV Push once  dextrose 50% Injectable 25 Gram(s) IV Push once  dextrose 50% Injectable 25 Gram(s) IV Push once  furosemide   Injectable 40 milliGRAM(s) IV Push daily  glucagon  Injectable 1 milliGRAM(s) IntraMuscular once  heparin   Injectable 5000 Unit(s) SubCutaneous every 12 hours  insulin lispro (ADMELOG) corrective regimen sliding scale   SubCutaneous three times a day before meals  metoprolol tartrate 50 milliGRAM(s) Oral two times a day  pantoprazole    Tablet 40 milliGRAM(s) Oral before breakfast  polyethylene glycol 3350 17 Gram(s) Oral daily  senna 2 Tablet(s) Oral at bedtime    MEDICATIONS  (PRN):  acetaminophen     Tablet .. 650 milliGRAM(s) Oral every 6 hours PRN Mild Pain (1 - 3), Moderate Pain (4 - 6)  albuterol/ipratropium for Nebulization 3 milliLiter(s) Nebulizer every 6 hours PRN Shortness of Breath and/or Wheezing  bisacodyl Suppository 10 milliGRAM(s) Rectal daily PRN Constipation  dextrose Oral Gel 15 Gram(s) Oral once PRN Blood Glucose LESS THAN 70 milliGRAM(s)/deciliter  LORazepam    Concentrate 0.5 milliGRAM(s) Oral every 4 hours PRN Agitation  melatonin 3 milliGRAM(s) Oral at bedtime PRN Insomnia  morphine   Solution 5 milliGRAM(s) Oral every 4 hours PRN Severe Pain (7 - 10)  morphine   Solution 2.5 milliGRAM(s) Oral every 4 hours PRN Moderate Pain (4 - 6)      PHYSICAL EXAM:  T(C): 36.3 (12-31-23 @ 23:37), Max: 36.7 (12-31-23 @ 17:12)  HR: 66 (12-31-23 @ 23:37) (66 - 87)  BP: 107/59 (12-31-23 @ 23:37) (105/58 - 121/54)  RR: 18 (12-31-23 @ 23:37) (16 - 18)  SpO2: 97% (12-31-23 @ 23:37) (97% - 98%)  I&O's Summary    GENERAL: NAD, lying in bed, significant contractures to the lower extremities  HEAD:  Atraumatic, Normocephalic, MMM  CHEST/LUNG: No use of accessory muscles, CTAB, breathing non-labored  COR: RR, no mrcg  ABD: Soft, ND/NT, +BS  NEUROLOGY: AAOx2  SKIN: No rashes or lesions  EXT: wwp, no cce    LABS:  CAPILLARY BLOOD GLUCOSE      POCT Blood Glucose.: 178 mg/dL (01 Jan 2024 08:04)  POCT Blood Glucose.: 140 mg/dL (31 Dec 2023 22:06)  POCT Blood Glucose.: 64 mg/dL (31 Dec 2023 21:22)  POCT Blood Glucose.: 57 mg/dL (31 Dec 2023 21:21)  POCT Blood Glucose.: 75 mg/dL (31 Dec 2023 16:26)  POCT Blood Glucose.: 209 mg/dL (31 Dec 2023 11:36)                        RADIOLOGY & ADDITIONAL TESTS:    Telemetry Personally Reviewed -     Imaging Personally Reviewed -     Imaging Reviewed -     Consultant(s) Notes Reviewed -       Care Discussed with Consultants/Other Providers -  89 years old female with h/o HTN, HLD, CAD, s/p CABG, DM, dementia, mod AS, s/p spinal stimulator placement present to ED with generalized weakness and frequent falls.      Patient seen and examined at bedside  No acute distress  Son at the bedside  Case discussed with CM - patient to be DC to inpatient palliative care at [facility]/      MEDICATIONS  (STANDING):  amLODIPine   Tablet 10 milliGRAM(s) Oral daily  aspirin  chewable 81 milliGRAM(s) Oral daily  atorvastatin 10 milliGRAM(s) Oral at bedtime  ceFAZolin   IVPB      ceFAZolin   IVPB 1000 milliGRAM(s) IV Intermittent every 12 hours  clopidogrel Tablet 75 milliGRAM(s) Oral daily  collagenase Ointment 1 Application(s) Topical daily  dextrose 5%. 1000 milliLiter(s) (100 mL/Hr) IV Continuous <Continuous>  dextrose 5%. 1000 milliLiter(s) (50 mL/Hr) IV Continuous <Continuous>  dextrose 50% Injectable 12.5 Gram(s) IV Push once  dextrose 50% Injectable 25 Gram(s) IV Push once  dextrose 50% Injectable 25 Gram(s) IV Push once  furosemide   Injectable 40 milliGRAM(s) IV Push daily  glucagon  Injectable 1 milliGRAM(s) IntraMuscular once  heparin   Injectable 5000 Unit(s) SubCutaneous every 12 hours  insulin lispro (ADMELOG) corrective regimen sliding scale   SubCutaneous three times a day before meals  metoprolol tartrate 50 milliGRAM(s) Oral two times a day  pantoprazole    Tablet 40 milliGRAM(s) Oral before breakfast  polyethylene glycol 3350 17 Gram(s) Oral daily  senna 2 Tablet(s) Oral at bedtime    MEDICATIONS  (PRN):  acetaminophen     Tablet .. 650 milliGRAM(s) Oral every 6 hours PRN Mild Pain (1 - 3), Moderate Pain (4 - 6)  albuterol/ipratropium for Nebulization 3 milliLiter(s) Nebulizer every 6 hours PRN Shortness of Breath and/or Wheezing  bisacodyl Suppository 10 milliGRAM(s) Rectal daily PRN Constipation  dextrose Oral Gel 15 Gram(s) Oral once PRN Blood Glucose LESS THAN 70 milliGRAM(s)/deciliter  LORazepam    Concentrate 0.5 milliGRAM(s) Oral every 4 hours PRN Agitation  melatonin 3 milliGRAM(s) Oral at bedtime PRN Insomnia  morphine   Solution 5 milliGRAM(s) Oral every 4 hours PRN Severe Pain (7 - 10)  morphine   Solution 2.5 milliGRAM(s) Oral every 4 hours PRN Moderate Pain (4 - 6)      PHYSICAL EXAM:  T(C): 36.3 (12-31-23 @ 23:37), Max: 36.7 (12-31-23 @ 17:12)  HR: 66 (12-31-23 @ 23:37) (66 - 87)  BP: 107/59 (12-31-23 @ 23:37) (105/58 - 121/54)  RR: 18 (12-31-23 @ 23:37) (16 - 18)  SpO2: 97% (12-31-23 @ 23:37) (97% - 98%)  I&O's Summary    GENERAL: NAD, lying in bed, significant contractures to the lower extremities  HEAD:  Atraumatic, Normocephalic, MMM  CHEST/LUNG: No use of accessory muscles, CTAB, breathing non-labored  COR: RR, no mrcg  ABD: Soft, ND/NT, +BS  NEUROLOGY: AAOx2  SKIN: No rashes or lesions  EXT: wwp, no cce    LABS:  CAPILLARY BLOOD GLUCOSE      POCT Blood Glucose.: 178 mg/dL (01 Jan 2024 08:04)  POCT Blood Glucose.: 140 mg/dL (31 Dec 2023 22:06)  POCT Blood Glucose.: 64 mg/dL (31 Dec 2023 21:22)  POCT Blood Glucose.: 57 mg/dL (31 Dec 2023 21:21)  POCT Blood Glucose.: 75 mg/dL (31 Dec 2023 16:26)  POCT Blood Glucose.: 209 mg/dL (31 Dec 2023 11:36)                        RADIOLOGY & ADDITIONAL TESTS:    Telemetry Personally Reviewed -     Imaging Personally Reviewed -     Imaging Reviewed -     Consultant(s) Notes Reviewed -       Care Discussed with Consultants/Other Providers -

## 2024-01-02 NOTE — PROGRESS NOTE ADULT - REASON FOR ADMISSION
frequent fall

## 2024-01-02 NOTE — DISCHARGE NOTE NURSING/CASE MANAGEMENT/SOCIAL WORK - NSDCPEFALRISK_GEN_ALL_CORE
For information on Fall & Injury Prevention, visit: [email or web address] OR  [email or web address] OR  https://www.cdc.gov/steadi/patient.html For information on Fall & Injury Prevention, visit: [email or web address] OR  [email or web address] OR  https://www.cdc.gov/steadi/patient.html

## 2024-01-02 NOTE — PROGRESS NOTE ADULT - PROBLEM SELECTOR PROBLEM 2
Infected pressure ulcer
Macrocytic anemia
Dementia
Macrocytic anemia

## 2024-01-02 NOTE — DISCHARGE NOTE NURSING/CASE MANAGEMENT/SOCIAL WORK - NSDCVIVACCINE_GEN_ALL_CORE_FT
Tdap; 02-May-2014 19:23; Eric Perez (RN); [id number]; IntraMuscular; Deltoid Left.; 0.5 milliLiter(s);    Tdap; 02-May-2014 19:23; Eric Perez (RN); [id number]; IntraMuscular; Deltoid Left.; 0.5 milliLiter(s);

## 2024-01-02 NOTE — PROGRESS NOTE ADULT - ASSESSMENT
89 years old female with h/o HTN, HLD, CAD, s/p CABG, DM, dementia, mod AS, s/p spinal stimulator placement present to ED with generalized weakness and frequent fall. For last 1-2 weeks, patient has been having generalized weakness and frequent fall. Have multiple bruises on body, admitted for sepsis 2/2 to feet ulcers,  family requesting inpatient hospice/comfort care  - to be discharged to [facility]    Left lateral malleolus infected appearing Ulcer   Sepsis   leukocytosis improved   US doppler ordered for LLE tenderness-- negative for DVT  -- PT wound consulted, wound recs appreciated    -ESR/CRP elevated   -- MRSA neg   - Blood Cx--NGTD   --Vascular , ID, podiatry consults appreciated, no surgical intervention   --abd changed to ancef (wound Cx reviewed), needs 2 week course, can d/c on TMP-SMX DS BID (end date Jan 8)  --patient unable to tolerate MRI ankle Left     Presumed UTI ? patient was complaining of dysuria   abx as above   UCx NGTD     Shortness of breath   12/23 CXR showing pulmonary edema   no wheezing on exam , appears comfortable   added Lasix   supplemental O2 to maintain O2 sat >92%   SpO2 95% on 4L NC 12/25/23        Frequent falls  advanced dementia , mostly bedbound , functional quadriplegia (wheelchair bound at home)   CT head    with no acute pathology. CT C spine with no acute fracture.       4mm right upper lobe pul nodule.   CT chest/abd/pelvis with no acute pathology. Emphysema +. Chronic fibrotic changes at lung apices.   no wheezing, lungs are CTAB       Questionable asymmetrical rectal wall thickening.   --per discussion with son, given age and dementia, family prefers not to pursue further investigation /treatment or aggressive measures.      Macrocytic anemia.   Iron panel relatively unremarkable, b12, folate WNL--no role for supplementation     SAE (acute kidney injury).  POA   resolved with IVF      Benign essential HTN/HLD   continue with current regimen       CAD (coronary artery disease).   --CAD s/p PCI s/p CABG  on aspirin, plavix, statin, BB.    Type 2 diabetes mellitus with unspecified complications. controlled   A1c 6.6%  continue with ISS for now      Dementia. suspected vascular dementia   CT head showing multiple old chronic strokes, including cerebellar strokes which would explain the gait instability    supportive care    Moderate PCM   nutrition consult appreciated     Preventative Measures   heparin SQ-dvt ppx  fall, aspiration precautions   HOBE     palliative consult appreciated >> patient DNR/DNI      89 years old female with h/o HTN, HLD, CAD, s/p CABG, DM, dementia, mod AS, s/p spinal stimulator placement present to ED with generalized weakness and frequent fall. For last 1-2 weeks, patient has been having generalized weakness and frequent fall. Have multiple bruises on body, admitted for sepsis 2/2 to feet ulcers,  family requesting inpatient hospice/comfort care  - to be discharged to [facility]    Left lateral malleolus infected appearing Ulcer   Sepsis   leukocytosis improved   US doppler ordered for LLE tenderness-- negative for DVT  -- PT wound consulted, wound recs appreciated    -ESR/CRP elevated   -- MRSA neg   - Blood Cx--NGTD   --Vascular , ID, podiatry consults appreciated, no surgical intervention   --abd changed to ancef (wound Cx reviewed), needs 2 week course, can d/c on TMP-SMX DS BID (end date Jan 8)  --patient unable to tolerate MRI ankle Left     Presumed UTI ? patient was complaining of dysuria   abx as above   UCx NGTD     Shortness of breath   12/23 CXR showing pulmonary edema   no wheezing on exam , appears comfortable   added Lasix   supplemental O2 to maintain O2 sat >92%   SpO2 95% on 4L NC 12/25/23        Frequent falls  advanced dementia , mostly bedbound , functional quadriplegia (wheelchair bound at home)   CT head    with no acute pathology. CT C spine with no acute fracture.       4mm right upper lobe pul nodule.   CT chest/abd/pelvis with no acute pathology. Emphysema +. Chronic fibrotic changes at lung apices.   no wheezing, lungs are CTAB       Questionable asymmetrical rectal wall thickening.   --per discussion with son, given age and dementia, family prefers not to pursue further investigation /treatment or aggressive measures.      Macrocytic anemia.   Iron panel relatively unremarkable, b12, folate WNL--no role for supplementation     SAE (acute kidney injury).  POA   resolved with IVF      Benign essential HTN/HLD   continue with current regimen       CAD (coronary artery disease).   --CAD s/p PCI s/p CABG  on aspirin, plavix, statin, BB.    Type 2 diabetes mellitus with unspecified complications. controlled   A1c 6.6%  continue with ISS for now      Dementia. suspected vascular dementia   CT head showing multiple old chronic strokes, including cerebellar strokes which would explain the gait instability    supportive care    Moderate PCM   nutrition consult appreciated     Preventative Measures   heparin SQ-dvt ppx  fall, aspiration precautions   HOBE     palliative consult appreciated >> patient DNR/DNI

## 2024-01-02 NOTE — PROGRESS NOTE ADULT - PROBLEM SELECTOR PROBLEM 8
Dementia

## 2024-01-02 NOTE — PROGRESS NOTE ADULT - PROVIDER SPECIALTY LIST ADULT
Hospitalist
Infectious Disease
Infectious Disease
Hospitalist
Infectious Disease
Vascular Surgery
Hospitalist
Palliative Care
Hospitalist
Hospitalist
Palliative Care
Hospitalist

## 2024-01-02 NOTE — PROGRESS NOTE ADULT - PROBLEM SELECTOR PROBLEM 7
Type 2 diabetes mellitus with unspecified complications

## 2024-01-02 NOTE — PROGRESS NOTE ADULT - PROBLEM SELECTOR PROBLEM 5
Encounter for palliative care
Hyperlipidemia, unspecified
Debility
Hyperlipidemia, unspecified

## 2024-01-02 NOTE — DISCHARGE NOTE NURSING/CASE MANAGEMENT/SOCIAL WORK - PATIENT PORTAL LINK FT
You can access the FollowMyHealth Patient Portal offered by [facility] by registering at the following website: [email or web address]. By joining [facility]’s FollowMyHealth portal, you will also be able to view your health information using other applications (apps) compatible with our system. You can access the FollowMyHealth Patient Portal offered by [facility] by registering at the following website: [email or web address]. By joining [facility]’s FollowMyHealth portal, you will also be able to view your health information using other applications (apps) compatible with our system.

## 2024-01-02 NOTE — DISCHARGE NOTE NURSING/CASE MANAGEMENT/SOCIAL WORK - NSDCFUADDAPPT_GEN_ALL_CORE_FT
Podiatry Discharge Instructions:  Follow up: Please follow up with Dr. Waterhouse within 1 week of discharge from the hospital, please call [phone] for appointment and discuss that you recently were seen in the hospital.  Wound Care: Please apply betadine to left ankle lateral malleolus wound followed by 4x4 gauze, ABD pad, and tra daily.   Weight bearing: Please offload heels at all times with z flow boots.   Antibiotics: Please continue as instructed. Podiatry Discharge Instructions:  Follow up: Please follow up with Dr. Waterhouse within 1 week of discharge from the hospital, please call [phone] for appointment and discuss that you recently were seen in the hospital.  Wound Care: Please apply betadine to left ankle lateral malleolus wound followed by 4x4 gauze, ABD pad, and tra daily.   Weight bearing: Please offload heels at all times with z flow boots.   Antibiotics: Please continue as instructed.

## 2024-01-02 NOTE — PROGRESS NOTE ADULT - PROBLEM SELECTOR PROBLEM 3
SAE (acute kidney injury)
Moderate protein-calorie malnutrition
SAE (acute kidney injury)
Dementia

## 2024-01-02 NOTE — PROGRESS NOTE ADULT - PROBLEM SELECTOR PROBLEM 6
CAD (coronary artery disease)
Encounter for palliative care
CAD (coronary artery disease)
CAD (coronary artery disease)

## 2024-01-02 NOTE — PROGRESS NOTE ADULT - PROBLEM SELECTOR PROBLEM 9
UTI (urinary tract infection)

## 2024-01-02 NOTE — PROGRESS NOTE ADULT - PROBLEM SELECTOR PROBLEM 1
Frequent falls
Acute respiratory failure with hypoxia
Infected pressure ulcer
Frequent falls

## 2024-01-24 NOTE — H&P ADULT - PROBLEM SELECTOR PLAN 6
- DVT: Heparin  - Diet: Pending S&S eval  - Dispo: Pending GO - s/p CABG w/ stent in 2005    Plan:  - Hold home Plavix i/s/o remote Hx of stent placement, NPO

## 2024-01-24 NOTE — H&P ADULT - ATTENDING COMMENTS
Patient seen and examined at bedside on 1/24/24.    89 y/o F with PMH HTN, HLD, CAD, DM, dementia s/p spinal stimulator placement who presented from [facility] for hypernatremia and concern for foot infection.    #Sepsis, pt. met SIRS criteria + source of known foot infection. UA negative. RVP negative. BCx pending. Recently treated for L foot wound growing E. coli and S. aureus. C/w cefepime for now (zosyn allergy). Vascular offering definitive management with amputation but family declines. Local wound care.     #L lateral malleolar pressure wound, vascular consulted. Rec amputation but family declines.     #Hypernatremia, Na 160 2/2 dehydration in the setting of poor PO intake. Pt. HD stable. Will initiate D5 maintenance fluids. Trend BMP q6.     #Acute renal failure,  and Cr 2.76. Elevated from baseline on prior admission. Likely pre-renal in the setting of dehydration. C/w D5.    #Dementia, pt. A&Ox0 - nonverbal. Poor PO intake. Severe protein calorie malnutrition. Patient seen and examined at bedside on 1/24/24.    89 y/o F with PMH HTN, HLD, CAD, DM, dementia s/p spinal stimulator placement who presented from [facility] for hypernatremia and concern for foot infection.    #Sepsis, pt. met SIRS criteria + source of known foot infection. UA negative. RVP negative. BCx pending. Recently treated for L foot wound growing E. coli and S. aureus. C/w cefepime for now (zosyn allergy). Vascular offering definitive management with amputation but family declines. Local wound care.     #L lateral malleolar pressure wound, vascular consulted. Rec amputation but family declines.     #Hypernatremia, Na 160 2/2 dehydration in the setting of poor PO intake. Pt. HD stable. Will initiate D5 maintenance fluids. Trend BMP q6.     #Acute renal failure,  and Cr 2.76. Elevated from baseline on prior admission. Likely pre-renal in the setting of dehydration. C/w D5.    #Dementia, pt. A&Ox0 - nonverbal. Poor PO intake. Severe protein calorie malnutrition. Currently NPO. Pending S&S evaluation.     #GOC, pt with prior MOLST - DNR/DNI but as per vascular yesterday this was rescinded. Palliative care consulted for ongoing GOC conversations.     Remainder of plan as stated above. Plan discussed with HS.

## 2024-01-24 NOTE — H&P ADULT - ASSESSMENT
89 years old female with h/o HTN, HLD, CAD, DM, dementia, s/p spinal stimulator placement p/w abnormal labs (Na 168, K 2.9)

## 2024-01-24 NOTE — H&P ADULT - NSHPLABSRESULTS_GEN_ALL_CORE
LABS:  cret                        9.3    14.37 )-----------( 149      ( 24 Jan 2024 12:05 )             30.2     01-24    163<HH>  |  128<H>  |  107<H>  ----------------------------<  160<H>  3.5   |  18<L>  |  2.76<H>    Ca    8.4      24 Jan 2024 12:05    TPro  6.7  /  Alb  2.3<L>  /  TBili  0.3  /  DBili  x   /  AST  34<H>  /  ALT  15  /  AlkPhos  107  01-24    PT/INR - ( 24 Jan 2024 12:05 )   PT: 13.4 sec;   INR: 1.20 ratio         PTT - ( 24 Jan 2024 12:05 )  PTT:23.0 sec

## 2024-01-24 NOTE — H&P ADULT - PROBLEM SELECTOR PLAN 2
- L foot xray:Dorsal forefoot soft tissue swelling. Questionable focal slightly thinned   indistinct appearing calcaneal cortex on left foot frontal/oblique view   raising concern for possible osteomyelitis, MRI would be helpful to   better assess. No tracking gas collections or additional suspected areas   of osteomyelitis. No fractures or dislocations.  - CRP elevated on admission    Plan:  - Wound consulted, appreciate recs  - Vascular consulted, recs:  	- Continue local wound care, daily dressing changes   	- Frequent re-positioning and offloading to B/L lower extremities   	- Remainder care per primary   	- Reconsult Vascular sx as needed, if family/pt amenable to amputation  - c/w Shruti Cardenas  - previously unable to tolerate MRI L ankle when attempted in recent hospitalization - Na 163 on admission  - FWD of 1.3L on admission    Plan:  - c/w IVF  - BMP, trend Na  - Goal correction 6-8 mEq/L/day - L foot xray:Dorsal forefoot soft tissue swelling. Questionable focal slightly thinned   indistinct appearing calcaneal cortex on left foot frontal/oblique view   raising concern for possible osteomyelitis, MRI would be helpful to   better assess. No tracking gas collections or additional suspected areas   of osteomyelitis. No fractures or dislocations.  - CRP elevated on admission    Plan:  - Recently Tx in Dec 23' for L foot wound growing S. Aureas and E. Coli, amputation L foot offered and deferred at the time  - Wound consulted, appreciate recs  - Vascular consulted, recs:  	- Continue local wound care, daily dressing changes   	- Frequent re-positioning and offloading to B/L lower extremities   	- Remainder care per primary   	- Reconsult Vascular sx as needed, if family/pt amenable to amputation  - c/w Cefepime  - previously unable to tolerate MRI L ankle when attempted in recent hospitalization

## 2024-01-24 NOTE — ED ADULT NURSE NOTE - OBJECTIVE STATEMENT
Pt awake,  non-verbal, contracted, responds to painful stimulation (manipulation of limbs/IV access etc.); arrives from [place], sent in due to abnormal labs; pt noted to have 2cm oval Stage 1 on Left buttock, 2 additional 0.5 cm Stage 1 ulcers on Right buttock; 2 x 2 cm and lateral 1 cm Stage I on Right thoracic area of back; pt has Left ankle/heel/foot ulceration, unstageable, foul-smelling eschar noted, pt has been seen before by Vascular surgery here at [facility], Vascular Resident at bedside now performing eval. Pt awake,  non-verbal, contracted, responds to painful stimulation (manipulation of limbs/IV access etc.); arrives from [place], sent in due to abnormal labs; pt noted to have 2cm oval Stage 1 on Left buttock, 2 additional 0.5 cm Stage 1 ulcers on Right buttock; 2 x 2 cm and lateral 1 cm Stage I on Right thoracic area of back; pt has Left ankle/heel/foot ulceration, unstageable, foul-smelling eschar noted, pt has been seen before by Vascular surgery here at [facility], Vascular Resident at bedside now performing eval. Pt has 2-3cm wide round unstageable wound to Right heel; pt has some kind of implanted device to Left lumbar region; pt staright cathed for urine, tolerated well drained approx 250 mL foul-smelling, cloudy  urine. 22G placed in Right AC by facilitator RN labs drawn and sent, 24G in pt's Left hand placed by EMS. none

## 2024-01-24 NOTE — ED ADULT NURSE NOTE - CHIEF COMPLAINT QUOTE
Pt presents to ED via EMS from [place] with c/o abnormal lab work and r/o sepsis. Pt found to have YY=823, K+=2.9. Pt arrives lethargic and nonverbal which is her baseline.

## 2024-01-24 NOTE — ED PROVIDER NOTE - OBJECTIVE STATEMENT
89 y/o female with PMHx of dementia and DM sent in by [facility] for abnormal lab results (Na 168 and K 2.9) to r/o sepsis. Per notes from [facility], pt is baseline nonverbal and lethargic.

## 2024-01-24 NOTE — H&P ADULT - HISTORY OF PRESENT ILLNESS
89 years old female with h/o HTN, HLD, CAD, DM, dementia, s/p spinal stimulator placement p/w abnormal labs (Na 168, K 2.9). Unable to obtain further history due to patient baseline nonverbal.    ED course: Pt arrived to ED w/ , /46, afebrile, requiring 4L NC. Pt recieved Vancomycin, and IVF. Labs showed leukocytosis 14, elevated , normal lactate 1.5, Na 163, Cl 128, SCr 2.76 (baseline ~1-1.2), . UA growing yeast like cells and with pyuria.  89 years old female with h/o HTN, HLD, CAD, DM, dementia, s/p spinal stimulator placement p/w abnormal labs (Na 168, K 2.9). Unable to obtain further history due to patient baseline nonverbal. Patient was transferred from [facility] for hypernatremia on routine labs. They also recently started cefepime due to concern for foot infection. The son did not have any further history regarding the events leading up to hospital admission. As per the son, the patient was in her usual state of health the last time he visited with her.     ED course: Pt arrived to ED w/ , /46, afebrile, requiring 4L NC. Pt recieved Vancomycin, and IVF. Labs showed leukocytosis 14, elevated , normal lactate 1.5, Na 163, Cl 128, SCr 2.76 (baseline ~1-1.2), . UA growing yeast like cells and with pyuria.

## 2024-01-24 NOTE — H&P ADULT - TIME BILLING
Review of laboratory data, radiology results, consultants' recommendations, documentation in [place], discussion with patient/advanced care providers and interdisciplinary staff (such as , social workers, etc). Interventions were performed as documented above.

## 2024-01-24 NOTE — H&P ADULT - PROBLEM SELECTOR PLAN 4
- DVT: Heparin  - Diet: Pending S&S eval  - Dispo: Pending GO - On Amlodipine 10mg QD, Metoprolol 50mg BID    Plan:  - Hold i/s/o low BP, sepsis - On Amlodipine 10mg QD, Metoprolol 50mg BID    Plan:  - Pt w/o PO access, hold - SCr 2.7 on admission, baseline 1-1.2    Plan:  - c/w IVF  - CTM SCr

## 2024-01-24 NOTE — CONSULT NOTE ADULT - ASSESSMENT
89 yo F w/ PMHx of HTN, HLD, CAD, DM, dementia presents to the ED from rehab with electrolyte abnormalities. Vascular surgery consulted for evaluation of chronic L lateral malleolar pressure wound.     Recommendations:   - No acute vascular surgical intervention      -  Discussed w/ patient's son Toby ( [phone]) today who states would like to avoid amputation given comorbities and risk of surgery. However, he states would like like to rescind patient's prior DNR/DNI status for FULL CODE  - Recommend palliative care consult   - Continue local wound care, daily dressing changes   - Frequent re-positioning and offloading to B/L lower extremities   - Remainder care per primary     Discussed w/ Dr. Jacques      Vascular Surgery   [id number]     89 yo F w/ PMHx of HTN, HLD, CAD, DM, dementia presents to the ED from rehab with electrolyte abnormalities. Vascular surgery consulted for evaluation of chronic L lateral malleolar pressure wound.     Recommendations:   - No acute vascular surgical intervention      -  Discussed w/ patient's son Toby ( [phone]) today who states would like to avoid amputation given comorbities and risk of surgery. However, he states would like like to rescind patient's prior DNR/DNI status for FULL CODE  - Recommend palliative care consult   - Continue local wound care, daily dressing changes   - Frequent re-positioning and offloading to B/L lower extremities   - Remainder care per primary   - Reconsult Vascular sx as needed, if family/pt amenable to amputation     Discussed w/ Dr. Jacques      Vascular Surgery   [id number]

## 2024-01-24 NOTE — H&P ADULT - PROBLEM SELECTOR PLAN 5
- DVT: Heparin  - Diet: Pending S&S eval  - Dispo: Pending GO - s/p CABG w/ stent in 2005    Plan:  - Hold home Plavix i/s/o remote Hx of stent placement - On Amlodipine 10mg QD, Metoprolol 50mg BID    Plan:  - Pt w/o PO access, hold

## 2024-01-24 NOTE — H&P ADULT - PROBLEM SELECTOR PLAN 3
- On Amlodipine 10mg QD, Metoprolol 50mg BID    Plan:  - Hold i/s/o low BP, sepsis - L foot xray:Dorsal forefoot soft tissue swelling. Questionable focal slightly thinned   indistinct appearing calcaneal cortex on left foot frontal/oblique view   raising concern for possible osteomyelitis, MRI would be helpful to   better assess. No tracking gas collections or additional suspected areas   of osteomyelitis. No fractures or dislocations.  - CRP elevated on admission    Plan:  - Recently Tx in Dec 23' for L foot wound growing S. Aureas and E. Coli, amputation L foot offered and deferred at the time  - Wound consulted, appreciate recs  - Vascular consulted, recs:  	- Continue local wound care, daily dressing changes   	- Frequent re-positioning and offloading to B/L lower extremities   	- Remainder care per primary   	- Reconsult Vascular sx as needed, if family/pt amenable to amputation  - c/w Theresa, Shruti  - previously unable to tolerate MRI L ankle when attempted in recent hospitalization - Na 163 on admission  - FWD of 1.3L on admission    Plan:  - c/w IVF  - BMP, trend Na  - Goal correction 6-8 mEq/L/day

## 2024-01-24 NOTE — H&P ADULT - PROBLEM SELECTOR PLAN 1
- CXR w/ No focal consolidation  - L foot xray --> Dorsal forefoot soft tissue swelling. Questionable focal slightly thinned   indistinct appearing calcaneal cortex on left foot frontal/oblique view   raising concern for possible osteomyelitis, MRI would be helpful to   better assess. No tracking gas collections or additional suspected areas   of osteomyelitis. No fractures or dislocations.  - Recently Tx in Dec 23' for L foot wound growing S. Aureas and E. Coli    Plan:  - s/p 1x dose Vancomycin  - c/w IVF  - c/w Vancomycin,   - f/u L malleolus wound Cx - L foot xray:Dorsal forefoot soft tissue swelling. Questionable focal slightly thinned   indistinct appearing calcaneal cortex on left foot frontal/oblique view   raising concern for possible osteomyelitis, MRI would be helpful to   better assess. No tracking gas collections or additional suspected areas   of osteomyelitis. No fractures or dislocations.  - CRP elevated on admission    Plan:  - Recently Tx in Dec 23' for L foot wound growing S. Aureas and E. Coli, amputation L foot offered and deferred at the time  - s/p Vanc dose in ED  - c/w Vanc, Zosyn  - previously unable to tolerate MRI L ankle when attempted in recent hospitalization

## 2024-01-24 NOTE — ED ADULT TRIAGE NOTE - CHIEF COMPLAINT QUOTE
Pt presents to ED via EMS from [place] with c/o abnormal lab work and r/o sepsis. Pt found to have LR=552, K+=2.9. Pt arrives lethargic and nonverbal which is her baseline.

## 2024-01-24 NOTE — ED PROVIDER NOTE - PHYSICAL EXAMINATION
B/L dorsalis pedis pulses detected with doppler B/L faint dorsalis pedis pulses detected with doppler

## 2024-01-24 NOTE — CONSULT NOTE ADULT - SUBJECTIVE AND OBJECTIVE BOX
VASCULAR SURGERY CONSULT NOTE    HPI:  89 yo F w/ PMHx of HTN, HLD, CAD, DM, dementia presents to the ED from rehab with electrolyte abnormalities.     Patient w/ chronic L lateral malleolus pressure wound. Vascular surgery consulted for evaluation. Of note, patient seen by vascular surgery at [facility] 12/2023 for same wound. At that time, extensive discussed with family at bedside regarding patient's prognosis and intervention options. Due to patinet's multiple comorbidities, ambulatory status and contractures, decision was made to pursue local wound care. Patient's son (Toby and HCP) was in agreement. There was understanding of likely progression of disease given likely underlying PAD and worsening sepsis. Amputation was discussed however patient is a poor surgical candidate and family agreed against amputation. Patient was seen and evaluated by palliative care as well and decision made for DNR/DNI status    Patient w/ dementia, bedbound, contracted, nonverbal, AOx0 at baseline. In the ED today, AVSS. Labs show leukocytosis 14, elevated , normal lactate 1.5, Na 163. Was able to reach son Toby ( [phone]) today who states would like to avoid amputation given comorbities and risk of surgery. However, he states would like patient to be FULL CODE at this time.         PAST MEDICAL HISTORY:  Angina    Diabetes Mellitus    Arthritis, Infective, Knee    Detached Retina    Acute Mucous Pneumonia    Spinal Stenosis- lumbar    History of Back Surgery    Basal Cell Cancer    Dementia        PAST SURGICAL HISTORY:  Detached Retina, Left    S/P Carpal Tunnel Release    Trigger Finger    S/P Laparoscopic Cholecystectomy    S/P TKR (Total Knee Replacement)    Cataract        MEDICATIONS:  dextrose 5%. 1000 milliLiter(s) IV Continuous <Continuous>      ALLERGIES:  Pineapple (Unknown)  contrast dye -  rash (Other)  iodine (Other)  codeine (Vomiting)  Tolerates ceftriaxone, cefepime (Other)  penicillin (Rash)  latex (Rash)      VITALS & I/Os:  Vital Signs Last 24 Hrs  T(C): 36.6 (24 Jan 2024 12:44), Max: 36.9 (24 Jan 2024 10:06)  T(F): 97.9 (24 Jan 2024 12:44), Max: 98.4 (24 Jan 2024 10:06)  HR: 98 (24 Jan 2024 12:44) (98 - 116)  BP: 112/55 (24 Jan 2024 12:44) (106/46 - 112/55)  RR: 18 (24 Jan 2024 12:44) (18 - 18)  SpO2: 99% (24 Jan 2024 12:44) (97% - 99%)    Parameters below as of 24 Jan 2024 12:44  Patient On (Oxygen Delivery Method): nasal cannula  O2 Flow (L/min): 4        PHYSICAL EXAM:  General: NAD  Neuro: AOx0  Respiratory: Nonlabored  Cardiovascular: RRR  Abdominal: Soft, nondistended, nontender  Extremities:   - RLE: palpable DP/PT pulses  - LLE: DP/PT signals. Lateral malleolar ulceration and surrounding tissue necrosis with drianage       LABS:                        9.3    14.37 )-----------( 149      ( 24 Jan 2024 12:05 )             30.2     01-24    163<HH>  |  128<H>  |  107<H>  ----------------------------<  160<H>  3.5   |  18<L>  |  2.76<H>    Ca    8.4      24 Jan 2024 12:05    TPro  6.7  /  Alb  2.3<L>  /  TBili  0.3  /  DBili  x   /  AST  34<H>  /  ALT  15  /  AlkPhos  107  01-24    Lactate:  01-24 @ 11:00  1.5    PT/INR - ( 24 Jan 2024 12:05 )   PT: 13.4 sec;   INR: 1.20 ratio         PTT - ( 24 Jan 2024 12:05 )  PTT:23.0 sec        Urinalysis Basic - ( 24 Jan 2024 12:05 )    Color: x / Appearance: x / SG: x / pH: x  Gluc: 160 mg/dL / Ketone: x  / Bili: x / Urobili: x   Blood: x / Protein: x / Nitrite: x   Leuk Esterase: x / RBC: x / WBC x   Sq Epi: x / Non Sq Epi: x / Bacteria: x

## 2024-01-24 NOTE — H&P ADULT - NSHPPHYSICALEXAM_GEN_ALL_CORE
PHYSICAL EXAM:  GENERAL: Cachectic, lethargic  HEENT: NC/AT  NECK: Supple, No JVD  CHEST/LUNG: CTAB, no increased WOB  HEART: RRR, no m/r/g, +2 pulses bilaterally  ABDOMEN: soft, non-tender, non-distended, BS+  EXTREMITIES:  2+ peripheral pulses, no clubbing, no edema  NERVOUS SYSTEM:  A&Ox0  SKIN: L foot mottled w/ purple, green discoloration, visible wound on dorsum, heel covered with bandage

## 2024-01-24 NOTE — ED PROVIDER NOTE - CLINICAL SUMMARY MEDICAL DECISION MAKING FREE TEXT BOX
89 y/o female with PMHx of dementia and DM sent in by [facility] for abnormal lab results (Na 168 and K 2.9) to r/o sepsis. Per notes from [facility], pt is baseline nonverbal and lethargic.  - CBC, CMP, BVG, CRP, ESR, Blood cultures, PT/PTT  - UA, urine culture   - RVP  - Xray ankle, foot, chest  - Consult podiatry

## 2024-01-25 NOTE — DIETITIAN INITIAL EVALUATION ADULT - NSFNSPHYEXAMSKINFT_GEN_A_CORE
Per RN flowsheets:  Pressure Injury 1: Right:, back, Stage II  Pressure Injury 2: none, Stage III  Pressure Injury 3: Bilateral:, buttocks, Stage II  Pressure Injury 4: first toe, Right:, scapula, Stage II, Suspected deep tissue injury  Pressure Injury 5: Right:, heel, Suspected deep tissue injury

## 2024-01-25 NOTE — DIETITIAN NUTRITION RISK NOTIFICATION - ADDITIONAL COMMENTS/DIETITIAN RECOMMENDATIONS
Please see Dietitian Initial Assessment for complete recommendations.     Susan Villanueva MS RDN CDN  On [employer] Teams, Pager #[phone]

## 2024-01-25 NOTE — SWALLOW BEDSIDE ASSESSMENT ADULT - SWALLOW EVAL: RECOMMENDED DIET
1. Consider NPO with consideration for short-term non-oral means of nutrition/ hydration/ medication

## 2024-01-25 NOTE — CONSULT NOTE ADULT - PROBLEM SELECTOR RECOMMENDATION 7
Thank you for allowing us to participate in your patient's care. We will continue to follow with you. Please page [phone] for any q's or c's. The Geriatric and Palliative Medicine service has coverage 24 hours a day/ 7 days a week to provide medical recommendations regarding symptom management needs via telephone.    Brianna Chang D.O.   Palliative Medicine

## 2024-01-25 NOTE — CONSULT NOTE ADULT - PROBLEM SELECTOR RECOMMENDATION 4
PPSV 10%   Patient requires assistance with all ADLs.  Patient bedbound, nonverbal, with multiple wounds.

## 2024-01-25 NOTE — CONSULT NOTE ADULT - ASSESSMENT
89 years old female with h/o HTN, HLD, CAD, DM, dementia, s/p spinal stimulator placement p/w abnormal labs and due to concern for foot infection.    Afebrile   Leukocytosis 14-->16        Workup :  Xray L foot: Dorsal forefoot soft tissue swelling. Questionable focal slightly thinned indistinct appearing calcaneal cortex on left foot frontal/oblique view raising concern for possible osteomyelitis, MRI would be helpful to   better assess. No tracking gas collections or additional suspected areas of osteomyelitis. No fractures or dislocations.    UA: WBC 10, neg bacteria, yeast like cells    #L ankle infected wound with concern for OM, previous wound Cx + E coli, MSSA, [place]   #Leukocytosis   #Hypoxemia with normal chest imaging r/o PE  #SAE     Recommendations:  -Continue Cefepime while inpatient   -Final antibiotics choice and duration pending final goal of care discussion   -Vascular surgery following with no acute vascular surgical intervention planned   -Monitor Kidney function     Seen and discussed with Dr Allie Saldana MD, PGY5  ID fellow  [employer] Teams Preferred  After 5pm/weekends call [phone]

## 2024-01-25 NOTE — PATIENT PROFILE ADULT - HAS THE PATIENT RECEIVED THE INFLUENZA VACCINE THIS SEASON?
Goal Outcome Evaluation:      Plan of Care Reviewed With: patient    SUMMARY: pleural effusion, ESRD, metabolic encephalopathy   DATE & TIME: 3/12/23, [id number]  Cognitive Concerns/ Orientation:A & O x 2-3 (disorientated to time and situation,fluctuates throughout the day)  BEHAVIOR & AGGRESSION TOOL COLOR: Green  ABNL VS/O2: VSS/RA   ABNL Labs: Creatinine=4.44, Albumin=2.5, alk phos=200, ALT=8, WBC=3.5, hgb=7.9, platelets=116, mag=1.8 (WDL, order for am check), CO2=49 (trending down), Ammonia=64 (trending up, MD aware)   MOBILITY: Strong Assist-2 with Justyna Oro; up to chair for meals. Repositions self frequently in bed   PAIN MANAGMENT: PRN Tylenol given x 1 for low back pain. Encouraged heat packs. Refused lidocaine patch   DIET: Regular diet w/ mildly thick liquids (level 2) with meds & meals. Bolus TF when not eating sufficiently-not needed today  BOWEL/BLADDER: Incontinent of bowel at times; x 3 soft Bms (goal x 3 Bms within 24 hours). Minimal to no urine-hemodialysis patient   DRAIN/DEVICES: R. PIV saline locked; R chest CVC for hemodialysis; PEG tube, clamped/dressing changed/CDI.   SKIN: Jaundiced, Scattered scabs and bruising. Blanchable redness to coccyx. Abrasion left elbow and left neck, new mepilex in place.   TEST/PROCEDURES: Dialysis MWF   D/C DATE: Pending, will need TCU placement when ready.   OTHER IMPORTANT INFO: Per pulmonology, encourage frequent IS use-education provided today. Patient very sleepy today, does not sleep well at night. Sitter at bedside due to impulsiveness (pulling at lines and tubes when left alone). Family at bedside and updated on plan of care.        yes...

## 2024-01-25 NOTE — SWALLOW BEDSIDE ASSESSMENT ADULT - COMMENTS
Internal Medicine 1/25, "89 years old female with h/o HTN, HLD, CAD, DM, dementia, s/p spinal stimulator placement p/w abnormal labs (Na 168, K 2.9)"    CXR 1/24: IMPRESSION: No focal consolidation.    Patient received awake in bed, contracted (in fetal position) RN assisted in repositioning patient, noted with O2 via nasal cannula. Patient did not verbalize, was unable to make basic wants/ needs known or follow simple directives.

## 2024-01-25 NOTE — DIETITIAN INITIAL EVALUATION ADULT - PERTINENT LABORATORY DATA
01-25    159<H>  |  125<H>  |  103<H>  ----------------------------<  276<H>  3.3<L>   |  17<L>  |  2.53<H>    Ca    8.3<L>      25 Jan 2024 11:00  Phos  2.6     01-25  Mg     2.00     01-25    TPro  6.7  /  Alb  2.3<L>  /  TBili  0.3  /  DBili  x   /  AST  34<H>  /  ALT  15  /  AlkPhos  107  01-24  POCT Blood Glucose.: 217 mg/dL (01-25-24 @ 12:33)  A1C with Estimated Average Glucose Result: 6.6 % (12-16-23 @ 07:30)

## 2024-01-25 NOTE — DIETITIAN INITIAL EVALUATION ADULT - ORAL INTAKE PTA/DIET HISTORY
Per transfer sheets, noted the following information: No known food allergies or food intolerances. Height 59inches, weight 86.6lb. Diet order no added salt, no concentrated sweets, pureed, nectar thick liquids. Supplementation included LPS sugar-free once daily and Boost TID.    Noted pineapple food allergy in the chart, not noted on the facility transfer sheets. Unable to verify accuracy.    Patient seen at bedside this AM by writer. Patient noted to be documented as disoriented per RN flowsheet. Patient unable to meaningfully participate in assessment at this time.    No weight documentation noted for current admission. Unable to determine BMI without weight measurement  Per [facility]ILA, noted the following weight history: 54.4kg (12/15), 59kg (10/30)  Objective weight measurements suggest 4.6kg (8%) weight loss x1.5 months period of time (significant)

## 2024-01-25 NOTE — DIETITIAN INITIAL EVALUATION ADULT - REASON FOR ADMISSION
Hyperosmolality with hypernatremia    Patient is a 89y Female with PMH HTN, HLD, coronary artery disease, diabetes mellitus, dementia who presents from facility to [facility] with abnormal labs. Found to have sepsis secondary to LE wound.

## 2024-01-25 NOTE — DIETITIAN INITIAL EVALUATION ADULT - PERTINENT MEDS FT
MEDICATIONS  (STANDING):  cefepime   IVPB 1000 milliGRAM(s) IV Intermittent every 24 hours  dextrose 5%. 1000 milliLiter(s) (50 mL/Hr) IV Continuous <Continuous>  dextrose 5%. 1000 milliLiter(s) (100 mL/Hr) IV Continuous <Continuous>  dextrose 5%. 1000 milliLiter(s) (70 mL/Hr) IV Continuous <Continuous>  dextrose 5%. 1000 milliLiter(s) (100 mL/Hr) IV Continuous <Continuous>  dextrose 5%. 1000 milliLiter(s) (50 mL/Hr) IV Continuous <Continuous>  dextrose 50% Injectable 12.5 Gram(s) IV Push once  dextrose 50% Injectable 25 Gram(s) IV Push once  dextrose 50% Injectable 25 Gram(s) IV Push once  dextrose 50% Injectable 12.5 Gram(s) IV Push once  dextrose 50% Injectable 25 Gram(s) IV Push once  dextrose 50% Injectable 25 Gram(s) IV Push once  glucagon  Injectable 1 milliGRAM(s) IntraMuscular once  glucagon  Injectable 1 milliGRAM(s) IntraMuscular once  heparin   Injectable 5000 Unit(s) SubCutaneous every 12 hours  insulin lispro (ADMELOG) corrective regimen sliding scale   SubCutaneous every 6 hours  pantoprazole    Tablet 40 milliGRAM(s) Oral before breakfast  polyethylene glycol 3350 17 Gram(s) Oral daily  senna 2 Tablet(s) Oral at bedtime    MEDICATIONS  (PRN):  dextrose Oral Gel 15 Gram(s) Oral once PRN Blood Glucose LESS THAN 70 milliGRAM(s)/deciliter  dextrose Oral Gel 15 Gram(s) Oral once PRN Blood Glucose LESS THAN 70 milliGRAM(s)/deciliter

## 2024-01-25 NOTE — PROGRESS NOTE ADULT - PROBLEM SELECTOR PLAN 3
- Na 163 on admission  - FWD of 1.3L on admission    Plan:  - c/w IVF  - BMP, trend Na  - Goal correction 6-8 mEq/L/day

## 2024-01-25 NOTE — PROGRESS NOTE ADULT - TIME BILLING
Review of laboratory data, radiology results, consultants' recommendations, documentation in [place], discussion with patient/advanced care providers and interdisciplinary staff (such as , social workers, etc). Interventions were performed as documented above.

## 2024-01-25 NOTE — DIETITIAN INITIAL EVALUATION ADULT - ETIOLOGY
metabolic demand for wound healing patient meets criteria for malnutrition in the context of chronic illness

## 2024-01-25 NOTE — DIETITIAN INITIAL EVALUATION ADULT - ADD RECOMMEND
1. Defer nutrition plan of care to medical team based on goals of care discussion and decisions of patient/patient's family  --> If alternate means of nutrition are to be initiated, please reconsult nutrition for recommendations   2. If PO diet is to be provided, recommend SLP evaluation prior to initiation; follow recommendations  3. Monitor weights, labs/electrolytes, BM's, skin integrity, diet progression  4. Consider thiamine supplementation via tolerated route at medical team's discretion

## 2024-01-25 NOTE — CONSULT NOTE ADULT - SUBJECTIVE AND OBJECTIVE BOX
[facility] [place] [facility]  Brianna Chang, Palliative Care Attending  Contact Info: Page [phone] (including Nights/Weekends), message on Microsoft Teams (Brianna Chang), or leave  at Palliative Office [phone] (non-urgent)     Date of [id number] @ 13:17  HPI:  89 years old female with h/o HTN, HLD, CAD, DM, dementia, s/p spinal stimulator placement p/w abnormal labs (Na 168, K 2.9). Unable to obtain further history due to patient baseline nonverbal.    ED course: Pt arrived to ED w/ , /46, afebrile, requiring 4L NC. Pt recieved Vancomycin, and IVF. Labs showed leukocytosis 14, elevated , normal lactate 1.5, Na 163, Cl 128, SCr 2.76 (baseline ~1-1.2), . UA growing yeast like cells and with pyuria.  (24 Jan 2024 17:59)    PERTINENT PM/SXH:   Angina  Diabetes Mellitus  Arthritis, Infective, Knee  Detached Retina  Acute Mucous Pneumonia  Spinal Stenosis- lumbar  History of Back Surgery  Basal Cell Cancer  Dementia  Detached Retina, Left  S/P Carpal Tunnel Release  Trigger Finger  S/P Laparoscopic Cholecystectomy  S/P TKR (Total Knee Replacement)  Cataract    FAMILY HISTORY:  FH: HTN (hypertension)    Family Hx substance abuse [ ]yes [ ]no  ITEMS NOT CHECKED ARE NOT PRESENT    SOCIAL HISTORY:   Significant other/partner[ ]  Children[x ]  Cheondoism/Spirituality:  Substance hx:  [ ]   Tobacco hx:  [ ]   Alcohol hx: [ ]   Home Opioid hx: NONE  [ ] I-Stop Reference No: [id number]  Living Situation: [ ]Home  [ ]Long term care  [ x]Rehab [ ]Other    ADVANCE DIRECTIVES:    DNR/MOLST  [ ]  Living Will  [ ]   DECISION MAKER(s):  [x ] Health Care Proxy(s)  [ ] Surrogate(s)  [ ] Guardian           Name(s): Toby Burnham Phone Number(s): [phone]    BASELINE (I)ADL(s) (prior to admission):   [place]: [ ]Total  [ ] Moderate [x ]Dependent    Allergies    Pineapple (Unknown)  contrast dye -  rash (Other)  iodine (Other)  codeine (Vomiting)  penicillin (Rash)  latex (Rash)    Intolerances    Tolerates ceftriaxone, cefepime (Other)  MEDICATIONS  (STANDING):  cefepime   IVPB 1000 milliGRAM(s) IV Intermittent every 24 hours  dextrose 5%. 1000 milliLiter(s) (54 mL/Hr) IV Continuous <Continuous>  dextrose 5%. 1000 milliLiter(s) (100 mL/Hr) IV Continuous <Continuous>  dextrose 5%. 1000 milliLiter(s) (100 mL/Hr) IV Continuous <Continuous>  dextrose 5%. 1000 milliLiter(s) (50 mL/Hr) IV Continuous <Continuous>  dextrose 5%. 1000 milliLiter(s) (50 mL/Hr) IV Continuous <Continuous>  dextrose 50% Injectable 12.5 Gram(s) IV Push once  dextrose 50% Injectable 12.5 Gram(s) IV Push once  dextrose 50% Injectable 25 Gram(s) IV Push once  dextrose 50% Injectable 25 Gram(s) IV Push once  dextrose 50% Injectable 25 Gram(s) IV Push once  dextrose 50% Injectable 25 Gram(s) IV Push once  glucagon  Injectable 1 milliGRAM(s) IntraMuscular once  glucagon  Injectable 1 milliGRAM(s) IntraMuscular once  heparin   Injectable 5000 Unit(s) SubCutaneous every 12 hours  insulin lispro (ADMELOG) corrective regimen sliding scale   SubCutaneous every 6 hours  pantoprazole    Tablet 40 milliGRAM(s) Oral before breakfast  polyethylene glycol 3350 17 Gram(s) Oral daily  senna 2 Tablet(s) Oral at bedtime    MEDICATIONS  (PRN):  dextrose Oral Gel 15 Gram(s) Oral once PRN Blood Glucose LESS THAN 70 milliGRAM(s)/deciliter  dextrose Oral Gel 15 Gram(s) Oral once PRN Blood Glucose LESS THAN 70 milliGRAM(s)/deciliter    PRESENT SYMPTOMS: [x ]Unable to self-report  [ ] CPOT [ x] PAINADs [x ] RDOS  Source if other than patient:  [ ]Family   [x ]Team     Pain: [ ]yes [ ]no  QOL impact -   Location -                    Aggravating factors -  Quality -  Radiation -  Timing-  Severity (0-10 scale):  Minimal acceptable level/pain goal (0-10 scale):     CPOT:    https://www.sccm.org/getattachment/nzm54t07-8i3x-3g2f-7n5j-2662d1787n4f/Critical-Care-Pain-Observation-Tool-(CPOT)    Dyspnea:                           [ ]Mild [ ]Moderate [ ]Severe  Anxiety:                             [ ]Mild [ ]Moderate [ ]Severe  Fatigue:                             [ ]Mild [ ]Moderate [ ]Severe  Nausea:                             [ ]Mild [ ]Moderate [ ]Severe  Loss of appetite:              [ ]Mild [ ]Moderate [ ]Severe  Constipation:                    [ ]Mild [ ]Moderate [ ]Severe    Other Symptoms:  [ ]All other review of systems negative     PCSSQ[Palliative Care Spiritual Screening Question]   Severity (0-10):  Chaplaincy Referral: [ ] yes [ ] refused [ ] following [x ] deferred     Caregiver [place]? : [ ] yes [ ] no [ x] Deferred [ ] Declined             Social work referral [ ] Patient & Family Centered Care Referral [ ]  Anticipatory Grief present?:  [ ] yes [ ] no  [x ] Deferred                  Social work referral [ ] Patient & Family Centered Care Referral [ ]    PHYSICAL EXAM:  Vital Signs Last 24 Hrs  T(C): 36.8 (25 Jan 2024 07:00), Max: 37.2 (24 Jan 2024 17:35)  T(F): 98.3 (25 Jan 2024 07:00), Max: 98.9 (24 Jan 2024 17:35)  HR: 66 (25 Jan 2024 07:00) (66 - 98)  BP: 132/67 (25 Jan 2024 07:00) (121/30 - 137/59)  BP(mean): 75 (25 Jan 2024 00:15) (75 - 75)  RR: 16 (25 Jan 2024 07:00) (16 - 18)  SpO2: 100% (25 Jan 2024 07:00) (97% - 100%)    Parameters below as of 25 Jan 2024 07:00  Patient On (Oxygen Delivery Method): nasal cannula  O2 Flow (L/min): 4   I&O's Summary    GENERAL: [x ]Cachexia    [ x]Alert  [ ]Oriented x   [ ]Lethargic  [ ]Unarousable  [ ]Verbal  [ ]Non-Verbal  Behavioral:   [ ] Anxiety  [ ] Delirium [ ] Agitation [x ] Other  HEENT:  [ ]Normal   [ x]Dry mouth   [ ]ET Tube/Trach  [ ]Oral lesions  PULMONARY:   [ ]Clear [ ]Tachypnea  [ ]Audible excessive secretions   [ ]Rhonchi        [ ]Right [ ]Left [ ]Bilateral  [ ]Crackles        [ ]Right [ ]Left [ ]Bilateral  [ ]Wheezing     [ ]Right [ ]Left [ ]Bilateral  [x ]Diminished breath sounds [ ]right [ ]left [ x]bilateral  CARDIOVASCULAR:    [x ]Regular [ ]Irregular [ ]Tachy  [ ]Jack [ ]Murmur [ ]Other  GASTROINTESTINAL:  [ x]Soft  [ ]Distended   [ ]+BS  [ ]Non tender [ ]Tender  [ ]Other [ ]PEG [ ]OGT/ NGT  Last BM: ?   GENITOURINARY:  [ ]Normal [x ] Incontinent   [ ]Oliguria/Anuria   [ ][place]  MUSCULOSKELETAL:   [ ]Normal   [ ]Weakness  [x ]Bed/Wheelchair bound [ ]Edema  NEUROLOGIC:   [ ]No focal deficits  [x ]Cognitive impairment  [ ]Dysphagia [ ]Dysarthria [ ]Paresis [ ]Other   SKIN: Please see flowsheets   [ ]Normal  [ ]Rash  [x ]Other- multiple wounds on her body   [ ]Pressure ulcer(s)       Present on admission [ ]y [ ]n    CRITICAL CARE:  [ ] Shock Present  [ ]Septic [ ]Cardiogenic [ ]Neurologic [ ]Hypovolemic  [ ]  Vasopressors [ ]  Inotropes   [ ]Respiratory failure present [ ]Mechanical ventilation [ ]Non-invasive ventilatory support [ ]High flow    [ ]Acute  [ ]Chronic [ ]Hypoxic  [ ]Hypercarbic [ ]Other  [ ]Other organ failure     LABS:                        8.3    16.85 )-----------( 132      ( 25 Jan 2024 11:00 )             26.4   01-25    159<H>  |  125<H>  |  103<H>  ----------------------------<  276<H>  3.3<L>   |  17<L>  |  2.53<H>    Ca    8.3<L>      25 Jan 2024 11:00  Phos  2.6     01-25  Mg     2.00     01-25    TPro  6.7  /  Alb  2.3<L>  /  TBili  0.3  /  DBili  x   /  AST  34<H>  /  ALT  15  /  AlkPhos  107  01-24  PT/INR - ( 24 Jan 2024 12:05 )   PT: 13.4 sec;   INR: 1.20 ratio         PTT - ( 24 Jan 2024 12:05 )  PTT:23.0 sec    Urinalysis Basic - ( 25 Jan 2024 11:00 )    Color: x / Appearance: x / SG: x / pH: x  Gluc: 276 mg/dL / Ketone: x  / Bili: x / Urobili: x   Blood: x / Protein: x / Nitrite: x   Leuk Esterase: x / RBC: x / WBC x   Sq Epi: x / Non Sq Epi: x / Bacteria: x      RADIOLOGY & ADDITIONAL STUDIES: < from: Xray Foot AP + Lateral, Left (01.24.24 @ 14:09) >  IMPRESSION:  Dorsal forefoot soft tissue swelling. Questionable focal slightly thinned   indistinct appearing calcaneal cortex on left foot frontal/oblique view   raising concern for possible osteomyelitis, MRI would be helpful to   better assess. No tracking gas collections or additional suspected areas   of osteomyelitis. No fractures or dislocations.    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 Tarsometatarsal   alignment maintained without evidence for a Lisfranc injury.    Small calcaneal enthesophytes.    Generalized osteopenia otherwise no discrete lytic or blastic lesions.    < end of copied text >      PROTEIN CALORIE MALNUTRITION PRESENT: [ ]mild [ ]moderate [ ]severe [ ]underweight [ ]morbid obesity  https://www.andeal.org/vault/2440/web/files/ONC/Table_Clinical%20Characteristics%20to%20Document%20Malnutrition-White%20JV%20et%20al%628252.pdf    Height (cm): 149.9 (01-24-24 @ 10:06), 149.9 (12-15-23 @ 09:00), 149.9 (10-30-23 @ 16:08)  Weight (kg): 54.4 (12-15-23 @ 09:00), 59 (10-30-23 @ 16:08)  BMI (kg/m2): 24.2 (01-24-24 @ 10:06), 24.2 (12-15-23 @ 09:00), 26.3 (10-30-23 @ 16:08)    [x ]PPSV2 < or = to 30% [ ]significant weight loss  [ ]poor nutritional intake  [ ]anasarca[ ]Artificial Nutrition      Other REFERRALS:  [ ]Hospice  [ ]Child Life  [ ]Social Work  [ ]Case management [ ]Holistic Therapy

## 2024-01-25 NOTE — CONSULT NOTE ADULT - ASSESSMENT
89 year old female PMH of dementia, HTN, HLD, CAD and DM transferred from rehab to [facility] for electrolyte abnormalities. Palliative consulted for complex decision making regarding goc in setting of advanced illness.

## 2024-01-25 NOTE — CONSULT NOTE ADULT - ATTENDING COMMENTS
89-yo F w/ PMH of dementia, CAD, DM, s/p spinal stimulator, and recent admission at [facility] (12/15/23-1/2/24), sent in for abnormal labs. Recently hospitalized at [facility] w/ generalized weakness and frequent falls. Seen by ID there for infected L ankle wound. Amputation was discussed, however not pursued. Patient was discharged to [facility], pending hospice.    #L foot OM  #Leukocytosis  #Elevated ESR and CRP  #Polymicrobial infection  #Dementia  #FTT in adult  #Debility  #SAE  #Electrolyte abnormality  - Recent hospitalization for frequent falls, found to have L foot wound. Polymicrobial on culture, growing E coli, MSSA, and Finegoldia.  - Now presenting with significant electrolyte derangements i/s/o SAE.  - On exam, L foot was malodorous and w/ escharous changes. +Bleeding also noted.  - Clinical OM (bone probing, elevated ESR/CRP, leukocytosis). Amputation was offered, however not pursued. Severely malnourished and deconditioned patient with dismal chance of meaningful recovery with medical treatment only. Medical treatment for a "curative" intention (e.g. long-term IV) may not be in line with patient's GOC (e.g. hospice care)  - D/c cefepime. Start ceftriaxone 1g IV Q24H, doxycycline 100 mg IV Q12H, and metronidazole 500 mg IV Q12H in the meantime.    Plan discussed with primary team house staff and ID fellow.  Thank you for this consult. Inpatient ID team will follow.    Kareem Kelley MD, PhD  Attending Physician  Division of Infectious Diseases  Department of Medicine    Please contact through MS Teams message.  Office: [phone] (after 5 PM or weekend) 89-yo F w/ PMH of dementia, CAD, DM, s/p spinal stimulator, and recent admission at [facility] (12/15/23-1/2/24), sent in for abnormal labs. Recently hospitalized at [facility] w/ generalized weakness and frequent falls. Seen by ID there for infected L ankle wound. Amputation was discussed, however not pursued. Patient was discharged to [facility], pending hospice.    #L foot OM  #Leukocytosis  #Elevated ESR and CRP  #Polymicrobial infection  #Dementia  #FTT in adult  #Debility  #SAE  #Electrolyte abnormality  - Recent hospitalization for frequent falls, found to have L foot wound. Polymicrobial on culture, growing E coli, MSSA, and Finegoldia.  - Now presenting with significant electrolyte derangements i/s/o SAE.  - On exam, L foot was malodorous and w/ escharous changes. +Bleeding also noted.  - Clinical OM (bone probing, elevated ESR/CRP, leukocytosis). Amputation was offered, however not pursued. Severely malnourished and deconditioned patient with dismal chance of meaningful recovery with medical treatment only. Medical treatment for a "curative" intention (e.g. long-term IV) may not be in line with patient's GOC (e.g. hospice care)  - D/c cefepime. Start ceftriaxone 1g IV Q24H, doxycycline 100 mg IV Q12H, and metronidazole 500 mg IV Q12H in the meantime.  - Bed head elevation. Aspiration precautions. Optimize nutrition. Frequent turning and wound care.    Plan discussed with primary team house staff and ID fellow.  Thank you for this consult. Inpatient ID team will follow.    Kareem Kelley MD, PhD  Attending Physician  Division of Infectious Diseases  Department of Medicine    Please contact through MS Teams message.  Office: [phone] (after 5 PM or weekend)

## 2024-01-25 NOTE — SWALLOW BEDSIDE ASSESSMENT ADULT - SWALLOW EVAL: DIAGNOSIS
SLP placed ice chip to labial surface; however, patient without attempt to lick/ suck/ manipulate/ retrieve ice chip with labial closure maintained. PO trials deferred at this time given clinical presentation.

## 2024-01-25 NOTE — PROGRESS NOTE ADULT - SUBJECTIVE AND OBJECTIVE BOX
************************  Abhay Michelle MD  Internal Medicine PGY-1  ************************    Patient is a 89y old  Female who presents with a chief complaint of Abnormal labs    Unable to complete ROS, AOx0    MEDICATIONS  (STANDING):  cefepime   IVPB 1000 milliGRAM(s) IV Intermittent every 24 hours  dextrose 5%. 1000 milliLiter(s) (50 mL/Hr) IV Continuous <Continuous>  dextrose 5%. 1000 milliLiter(s) (50 mL/Hr) IV Continuous <Continuous>  dextrose 5%. 1000 milliLiter(s) (100 mL/Hr) IV Continuous <Continuous>  dextrose 5%. 1000 milliLiter(s) (54 mL/Hr) IV Continuous <Continuous>  dextrose 5%. 1000 milliLiter(s) (100 mL/Hr) IV Continuous <Continuous>  dextrose 50% Injectable 25 Gram(s) IV Push once  dextrose 50% Injectable 25 Gram(s) IV Push once  dextrose 50% Injectable 12.5 Gram(s) IV Push once  dextrose 50% Injectable 12.5 Gram(s) IV Push once  dextrose 50% Injectable 25 Gram(s) IV Push once  dextrose 50% Injectable 25 Gram(s) IV Push once  glucagon  Injectable 1 milliGRAM(s) IntraMuscular once  glucagon  Injectable 1 milliGRAM(s) IntraMuscular once  heparin   Injectable 5000 Unit(s) SubCutaneous every 12 hours  insulin lispro (ADMELOG) corrective regimen sliding scale   SubCutaneous every 6 hours  pantoprazole    Tablet 40 milliGRAM(s) Oral before breakfast  polyethylene glycol 3350 17 Gram(s) Oral daily  potassium chloride  10 mEq/100 mL IVPB 10 milliEquivalent(s) IV Intermittent every 1 hour  senna 2 Tablet(s) Oral at bedtime    MEDICATIONS  (PRN):  dextrose Oral Gel 15 Gram(s) Oral once PRN Blood Glucose LESS THAN 70 milliGRAM(s)/deciliter  dextrose Oral Gel 15 Gram(s) Oral once PRN Blood Glucose LESS THAN 70 milliGRAM(s)/deciliter      CAPILLARY BLOOD GLUCOSE      POCT Blood Glucose.: 274 mg/dL (25 Jan 2024 07:13)  POCT Blood Glucose.: 286 mg/dL (25 Jan 2024 02:49)  POCT Blood Glucose.: 282 mg/dL (24 Jan 2024 21:19)  POCT Blood Glucose.: 239 mg/dL (24 Jan 2024 17:23)    I&O's Summary      PHYSICAL EXAM:  Vital Signs Last 24 Hrs  T(C): 36.6 (25 Jan 2024 02:06), Max: 37.2 (24 Jan 2024 17:35)  T(F): 97.8 (25 Jan 2024 02:06), Max: 98.9 (24 Jan 2024 17:35)  HR: 82 (25 Jan 2024 02:46) (72 - 116)  BP: 121/30 (25 Jan 2024 02:46) (106/46 - 137/59)  BP(mean): 75 (25 Jan 2024 00:15) (75 - 75)  RR: 17 (25 Jan 2024 02:46) (17 - 18)  SpO2: 100% (25 Jan 2024 02:46) (97% - 100%)    Parameters below as of 25 Jan 2024 02:46  Patient On (Oxygen Delivery Method): nasal cannula  O2 Flow (L/min): 4      PHYSICAL EXAM:  GENERAL: Cachectic, lethargic  HEENT: NC/AT  NECK: Supple, No JVD  CHEST/LUNG: CTAB, no increased WOB  HEART: RRR, no m/r/g, +2 pulses bilaterally  ABDOMEN: soft, non-tender, non-distended, BS+  EXTREMITIES:  2+ peripheral pulses, no clubbing, no edema  NERVOUS SYSTEM:  A&Ox0  SKIN: L foot mottled w/ purple, green discoloration, visible wound on dorsum, heel covered with bandage    LABS:                        9.3    14.37 )-----------( 149      ( 24 Jan 2024 12:05 )             30.2     01-25    160<HH>  |  126<H>  |  104<H>  ----------------------------<  292<H>  3.1<L>   |  15<L>  |  2.59<H>    Ca    8.1<L>      25 Jan 2024 01:20  Phos  3.2     01-25  Mg     1.70     01-25    TPro  6.7  /  Alb  2.3<L>  /  TBili  0.3  /  DBili  x   /  AST  34<H>  /  ALT  15  /  AlkPhos  107  01-24    PT/INR - ( 24 Jan 2024 12:05 )   PT: 13.4 sec;   INR: 1.20 ratio         PTT - ( 24 Jan 2024 12:05 )  PTT:23.0 sec      Urinalysis Basic - ( 25 Jan 2024 01:20 )    Color: x / Appearance: x / SG: x / pH: x  Gluc: 292 mg/dL / Ketone: x  / Bili: x / Urobili: x   Blood: x / Protein: x / Nitrite: x   Leuk Esterase: x / RBC: x / WBC x   Sq Epi: x / Non Sq Epi: x / Bacteria: x

## 2024-01-25 NOTE — DIETITIAN INITIAL EVALUATION ADULT - OTHER INFO
Patient is currently NPO, x1 day ongoing. Noted SLP evaluation ordered, follow recommendations upon completion. Patient seen at bedside this AM by writer. Patient noted to be documented as disoriented per RN flowsheet. Patient unable to meaningfully participate in assessment at this time. No report of GI distress (nausea, vomiting, diarrhea, constipation). No BMs noted per RN flowsheet documentation. Noted to be on a bowel regimen. Noted HbA1c 6.6% (12/16/23).

## 2024-01-25 NOTE — SWALLOW BEDSIDE ASSESSMENT ADULT - ADDITIONAL RECOMMENDATIONS
1. Medical team advised to reconsult this service as patient becomes medically optimized. 2. This service to follow to reassess for oral diet program candidacy as schedule permits.

## 2024-01-25 NOTE — CONSULT NOTE ADULT - CONVERSATION DETAILS
Referral for complex decision making and symptom management in setting of advanced illness. Introduced role of GaP team to patient's son/hcp, Toby, who was amenable to speaking with palliative provider. Reviewed patient's clinical condition and symptom management in setting of wounds with advanced dementia. Toby shared that patient has had a significant decline over the past 1 month. He states that patient was talking 1 month ago and has been bed bound, unable to participate in rehab. Discussed advanced directives including CPR and mechanical ventilation in setting of advanced dementia and increasing frailty. Reviewed the risks and benefits of these interventions at the EOL in setting of dementia sharing that these interventions might pose more burden than benefit. Toby states that he would prefer mom to have a natural passing with dNR/DNI and he is the hcp and POA for his mother, however he has older siblings that have "trumped" his wishes. He states that he is planning to have his siblings physically see mom in the hospital this weekend and is hopeful that they will change their mind on advance directives. Toby understands that patient is a Full Code at this point.     Educated family on the philosophy of hospice care and explained the various settings and criteria in which hospice can be delivered (home vs. nursing home vs. inpatient hospice). Discussed the services provided under hospice services emphasizing the goal of elevating patient's quality of life and optimizing symptom management and avoiding unnecessary future hospitalizations. Explained that patient would likely be eligible for inpatient hospice as she does not have safe PO route. Toby was amenable to transitioning to hospice plan of care after his siblings see mom over the weekend and are hopeful that they will be amenable to this plan of care as well.

## 2024-01-25 NOTE — CONSULT NOTE ADULT - PROBLEM SELECTOR RECOMMENDATION 5
Patient with multiple wounds noted- non-verbal signs of discomfort despite barely examining patient   > Son, Toby was amenable to symptom meds   > Can add 0.25mg IV dilaudid q4h prn severe pain. Can add special instructions to pre-medicate prior to wound care changes   > bowel regimen while on opioids   > narcan prn

## 2024-01-25 NOTE — SWALLOW BEDSIDE ASSESSMENT ADULT - CONSISTENCIES ADMINISTERED
ice chip presented to labial surface; patient without attempt to lick/ suck/ retrieve/ manipulate ice

## 2024-01-25 NOTE — ED ADULT NURSE REASSESSMENT NOTE - NS ED NURSE REASSESS COMMENT FT1
Lab called per critical blood sugar. Finger stick done 282. Provider notified and dextrose infusion stopped. Pt is mentating baseline and vitals are stable.
Pt resting in room 29. Pt is at mental baseline. Pt does not appear in acute distress. Pt medicated per MAR. Airway is patent, respirations are even and unlabored. Plan of care ongoing, safety maintained.
Pt resting in room 29. Pt mentating per baseline. Pt appears not in acute distress. Airway is patent, respirations are even an unlabored. Plan of care ongoing, safety maintained.
Rapid called bc pt is hypoxic. Pt 60s-70s sating on 5L NC. Pt placed on non-rebreather sating in the 80s. Pt is groaning. MD made aware and arrive to bedside. FS and rectal temp done. RR called and ended per patient improving. Pt back on 5L NC and sating mid 90s. Airway is patent, respirations are even and unlabored. Plan of care ongoing, safety maintained.
Report received from RN. Pt mentating at baseline per report. Airway is patent, respirations are even and unlabored. Pt medicated per MAR. Plan of care ongoing, safety maintained.
report given to inpatient RN Zakiya. provider Abdullahi Hernandez contacted to DC CPO2 and I&OQ2hr order as request of inpatient RN, MD states he will DC orders. as well MD to add q6hr fingerstick order.
report received from KETAN Sanderson. pt remains at baseline mental status A&OX0, RR even unlabored, ON 4LNC, o2 saturation 100%. NAD noted at this time. stretcher lowest position siderails up safety measures in place. pending bed assignment at this time
[place] bilateral hearing aids

## 2024-01-25 NOTE — PROGRESS NOTE ADULT - PROBLEM SELECTOR PLAN 1
- L foot xray:Dorsal forefoot soft tissue swelling. Questionable focal slightly thinned   indistinct appearing calcaneal cortex on left foot frontal/oblique view   raising concern for possible osteomyelitis, MRI would be helpful to   better assess. No tracking gas collections or additional suspected areas   of osteomyelitis. No fractures or dislocations.  - CRP elevated on admission    Plan:  - Recently Tx in Dec 23' for L foot wound growing S. Aureas and E. Coli, amputation L foot offered and deferred at the time  - s/p Vanc dose in ED  - c/w Vanc, Zosyn  - previously unable to tolerate MRI L ankle when attempted in recent hospitalization

## 2024-01-25 NOTE — PROGRESS NOTE ADULT - ATTENDING COMMENTS
89 y/o F with PMH HTN, HLD, CAD, DM, dementia s/p spinal stimulator placement who presented from [facility] for hypernatremia and concern for foot infection.    #Sepsis, pt. met SIRS criteria + source of known foot infection. Sepsis now resolved. UA negative. RVP negative. BCx pending. Recently treated for L foot wound growing E. coli and S. aureus. C/w cefepime for now (zosyn allergy). Vascular offering definitive management with amputation but family declines. Local wound care.     #L lateral malleolar pressure wound, vascular consulted. Rec amputation but family declines.     #Hypernatremia, Na 160 2/2 dehydration in the setting of poor PO intake. Pt. HD stable. Na repeat 159. C/w D5.     #Acute renal failure,  and Cr 2.76. Elevated from baseline on prior admission. Likely pre-renal in the setting of dehydration. C/w D5.    #Dementia, pt. A&Ox0 - nonverbal. Poor PO intake. Severe protein calorie malnutrition. Currently NPO. Pending S&S evaluation.     #GOC, pt with prior MOLST - DNR/DNI but as per vascular yesterday this was rescinded. Palliative care consulted for ongoing GOC conversations.     Remainder of plan as stated above. Plan discussed with HS.

## 2024-01-25 NOTE — PATIENT PROFILE ADULT - FALL HARM RISK - HARM RISK INTERVENTIONS

## 2024-01-25 NOTE — CONSULT NOTE ADULT - PROBLEM SELECTOR RECOMMENDATION 6
Patient's son, Toby Burnham ([phone]) is hcp and POA. Toby has 4 siblings that are coming to visit patient this weekend before hopefully re-establishing advanced directives.   > FULL CODE   > Discussed referral for hospice, which son, Toby, would be amenable to after his siblings visit patient this weekend.

## 2024-01-25 NOTE — SWALLOW BEDSIDE ASSESSMENT ADULT - SWALLOW EVAL: ORAL MUSCULATURE
patient did not open mouth given verbal cues/ thermal stimuli (ice chips to labial surface) therefore unable to adequately assess/unable to assess due to poor participation/comprehension

## 2024-01-25 NOTE — SWALLOW BEDSIDE ASSESSMENT ADULT - NS SPL SWALLOW CLINIC TRIAL FT
PO trials deferred at this time given clinical presentation. Unable to assess oral-pharyngeal stages.

## 2024-01-25 NOTE — CONSULT NOTE ADULT - SUBJECTIVE AND OBJECTIVE BOX
HPI:    89 years old female with h/o HTN, HLD, CAD, DM, dementia, s/p spinal stimulator placement p/w abnormal labs (Na 168, K 2.9). Unable to obtain further history due to patient baseline nonverbal. Patient was transferred from [facility] for hypernatremia on routine labs. They also recently started cefepime due to concern for foot infection. The son did not have any further history regarding the events leading up to hospital admission. As per the son, the patient was in her usual state of health the last time he visited with her.     ED course: Pt arrived to ED w/ , /46, afebrile, requiring 4L NC. Pt recieved Vancomycin, and IVF. Labs showed leukocytosis 14, elevated , normal lactate 1.5, Na 163, Cl 128, SCr 2.76 (baseline ~1-1.2), . UA growing yeast like cells and with pyuria.      ID consulted for further recommendations.      REVIEW OF SYSTEMS  [X] ROS unobtainable because:  non verbal   [  ] All other systems negative except as noted below    Constitutional:  [ ] fever [ ] chills  [ ] weight loss  [ ]night sweat  [ ]poor appetite/PO intake [ ]fatigue   Skin:  [ ] rash [ ] phlebitis	  Eyes: [ ] icterus [ ] pain  [ ] discharge	  ENMT: [ ] sore throat  [ ] thrush [ ] ulcers [ ] exudates [ ]anosmia  Respiratory: [ ] dyspnea [ ] hemoptysis [ ] cough [ ] sputum	  Cardiovascular:  [ ] chest pain [ ] palpitations [ ] edema	  Gastrointestinal:  [ ] nausea [ ] vomiting [ ] diarrhea [ ] constipation [ ] pain	  Genitourinary:  [ ] dysuria [ ] frequency [ ] hematuria [ ] discharge [ ] flank pain  [ ] incontinence  Musculoskeletal:  [ ] myalgias [ ] arthralgias [ ] arthritis  [ ] back pain  Neurological:  [ ] headache [ ] weakness [ ] seizures  [ ] confusion/altered mental status    prior hospital charts reviewed [V]  primary team notes reviewed [V]  other consultant notes reviewed [V]    PAST MEDICAL & SURGICAL HISTORY:  Angina  in 2005, s/p angioplasty with stent placement, no recurrance, no sequalae    Diabetes Mellitus  type 2    Arthritis, Infective, Knee    Detached Retina  right eye    Acute Mucous Pneumonia  4/2010, resolved ; no residual problems    Spinal Stenosis- lumbar    History of Back Surgery  2010    Basal Cell Cancer  removed from left neck 2009    Dementia    Detached Retina, Left  laser surgery in 1995    S/P Carpal Tunnel Release  bilateral hands in 1990    Trigger Finger  release of middle and ring finger of right hand in 1990, and middle finger left hand in 2008    S/P Laparoscopic Cholecystectomy  2008    S/P TKR (Total Knee Replacement)  left knee    Cataract  removal with lens implant right in 2000    SOCIAL HISTORY:  - Denied smoking/vaping/alcohol/recreational drug use    FAMILY HISTORY:  FH: HTN (hypertension)    Allergies  Pineapple (Unknown)  contrast dye -  rash (Other)  iodine (Other)  codeine (Vomiting)  penicillin (Rash)  latex (Rash)    ANTIMICROBIALS:  cefepime   IVPB 1000 every 24 hours    ANTIMICROBIALS (past 90 days):  MEDICATIONS  (STANDING):    cefepime   IVPB   100 mL/Hr IV Intermittent (01-24-24 @ 20:13)    vancomycin  IVPB.   250 mL/Hr IV Intermittent (01-24-24 @ 12:39)    OTHER MEDS:   MEDICATIONS  (STANDING):  dextrose 50% Injectable 12.5 once  dextrose 50% Injectable 12.5 once  dextrose 50% Injectable 25 once  dextrose 50% Injectable 25 once  dextrose 50% Injectable 25 once  dextrose 50% Injectable 25 once  dextrose Oral Gel 15 once PRN  dextrose Oral Gel 15 once PRN  glucagon  Injectable 1 once  glucagon  Injectable 1 once  heparin   Injectable 5000 every 12 hours  insulin lispro (ADMELOG) corrective regimen sliding scale  every 6 hours  pantoprazole    Tablet 40 before breakfast  polyethylene glycol 3350 17 daily  senna 2 at bedtime    VITALS:  Vital Signs Last 24 Hrs  T(F): 97.4 (01-25-24 @ 14:50), Max: 98.9 (01-24-24 @ 17:35)    Vital Signs Last 24 Hrs  HR: 75 (01-25-24 @ 14:50) (66 - 98)  BP: 128/50 (01-25-24 @ 14:50) (121/30 - 137/59)  RR: 17 (01-25-24 @ 14:50)  SpO2: 95% (01-25-24 @ 14:50) (95% - 100%)  Wt(kg): --    EXAM:  Physical Exam:  Constitutional:  non verbal   Head/Eyes: no icterus, PERRL, EOMI  ENT:  supple; no thrush  LUNGS:  CTA, on 3 L NC   CVS:  normal S1, S2, no murmur  Abd:  soft, non-tender; non-distended  Ext:   L ankle with deep wound, foul smelling   Vascular:  IV site no erythema tenderness or discharge  MSK:  joints without swelling  Neuro: alert, non verbal     Labs:                        8.3    16.85 )-----------( 132      ( 25 Jan 2024 11:00 )             26.4     01-25    159<H>  |  125<H>  |  103<H>  ----------------------------<  276<H>  3.3<L>   |  17<L>  |  2.53<H>    Ca    8.3<L>      25 Jan 2024 11:00  Phos  2.6     01-25  Mg     2.00     01-25    TPro  6.7  /  Alb  2.3<L>  /  TBili  0.3  /  DBili  x   /  AST  34<H>  /  ALT  15  /  AlkPhos  107  01-24    WBC Trend:  WBC Count: 16.85 (01-25-24 @ 11:00)  WBC Count: 14.37 (01-24-24 @ 12:05)    Auto Neutrophil #: 15.01 K/uL (01-25-24 @ 11:00)  Auto Neutrophil #: 12.14 K/uL (01-24-24 @ 12:05)  Auto Neutrophil #: 10.16 K/uL (12-15-23 @ 09:49)  Auto Neutrophil #: 4.69 K/uL (10-30-23 @ 18:30)    Creatine Trend:  Creatinine: 2.53 (01-25)  Creatinine: 2.59 (01-25)  Creatinine: 2.55 (01-24)  Creatinine: 2.76 (01-24)    Liver Biochemical Testing Trend:  Alanine Aminotransferase (ALT/SGPT): 15 (01-24)  Alanine Aminotransferase (ALT/SGPT): 33 (12-22)  Alanine Aminotransferase (ALT/SGPT): 23 (12-16)  Alanine Aminotransferase (ALT/SGPT): 24 (12-15)  Alanine Aminotransferase (ALT/SGPT): 19 (10-30)  Aspartate Aminotransferase (AST/SGOT): 34 (01-24-24 @ 12:05)  Aspartate Aminotransferase (AST/SGOT): 35 (12-22-23 @ 09:42)  Aspartate Aminotransferase (AST/SGOT): 33 (12-16-23 @ 07:30)  Aspartate Aminotransferase (AST/SGOT): 27 (12-15-23 @ 09:49)  Aspartate Aminotransferase (AST/SGOT): 18 (10-30-23 @ 18:30)  Bilirubin Total: 0.3 (01-24)  Bilirubin Total: 0.6 (12-22)  Bilirubin Total: 0.4 (12-16)  Bilirubin Total: 0.7 (12-15)  Bilirubin Total: 0.4 (10-30)    Trend LDH    Auto Eosinophil %: 0.6 % (01-25-24 @ 11:00)  Auto Eosinophil %: 0.4 % (01-24-24 @ 12:05)    Urinalysis Basic - ( 25 Jan 2024 11:00 )    Color: x / Appearance: x / SG: x / pH: x  Gluc: 276 mg/dL / Ketone: x  / Bili: x / Urobili: x   Blood: x / Protein: x / Nitrite: x   Leuk Esterase: x / RBC: x / WBC x   Sq Epi: x / Non Sq Epi: x / Bacteria: x    MICROBIOLOGY:  Vancomycin Level, Random: 32.3 (01-25 @ 15:30)    MRSA PCR Result.: Detected (01-24-24 @ 14:20)  MRSA PCR Result.: NotDetec (12-20-23 @ 06:45)    Culture - Blood (collected 24 Jan 2024 11:50)  Source: .Blood Blood-Peripheral  Preliminary Report:    No growth at 24 hours    Culture - Blood (collected 24 Jan 2024 11:40)  Source: .Blood Blood-Peripheral  Preliminary Report:    No growth at 24 hours    Culture - Abscess with Gram Stain (collected 22 Dec 2023 17:33)  Source: .Abscess Left ankle wound  Final Report:    Few Staphylococcus aureus    Rare Escherichia coli    Rare Finegoldia magna "Susceptibilities not performed"  Organism: Staphylococcus aureus  Escherichia coli  Organism: Escherichia coli    Sensitivities:      Method Type: SRIRAM      -  Amoxicillin/Clavulanic Acid: S <=8/4      -  Ampicillin: S <=8 These ampicillin results predict results for amoxicillin      -  Ampicillin/Sulbactam: S <=4/2      -  Aztreonam: S <=4      -  Cefazolin: S <=2      -  Cefepime: S <=2      -  Cefoxitin: S <=8      -  Ceftriaxone: S <=1      -  Ciprofloxacin: S <=0.25      -  Ertapenem: S <=0.5      -  Gentamicin: S <=2      -  Imipenem: S <=1      -  Levofloxacin: S <=0.5      -  Meropenem: S <=1      -  Piperacillin/Tazobactam: S <=8      -  Tobramycin: S <=2      -  Trimethoprim/Sulfamethoxazole: S <=0.5/9.5  Organism: Staphylococcus aureus    Sensitivities:      Method Type: SRIRAM      -  Ampicillin/Sulbactam: S <=8/4      -  Cefazolin: S <=4      -  Clindamycin: S <=0.25      -  Erythromycin: S <=0.25      -  Gentamicin: S <=1 Should not be used as monotherapy      -  Oxacillin: S <=0.25 Oxacillin predicts susceptibility for dicloxacillin, methicillin, and nafcillin      -  Penicillin: R >8      -  Rifampin: S <=1 Should not be used as monotherapy      -  Tetracycline: S <=1      -  Trimethoprim/Sulfamethoxazole: S <=0.5/9.5      -  Vancomycin: S 1    Culture - Blood (collected 21 Dec 2023 16:14)  Source: .Blood Blood-Peripheral  Final Report:    No growth at 5 days    Culture - Blood (collected 21 Dec 2023 16:14)  Source: .Blood Blood-Peripheral  Final Report:    No growth at 5 days    Culture - Blood (collected 20 Dec 2023 21:45)  Source: .Blood Blood-Peripheral  Final Report:    No growth at 5 days    Culture - Blood (collected 20 Dec 2023 21:30)  Source: .Blood Blood-Peripheral  Final Report:    No growth at 5 days    Culture - Urine (collected 20 Dec 2023 16:50)  Source: Clean Catch Clean Catch (Midstream)  Final Report:    No growth    Culture - Urine (collected 30 Oct 2023 18:30)  Source: Clean Catch Clean Catch (Midstream)  Final Report:    Normal Urogenital vicki present    Culture - Urine (collected 11 Apr 2022 00:54)  Source: Clean Catch Clean Catch (Midstream)  Final Report:    >100,000 CFU/ml Escherichia coli  Organism: Escherichia coli  Organism: Escherichia coli    Sensitivities:      Method Type: SRIRAM      -  Amikacin: S <=16      -  Amoxicillin/Clavulanic Acid: R >16/8      -  Ampicillin: R >16 These ampicillin results predict results for amoxicillin      -  Ampicillin/Sulbactam: R >16/8 Enterobacter, Klebsiella aerogenes, Citrobacter, and Serratia may develop resistance during prolonged therapy (3-4 days)      -  Aztreonam: S <=4      -  Cefazolin: R >16 (MIC_CL_COM_ENTERIC_CEFAZU) For uncomplicated UTI with K. pneumoniae, E. coli, or P. mirablis: SRIRAM <=16 is sensitive and SRIRAM >=32 is resistant. This also predicts results for oral agents cefaclor, cefdinir, cefpodoxime, cefprozil, cefuroxime axetil, cephalexin and locarbef for uncomplicated UTI. Note that some isolates may be susceptible to these agents while testing resistant to cefazolin.      -  Cefepime: S <=2      -  Cefoxitin: I 16      -  Ceftriaxone: S <=1 Enterobacter, Klebsiella aerogenes, Citrobacter, and Serratia may develop resistance during prolonged therapy      -  Ciprofloxacin: S <=0.25      -  Ertapenem: S <=0.5      -  Gentamicin: S <=2      -  Imipenem: S <=1      -  Levofloxacin: S <=0.5      -  Meropenem: S <=1      -  Nitrofurantoin: R >64 Should not be used to treat pyelonephritis      -  Piperacillin/Tazobactam: S <=8      -  Tigecycline: S <=2      -  Tobramycin: S <=2      -  Trimethoprim/Sulfamethoxazole: S <=0.5/9.5    Rapid RVP Result: NotDetec (01-24 @ 11:26)    C-Reactive Protein, Serum: 288.3 (01-24)    Blood Gas Venous - Lactate: 1.5 (01-24 @ 11:00)    A1C with Estimated Average Glucose Result: 6.6 % (12-16-23 @ 07:30)    RADIOLOGY:  imaging below personally reviewed    L foot xray:   Dorsal forefoot soft tissue swelling. Questionable focal slightly thinned   indistinct appearing calcaneal cortex on left foot frontal/oblique view   raising concern for possible osteomyelitis, MRI would be helpful to   better assess. No tracking gas collections or additional suspected areas   of osteomyelitis. No fractures or dislocations.           HPI:    89 years old female with h/o HTN, HLD, CAD, DM, dementia, s/p spinal stimulator placement p/w abnormal labs (Na 168, K 2.9). Unable to obtain further history due to patient baseline nonverbal. Patient was transferred from [facility] for hypernatremia on routine labs. They also recently started cefepime due to concern for foot infection. The son did not have any further history regarding the events leading up to hospital admission. As per the son, the patient was in her usual state of health the last time he visited with her.     ED course: Pt arrived to ED w/ , /46, afebrile, requiring 4L NC. Pt received Vancomycin, and IVF. Labs showed leukocytosis 14, elevated , normal lactate 1.5, Na 163, Cl 128, SCr 2.76 (baseline ~1-1.2), . UA growing yeast like cells and with pyuria.      ID consulted for further recommendations.      REVIEW OF SYSTEMS  [X] ROS unobtainable because:  non verbal   [  ] All other systems negative except as noted below    Constitutional:  [ ] fever [ ] chills  [ ] weight loss  [ ]night sweat  [ ]poor appetite/PO intake [ ]fatigue   Skin:  [ ] rash [ ] phlebitis	  Eyes: [ ] icterus [ ] pain  [ ] discharge	  ENMT: [ ] sore throat  [ ] thrush [ ] ulcers [ ] exudates [ ]anosmia  Respiratory: [ ] dyspnea [ ] hemoptysis [ ] cough [ ] sputum	  Cardiovascular:  [ ] chest pain [ ] palpitations [ ] edema	  Gastrointestinal:  [ ] nausea [ ] vomiting [ ] diarrhea [ ] constipation [ ] pain	  Genitourinary:  [ ] dysuria [ ] frequency [ ] hematuria [ ] discharge [ ] flank pain  [ ] incontinence  Musculoskeletal:  [ ] myalgias [ ] arthralgias [ ] arthritis  [ ] back pain  Neurological:  [ ] headache [ ] weakness [ ] seizures  [ ] confusion/altered mental status    prior hospital charts reviewed [V]  primary team notes reviewed [V]  other consultant notes reviewed [V]    PAST MEDICAL & SURGICAL HISTORY:  Angina  in 2005, s/p angioplasty with stent placement, no recurrance, no sequalae    Diabetes Mellitus  type 2    Arthritis, Infective, Knee    Detached Retina  right eye    Acute Mucous Pneumonia  4/2010, resolved ; no residual problems    Spinal Stenosis- lumbar    History of Back Surgery  2010    Basal Cell Cancer  removed from left neck 2009    Dementia    Detached Retina, Left  laser surgery in 1995    S/P Carpal Tunnel Release  bilateral hands in 1990    Trigger Finger  release of middle and ring finger of right hand in 1990, and middle finger left hand in 2008    S/P Laparoscopic Cholecystectomy  2008    S/P TKR (Total Knee Replacement)  left knee    Cataract  removal with lens implant right in 2000    SOCIAL HISTORY:  - Denied smoking/vaping/alcohol/recreational drug use    FAMILY HISTORY:  FH: HTN (hypertension)    Allergies  Pineapple (Unknown)  contrast dye -  rash (Other)  iodine (Other)  codeine (Vomiting)  penicillin (Rash)  latex (Rash)    ANTIMICROBIALS:  cefepime   IVPB 1000 every 24 hours    ANTIMICROBIALS (past 90 days):  MEDICATIONS  (STANDING):    cefepime   IVPB   100 mL/Hr IV Intermittent (01-24-24 @ 20:13)    vancomycin  IVPB.   250 mL/Hr IV Intermittent (01-24-24 @ 12:39)    OTHER MEDS:   MEDICATIONS  (STANDING):  dextrose 50% Injectable 12.5 once  dextrose 50% Injectable 12.5 once  dextrose 50% Injectable 25 once  dextrose 50% Injectable 25 once  dextrose 50% Injectable 25 once  dextrose 50% Injectable 25 once  dextrose Oral Gel 15 once PRN  dextrose Oral Gel 15 once PRN  glucagon  Injectable 1 once  glucagon  Injectable 1 once  heparin   Injectable 5000 every 12 hours  insulin lispro (ADMELOG) corrective regimen sliding scale  every 6 hours  pantoprazole    Tablet 40 before breakfast  polyethylene glycol 3350 17 daily  senna 2 at bedtime    VITALS:  Vital Signs Last 24 Hrs  T(F): 97.4 (01-25-24 @ 14:50), Max: 98.9 (01-24-24 @ 17:35)    Vital Signs Last 24 Hrs  HR: 75 (01-25-24 @ 14:50) (66 - 98)  BP: 128/50 (01-25-24 @ 14:50) (121/30 - 137/59)  RR: 17 (01-25-24 @ 14:50)  SpO2: 95% (01-25-24 @ 14:50) (95% - 100%)  Wt(kg): --    EXAM:  Physical Exam:  Constitutional:  non verbal   Head/Eyes: no icterus, PERRL, EOMI  ENT:  supple; no thrush  LUNGS:  CTA, on 3 L NC   CVS:  normal S1, S2, no murmur  Abd:  soft, non-tender; non-distended  Ext:   L ankle with deep wound, foul smelling   Vascular:  IV site no erythema tenderness or discharge  MSK:  joints without swelling  Neuro: alert, non verbal     Labs:                        8.3    16.85 )-----------( 132      ( 25 Jan 2024 11:00 )             26.4     01-25    159<H>  |  125<H>  |  103<H>  ----------------------------<  276<H>  3.3<L>   |  17<L>  |  2.53<H>    Ca    8.3<L>      25 Jan 2024 11:00  Phos  2.6     01-25  Mg     2.00     01-25    TPro  6.7  /  Alb  2.3<L>  /  TBili  0.3  /  DBili  x   /  AST  34<H>  /  ALT  15  /  AlkPhos  107  01-24    WBC Trend:  WBC Count: 16.85 (01-25-24 @ 11:00)  WBC Count: 14.37 (01-24-24 @ 12:05)    Auto Neutrophil #: 15.01 K/uL (01-25-24 @ 11:00)  Auto Neutrophil #: 12.14 K/uL (01-24-24 @ 12:05)  Auto Neutrophil #: 10.16 K/uL (12-15-23 @ 09:49)  Auto Neutrophil #: 4.69 K/uL (10-30-23 @ 18:30)    Creatine Trend:  Creatinine: 2.53 (01-25)  Creatinine: 2.59 (01-25)  Creatinine: 2.55 (01-24)  Creatinine: 2.76 (01-24)    Liver Biochemical Testing Trend:  Alanine Aminotransferase (ALT/SGPT): 15 (01-24)  Alanine Aminotransferase (ALT/SGPT): 33 (12-22)  Alanine Aminotransferase (ALT/SGPT): 23 (12-16)  Alanine Aminotransferase (ALT/SGPT): 24 (12-15)  Alanine Aminotransferase (ALT/SGPT): 19 (10-30)  Aspartate Aminotransferase (AST/SGOT): 34 (01-24-24 @ 12:05)  Aspartate Aminotransferase (AST/SGOT): 35 (12-22-23 @ 09:42)  Aspartate Aminotransferase (AST/SGOT): 33 (12-16-23 @ 07:30)  Aspartate Aminotransferase (AST/SGOT): 27 (12-15-23 @ 09:49)  Aspartate Aminotransferase (AST/SGOT): 18 (10-30-23 @ 18:30)  Bilirubin Total: 0.3 (01-24)  Bilirubin Total: 0.6 (12-22)  Bilirubin Total: 0.4 (12-16)  Bilirubin Total: 0.7 (12-15)  Bilirubin Total: 0.4 (10-30)    Trend LDH    Auto Eosinophil %: 0.6 % (01-25-24 @ 11:00)  Auto Eosinophil %: 0.4 % (01-24-24 @ 12:05)    Urinalysis Basic - ( 25 Jan 2024 11:00 )    Color: x / Appearance: x / SG: x / pH: x  Gluc: 276 mg/dL / Ketone: x  / Bili: x / Urobili: x   Blood: x / Protein: x / Nitrite: x   Leuk Esterase: x / RBC: x / WBC x   Sq Epi: x / Non Sq Epi: x / Bacteria: x    MICROBIOLOGY:  Vancomycin Level, Random: 32.3 (01-25 @ 15:30)    MRSA PCR Result.: Detected (01-24-24 @ 14:20)  MRSA PCR Result.: NotDetec (12-20-23 @ 06:45)    Culture - Blood (collected 24 Jan 2024 11:50)  Source: .Blood Blood-Peripheral  Preliminary Report:    No growth at 24 hours    Culture - Blood (collected 24 Jan 2024 11:40)  Source: .Blood Blood-Peripheral  Preliminary Report:    No growth at 24 hours    Culture - Abscess with Gram Stain (collected 22 Dec 2023 17:33)  Source: .Abscess Left ankle wound  Final Report:    Few Staphylococcus aureus    Rare Escherichia coli    Rare Finegoldia magna "Susceptibilities not performed"  Organism: Staphylococcus aureus  Escherichia coli  Organism: Escherichia coli    Sensitivities:      Method Type: SRIRAM      -  Amoxicillin/Clavulanic Acid: S <=8/4      -  Ampicillin: S <=8 These ampicillin results predict results for amoxicillin      -  Ampicillin/Sulbactam: S <=4/2      -  Aztreonam: S <=4      -  Cefazolin: S <=2      -  Cefepime: S <=2      -  Cefoxitin: S <=8      -  Ceftriaxone: S <=1      -  Ciprofloxacin: S <=0.25      -  Ertapenem: S <=0.5      -  Gentamicin: S <=2      -  Imipenem: S <=1      -  Levofloxacin: S <=0.5      -  Meropenem: S <=1      -  Piperacillin/Tazobactam: S <=8      -  Tobramycin: S <=2      -  Trimethoprim/Sulfamethoxazole: S <=0.5/9.5  Organism: Staphylococcus aureus    Sensitivities:      Method Type: SRIRAM      -  Ampicillin/Sulbactam: S <=8/4      -  Cefazolin: S <=4      -  Clindamycin: S <=0.25      -  Erythromycin: S <=0.25      -  Gentamicin: S <=1 Should not be used as monotherapy      -  Oxacillin: S <=0.25 Oxacillin predicts susceptibility for dicloxacillin, methicillin, and nafcillin      -  Penicillin: R >8      -  Rifampin: S <=1 Should not be used as monotherapy      -  Tetracycline: S <=1      -  Trimethoprim/Sulfamethoxazole: S <=0.5/9.5      -  Vancomycin: S 1    Culture - Blood (collected 21 Dec 2023 16:14)  Source: .Blood Blood-Peripheral  Final Report:    No growth at 5 days    Culture - Blood (collected 21 Dec 2023 16:14)  Source: .Blood Blood-Peripheral  Final Report:    No growth at 5 days    Culture - Blood (collected 20 Dec 2023 21:45)  Source: .Blood Blood-Peripheral  Final Report:    No growth at 5 days    Culture - Blood (collected 20 Dec 2023 21:30)  Source: .Blood Blood-Peripheral  Final Report:    No growth at 5 days    Culture - Urine (collected 20 Dec 2023 16:50)  Source: Clean Catch Clean Catch (Midstream)  Final Report:    No growth    Culture - Urine (collected 30 Oct 2023 18:30)  Source: Clean Catch Clean Catch (Midstream)  Final Report:    Normal Urogenital vicki present    Culture - Urine (collected 11 Apr 2022 00:54)  Source: Clean Catch Clean Catch (Midstream)  Final Report:    >100,000 CFU/ml Escherichia coli  Organism: Escherichia coli  Organism: Escherichia coli    Sensitivities:      Method Type: SRIRAM      -  Amikacin: S <=16      -  Amoxicillin/Clavulanic Acid: R >16/8      -  Ampicillin: R >16 These ampicillin results predict results for amoxicillin      -  Ampicillin/Sulbactam: R >16/8 Enterobacter, Klebsiella aerogenes, Citrobacter, and Serratia may develop resistance during prolonged therapy (3-4 days)      -  Aztreonam: S <=4      -  Cefazolin: R >16 (MIC_CL_COM_ENTERIC_CEFAZU) For uncomplicated UTI with K. pneumoniae, E. coli, or P. mirablis: SRIRAM <=16 is sensitive and SRIRAM >=32 is resistant. This also predicts results for oral agents cefaclor, cefdinir, cefpodoxime, cefprozil, cefuroxime axetil, cephalexin and locarbef for uncomplicated UTI. Note that some isolates may be susceptible to these agents while testing resistant to cefazolin.      -  Cefepime: S <=2      -  Cefoxitin: I 16      -  Ceftriaxone: S <=1 Enterobacter, Klebsiella aerogenes, Citrobacter, and Serratia may develop resistance during prolonged therapy      -  Ciprofloxacin: S <=0.25      -  Ertapenem: S <=0.5      -  Gentamicin: S <=2      -  Imipenem: S <=1      -  Levofloxacin: S <=0.5      -  Meropenem: S <=1      -  Nitrofurantoin: R >64 Should not be used to treat pyelonephritis      -  Piperacillin/Tazobactam: S <=8      -  Tigecycline: S <=2      -  Tobramycin: S <=2      -  Trimethoprim/Sulfamethoxazole: S <=0.5/9.5    Rapid RVP Result: NotDetec (01-24 @ 11:26)    C-Reactive Protein, Serum: 288.3 (01-24)    Blood Gas Venous - Lactate: 1.5 (01-24 @ 11:00)    A1C with Estimated Average Glucose Result: 6.6 % (12-16-23 @ 07:30)    RADIOLOGY:  imaging below personally reviewed    L foot xray:   Dorsal forefoot soft tissue swelling. Questionable focal slightly thinned   indistinct appearing calcaneal cortex on left foot frontal/oblique view   raising concern for possible osteomyelitis, MRI would be helpful to   better assess. No tracking gas collections or additional suspected areas   of osteomyelitis. No fractures or dislocations.           HPI:    89 years old female with h/o HTN, HLD, CAD, DM, dementia, s/p spinal stimulator placement p/w abnormal labs (Na 168, K 2.9). Unable to obtain further history due to patient baseline nonverbal. Patient was transferred from [facility] for hypernatremia on routine labs. They also recently started cefepime due to concern for foot infection. The son did not have any further history regarding the events leading up to hospital admission. As per the son, the patient was in her usual state of health the last time he visited with her.     ED course: Pt arrived to ED w/ , /46, afebrile, requiring 4L NC. Pt received Vancomycin, and IVF. Labs showed leukocytosis 14, elevated , normal lactate 1.5, Na 163, Cl 128, SCr 2.76 (baseline ~1-1.2), . UA growing yeast like cells and with pyuria.      ID consulted for further recommendations.      REVIEW OF SYSTEMS  [X] ROS unobtainable because:  non verbal   [  ] All other systems negative except as noted below    Constitutional:  [ ] fever [ ] chills  [ ] weight loss  [ ]night sweat  [ ]poor appetite/PO intake [ ]fatigue   Skin:  [ ] rash [ ] phlebitis	  Eyes: [ ] icterus [ ] pain  [ ] discharge	  ENMT: [ ] sore throat  [ ] thrush [ ] ulcers [ ] exudates [ ]anosmia  Respiratory: [ ] dyspnea [ ] hemoptysis [ ] cough [ ] sputum	  Cardiovascular:  [ ] chest pain [ ] palpitations [ ] edema	  Gastrointestinal:  [ ] nausea [ ] vomiting [ ] diarrhea [ ] constipation [ ] pain	  Genitourinary:  [ ] dysuria [ ] frequency [ ] hematuria [ ] discharge [ ] flank pain  [ ] incontinence  Musculoskeletal:  [ ] myalgias [ ] arthralgias [ ] arthritis  [ ] back pain  Neurological:  [ ] headache [ ] weakness [ ] seizures  [ ] confusion/altered mental status    prior hospital charts reviewed [V]  primary team notes reviewed [V]  other consultant notes reviewed [V]    PAST MEDICAL & SURGICAL HISTORY:  Angina  in 2005, s/p angioplasty with stent placement, no recurrance, no sequalae    Diabetes Mellitus  type 2    Arthritis, Infective, Knee    Detached Retina  right eye    Acute Mucous Pneumonia  4/2010, resolved ; no residual problems    Spinal Stenosis- lumbar    History of Back Surgery  2010    Basal Cell Cancer  removed from left neck 2009    Dementia    Detached Retina, Left  laser surgery in 1995    S/P Carpal Tunnel Release  bilateral hands in 1990    Trigger Finger  release of middle and ring finger of right hand in 1990, and middle finger left hand in 2008    S/P Laparoscopic Cholecystectomy  2008    S/P TKR (Total Knee Replacement)  left knee    Cataract  removal with lens implant right in 2000    SOCIAL HISTORY:  No known tobacco or illicit drug history    FAMILY HISTORY:  FH: HTN (hypertension)    Allergies  Pineapple (Unknown)  contrast dye -  rash (Other)  iodine (Other)  codeine (Vomiting)  penicillin (Rash)  latex (Rash)    ANTIMICROBIALS:  cefepime   IVPB 1000 every 24 hours    ANTIMICROBIALS (past 90 days):  MEDICATIONS  (STANDING):    cefepime   IVPB   100 mL/Hr IV Intermittent (01-24-24 @ 20:13)    vancomycin  IVPB.   250 mL/Hr IV Intermittent (01-24-24 @ 12:39)    OTHER MEDS:   MEDICATIONS  (STANDING):  dextrose 50% Injectable 12.5 once  dextrose 50% Injectable 12.5 once  dextrose 50% Injectable 25 once  dextrose 50% Injectable 25 once  dextrose 50% Injectable 25 once  dextrose 50% Injectable 25 once  dextrose Oral Gel 15 once PRN  dextrose Oral Gel 15 once PRN  glucagon  Injectable 1 once  glucagon  Injectable 1 once  heparin   Injectable 5000 every 12 hours  insulin lispro (ADMELOG) corrective regimen sliding scale  every 6 hours  pantoprazole    Tablet 40 before breakfast  polyethylene glycol 3350 17 daily  senna 2 at bedtime    VITALS:  Vital Signs Last 24 Hrs  T(F): 97.4 (01-25-24 @ 14:50), Max: 98.9 (01-24-24 @ 17:35)    Vital Signs Last 24 Hrs  HR: 75 (01-25-24 @ 14:50) (66 - 98)  BP: 128/50 (01-25-24 @ 14:50) (121/30 - 137/59)  RR: 17 (01-25-24 @ 14:50)  SpO2: 95% (01-25-24 @ 14:50) (95% - 100%)  Wt(kg): --    EXAM:  Physical Exam:  Constitutional:  non verbal   Head/Eyes: no icterus, PERRL, EOMI  ENT:  supple; no thrush  LUNGS:  CTA, on 3 L NC   CVS:  normal S1, S2, no murmur  Abd:  soft, non-tender; non-distended  Ext:   L ankle with deep wound, foul smelling   Vascular:  IV site no erythema tenderness or discharge  MSK:  joints without swelling  Neuro: alert, non verbal     Labs:                        8.3    16.85 )-----------( 132      ( 25 Jan 2024 11:00 )             26.4     01-25    159<H>  |  125<H>  |  103<H>  ----------------------------<  276<H>  3.3<L>   |  17<L>  |  2.53<H>    Ca    8.3<L>      25 Jan 2024 11:00  Phos  2.6     01-25  Mg     2.00     01-25    TPro  6.7  /  Alb  2.3<L>  /  TBili  0.3  /  DBili  x   /  AST  34<H>  /  ALT  15  /  AlkPhos  107  01-24    WBC Trend:  WBC Count: 16.85 (01-25-24 @ 11:00)  WBC Count: 14.37 (01-24-24 @ 12:05)    Auto Neutrophil #: 15.01 K/uL (01-25-24 @ 11:00)  Auto Neutrophil #: 12.14 K/uL (01-24-24 @ 12:05)  Auto Neutrophil #: 10.16 K/uL (12-15-23 @ 09:49)  Auto Neutrophil #: 4.69 K/uL (10-30-23 @ 18:30)    Creatine Trend:  Creatinine: 2.53 (01-25)  Creatinine: 2.59 (01-25)  Creatinine: 2.55 (01-24)  Creatinine: 2.76 (01-24)    Liver Biochemical Testing Trend:  Alanine Aminotransferase (ALT/SGPT): 15 (01-24)  Alanine Aminotransferase (ALT/SGPT): 33 (12-22)  Alanine Aminotransferase (ALT/SGPT): 23 (12-16)  Alanine Aminotransferase (ALT/SGPT): 24 (12-15)  Alanine Aminotransferase (ALT/SGPT): 19 (10-30)  Aspartate Aminotransferase (AST/SGOT): 34 (01-24-24 @ 12:05)  Aspartate Aminotransferase (AST/SGOT): 35 (12-22-23 @ 09:42)  Aspartate Aminotransferase (AST/SGOT): 33 (12-16-23 @ 07:30)  Aspartate Aminotransferase (AST/SGOT): 27 (12-15-23 @ 09:49)  Aspartate Aminotransferase (AST/SGOT): 18 (10-30-23 @ 18:30)  Bilirubin Total: 0.3 (01-24)  Bilirubin Total: 0.6 (12-22)  Bilirubin Total: 0.4 (12-16)  Bilirubin Total: 0.7 (12-15)  Bilirubin Total: 0.4 (10-30)    Trend LDH    Auto Eosinophil %: 0.6 % (01-25-24 @ 11:00)  Auto Eosinophil %: 0.4 % (01-24-24 @ 12:05)    Urinalysis Basic - ( 25 Jan 2024 11:00 )    Color: x / Appearance: x / SG: x / pH: x  Gluc: 276 mg/dL / Ketone: x  / Bili: x / Urobili: x   Blood: x / Protein: x / Nitrite: x   Leuk Esterase: x / RBC: x / WBC x   Sq Epi: x / Non Sq Epi: x / Bacteria: x    MICROBIOLOGY:  Vancomycin Level, Random: 32.3 (01-25 @ 15:30)    MRSA PCR Result.: Detected (01-24-24 @ 14:20)  MRSA PCR Result.: NotDetec (12-20-23 @ 06:45)    Culture - Blood (collected 24 Jan 2024 11:50)  Source: .Blood Blood-Peripheral  Preliminary Report:    No growth at 24 hours    Culture - Blood (collected 24 Jan 2024 11:40)  Source: .Blood Blood-Peripheral  Preliminary Report:    No growth at 24 hours    Culture - Abscess with Gram Stain (collected 22 Dec 2023 17:33)  Source: .Abscess Left ankle wound  Final Report:    Few Staphylococcus aureus    Rare Escherichia coli    Rare Finegoldia magna "Susceptibilities not performed"  Organism: Staphylococcus aureus  Escherichia coli  Organism: Escherichia coli    Sensitivities:      Method Type: SRIRAM      -  Amoxicillin/Clavulanic Acid: S <=8/4      -  Ampicillin: S <=8 These ampicillin results predict results for amoxicillin      -  Ampicillin/Sulbactam: S <=4/2      -  Aztreonam: S <=4      -  Cefazolin: S <=2      -  Cefepime: S <=2      -  Cefoxitin: S <=8      -  Ceftriaxone: S <=1      -  Ciprofloxacin: S <=0.25      -  Ertapenem: S <=0.5      -  Gentamicin: S <=2      -  Imipenem: S <=1      -  Levofloxacin: S <=0.5      -  Meropenem: S <=1      -  Piperacillin/Tazobactam: S <=8      -  Tobramycin: S <=2      -  Trimethoprim/Sulfamethoxazole: S <=0.5/9.5  Organism: Staphylococcus aureus    Sensitivities:      Method Type: SRIRAM      -  Ampicillin/Sulbactam: S <=8/4      -  Cefazolin: S <=4      -  Clindamycin: S <=0.25      -  Erythromycin: S <=0.25      -  Gentamicin: S <=1 Should not be used as monotherapy      -  Oxacillin: S <=0.25 Oxacillin predicts susceptibility for dicloxacillin, methicillin, and nafcillin      -  Penicillin: R >8      -  Rifampin: S <=1 Should not be used as monotherapy      -  Tetracycline: S <=1      -  Trimethoprim/Sulfamethoxazole: S <=0.5/9.5      -  Vancomycin: S 1    Culture - Blood (collected 21 Dec 2023 16:14)  Source: .Blood Blood-Peripheral  Final Report:    No growth at 5 days    Culture - Blood (collected 21 Dec 2023 16:14)  Source: .Blood Blood-Peripheral  Final Report:    No growth at 5 days    Culture - Blood (collected 20 Dec 2023 21:45)  Source: .Blood Blood-Peripheral  Final Report:    No growth at 5 days    Culture - Blood (collected 20 Dec 2023 21:30)  Source: .Blood Blood-Peripheral  Final Report:    No growth at 5 days    Culture - Urine (collected 20 Dec 2023 16:50)  Source: Clean Catch Clean Catch (Midstream)  Final Report:    No growth    Culture - Urine (collected 30 Oct 2023 18:30)  Source: Clean Catch Clean Catch (Midstream)  Final Report:    Normal Urogenital vicki present    Culture - Urine (collected 11 Apr 2022 00:54)  Source: Clean Catch Clean Catch (Midstream)  Final Report:    >100,000 CFU/ml Escherichia coli  Organism: Escherichia coli  Organism: Escherichia coli    Sensitivities:      Method Type: SRIRAM      -  Amikacin: S <=16      -  Amoxicillin/Clavulanic Acid: R >16/8      -  Ampicillin: R >16 These ampicillin results predict results for amoxicillin      -  Ampicillin/Sulbactam: R >16/8 Enterobacter, Klebsiella aerogenes, Citrobacter, and Serratia may develop resistance during prolonged therapy (3-4 days)      -  Aztreonam: S <=4      -  Cefazolin: R >16 (MIC_CL_COM_ENTERIC_[id number]) For uncomplicated UTI with K. pneumoniae, E. coli, or P. mirablis: SRIRAM <=16 is sensitive and SRIRAM >=32 is resistant. This also predicts results for oral agents cefaclor, cefdinir, cefpodoxime, cefprozil, cefuroxime axetil, cephalexin and locarbef for uncomplicated UTI. Note that some isolates may be susceptible to these agents while testing resistant to cefazolin.      -  Cefepime: S <=2      -  Cefoxitin: I 16      -  Ceftriaxone: S <=1 Enterobacter, Klebsiella aerogenes, Citrobacter, and Serratia may develop resistance during prolonged therapy      -  Ciprofloxacin: S <=0.25      -  Ertapenem: S <=0.5      -  Gentamicin: S <=2      -  Imipenem: S <=1      -  Levofloxacin: S <=0.5      -  Meropenem: S <=1      -  Nitrofurantoin: R >64 Should not be used to treat pyelonephritis      -  Piperacillin/Tazobactam: S <=8      -  Tigecycline: S <=2      -  Tobramycin: S <=2      -  Trimethoprim/Sulfamethoxazole: S <=0.5/9.5    Rapid RVP Result: NotDetec (01-24 @ 11:26)    C-Reactive Protein, Serum: 288.3 (01-24)    Blood Gas Venous - Lactate: 1.5 (01-24 @ 11:00)    A1C with Estimated Average Glucose Result: 6.6 % (12-16-23 @ 07:30)    RADIOLOGY:  imaging below personally reviewed    L foot xray:   Dorsal forefoot soft tissue swelling. Questionable focal slightly thinned   indistinct appearing calcaneal cortex on left foot frontal/oblique view   raising concern for possible osteomyelitis, MRI would be helpful to   better assess. No tracking gas collections or additional suspected areas   of osteomyelitis. No fractures or dislocations.

## 2024-01-25 NOTE — PROGRESS NOTE ADULT - PROBLEM SELECTOR PLAN 2
- L foot xray:Dorsal forefoot soft tissue swelling. Questionable focal slightly thinned   indistinct appearing calcaneal cortex on left foot frontal/oblique view   raising concern for possible osteomyelitis, MRI would be helpful to   better assess. No tracking gas collections or additional suspected areas   of osteomyelitis. No fractures or dislocations.  - CRP elevated on admission    Plan:  - Recently Tx in Dec 23' for L foot wound growing S. Aureas and E. Coli, amputation L foot offered and deferred at the time  - Wound consulted, appreciate recs  - Vascular consulted, recs:  	- Continue local wound care, daily dressing changes   	- Frequent re-positioning and offloading to B/L lower extremities   	- Remainder care per primary   	- Reconsult Vascular sx as needed, if family/pt amenable to amputation  - c/w Cefepime  - previously unable to tolerate MRI L ankle when attempted in recent hospitalization

## 2024-01-26 NOTE — PROGRESS NOTE ADULT - PROBLEM SELECTOR PLAN 1
Pt bedbound, nonverbal at baseline, contracted.   PPSV 10% at this time  Please assist with ADLs   Wound care recs.

## 2024-01-26 NOTE — PROGRESS NOTE ADULT - ASSESSMENT
89 year old female PMH of dementia, HTN, HLD, CAD and DM transferred from rehab to [facility] for electrolyte abnormalities. Palliative consulted for complex decision making regarding goc in setting of advanced illness.

## 2024-01-26 NOTE — PROGRESS NOTE ADULT - TIME BILLING
Time spent for extensive review of the physical chart, electronic medical record, and documentation to obtain collateral information including but not limited to:  [x ] Inpatient records (ED, H&P, primary team, and consultants if applicable, care coordination)  [x ] Inpatient values/results (biomarkers, immunoassays, imaging, and microbiology results)  [x ] Current or proposed treatment plans  [x  ] Discussion with the primary team  [x ] Discussion with the patient, surrogate decision maker, or family    Time spent: >36 min

## 2024-01-26 NOTE — PROGRESS NOTE ADULT - PROBLEM SELECTOR PLAN 2
X-ray (1/24): Dorsal forefoot soft tissue swelling. Questionable focal slightly thinned indistinct appearing calcaneal cortex on left foot frontal/oblique view raising concern for possible osteomyelitis, MRI would be helpful to better assess. No tracking gas collections or additional suspected areas of osteomyelitis. No fractures or dislocations. 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 Tarsometatarsal alignment maintained without evidence for a Lisfranc injury. Small calcaneal enthesophytes. Generalized osteopenia otherwise no discrete lytic or blastic lesions.  > Appreciate vascular recs - family declined surgery   > Patient on IV abx.  > appreciate ID recs

## 2024-01-26 NOTE — CONSULT NOTE ADULT - ASSESSMENT
Assessment/Plan: 89 years old female with h/o HTN, HLD, CAD, DM, dementia, s/p spinal stimulator placement p/w abnormal labs (Na 168, K 2.9).    Wound Consult requested to assist w/ management of right posterior rib cage, gluteal cleft to right buttock deep tissue pressure injury, left upper buttock unstageable pressure injury, right heel, right dorsal foot, left foot pressure injuries complicated by arterial insufficiency.         Compression BLE  Consider MIKHAIL/PVR, XRay, Duplex, MRI, CT  BLE elevation  Abx per Medicine/ ID  Moisturize intact skin w/ SWEEN cream BID  Nutrition Consult for optimization as tolerated in pt w/ Protein Calorie Malnutirion        Inadequate PO intake        consider MVI & Vit C to promote wound healing        encourage high quality protein when appropriate  Hyperglycemia - consider HgA1c        FS w/ ISS q6h, consider Endo Consult  Anemia- transfusions, Fe studies  Continue turning and positioning w/ offloading assistive devices as per protocol  Continue w/ Pericare as per protocol  Waffle Cushion to chair when oob to chair  Continue w/ low air loss fluidized bed surface     Care as per medicine, will follow w/ you    Upon discharge f/u as outpatient at [facility] [address] [place], [place] [id number]  Seen w/ attng and D/w team    Thank you for this consult  EDWARDO Corcoran-BC, CWN (pager #[phone])    If after 4PM or before 7:30AM on Mon-Friday or weekend/holiday please contact general surgery for urgent matters.   Team A- [id number]   Team B- [id number]  For non-urgent matters e-mail [email or web address]    I/We spent (50min) minutes face to face with this patient of which more than 50% of the time was spent counseling & coordinating care of this pt Assessment/Plan: 89 years old female with h/o HTN, HLD, CAD, DM, dementia, s/p spinal stimulator placement p/w abnormal labs (Na 168, K 2.9).    Wound Consult requested to assist w/ management of right posterior rib cage, gluteal cleft to right buttock deep tissue pressure injury, left upper buttock unstageable pressure injury, right heel, right dorsal foot, left foot pressure injuries complicated by arterial insufficiency.     -Patient lethargic, minimally arousable, unable to follow commands, (per RN and primary team has been patient's baseline on this admission, per Palliative care team patient verbal and interactive on previous admission 1 month ago), frail, cachetic, bedbound, incontinent urine and stool.  -Currently NPO except meds; appears too lethargic for speech and swallow study at this time; management per primary team.  -Multiple wounds noted:   ·	Right posterior rib cage, gluteal cleft to right buttock- deep tissue pressure injuries with s/s of acute soft tissue infection.  ·	Left upper buttock unstageable pressure injury- adherent nonfluctuant slough, no induration, no crepitus, no s/s of acute soft tissue infection  *Buttock wounds complicated by fecal and urinary incontinence.  -Bilateral lower extremities with pressure injuries complicated by arterial insufficiency, most extensive left lateral malleolus and foot.  ·	Right dorsal foot, right heel deep tissue pressure injury, right latera lower leg stage 1 pressure injury, all complicated by arterial insufficiency  ·	Left lateral malleolus extensive necrosis involving bone, tendon and ligament; central eschar beginning to soften. Entire foot cold with extending mottling, no palpable pulses. (+) malodor, no purulent drainage. CONCERN FOR ACUTE ISCHEMIC LIMB and Beginning conversion of dry gangrene to wet gangrene; no crepitus noted.  ·	B/L LE (+) DVT  ·	Left ankle and foot x-ray without tracking gas collections or additional suspected areas of osteomyelitis; however physical exam concerning for OM, would consider MRI if in line with goals of care.   ·	Left ankle cultures 12/22 (+)  Staphylococcus aureus, Escherichia coli, Finegoldia magna  -If in line with goals of care would recommend MIKHAIL/PVR.  -With no surgical intervention left foot ischemia will likely progress, may demarcate with time.  -Per records patient's HCP/son would like to avoid amputation; see vascular notes.  -Vascular surgery recommendations appreciated.  -Would consider Podiatry consult.  -Appreciate ID recommendations, currently receiving Ceftriaxone and Doxycyline   -Topical recommendations:  ·	Right posterior rib cage, gluteal cleft to right buttock deep tissue pressure injuries- cleanse wounds with NS, pat dry. Cover with silicone foam with border. Change daily and prn if soiled/compromised..  ·	Left upper buttock unstageable pressure injury- cleanse wound and periwound skin with perineal spray, apply TRIAD moisture barrier paste, cover with silicone foam with border, change daily and prn if soiled/compromised.  ·	Right dorsal foot, heel and lateral lower leg- apply liquid barrier film, allow to dry, cover with abdominal pads and lose kerlix wrap. Change daily.  ·	Left lateral malleolus extending to foot- cleanse with Dakins 1/4 strength, pat dry. (Per records patient allergic to iodine) Apply liquid barrier film to entire area. Cover with abdominal pads, secure with lose kerlix wrap.  -Continue to offload pressure; low airloss support surface, t&p per protocol with use of fluidized positioning devices, offload heels with complete cair boots at all times, pillow between bony prominences.  -Continue incontinence care per protocol and use of single breathable incontinence pad.  -Appreciate Palliative care recommendations. If family opts not to pursue hospice/comfort measures and is not a surgical candidate or family opts out of surgery, would strongly consider Ethics consults in setting of ischemic left foot.  -Nutrition recommendations appreciated when medically appropriate.    All findings and plan discussed in detail with Dr. Michelle and Palliative care team. Wound surgery service line remains available via MS teams if we can assist/contribute in any way to family meetings and/or discussion regarding goals of care. Patient frail, bedbound, incontinent urine and stool, unable to make needs known, nutritionally compromised, multiple wounds including c/f acute left foot ischemia. Wounds unlikely to fully heal. If family in agreement patient appears to be appropriate candidate for hospice referral.       Thank you for this consult, please reconsult as needed.  VANESSA Corcoran, CWN (pager #[phone])    If after 4PM or before 7:30AM on Mon-Friday or weekend/holiday please contact general surgery for urgent matters.   Team A- [id number]   Team B- [id number]  For non-urgent matters e-mail [email or web address]    I spent 75 minutes face to face with this patient of which more than 50% of the time was spent counseling & coordinating care of this pt

## 2024-01-26 NOTE — PROGRESS NOTE ADULT - ATTENDING COMMENTS
89 y/o F with PMH HTN, HLD, CAD, DM, dementia s/p spinal stimulator placement who presented from [facility] for hypernatremia and concern for foot infection.    #Sepsis, pt. met SIRS criteria + source of known foot infection. Sepsis now resolved. UA negative. RVP negative. BCx pending. Recently treated for L foot wound growing E. coli and S. aureus. Vascular offering definitive management with amputation but family declines. Local wound care. ID consulted for antibiotic recs - changed antibiotics to doxycycline, ceftriaxone and flagyl. Continue for now. Ongoing GOC conversations.     #L lateral malleolar pressure wound, vascular consulted. Rec amputation but family declines.     #Hypernatremia, Na 160 2/2 dehydration in the setting of poor PO intake. Pt. HD stable however pt. is intravascularly depleted (pt. has significant pre-renal SAE). Received LR fluid bolus with improvement in Na. Will give another 1L bolus today.     #Acute renal failure,  and Cr 2.76. Elevated from baseline on prior admission. Likely pre-renal in the setting of dehydration. Improved with fluid bolus. Repeat 1L LR fluid bolus today.     #Dementia, pt. A&Ox0 - nonverbal. Poor PO intake. Severe protein calorie malnutrition. Currently NPO. Failed S&S eval. Will need to have GOC convo with family regarding nutrition status. If family would like to proceed with nutirtion/possible PEG - will place NGT.     #Hypoxia, pt requiring 4L NC. CXR clear. Possibly atelectasis. Pt. is bedbound - concern for PE however not tachy. Will obtain LE dopplers - negative. Continue to wean O2.     #GOC, pt with prior MOLST - DNR/DNI but as per vascular yesterday this was rescinded. Palliative care consulted for ongoing GOC conversations.     Remainder of plan as stated above. Plan discussed with HS.

## 2024-01-26 NOTE — PROGRESS NOTE ADULT - SUBJECTIVE AND OBJECTIVE BOX
Follow Up:    Foot ulcer    Interval History/ROS:  Afebrile ON. Per vascular note, DNI/DNR has been rescinded per family. Patient was seen and examined at bedside. Nonverbal. Unable to assess ROS.    Allergies  Pineapple (Unknown)  contrast dye -  rash (Other)  iodine (Other)  codeine (Vomiting)  penicillin (Rash)  latex (Rash)        ANTIMICROBIALS:  cefTRIAXone   IVPB 1000 every 24 hours  doxycycline IVPB    doxycycline IVPB 100 every 12 hours  metroNIDAZOLE  IVPB 500 every 12 hours      OTHER MEDS:  MEDICATIONS  (STANDING):  dextrose 50% Injectable 12.5 once  dextrose 50% Injectable 12.5 once  dextrose 50% Injectable 25 once  dextrose 50% Injectable 25 once  dextrose 50% Injectable 25 once  dextrose 50% Injectable 25 once  dextrose Oral Gel 15 once PRN  dextrose Oral Gel 15 once PRN  glucagon  Injectable 1 once  glucagon  Injectable 1 once  heparin   Injectable 5000 every 12 hours  HYDROmorphone  Injectable 0.25 every 6 hours PRN  insulin lispro (ADMELOG) corrective regimen sliding scale  every 6 hours  pantoprazole  Injectable 40 daily  polyethylene glycol 3350 17 daily  senna 2 at bedtime      Vital Signs Last 24 Hrs  T(C): 36.7 (26 Jan 2024 14:11), Max: 37.3 (26 Jan 2024 05:42)  T(F): 98 (26 Jan 2024 14:11), Max: 99.2 (26 Jan 2024 05:42)  HR: 83 (26 Jan 2024 14:11) (76 - 83)  BP: 113/65 (26 Jan 2024 14:11) (109/50 - 114/45)  BP(mean): --  RR: 17 (26 Jan 2024 14:11) (16 - 18)  SpO2: 98% (26 Jan 2024 14:11) (98% - 100%)    Parameters below as of 26 Jan 2024 14:11  Patient On (Oxygen Delivery Method): nasal cannula        PHYSICAL EXAM:  Constitutional: chronically-ill appearing cachectic elderly female, contracted posture  Cardiovascular:   normal S1, S2, no murmur, RRR  Respiratory:  clear BS bilaterally, no wheezes, no rales  Musculoskeletal:  no BLE edema  Neurologic: awake, unresponsive  Skin:  L heel w/ malodorous escharous changes; R shoulder w/ pressure ulcer; +gluteal cleft ulcer                                7.4    12.57 )-----------( 131      ( 26 Jan 2024 09:30 )             23.7       01-26    149<H>  |  118<H>  |  77<H>  ----------------------------<  178<H>  3.1<L>   |  19<L>  |  1.99<H>    Ca    8.0<L>      26 Jan 2024 19:50  Phos  2.2     01-26  Mg     1.60     01-26    TPro  6.1  /  Alb  2.2<L>  /  TBili  0.4  /  DBili  x   /  AST  30  /  ALT  17  /  AlkPhos  106  01-26      Urinalysis Basic - ( 26 Jan 2024 19:50 )    Color: x / Appearance: x / SG: x / pH: x  Gluc: 178 mg/dL / Ketone: x  / Bili: x / Urobili: x   Blood: x / Protein: x / Nitrite: x   Leuk Esterase: x / RBC: x / WBC x   Sq Epi: x / Non Sq Epi: x / Bacteria: x        MICROBIOLOGY:  v  Clean Catch Clean Catch (Midstream)  01-24-24   <10,000 CFU/mL Normal Urogenital Nyla  --  --      .Blood Blood-Peripheral  01-24-24   No growth at 48 Hours  --  --      .Blood Blood-Peripheral  01-24-24   No growth at 48 Hours  --  --          Rapid RVP Result: NotDetec (01-24 @ 11:26)        RADIOLOGY:  Imaging below independently reviewed.  < from: Xray Ankle Complete 3 Views, Left (01.24.24 @ 14:10) >  IMPRESSION:  Dorsal forefoot soft tissue swelling. Questionable focal slightly thinned   indistinct appearing calcaneal cortex on left foot frontal/oblique view   raising concern for possible osteomyelitis, MRI would be helpful to   better assess. No tracking gas collections or additional suspected areas   of osteomyelitis. No fractures or dislocations.    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 Tarsometatarsal   alignment maintained without evidence for a Lisfranc injury.    Small calcaneal enthesophytes.    Generalized osteopenia otherwise no discrete lytic or blastic lesions.    < end of copied text >

## 2024-01-26 NOTE — PROGRESS NOTE ADULT - SUBJECTIVE AND OBJECTIVE BOX
[facility] and Pall[facility]  Brianna Chang Palliative Care Attending  Contact Info: Page [phone] (including Nights/Weekends), message on Microsoft Teams (Brianna Chang), or leave  at Palliative Office [phone] (non-urgent)   Date of [id number] @ 13:09    SUBJECTIVE AND OBJECTIVE: Patient seen this AM lying in bed with wound care RN at bedside. Patient's mental remains poor.     Indication for Geriatrics and Palliative Care Services/INTERVAL HPI: complex decision making regarding goc in setting of advanced illness.     OVERNIGHT EVENTS:   > Over the past 24 hours, patient did not require PRNs.     DNR on chart:  Allergies    Pineapple (Unknown)  contrast dye -  rash (Other)  iodine (Other)  codeine (Vomiting)  penicillin (Rash)  latex (Rash)    Intolerances    Tolerates ceftriaxone, cefepime (Other)  MEDICATIONS  (STANDING):  cefTRIAXone   IVPB 1000 milliGRAM(s) IV Intermittent every 24 hours  dextrose 5%. 1000 milliLiter(s) (50 mL/Hr) IV Continuous <Continuous>  dextrose 5%. 1000 milliLiter(s) (100 mL/Hr) IV Continuous <Continuous>  dextrose 5%. 1000 milliLiter(s) (50 mL/Hr) IV Continuous <Continuous>  dextrose 5%. 1000 milliLiter(s) (70 mL/Hr) IV Continuous <Continuous>  dextrose 5%. 1000 milliLiter(s) (100 mL/Hr) IV Continuous <Continuous>  dextrose 50% Injectable 12.5 Gram(s) IV Push once  dextrose 50% Injectable 25 Gram(s) IV Push once  dextrose 50% Injectable 25 Gram(s) IV Push once  dextrose 50% Injectable 12.5 Gram(s) IV Push once  dextrose 50% Injectable 25 Gram(s) IV Push once  dextrose 50% Injectable 25 Gram(s) IV Push once  doxycycline IVPB      doxycycline IVPB 100 milliGRAM(s) IV Intermittent every 12 hours  glucagon  Injectable 1 milliGRAM(s) IntraMuscular once  glucagon  Injectable 1 milliGRAM(s) IntraMuscular once  heparin   Injectable 5000 Unit(s) SubCutaneous every 12 hours  insulin lispro (ADMELOG) corrective regimen sliding scale   SubCutaneous every 6 hours  lactated ringers Bolus 1000 milliLiter(s) IV Bolus once  metroNIDAZOLE  IVPB 500 milliGRAM(s) IV Intermittent every 12 hours  pantoprazole  Injectable 40 milliGRAM(s) IV Push daily  polyethylene glycol 3350 17 Gram(s) Oral daily  senna 2 Tablet(s) Oral at bedtime    MEDICATIONS  (PRN):  dextrose Oral Gel 15 Gram(s) Oral once PRN Blood Glucose LESS THAN 70 milliGRAM(s)/deciliter  dextrose Oral Gel 15 Gram(s) Oral once PRN Blood Glucose LESS THAN 70 milliGRAM(s)/deciliter      ITEMS UNCHECKED ARE NOT PRESENT    PRESENT SYMPTOMS: [x ]Unable to self-report - see [ ] CPOT [x ] PAINADS [x ] RDOS  Source if other than patient:  [ ]Family   [x ]Team     Pain:  [ ]yes [ ]no  QOL impact -   Location -                    Aggravating factors -  Quality -  Radiation -  Timing-  Severity (0-10 scale):  Minimal acceptable level/ pain goal (0-10 scale):     CPOT:    https://www.[facility].org/getattachment/[email or web address]-8043e5868d9v/Critical-Care-Pain-Observation-Tool-(CPOT)    Dyspnea:                           [ ]Mild [ ]Moderate [ ]Severe  Anxiety:                             [ ]Mild [ ]Moderate [ ]Severe  Fatigue:                             [ ]Mild [ ]Moderate [ ]Severe  Nausea:                             [ ]Mild [ ]Moderate [ ]Severe  Loss of appetite:              [ ]Mild [ ]Moderate [ ]Severe  Constipation:                    [ ]Mild [ ]Moderate [ ]Severe  Other Symptoms:  [ ]All other review of systems negative     PCSSQ[Palliative Care Spiritual Screening Question]   Severity (0-10):  Score of 4 or > indicate consideration of Chaplaincy referral.  Chaplaincy Referral: [ ] yes [ ] refused [ ] following [ x] deferred    Caregiver [place]? : [ ] yes [ ] no [ x] Deferred [ ] Declined             Social work referral [ ] Patient & Family Centered Care Referral [ ]  Anticipatory Grief present?:  [ ] yes [ ] no  [x ] Deferred                  Social work referral [ ] Patient & Family Centered Care Referral [ ]      PHYSICAL EXAM:  Vital Signs Last 24 Hrs  T(C): 37.3 (26 Jan 2024 05:42), Max: 37.3 (26 Jan 2024 05:42)  T(F): 99.2 (26 Jan 2024 05:42), Max: 99.2 (26 Jan 2024 05:42)  HR: 76 (26 Jan 2024 05:42) (75 - 81)  BP: 109/50 (26 Jan 2024 05:42) (109/50 - 128/50)  BP(mean): --  RR: 18 (26 Jan 2024 05:42) (17 - 18)  SpO2: 100% (26 Jan 2024 05:42) (95% - 100%)    Parameters below as of 26 Jan 2024 05:42  Patient On (Oxygen Delivery Method): nasal cannula, 2L     I&O's Summary     GENERAL: [x ]Cachexia  eyes open but pt not responsive   [ ]Alert  [ ]Oriented x   [ ]Lethargic  [ ]Unarousable  [ ]Verbal  [ ]Non-Verbal  Behavioral:   [ ] Anxiety  [ ] Delirium [ ] Agitation [x ] Other  HEENT:  [ ]Normal   [ x]Dry mouth   [ ]ET Tube/Trach  [ ]Oral lesions  PULMONARY:   [ ]Clear [ ]Tachypnea  [ ]Audible excessive secretions   [ ]Rhonchi        [ ]Right [ ]Left [ ]Bilateral  [ ]Crackles        [ ]Right [ ]Left [ ]Bilateral  [ ]Wheezing     [ ]Right [ ]Left [ ]Bilateral  [x ]Diminished breath sounds [ ]right [ ]left [ x]bilateral  CARDIOVASCULAR:    [x ]Regular [ ]Irregular [ ]Tachy  [ ]Jack [ ]Murmur [ ]Other  GASTROINTESTINAL:  [ x]Soft  [ ]Distended   [ ]+BS  [ ]Non tender [ ]Tender  [ ]Other [ ]PEG [ ]OGT/ NGT  Last BM: fecal incontinence   GENITOURINARY:  [ ]Normal [x ] Incontinent   [ ]Oliguria/Anuria   [ ][place]  MUSCULOSKELETAL:   [ ]Normal   [ ]Weakness  [x ]Bed/Wheelchair bound [ ]Edema  NEUROLOGIC:   [ ]No focal deficits  [x ]Cognitive impairment  [ ]Dysphagia [ ]Dysarthria [ ]Paresis [ ]Other   SKIN: Please see flowsheets   [ ]Normal  [ ]Rash  [x ]Other- multiple wounds on her body   [ ]Pressure ulcer(s)       Present on admission [ ]y [ ]n      CRITICAL CARE:  [ ]Shock Present  [ ]Septic [ ]Cardiogenic [ ]Neurologic [ ]Hypovolemic  [ ]Vasopressors [ ]Inotropes  [ ]Respiratory failure present [ ]Mechanical Ventilation [ ]Non-invasive ventilatory support [ ]High-Flow   [ ]Acute  [ ]Chronic [ ]Hypoxic  [ ]Hypercarbic [ ]Other  [ ]Other organ failure     LABS:                        7.4    12.57 )-----------( 131      ( 26 Jan 2024 09:30 )             23.7   01-26    154<H>  |  122<H>  |  86<H>  ----------------------------<  167<H>  3.7   |  18<L>  |  2.15<H>    Ca    8.4      26 Jan 2024 09:30  Phos  2.4     01-26  Mg     1.70     01-26    TPro  6.1  /  Alb  2.2<L>  /  TBili  0.4  /  DBili  x   /  AST  30  /  ALT  17  /  AlkPhos  106  01-26      Urinalysis Basic - ( 26 Jan 2024 09:30 )    Color: x / Appearance: x / SG: x / pH: x  Gluc: 167 mg/dL / Ketone: x  / Bili: x / Urobili: x   Blood: x / Protein: x / Nitrite: x   Leuk Esterase: x / RBC: x / WBC x   Sq Epi: x / Non Sq Epi: x / Bacteria: x      RADIOLOGY & ADDITIONAL STUDIES: no new    Protein Calorie Malnutrition Present: [ ]mild [ ]moderate [x ]severe [ ]underweight [ ]morbid obesity  https://www.andeal.org/vault/2440/web/files/ONC/Table_Clinical%20Characteristics%20to%20Document%20Malnutrition-White%20JV%20et%20al%202012.pdf    Height (cm): 149.9 (01-24-24 @ 10:06), 149.9 (12-15-23 @ 09:00), 149.9 (10-30-23 @ 16:08)  Weight (kg): 40.4 (01-25-24 @ 15:55), 54.4 (12-15-23 @ 09:00), 59 (10-30-23 @ 16:08)  BMI (kg/m2): 18 (01-25-24 @ 15:55), 24.2 (01-24-24 @ 10:06), 24.2 (12-15-23 @ 09:00)    [x ]PPSV2 < or = 30%  [ ]significant weight loss [ ]poor nutritional intake [ ]anasarca[ ]Artificial Nutrition    Other REFERRALS:  [ ]Hospice  [ ]Child Life  [ ]Social Work  [ ]Case management [ ]Holistic Therapy

## 2024-01-26 NOTE — PROGRESS NOTE ADULT - PROBLEM SELECTOR PLAN 2
- L foot xray:Dorsal forefoot soft tissue swelling. Questionable focal slightly thinned   indistinct appearing calcaneal cortex on left foot frontal/oblique view   raising concern for possible osteomyelitis, MRI would be helpful to   better assess. No tracking gas collections or additional suspected areas   of osteomyelitis. No fractures or dislocations.  - CRP elevated on admission    Plan:  - Recently Tx in Dec 23' for L foot wound growing S. Aureas and E. Coli, amputation L foot offered and deferred at the time  - Wound consulted, appreciate recs  - Vascular consulted, recs:  	- Continue local wound care, daily dressing changes   	- Frequent re-positioning and offloading to B/L lower extremities   	- Remainder care per primary   	- Reconsult Vascular sx as needed, if family/pt amenable to amputation  - c/w Abx as above  - previously unable to tolerate MRI L ankle when attempted in recent hospitalization

## 2024-01-26 NOTE — PROGRESS NOTE ADULT - ASSESSMENT
89-yo F w/ PMH of dementia, CAD, DM, s/p spinal stimulator, and recent admission at [facility] (12/15/23-1/2/24), sent in for abnormal labs. Recently hospitalized at [facility] w/ generalized weakness and frequent falls. Seen by ID there for infected L ankle wound. Amputation was discussed, however not pursued. Patient was discharged to [facility], pending hospice.    #L foot OM  #Leukocytosis  #Elevated ESR and CRP  #Polymicrobial infection  #Dementia  #FTT in adult  #Debility  #SAE  #Electrolyte abnormality  - Recent hospitalization for frequent falls, found to have L foot wound. Polymicrobial on culture, growing E coli, MSSA, and Finegoldia.  - Now presenting with significant electrolyte derangements i/s/o SAE.  - On exam, L foot was malodorous and w/ escharous changes. +Bleeding also noted.  - Clinical OM (bone probing, elevated ESR/CRP, leukocytosis). Amputation was offered, however not pursued. Severely malnourished and deconditioned patient with dismal chance of meaningful recovery with medical treatment only. Medical treatment for a "curative" intention (e.g. long-term IV) may not be in line with patient's GOC (e.g. hospice care)  - Need to define a realistic GOC first. Note that antibiotic treatment will not likely to provide a cure to patient's acute on chronic wound in a bedbound patient w/ chronically contracted posture. Toxicity from antibiotic along with the patient's advanced age and poor renal function places her at high risk of adverse outcomes. Long-term antibiotic treatment appears futile and will not likely to increase patient's quality of life.  - Bed head elevation. Aspiration precautions. Optimize nutrition. Frequent turning and wound care.  - Started on ceftriaxone 1g IV Q24H, doxycycline 100 mg IV Q12H, and metronidazole 500 mg IV Q12H. Can continue at this time. For oral conversion, a combination of doxycycline w/ moxifloxacin can be considered, if QTc is not prolonged. Note that fluoroquinolones (moxifloxacin) can result in adverse outcomes in elderly patients.    Plan discussed with coverage house staff (NF).  Thank you for this consult. Inpatient ID team will follow.    Kareem Kelley MD, PhD  Attending Physician  Division of Infectious Diseases  Department of Medicine    Please contact through MS Teams message.  Office: [phone] (after 5 PM or weekend).      89-yo F w/ PMH of dementia, CAD, DM, s/p spinal stimulator, and recent admission at [facility] (12/15/23-1/2/24), sent in for abnormal labs. Recently hospitalized at [facility] w/ generalized weakness and frequent falls. Seen by ID there for infected L ankle wound. Amputation was discussed, however not pursued. Patient was discharged to [facility], pending hospice.    #L foot OM  #Leukocytosis  #Elevated ESR and CRP  #Polymicrobial infection  #Dementia  #FTT in adult  #Debility  #SAE  #Electrolyte abnormality  - Recent hospitalization for frequent falls, found to have L foot wound. Polymicrobial on culture, growing E coli, MSSA, and Finegoldia.  - Now presenting with significant electrolyte derangements i/s/o SAE.  - On exam, L foot was malodorous and w/ escharous changes. +Bleeding also noted.  - Clinical OM (bone probing, elevated ESR/CRP, leukocytosis). Amputation was offered, however not pursued. Severely malnourished and deconditioned patient with dismal chance of meaningful recovery with medical treatment only. Medical treatment for a "curative" intention (e.g. long-term IV) may not be in line with patient's GOC (e.g. hospice care)  - Need to define a realistic GOC first. Note that antibiotic treatment will not likely to provide a cure to patient's acute on chronic wound in a bedbound patient w/ chronically contracted posture. Toxicity from antibiotic along with the patient's advanced age and poor renal function places her at high risk of adverse outcomes. Long-term antibiotic treatment appears futile and will not likely to increase patient's quality of life.  - Bed head elevation. Aspiration precautions. Optimize nutrition. Frequent turning and wound care.  - Started on ceftriaxone 1g IV Q24H, doxycycline 100 mg IV Q12H, and metronidazole 500 mg IV Q12H. Can continue at this time. For oral conversion, a combination of doxycycline w/ moxifloxacin can be considered, if QTc is not prolonged. Note that fluoroquinolones (moxifloxacin) can result in adverse outcomes in elderly patients.    Plan discussed with coverage house staff (NF).  Thank you for this consult. Inpatient ID team will peripherally follow.    Kareem Kelley MD, PhD  Attending Physician  Division of Infectious Diseases  Department of Medicine    Please contact through MS Teams message.  Office: [phone] (after 5 PM or weekend).

## 2024-01-26 NOTE — CONSULT NOTE ADULT - SUBJECTIVE AND OBJECTIVE BOX
Wound SURGERY CONSULT NOTE    HPI:  89 years old female with h/o HTN, HLD, CAD, DM, dementia, s/p spinal stimulator placement p/w abnormal labs (Na 168, K 2.9). Unable to obtain further history due to patient baseline nonverbal. Patient was transferred from [facility] for hypernatremia on routine labs. They also recently started cefepime due to concern for foot infection. The son did not have any further history regarding the events leading up to hospital admission. As per the son, the patient was in her usual state of health the last time he visited with her.     ED course: Pt arrived to ED w/ , /46, afebrile, requiring 4L NC. Pt recieved Vancomycin, and IVF. Labs showed leukocytosis 14, elevated , normal lactate 1.5, Na 163, Cl 128, SCr 2.76 (baseline ~1-1.2), . UA growing yeast like cells and with pyuria.  (24 Jan 2024 17:59)    Wound consult requested to assist w/ management of right posterior rib cage, gluteal cleft to right buttock deep tissue pressure injury, left upper buttock unstageable pressure injury, right heel, right dorsal foot, left foot pressure injuries complicated by arterial insufficiency. No family at bedside, pt nonverbal, unable to obtain subjective data. On previous admission patient followed by Podiatry and vascular surgery with no surgical intervention. Seen by Vascular surgery on this admission for left lateral malleolus, per records patient's son/HCP "stated he would like to avoid amputation given comorbidities and risk of surgery. However, he stated would like to rescind patient's prior DNR/DNI status for FULL CODE."      Current Diet: Diet, NPO:   Except Medications (01-24-24 @ 19:11)      PAST MEDICAL & SURGICAL HISTORY:  Angina  in 2005, s/p angioplasty with stent placement, no recurrance, no sequalae    Diabetes Mellitus  type 2    Arthritis, Infective, Knee    Detached Retina  right eye    Acute Mucous Pneumonia  4/2010, resolved ; no residual problems    Spinal Stenosis- lumbar    History of Back Surgery  2010    Basal Cell Cancer  removed from left neck 2009    Dementia    Detached Retina, Left  laser surgery in 1995    S/P Carpal Tunnel Release  bilateral hands in 1990    Trigger Finger  release of middle and ring finger of right hand in 1990, and middle finger left hand in 2008    S/P Laparoscopic Cholecystectomy  2008    S/P TKR (Total Knee Replacement)  left knee    Cataract  removal with lens implant right in 2000      REVIEW OF SYSTEMS: Pt unable to offer, due to baseline mental status.    MEDICATIONS  (STANDING):  cefTRIAXone   IVPB 1000 milliGRAM(s) IV Intermittent every 24 hours  dextrose 5%. 1000 milliLiter(s) (100 mL/Hr) IV Continuous <Continuous>  dextrose 5%. 1000 milliLiter(s) (50 mL/Hr) IV Continuous <Continuous>  dextrose 5%. 1000 milliLiter(s) (100 mL/Hr) IV Continuous <Continuous>  dextrose 5%. 1000 milliLiter(s) (70 mL/Hr) IV Continuous <Continuous>  dextrose 5%. 1000 milliLiter(s) (50 mL/Hr) IV Continuous <Continuous>  dextrose 50% Injectable 12.5 Gram(s) IV Push once  dextrose 50% Injectable 25 Gram(s) IV Push once  dextrose 50% Injectable 25 Gram(s) IV Push once  dextrose 50% Injectable 25 Gram(s) IV Push once  dextrose 50% Injectable 25 Gram(s) IV Push once  dextrose 50% Injectable 12.5 Gram(s) IV Push once  doxycycline IVPB      doxycycline IVPB 100 milliGRAM(s) IV Intermittent every 12 hours  glucagon  Injectable 1 milliGRAM(s) IntraMuscular once  glucagon  Injectable 1 milliGRAM(s) IntraMuscular once  heparin   Injectable 5000 Unit(s) SubCutaneous every 12 hours  insulin lispro (ADMELOG) corrective regimen sliding scale   SubCutaneous every 6 hours  lactated ringers Bolus 1000 milliLiter(s) IV Bolus once  metroNIDAZOLE  IVPB 500 milliGRAM(s) IV Intermittent every 12 hours  pantoprazole  Injectable 40 milliGRAM(s) IV Push daily  polyethylene glycol 3350 17 Gram(s) Oral daily  senna 2 Tablet(s) Oral at bedtime    MEDICATIONS  (PRN):  dextrose Oral Gel 15 Gram(s) Oral once PRN Blood Glucose LESS THAN 70 milliGRAM(s)/deciliter  dextrose Oral Gel 15 Gram(s) Oral once PRN Blood Glucose LESS THAN 70 milliGRAM(s)/deciliter      Allergies    Pineapple (Unknown)  contrast dye -  rash (Other)  iodine (Other)  codeine (Vomiting)  penicillin (Rash)  latex (Rash)    Intolerances    Tolerates ceftriaxone, cefepime (Other)      SOCIAL HISTORY:  Resident at [facility]. Bedbound, incontinent urine and stool. Dependent on all ADLs.  Per records no history of etoh, illicit drug use, smoking.  Previously DNR/DNI, now rescinded.     FAMILY HISTORY:  FH: HTN (hypertension)        PHYSICAL EXAM:  Vital Signs Last 24 Hrs  T(C): 37.3 (26 Jan 2024 05:42), Max: 37.3 (26 Jan 2024 05:42)  T(F): 99.2 (26 Jan 2024 05:42), Max: 99.2 (26 Jan 2024 05:42)  HR: 76 (26 Jan 2024 05:42) (75 - 81)  BP: 109/50 (26 Jan 2024 05:42) (109/50 - 128/50)  BP(mean): --  RR: 18 (26 Jan 2024 05:42) (17 - 18)  SpO2: 100% (26 Jan 2024 05:42) (95% - 100%)    Parameters below as of 26 Jan 2024 05:42  Patient On (Oxygen Delivery Method): nasal cannula, 2L    Wt: 40.4 kg (01-)    Constitutional:   cachectic/ MO/ Obese/ frail  WD/ WN/ WG/ Disheveled  (+) low airloss support surface, (+) fluidized postioining devices, (+) complete cair boots  HEENT:  NC/AT, PERRL, EOMI, mucosa moist, throat clear, trachea midline, neck supple  Cardiovascular: RRR   Respiratory: CTA  Gastrointestinal soft NT/ND (+)BS  (+)PEG (+)ostomy  Neurology  weakened strength & sensation grossly intact/ paraesthesia  nonverbal, no follow commands/ paraplegic  Musculoskeletal:  limited/ FROM, no deformities/ contractures  Vascular: BLE equally warm/ cool,  no cyanosis, clubbing, edema  >LE //BLE edema equal  DP/PT pulses palpable  no acute ischemia noted  hemosiderin staining  Skin:  moist w/ good turgor  frail,  ecchymosis w/o hematoma  serosanguinous drainage  No odor, erythema, increased warmth, tenderness, induration, fluctuance      LABS/ CULTURES/ RADIOLOGY:                        7.4    12.57 )-----------( 131      ( 26 Jan 2024 09:30 )             23.7       154  |  122  |  86  ----------------------------<  167      [01-26-24 @ 09:30]  3.7   |  18  |  2.15        Ca     8.4     [01-26-24 @ 09:30]      Mg     1.70     [01-26-24 @ 09:30]      Phos  2.4     [01-26-24 @ 09:30]    TPro  6.1  /  Alb  2.2  /  TBili  0.4  /  DBili  x   /  AST  30  /  ALT  17  /  AlkPhos  106  [01-26-24 @ 09:30]          Culture - Blood (collected 01-24-24 @ 11:50)  Source: .Blood Blood-Peripheral  Preliminary Report (01-25-24 @ 16:02):    No growth at 24 hours    Culture - Blood (collected 01-24-24 @ 11:40)  Source: .Blood Blood-Peripheral  Preliminary Report (01-25-24 @ 16:02):    No growth at 24 hours       Wound SURGERY CONSULT NOTE    HPI:  89 years old female with h/o HTN, HLD, CAD, DM, dementia, s/p spinal stimulator placement p/w abnormal labs (Na 168, K 2.9). Unable to obtain further history due to patient baseline nonverbal. Patient was transferred from [facility] for hypernatremia on routine labs. They also recently started cefepime due to concern for foot infection. The son did not have any further history regarding the events leading up to hospital admission. As per the son, the patient was in her usual state of health the last time he visited with her.     ED course: Pt arrived to ED w/ , /46, afebrile, requiring 4L NC. Pt recieved Vancomycin, and IVF. Labs showed leukocytosis 14, elevated , normal lactate 1.5, Na 163, Cl 128, SCr 2.76 (baseline ~1-1.2), . UA growing yeast like cells and with pyuria.  (24 Jan 2024 17:59)    Wound consult requested to assist w/ management of right posterior rib cage, gluteal cleft to right buttock deep tissue pressure injury, left upper buttock unstageable pressure injury, right heel, right dorsal foot, left foot pressure injuries complicated by arterial insufficiency. No family at bedside, pt nonverbal, unable to obtain subjective data. On previous admission patient followed by Podiatry and vascular surgery with no surgical intervention. Seen by Vascular surgery on this admission for left lateral malleolus, per records patient's son/HCP "stated he would like to avoid amputation given comorbidities and risk of surgery. However, he stated would like to rescind patient's prior DNR/DNI status for FULL CODE."      Current Diet: Diet, NPO:   Except Medications (01-24-24 @ 19:11)      PAST MEDICAL & SURGICAL HISTORY:  Angina  in 2005, s/p angioplasty with stent placement, no recurrance, no sequalae    Diabetes Mellitus  type 2    Arthritis, Infective, Knee    Detached Retina  right eye    Acute Mucous Pneumonia  4/2010, resolved ; no residual problems    Spinal Stenosis- lumbar    History of Back Surgery  2010    Basal Cell Cancer  removed from left neck 2009    Dementia    Detached Retina, Left  laser surgery in 1995    S/P Carpal Tunnel Release  bilateral hands in 1990    Trigger Finger  release of middle and ring finger of right hand in 1990, and middle finger left hand in 2008    S/P Laparoscopic Cholecystectomy  2008    S/P TKR (Total Knee Replacement)  left knee    Cataract  removal with lens implant right in 2000      REVIEW OF SYSTEMS: Pt unable to offer, due to baseline mental status.    MEDICATIONS  (STANDING):  cefTRIAXone   IVPB 1000 milliGRAM(s) IV Intermittent every 24 hours  dextrose 5%. 1000 milliLiter(s) (100 mL/Hr) IV Continuous <Continuous>  dextrose 5%. 1000 milliLiter(s) (50 mL/Hr) IV Continuous <Continuous>  dextrose 5%. 1000 milliLiter(s) (100 mL/Hr) IV Continuous <Continuous>  dextrose 5%. 1000 milliLiter(s) (70 mL/Hr) IV Continuous <Continuous>  dextrose 5%. 1000 milliLiter(s) (50 mL/Hr) IV Continuous <Continuous>  dextrose 50% Injectable 12.5 Gram(s) IV Push once  dextrose 50% Injectable 25 Gram(s) IV Push once  dextrose 50% Injectable 25 Gram(s) IV Push once  dextrose 50% Injectable 25 Gram(s) IV Push once  dextrose 50% Injectable 25 Gram(s) IV Push once  dextrose 50% Injectable 12.5 Gram(s) IV Push once  doxycycline IVPB      doxycycline IVPB 100 milliGRAM(s) IV Intermittent every 12 hours  glucagon  Injectable 1 milliGRAM(s) IntraMuscular once  glucagon  Injectable 1 milliGRAM(s) IntraMuscular once  heparin   Injectable 5000 Unit(s) SubCutaneous every 12 hours  insulin lispro (ADMELOG) corrective regimen sliding scale   SubCutaneous every 6 hours  lactated ringers Bolus 1000 milliLiter(s) IV Bolus once  metroNIDAZOLE  IVPB 500 milliGRAM(s) IV Intermittent every 12 hours  pantoprazole  Injectable 40 milliGRAM(s) IV Push daily  polyethylene glycol 3350 17 Gram(s) Oral daily  senna 2 Tablet(s) Oral at bedtime    MEDICATIONS  (PRN):  dextrose Oral Gel 15 Gram(s) Oral once PRN Blood Glucose LESS THAN 70 milliGRAM(s)/deciliter  dextrose Oral Gel 15 Gram(s) Oral once PRN Blood Glucose LESS THAN 70 milliGRAM(s)/deciliter      Allergies    Pineapple (Unknown)  contrast dye -  rash (Other)  iodine (Other)  codeine (Vomiting)  penicillin (Rash)  latex (Rash)    Intolerances    Tolerates ceftriaxone, cefepime (Other)      SOCIAL HISTORY:  Resident at [facility]. Bedbound, incontinent urine and stool. Dependent on all ADLs.  Per records no history of etoh, illicit drug use, smoking.  Previously DNR/DNI, now rescinded.     FAMILY HISTORY:  FH: HTN (hypertension)        PHYSICAL EXAM:  Vital Signs Last 24 Hrs  T(C): 37.3 (26 Jan 2024 05:42), Max: 37.3 (26 Jan 2024 05:42)  T(F): 99.2 (26 Jan 2024 05:42), Max: 99.2 (26 Jan 2024 05:42)  HR: 76 (26 Jan 2024 05:42) (75 - 81)  BP: 109/50 (26 Jan 2024 05:42) (109/50 - 128/50)  BP(mean): --  RR: 18 (26 Jan 2024 05:42) (17 - 18)  SpO2: 100% (26 Jan 2024 05:42) (95% - 100%)    Parameters below as of 26 Jan 2024 05:42  Patient On (Oxygen Delivery Method): nasal cannula, 2L    Wt: 40.4 kg (01-)    Constitutional: Lethargic, minimally arousable, unable to follow commands. Frail, ill appearing, bedbound, cachetic.  (+) low airloss support surface, (+) fluidized positioning devices, (+) complete cair boots  HEENT: NC/AT, nonicteric, mucosa dry.   Cardiovascular: rate regular  Respiratory: nonlabored, supplemental oxygen via nasal cannula, equal chest expansion.  Gastrointestinal: soft NT/ND, incontinent of stool  : incontinent urine, external female urinary collection device.  Musculoskeletal: mildly contracted bilateral lower extremities at knees, able to extend with pROM.   Vascular: B/L upper extremities +1 radial pulses, capillary refill < 3 seconds, no temperature changes noted. B/L UE with edema.   B/L LE: Right foot wounds see below, Left foot extensive wound with mottling see below.  RLE: nonpalpable dp/pt pulses, +1 femoral pulse, capillary refill > 3 seconds, increased coolness from midfoot to distal toes.  LLE: nonpalpable dp/pt pulses, +1 femoral pulse, capillary refill >3 seconds, foot cold to touch.  Wounds with mixed etiology pressure and arterial insufficiency:  Right heel deep tissue pressure injury- 3.5cmx3.5cmx0- 100% purple-maroon discoloration.  Right dorsal foot deep tissue pressure injury- [id number]- 100% purple-maroon discoloration.  Right lateral lower leg nonblanching erythema- [id number]  Left lateral malleolus- stage 4 pressure injury with extensive necrosis and foot ischemia.  -7.5cmx9.6kod6aw  -Necrotic bone, tendon, ligament in center with large area of surrounding eschar beginning to soften in center, (+) small non-purulent drainage with odor.  -No crepitus, no fluctuance.  -Periwound skin extending from proximal wound edge affecting entire foot and toes with extensive mottling; areas of dry-fixed eschar noted to medial mid foot, medial 1st metatarsal head.  Skin: thin, frail skin, scattered ecchymosis without hematoma, poor skin turgor. Linear midsternal surgical scar, linear left heel surgical scar.  Right shoulder with hypopigmentation.   Right posterior thoracic rib cage- deep tissue pressure injury- 4.9smd0ilu1- 100% purple-maroon discoloration. No induration.  Gluteal cleft extending to right buttock- evolving deep tissue pressure injury- 5.7yxw4tli3.2cm; well defined irregular borders, purple-maroon discoloration with skin slippage exposing areas of minimally moist darkened dermis, no drainage.  Left upper buttock- unstageble pressure injury- 3dms4rdj3.1cm- 100% minimally moist adherent slough with circumferential skin slippage exposing minimally moist dermis.    LABS/ CULTURES/ RADIOLOGY:                        7.4    12.57 )-----------( 131      ( 26 Jan 2024 09:30 )             23.7       154  |  122  |  86  ----------------------------<  167      [01-26-24 @ 09:30]  3.7   |  18  |  2.15        Ca     8.4     [01-26-24 @ 09:30]      Mg     1.70     [01-26-24 @ 09:30]      Phos  2.4     [01-26-24 @ 09:30]    TPro  6.1  /  Alb  2.2  /  TBili  0.4  /  DBili  x   /  AST  30  /  ALT  17  /  AlkPhos  106  [01-26-24 @ 09:30]      Culture - Urine (01.24.24 @ 11:55)   Specimen Source: Clean Catch Clean Catch (Midstream)  Culture Results:   <10,000 CFU/mL Normal Urogenital Nyla      Culture - Blood (collected 01-24-24 @ 11:50)  Source: .Blood Blood-Peripheral  Preliminary Report (01-25-24 @ 16:02):    No growth at 24 hours    Culture - Blood (collected 01-24-24 @ 11:40)  Source: .Blood Blood-Peripheral  Preliminary Report (01-25-24 @ 16:02):    No growth at 24 hours      Culture - Abscess with Gram Stain (12.22.23 @ 17:33)   Gram Stain:   No polymorphonuclear leukocytes seen per low power field   Rare Gram positive cocci in pairs seen per oil power field  - Amoxicillin/Clavulanic Acid: S <=8/4  - Ampicillin: S <=8 These ampicillin results predict results for amoxicillin  - Ampicillin/Sulbactam: S <=4/2  - Ampicillin/Sulbactam: S <=8/4  - Aztreonam: S <=4  - Cefazolin: S <=2  - Cefazolin: S <=4  - Cefepime: S <=2  - Cefoxitin: S <=8  - Ceftriaxone: S <=1  - Ciprofloxacin: S <=0.25  - Clindamycin: S <=0.25  - Ertapenem: S <=0.5  - Erythromycin: S <=0.25  - Gentamicin: S <=1 Should not be used as monotherapy  - Gentamicin: S <=2  - Imipenem: S <=1  - Levofloxacin: S <=0.5  - Meropenem: S <=1  - Oxacillin: S <=0.25 Oxacillin predicts susceptibility for dicloxacillin, methicillin, and nafcillin  - Penicillin: R >8  - Piperacillin/Tazobactam: S <=8  - Rifampin: S <=1 Should not be used as monotherapy  - Tetracycline: S <=1  - Tobramycin: S <=2  - Trimethoprim/Sulfamethoxazole: S <=0.5/9.5  - Trimethoprim/Sulfamethoxazole: S <=0.5/9.5  - Vancomycin: S 1  Specimen Source: .Abscess Left ankle wound  Culture Results:   Few Staphylococcus aureus   Rare Escherichia coli   Rare Finegoldia magna "Susceptibilities not performed"  Organism Identification: Staphylococcus aureus   Escherichia coli  Organism: Staphylococcus aureus  Organism: Escherichia coli  Method Type: SRIRAM  Method Type: SRIRAM          ACC: [id number] EXAM:  XR FOOT 2 VIEWS LT   ORDERED BY: ALICIA HUNT     ACC: [id number] EXAM:  XR ANKLE COMP MIN 3 VIEWS LT   ORDERED BY: ALICIA HUNT     PROCEDURE DATE:  01/24/2024          INTERPRETATION:  CLINICAL INDICATION: left heel ulcer    EXAM:  Frontal and lateral left ankle and foot from 1/24/2024 at 1409. Compared   to prior study from 12/15/2023.    IMPRESSION:  Dorsal forefoot soft tissue swelling. Questionable focal slightly thinned   indistinct appearing calcaneal cortex on left foot frontal/oblique view   raising concern for possible osteomyelitis, MRI would be helpful to   better assess. No tracking gas collections or additional suspected areas   of osteomyelitis. No fractures or dislocations.    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 Tarsometatarsal   alignment maintained without evidence for a Lisfranc injury.    Small calcaneal enthesophytes.    Generalized osteopenia otherwise no discrete lytic or blastic lesions.    --- End of Report ---            WILY HENDRICKSON MD; Attending Radiologist  This document has been electronically signed. Jan 24 2024  3:08PM      BILATERAL LOWER EXTREMITY VENOUS ULTRASOUND REPORT       Name:       AARON GREEN Study Date:       1/26/2024  YOB: 1930          MRN:              [id number]  Age:        89 years           Accession Number: [id number]  Gender:F                  Admission ID:     [id number]  Referring Physician:    Charmaine Ruby MD  Interpreting Physician: Rashawn Valdes MD, PhD  Primary Sonographer:    Lester Norman T       --------------------------------------------------------------------------------  Procedure:        Duplex Real-time grayscale/color Doppler ultrasonography used                    to interrogate the lower extremity venous system.  CPT:              DUPLX EXT VEINS COMPLT GREG - 94332.m  Admission Status: Inpatient  Indication(s):    Localized swelling, mass and lump, lower limb, bilateral -                    R22.43  Study Quality:    Technically difficult and limited study.       --------------------------------------------------------------------------------  CONCLUSIONS:    No evidence of deep vein thrombosis noted in the visualized right and left lower extremities.       --------------------------------------------------------------------------------  MEASUREMENTS:    +-----------------+--+-------+ +-----------------+--+-------+-------+  |RIGHT            |2D|Doppler| |LEFT             |2D|Doppler|Augment|  +-----------------+--+-------+ +-----------------+--+-------+-------+  |Common Femoral:  |* |   *   | |Common Femoral:  |Y |   Y   |       |  +-----------------+--+-------+ +-----------------+--+-------+-------+  |Femoral:         |Y |   Y   | |Femoral:         |Y |   Y   |       |  +-----------------+--+-------+ +-----------------+--+-------+-------+  |Popliteal:       |Y |   Y   | |Popliteal:     |Y |   Y   |       |  +-----------------+--+-------+ +-----------------+--+-------+-------+  |Peroneal:        |Y |       | |Peroneal:        |Y |       |       |  +-----------------+--+-------+ +-----------------+--+-------+-------+  |PosteriorTibial:|Y |       | |Posterior Tibial:|Y |       |       |  +-----------------+--+-------+ +-----------------+--+-------+-------+   2D                         Doppler             Augment   Y = Compressible           Y = Normal          Y = Augmentation   N = Incompressible         N = Abnormal        N = No Augmentation   P = Partially Compressible * = Cannot Evaluate   * = Cannot Evaluate       --------------------------------------------------------------------------------  FINDINGS:  Right Lower Extremity Veins:  R CFV: The right common femoral vein was not visualized. Spectral Doppler flow pattern was not able to be evaluated.  R Fem: The right femoral vein was normally compressible. Spectral Doppler flow pattern was normal.  R Pop: The right popliteal vein was normally compressible. Spectral Doppler flow pattern was normal.  R Per: The right peroneal vein was normally compressible.  R PTV: The right posterior tibial vein was normally compressible.     Left Lower Extremity Veins:  L CFV: The left common femoral vein was normally compressible. Spectral Doppler flow pattern was normal.  L Fem: The left femoral vein was normally compressible. Spectral Doppler flow pattern was normal.  L Pop: The left popliteal vein was normally compressible. Spectral Doppler flow pattern was normal.  L Per: The left peroneal vein was normally compressible.  L PTV: The left posterior tibial vein was normally compressible.       Electronically signed on 1/26/2024 at 10:12:44 AM by Rashawn Valdes MD, PhD, FACC, RPVI           *** Final ***

## 2024-01-26 NOTE — PROGRESS NOTE ADULT - PROBLEM SELECTOR PLAN 7
Thank you for allowing us to participate in your patient's care. We will continue to follow with you. Please page [phone] for any q's or c's. The Geriatric and Palliative Medicine service has coverage 24 hours a day/ 7 days a week to provide medical recommendations regarding symptom management needs via telephone.    Brianna Chang D.O.   Palliative Medicine.

## 2024-01-26 NOTE — PROGRESS NOTE ADULT - PROBLEM SELECTOR PLAN 3
Patient NPO at this time. Continue IVF   Management per medical team   Patient's mental status remains very poor.

## 2024-01-26 NOTE — PROGRESS NOTE ADULT - PROBLEM SELECTOR PLAN 1
- L foot xray:Dorsal forefoot soft tissue swelling. Questionable focal slightly thinned   indistinct appearing calcaneal cortex on left foot frontal/oblique view   raising concern for possible osteomyelitis, MRI would be helpful to   better assess. No tracking gas collections or additional suspected areas   of osteomyelitis. No fractures or dislocations.  - Recently Tx in Dec 23' for L foot wound growing S. Aureas and E. Coli, amputation L foot offered and deferred at the time    Plan:  - ID consulted, recs: d/c Cefepime. Start CTX 1g IV Q24H, Doxycycline 100 mg IV Q12H, and Metronidazole 500 mg IV Q12H in the meantime.  - previously unable to tolerate MRI L ankle when attempted in recent hospitalization

## 2024-01-27 NOTE — PROGRESS NOTE ADULT - ATTENDING COMMENTS
Patient seen and examined, case d/w house staff.    89F with htn, dm, hld, cad, dementia, spinal stimulator, sent from [place] for hypernatremia and L foot wound infection.  PE: pale, frail appearing, wound dressing b/l feet    # nina on ckd4, hypernatremia: pending lab today, on d5w, pending lab can change to d51/2ns +20kcl    # L foot infection, ischemic limb: clinical OM, family declined amputation by vascular sx  on empiric abx ceftriaxone, doxy, flagyl, but no source control unlikely to heal  # anemia: trend CBC, transfuse prn  family rescinded dnr, now full code, f/u palliative care re: GOC Patient seen and examined, case d/w house staff.    89F with htn, dm, hld, cad, dementia, spinal stimulator, sent from [place] for hypernatremia and L foot wound infection.  PE: pale, frail appearing, wound dressing b/l feet    # nina on ckd4, hypernatremia: pending lab today, on d5w, pending lab can change to d51/2ns +20kcl    # chronic L lateral malleolar pressure wound, L foot infection, ischemic limb: clinical OM, family declined amputation by vascular sx  on empiric abx ceftriaxone, doxy, flagyl, but no source control unlikely to heal  # anemia: trend CBC, transfuse prn  family rescinded dnr, now full code, f/u palliative care re: GOC

## 2024-01-28 NOTE — PROGRESS NOTE ADULT - ATTENDING COMMENTS
Patient seen and examined, case d/w house staff.    89F with htn, dm, hld, cad, dementia, spinal stimulator, sent from [place] for hypernatremia and L foot wound infection.  PE: pale, frail appearing, wound dressing b/l feet, nonverbal, a/ox0    # nina on ckd4, hypernatremia: Na improved to 142, on d5LR, Met acidosis hco3 15 noted, can change to ivf to sodium bicarb drip if remains low    # chronic L lateral malleolar pressure wound, L foot infection, ischemic limb: clinical OM, family declined amputation by vascular sx  on empiric abx ceftriaxone, doxy, flagyl, but no source control unlikely to heal  # anemia: trend CBC, transfuse prn  family rescinded dnr, poor prognosis for meaningful recovery, now full code, f/u family and palliative care re: [facility] Patient seen and examined, case d/w house staff.    89F with htn, dm, hld, cad, dementia, spinal stimulator, sent from [place] for hypernatremia and L foot wound infection.  PE: pale, frail appearing, wound dressing b/l feet, nonverbal, a/ox0    # nina on ckd4, hypernatremia: Na improved to 142, on d5LR, Met acidosis hco3 15 noted, can change to ivf to sodium bicarb drip if remains low    # chronic L lateral malleolar pressure wound, L foot infection, ischemic limb: clinical OM, family declined amputation by vascular sx  on empiric abx ceftriaxone, doxy, flagyl, but no source control unlikely to heal  # anemia: trend CBC, transfuse prn  family rescinded dnr, now full code, poor prognosis for meaningful recovery, poor candidate for NGT or enteral tube feed, f/u family and palliative  care re: [facility]

## 2024-01-28 NOTE — PROGRESS NOTE ADULT - PROBLEM SELECTOR PLAN 3
- Na 163 on admission  - FWD of 1.3L on admission    Plan:  - c/w IVF  - BMP, trend Na  - Goal correction 6-8 mEq/L/day until WNL

## 2024-01-28 NOTE — PROGRESS NOTE ADULT - PROBLEM SELECTOR PLAN 1
- L foot xray:Dorsal forefoot soft tissue swelling. Questionable focal slightly thinned   indistinct appearing calcaneal cortex on left foot frontal/oblique view   raising concern for possible osteomyelitis, MRI would be helpful to   better assess. No tracking gas collections or additional suspected areas   of osteomyelitis. No fractures or dislocations.  - Recently Tx in Dec 23' for L foot wound growing S. Aureas and E. Coli, amputation L foot offered and deferred at the time  - previously unable to tolerate MRI L ankle when attempted in recent hospitalization  - s/p Cefepime    Plan:  - ID following, appreciate recs  - c/w CTX, Doxycycline, Metronidazole

## 2024-01-28 NOTE — PROVIDER CONTACT NOTE (OTHER) - ASSESSMENT
Patients blood pressure 144/57 heart rate 65. Patient is nonverbal, is lethargic so will hold senna.

## 2024-01-28 NOTE — PROGRESS NOTE ADULT - SUBJECTIVE AND OBJECTIVE BOX
************************  Abhay Michelle MD  Internal Medicine PGY-1  ************************    Patient is a 89y old  Female who presents with a chief complaint of Abnormal labs (27 Jan 2024 07:28)    Overnight no events. Patient AOx0, unable to complete ROS.    MEDICATIONS  (STANDING):  cefTRIAXone   IVPB 1000 milliGRAM(s) IV Intermittent every 24 hours  Dakins Solution - 1/4 Strength 1 Application(s) Topical daily  dextrose 5% + lactated ringers. 1000 milliLiter(s) (75 mL/Hr) IV Continuous <Continuous>  dextrose 5%. 1000 milliLiter(s) (50 mL/Hr) IV Continuous <Continuous>  dextrose 5%. 1000 milliLiter(s) (50 mL/Hr) IV Continuous <Continuous>  dextrose 5%. 1000 milliLiter(s) (100 mL/Hr) IV Continuous <Continuous>  dextrose 5%. 1000 milliLiter(s) (100 mL/Hr) IV Continuous <Continuous>  dextrose 50% Injectable 25 Gram(s) IV Push once  dextrose 50% Injectable 25 Gram(s) IV Push once  dextrose 50% Injectable 12.5 Gram(s) IV Push once  dextrose 50% Injectable 12.5 Gram(s) IV Push once  dextrose 50% Injectable 25 Gram(s) IV Push once  dextrose 50% Injectable 25 Gram(s) IV Push once  doxycycline IVPB 100 milliGRAM(s) IV Intermittent every 12 hours  doxycycline IVPB      glucagon  Injectable 1 milliGRAM(s) IntraMuscular once  glucagon  Injectable 1 milliGRAM(s) IntraMuscular once  heparin   Injectable 5000 Unit(s) SubCutaneous every 12 hours  insulin lispro (ADMELOG) corrective regimen sliding scale   SubCutaneous every 6 hours  metroNIDAZOLE  IVPB 500 milliGRAM(s) IV Intermittent every 12 hours  pantoprazole  Injectable 40 milliGRAM(s) IV Push daily  polyethylene glycol 3350 17 Gram(s) Oral daily  senna 2 Tablet(s) Oral at bedtime    MEDICATIONS  (PRN):  dextrose Oral Gel 15 Gram(s) Oral once PRN Blood Glucose LESS THAN 70 milliGRAM(s)/deciliter  dextrose Oral Gel 15 Gram(s) Oral once PRN Blood Glucose LESS THAN 70 milliGRAM(s)/deciliter  HYDROmorphone  Injectable 0.25 milliGRAM(s) IV Push every 6 hours PRN Severe Pain (7 - 10)      CAPILLARY BLOOD GLUCOSE      POCT Blood Glucose.: 141 mg/dL (28 Jan 2024 06:07)  POCT Blood Glucose.: 131 mg/dL (28 Jan 2024 00:22)  POCT Blood Glucose.: 211 mg/dL (27 Jan 2024 17:54)  POCT Blood Glucose.: 289 mg/dL (27 Jan 2024 11:56)  POCT Blood Glucose.: 289 mg/dL (27 Jan 2024 07:27)    I&O's Summary    27 Jan 2024 07:01  -  28 Jan 2024 07:00  --------------------------------------------------------  IN: 2920 mL / OUT: 600 mL / NET: 2320 mL        PHYSICAL EXAM:  Vital Signs Last 24 Hrs  T(C): 36.7 (28 Jan 2024 04:28), Max: 36.8 (27 Jan 2024 21:30)  T(F): 98 (28 Jan 2024 04:28), Max: 98.3 (27 Jan 2024 21:30)  HR: 93 (28 Jan 2024 04:28) (90 - 93)  BP: 113/54 (28 Jan 2024 04:28) (113/54 - 133/50)  BP(mean): --  RR: 20 (28 Jan 2024 04:28) (18 - 20)  SpO2: 99% (28 Jan 2024 04:28) (99% - 100%)    Parameters below as of 28 Jan 2024 04:28  Patient On (Oxygen Delivery Method): nasal cannula  O2 Flow (L/min): 2      PHYSICAL EXAM:  GENERAL: Cachectic, lethargic  HEENT: NC/AT  NECK: Supple, No JVD  CHEST/LUNG: CTAB, no increased WOB  HEART: RRR, no m/r/g, +2 pulses bilaterally  ABDOMEN: soft, non-tender, non-distended, BS+  EXTREMITIES:  2+ peripheral pulses, no clubbing, no edema  NERVOUS SYSTEM:  A&Ox0  SKIN: L foot mottled w/ purple, green discoloration, visible wound on dorsum, heel covered with bandage    LABS:                        6.5    12.58 )-----------( 128      ( 27 Jan 2024 20:30 )             21.0     01-28    141  |  114<H>  |  65<H>  ----------------------------<  119<H>  4.0   |  14<L>  |  1.91<H>    Ca    7.8<L>      28 Jan 2024 00:30  Phos  2.6     01-28  Mg     1.60     01-28    TPro  6.1  /  Alb  2.2<L>  /  TBili  0.4  /  DBili  x   /  AST  30  /  ALT  17  /  AlkPhos  106  01-26          Urinalysis Basic - ( 28 Jan 2024 00:30 )    Color: x / Appearance: x / SG: x / pH: x  Gluc: 119 mg/dL / Ketone: x  / Bili: x / Urobili: x   Blood: x / Protein: x / Nitrite: x   Leuk Esterase: x / RBC: x / WBC x   Sq Epi: x / Non Sq Epi: x / Bacteria: x

## 2024-01-29 NOTE — PROVIDER CONTACT NOTE (CRITICAL VALUE NOTIFICATION) - NAME OF MD/NP/PA/DO NOTIFIED:
[FreeTextEntry1] : Yareli Fuentes presents for evaluation of hearing. She has known hearing loss and wears hearing aids bilaterally. Audiogram today shows to moderate to moderately severe bilateral sensorineural hearing loss. Will place referral for hearing aid evaluation for new hearing aids. Will also attempt to obtain previous outside audiogram.\par \par - Follow up in 1 year with repeat audiogram.
Abdullahi Leary
Shailesh Pineda
Abhay Michelle
MD Miguel Angel Blank notified
MD Chester House

## 2024-01-29 NOTE — PROGRESS NOTE ADULT - TIME BILLING
Review of laboratory data, radiology results, consultants' recommendations, documentation in [place], discussion with patient/advanced care providers and interdisciplinary staff (such as , social workers, etc). Interventions were performed as documented above.

## 2024-01-29 NOTE — PROVIDER CONTACT NOTE (OTHER) - ASSESSMENT
Patients blood pressure 135/52 heart rate 108. Patient in no acute distress. Nursing care to continue

## 2024-01-29 NOTE — PROGRESS NOTE ADULT - ATTENDING COMMENTS
89 y/o F with PMH HTN, HLD, CAD, DM, dementia s/p spinal stimulator placement who presented from [facility] for hypernatremia and concern for foot infection.    #Sepsis, pt. met SIRS criteria + source of known foot infection. Sepsis now resolved. UA negative. RVP negative. BCx pending. Recently treated for L foot wound growing E. coli and S. aureus. Vascular offering definitive management with amputation but family declines. Local wound care. ID consulted for antibiotic recs - changed antibiotics to doxycycline, ceftriaxone and flagyl. Continue for now. Ongoing GOC conversations.     #L lateral malleolar pressure wound, vascular consulted. Rec amputation but family declines.     #Hypernatremia, Na 160 2/2 dehydration in the setting of poor PO intake. Pt. HD stable however pt. is intravascularly depleted (pt. has significant pre-renal SAE). Received fluid resuscitation and Na is now improved.     #Acute renal failure,  and Cr 2.76. Elevated from baseline on prior admission. Likely pre-renal in the setting of dehydration. Improved with fluid bolus. Repeat 1L LR fluid bolus today.     #Dementia, pt. A&Ox0 - nonverbal. Poor PO intake. Severe protein calorie malnutrition. Currently NPO. Failed S&S eval. Will need to have GOC convo with family regarding nutrition status. If family would like to proceed with nutrition/possible PEG - will place NGT.     #Hypoxia, pt requiring 4L NC. CXR clear. Possibly atelectasis. Pt. is bedbound - concern for PE however not tachy. Will obtain LE dopplers - negative. Continue to wean O2.     #GOC, pt with prior MOLST - DNR/DNI but as per vascular yesterday this was rescinded. Palliative care consulted for ongoing GOC conversations.     Remainder of plan as stated above. Plan discussed with HS. 89 y/o F with PMH HTN, HLD, CAD, DM, dementia s/p spinal stimulator placement who presented from [facility] for hypernatremia and concern for foot infection.    #Sepsis, pt. met SIRS criteria + source of known foot infection. Sepsis now resolved. UA negative. RVP negative. BCx NGTD. Recently treated for L foot wound growing E. coli and S. aureus. Vascular offering definitive management with amputation but family declines. Local wound care. ID consulted for antibiotic recs - changed antibiotics to doxycycline, ceftriaxone and flagyl. Continue for now. Ongoing GOC conversations.     #L lateral malleolar pressure wound, vascular consulted. Rec amputation but family declines.     #Hypernatremia, Na 160 2/2 dehydration in the setting of poor PO intake. Pt. HD stable however pt. is intravascularly depleted (pt. has significant pre-renal SAE). Received fluid resuscitation and Na is now improved.     #Acute renal failure,  and Cr 2.76. Elevated from baseline on prior admission. Pre-renal in the setting of poor PO intake/dehydration. Now improved s/p fluid resuscitation.     #Dementia, pt. A&Ox0 - nonverbal. Poor PO intake. Severe protein calorie malnutrition. Currently NPO. Failed S&S eval. Ongoing GOC conversation. Poor prognosis overall. Not a good candidate for PEG tube.     #GOC, pt with prior MOLST - DNR/DNI but was rescinded on this hospitalization. Palliative consulted. Ongoing GOC conversations. Awaiting HCP to bring siblings in to see their mom to decide on GOC.     Remainder of plan as stated above. Plan discussed with HS.

## 2024-01-29 NOTE — PROVIDER CONTACT NOTE (OTHER) - ASSESSMENT
Patients blood pressure 138/53 heart rate 96. Patient in no acute distress. Also, holding senna as patient unable to tolerate medication at this time

## 2024-01-29 NOTE — PROGRESS NOTE ADULT - PROBLEM SELECTOR PLAN 2
X-ray (1/24): Dorsal forefoot soft tissue swelling. Questionable focal slightly thinned indistinct appearing calcaneal cortex on left foot frontal/oblique view raising concern for possible osteomyelitis, MRI would be helpful to better assess. No tracking gas collections or additional suspected areas of osteomyelitis. No fractures or dislocations. 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 Tarsometatarsal alignment maintained without evidence for a Lisfranc injury. Small calcaneal enthesophytes. Generalized osteopenia otherwise no discrete lytic or blastic lesions.  > Appreciate vascular recs - family declined surgery   > Patient on IV abx.  > appreciate ID recs  > Pt is being transfused 1u PRBCs

## 2024-01-29 NOTE — PROVIDER CONTACT NOTE (CRITICAL VALUE NOTIFICATION) - ACTION/TREATMENT ORDERED:
No other intervention at this time
Awaiting for order to transfuse patient.
pending further orders at this time
Provider aware.
No interventions at this time. MD waiting for consent from family for blood transfusion.

## 2024-01-29 NOTE — PROGRESS NOTE ADULT - PROBLEM SELECTOR PLAN 7
Thank you for allowing us to participate in your patient's care. We will continue to follow with you. Please page [phone] for any q's or c's. The Geriatric and Palliative Medicine service has coverage 24 hours a day/ 7 days a week to provide medical recommendations regarding symptom management needs via telephone.    Brianna Chang D.O.   Palliative Medicine.

## 2024-01-29 NOTE — PROGRESS NOTE ADULT - ASSESSMENT
89 year old female PMH of dementia, HTN, HLD, CAD and DM transferred from rehab to [facility] for electrolyte abnormalities. Palliative consulted for complex decision making regarding goc in setting of advanced illness.

## 2024-01-29 NOTE — PROVIDER CONTACT NOTE (CRITICAL VALUE NOTIFICATION) - BACKGROUND
Patient admitted with SAE and sepsis.
sepsis
sepsis
Pt admitted for hyperosmmolaity with hypernatremia. PMH dementia, angina, DM.

## 2024-01-29 NOTE — PROVIDER CONTACT NOTE (OTHER) - ASSESSMENT
Patient pending 1 unit of packed red blood cells from 1/27/24. Pending consent for patient to get 1 unit of PRBc transfusion. Patient's hemoglobin 7.0 now.

## 2024-01-29 NOTE — PROVIDER CONTACT NOTE (OTHER) - ACTION/TREATMENT ORDERED:
MD Leary made aware, no intervention ordered at this time. MD Leary to let primary team know tomorrow. Nursing care to continue

## 2024-01-29 NOTE — PROGRESS NOTE ADULT - ASSESSMENT
89-yo F w/ PMH of dementia, CAD, DM, s/p spinal stimulator, and recent admission at [facility] (12/15/23-1/2/24), sent in for abnormal labs. Recently hospitalized at [facility] w/ generalized weakness and frequent falls. Seen by ID there for infected L ankle wound. Amputation was discussed, however not pursued. Patient was discharged to [facility], pending hospice.    #L foot OM  #Leukocytosis  #Elevated ESR and CRP  #Polymicrobial infection  #Dementia  #FTT in adult  #Debility  #SAE  #Electrolyte abnormality  - Recent hospitalization for frequent falls, found to have L foot wound. Polymicrobial on culture, growing E coli, MSSA, and Finegoldia.  - Now presenting with significant electrolyte derangements i/s/o SAE.  - On exam, L foot was malodorous and w/ escharous changes. +Bleeding also noted.  - Clinical OM (bone probing, elevated ESR/CRP, leukocytosis). Amputation was offered, however not pursued. Severely malnourished and deconditioned patient with dismal chance of meaningful recovery with medical treatment only. Medical treatment for a "curative" intention (e.g. long-term IV) may not be in line with patient's GOC (e.g. hospice care)  - Need to define a realistic GOC first. Note that antibiotic treatment will not likely to provide a cure to patient's acute on chronic wound in a bedbound patient w/ chronically contracted posture. Toxicity from antibiotic along with the patient's advanced age and poor renal function places her at high risk of adverse outcomes. Long-term antibiotic treatment appears futile and will not likely to increase patient's quality of life.  - Bed head elevation. Aspiration precautions. Optimize nutrition. Frequent turning and wound care.  - Continue with ceftriaxone 1g IV Q24H, doxycycline 100 mg IV Q12H, and metronidazole 500 mg IV Q12H. For oral conversion, a combination of doxycycline w/ moxifloxacin can be considered, if QTc is not prolonged. Note that fluoroquinolones (moxifloxacin) can result in adverse outcomes in elderly patients.    Plan discussed with primary team house staff.  Thank you for this consult. Inpatient ID team will follow.    Kareem Kelley MD, PhD  Attending Physician  Division of Infectious Diseases  Department of Medicine    Please contact through MS Teams message.  Office: [phone] (after 5 PM or weekend)

## 2024-01-29 NOTE — PROVIDER CONTACT NOTE (CRITICAL VALUE NOTIFICATION) - RECOMMENDATIONS
continue to monitor for bleeding
MD notified and aware.
Provider aware.
continue to monitor for bleeding

## 2024-01-29 NOTE — PROGRESS NOTE ADULT - SUBJECTIVE AND OBJECTIVE BOX
[facility] and Palliative Care  Brianna Chang Palliative Care Attending  Contact Info: Page [phone] (including Nights/Weekends), message on Microsoft Teams (Brianna Chang), or leave  at Palliative Office [phone] (non-urgent)   Date of [id number] @ 12:52    SUBJECTIVE AND OBJECTIVE: Patient seen this AM lying in bed, unchanged mental status, non-verbal.     Indication for Geriatrics and Palliative Care Services/INTERVAL HPI: goc in setting of advanced illness     OVERNIGHT EVENTS:  > 1/29: Hgb dropping. No PRNs required.     DNR on chart:  Allergies    Pineapple (Unknown)  contrast dye -  rash (Other)  iodine (Other)  codeine (Vomiting)  penicillin (Rash)  latex (Rash)    Intolerances    Tolerates ceftriaxone, cefepime (Other)  MEDICATIONS  (STANDING):  cefTRIAXone   IVPB 1000 milliGRAM(s) IV Intermittent every 24 hours  Dakins Solution - 1/4 Strength 1 Application(s) Topical daily  dextrose 5% + lactated ringers. 1000 milliLiter(s) (75 mL/Hr) IV Continuous <Continuous>  dextrose 5%. 1000 milliLiter(s) (50 mL/Hr) IV Continuous <Continuous>  dextrose 5%. 1000 milliLiter(s) (100 mL/Hr) IV Continuous <Continuous>  dextrose 5%. 1000 milliLiter(s) (100 mL/Hr) IV Continuous <Continuous>  dextrose 5%. 1000 milliLiter(s) (50 mL/Hr) IV Continuous <Continuous>  dextrose 50% Injectable 12.5 Gram(s) IV Push once  dextrose 50% Injectable 25 Gram(s) IV Push once  dextrose 50% Injectable 25 Gram(s) IV Push once  dextrose 50% Injectable 12.5 Gram(s) IV Push once  dextrose 50% Injectable 25 Gram(s) IV Push once  dextrose 50% Injectable 25 Gram(s) IV Push once  doxycycline IVPB      doxycycline IVPB 100 milliGRAM(s) IV Intermittent every 12 hours  glucagon  Injectable 1 milliGRAM(s) IntraMuscular once  glucagon  Injectable 1 milliGRAM(s) IntraMuscular once  heparin   Injectable 5000 Unit(s) SubCutaneous every 12 hours  insulin lispro (ADMELOG) corrective regimen sliding scale   SubCutaneous every 6 hours  metroNIDAZOLE  IVPB 500 milliGRAM(s) IV Intermittent every 12 hours  pantoprazole  Injectable 40 milliGRAM(s) IV Push daily  polyethylene glycol 3350 17 Gram(s) Oral daily  senna 2 Tablet(s) Oral at bedtime    MEDICATIONS  (PRN):  dextrose Oral Gel 15 Gram(s) Oral once PRN Blood Glucose LESS THAN 70 milliGRAM(s)/deciliter  dextrose Oral Gel 15 Gram(s) Oral once PRN Blood Glucose LESS THAN 70 milliGRAM(s)/deciliter  HYDROmorphone  Injectable 0.25 milliGRAM(s) IV Push every 6 hours PRN Severe Pain (7 - 10)      ITEMS UNCHECKED ARE NOT PRESENT    PRESENT SYMPTOMS: [ x]Unable to self-report - see [ ] CPOT [x ] PAINADS [x ] RDOS  Source if other than patient:  [ ]Family   [ ]Team     Pain:  [ ]yes [ ]no  QOL impact -   Location -                    Aggravating factors -  Quality -  Radiation -  Timing-  Severity (0-10 scale):  Minimal acceptable level/ pain goal (0-10 scale):     CPOT:    https://www.[facility].org/getattachment/[email or web address]/Critical-Care-Pain-Observation-Tool-(CPOT)    Dyspnea:                           [ ]Mild [ ]Moderate [ ]Severe  Anxiety:                             [ ]Mild [ ]Moderate [ ]Severe  Fatigue:                             [ ]Mild [ ]Moderate [ ]Severe  Nausea:                             [ ]Mild [ ]Moderate [ ]Severe  Loss of appetite:              [ ]Mild [ ]Moderate [ ]Severe  Constipation:                    [ ]Mild [ ]Moderate [ ]Severe  Other Symptoms:  [ ]All other review of systems negative     PCSSQ[Palliative Care Spiritual Screening Question]   Severity (0-10):  Score of 4 or > indicate consideration of Chaplaincy referral.  Chaplaincy Referral: [ ] yes [ ] refused [ ] following [ x] deferred    Caregiver [place]? : [ ] yes [ ] no [x ] Deferred [ ] Declined             Social work referral [ ] Patient & Family Centered Care Referral [ ]  Anticipatory Grief present?:  [ ] yes [ ] no  [x ] Deferred                  Social work referral [ ] Patient & Family Centered Care Referral [ ]      PHYSICAL EXAM:  Vital Signs Last 24 Hrs  T(C): 36.8 (29 Jan 2024 12:34), Max: 37.3 (29 Jan 2024 04:45)  T(F): 98.3 (29 Jan 2024 12:34), Max: 99.1 (29 Jan 2024 04:45)  HR: 96 (29 Jan 2024 12:34) (65 - 96)  BP: 138/53 (29 Jan 2024 12:34) (128/57 - 144/57)  BP(mean): --  RR: 18 (29 Jan 2024 12:34) (18 - 18)  SpO2: 99% (29 Jan 2024 12:34) (98% - 100%)    Parameters below as of 29 Jan 2024 12:34  Patient On (Oxygen Delivery Method): nasal cannula  O2 Flow (L/min): 2   I&O's Summary    28 Jan 2024 07:01  -  29 Jan 2024 07:00  --------------------------------------------------------  IN: 200 mL / OUT: 850 mL / NET: -650 mL       GENERAL: [x ]Cachexia  eyes open but pt not responsive   [ ]Alert  [ ]Oriented x   [ ]Lethargic  [x ]Unarousable  [ ]Verbal  [x ]Non-Verbal  Behavioral:   [ ] Anxiety  [ ] Delirium [ ] Agitation [x ] Other  HEENT:  [ ]Normal   [ x]Dry mouth   [ ]ET Tube/Trach  [ ]Oral lesions  PULMONARY:   [ ]Clear [ ]Tachypnea  [ ]Audible excessive secretions   [ ]Rhonchi        [ ]Right [ ]Left [ ]Bilateral  [ ]Crackles        [ ]Right [ ]Left [ ]Bilateral  [ ]Wheezing     [ ]Right [ ]Left [ ]Bilateral  [x ]Diminished breath sounds [ ]right [ ]left [ x]bilateral  CARDIOVASCULAR:    [x ]Regular [ ]Irregular [ ]Tachy  [ ]Jack [ ]Murmur [ ]Other  GASTROINTESTINAL:  [ x]Soft  [ ]Distended   [ ]+BS  [ ]Non tender [ ]Tender  [ ]Other [ ]PEG [ ]OGT/ NGT  Last BM: fecal incontinence   GENITOURINARY:  [ ]Normal [x ] Incontinent   [ ]Oliguria/Anuria   [ ][place]  MUSCULOSKELETAL:   [ ]Normal   [ ]Weakness  [x ]Bed/Wheelchair bound [ ]Edema  NEUROLOGIC:   [ ]No focal deficits  [x ]Cognitive impairment  [ ]Dysphagia [ ]Dysarthria [ ]Paresis [ ]Other   SKIN: Please see flowsheets   [ ]Normal  [ ]Rash  [x ]Other- multiple wounds on her body   [ ]Pressure ulcer(s)       Present on admission [ ]y [ ]n      CRITICAL CARE:  [ ]Shock Present  [ ]Septic [ ]Cardiogenic [ ]Neurologic [ ]Hypovolemic  [ ]Vasopressors [ ]Inotropes  [ ]Respiratory failure present [ ]Mechanical Ventilation [ ]Non-invasive ventilatory support [ ]High-Flow   [ ]Acute  [ ]Chronic [ ]Hypoxic  [ ]Hypercarbic [ ]Other  [ ]Other organ failure     LABS:                        6.5    15.82 )-----------( 146      ( 29 Jan 2024 06:30 )             19.6   01-29    142  |  113<H>  |  58<H>  ----------------------------<  119<H>  4.3   |  14<L>  |  1.95<H>    Ca    8.1<L>      29 Jan 2024 06:30  Phos  3.5     01-29  Mg     1.90     01-29    TPro  5.8<L>  /  Alb  2.1<L>  /  TBili  0.3  /  DBili  x   /  AST  15  /  ALT  11  /  AlkPhos  104  01-28      Urinalysis Basic - ( 29 Jan 2024 06:30 )    Color: x / Appearance: x / SG: x / pH: x  Gluc: 119 mg/dL / Ketone: x  / Bili: x / Urobili: x   Blood: x / Protein: x / Nitrite: x   Leuk Esterase: x / RBC: x / WBC x   Sq Epi: x / Non Sq Epi: x / Bacteria: x      RADIOLOGY & ADDITIONAL STUDIES: no new     Protein Calorie Malnutrition Present: [ ]mild [ ]moderate [ ]severe [ ]underweight [ ]morbid obesity  https://www.andeal.org/vault/2440/web/files/ONC/Table_Clinical%20Characteristics%20to%20Document%20Malnutrition-White%20JV%20et%20al%202012.pdf    Height (cm): 149.9 (01-24-24 @ 10:06), 149.9 (12-15-23 @ 09:00), 149.9 (10-30-23 @ 16:08)  Weight (kg): 40.4 (01-25-24 @ 15:55), 54.4 (12-15-23 @ 09:00), 59 (10-30-23 @ 16:08)  BMI (kg/m2): 18 (01-25-24 @ 15:55), 24.2 (01-24-24 @ 10:06), 24.2 (12-15-23 @ 09:00)    [ x]PPSV2 < or = 30%  [ ]significant weight loss [ ]poor nutritional intake [ ]anasarca[ ]Artificial Nutrition    Other REFERRALS:  [ ]Hospice  [ ]Child Life  [ ]Social Work  [ ]Case management [ ]Holistic Therapy

## 2024-01-29 NOTE — CHART NOTE - NSCHARTNOTEFT_GEN_A_CORE
Paged by Day RN Regarding HgB of 6.5 from 1/29 AM labs    Chart reviewed.   In summary this is a 89 y/o female with h/o HTN, HLD, CAD, DM, dementia, s/p spinal stimulator who presented with several electrolyte derangements found to have sepsis likely secondary to gangrenous foot. Pt is noted to have been evaluated by vascular and was offered amputation however family declined invasive procedure.     Pt noted on admission to have Hgb of 9.3 am labs on 1/29 noted to have hgb of 6.5 though last three days patient has been between 6.5-7.0. Per primary team [facility] was leaning towards transfusions as needed but no consent obtained yet.    Iron studies from 12/16/2023  Iron total 45  Unsaturated binding capacity 166  Iron-total binding capacity 211  %Saturation 22  Ferritin  365    Pt seen and examined bedside. Pt appeared to be sleeping. Minimally responsive to verbal/physical stimuli. Brief physical exam not revealing of any acute signs of blood loss.    Of note orders notable for IVF D5LR 75cc/hr, I&O noting 400cc per last 12hrs with additional 150cc in canister at bedside. Minimal output for documented weight is 480cc/24hrs.    Active type and screen on file (last 1/28/2024)    DDX: Given iron studies and radiology findings patient likely has component of myelosuppression iso of uncontrolled septic source plus poor micronutrients given dementia/NPO status.     Interventions:  [ ] Consent obtained with [facility] Burnham via phone 9:34pm, explained alternatives and clinical indications of blood transfusions, risks and benefits including but limited to blood borne infections (HIV/Hepatitis), transfusion reactions such as hemolytic and non hemolytic reactions, TACO/TRALI. Questions answered to their apparent satisfaction.    [ ] 1 unit prbcs transfusion ordered, post transfusion cbc to be check as AM labs    [ ] Recommend to day team bladder scans given inconsistent I&O and large volume fluids resuscitation Paged by Day RN Regarding HgB of 6.5 from 1/29 AM labs    Chart reviewed.   In summary this is a 89 y/o female with h/o HTN, HLD, CAD, DM, dementia, s/p spinal stimulator who presented with several electrolyte derangements found to have sepsis likely secondary to gangrenous foot. Pt is noted to have been evaluated by vascular and was offered amputation however family declined invasive procedure.     Pt noted on admission to have Hgb of 9.3 am labs on 1/29 noted to have hgb of 6.5 though last three days patient has been between 6.5-7.0. Per primary team [facility] was leaning towards transfusions as needed but no consent obtained yet.    Iron studies from 12/16/2023  Iron total 45  Unsaturated binding capacity 166  Iron-total binding capacity 211  %Saturation 22  Ferritin  365    Pt seen and examined bedside. Pt appeared to be sleeping. Minimally responsive to verbal/physical stimuli. Brief physical exam not revealing of any acute signs of blood loss.    Of note orders notable for IVF D5LR 75cc/hr, I&O noting 400cc per last 12hrs with additional 150cc in canister at bedside. Minimal output for documented weight is 480cc/24hrs.    Active type and screen on file (last 1/28/2024)    DDX: Given iron studies and radiology findings patient likely has component of myelosuppression iso of uncontrolled septic source plus poor micronutrients given dementia/NPO status.     Interventions:  [ ] Consent obtained with [facility] Burnham via phone 9:34pm, explained alternatives and clinical indications of blood transfusions, risks and benefits including but limited to blood borne infections (HIV/Hepatitis), transfusion reactions such as hemolytic and non hemolytic reactions, TACO/TRALI. Questions answered to their apparent satisfaction.    [ ] 1 unit prbcs transfusion ordered, post transfusion cbc to be checked as AM labs    [ ] Recommend to day team bladder scans given inconsistent I&O and large volume fluid resuscitation

## 2024-01-29 NOTE — PROVIDER CONTACT NOTE (CRITICAL VALUE NOTIFICATION) - PERSON GIVING RESULT:
DANIEL Lewis, Lab
LAB
CLAUDINE Mcconnell  Hematology Lab
ROYAL Sanches   Hematology Lab
LAB, ADILENE Arthur

## 2024-01-29 NOTE — PROVIDER CONTACT NOTE (CRITICAL VALUE NOTIFICATION) - ASSESSMENT
Pt A/ox0. non verbal. No signs of distress noted at this time.
blood work
Patient is A&Ox0 and nonverbal. No complaints at this time.
Pt is awake ,non verbal.

## 2024-01-29 NOTE — PROGRESS NOTE ADULT - SUBJECTIVE AND OBJECTIVE BOX
Follow Up:    Foot ulcer    Interval History/ROS:  VSS. Patient was seen and examined at bedside. Nonverbal. Unable to assess ROS.    Allergies  Pineapple (Unknown)  contrast dye -  rash (Other)  iodine (Other)  codeine (Vomiting)  penicillin (Rash)  latex (Rash)        ANTIMICROBIALS:  cefTRIAXone   IVPB 1000 every 24 hours  doxycycline IVPB 100 every 12 hours  doxycycline IVPB    metroNIDAZOLE  IVPB 500 every 12 hours      OTHER MEDS:  MEDICATIONS  (STANDING):  dextrose 50% Injectable 25 once  dextrose 50% Injectable 25 once  dextrose 50% Injectable 12.5 once  dextrose 50% Injectable 12.5 once  dextrose 50% Injectable 25 once  dextrose 50% Injectable 25 once  dextrose Oral Gel 15 once PRN  dextrose Oral Gel 15 once PRN  glucagon  Injectable 1 once  glucagon  Injectable 1 once  heparin   Injectable 5000 every 12 hours  HYDROmorphone  Injectable 0.25 every 6 hours PRN  insulin lispro (ADMELOG) corrective regimen sliding scale  every 6 hours  pantoprazole  Injectable 40 daily  polyethylene glycol 3350 17 daily  senna 2 at bedtime      Vital Signs Last 24 Hrs  T(C): 36.8 (29 Jan 2024 12:34), Max: 37.3 (29 Jan 2024 04:45)  T(F): 98.3 (29 Jan 2024 12:34), Max: 99.1 (29 Jan 2024 04:45)  HR: 96 (29 Jan 2024 12:34) (65 - 96)  BP: 138/53 (29 Jan 2024 12:34) (128/57 - 144/57)  BP(mean): --  RR: 18 (29 Jan 2024 12:34) (18 - 18)  SpO2: 99% (29 Jan 2024 12:34) (98% - 100%)    Parameters below as of 29 Jan 2024 12:34  Patient On (Oxygen Delivery Method): nasal cannula  O2 Flow (L/min): 2      PHYSICAL EXAM:  Constitutional: chronically-ill appearing cachectic elderly female, contracted posture  Cardiovascular:   normal S1, S2, no murmur, RRR  Respiratory:  clear BS bilaterally, no wheezes, no rales  Musculoskeletal:  no BLE edema  Neurologic: awake, unresponsive  Skin:  L heel w/ malodorous escharous changes                            6.5    15.82 )-----------( 146      ( 29 Jan 2024 06:30 )             19.6       01-29    142  |  113<H>  |  58<H>  ----------------------------<  119<H>  4.3   |  14<L>  |  1.95<H>    Ca    8.1<L>      29 Jan 2024 06:30  Phos  3.5     01-29  Mg     1.90     01-29    TPro  5.8<L>  /  Alb  2.1<L>  /  TBili  0.3  /  DBili  x   /  AST  15  /  ALT  11  /  AlkPhos  104  01-28      Urinalysis Basic - ( 29 Jan 2024 06:30 )    Color: x / Appearance: x / SG: x / pH: x  Gluc: 119 mg/dL / Ketone: x  / Bili: x / Urobili: x   Blood: x / Protein: x / Nitrite: x   Leuk Esterase: x / RBC: x / WBC x   Sq Epi: x / Non Sq Epi: x / Bacteria: x        MICROBIOLOGY:  v  Clean Catch Clean Catch (Midstream)  01-24-24   <10,000 CFU/mL Normal Urogenital Nyla  --  --      .Blood Blood-Peripheral  01-24-24   No growth at 4 days  --  --      .Blood Blood-Peripheral  01-24-24   No growth at 4 days  --  --          Rapid RVP Result: NotDetec (01-24 @ 11:26)        RADIOLOGY:  Imaging below independently reviewed.  < from: Xray Ankle Complete 3 Views, Left (01.24.24 @ 14:10) >    ACC: [id number] EXAM:  XR FOOT 2 VIEWS LT   ORDERED BY: ALICIA HUNT     ACC: [id number] EXAM:  XR ANKLE COMP MIN 3 VIEWS LT   ORDERED BY: ALICIA HUNT     PROCEDURE DATE:  01/24/2024          INTERPRETATION:  CLINICAL INDICATION: left heel ulcer    EXAM:  Frontal and lateral left ankle and foot from 1/24/2024 at 1409. Compared   to prior study from 12/15/2023.    IMPRESSION:  Dorsal forefoot soft tissue swelling. Questionable focal slightly thinned   indistinct appearing calcaneal cortex on left foot frontal/oblique view   raising concern for possible osteomyelitis, MRI would be helpful to   better assess. No tracking gas collections or additional suspected areas   of osteomyelitis. No fractures or dislocations.    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 Tarsometatarsal   alignment maintained without evidence for a Lisfranc injury.    Small calcaneal enthesophytes.    Generalized osteopenia otherwise no discrete lytic or blastic lesions.    --- End of Report ---            WILY HENDRICKSON MD; Attending Radiologist  This document has been electronically signed. Jan 24 2024  3:08PM    < end of copied text >

## 2024-01-29 NOTE — PROVIDER CONTACT NOTE (OTHER) - ASSESSMENT
Patients blood pressure 128/57 heart rate 90. Patient in no acute distress. Nursing care to continue

## 2024-01-29 NOTE — CHART NOTE - NSCHARTNOTEFT_GEN_A_CORE
Source: Patient [ ]    Family [ ]     other [ x] chart review    Patient seen for severe malnutrition f/u. 89 year old female with a PMH of HTN, HLD, CAD, DM, dementia p/w abnormal labs per chart.    Patient continues to be NPO pending nutritional GOC conversation per chart. No GI distress noted. Last bowel movement (1/28) per RN flow sheet. Noted w/ +1 L/R arm edema per RN flow sheet. Per wound care (1/26) noted w/ multiple pressure injuries.     Diet : Diet, NPO:   Except Medications (01-24-24 @ 19:11)    Current Weight: Weight (kg): no new weight to assess  40.4 kg (1/25)    Pertinent Medications: MEDICATIONS  (STANDING):  cefTRIAXone   IVPB 1000 milliGRAM(s) IV Intermittent every 24 hours  Dakins Solution - 1/4 Strength 1 Application(s) Topical daily  dextrose 5% + lactated ringers. 1000 milliLiter(s) (75 mL/Hr) IV Continuous <Continuous>  dextrose 5%. 1000 milliLiter(s) (50 mL/Hr) IV Continuous <Continuous>  dextrose 5%. 1000 milliLiter(s) (50 mL/Hr) IV Continuous <Continuous>  dextrose 5%. 1000 milliLiter(s) (100 mL/Hr) IV Continuous <Continuous>  dextrose 5%. 1000 milliLiter(s) (100 mL/Hr) IV Continuous <Continuous>  dextrose 50% Injectable 25 Gram(s) IV Push once  dextrose 50% Injectable 25 Gram(s) IV Push once  dextrose 50% Injectable 12.5 Gram(s) IV Push once  dextrose 50% Injectable 12.5 Gram(s) IV Push once  dextrose 50% Injectable 25 Gram(s) IV Push once  dextrose 50% Injectable 25 Gram(s) IV Push once  doxycycline IVPB      doxycycline IVPB 100 milliGRAM(s) IV Intermittent every 12 hours  glucagon  Injectable 1 milliGRAM(s) IntraMuscular once  glucagon  Injectable 1 milliGRAM(s) IntraMuscular once  heparin   Injectable 5000 Unit(s) SubCutaneous every 12 hours  insulin lispro (ADMELOG) corrective regimen sliding scale   SubCutaneous every 6 hours  metroNIDAZOLE  IVPB 500 milliGRAM(s) IV Intermittent every 12 hours  pantoprazole  Injectable 40 milliGRAM(s) IV Push daily  polyethylene glycol 3350 17 Gram(s) Oral daily  senna 2 Tablet(s) Oral at bedtime    MEDICATIONS  (PRN):  dextrose Oral Gel 15 Gram(s) Oral once PRN Blood Glucose LESS THAN 70 milliGRAM(s)/deciliter  dextrose Oral Gel 15 Gram(s) Oral once PRN Blood Glucose LESS THAN 70 milliGRAM(s)/deciliter  HYDROmorphone  Injectable 0.25 milliGRAM(s) IV Push every 6 hours PRN Severe Pain (7 - 10)    Pertinent Labs:  01-29 Na142 mmol/L Glu 119 mg/dL<H> K+ 4.3 mmol/L Cr  1.95 mg/dL<H> BUN 58 mg/dL<H> 01-29 Phos 3.5 mg/dL 01-28 Alb 2.1 g/dL<L>    Estimated Needs: [x ] no change since previous assessment    Previous Nutrition Diagnosis: Severe malnutrition    Nutrition Diagnosis is [x ] ongoing  [ ] resolved [ ] not applicable     Education:    [  ] Given today    Type of education provided:    [  ] Given on previous assessment by RD    [  ] Not applicable 2/2 cognitive deficit    [  ] Pt refusal of education offered    [  ] Not applicable 2/2 current prognosis    [x  ] Not warranted at present    Recommend  - defer nutrition POC to team  - nutrition remains available for reconsult based on [facility]    Monitoring and Evaluation:     [x ] Tolerance to diet prescription [x ] weights [x ] follow up per protocol    Magdy Boyd, [id number] or TEAMS

## 2024-01-30 NOTE — PROGRESS NOTE ADULT - SUBJECTIVE AND OBJECTIVE BOX
************************  Abhay Michelle MD  Internal Medicine PGY-1  ************************    Patient is a 89y old  Female who presents with a chief complaint of Abnormal labs    Overnight no events. Patient AOx0, unable to complete ROS.    MEDICATIONS  (STANDING):  cefTRIAXone   IVPB 1000 milliGRAM(s) IV Intermittent every 24 hours  Dakins Solution - 1/4 Strength 1 Application(s) Topical daily  dextrose 5% + lactated ringers. 1000 milliLiter(s) (75 mL/Hr) IV Continuous <Continuous>  dextrose 5%. 1000 milliLiter(s) (50 mL/Hr) IV Continuous <Continuous>  dextrose 5%. 1000 milliLiter(s) (50 mL/Hr) IV Continuous <Continuous>  dextrose 5%. 1000 milliLiter(s) (100 mL/Hr) IV Continuous <Continuous>  dextrose 5%. 1000 milliLiter(s) (100 mL/Hr) IV Continuous <Continuous>  dextrose 50% Injectable 25 Gram(s) IV Push once  dextrose 50% Injectable 25 Gram(s) IV Push once  dextrose 50% Injectable 12.5 Gram(s) IV Push once  dextrose 50% Injectable 12.5 Gram(s) IV Push once  dextrose 50% Injectable 25 Gram(s) IV Push once  dextrose 50% Injectable 25 Gram(s) IV Push once  doxycycline IVPB 100 milliGRAM(s) IV Intermittent every 12 hours  doxycycline IVPB      glucagon  Injectable 1 milliGRAM(s) IntraMuscular once  glucagon  Injectable 1 milliGRAM(s) IntraMuscular once  heparin   Injectable 5000 Unit(s) SubCutaneous every 12 hours  insulin lispro (ADMELOG) corrective regimen sliding scale   SubCutaneous every 6 hours  metroNIDAZOLE  IVPB 500 milliGRAM(s) IV Intermittent every 12 hours  pantoprazole  Injectable 40 milliGRAM(s) IV Push daily  polyethylene glycol 3350 17 Gram(s) Oral daily  senna 2 Tablet(s) Oral at bedtime    MEDICATIONS  (PRN):  dextrose Oral Gel 15 Gram(s) Oral once PRN Blood Glucose LESS THAN 70 milliGRAM(s)/deciliter  dextrose Oral Gel 15 Gram(s) Oral once PRN Blood Glucose LESS THAN 70 milliGRAM(s)/deciliter  HYDROmorphone  Injectable 0.25 milliGRAM(s) IV Push every 6 hours PRN Severe Pain (7 - 10)      CAPILLARY BLOOD GLUCOSE      POCT Blood Glucose.: 141 mg/dL (28 Jan 2024 06:07)  POCT Blood Glucose.: 131 mg/dL (28 Jan 2024 00:22)  POCT Blood Glucose.: 211 mg/dL (27 Jan 2024 17:54)  POCT Blood Glucose.: 289 mg/dL (27 Jan 2024 11:56)  POCT Blood Glucose.: 289 mg/dL (27 Jan 2024 07:27)    I&O's Summary    27 Jan 2024 07:01  -  28 Jan 2024 07:00  --------------------------------------------------------  IN: 2920 mL / OUT: 600 mL / NET: 2320 mL        PHYSICAL EXAM:  Vital Signs Last 24 Hrs  T(C): 36.7 (28 Jan 2024 04:28), Max: 36.8 (27 Jan 2024 21:30)  T(F): 98 (28 Jan 2024 04:28), Max: 98.3 (27 Jan 2024 21:30)  HR: 93 (28 Jan 2024 04:28) (90 - 93)  BP: 113/54 (28 Jan 2024 04:28) (113/54 - 133/50)  BP(mean): --  RR: 20 (28 Jan 2024 04:28) (18 - 20)  SpO2: 99% (28 Jan 2024 04:28) (99% - 100%)    Parameters below as of 28 Jan 2024 04:28  Patient On (Oxygen Delivery Method): nasal cannula  O2 Flow (L/min): 2      PHYSICAL EXAM:  GENERAL: Cachectic, lethargic  HEENT: NC/AT  NECK: Supple, No JVD  CHEST/LUNG: CTAB, no increased WOB  HEART: RRR, no m/r/g, +2 pulses bilaterally  ABDOMEN: soft, non-tender, non-distended, BS+  EXTREMITIES:  2+ peripheral pulses, no clubbing, no edema  NERVOUS SYSTEM:  A&Ox0  SKIN: L foot mottled w/ purple, green discoloration, visible wound on dorsum, heel covered with bandage    LABS:                        6.5    12.58 )-----------( 128      ( 27 Jan 2024 20:30 )             21.0     01-28    141  |  114<H>  |  65<H>  ----------------------------<  119<H>  4.0   |  14<L>  |  1.91<H>    Ca    7.8<L>      28 Jan 2024 00:30  Phos  2.6     01-28  Mg     1.60     01-28    TPro  6.1  /  Alb  2.2<L>  /  TBili  0.4  /  DBili  x   /  AST  30  /  ALT  17  /  AlkPhos  106  01-26          Urinalysis Basic - ( 28 Jan 2024 00:30 )    Color: x / Appearance: x / SG: x / pH: x  Gluc: 119 mg/dL / Ketone: x  / Bili: x / Urobili: x   Blood: x / Protein: x / Nitrite: x   Leuk Esterase: x / RBC: x / WBC x   Sq Epi: x / Non Sq Epi: x / Bacteria: x

## 2024-01-30 NOTE — PROGRESS NOTE ADULT - TIME BILLING
Review of laboratory data, radiology results, consultants' recommendations, documentation in [place], discussion with patient/advanced care providers and interdisciplinary staff (such as , social workers, etc). Interventions were performed as documented above.

## 2024-01-30 NOTE — PROGRESS NOTE ADULT - PROBLEM SELECTOR PLAN 3
- Na 163 on admission  - FWD of 1.3L on admission    Plan:  - c/w IVF  - BMP, trend Na  - Goal correction 6-8 mEq/L/day until WNL - Na 163 on admission  - FWD of 1.3L on admission    Plan:  - RESOLVED  - c/w IVF i/s/o NPO  - CTM BMP, trend Na

## 2024-01-30 NOTE — PROVIDER CONTACT NOTE (OTHER) - ASSESSMENT
Patients blood pressure 147/55 heart rate 82. Patient in no acute distress. Nursing care to continue

## 2024-01-30 NOTE — PROGRESS NOTE ADULT - ATTENDING COMMENTS
89 y/o F with PMH HTN, HLD, CAD, DM, dementia s/p spinal stimulator placement who presented from [facility] for hypernatremia and concern for foot infection.    #Sepsis, pt. met SIRS criteria + source of known foot infection. Sepsis now resolved. UA negative. RVP negative. BCx NGTD. Recently treated for L foot wound growing E. coli and S. aureus. Vascular offering definitive management with amputation but family declines. Local wound care. ID consulted for antibiotic recs - changed antibiotics to doxycycline, ceftriaxone and flagyl. Continue for now. Ongoing GOC conversations.     #Anemia, Hgb ~6 yesterday. No signs of bleeding. Received 1U PRBC. Hgb 8.5 today.     #L lateral malleolar pressure wound, vascular consulted. Rec amputation but family declines.     #Hypernatremia, Na 160 2/2 dehydration in the setting of poor PO intake. Pt. HD stable however pt. is intravascularly depleted (pt. has significant pre-renal SAE). Received fluid resuscitation and Na is now improved.     #Acute renal failure,  and Cr 2.76. Elevated from baseline on prior admission. Pre-renal in the setting of poor PO intake/dehydration. Now improved s/p fluid resuscitation.     #Dementia, pt. A&Ox0 - nonverbal. Poor PO intake. Severe protein calorie malnutrition. Currently NPO. Failed S&S eval. Ongoing GOC conversation. Poor prognosis overall. Not a good candidate for PEG tube.     #GOC, pt with prior MOLST - DNR/DNI but was rescinded on this hospitalization. Palliative consulted. Ongoing GOC conversations. Awaiting HCP to bring siblings in to see their mom to decide on GOC.     Remainder of plan as stated above. Plan discussed with HS.

## 2024-01-31 NOTE — PROGRESS NOTE ADULT - CONVERSATION DETAILS
Palliative care and Medical attending called patient's son, Toby, to follow up on goc discussions. Toby says that family was able to see patient yesterday but there are 2 brothers are still struggling, asking for ongoing medical interventions. We reviewed the trajectory of dementia and discussed the risks associated with NGT feeds. Explained that artificial nutrition would not eliminate risks of aspiration and ultimately would not change long term outcome for patient. Explained our recommendation for DNR/DNI, NO FEEDING TUBE, symptom directed care, stopping blood draws. He stated he will speak to his brothers and asked providers to call him in 2 hours to follow up on this goc discussion. Palliative care and Medical attending called patient's son, Toby, to follow up on goc discussions. Toby says that family was able to see patient yesterday but there are 2 brothers are still struggling, asking for ongoing medical interventions. We reviewed the trajectory of dementia and discussed the risks associated with NGT feeds. Explained that artificial nutrition would not eliminate risks of aspiration and ultimately would not change long term outcome for patient. Explained our recommendation for DNR/DNI, NO FEEDING TUBE, symptom directed care, stopping blood draws. He stated he will speak to his brothers and asked providers to call him in 2 hours to follow up on this goc discussion.    UPDATE: Dr. Chang and Dr. Ruby called patient's hcp, Toby, to follow up on goc discussions and update him regarding patient's rapid a.fib. He states that he is awaiting callback from his brothers, hopefully by 3:30pm. Palliative provider gave palliative office number for him to call us back once he speaks to his siblings. He states that "medical teams should do whatever they can for now". Palliative care and Medical attending called patient's son, Toby, to follow up on goc discussions. Toby says that family was able to see patient yesterday but there are 2 brothers are still struggling, asking for ongoing medical interventions. We reviewed the trajectory of dementia and discussed the risks associated with NGT feeds. Explained that artificial nutrition would not eliminate risks of aspiration and ultimately would not change long term outcome for patient. Explained our recommendation for DNR/DNI, NO FEEDING TUBE, symptom directed care, stopping blood draws. He stated he will speak to his brothers and asked providers to call him in 2 hours to follow up on this goc discussion.    UPDATE: Dr. Chang and Dr. Ruby called patient's hcp, Tomsharri, to follow up on goc discussions and update him regarding patient's rapid a.fib. He states that he is awaiting callback from his brothers, hopefully by 3:30pm. Palliative provider gave palliative office number for him to call us back once he speaks to his siblings. He states that "medical teams should do whatever they can for now".    UPDATE: Toby called palliative office after speaking to his siblings. He stated that he feels that "his hands are tied as his brothers are steadfast in their decision to continue to do everything for mom". Offered to speak to his brothers for him to help explain but he stated that he doubts that it would change their mind. Discussed holding off on NGT for now to minimize patient's discomfort and adding additional stressors with her intermittent episodes of a.fib.

## 2024-01-31 NOTE — PROGRESS NOTE ADULT - PROBLEM SELECTOR PLAN 1
Pt bedbound, nonverbal at baseline, contracted.   PPSV 10% at this time  Please assist with ADLs   Wound care recs. Pt bedbound, nonverbal at baseline, contracted.   PPSV 10% at this time  Please assist with ADLs   Wound care recs.  Hold off on NGT for now and reassess patient's stability tomorrow.

## 2024-01-31 NOTE — PROGRESS NOTE ADULT - TIME BILLING
Review of laboratory data, radiology results, consultants' recommendations, documentation in [place], discussion with patient/advanced care providers and interdisciplinary staff (such as , social workers, etc). Interventions were performed as documented above.

## 2024-01-31 NOTE — PROGRESS NOTE ADULT - PROBLEM SELECTOR PLAN 2
X-ray (1/24): Dorsal forefoot soft tissue swelling. Questionable focal slightly thinned indistinct appearing calcaneal cortex on left foot frontal/oblique view raising concern for possible osteomyelitis, MRI would be helpful to better assess. No tracking gas collections or additional suspected areas of osteomyelitis. No fractures or dislocations. 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 Tarsometatarsal alignment maintained without evidence for a Lisfranc injury. Small calcaneal enthesophytes. Generalized osteopenia otherwise no discrete lytic or blastic lesions.  > Appreciate vascular recs - family declined surgery   > Patient on IV abx.  > appreciate ID recs  > Pt s/p 1u PRBCs

## 2024-01-31 NOTE — PROGRESS NOTE ADULT - TIME BILLING
Time spent for extensive review of the physical chart, electronic medical record, and documentation to obtain collateral information including but not limited to:  [x ] Inpatient records (ED, H&P, primary team, and consultants if applicable, care coordination)  [x ] Inpatient values/results (biomarkers, immunoassays, imaging, and microbiology results)  [x ] Current or proposed treatment plans  [x  ] Discussion with the primary team  [x ] Discussion with the patient, surrogate decision maker, or family  [x] 30 mins spent discussing goc     Time spent: >82 min Time spent for extensive review of the physical chart, electronic medical record, and documentation to obtain collateral information including but not limited to:  [x ] Inpatient records (ED, H&P, primary team, and consultants if applicable, care coordination)  [x ] Inpatient values/results (biomarkers, immunoassays, imaging, and microbiology results)  [x ] Current or proposed treatment plans  [x  ] Discussion with the primary team  [x ] Discussion with the patient, surrogate decision maker, or family  [x] 46 mins spent discussing goc     Time spent: >98 min

## 2024-01-31 NOTE — PROGRESS NOTE ADULT - PROBLEM SELECTOR PLAN 7
Thank you for allowing us to participate in your patient's care. We will continue to follow with you. Please page [phone] for any q's or c's. The Geriatric and Palliative Medicine service has coverage 24 hours a day/ 7 days a week to provide medical recommendations regarding symptom management needs via telephone.    Brianna Chang D.O.   Palliative Medicine.

## 2024-01-31 NOTE — PROGRESS NOTE ADULT - PROBLEM SELECTOR PLAN 2
- L foot xray:Dorsal forefoot soft tissue swelling. Questionable focal slightly thinned   indistinct appearing calcaneal cortex on left foot frontal/oblique view   raising concern for possible osteomyelitis, MRI would be helpful to   better assess. No tracking gas collections or additional suspected areas   of osteomyelitis. No fractures or dislocations.  - CRP elevated on admission    Plan:  - Recently Tx in Dec 23' for L foot wound growing S. Aureas and E. Coli, amputation L foot offered and deferred at the time  - Wound consulted, appreciate recs  - Vascular consulted, recs:  	- Continue local wound care, daily dressing changes   	- Frequent re-positioning and offloading to B/L lower extremities   	- Remainder care per primary   	- Reconsult Vascular sx as needed, if family/pt amenable to amputation  - c/w Abx as above  - previously unable to tolerate MRI L ankle when attempted in recent hospitalization - L foot xray:Dorsal forefoot soft tissue swelling. Questionable focal slightly thinned   indistinct appearing calcaneal cortex on left foot frontal/oblique view   raising concern for possible osteomyelitis, MRI would be helpful to   better assess. No tracking gas collections or additional suspected areas   of osteomyelitis. No fractures or dislocations.  - CRP elevated on admission    Plan:  - Recently Tx in Dec 23' for L foot wound growing S. Aureas and E. Coli, amputation L foot offered and deferred at the time  - Wound care following  - Vascular consulted, recs:  	- Continue local wound care, daily dressing changes   	- Frequent re-positioning and offloading to B/L lower extremities   	- Remainder care per primary   	- Reconsult Vascular sx as needed, if family/pt amenable to amputation  - c/w Abx as above  - previously unable to tolerate MRI L ankle when attempted in recent hospitalization

## 2024-01-31 NOTE — CHART NOTE - NSCHARTNOTEFT_GEN_A_CORE
Paged by RN re XI Vitals    Chart reviewed.   In summary this is a 89 y/o female with h/o HTN, HLD, CAD, DM, dementia, s/p spinal stimulator who presented with several electrolyte derangements found to have sepsis likely secondary to gangrenous foot. Pt is noted to have been evaluated by vascular and was offered amputation however family declined invasive procedure.     Vitals:    Temp Axillary 97.7  /79  O2 95% on 2LNC    Vital trend appears to be   T(F): 97.9 (30 Jan 2024 22:12), Max: 97.9 (30 Jan 2024 22:12)  HR: 99 (30 Jan 2024 22:12) (99 - 100)  BP: 124/54 (30 Jan 2024 22:12) (124/54 - 131/86)  RR: 18 (30 Jan 2024 22:12) (17 - 18)  SpO2: 97% (30 Jan 2024 22:12) (97% - 100%)    Pt does not have a documented history of arrythmia. Pt seen and examined at bedside. VS still significant for persistent tachycardia to 140s. SPO2 notable for 93-95% on 2LNC. Physical exam notable for pt awake, intermittently looking around the room. Chest exam notable for coarse upper airway sounds. Paged by RN re XI Vitals    Chart reviewed.   In summary this is a 89 y/o female with h/o HTN, HLD, CAD, DM, dementia, s/p spinal stimulator who presented with several electrolyte derangements found to have sepsis likely secondary to gangrenous foot. Pt is noted to have been evaluated by vascular and was offered amputation however family declined invasive procedure.     Vitals:    Temp Axillary 97.7  /79  O2 95% on 2LNC    Vital trend appears to be   T(F): 97.9 (30 Jan 2024 22:12), Max: 97.9 (30 Jan 2024 22:12)  HR: 99 (30 Jan 2024 22:12) (99 - 100)  BP: 124/54 (30 Jan 2024 22:12) (124/54 - 131/86)  RR: 18 (30 Jan 2024 22:12) (17 - 18)  SpO2: 97% (30 Jan 2024 22:12) (97% - 100%)    Pt does not have a documented history of arrythmia. Pt seen and examined at bedside. VS still significant for persistent tachycardia to 140s. SPO2 notable for 93-95% on 2LNC. Physical exam notable for pt awake, intermittently looking around the room. Chest exam notable for coarse upper airway sounds. Irregular pulse, tachycardic rate.    Of note patient was ordered for metoprolol on 1/24 but has not received any since. Paged by RN re XI Vitals    Chart reviewed.   In summary this is a 89 y/o female with h/o HTN, HLD, CAD, DM, dementia, s/p spinal stimulator who presented with several electrolyte derangements found to have sepsis likely secondary to gangrenous foot. Pt is noted to have been evaluated by vascular and was offered amputation however family declined invasive procedure.     Vitals:    Temp Axillary 97.7  /79  O2 95% on 2LNC    Vital trend appears to be   T(F): 97.9 (30 Jan 2024 22:12), Max: 97.9 (30 Jan 2024 22:12)  HR: 99 (30 Jan 2024 22:12) (99 - 100)  BP: 124/54 (30 Jan 2024 22:12) (124/54 - 131/86)  RR: 18 (30 Jan 2024 22:12) (17 - 18)  SpO2: 97% (30 Jan 2024 22:12) (97% - 100%)    Pt does not have a documented history of arrythmia. Pt seen and examined at bedside. VS still significant for persistent tachycardia to 140s. SPO2 notable for 93-95% on 2LNC. Physical exam notable for pt awake, intermittently looking around the room. Chest exam notable for coarse upper airway sounds. Irregular pulse, tachycardic rate.    Of note patient was ordered for metoprolol on 1/24 but has not received any since. No metoprolol in medrec, unclear why it was given previously.    Interventions:  [ ] Oral suctioning with Paged by RN re AM Vitals    Chart reviewed.   In summary this is a 89 y/o female with h/o HTN, HLD, CAD, DM, dementia, s/p spinal stimulator who presented with several electrolyte derangements found to have sepsis likely secondary to gangrenous foot. Pt is noted to have been evaluated by vascular and was offered amputation however family declined invasive procedure.     Vitals:    Temp Axillary 97.7  /79  O2 95% on 2LNC    Vital trend appears to be   T(F): 97.9 (30 Jan 2024 22:12), Max: 97.9 (30 Jan 2024 22:12)  HR: 99 (30 Jan 2024 22:12) (99 - 100)  BP: 124/54 (30 Jan 2024 22:12) (124/54 - 131/86)  RR: 18 (30 Jan 2024 22:12) (17 - 18)  SpO2: 97% (30 Jan 2024 22:12) (97% - 100%)    Pt does not have a documented history of arrythmia. Pt seen and examined at bedside. VS still significant for persistent tachycardia to 140s. SPO2 notable for 93-95% on 2LNC. Physical exam notable for pt awake, intermittently looking around the room. Chest exam notable for coarse upper airway sounds. Irregular pulse, tachycardic rate.    Of note patient was ordered for metoprolol on 1/24 but has not received any since. No metoprolol in medrec, unclear why it was given previously.    Interventions:  [ ] Oral suctioning x1-->minor secretions removed  [ ] Lopressor 5mg IV push x1-->Tachycardia broke, SBP stable (approximately 130/80s)  [ ] EKG with afib vs sinus w/pacs  [ ] Transfer to Tele    Repeat vitals 6:30am notable for persistent tachy   Repeat EKG with afib vs sinus w/pacs   Interventions:  [ ] 500cc bolus NS

## 2024-01-31 NOTE — PROGRESS NOTE ADULT - PROBLEM SELECTOR PLAN 3
- Na 163 on admission  - FWD of 1.3L on admission    Plan:  - RESOLVED  - c/w IVF i/s/o NPO  - CTM BMP, trend Na

## 2024-01-31 NOTE — PROGRESS NOTE ADULT - SUBJECTIVE AND OBJECTIVE BOX
[facility] Geriatrics and Palliative Care  Brianna Chang Palliative Care Attending  Contact Info: Page [phone] (including Nights/Weekends), message on Microsoft Teams (Brianna Chang), or leave  at Palliative Office [phone] (non-urgent)   Date of [id number] @ 13:09    SUBJECTIVE AND OBJECTIVE: Patient seen this AM lying in bed, unchanged mental status.     Indication for Geriatrics and Palliative Care Services/INTERVAL HPI: goc in setting of advanced dementia     OVERNIGHT EVENTS:  > 1/31: Provider contact notes reviewed.     DNR on chart:  Allergies    Pineapple (Unknown)  contrast dye -  rash (Other)  iodine (Other)  codeine (Vomiting)  penicillin (Rash)  latex (Rash)    Intolerances    Tolerates ceftriaxone, cefepime (Other)  MEDICATIONS  (STANDING):  cefTRIAXone   IVPB 1000 milliGRAM(s) IV Intermittent every 24 hours  Dakins Solution - 1/4 Strength 1 Application(s) Topical daily  dextrose 5%. 1000 milliLiter(s) (100 mL/Hr) IV Continuous <Continuous>  dextrose 5%. 1000 milliLiter(s) (75 mL/Hr) IV Continuous <Continuous>  dextrose 5%. 1000 milliLiter(s) (50 mL/Hr) IV Continuous <Continuous>  dextrose 5%. 1000 milliLiter(s) (50 mL/Hr) IV Continuous <Continuous>  dextrose 5%. 1000 milliLiter(s) (100 mL/Hr) IV Continuous <Continuous>  dextrose 50% Injectable 25 Gram(s) IV Push once  dextrose 50% Injectable 25 Gram(s) IV Push once  dextrose 50% Injectable 12.5 Gram(s) IV Push once  dextrose 50% Injectable 12.5 Gram(s) IV Push once  dextrose 50% Injectable 25 Gram(s) IV Push once  dextrose 50% Injectable 25 Gram(s) IV Push once  doxycycline IVPB      doxycycline IVPB 100 milliGRAM(s) IV Intermittent every 12 hours  glucagon  Injectable 1 milliGRAM(s) IntraMuscular once  glucagon  Injectable 1 milliGRAM(s) IntraMuscular once  heparin   Injectable 5000 Unit(s) SubCutaneous every 12 hours  insulin lispro (ADMELOG) corrective regimen sliding scale   SubCutaneous every 6 hours  metroNIDAZOLE  IVPB 500 milliGRAM(s) IV Intermittent every 12 hours  pantoprazole  Injectable 40 milliGRAM(s) IV Push daily  polyethylene glycol 3350 17 Gram(s) Oral daily  potassium chloride  10 mEq/100 mL IVPB 10 milliEquivalent(s) IV Intermittent every 1 hour  senna 2 Tablet(s) Oral at bedtime    MEDICATIONS  (PRN):  dextrose Oral Gel 15 Gram(s) Oral once PRN Blood Glucose LESS THAN 70 milliGRAM(s)/deciliter  dextrose Oral Gel 15 Gram(s) Oral once PRN Blood Glucose LESS THAN 70 milliGRAM(s)/deciliter  HYDROmorphone  Injectable 0.25 milliGRAM(s) IV Push every 6 hours PRN Severe Pain (7 - 10)      ITEMS UNCHECKED ARE NOT PRESENT    PRESENT SYMPTOMS: [ x]Unable to self-report - see [ ] CPOT [x ] PAINADS [x ] RDOS  Source if other than patient:  [ ]Family   [ ]Team     Pain:  [ ]yes [ ]no  QOL impact -   Location -                    Aggravating factors -  Quality -  Radiation -  Timing-  Severity (0-10 scale):  Minimal acceptable level/ pain goal (0-10 scale):     CPOT:    https://www.[facility].org/getattachment/fxv90c49-8b1u-[email or web address]-[email or web address]/Critical-Care-Pain-Observation-Tool-(CPOT)    Dyspnea:                           [ ]Mild [ ]Moderate [ ]Severe  Anxiety:                             [ ]Mild [ ]Moderate [ ]Severe  Fatigue:                             [ ]Mild [ ]Moderate [ ]Severe  Nausea:                             [ ]Mild [ ]Moderate [ ]Severe  Loss of appetite:              [ ]Mild [ ]Moderate [ ]Severe  Constipation:                    [ ]Mild [ ]Moderate [ ]Severe  Other Symptoms:  [ ]All other review of systems negative     PCSSQ[Palliative Care Spiritual Screening Question]   Severity (0-10):  Score of 4 or > indicate consideration of Chaplaincy referral.  Chaplaincy Referral: [ ] yes [ ] refused [ ] following [ x] deferred    Caregiver Ste[place]? : [ ] yes [ ] no [ x] Deferred [ ] Declined             Social work referral [ ] Patient & Family Centered Care Referral [ ]  Anticipatory Grief present?:  [ ] yes [ ] no  [x ] Deferred                  Social work referral [ ] Patient & Family Centered Care Referral [ ]      PHYSICAL EXAM:  Vital Signs Last 24 Hrs  T(C): 37 (31 Jan 2024 12:18), Max: 37 (31 Jan 2024 05:05)  T(F): 98.6 (31 Jan 2024 12:18), Max: 98.6 (31 Jan 2024 05:05)  HR: 112 (31 Jan 2024 12:18) (94 - 144)  BP: 136/51 (31 Jan 2024 12:18) (124/54 - 149/76)  BP(mean): --  RR: 18 (31 Jan 2024 12:18) (17 - 19)  SpO2: 97% (31 Jan 2024 12:18) (95% - 100%)    Parameters below as of 31 Jan 2024 12:18  Patient On (Oxygen Delivery Method): nasal cannula  O2 Flow (L/min): 2   I&O's Summary    31 Jan 2024 07:01  -  31 Jan 2024 13:09  --------------------------------------------------------  IN: 525 mL / OUT: 0 mL / NET: 525 mL       GENERAL: [x ]Cachexia  eyes open but pt not responsive   [ ]Alert  [ ]Oriented x   [ ]Lethargic  [x ]Unarousable  [ ]Verbal  [x ]Non-Verbal  Behavioral:   [ ] Anxiety  [ ] Delirium [ ] Agitation [x ] Other  HEENT:  [ ]Normal   [ x]Dry mouth   [ ]ET Tube/Trach  [ ]Oral lesions  PULMONARY:   [ ]Clear [ ]Tachypnea  [ ]Audible excessive secretions   [ ]Rhonchi        [ ]Right [ ]Left [ ]Bilateral  [ ]Crackles        [ ]Right [ ]Left [ ]Bilateral  [ ]Wheezing     [ ]Right [ ]Left [ ]Bilateral  [x ]Diminished breath sounds [ ]right [ ]left [ x]bilateral  CARDIOVASCULAR:    [x ]Regular [ ]Irregular [ ]Tachy  [ ]Jack [ ]Murmur [ ]Other  GASTROINTESTINAL:  [ x]Soft  [ ]Distended   [ ]+BS  [ ]Non tender [ ]Tender  [ ]Other [ ]PEG [ ]OGT/ NGT  Last BM: fecal incontinence   GENITOURINARY:  [ ]Normal [x ] Incontinent   [ ]Oliguria/Anuria   [ ][place]  MUSCULOSKELETAL:   [ ]Normal   [ ]Weakness  [x ]Bed/Wheelchair bound [ ]Edema  NEUROLOGIC:   [ ]No focal deficits  [x ]Cognitive impairment  [ ]Dysphagia [ ]Dysarthria [ ]Paresis [ ]Other   SKIN: Please see flowsheets   [ ]Normal  [ ]Rash  [x ]Other- multiple wounds on her body   [ ]Pressure ulcer(s)       Present on admission [ ]y [ ]n    CRITICAL CARE:  [ ]Shock Present  [ ]Septic [ ]Cardiogenic [ ]Neurologic [ ]Hypovolemic  [ ]Vasopressors [ ]Inotropes  [ ]Respiratory failure present [ ]Mechanical Ventilation [ ]Non-invasive ventilatory support [ ]High-Flow   [ ]Acute  [ ]Chronic [ ]Hypoxic  [ ]Hypercarbic [ ]Other  [ ]Other organ failure     LABS:                        8.6    14.94 )-----------( 170      ( 31 Jan 2024 05:45 )             25.6   01-31    144  |  115<H>  |  48<H>  ----------------------------<  97  3.1<L>   |  13<L>  |  2.02<H>    Ca    8.2<L>      31 Jan 2024 05:45  Phos  3.2     01-31  Mg     1.70     01-31        Urinalysis Basic - ( 31 Jan 2024 05:45 )    Color: x / Appearance: x / SG: x / pH: x  Gluc: 97 mg/dL / Ketone: x  / Bili: x / Urobili: x   Blood: x / Protein: x / Nitrite: x   Leuk Esterase: x / RBC: x / WBC x   Sq Epi: x / Non Sq Epi: x / Bacteria: x      RADIOLOGY & ADDITIONAL STUDIES: no new     Protein Calorie Malnutrition Present: [ ]mild [ ]moderate [ ]severe [ ]underweight [ ]morbid obesity  https://www.andeal.org/vault/2440/web/files/ONC/Table_Clinical%20Characteristics%20to%20Document%20Malnutrition-White%20JV%20et%20al%202012.pdf    Height (cm): 149.9 (01-24-24 @ 10:06), 149.9 (12-15-23 @ 09:00), 149.9 (10-30-23 @ 16:08)  Weight (kg): 40.4 (01-25-24 @ 15:55), 54.4 (12-15-23 @ 09:00), 59 (10-30-23 @ 16:08)  BMI (kg/m2): 18 (01-25-24 @ 15:55), 24.2 (01-24-24 @ 10:06), 24.2 (12-15-23 @ 09:00)    [x ]PPSV2 < or = 30%  [ ]significant weight loss [ ]poor nutritional intake [ ]anasarca[ ]Artificial Nutrition    Other REFERRALS:  [ ]Hospice  [ ]Child Life  [ ]Social Work  [ ]Case management [ ]Holistic Therapy

## 2024-01-31 NOTE — PROGRESS NOTE ADULT - ATTENDING COMMENTS
89 y/o F with PMH HTN, HLD, CAD, DM, dementia s/p spinal stimulator placement who presented from [facility] for hypernatremia and concern for foot infection.    #Afib with RVR, overnight with new tachycardia. EKG showing afib. Received lopressor 5mg IVP x1 and tachycardia resolved. HD stable during this time. Pt. was transferred to a telemetry floor. Pt. tachy in the AM and received 500cc bolus. Today, pt. with HR in 180s and received lopressor 5mg IVP x1 and HR improved to 100s. Discussed with son. Will continue with lopressor IVP for tachycardia for now. F/u CXR.     #Sepsis, pt. met SIRS criteria + source of known foot infection. Sepsis now resolved. UA negative. RVP negative. BCx NGTD. Recently treated for L foot wound growing E. coli and S. aureus. Vascular offering definitive management with amputation but family declines. Local wound care. ID consulted for antibiotic recs - changed antibiotics to doxycycline, ceftriaxone and flagyl. Continue for now. Ongoing GOC conversations.     #Anemia, Hgb ~6 on 1/29. No signs of bleeding. Received 1U PRBC. Hgb now improved - stable.     #L lateral malleolar pressure wound, vascular consulted. Rec amputation but family declines.     #Hypernatremia, Na 160 2/2 dehydration in the setting of poor PO intake. Pt. HD stable however pt. is intravascularly depleted (pt. has significant pre-renal SAE). Received fluid resuscitation and Na is now improved.     #Acute renal failure,  and Cr 2.76. Elevated from baseline on prior admission. Pre-renal in the setting of poor PO intake/dehydration. Now improved s/p fluid resuscitation.     #Dementia, pt. A&Ox0 - nonverbal. Poor PO intake. Severe protein calorie malnutrition. Currently NPO. Failed S&S eval. Ongoing GOC conversation. Poor prognosis overall. Not a good candidate for PEG tube. Will hold off on NGT for now - this is going to be uncomfortable for the patient and cause more harm. Awaiting decision from family for definitive management.     #GOC, pt with prior MOLST - DNR/DNI but was rescinded on this hospitalization. Palliative consulted. Ongoing GOC conversations. Discussed prognosis and care plan with jing Luis via telephone with Dr. Chang from palliative care. Edward understands the poor prognosis and would like to proceed with comfort measures/DNR/DNI but two out of his three siblings do not agree with comfort measures at this time. He does not feel comfortable transitioning care until his other two brothers are in agreement. We addressed that his mom is declining and that we do not recommend NGT/PEG as this is uncomfortable and would not change her prognosis. Toby expressed understanding but needs to speak to his brothers. Toby is going to speak to his brothers this afternoon and will provide an answer regarding goals of care.     Remainder of plan as stated above. Plan discussed with HS.

## 2024-01-31 NOTE — PROGRESS NOTE ADULT - ASSESSMENT
89 years old female with h/o HTN, HLD, CAD, DM, dementia, s/p spinal stimulator placement p/w abnormal labs (Na 168, K 2.9) (V5) oriented, appropriate

## 2024-01-31 NOTE — PROGRESS NOTE ADULT - ASSESSMENT
89 year old female PMH of dementia, HTN, HLD, CAD and DM transferred from rehab to [facility] for electrolyte abnormalities. Palliative consulted for complex decision making regarding goc in setting of advanced illness.

## 2024-01-31 NOTE — PROGRESS NOTE ADULT - SUBJECTIVE AND OBJECTIVE BOX
************************  Abhay Michelle MD  Internal Medicine PGY-1  ************************    Patient is a 89y old  Female who presents with a chief complaint of Abnormal labs    Overnight patient with several runs of sustained tachycardia requiring push of lopressor, followed by a fluid bolus. Patient AOx0, unable to complete ROS.    MEDICATIONS  (STANDING):  cefTRIAXone   IVPB 1000 milliGRAM(s) IV Intermittent every 24 hours  Dakins Solution - 1/4 Strength 1 Application(s) Topical daily  dextrose 5% + lactated ringers. 1000 milliLiter(s) (75 mL/Hr) IV Continuous <Continuous>  dextrose 5%. 1000 milliLiter(s) (50 mL/Hr) IV Continuous <Continuous>  dextrose 5%. 1000 milliLiter(s) (50 mL/Hr) IV Continuous <Continuous>  dextrose 5%. 1000 milliLiter(s) (100 mL/Hr) IV Continuous <Continuous>  dextrose 5%. 1000 milliLiter(s) (100 mL/Hr) IV Continuous <Continuous>  dextrose 50% Injectable 25 Gram(s) IV Push once  dextrose 50% Injectable 25 Gram(s) IV Push once  dextrose 50% Injectable 12.5 Gram(s) IV Push once  dextrose 50% Injectable 12.5 Gram(s) IV Push once  dextrose 50% Injectable 25 Gram(s) IV Push once  dextrose 50% Injectable 25 Gram(s) IV Push once  doxycycline IVPB 100 milliGRAM(s) IV Intermittent every 12 hours  doxycycline IVPB      glucagon  Injectable 1 milliGRAM(s) IntraMuscular once  glucagon  Injectable 1 milliGRAM(s) IntraMuscular once  heparin   Injectable 5000 Unit(s) SubCutaneous every 12 hours  insulin lispro (ADMELOG) corrective regimen sliding scale   SubCutaneous every 6 hours  metroNIDAZOLE  IVPB 500 milliGRAM(s) IV Intermittent every 12 hours  pantoprazole  Injectable 40 milliGRAM(s) IV Push daily  polyethylene glycol 3350 17 Gram(s) Oral daily  senna 2 Tablet(s) Oral at bedtime    MEDICATIONS  (PRN):  dextrose Oral Gel 15 Gram(s) Oral once PRN Blood Glucose LESS THAN 70 milliGRAM(s)/deciliter  dextrose Oral Gel 15 Gram(s) Oral once PRN Blood Glucose LESS THAN 70 milliGRAM(s)/deciliter  HYDROmorphone  Injectable 0.25 milliGRAM(s) IV Push every 6 hours PRN Severe Pain (7 - 10)      CAPILLARY BLOOD GLUCOSE      POCT Blood Glucose.: 141 mg/dL (28 Jan 2024 06:07)  POCT Blood Glucose.: 131 mg/dL (28 Jan 2024 00:22)  POCT Blood Glucose.: 211 mg/dL (27 Jan 2024 17:54)  POCT Blood Glucose.: 289 mg/dL (27 Jan 2024 11:56)  POCT Blood Glucose.: 289 mg/dL (27 Jan 2024 07:27)    I&O's Summary    27 Jan 2024 07:01  -  28 Jan 2024 07:00  --------------------------------------------------------  IN: 2920 mL / OUT: 600 mL / NET: 2320 mL        PHYSICAL EXAM:  Vital Signs Last 24 Hrs  T(C): 36.7 (28 Jan 2024 04:28), Max: 36.8 (27 Jan 2024 21:30)  T(F): 98 (28 Jan 2024 04:28), Max: 98.3 (27 Jan 2024 21:30)  HR: 93 (28 Jan 2024 04:28) (90 - 93)  BP: 113/54 (28 Jan 2024 04:28) (113/54 - 133/50)  BP(mean): --  RR: 20 (28 Jan 2024 04:28) (18 - 20)  SpO2: 99% (28 Jan 2024 04:28) (99% - 100%)    Parameters below as of 28 Jan 2024 04:28  Patient On (Oxygen Delivery Method): nasal cannula  O2 Flow (L/min): 2      PHYSICAL EXAM:  GENERAL: Cachectic, lethargic  HEENT: NC/AT  NECK: Supple, No JVD  CHEST/LUNG: CTAB, no increased WOB  HEART: RRR, no m/r/g, +2 pulses bilaterally  ABDOMEN: soft, non-tender, non-distended, BS+  EXTREMITIES:  2+ peripheral pulses, no clubbing, no edema  NERVOUS SYSTEM:  A&Ox0  SKIN: L foot mottled w/ purple, green discoloration, visible wound on dorsum, heel covered with bandage    LABS:                        6.5    12.58 )-----------( 128      ( 27 Jan 2024 20:30 )             21.0     01-28    141  |  114<H>  |  65<H>  ----------------------------<  119<H>  4.0   |  14<L>  |  1.91<H>    Ca    7.8<L>      28 Jan 2024 00:30  Phos  2.6     01-28  Mg     1.60     01-28    TPro  6.1  /  Alb  2.2<L>  /  TBili  0.4  /  DBili  x   /  AST  30  /  ALT  17  /  AlkPhos  106  01-26          Urinalysis Basic - ( 28 Jan 2024 00:30 )    Color: x / Appearance: x / SG: x / pH: x  Gluc: 119 mg/dL / Ketone: x  / Bili: x / Urobili: x   Blood: x / Protein: x / Nitrite: x   Leuk Esterase: x / RBC: x / WBC x   Sq Epi: x / Non Sq Epi: x / Bacteria: x

## 2024-02-01 NOTE — PROGRESS NOTE ADULT - ASSESSMENT
89 y/o F with PMH HTN, HLD, CAD, DM, dementia s/p spinal stimulator placement who presented from [facility] for hypernatremia and concern for foot infection.

## 2024-02-01 NOTE — PROVIDER CONTACT NOTE (OTHER) - ASSESSMENT
informed by tele tech pt is in rapid AFib. HR up to 150s mainly sustaining 130s/140s. Pt resting in bed, no s/s of distress noted. No nonverbal indications of chest pain. Mental status at baseline. See VS flowsheet.

## 2024-02-01 NOTE — PROGRESS NOTE ADULT - PROBLEM SELECTOR PLAN 1
- pt. met SIRS criteria + source of known foot infection. Sepsis now resolved. UA negative. RVP negative. BCx NGTD. Recently treated for L foot wound growing E. coli and S. aureus. Vascular offering definitive management with amputation but family declines. Local wound care. ID consulted for antibiotic recs - changed antibiotics to doxycycline, ceftriaxone and flagyl. ID left final anbx recs based on GOC.

## 2024-02-01 NOTE — PROGRESS NOTE ADULT - PROBLEM SELECTOR PLAN 7
- on admission  and Cr 2.76. Likely prerenal   - c/w IVF  - Now improved s/p fluid resuscitation  - CTM SCr

## 2024-02-01 NOTE — PROVIDER CONTACT NOTE (OTHER) - ASSESSMENT
Pt resting in bed, no s/s of distress noted. No nonverbal indications of chest pain. Mental status at baseline.

## 2024-02-01 NOTE — PROGRESS NOTE ADULT - ASSESSMENT
89-yo F w/ PMH of dementia, CAD, DM, s/p spinal stimulator, and recent admission at [facility] (12/15/23-1/2/24), sent in for abnormal labs. Recently hospitalized at [facility] w/ generalized weakness and frequent falls. Seen by ID there for infected L ankle wound. Amputation was discussed, however not pursued. Patient was discharged to [facility], pending hospice. Now DNR/DNI rescinded.     #L foot OM  #Leukocytosis  #Elevated ESR and CRP  #Polymicrobial infection  #Dementia  #FTT in adult  #Debility  #SAE  #Electrolyte abnormality  - Recent hospitalization for frequent falls, found to have L foot wound. Polymicrobial on culture, growing E coli, MSSA, and Finegoldia.  - Now presenting with significant electrolyte derangements i/s/o SAE.  - On exam, L foot was malodorous and w/ escharous changes. +Bleeding also noted.  - Clinical OM (bone probing, elevated ESR/CRP, leukocytosis). Amputation was offered, however not pursued. Severely malnourished and deconditioned patient with dismal chance of meaningful recovery with medical treatment only. Medical treatment for a "curative" intention (e.g. long-term IV) may not be in line with patient's GOC (e.g. hospice care)  - Need to define a realistic GOC first. Note that antibiotic treatment will not likely to provide a cure to patient's acute on chronic wound in a bedbound patient w/ chronically contracted posture. Toxicity from antibiotic along with the patient's advanced age and poor renal function places her at high risk of adverse outcomes. Long-term antibiotic treatment appears futile and will not likely to increase patient's quality of life.  - Palliative care eval noted. Difficult GOC per family. Note that medical treatment is not benign and carries risk of adverse outcomes from medications themselves, especially in the elderly.  - Bed head elevation. Aspiration precautions. Optimize nutrition. Frequent turning and wound care.  - Continue with ceftriaxone 1g IV Q24H, doxycycline 100 mg IV Q12H, and metronidazole 500 mg IV Q12H.  - For oral conversion, a combination of doxycycline 100 mg PO Q12H w/ moxifloxacin 400 mg PO Q24H can be considered, if QTc is not prolonged. Note that fluoroquinolones (moxifloxacin) can result in potentially fatal adverse outcomes in elderly patients.  - If GOC dictates "full course of antibiotic care accepting the high chance of treatment failure and adverse outcomes", can consider x6w antibiotics as above. Otherwise, limit 10-14d for short course coverage of soft tissue infection.    Plan discussed with primary team ACP.  Thank you for this consult. Inpatient ID consult team will discontinue active follow-up for this patient.    Further changes in lab values, imaging studies, or clinical status will not be known to ID inpatient consultants unless specifically communicated by primary team.    Kareem Kelley MD, PhD  Attending Physician  Division of Infectious Diseases  Department of Medicine    Please contact through MS Teams message.  Office: [phone] (after 5 PM or weekend)

## 2024-02-01 NOTE — PROGRESS NOTE ADULT - SUBJECTIVE AND OBJECTIVE BOX
Follow Up:    Foot wound    Interval History/ROS:  Afebrile. Tachycardic. Patient was seen and examined at bedside. Awake, nonverbal. Unable to assess ROS.    Allergies  Pineapple (Unknown)  contrast dye -  rash (Other)  iodine (Other)  codeine (Vomiting)  penicillin (Rash)  latex (Rash)        ANTIMICROBIALS:  cefTRIAXone   IVPB 1000 every 24 hours  doxycycline IVPB    doxycycline IVPB 100 every 12 hours  metroNIDAZOLE  IVPB 500 every 12 hours      OTHER MEDS:  MEDICATIONS  (STANDING):  dextrose 50% Injectable 12.5 once  dextrose 50% Injectable 12.5 once  dextrose 50% Injectable 25 once  dextrose 50% Injectable 25 once  dextrose 50% Injectable 25 once  dextrose 50% Injectable 25 once  dextrose Oral Gel 15 once PRN  dextrose Oral Gel 15 once PRN  glucagon  Injectable 1 once  glucagon  Injectable 1 once  heparin   Injectable 5000 every 12 hours  HYDROmorphone  Injectable 0.25 every 6 hours PRN  insulin lispro (ADMELOG) corrective regimen sliding scale  every 6 hours  pantoprazole  Injectable 40 daily  polyethylene glycol 3350 17 daily  senna 2 at bedtime      Vital Signs Last 24 Hrs  T(C): 36.4 (01 Feb 2024 06:10), Max: 37.1 (31 Jan 2024 19:35)  T(F): 97.5 (01 Feb 2024 06:10), Max: 98.7 (31 Jan 2024 19:35)  HR: 138 (01 Feb 2024 13:26) (100 - 138)  BP: 147/66 (01 Feb 2024 13:26) (117/87 - 153/71)  BP(mean): --  RR: 18 (01 Feb 2024 13:26) (16 - 19)  SpO2: 98% (01 Feb 2024 13:26) (98% - 100%)    Parameters below as of 01 Feb 2024 13:26  Patient On (Oxygen Delivery Method): nasal cannula  O2 Flow (L/min): 2      PHYSICAL EXAM:  Constitutional: chronically-ill appearing cachectic elderly female, contracted posture  ENT: dry oral mucosa  Cardiovascular:   normal S1, S2, no murmur, RRR  Respiratory:  clear BS bilaterally, no wheezes, no rales  Musculoskeletal:  no BLE edema  Neurologic: awake, minimally responsive  Skin:  L heel w/ malodorous escharous changes                              7.6    15.50 )-----------( 170      ( 01 Feb 2024 04:49 )             23.6       02-01    140  |  113<H>  |  46<H>  ----------------------------<  154<H>  3.1<L>   |  14<L>  |  1.97<H>    Ca    8.0<L>      01 Feb 2024 04:49  Phos  3.3     02-01  Mg     1.50     02-01        Urinalysis Basic - ( 01 Feb 2024 04:49 )    Color: x / Appearance: x / SG: x / pH: x  Gluc: 154 mg/dL / Ketone: x  / Bili: x / Urobili: x   Blood: x / Protein: x / Nitrite: x   Leuk Esterase: x / RBC: x / WBC x   Sq Epi: x / Non Sq Epi: x / Bacteria: x        MICROBIOLOGY:  v  Clean Catch Clean Catch (Midstream)  01-24-24   <10,000 CFU/mL Normal Urogenital Nyla  --  --      .Blood Blood-Peripheral  01-24-24   No growth at 5 days  --  --      .Blood Blood-Peripheral  01-24-24   No growth at 5 days  --  --                RADIOLOGY:  Imaging below independently reviewed.  < from: Xray Chest 1 View- PORTABLE-Urgent (Xray Chest 1 View- PORTABLE-Urgent .) (01.31.24 @ 14:38) >    ACC: [id number] EXAM:  XR CHEST PORTABLE URGENT 1V   ORDERED BY: RAFAELA BLAIR     PROCEDURE DATE:  01/31/2024          INTERPRETATION:  CLINICAL INFORMATION: Agonal breathing    TIME OF EXAMINATION: January 31, 2024 at 1:57 PM    EXAM: Portable chest    FINDINGS:    Sternotomy wires with clips, TAVR and spinal stimulating device.  Bibasilar haziness likely small effusions with underlying atelectasis.    Remainder of the lungs show no acute pathology.        COMPARISON: January 24, 2024        IMPRESSION: Bibasilar haziness likely effusion/atelectasis new since the   last study.    --- End of Report ---            EDGAR GARCIA MD; Attending Radiologist  This document has been electronically signed. Feb 1 2024  6:44AM    < end of copied text >

## 2024-02-01 NOTE — PROGRESS NOTE ADULT - CONVERSATION DETAILS
Met with patient's son/hcp, Toby, this AM. Discussed patient's declining clinical condition. He expresses appreciation for the ongoing support for his mother as he shares that his brothers are "still not budging" on changing their goals for mom. We discussed patient's frailty, advanced dementia and extensive wounds. Toby understands the importance of establishing advanced directives and would not want patient to suffer. I did explain medical futility of CPR and mechanical ventilation in the setting of his mother's poor clinical condition. I continued to recommend not introducing NGT as it will not eliminate risk of aspiration and could introduce pain to patient. I recommended transitioning care to hospice to focus on patient's symptoms. He was amenable to symptom meds being available to patient. He states that he feels that "there will be a point where he will just have to make the decision that his brothers won't allow him to do". Emotional support provided and explained that he should focus on making decisions that his mother would want for herself if she could participate in these [facility] discussions.

## 2024-02-01 NOTE — PROGRESS NOTE ADULT - TIME BILLING
Time spent for extensive review of the physical chart, electronic medical record, and documentation to obtain collateral information including but not limited to:  [x ] Inpatient records (ED, H&P, primary team, and consultants if applicable, care coordination)  [x ] Inpatient values/results (biomarkers, immunoassays, imaging, and microbiology results)  [x ] Current or proposed treatment plans  [x  ] Discussion with the primary team  [x ] Discussion with the patient, surrogate decision maker, or family  [x] 16mins spent discussing goc     Time spent: >16 min

## 2024-02-01 NOTE — PROGRESS NOTE ADULT - SUBJECTIVE AND OBJECTIVE BOX
[facility] Geriatrics and Palliative Care  Brianna Chang Palliative Care Attending  Contact Info: Page [phone] (including Nights/Weekends), message on Microsoft Teams (Brianna Chang), or leave  at Palliative Office [phone] (non-urgent)   Date of [id number] @ 16:28    SUBJECTIVE AND OBJECTIVE:    Indication for Geriatrics and Palliative Care Services/INTERVAL HPI:    OVERNIGHT EVENTS:    DNR on chart:  Allergies    Pineapple (Unknown)  contrast dye -  rash (Other)  iodine (Other)  codeine (Vomiting)  penicillin (Rash)  latex (Rash)    Intolerances    Tolerates ceftriaxone, cefepime (Other)  MEDICATIONS  (STANDING):  cefTRIAXone   IVPB 1000 milliGRAM(s) IV Intermittent every 24 hours  Dakins Solution - 1/4 Strength 1 Application(s) Topical daily  dextrose 5%. 1000 milliLiter(s) (50 mL/Hr) IV Continuous <Continuous>  dextrose 5%. 1000 milliLiter(s) (75 mL/Hr) IV Continuous <Continuous>  dextrose 5%. 1000 milliLiter(s) (50 mL/Hr) IV Continuous <Continuous>  dextrose 5%. 1000 milliLiter(s) (100 mL/Hr) IV Continuous <Continuous>  dextrose 5%. 1000 milliLiter(s) (100 mL/Hr) IV Continuous <Continuous>  dextrose 50% Injectable 25 Gram(s) IV Push once  dextrose 50% Injectable 25 Gram(s) IV Push once  dextrose 50% Injectable 12.5 Gram(s) IV Push once  dextrose 50% Injectable 12.5 Gram(s) IV Push once  dextrose 50% Injectable 25 Gram(s) IV Push once  dextrose 50% Injectable 25 Gram(s) IV Push once  doxycycline IVPB 100 milliGRAM(s) IV Intermittent every 12 hours  doxycycline IVPB      glucagon  Injectable 1 milliGRAM(s) IntraMuscular once  glucagon  Injectable 1 milliGRAM(s) IntraMuscular once  heparin   Injectable 5000 Unit(s) SubCutaneous every 12 hours  insulin lispro (ADMELOG) corrective regimen sliding scale   SubCutaneous every 6 hours  metroNIDAZOLE  IVPB 500 milliGRAM(s) IV Intermittent every 12 hours  pantoprazole  Injectable 40 milliGRAM(s) IV Push daily  polyethylene glycol 3350 17 Gram(s) Oral daily  potassium chloride  20 mEq/100 mL IVPB 20 milliEquivalent(s) IV Intermittent every 2 hours  senna 2 Tablet(s) Oral at bedtime    MEDICATIONS  (PRN):  dextrose Oral Gel 15 Gram(s) Oral once PRN Blood Glucose LESS THAN 70 milliGRAM(s)/deciliter  dextrose Oral Gel 15 Gram(s) Oral once PRN Blood Glucose LESS THAN 70 milliGRAM(s)/deciliter  HYDROmorphone  Injectable 0.25 milliGRAM(s) IV Push every 6 hours PRN Severe Pain (7 - 10)      ITEMS UNCHECKED ARE NOT PRESENT    PRESENT SYMPTOMS: [ ]Unable to self-report - see [ ] CPOT [ ] PAINADS [ ] RDOS  Source if other than patient:  [ ]Family   [ ]Team     Pain:  [ ]yes [ ]no  QOL impact -   Location -                    Aggravating factors -  Quality -  Radiation -  Timing-  Severity (0-10 scale):  Minimal acceptable level/ pain goal (0-10 scale):     CPOT:    https://www.[facility].org/getattachment/[email or web address]/Critical-Care-Pain-Observation-Tool-(CPOT)    Dyspnea:                           [ ]Mild [ ]Moderate [ ]Severe  Anxiety:                             [ ]Mild [ ]Moderate [ ]Severe  Fatigue:                             [ ]Mild [ ]Moderate [ ]Severe  Nausea:                             [ ]Mild [ ]Moderate [ ]Severe  Loss of appetite:              [ ]Mild [ ]Moderate [ ]Severe  Constipation:                    [ ]Mild [ ]Moderate [ ]Severe  Other Symptoms:  [ ]All other review of systems negative     PCSSQ[Palliative Care Spiritual Screening Question]   Severity (0-10):  Score of 4 or > indicate consideration of Chaplaincy referral.  Chaplaincy Referral: [ ] yes [ ] refused [ ] following [ ] deferred    Caregiver [place]? : [ ] yes [ ] no [ ] Deferred [ ] Declined             Social work referral [ ] Patient & Family Centered Care Referral [ ]  Anticipatory Grief present?:  [ ] yes [ ] no  [ ] Deferred                  Social work referral [ ] Patient & Family Centered Care Referral [ ]      PHYSICAL EXAM:  Vital Signs Last 24 Hrs  T(C): 36.4 (01 Feb 2024 06:10), Max: 37.1 (31 Jan 2024 19:35)  T(F): 97.5 (01 Feb 2024 06:10), Max: 98.7 (31 Jan 2024 19:35)  HR: 138 (01 Feb 2024 13:26) (100 - 138)  BP: 147/66 (01 Feb 2024 13:26) (117/87 - 153/71)  BP(mean): --  RR: 18 (01 Feb 2024 13:26) (16 - 19)  SpO2: 98% (01 Feb 2024 13:26) (98% - 100%)    Parameters below as of 01 Feb 2024 13:26  Patient On (Oxygen Delivery Method): nasal cannula  O2 Flow (L/min): 2   I&O's Summary    31 Jan 2024 07:01  -  01 Feb 2024 07:00  --------------------------------------------------------  IN: 1475 mL / OUT: 200 mL / NET: 1275 mL       GENERAL: [ ]Cachexia    [ ]Alert  [ ]Oriented x   [ ]Lethargic  [ ]Unarousable  [ ]Verbal  [ ]Non-Verbal  Behavioral:   [ ]Anxiety  [ ]Delirium [ ]Agitation [ ]Other  HEENT:  [ ]Normal   [ ]Dry mouth   [ ]ET Tube/Trach  [ ]Oral lesions  PULMONARY:   [ ]Clear [ ]Tachypnea  [ ]Audible excessive secretions   [ ]Rhonchi        [ ]Right [ ]Left [ ]Bilateral  [ ]Crackles        [ ]Right [ ]Left [ ]Bilateral  [ ]Wheezing     [ ]Right [ ]Left [ ]Bilateral  [ ]Diminished BS [ ] Right [ ]Left [ ]Bilateral  CARDIOVASCULAR:    [ ]Regular [ ]Irregular [ ]Tachy  [ ]Jack [ ]Murmur [ ]Other  GASTROINTESTINAL:  [ ]Soft  [ ]Distended   [ ]+BS  [ ]Non tender [ ]Tender  [ ]Other [ ]PEG [ ]OGT/ NGT   Last BM:   GENITOURINARY:  [ ]Normal [ ]Incontinent   [ ]Oliguria/Anuria   [ ][place]  MUSCULOSKELETAL:   [ ]Normal   [ ]Weakness  [ ]Bed/Wheelchair bound [ ]Edema  NEUROLOGIC:   [ ]No focal deficits  [ ] Cognitive impairment  [ ] Dysphagia [ ]Dysarthria [ ] Paresis [ ]Other   SKIN: Please see flowsheets   [ ]Normal  [ ]Rash  [ ]Other  [ ]Pressure ulcer(s) [ ]y [ ]n present on admission    CRITICAL CARE:  [ ]Shock Present  [ ]Septic [ ]Cardiogenic [ ]Neurologic [ ]Hypovolemic  [ ]Vasopressors [ ]Inotropes  [ ]Respiratory failure present [ ]Mechanical Ventilation [ ]Non-invasive ventilatory support [ ]High-Flow   [ ]Acute  [ ]Chronic [ ]Hypoxic  [ ]Hypercarbic [ ]Other  [ ]Other organ failure     LABS:                        7.6    15.50 )-----------( 170      ( 01 Feb 2024 04:49 )             23.6   02-01    140  |  113<H>  |  46<H>  ----------------------------<  154<H>  3.1<L>   |  14<L>  |  1.97<H>    Ca    8.0<L>      01 Feb 2024 04:49  Phos  3.3     02-01  Mg     1.50     02-01        Urinalysis Basic - ( 01 Feb 2024 04:49 )    Color: x / Appearance: x / SG: x / pH: x  Gluc: 154 mg/dL / Ketone: x  / Bili: x / Urobili: x   Blood: x / Protein: x / Nitrite: x   Leuk Esterase: x / RBC: x / WBC x   Sq Epi: x / Non Sq Epi: x / Bacteria: x      RADIOLOGY & ADDITIONAL STUDIES:    Protein Calorie Malnutrition Present: [ ]mild [ ]moderate [ ]severe [ ]underweight [ ]morbid obesity  https://www.andeal.org/vault/2440/web/files/ONC/Table_Clinical%20Characteristics%20to%20Document%20Malnutrition-White%20JV%20et%20al%474385.pdf    Height (cm): 149.9 (01-24-24 @ 10:06), 149.9 (12-15-23 @ 09:00), 149.9 (10-30-23 @ 16:08)  Weight (kg): 40.4 (01-25-24 @ 15:55), 54.4 (12-15-23 @ 09:00), 59 (10-30-23 @ 16:08)  BMI (kg/m2): 18 (01-25-24 @ 15:55), 24.2 (01-24-24 @ 10:06), 24.2 (12-15-23 @ 09:00)    [ ]PPSV2 < or = 30%  [ ]significant weight loss [ ]poor nutritional intake [ ]anasarca[ ]Artificial Nutrition    Other REFERRALS:  [ ]Hospice  [ ]Child Life  [ ]Social Work  [ ]Case management [ ]Holistic Therapy     Goals of Care Document: [facility] Geriatrics and Palliative Care  Brianna Chang Palliative Care Attending  Contact Info: Page [phone] (including Nights/Weekends), message on Microsoft Teams (Brianna Chang), or leave  at Palliative Office [phone] (non-urgent)   Date of [id number] @ 16:28    SUBJECTIVE AND OBJECTIVE: Patient seen this AM with son, Toby, at bedside. Patient continues to be lethargic. Per discussion with Toby she opened her eyes when he called her name.     Indication for Geriatrics and Palliative Care Services/INTERVAL HPI: goc in setting of advanced illness     OVERNIGHT EVENTS:  > 2/1: Patient with intermittent rapid a.fib requiring IV metoprolol     DNR on chart:  Allergies    Pineapple (Unknown)  contrast dye -  rash (Other)  iodine (Other)  codeine (Vomiting)  penicillin (Rash)  latex (Rash)    Intolerances    Tolerates ceftriaxone, cefepime (Other)  MEDICATIONS  (STANDING):  cefTRIAXone   IVPB 1000 milliGRAM(s) IV Intermittent every 24 hours  Dakins Solution - 1/4 Strength 1 Application(s) Topical daily  dextrose 5%. 1000 milliLiter(s) (50 mL/Hr) IV Continuous <Continuous>  dextrose 5%. 1000 milliLiter(s) (75 mL/Hr) IV Continuous <Continuous>  dextrose 5%. 1000 milliLiter(s) (50 mL/Hr) IV Continuous <Continuous>  dextrose 5%. 1000 milliLiter(s) (100 mL/Hr) IV Continuous <Continuous>  dextrose 5%. 1000 milliLiter(s) (100 mL/Hr) IV Continuous <Continuous>  dextrose 50% Injectable 25 Gram(s) IV Push once  dextrose 50% Injectable 25 Gram(s) IV Push once  dextrose 50% Injectable 12.5 Gram(s) IV Push once  dextrose 50% Injectable 12.5 Gram(s) IV Push once  dextrose 50% Injectable 25 Gram(s) IV Push once  dextrose 50% Injectable 25 Gram(s) IV Push once  doxycycline IVPB 100 milliGRAM(s) IV Intermittent every 12 hours  doxycycline IVPB      glucagon  Injectable 1 milliGRAM(s) IntraMuscular once  glucagon  Injectable 1 milliGRAM(s) IntraMuscular once  heparin   Injectable 5000 Unit(s) SubCutaneous every 12 hours  insulin lispro (ADMELOG) corrective regimen sliding scale   SubCutaneous every 6 hours  metroNIDAZOLE  IVPB 500 milliGRAM(s) IV Intermittent every 12 hours  pantoprazole  Injectable 40 milliGRAM(s) IV Push daily  polyethylene glycol 3350 17 Gram(s) Oral daily  potassium chloride  20 mEq/100 mL IVPB 20 milliEquivalent(s) IV Intermittent every 2 hours  senna 2 Tablet(s) Oral at bedtime    MEDICATIONS  (PRN):  dextrose Oral Gel 15 Gram(s) Oral once PRN Blood Glucose LESS THAN 70 milliGRAM(s)/deciliter  dextrose Oral Gel 15 Gram(s) Oral once PRN Blood Glucose LESS THAN 70 milliGRAM(s)/deciliter  HYDROmorphone  Injectable 0.25 milliGRAM(s) IV Push every 6 hours PRN Severe Pain (7 - 10)      ITEMS UNCHECKED ARE NOT PRESENT    PRESENT SYMPTOMS: [x ]Unable to self-report - see [ ] CPOT [x ] PAINADS [ x] RDOS  Source if other than patient:  [x ]Family   [ ]Team     Pain:  [ ]yes [ ]no  QOL impact -   Location -                    Aggravating factors -  Quality -  Radiation -  Timing-  Severity (0-10 scale):  Minimal acceptable level/ pain goal (0-10 scale):     CPOT:    https://www.[facility].org/getattachment/pxc26y40-2j5s-0t9p-4r3e-9703h4241c8m/Critical-Care-Pain-Observation-Tool-(CPOT)    Dyspnea:                           [ ]Mild [ ]Moderate [ ]Severe  Anxiety:                             [ ]Mild [ ]Moderate [ ]Severe  Fatigue:                             [ ]Mild [ ]Moderate [ ]Severe  Nausea:                             [ ]Mild [ ]Moderate [ ]Severe  Loss of appetite:              [ ]Mild [ ]Moderate [ ]Severe  Constipation:                    [ ]Mild [ ]Moderate [ ]Severe  Other Symptoms:  [ ]All other review of systems negative     PCSSQ[Palliative Care Spiritual Screening Question]   Severity (0-10):  Score of 4 or > indicate consideration of Chaplaincy referral.  Chaplaincy Referral: [ ] yes [ ] refused [ ] following [ x] deferred    Caregiver [place]? : [ ] yes [ ] no [x ] Deferred [ ] Declined             Social work referral [ ] Patient & Family Centered Care Referral [ ]  Anticipatory Grief present?:  [ ] yes [ ] no  [ x] Deferred                  Social work referral [ ] Patient & Family Centered Care Referral [ ]      PHYSICAL EXAM:  Vital Signs Last 24 Hrs  T(C): 36.4 (01 Feb 2024 06:10), Max: 37.1 (31 Jan 2024 19:35)  T(F): 97.5 (01 Feb 2024 06:10), Max: 98.7 (31 Jan 2024 19:35)  HR: 138 (01 Feb 2024 13:26) (100 - 138)  BP: 147/66 (01 Feb 2024 13:26) (117/87 - 153/71)  BP(mean): --  RR: 18 (01 Feb 2024 13:26) (16 - 19)  SpO2: 98% (01 Feb 2024 13:26) (98% - 100%)    Parameters below as of 01 Feb 2024 13:26  Patient On (Oxygen Delivery Method): nasal cannula  O2 Flow (L/min): 2   I&O's Summary    31 Jan 2024 07:01  -  01 Feb 2024 07:00  --------------------------------------------------------  IN: 1475 mL / OUT: 200 mL / NET: 1275 mL       GENERAL: [x ]Cachexia  eyes open but pt not responsive   [ ]Alert  [ ]Oriented x   [ x]Lethargic  [ ]Unarousable  [ ]Verbal  [x ]Non-Verbal  Behavioral:   [ ] Anxiety  [ ] Delirium [ ] Agitation [x ] Other  HEENT:  [ ]Normal   [ x]Dry mouth   [ ]ET Tube/Trach  [ ]Oral lesions  PULMONARY:   [ ]Clear [ ]Tachypnea  [ ]Audible excessive secretions   [ ]Rhonchi        [ ]Right [ ]Left [ ]Bilateral  [ ]Crackles        [ ]Right [ ]Left [ ]Bilateral  [ ]Wheezing     [ ]Right [ ]Left [ ]Bilateral  [x ]Diminished breath sounds [ ]right [ ]left [ x]bilateral  CARDIOVASCULAR:    [x ]Regular [ ]Irregular [ ]Tachy  [ ]Jack [ ]Murmur [ ]Other  GASTROINTESTINAL:  [ x]Soft  [ ]Distended   [ ]+BS  [ ]Non tender [ ]Tender  [ ]Other [ ]PEG [ ]OGT/ NGT  Last BM: fecal incontinence   GENITOURINARY:  [ ]Normal [x ] Incontinent   [ ]Oliguria/Anuria   [ ][place]  MUSCULOSKELETAL:   [ ]Normal   [ ]Weakness  [x ]Bed/Wheelchair bound [ ]Edema  NEUROLOGIC:   [ ]No focal deficits  [x ]Cognitive impairment  [ ]Dysphagia [ ]Dysarthria [ ]Paresis [ ]Other   SKIN: Please see flowsheets   [ ]Normal  [ ]Rash  [x ]Other- multiple wounds on her body   [ ]Pressure ulcer(s)       Present on admission [ ]y [ ]n    CRITICAL CARE:  [ ]Shock Present  [ ]Septic [ ]Cardiogenic [ ]Neurologic [ ]Hypovolemic  [ ]Vasopressors [ ]Inotropes  [ ]Respiratory failure present [ ]Mechanical Ventilation [ ]Non-invasive ventilatory support [ ]High-Flow   [ ]Acute  [ ]Chronic [ ]Hypoxic  [ ]Hypercarbic [ ]Other  [ ]Other organ failure     LABS:                        7.6    15.50 )-----------( 170      ( 01 Feb 2024 04:49 )             23.6   02-01    140  |  113<H>  |  46<H>  ----------------------------<  154<H>  3.1<L>   |  14<L>  |  1.97<H>    Ca    8.0<L>      01 Feb 2024 04:49  Phos  3.3     02-01  Mg     1.50     02-01        Urinalysis Basic - ( 01 Feb 2024 04:49 )    Color: x / Appearance: x / SG: x / pH: x  Gluc: 154 mg/dL / Ketone: x  / Bili: x / Urobili: x   Blood: x / Protein: x / Nitrite: x   Leuk Esterase: x / RBC: x / WBC x   Sq Epi: x / Non Sq Epi: x / Bacteria: x      RADIOLOGY & ADDITIONAL STUDIES: no new     Protein Calorie Malnutrition Present: [ ]mild [ ]moderate [ ]severe [ ]underweight [ ]morbid obesity  https://www.andeal.org/vault/8380/web/files/ONC/Table_Clinical%20Characteristics%20to%20Document%20Malnutrition-White%20JV%20et%20al%202012.pdf    Height (cm): 149.9 (01-24-24 @ 10:06), 149.9 (12-15-23 @ 09:00), 149.9 (10-30-23 @ 16:08)  Weight (kg): 40.4 (01-25-24 @ 15:55), 54.4 (12-15-23 @ 09:00), 59 (10-30-23 @ 16:08)  BMI (kg/m2): 18 (01-25-24 @ 15:55), 24.2 (01-24-24 @ 10:06), 24.2 (12-15-23 @ 09:00)    [x ]PPSV2 < or = 30%  [ ]significant weight loss [ ]poor nutritional intake [ ]anasarca[ ]Artificial Nutrition    Other REFERRALS:  [ ]Hospice  [ ]Child Life  [ ]Social Work  [ ]Case management [ ]Holistic Therapy

## 2024-02-01 NOTE — PROGRESS NOTE ADULT - PROBLEM SELECTOR PLAN 3
- pt. A&Ox0 - nonverbal.  -  Poor PO intake.   - Severe protein calorie malnutrition.   - Currently NPO.   - Failed S&S eval.   - Ongoing GOC conversation. Poor prognosis overall. Not a good candidate for PEG tube. Will hold off on NGT for now - this is going to be uncomfortable for the patient and cause more harm. Awaiting decision from family for definitive management.

## 2024-02-01 NOTE — PROGRESS NOTE ADULT - PROBLEM SELECTOR PLAN 1
Pt bedbound, nonverbal at baseline, contracted.   PPSV 10% at this time  Please assist with ADLs   Wound care recs.  Encourage ongoing discussions

## 2024-02-01 NOTE — PROGRESS NOTE ADULT - PROBLEM SELECTOR PLAN 7
Thank you for allowing us to participate in your patient's care.     Please page [phone] for any q's or c's. The Geriatric and Palliative Medicine service has coverage 24 hours a day/ 7 days a week to provide medical recommendations regarding symptom management needs via telephone.    Brianna Chang D.O.   Palliative Medicine. Thank you for allowing us to participate in your patient's care. Toby has palliative office contact number. Signing off as goals are clear.     Please page [phone] for any q's or c's. The Geriatric and Palliative Medicine service has coverage 24 hours a day/ 7 days a week to provide medical recommendations regarding symptom management needs via telephone.    Brianna Chang D.O.   Palliative Medicine.

## 2024-02-01 NOTE — PROGRESS NOTE ADULT - ASSESSMENT
89 year old female PMH of dementia, HTN, HLD, CAD and DM transferred from rehab to [facility] for electrolyte abnormalities. Palliative consulted for complex decision making regarding goc in setting of advanced illness.

## 2024-02-01 NOTE — PROGRESS NOTE ADULT - PROBLEM SELECTOR PLAN 2
- Afib with RVR. Pt. was transferred to a telemetry floor. Discussed with son. Will continue with lopressor IVP for tachycardia for now.

## 2024-02-01 NOTE — CHART NOTE - NSCHARTNOTEFT_GEN_A_CORE
Attempted to see patient for follow up of multiple wounds, patient in rapid afib in the 110's received metoprolol. Patient receiving supplemental oxygen via nasal cannula, appears uncomfortable, mouth breathing,  tachypneic. Unable to make needs known, primary RN at the bedside. Patient receiving magnesium via IV perhaps she is in pain? RN to administered pain medicine as order. Will f/u at later time.

## 2024-02-01 NOTE — PROGRESS NOTE ADULT - TIME BILLING
Review of laboratory data, radiology results, consultants' recommendations, documentation in [place], discussion with patient/advanced care providers and interdisciplinary staff (such as , social workers, etc). Interventions were performed as documented above.

## 2024-02-01 NOTE — PROGRESS NOTE ADULT - SUBJECTIVE AND OBJECTIVE BOX
MD MARISELA Patel Division of Hospital Medicine  Pager: [phone]  Available via MS Teams    SUBJECTIVE / OVERNIGHT EVENTS:    Nonverbal   Lethargic     MEDICATIONS  (STANDING):  cefTRIAXone   IVPB 1000 milliGRAM(s) IV Intermittent every 24 hours  Dakins Solution - 1/4 Strength 1 Application(s) Topical daily  dextrose 5%. 1000 milliLiter(s) (50 mL/Hr) IV Continuous <Continuous>  dextrose 5%. 1000 milliLiter(s) (50 mL/Hr) IV Continuous <Continuous>  dextrose 5%. 1000 milliLiter(s) (75 mL/Hr) IV Continuous <Continuous>  dextrose 5%. 1000 milliLiter(s) (100 mL/Hr) IV Continuous <Continuous>  dextrose 5%. 1000 milliLiter(s) (100 mL/Hr) IV Continuous <Continuous>  dextrose 50% Injectable 25 Gram(s) IV Push once  dextrose 50% Injectable 25 Gram(s) IV Push once  dextrose 50% Injectable 12.5 Gram(s) IV Push once  dextrose 50% Injectable 12.5 Gram(s) IV Push once  dextrose 50% Injectable 25 Gram(s) IV Push once  dextrose 50% Injectable 25 Gram(s) IV Push once  doxycycline IVPB      doxycycline IVPB 100 milliGRAM(s) IV Intermittent every 12 hours  glucagon  Injectable 1 milliGRAM(s) IntraMuscular once  glucagon  Injectable 1 milliGRAM(s) IntraMuscular once  heparin   Injectable 5000 Unit(s) SubCutaneous every 12 hours  insulin lispro (ADMELOG) corrective regimen sliding scale   SubCutaneous every 6 hours  metroNIDAZOLE  IVPB 500 milliGRAM(s) IV Intermittent every 12 hours  pantoprazole  Injectable 40 milliGRAM(s) IV Push daily  polyethylene glycol 3350 17 Gram(s) Oral daily  potassium chloride  20 mEq/100 mL IVPB 20 milliEquivalent(s) IV Intermittent every 2 hours  senna 2 Tablet(s) Oral at bedtime    MEDICATIONS  (PRN):  dextrose Oral Gel 15 Gram(s) Oral once PRN Blood Glucose LESS THAN 70 milliGRAM(s)/deciliter  dextrose Oral Gel 15 Gram(s) Oral once PRN Blood Glucose LESS THAN 70 milliGRAM(s)/deciliter  HYDROmorphone  Injectable 0.25 milliGRAM(s) IV Push every 6 hours PRN Severe Pain (7 - 10)      I&O's Summary    31 Jan 2024 07:01  -  01 Feb 2024 07:00  --------------------------------------------------------  IN: 1475 mL / OUT: 200 mL / NET: 1275 mL        PHYSICAL EXAM:  Vital Signs Last 24 Hrs  T(C): 36.6 (01 Feb 2024 13:26), Max: 37.1 (31 Jan 2024 19:35)  T(F): 97.8 (01 Feb 2024 13:26), Max: 98.7 (31 Jan 2024 19:35)  HR: 138 (01 Feb 2024 13:26) (100 - 138)  BP: 147/66 (01 Feb 2024 13:26) (117/87 - 153/71)  BP(mean): --  RR: 18 (01 Feb 2024 13:26) (16 - 19)  SpO2: 98% (01 Feb 2024 13:26) (98% - 100%)    Parameters below as of 01 Feb 2024 13:26  Patient On (Oxygen Delivery Method): nasal cannula  O2 Flow (L/min): 2    CONSTITUTIONAL: NAD   EYES: conjunctiva and sclera clear  ENMT: Moist oral mucosa   NECK: Supple   RESPIRATORY: Normal respiratory effort; lungs are clear to auscultation bilaterally  CARDIOVASCULAR: Regular rate and rhythm, normal S1 and S2, no murmur/rub/gallop; Peripheral pulses are 2+ bilaterally  ABDOMEN: Nontender to palpation, normoactive bowel sounds, no rebound/guarding   MUSCULOSKELETAL: No lower extremity edema   PSYCH: non-verbal, lethargic   NEUROLOGY: moves all extremities   SKIN: No rashes    LABS:                        7.6    15.50 )-----------( 170      ( 01 Feb 2024 04:49 )             23.6     02-01    140  |  113<H>  |  46<H>  ----------------------------<  154<H>  3.1<L>   |  14<L>  |  1.97<H>    Ca    8.0<L>      01 Feb 2024 04:49  Phos  3.3     02-01  Mg     1.50     02-01            Urinalysis Basic - ( 01 Feb 2024 04:49 )    Color: x / Appearance: x / SG: x / pH: x  Gluc: 154 mg/dL / Ketone: x  / Bili: x / Urobili: x   Blood: x / Protein: x / Nitrite: x   Leuk Esterase: x / RBC: x / WBC x   Sq Epi: x / Non Sq Epi: x / Bacteria: x        SARS-CoV-2: NotDetec (24 Jan 2024 11:26)      RADIOLOGY & ADDITIONAL TESTS:  Other Results Reviewed Today: BMP with stable Cr, CBC with stable Hg     COORDINATION OF CARE:  Communication: discussed plan of care with ACP

## 2024-02-02 NOTE — PROVIDER CONTACT NOTE (OTHER) - ACTION/TREATMENT ORDERED:
ACP Madison Morales made aware. ACP Madison Morales made aware. ACP Madison Morales came to assess patient at bedside. Care plan ongoing. ACP Madison Morales made aware. ISIDRA Morales came to assess patient at bedside. RRT called for agonal breathing on nonrebreather - see RRT sheet. Care plan ongoing.

## 2024-02-02 NOTE — PROGRESS NOTE ADULT - TIME BILLING
Review of laboratory data, radiology results, consultants' recommendations, documentation in [place], discussion with patient/advanced care providers and interdisciplinary staff (such as , social workers, etc). Interventions were performed as documented above.

## 2024-02-02 NOTE — PROGRESS NOTE ADULT - PROBLEM SELECTOR PLAN 1
- pt. met SIRS criteria + source of known foot infection.   - Sepsis now resolved. UA negative. RVP negative. BCx NGTD.   - Recently treated for L foot wound growing E. coli and S. aureus.   - Vascular offering definitive management with amputation but family declines. Local wound care.   - ID consulted for antibiotic recs - changed antibiotics to doxycycline, ceftriaxone and flagyl. ID left final anbx recs based on GOC.

## 2024-02-02 NOTE — PROGRESS NOTE ADULT - SUBJECTIVE AND OBJECTIVE BOX
Mike Hernandez MD  [facility]  Pager: [phone]  Available via MS Teams    SUBJECTIVE / OVERNIGHT EVENTS    Non-verbal   In mild distress     MEDICATIONS  (STANDING):  cefTRIAXone   IVPB      Dakins Solution - 1/4 Strength 1 Application(s) Topical daily  dextrose 5%. 1000 milliLiter(s) (50 mL/Hr) IV Continuous <Continuous>  dextrose 5%. 1000 milliLiter(s) (50 mL/Hr) IV Continuous <Continuous>  dextrose 5%. 1000 milliLiter(s) (100 mL/Hr) IV Continuous <Continuous>  dextrose 5%. 1000 milliLiter(s) (100 mL/Hr) IV Continuous <Continuous>  dextrose 5%. 1000 milliLiter(s) (75 mL/Hr) IV Continuous <Continuous>  dextrose 50% Injectable 12.5 Gram(s) IV Push once  dextrose 50% Injectable 12.5 Gram(s) IV Push once  dextrose 50% Injectable 25 Gram(s) IV Push once  dextrose 50% Injectable 25 Gram(s) IV Push once  dextrose 50% Injectable 25 Gram(s) IV Push once  dextrose 50% Injectable 25 Gram(s) IV Push once  doxycycline IVPB      doxycycline IVPB 100 milliGRAM(s) IV Intermittent every 12 hours  glucagon  Injectable 1 milliGRAM(s) IntraMuscular once  glucagon  Injectable 1 milliGRAM(s) IntraMuscular once  heparin   Injectable 5000 Unit(s) SubCutaneous every 12 hours  insulin lispro (ADMELOG) corrective regimen sliding scale   SubCutaneous every 6 hours  metroNIDAZOLE  IVPB 500 milliGRAM(s) IV Intermittent every 12 hours  pantoprazole  Injectable 40 milliGRAM(s) IV Push daily  polyethylene glycol 3350 17 Gram(s) Oral daily  senna 2 Tablet(s) Oral at bedtime    MEDICATIONS  (PRN):  dextrose Oral Gel 15 Gram(s) Oral once PRN Blood Glucose LESS THAN 70 milliGRAM(s)/deciliter  dextrose Oral Gel 15 Gram(s) Oral once PRN Blood Glucose LESS THAN 70 milliGRAM(s)/deciliter  HYDROmorphone  Injectable 0.25 milliGRAM(s) IV Push every 6 hours PRN Severe Pain (7 - 10)  metoprolol tartrate Injectable 5 milliGRAM(s) IV Push every 6 hours PRN AFIB WITH RVR RATES GREATER THAN 130BPM      I&O's Summary    01 Feb 2024 07:01  -  02 Feb 2024 07:00  --------------------------------------------------------  IN: 0 mL / OUT: 400 mL / NET: -400 mL        PHYSICAL EXAM:  Vital Signs Last 24 Hrs  T(C): 36.7 (02 Feb 2024 12:30), Max: 36.7 (01 Feb 2024 18:14)  T(F): 98 (02 Feb 2024 12:30), Max: 98 (01 Feb 2024 18:14)  HR: 75 (02 Feb 2024 12:30) (75 - 126)  BP: 147/54 (02 Feb 2024 12:30) (128/78 - 151/79)  BP(mean): --  RR: 17 (02 Feb 2024 12:30) (16 - 19)  SpO2: 100% (02 Feb 2024 12:30) (98% - 100%)    Parameters below as of 02 Feb 2024 05:25  Patient On (Oxygen Delivery Method): nasal cannula  O2 Flow (L/min): 2    CONSTITUTIONAL: In mild distress   EYES: conjunctiva and sclera clear  ENMT: Moist oral mucosa   NECK: Supple   RESPIRATORY: Normal respiratory effort; lungs are clear to auscultation bilaterally  CARDIOVASCULAR: Regular rate and rhythm, normal S1 and S2, no murmur/rub/gallop; Peripheral pulses are 2+ bilaterally  ABDOMEN: Nontender to palpation, normoactive bowel sounds, no rebound/guarding   MUSCULOSKELETAL: No lower extremity edema   PSYCH: non-verbal,  NEUROLOGY: moves all extremities   SKIN: No rashes    LABS:                        9.0    16.36 )-----------( 183      ( 02 Feb 2024 08:50 )             27.9     02-02    133<L>  |  107  |  42<H>  ----------------------------<  157<H>  4.1   |  12<L>  |  1.91<H>    Ca    8.2<L>      02 Feb 2024 08:50  Phos  2.8     02-02  Mg     1.70     02-02            Urinalysis Basic - ( 02 Feb 2024 08:50 )    Color: x / Appearance: x / SG: x / pH: x  Gluc: 157 mg/dL / Ketone: x  / Bili: x / Urobili: x   Blood: x / Protein: x / Nitrite: x   Leuk Esterase: x / RBC: x / WBC x   Sq Epi: x / Non Sq Epi: x / Bacteria: x        SARS-CoV-2: NotDetec (24 Jan 2024 11:26)      RADIOLOGY & ADDITIONAL TESTS:  Other Results Reviewed Today: BMP with stable Cr, CBC with stable Hg     COORDINATION OF CARE:  Communication: discussed plan of care with ACP

## 2024-02-02 NOTE — CHART NOTE - NSCHARTNOTEFT_GEN_A_CORE
2/2 Night Coverage Med ACP    Pt seen and assessed at bedside for NGT insertion, pt appeared to be breathing agonally on non-rebreather.  As per RN, pt has been placed on NRB before change of shift by day RN for agonal breathing however not desaturating. Pt now desaturating on NRB to high 80s, Nasopharyngeal suctioning provided with no resolution.  RRT called for hypoxia/respiratory distress.  RRT at bedside ordered hypersal and duoneb nebulizer with improvement in spo2 on NRB mask.  Labs, ABG, High flow ordered.  Son (Toby) endorses he wishes pt to be full code until he speaks to his other brothers.      Madison CALVO 2/2 Night Coverage [facility]    Pt seen and assessed at bedside for NGT insertion, pt appeared to be breathing agonally on non-rebreather.  As per RN, pt has been placed on NRB before change of shift by day RN for agonal breathing however not desaturating. Pt now desaturating on NRB to high 80s, Nasopharyngeal suctioning provided with no resolution.  RRT called for hypoxia/respiratory distress.  RRT at bedside ordered hypersal and duoneb nebulizer with improvement in spo2 on NRB mask.  Labs, ABG, High flow ordered.  Son (Toby) endorses he wishes pt to be full code until he speaks to his other brothers.          --------------Addendum-------------    2nd RRT called for hypoxia on High flow 100%.  RRT at bedside, pt appears to be more alert, less congested on HFNC 100% spo2 in the 70s-80s however appears to be comfortable breathing spont non-labored. Heat pack on right arm improved Spo2 reading in addition with nebulizer treatments. Abg was ordered.    Madison CALVO

## 2024-02-02 NOTE — PROGRESS NOTE ADULT - SUBJECTIVE AND OBJECTIVE BOX
[facility]-- WOUND TEAM -- FOLLOW UP NOTE  --------------------------------------------------------------------------------    subjective: Patient seen and examined at bedside. Patient unable to provide subjective information given mental status. Patient remains in distress/tachypnea,  labor breathing non rebreather mask in place       Interval HPI/24 hour events:   -->Persistent leukocytosis   -->Remains with rapid afib   -->Received 1PRBC 1/29/24 for HH 6.5/19.6 g/dl   -->Goals of care meeting 2/1/24 with HCP son, patient remains full code.   -->Patient is not a surgical candidate for PEG tube placement   -->Patient is not tolerating PO meds, meds transition to IV route   Chart reviewed including labs and relevant images      Diet:  Diet, NPO:   Except Medications (01-24-24 @ 19:11)      ROS:pt unable to offer    ALLERGIES & MEDICATIONS  --------------------------------------------------------------------------------  Allergies    Pineapple (Unknown)  contrast dye -  rash (Other)  iodine (Other)  codeine (Vomiting)  penicillin (Rash)  latex (Rash)    Intolerances    Tolerates ceftriaxone, cefepime (Other)    STANDING INPATIENT MEDICATIONS    cefTRIAXone   IVPB      Dakins Solution - 1/4 Strength 1 Application(s) Topical daily  dextrose 5%. 1000 milliLiter(s) IV Continuous <Continuous>  dextrose 5%. 1000 milliLiter(s) IV Continuous <Continuous>  dextrose 5%. 1000 milliLiter(s) IV Continuous <Continuous>  dextrose 5%. 1000 milliLiter(s) IV Continuous <Continuous>  dextrose 5%. 1000 milliLiter(s) IV Continuous <Continuous>  dextrose 50% Injectable 25 Gram(s) IV Push once  dextrose 50% Injectable 25 Gram(s) IV Push once  dextrose 50% Injectable 12.5 Gram(s) IV Push once  dextrose 50% Injectable 25 Gram(s) IV Push once  dextrose 50% Injectable 25 Gram(s) IV Push once  dextrose 50% Injectable 12.5 Gram(s) IV Push once  doxycycline IVPB 100 milliGRAM(s) IV Intermittent every 12 hours  doxycycline IVPB      glucagon  Injectable 1 milliGRAM(s) IntraMuscular once  glucagon  Injectable 1 milliGRAM(s) IntraMuscular once  heparin   Injectable 5000 Unit(s) SubCutaneous every 12 hours  insulin lispro (ADMELOG) corrective regimen sliding scale   SubCutaneous every 6 hours  metroNIDAZOLE  IVPB 500 milliGRAM(s) IV Intermittent every 12 hours  pantoprazole  Injectable 40 milliGRAM(s) IV Push daily  polyethylene glycol 3350 17 Gram(s) Oral daily  senna 2 Tablet(s) Oral at bedtime    PRN INPATIENT MEDICATION  dextrose Oral Gel 15 Gram(s) Oral once PRN  dextrose Oral Gel 15 Gram(s) Oral once PRN  HYDROmorphone  Injectable 0.25 milliGRAM(s) IV Push every 6 hours PRN  metoprolol tartrate Injectable 5 milliGRAM(s) IV Push every 6 hours PRN    Vital signs:  T(C): 36.9 (02-02-24 @ 18:21), Max: 36.9 (02-02-24 @ 18:21)  HR: 82 (02-02-24 @ 18:21) (75 - 106)  BP: 134/66 (02-02-24 @ 18:21) (134/66 - 151/79)  RR: 18 (02-02-24 @ 18:21) (16 - 18)  SpO2: 98% (02-02-24 @ 18:21) (98% - 100%)    Wt(kg): --    02-01-24 @ 07:01  -  02-02-24 @ 07:00  --------------------------------------------------------  IN: 0 mL / OUT: 400 mL / NET: -400 mL    02-02-24 @ 07:01  -  02-02-24 @ 19:04  --------------------------------------------------------  IN: 0 mL / OUT: 250 mL / NET: -250 mL    onstitutional: Lethargic, minimally arousable to painful stimuli, unable to follow commands. Frail, pale ill appearing, bedbound, cachetic. Anasarca.   (+) low airloss support surface, (+) fluidized positioning devices, (+) complete cair boots  HEENT: NC/AT, nonicteric, oral mucosa dry.   Cardiovascular: rapid irregular rate in the monitor   Respiratory: nonlabored, supplemental oxygen via non rebreather   Gastrointestinal: soft NT/ND, incontinent of stool  : incontinent urine, external female urinary collection device.  Musculoskeletal: mildly contracted bilateral lower extremities at knees (eorse to left lower extremitiy), left extremity unable to extend with pROM.   Vascular: B/L upper extremities with edema including in bilateral hand,no temperature changes noted.    B/L LE: Right foot wounds see below, Left foot extensive wound with mottling noted to all digits see below   RLE: nonpalpable dp/pt pulses, capillary refill > 3 seconds, warm extremities   LLE: nonpalpable dp/pt pulses, capillary refill >3 seconds, foot warm   Wounds with mixed etiology pressure and arterial insufficiency:  Right heel prev deep tissue pressure injury evolved to Unstageable pressure injury 2.4oex2aun0wt (prev 3.5cmx3.5cmx0)- mixed deep purple-maroon discoloration approx 25% /remaining beginning dry fixed eschar   Right dorsal foot deep tissue pressure injury-resolved  Right lateral lower leg nonblanching erythema- resolved   Left lateral lower leg and right medial hallux blanching erythema   Left lateral malleolus extending to left foot- stage 4 pressure injury with extensive necrosis mostly to dorsal, lateral malleolus and plantar aspect of feet/foot ischemia.  -Necrotic bone, tendon, ligament in center with large area of surrounding eschar beginning to soften in center with areas of epidermal separation exposing black eschar,, plantar feet with mixed tissue type dry eschar and softening eschar purulent reabsorbed blister with green base. Toes area cyanotic. Left lateral malleolus with desiccated necrotic bone/tendon   -No crepitus, no fluctuance.  -Small serofibrinous drainage. Foul odor   -Periwound skin extending from proximal wound edge affecting entire foot and toes with extensive mottling; areas of dry-fixed eschar noted to medial mid foot, medial 1st metatarsal head.  Skin: thin, frail skin, scattered ecchymosis without hematoma, poor skin turgor. Linear midsternal surgical scar, linear left heel surgical scar.  Right shoulder with hypopigmentation.   Fingers tips bilaterally with ecchymosis puncture sites, no associated cellulitis   Right posterior thoracic rib cage- deep tissue pressure injury evolved to Stage 2 pressure injury - two ulcers measure in cluster  by intact epithelial bridge- 3wkf7qwn1.1cm (prev 4.7meq3kto5)-Tissue type pink moist dermis.  Minimal serous drainage. No induration.  Gluteal cleft extending to right buttock and sacrum (right and left of sacrum) evolving deep tissue pressure injury now presenting as Unstageable pressure injury with mixed tissue type- measure in cluster of 3 ulcerations 8cmx5.5cmx0.2cm  (prev 5.9hew4lii2.2cm; well defined irregular borders, mixed tissue type right lower buttock presenting with pink moist dermis with fibrinous tissue, Sacrum with black minimally moist eschar adhere, left sacrum/upper buttock with mixed tissue type with predominately yellow moist slough adhere in the center, pink dermis and darkened dermis.   Psych: Unable to assess mental status   Medical attending David at the bedside during skin assessment.         LABS/ CULTURES/ RADIOLOGY:              9.0    16.36 >-----------<  183      [02-02-24 @ 08:50]              27.9     133  |  107  |  42  ----------------------------<  157      [02-02-24 @ 08:50]  4.1   |  12  |  1.91        Ca     8.2     [02-02-24 @ 08:50]      Mg     1.70     [02-02-24 @ 08:50]      Phos  2.8     [02-02-24 @ 08:50]      Blood Gas Calcium, Ionized - Venous: 1.27 mmol/L (02-02-24 @ 09:43)      CAPILLARY BLOOD GLUCOSE      POCT Blood Glucose.: 202 mg/dL (02 Feb 2024 17:02)  POCT Blood Glucose.: 185 mg/dL (02 Feb 2024 12:17)  POCT Blood Glucose.: 105 mg/dL (02 Feb 2024 05:20)  POCT Blood Glucose.: 167 mg/dL (01 Feb 2024 23:12)      A1C with Estimated Average Glucose Result: 6.6 % (12-16-23 @ 07:30)      Culture - Abscess with Gram Stain (12.22.23 @ 17:33)   Gram Stain:   No polymorphonuclear leukocytes seen per low power field   Rare Gram positive cocci in pairs seen per oil power field  - Amoxicillin/Clavulanic Acid: S <=8/4  - Ampicillin: S <=8 These ampicillin results predict results for amoxicillin  - Ampicillin/Sulbactam: S <=4/2  - Ampicillin/Sulbactam: S <=8/4  - Aztreonam: S <=4  - Cefazolin: S <=2  - Cefazolin: S <=4  - Cefepime: S <=2  - Cefoxitin: S <=8  - Ceftriaxone: S <=1  - Ciprofloxacin: S <=0.25  - Clindamycin: S <=0.25  - Ertapenem: S <=0.5  - Erythromycin: S <=0.25  - Gentamicin: S <=1 Should not be used as monotherapy  - Gentamicin: S <=2  - Imipenem: S <=1  - Levofloxacin: S <=0.5  - Meropenem: S <=1  - Oxacillin: S <=0.25 Oxacillin predicts susceptibility for dicloxacillin, methicillin, and nafcillin  - Penicillin: R >8  - Piperacillin/Tazobactam: S <=8  - Rifampin: S <=1 Should not be used as monotherapy  - Tetracycline: S <=1  - Tobramycin: S <=2  - Trimethoprim/Sulfamethoxazole: S <=0.5/9.5  - Trimethoprim/Sulfamethoxazole: S <=0.5/9.5  - Vancomycin: S 1  Specimen Source: .Abscess Left ankle wound  Culture Results:   Few Staphylococcus aureus   Rare Escherichia coli   Rare Finegoldia magna "Susceptibilities not performed"  Organism Identification: Staphylococcus aureus   Escherichia coli  Organism: Staphylococcus aureus  Organism: Escherichia coli  Method Type: SRIRAM  Method Type: SRIRAM          ACC: [id number] EXAM:  XR FOOT 2 VIEWS LT   ORDERED BY: ALICIA HUNT     ACC: [id number] EXAM:  XR ANKLE COMP MIN 3 VIEWS LT   ORDERED BY: ALICIA HUNT     PROCEDURE DATE:  01/24/2024          INTERPRETATION:  CLINICAL INDICATION: left heel ulcer    EXAM:  Frontal and lateral left ankle and foot from 1/24/2024 at 1409. Compared   to prior study from 12/15/2023.    IMPRESSION:  Dorsal forefoot soft tissue swelling. Questionable focal slightly thinned   indistinct appearing calcaneal cortex on left foot frontal/oblique view   raising concern for possible osteomyelitis, MRI would be helpful to   better assess. No tracking gas collections or additional suspected areas   of osteomyelitis. No fractures or dislocations.    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 Tarsometatarsal   alignment maintained without evidence for a Lisfranc injury.    Small calcaneal enthesophytes.    Generalized osteopenia otherwise no discrete lytic or blastic lesions.    --- End of Report ---        BILATERAL LOWER EXTREMITY VENOUS ULTRASOUND REPORT       Name:       AARON GREEN Study Date:       1/26/2024  YOB: 1930          MRN:              [id number]  Age:        89 years           Accession Number: [id number]  Gender:F                  Admission ID:     [id number]  Referring Physician:    Charmaine Ruby MD  Interpreting Physician: Rashawn Valdes MD, PhD  Primary Sonographer:    Lester Norman RVT       --------------------------------------------------------------------------------  Procedure:        Duplex Real-time grayscale/color Doppler ultrasonography used                    to interrogate the lower extremity venous system.  CPT:              DUPLX EXT VEINS COMPLT GREG - 87926.m  Admission Status: Inpatient  Indication(s):    Localized swelling, mass and lump, lower limb, bilateral -                    R22.43  Study Quality:    Technically difficult and limited study.       --------------------------------------------------------------------------------  CONCLUSIONS:    No evidence of deep vein thrombosis noted in the visualized right and left lower extremities.       --------------------------------------------------------------------------------  MEASUREMENTS:    +-----------------+--+-------+ +-----------------+--+-------+-------+  |RIGHT            |2D|Doppler| |LEFT             |2D|Doppler|Augment|  +-----------------+--+-------+ +-----------------+--+-------+-------+  |Common Femoral:  |* |   *   | |Common Femoral:  |Y |   Y   |       |  +-----------------+--+-------+ +-----------------+--+-------+-------+  |Femoral:         |Y |   Y   | |Femoral:         |Y |   Y   |       |  +-----------------+--+-------+ +-----------------+--+-------+-------+  |Popliteal:       |Y |   Y   | |Popliteal:     |Y |   Y   |       |  +-----------------+--+-------+ +-----------------+--+-------+-------+  |Peroneal:        |Y |       | |Peroneal:        |Y |       |       |  +-----------------+--+-------+ +-----------------+--+-------+-------+  |PosteriorTibial:|Y |       | |Posterior Tibial:|Y |       |       |  +-----------------+--+-------+ +-----------------+--+-------+-------+   2D                         Doppler             Augment   Y = Compressible           Y = Normal          Y = Augmentation   N = Incompressible         N = Abnormal        N = No Augmentation   P = Partially Compressible * = Cannot Evaluate   * = Cannot Evaluate       --------------------------------------------------------------------------------  FINDINGS:  Right Lower Extremity Veins:  R CFV: The right common femoral vein was not visualized. Spectral Doppler flow pattern was not able to be evaluated.  R Fem: The right femoral vein was normally compressible. Spectral Doppler flow pattern was normal.  R Pop: The right popliteal vein was normally compressible. Spectral Doppler flow pattern was normal.  R Per: The right peroneal vein was normally compressible.  R PTV: The right posterior tibial vein was normally compressible.     Left Lower Extremity Veins:  L CFV: The left common femoral vein was normally compressible. Spectral Doppler flow pattern was normal.  L Fem: The left femoral vein was normally compressible. Spectral Doppler flow pattern was normal.  L Pop: The left popliteal vein was normally compressible. Spectral Doppler flow pattern was normal.  L Per: The left peroneal vein was normally compressible.  L PTV: The left posterior tibial vein was normally compressible.       Electronically signed on 1/26/2024 at 10:12:44 AM by Rashawn Valdes MD, PhD, FACC, VI           *** Final ***

## 2024-02-02 NOTE — CHART NOTE - NSCHARTNOTEFT_GEN_A_CORE
Attempted NGT at bedside with RN and additional provider. Multiple attempts are unsuccessful. NGT advances well but there is resistance met at certain point. Patient begins to gag and cry, noted coiling at the mouth. Reattempted the same with similar reaction. NGT continues to be advanced but expelled by patient and seen to coil inside the mouth. Dr Hernandez aware.

## 2024-02-02 NOTE — PROGRESS NOTE ADULT - ASSESSMENT
Assessment/Plan: 89 y/o F with PMH HTN, HLD, CAD, DM, dementia s/p spinal stimulator placement who presented from [facility] for hypernatremia and concern for foot infection.     Wound care f/u for right posterior rib cage, gluteal cleft to right buttock deep tissue pressure injury, left upper buttock unstageable pressure injury, right heel, right dorsal foot, left foot pressure injuries complicated by arterial insufficiency.     -Patient lethargic, mild distress, on non rebreather masks,, unable to follow commands, (per RN and primary team has been patient's baseline on this admission, per Palliative care team patient verbal and interactive on previous admission 1 month ago), frail, cachetic, bedbound, incontinent urine and stool. External female urinary  in place.   .Fingertips with ecchymosis, puncture sites from FS?. Warm extremities.   -Currently NPO except meds; failed speech and swallow study, management per primary team.  -Multiple wounds noted:   ·Right posterior rib cage, now presenting as partial thickness wound exposing pink dermis, stable   Sacrum/right and left upper buttocks- unstageable pressure injury- mixed tissue type left upper buttock exposing adherent nonfluctuant yellow slough, darkened and pink dermis, Sacrum exposing black minimally moist eschar, no induration, no crepitus, no s/s of acute soft tissue infection  *Buttock wounds complicated by fecal and urinary incontinence.  -Bilateral lower extremities with pressure injuries complicated by arterial insufficiency, most extensive left lateral malleolus and foot.  ·Right dorsal foot prev deep tissue pressure injury resolved, right heel previous deep tissue pressure injury now Unstageable pressure injury, right latera lower leg stage 1 pressure injury resolved, all complicated by arterial insufficiency  .Right hallux and left lateral lower leg with blanching erythema, no tissue type changes palpable   ·Left lateral malleolus extensive necrosis involving bone, tendon and ligament; central eschar beginning to soften. Warm extremity (prev cool) mottling foot/toes, no palpable pulses. (+) malodor, no purulent drainage. CONCERN FOR ACUTE ISCHEMIC LIMB and Beginning conversion of dry gangrene to wet gangrene; no crepitus noted.  .Vascular surgery recommendations appreciated.  .As per notes, HCP/son declined surgical intervention    ·B/L LE (-) DVT- 1/26  ·Left ankle and foot x-ray without tracking gas collections or additional suspected areas of osteomyelitis; however physical exam concerning for OM, would consider MRI if in line with goals of care.   ·Left ankle cultures 12/22 (+)  Staphylococcus aureus, Escherichia coli, Finegoldia magna  -If in line with goals of care would recommend MIKHAIL/PVR.  -With no surgical intervention left foot ischemia will likely progress, may demarcate with time.  .Would consider Podiatry consult.  -Appreciate ID recommendations, currently receiving Ceftriaxone and Doxycyline and flagyl   -Topical recommendations:  .Protect posterior ears from non rebreather masks or nasal cannula, assure tube is not directly over skin. Inspect skin at least twice a day.   .Continue to turn and position as per hospital protocol   .Elevate all extremities with pillows   .Elevate heels with complete cair boots   ·Right posterior rib cage- cleanse wounds with NS, pat dry. Cover with silicone foam with border. Change daily and prn if soiled/compromised.  ·Sacrum and upper buttocks Unstageable pressure injury- cleanse wound and periwound skin with perineal spray, apply TRIAD moisture barrier paste, cover with silicone foam with border, change daily and prn if soiled/compromised.  ·Right heel, right hallux and left lateral lower leg- apply liquid barrier film, allow to dry, cover with abdominal pads and lose kerlix wrap. Change daily.  ·Left lateral malleolus extending to foot- cleanse with Dakins 1/4 strength, pat dry. (Per records patient allergic to iodine), given foul odor, recommend placing moistened tra with Dakins solution 1/4 strenght (weto to dry) over areas of necrotic tissue,  Cover with abdominal pads, secure with lose kerlix wrap. Change twice a day or prn if soiled.   If patient transitions to hospice care, then change every day.   -Continue to offload pressure; low airloss support surface, t&p per protocol with use of fluidized positioning devices, offload heels with complete cair boots at all times, pillow between bony prominences.  -Continue incontinence care per protocol and use of single breathable incontinence pad.  -Appreciate Palliative care recommendations. If family opts not to pursue hospice/comfort measures and is not a surgical candidate or family opts out of surgery, would strongly consider Ethics consults in setting of ischemic left foot. Discussed with medical attending at the bedside MD Hernandez at this time given son is the HCP he is the decision maker. Ongoing GOC with family.   -Nutrition recommendations appreciated when medically appropriate.    All findings discussed with medical attending MD Hernandez. . Wound surgery service line remains available via MS teams if we can assist/contribute in any way to family meetings and/or discussion regarding goals of care. Patient frail, appears in mild distress, rapid Afib,  bedbound, incontinent urine and stool, unable to make needs known, nutritionally compromised, NPO since 1/24/23, multiple wounds including c/f acute left foot ischemia. Wounds unlikely to fully heal. If family in agreement patient appears to be appropriate candidate for hospice referral.     Continue low airloss support surface.  Continue to turn and position every 2 hours with z-mariana fluidized positioning device.  Continue use of Complete Cair pressure offloading boots.  Continue Nutritional management as per RD recommendations.    Upon discharge follow up at outpatient [facility]. [address] [place] [address]. [phone].    Will continue to follow while inpatient. Please reconsult earlier if needed   Thank you.    Salena Lares, AGNADELITA-BC, CWOC    pager #[phone] or available in MS teams     If after 4PM or before 7:30AM on Mon-Friday or weekend/holiday please contact general surgery for urgent matters.   Team A- [id number]   Team B- [id number]  For non-urgent matters e-mail [email or web address]    I spent 50 minutes face-to-face with this patient of which more than 50% of the time was spent counseling/coordinating care of this patient. Assessment/Plan: 89 y/o F with PMH HTN, HLD, CAD, DM, dementia s/p spinal stimulator placement who presented from [facility] for hypernatremia and concern for foot infection.     Wound care f/u for right posterior rib cage, gluteal cleft to right buttock deep tissue pressure injury, left upper buttock unstageable pressure injury, right heel, right dorsal foot, left foot pressure injuries complicated by arterial insufficiency.     -Patient lethargic, mild distress, on non rebreather masks,, unable to follow commands, (per RN and primary team has been patient's baseline on this admission, per Palliative care team patient verbal and interactive on previous admission 1 month ago), frail, cachetic, bedbound, incontinent urine and stool. External female urinary  in place.   .Fingertips with ecchymosis, puncture sites from FS?. Warm extremities.   -Currently NPO except meds; failed speech and swallow study, management per primary team.  -Multiple wounds noted:   ·Right posterior rib cage, now presenting as partial thickness wound exposing pink dermis, stable   Sacrum/right and left upper buttocks- unstageable pressure injury- mixed tissue type left upper buttock exposing adherent nonfluctuant yellow slough, darkened and pink dermis, Sacrum exposing black minimally moist eschar, no induration, no crepitus, no s/s of acute soft tissue infection  *Buttock wounds complicated by fecal and urinary incontinence.  -Bilateral lower extremities with pressure injuries complicated by arterial insufficiency, most extensive left lateral malleolus and foot.  ·Right dorsal foot prev deep tissue pressure injury resolved, right heel previous deep tissue pressure injury now Unstageable pressure injury, right latera lower leg stage 1 pressure injury resolved, all complicated by arterial insufficiency  .Right hallux and left lateral lower leg with blanching erythema, no tissue type changes palpable   ·Left lateral malleolus extensive necrosis involving bone, tendon and ligament; central eschar beginning to soften. Warm extremity (prev cool) mottling foot/toes, no palpable pulses. (+) malodor, no purulent drainage. CONCERN FOR ACUTE ISCHEMIC LIMB and Beginning conversion of dry gangrene to wet gangrene; no crepitus noted.  .Vascular surgery recommendations appreciated.  .As per notes, HCP/son declined surgical intervention    ·B/L LE (-) DVT- 1/26  ·Left ankle and foot x-ray without tracking gas collections or additional suspected areas of osteomyelitis; however physical exam concerning for OM, would consider MRI if in line with goals of care.   ·Left ankle cultures 12/22 (+)  Staphylococcus aureus, Escherichia coli, Finegoldia magna  -If in line with goals of care would recommend MIKHAIL/PVR.  -With no surgical intervention left foot ischemia will likely progress, may demarcate with time.  .Would consider Podiatry consult.  -Appreciate ID recommendations, currently receiving Ceftriaxone and Doxycyline and flagyl   -Topical recommendations:  .Protect posterior ears from non rebreather masks or nasal cannula, assure tube is not directly over skin. Inspect skin at least twice a day.   .Continue to turn and position as per hospital protocol   .Elevate all extremities with pillows   .Elevate heels with complete cair boots   ·Right posterior rib cage- cleanse wounds with NS, pat dry. Cover with silicone foam with border. Change daily and prn if soiled/compromised.  ·Sacrum and upper buttocks Unstageable pressure injury- cleanse wound and periwound skin with perineal spray, apply TRIAD moisture barrier paste, cover with silicone foam with border, change daily and prn if soiled/compromised.  ·Right heel, right hallux and left lateral lower leg- apply liquid barrier film, allow to dry, cover with abdominal pads and lose kerlix wrap. Change daily.  ·Left lateral malleolus extending to foot- cleanse with Dakins 1/4 strength, pat dry. (Per records patient allergic to iodine), apply liquid barrier  film to intact periwound skin, allow to dry, given foul odor, recommend placing moistened tra with Dakins solution 1/4 strenght (weto to dry) over areas of necrotic tissue,  Cover with abdominal pads, secure with lose kerlix wrap. Change twice a day or prn if soiled.   If patient transitions to hospice care, then change every day.   -Continue to offload pressure; low airloss support surface, t&p per protocol with use of fluidized positioning devices, offload heels with complete cair boots at all times, pillow between bony prominences.  -Continue incontinence care per protocol and use of single breathable incontinence pad.  -Appreciate Palliative care recommendations. If family opts not to pursue hospice/comfort measures and is not a surgical candidate or family opts out of surgery, would strongly consider Ethics consults in setting of ischemic left foot. Discussed with medical attending at the bedside MD Hernandez at this time given son is the HCP he is the decision maker. Ongoing GOC with family.   -Nutrition recommendations appreciated when medically appropriate.    All findings discussed with medical attending MD Hernandez. . Wound surgery service line remains available via MS teams if we can assist/contribute in any way to family meetings and/or discussion regarding goals of care. Patient frail, appears in mild distress, rapid Afib,  bedbound, incontinent urine and stool, unable to make needs known, nutritionally compromised, NPO since 1/24/23, multiple wounds including c/f acute left foot ischemia. Wounds unlikely to fully heal. If family in agreement patient appears to be appropriate candidate for hospice referral.     Continue low airloss support surface.  Continue to turn and position every 2 hours with z-mariana fluidized positioning device.  Continue use of Complete Cair pressure offloading boots.  Continue Nutritional management as per RD recommendations.    Upon discharge follow up at outpatient [facility]. [address] [place] [address]. [phone].    Will continue to follow while inpatient. Please reconsult earlier if needed   Thank you.    Salena Lares, EDWARDO-BC, CWOC    pager #[phone] or available in MS teams     If after 4PM or before 7:30AM on Mon-Friday or weekend/holiday please contact general surgery for urgent matters.   Team A- [id number]   Team B- [id number]  For non-urgent matters e-mail [email or web address]    I spent 50 minutes face-to-face with this patient of which more than 50% of the time was spent counseling/coordinating care of this patient.

## 2024-02-02 NOTE — PROGRESS NOTE ADULT - PROBLEM SELECTOR PLAN 2
- on admission  and Cr 2.76. Likely prerenal   - c/w IVF  - Now improved s/p fluid resuscitation  - CTM SCr    #AGMA   - bicarb down trending to 12 on 2/2   - d/w nephrology, will start bicarb table 1300 q8 once ng tube is placed

## 2024-02-02 NOTE — PROVIDER CONTACT NOTE (OTHER) - ASSESSMENT
Pt's breathing is labored Pt's breathing is labored with oxygen at 88% on venturi mask. Pt's oxygen was increased from 6L NC to venturi mask 50% at 15L/min per ACP recommendations at bedside. On venturi mask 50% at 15L/min, pt's breathing is labored with oxygen at 88

## 2024-02-02 NOTE — PROGRESS NOTE ADULT - ASSESSMENT
89 y/o F with PMH HTN, HLD, CAD, DM, dementia s/p spinal stimulator placement who presented from [facility] for hypernatremia and concern for foot infection.

## 2024-02-03 NOTE — PROVIDER CONTACT NOTE (OTHER) - ASSESSMENT
Pt is on high flow nasal cannula sustaining in the 80s. No signs of agonal breathing. Pt appears to be more alert than normal mentation, but still A&O 0 and nonverbal.

## 2024-02-03 NOTE — PROGRESS NOTE ADULT - TIME BILLING
Review of laboratory data, radiology results, consultants' recommendations, documentation in [place], discussion with patient/advanced care providers and interdisciplinary staff (such as , social workers, etc). Interventions were performed as documented above.

## 2024-02-03 NOTE — CONSULT NOTE ADULT - PROBLEM SELECTOR PROBLEM 3
Metabolic acidosis
Pt walking halls throughout morning  Talks to staff at RN station often  Also talks to self in hallway but does not appear to be RIS  Focused on wanting to leave the hospital  Becomes louder and angry at times but able to remain in control  Utilizes radio at times to cope  Does not want to attend groups  Was given ativan prn for increasing anxiety at 1246 
Hypernatremia

## 2024-02-03 NOTE — CONSULT NOTE ADULT - PROBLEM SELECTOR RECOMMENDATION 2
X-ray (1/24): Dorsal forefoot soft tissue swelling. Questionable focal slightly thinned indistinct appearing calcaneal cortex on left foot frontal/oblique view raising concern for possible osteomyelitis, MRI would be helpful to better assess. No tracking gas collections or additional suspected areas of osteomyelitis. No fractures or dislocations. 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 Tarsometatarsal alignment maintained without evidence for a Lisfranc injury. Small calcaneal enthesophytes. Generalized osteopenia otherwise no discrete lytic or blastic lesions.  > Appreciate vascular recs - not a surgical candidate   > Patient on IV abx
Patient with acute kidney injury in the setting of hypovolemia on admission as well as sepsis.  Patient's creatinine was increased to 2.76 on admission but improved with fluids to 2.05 today.  CXR reviewed patient does not appear to have pulmonary edema.  Can continue IVF if needed but would defer D5W for now and change to isotonic solution. Recommend continue to monitor BMP.

## 2024-02-03 NOTE — CONSULT NOTE ADULT - PROBLEM SELECTOR RECOMMENDATION 9
Hypernatremia on addmission likely in the setting of impaired access to free water.  Patient presently on D5W at 75 cc / hr.  Patient now presently hyponatremic at 133.  Recommend stop D5W and monitor BMP.
Pt bedbound, nonverbal at baseline, contracted.   PPSV 10% at this time  Please assist with ADLs   Wound care recs

## 2024-02-03 NOTE — PROGRESS NOTE ADULT - PROBLEM SELECTOR PLAN 7
- L foot xray: Dorsal forefoot soft tissue swelling. Calcaneal cortex on left foot with possible osteomyelitis  - previously unable to tolerate MRI L ankle when attempted in recent hospitalization  - Recently Tx in Dec 23' for L foot wound growing S. Aureas and E. Coli, amputation L foot offered and deferred at the time as per GOC   - Wound care following  - c/w Abx as above  - Vascular consulted, recs: continue local wound care, frequent offloading to B/L lower extremities

## 2024-02-03 NOTE — CONSULT NOTE ADULT - PROBLEM SELECTOR RECOMMENDATION 3
Patient's blood gas from RRT reviewed.  Metabolic acidosis noted.  Received pushes of IV bicarbonate which has subsequently improved today.  Recommend continue bicitra and monitor venous pH.
No
Patient NPO at this time. Continue IVF   Management per medical team   Patient's mental status remains very poor

## 2024-02-03 NOTE — PROGRESS NOTE ADULT - SUBJECTIVE AND OBJECTIVE BOX
Mike Hernandez MD  [facility] of [facility] Medicine  Pager: [phone]  Available via MS Teams    SUBJECTIVE / OVERNIGHT EVENTS:    At an RRT for hypoxia overnight, placed on HFNC and sats now improved   NG successfully placed overnight too, tube feeds now started at trickle   Patient continues to be lethargic, altered, and non-verbal - sick appearing     MEDICATIONS  (STANDING):  cefTRIAXone   IVPB      cefTRIAXone   IVPB 1000 milliGRAM(s) IV Intermittent every 24 hours  citric acid/sodium citrate Solution 30 milliLiter(s) Oral every 8 hours  Dakins Solution - 1/4 Strength 1 Application(s) Topical daily  dextrose 5%. 1000 milliLiter(s) (100 mL/Hr) IV Continuous <Continuous>  dextrose 5%. 1000 milliLiter(s) (75 mL/Hr) IV Continuous <Continuous>  dextrose 5%. 1000 milliLiter(s) (50 mL/Hr) IV Continuous <Continuous>  dextrose 5%. 1000 milliLiter(s) (50 mL/Hr) IV Continuous <Continuous>  dextrose 5%. 1000 milliLiter(s) (100 mL/Hr) IV Continuous <Continuous>  dextrose 50% Injectable 25 Gram(s) IV Push once  dextrose 50% Injectable 25 Gram(s) IV Push once  dextrose 50% Injectable 12.5 Gram(s) IV Push once  dextrose 50% Injectable 12.5 Gram(s) IV Push once  dextrose 50% Injectable 25 Gram(s) IV Push once  dextrose 50% Injectable 25 Gram(s) IV Push once  doxycycline IVPB      doxycycline IVPB 100 milliGRAM(s) IV Intermittent every 12 hours  glucagon  Injectable 1 milliGRAM(s) IntraMuscular once  glucagon  Injectable 1 milliGRAM(s) IntraMuscular once  heparin   Injectable 5000 Unit(s) SubCutaneous every 12 hours  insulin lispro (ADMELOG) corrective regimen sliding scale   SubCutaneous every 6 hours  levalbuterol Inhalation 0.63 milliGRAM(s) Inhalation every 6 hours  metoprolol tartrate 25 milliGRAM(s) Oral two times a day  metroNIDAZOLE  IVPB 500 milliGRAM(s) IV Intermittent every 12 hours  pantoprazole  Injectable 40 milliGRAM(s) IV Push daily  polyethylene glycol 3350 17 Gram(s) Oral daily  senna 2 Tablet(s) Oral at bedtime    MEDICATIONS  (PRN):  dextrose Oral Gel 15 Gram(s) Oral once PRN Blood Glucose LESS THAN 70 milliGRAM(s)/deciliter  dextrose Oral Gel 15 Gram(s) Oral once PRN Blood Glucose LESS THAN 70 milliGRAM(s)/deciliter      I&O's Summary    02 Feb 2024 07:01  -  03 Feb 2024 07:00  --------------------------------------------------------  IN: 975 mL / OUT: 250 mL / NET: 725 mL        PHYSICAL EXAM:  Vital Signs Last 24 Hrs  T(C): 36.2 (03 Feb 2024 12:15), Max: 36.9 (02 Feb 2024 18:21)  T(F): 97.2 (03 Feb 2024 12:15), Max: 98.4 (02 Feb 2024 18:21)  HR: 122 (03 Feb 2024 14:34) (57 - 122)  BP: 129/73 (03 Feb 2024 12:15) (113/76 - 134/66)  BP(mean): --  RR: 18 (03 Feb 2024 12:15) (17 - 22)  SpO2: 100% (03 Feb 2024 14:34) (85% - 100%)    Parameters below as of 03 Feb 2024 14:34  Patient On (Oxygen Delivery Method): HFNC  O2 Flow (L/min): 50  O2 Concentration (%): 100    CONSTITUTIONAL: NAD   EYES: conjunctiva and sclera clear  ENMT: Moist oral mucosa   NECK: Supple   RESPIRATORY: Normal respiratory effort; lungs are clear to auscultation bilaterally  CARDIOVASCULAR: Regular rate and rhythm, normal S1 and S2, no murmur/rub/gallop; Peripheral pulses are 2+ bilaterally  ABDOMEN: Nontender to palpation, normoactive bowel sounds, no rebound/guarding   MUSCULOSKELETAL: mild 2+ edema   PSYCH: alerted, lethargic and sick   NEUROLOGY: does not follow commands, pupils reactive to light   SKIN: No rashes    LABS:                        7.1    13.41 )-----------( 154      ( 03 Feb 2024 16:12 )             21.1     02-02    133<L>  |  107  |  44<H>  ----------------------------<  155<H>  4.0   |  12<L>  |  2.05<H>    Ca    8.1<L>      02 Feb 2024 22:14  Phos  3.8     02-02  Mg     1.50     02-02    TPro  5.3<L>  /  Alb  2.0<L>  /  TBili  0.3  /  DBili  x   /  AST  17  /  ALT  11  /  AlkPhos  191<H>  02-02          Urinalysis Basic - ( 02 Feb 2024 22:14 )    Color: x / Appearance: x / SG: x / pH: x  Gluc: 155 mg/dL / Ketone: x  / Bili: x / Urobili: x   Blood: x / Protein: x / Nitrite: x   Leuk Esterase: x / RBC: x / WBC x   Sq Epi: x / Non Sq Epi: x / Bacteria: x        SARS-CoV-2: NotDetec (24 Jan 2024 11:26)      RADIOLOGY & ADDITIONAL TESTS:  Other Results Reviewed Today: BMP with stable Cr, CBC with stable Hg     COORDINATION OF CARE:  Communication: discussed plan of care with ACP

## 2024-02-03 NOTE — PROGRESS NOTE ADULT - ASSESSMENT
89 y/o F with PMH HTN, HLD, CAD, DM, dementia s/p spinal stimulator placement who presented from [facility] for hypernatremia and concern for foot infection.

## 2024-02-03 NOTE — PROVIDER CONTACT NOTE (OTHER) - ACTION/TREATMENT ORDERED:
ACP Madison Morales made aware. ACP Madison Morales at bedside to assess patient. RRT called for hypoxia. Care plan ongoing.

## 2024-02-03 NOTE — PROGRESS NOTE ADULT - PROBLEM SELECTOR PLAN 3
- on admission  and Cr 2.76. Likely prerenal   - Now improved s/p fluid resuscitation  - CTM SCr    #AGMA   - bicarb down trending to 12 on 2/2   - d/w nephrology, started bicitra

## 2024-02-03 NOTE — PROGRESS NOTE ADULT - PROBLEM SELECTOR PLAN 1
- RRT noted for hypoxia on 2/3   - ABG with normal pH low pCO2 and with bicarb low on BMP which may represent a compensated metabolic acidosis. CXR with sig pulm edema    - now requiring high flow NC   - could be due to worsening deconditioning vs PE vs worsening sepsis   - may need V/Q scan if continues to be hypoxic and tachycardic   - continue antibiotics as per ID recs

## 2024-02-03 NOTE — CONSULT NOTE ADULT - SUBJECTIVE AND OBJECTIVE BOX
[facility] Division of Kidney Diseases & Hypertension  INITIAL CONSULT NOTE  --------------------------------------------------------------------------------  HPI: The patient is a 89 years old woman with history of diabetes and advanced dementia as well as peripheral artery disease.  The patient is bedbound and not verbal at baseline and so history is obtained from chart.  Patient was recently at [facility] for infection of lower extremity and evaluated by vascular surgery.  Patient was transferred to [facility].  On 1/24 patient was transferred from [place] to [facility] due to concern for hypernatremia, SAE as well as worsening lower extremity wound.  The patient was given antibiotics and fluids but given the patient's poor functional status surgery was not pursued.  Baseline creatinine is 1.3 but on admission was 2.76.  This SAE improved with IVF which were changed from isotonic to hypotonic on 1/31.  SAE and hyponatremia both improved.  Nephrology was consulted for acidosis.  The patient had RRT X 2 in the last 24 hours with worsening hypoxia but no pulmonary edema on CXR.        PAST HISTORY  --------------------------------------------------------------------------------  PAST MEDICAL & SURGICAL HISTORY:  Angina  in 2005, s/p angioplasty with stent placement, no recurrance, no sequalae      Diabetes Mellitus  type 2      Arthritis, Infective, Knee      Detached Retina  right eye      Acute Mucous Pneumonia  4/2010, resolved ; no residual problems      Spinal Stenosis- lumbar      History of Back Surgery  2010      Basal Cell Cancer  removed from left neck 2009      Dementia      Detached Retina, Left  laser surgery in 1995      S/P Carpal Tunnel Release  bilateral hands in 1990      Trigger Finger  release of middle and ring finger of right hand in 1990, and middle finger left hand in 2008      S/P Laparoscopic Cholecystectomy  2008      S/P TKR (Total Knee Replacement)  left knee      Cataract  removal with lens implant right in 2000        FAMILY HISTORY:  FH: HTN (hypertension)      PAST SOCIAL HISTORY: came [place]    ALLERGIES & MEDICATIONS  --------------------------------------------------------------------------------  Allergies    Pineapple (Unknown)  contrast dye -  rash (Other)  iodine (Other)  codeine (Vomiting)  penicillin (Rash)  latex (Rash)    Intolerances    Tolerates ceftriaxone, cefepime (Other)    Standing Inpatient Medications  albuterol/ipratropium for Nebulization 3 milliLiter(s) Nebulizer every 6 hours  cefTRIAXone   IVPB 1000 milliGRAM(s) IV Intermittent every 24 hours  cefTRIAXone   IVPB      citric acid/sodium citrate Solution 30 milliLiter(s) Oral every 8 hours  Dakins Solution - 1/4 Strength 1 Application(s) Topical daily  dextrose 5%. 1000 milliLiter(s) IV Continuous <Continuous>  dextrose 5%. 1000 milliLiter(s) IV Continuous <Continuous>  dextrose 5%. 1000 milliLiter(s) IV Continuous <Continuous>  dextrose 5%. 1000 milliLiter(s) IV Continuous <Continuous>  dextrose 5%. 1000 milliLiter(s) IV Continuous <Continuous>  dextrose 50% Injectable 25 Gram(s) IV Push once  dextrose 50% Injectable 25 Gram(s) IV Push once  dextrose 50% Injectable 25 Gram(s) IV Push once  dextrose 50% Injectable 25 Gram(s) IV Push once  dextrose 50% Injectable 12.5 Gram(s) IV Push once  dextrose 50% Injectable 12.5 Gram(s) IV Push once  doxycycline IVPB 100 milliGRAM(s) IV Intermittent every 12 hours  doxycycline IVPB      glucagon  Injectable 1 milliGRAM(s) IntraMuscular once  glucagon  Injectable 1 milliGRAM(s) IntraMuscular once  heparin   Injectable 5000 Unit(s) SubCutaneous every 12 hours  insulin lispro (ADMELOG) corrective regimen sliding scale   SubCutaneous every 6 hours  metroNIDAZOLE  IVPB 500 milliGRAM(s) IV Intermittent every 12 hours  pantoprazole  Injectable 40 milliGRAM(s) IV Push daily  polyethylene glycol 3350 17 Gram(s) Oral daily  senna 2 Tablet(s) Oral at bedtime  sodium chloride 3%  Inhalation 4 milliLiter(s) Inhalation every 4 hours    PRN Inpatient Medications  dextrose Oral Gel 15 Gram(s) Oral once PRN  dextrose Oral Gel 15 Gram(s) Oral once PRN  HYDROmorphone  Injectable 0.25 milliGRAM(s) IV Push every 6 hours PRN  metoprolol tartrate Injectable 5 milliGRAM(s) IV Push every 6 hours PRN      REVIEW OF SYSTEMS  --------------------------------------------------------------------------------  unable to obtain due to the patients mental status    VITALS/PHYSICAL EXAM  --------------------------------------------------------------------------------  T(C): 36.2 (02-03-24 @ 12:15), Max: 36.9 (02-02-24 @ 18:21)  HR: 103 (02-03-24 @ 12:15) (57 - 122)  BP: 129/73 (02-03-24 @ 12:15) (113/76 - 134/66)  RR: 18 (02-03-24 @ 12:15) (17 - 22)  SpO2: 90% (02-03-24 @ 12:15) (85% - 100%)  Wt(kg): --        02-02-24 @ 07:01  -  02-03-24 @ 07:00  --------------------------------------------------------  IN: 975 mL / OUT: 250 mL / NET: 725 mL      Physical Exam:  	Gen: no distress  	HEENT: anicteric  	Pulm: coarse breath sounds on nasal cannula  	CV: S1 and S2  	Abd: soft  	: No suprapubic fullness  	UE: contracted  	LE: contracted dressing noted, anasarca noted  	Neuro: non verbal  	Psych: calm  	Skin: cool to touch    LABS/STUDIES  --------------------------------------------------------------------------------              7.4    13.69 >-----------<  159      [02-02-24 @ 22:14]              23.0     133  |  107  |  44  ----------------------------<  155      [02-02-24 @ 22:14]  4.0   |  12  |  2.05        Ca     8.1     [02-02-24 @ 22:14]      Mg     1.50     [02-02-24 @ 22:14]      Phos  3.8     [02-02-24 @ 22:14]    TPro  5.3  /  Alb  2.0  /  TBili  0.3  /  DBili  x   /  AST  17  /  ALT  11  /  AlkPhos  191  [02-02-24 @ 22:14]          Creatinine Trend:  SCr 2.05 [02-02 @ 22:14]  SCr 1.91 [02-02 @ 08:50]  SCr 1.97 [02-01 @ 04:49]  SCr 2.02 [01-31 @ 05:45]  SCr 1.92 [01-30 @ 06:38]    Urinalysis - [02-02-24 @ 22:14]      Color  / Appearance  / SG  / pH       Gluc 155 / Ketone   / Bili  / Urobili        Blood  / Protein  / Leuk Est  / Nitrite       RBC  / WBC  / Hyaline  / Gran  / Sq Epi  / Non Sq Epi  / Bacteria

## 2024-02-04 NOTE — DISCHARGE NOTE PROVIDER - HOSPITAL COURSE
89 y/o F with PMH HTN, HLD, CAD, DM, dementia s/p spinal stimulator placement who presented from [facility] for hypernatremia and concern for foot infection.    #Acute respiratory failure with hypoxia.   - RRT noted for hypoxia on 2/3   - ABG with normal pH low pCO2 and with bicarb low on BMP which may represent a compensated metabolic acidosis. CXR with sig pulm edema    - now requiring high flow NC   - could be due to worsening deconditioning vs PE vs worsening sepsis   - CT chest ordered   - may need V/Q scan if continues to be hypoxic and tachycardic   - continue antibiotics as per ID recs  - wean off anbx.    #Sepsis.   - pt. met SIRS criteria + source of known foot infection.   - Sepsis now resolved. UA negative. RVP negative. BCx NGTD.   - Recently treated for L foot wound growing E. coli and S. aureus.   - Vascular offering definitive management with amputation but family declines. Local wound care.   - ID consulted for antibiotic recs - changed antibiotics to doxycycline, ceftriaxone and flagyl. ID left final anbx recs based on GOC.    #SAE (acute kidney injury).   - on admission  and Cr 2.76. Likely prerenal   - Now improved s/p fluid resuscitation  - CTM SCr    #AGMA   - bicarb down trending to 12 on 2/2   - d/w nephrology, started 1300 sodium bicarb TID.    #Afib.   - Afib with RVR. Pt. was transferred to a telemetry floor   - continue lopressor 25 BID.    # Dementia.   - pt. A&Ox0 - nonverbal.  - Severe protein calorie malnutrition.   - Failed S&S eval.   - Ongoing GOC conversation as below.    #Anemia.   - Hgb ~6 on 1/29. No signs of bleeding. Received 1U PRBC.   - Hgb now improved - stable.    #Wound of foot.   - L foot xray: Dorsal forefoot soft tissue swelling. Calcaneal cortex on left foot with possible osteomyelitis  - previously unable to tolerate MRI L ankle when attempted in recent hospitalization  - Recently Tx in Dec 23' for L foot wound growing S. Aureas and E. Coli, amputation L foot offered and deferred at the time as per GOC   - Wound care following  - c/w Abx as above  - Vascular consulted, recs: continue local wound care, frequent offloading to B/L lower extremities.    # HTN (hypertension).   - Home regimen: Amlodipine 10mg QD, Metoprolol 50mg BID  - restarted metoprolol 25 BID.    #Presence of stent of CABG.   - s/p CABG w/ stent in 2005  - Hold home Plavix given NPO status.    #Diabetes.   -  POC checks   - NPO, sliding scale.    #GOC, pt with prior MOLST - DNR/DNI but was rescinded on this hospitalization. Palliative consulted. Ongoing GOC conversations. Discussed prognosis and care plan with son Toby via telephone with Dr. Chang from palliative care. Toby understands the poor prognosis and would like to proceed with comfort measures/DNR/DNI but two out of his three siblings do not agree with comfort measures at this time. He does not feel comfortable transitioning care until his other two brothers are in agreement. We addressed that his mom is declining and that we do not recommend NGT/PEG as this is uncomfortable and would not change her prognosis. Toby expressed understanding but needs to speak to his brothers.

## 2024-02-04 NOTE — PROGRESS NOTE ADULT - SUBJECTIVE AND OBJECTIVE BOX
[facility] Division of Hospital Medicine  Pager: [phone]  Available via MS Teams    SUBJECTIVE / OVERNIGHT EVENTS:    Nonverbal   Non interactive     MEDICATIONS  (STANDING):  cefTRIAXone   IVPB      cefTRIAXone   IVPB 1000 milliGRAM(s) IV Intermittent every 24 hours  citric acid/sodium citrate Solution 30 milliLiter(s) Oral every 8 hours  Dakins Solution - 1/4 Strength 1 Application(s) Topical daily  dextrose 5%. 1000 milliLiter(s) (100 mL/Hr) IV Continuous <Continuous>  dextrose 5%. 1000 milliLiter(s) (100 mL/Hr) IV Continuous <Continuous>  dextrose 5%. 1000 milliLiter(s) (50 mL/Hr) IV Continuous <Continuous>  dextrose 5%. 1000 milliLiter(s) (50 mL/Hr) IV Continuous <Continuous>  dextrose 50% Injectable 12.5 Gram(s) IV Push once  dextrose 50% Injectable 25 Gram(s) IV Push once  dextrose 50% Injectable 25 Gram(s) IV Push once  dextrose 50% Injectable 12.5 Gram(s) IV Push once  dextrose 50% Injectable 25 Gram(s) IV Push once  dextrose 50% Injectable 25 Gram(s) IV Push once  doxycycline IVPB      doxycycline IVPB 100 milliGRAM(s) IV Intermittent every 12 hours  glucagon  Injectable 1 milliGRAM(s) IntraMuscular once  glucagon  Injectable 1 milliGRAM(s) IntraMuscular once  heparin   Injectable 5000 Unit(s) SubCutaneous every 12 hours  insulin lispro (ADMELOG) corrective regimen sliding scale   SubCutaneous every 6 hours  levalbuterol Inhalation 0.63 milliGRAM(s) Inhalation every 6 hours  magnesium sulfate  IVPB 2 Gram(s) IV Intermittent once  metoprolol tartrate 25 milliGRAM(s) Oral two times a day  metroNIDAZOLE  IVPB 500 milliGRAM(s) IV Intermittent every 12 hours  pantoprazole   Suspension 40 milliGRAM(s) Oral daily  polyethylene glycol 3350 17 Gram(s) Oral daily  senna 2 Tablet(s) Oral at bedtime  sodium bicarbonate 1300 milliGRAM(s) Oral three times a day    MEDICATIONS  (PRN):  dextrose Oral Gel 15 Gram(s) Oral once PRN Blood Glucose LESS THAN 70 milliGRAM(s)/deciliter  dextrose Oral Gel 15 Gram(s) Oral once PRN Blood Glucose LESS THAN 70 milliGRAM(s)/deciliter      I&O's Summary    03 Feb 2024 07:01  -  04 Feb 2024 07:00  --------------------------------------------------------  IN: 0 mL / OUT: 1000 mL / NET: -1000 mL        PHYSICAL EXAM:  Vital Signs Last 24 Hrs  T(C): 36.3 (04 Feb 2024 06:00), Max: 37.2 (04 Feb 2024 01:15)  T(F): 97.3 (04 Feb 2024 06:00), Max: 99 (04 Feb 2024 01:15)  HR: 94 (04 Feb 2024 15:03) (80 - 121)  BP: 133/67 (04 Feb 2024 06:00) (118/56 - 146/71)  BP(mean): --  RR: 20 (04 Feb 2024 15:03) (18 - 24)  SpO2: 100% (04 Feb 2024 15:03) (97% - 100%)    Parameters below as of 04 Feb 2024 15:03  Patient On (Oxygen Delivery Method): nasal cannula, high flow  O2 Flow (L/min): 35  O2 Concentration (%): 40  CONSTITUTIONAL: in mild distress hunched over   EYES: conjunctiva and sclera clear  ENMT: Moist oral mucosa   NECK: Supple   RESPIRATORY: Normal respiratory effort; lungs are clear to auscultation bilaterally  CARDIOVASCULAR: Regular rate and rhythm, normal S1 and S2, no murmur/rub/gallop; Peripheral pulses are 2+ bilaterally  ABDOMEN: Nontender to palpation, normoactive bowel sounds, no rebound/guarding   MUSCULOSKELETAL: 2+ edema in LE   PSYCH: non-verbal, non-interactive   NEUROLOGY: does not follow commands   SKIN: No rashes    LABS:                        8.5    13.70 )-----------( 140      ( 04 Feb 2024 11:00 )             25.0     02-04    135  |  107  |  48<H>  ----------------------------<  112<H>  4.0   |  13<L>  |  2.50<H>    Ca    8.3<L>      04 Feb 2024 11:00  Phos  3.6     02-04  Mg     1.50     02-04    TPro  5.2<L>  /  Alb  1.9<L>  /  TBili  0.6  /  DBili  x   /  AST  35<H>  /  ALT  15  /  AlkPhos  221<H>  02-03          Urinalysis Basic - ( 04 Feb 2024 11:00 )    Color: x / Appearance: x / SG: x / pH: x  Gluc: 112 mg/dL / Ketone: x  / Bili: x / Urobili: x   Blood: x / Protein: x / Nitrite: x   Leuk Esterase: x / RBC: x / WBC x   Sq Epi: x / Non Sq Epi: x / Bacteria: x        Culture - Blood (collected 02 Feb 2024 22:14)  Source: .Blood Blood-Peripheral  Preliminary Report (04 Feb 2024 03:02):    No growth at 24 hours    Culture - Blood (collected 02 Feb 2024 22:14)  Source: .Blood Blood-Peripheral  Preliminary Report (04 Feb 2024 03:02):    No growth at 24 hours      SARS-CoV-2: NotDetec (24 Jan 2024 11:26)      RADIOLOGY & ADDITIONAL TESTS:  Other Results Reviewed Today: BMP with stable Cr, CBC with stable Hg     COORDINATION OF CARE:  Communication: discussed plan of care with ACP

## 2024-02-04 NOTE — PROGRESS NOTE ADULT - PROBLEM SELECTOR PLAN 3
- on admission  and Cr 2.76. Likely prerenal   - Now improved s/p fluid resuscitation  - CTM SCr    #AGMA   - bicarb down trending to 12 on 2/2   - d/w nephrology, started 1300 sodium bicarb TID

## 2024-02-04 NOTE — PROGRESS NOTE ADULT - ASSESSMENT
89 y/o F with PMH HTN, HLD, CAD, DM, dementia s/p spinal stimulator placement who presented from [facility] for hypernatremia and concern for foot infection.

## 2024-02-04 NOTE — PROGRESS NOTE ADULT - PROBLEM SELECTOR PLAN 1
- RRT noted for hypoxia on 2/3   - ABG with normal pH low pCO2 and with bicarb low on BMP which may represent a compensated metabolic acidosis. CXR with sig pulm edema    - now requiring high flow NC   - could be due to worsening deconditioning vs PE vs worsening sepsis   - CT chest ordered   - may need V/Q scan if continues to be hypoxic and tachycardic   - continue antibiotics as per ID recs  - wean off anbx

## 2024-02-04 NOTE — DISCHARGE NOTE PROVIDER - NSDCMRMEDTOKEN_GEN_ALL_CORE_FT
acetaminophen 325 mg oral tablet: 2 tab(s) orally every 6 hours, As needed, Temp greater or equal to 38C (100.4F), Moderate Pain (4 - 6)  amLODIPine 10 mg oral tablet: 1 tab(s) orally once a day  aspirin 81 mg oral tablet, chewable: 1 tab(s) orally once a day  atorvastatin 10 mg oral tablet: 1 tab(s) orally once a day  clopidogrel 75 mg oral tablet: 1 tab(s) orally once a day  docusate sodium 100 mg oral capsule: 1 cap(s) orally 2 times a day  glimepiride 4 mg oral tablet: 1 tab(s) orally once a day  Lasix 40 mg oral tablet: 1 tab(s) orally once a day  metoprolol tartrate 50 mg oral tablet: 1 tab(s) orally 2 times a day  morphine 10 mg/5 mL oral solution: 1.25 milliliter(s) orally every 4 hours As needed Moderate Pain (4 - 6)  morphine 10 mg/5 mL oral solution: 2.5 milliliter(s) orally every 4 hours As needed Severe Pain (7 - 10)  pantoprazole 40 mg oral delayed release tablet: 1 tab(s) orally once a day (before a meal)  polyethylene glycol 3350 oral powder for reconstitution: 17 gram(s) orally once a day  senna leaf extract oral tablet: 2 tab(s) orally once a day (at bedtime)

## 2024-02-05 NOTE — PROGRESS NOTE ADULT - ASSESSMENT
Pt is a 89 yo F with PMH dementia (AOx0, non-verbal), R ankle gangrene, HTN, HLD, CAD, and T2D p/f PJ rehab with hyperNa and acute renal failure, both improved. Course c/b AHRF requiring HFNC, now being weaned to NC. GOC discussion held with son/HCP Toby who rescinded prior DNR/DNI, now plan for full code and full escalation of care and pursuing all interventions. Pending weaning of HFNC anticipate plan for PEG (GI consulted) and LTCF placement.

## 2024-02-05 NOTE — PROGRESS NOTE ADULT - PROBLEM SELECTOR PLAN 2
- pt. met SIRS criteria + source of known foot infection.   - Sepsis now resolved. UA negative. RVP negative. BCx NGTD.   - Recently treated for L foot wound growing E. coli and S. aureus.   - Vascular offering definitive management with amputation but family declines. Local wound care.   - ID consulted for antibiotic recs - changed antibiotics to doxycycline, ceftriaxone and flagyl

## 2024-02-05 NOTE — PROGRESS NOTE ADULT - PROBLEM SELECTOR PLAN 2
Pt with metabolic acidosis. Recommend 1/2NS w/ 150meq sodium bicarb at 75cc/hr x10hrs. c/w sodium bicarb tabs. Monitor volume and respiratory status with CXR. Monitor labs.

## 2024-02-05 NOTE — PROGRESS NOTE ADULT - PROBLEM SELECTOR PLAN 1
- RRT noted for hypoxia on 2/3   - ABG with normal pH low pCO2 and with bicarb low on BMP which may represent a compensated metabolic acidosis. CXR with sig pulm edema    - weaning HFNC to NC today -- unclear if pt was truly hypoxic as reports of poor waveform; remaining vitals stable  - given lack of tachycardia, ease of weaning O2, will defer PE w/u for now  - suspect in setting of worsening deconditioning, poor inspiratory effort, atelectasis   - defer CT chest / V/Q scan for now -- can obtain if develops worsening hypoxia/tachycardia   - continue antibiotics as per ID recs

## 2024-02-05 NOTE — CHART NOTE - NSCHARTNOTEFT_GEN_A_CORE
GI Chart Note-  Evaluation for peg requested by primary team. Per chart, pt presently on HFNC and GOC currently not established (hospitalist note from yesterday documents that son would like to proceed with comfort measures/DNR/DNI but other siblings not yet agreeable). Recommend palliative care follow up to delineate overall GOC. If PEG ultimately within GOC, please notify GI team and can evaluate pt at that time and plan for procedure once medically optimized. Omega MINER.    RADHA Moya PA-C GI Chart Note-  Evaluation for peg requested by primary team. Per chart, pt presently on HFNC and GOC currently not established (hospitalist note from yesterday documents that son would like to proceed with comfort measures/DNR/DNI but other siblings not yet agreeable). Recommend palliative care follow up to delineate overall GOC. If PEG ultimately within GOC, please notify GI team and can evaluate pt at that time, with consideration for procedure once medically optimized. [employer] MINER.    RADHA Moya PA-C

## 2024-02-05 NOTE — PROGRESS NOTE ADULT - PROBLEM SELECTOR PLAN 3
-hypokalemia: replete to maintain K 3.5-4.0. Monitor labs   -hypomagnesemia: replete to maintain Mg>2.   -Hyponatremia: Antibiotics in D5W. Please change to NS. Monitor labs.

## 2024-02-05 NOTE — PROGRESS NOTE ADULT - ATTENDING COMMENTS
worsening renal failure, acidosis, and hyponatremia and ( other lytes derangements)   recs as outlined above

## 2024-02-05 NOTE — PROGRESS NOTE ADULT - TIME BILLING
review of laboratory data, radiology results, consultants' recommendations, documentation in [place], discussion with patient/ACP and interdisciplinary staff (such as , social workers, etc). Interventions were performed as documented above.

## 2024-02-05 NOTE — PROGRESS NOTE ADULT - SUBJECTIVE AND OBJECTIVE BOX
[facility] Division of Kidney Diseases & Hypertension  FOLLOW UP NOTE  [phone]--------------------------------------------------------------------------------  Chief Complaint: nina, metabolic acidosis     24 hour events/subjective: Pt seen and evaluated bedside this morning. ROS limited 2/2 mental status. Pt on HFNC, lethargic, withdrawing to pain only.       PAST HISTORY  --------------------------------------------------------------------------------  No significant changes to PMH, PSH, FHx, SHx, unless otherwise noted    ALLERGIES & MEDICATIONS  --------------------------------------------------------------------------------  Allergies  Pineapple (Unknown)  contrast dye -  rash (Other)  iodine (Other)  codeine (Vomiting)  penicillin (Rash)  latex (Rash)  Intolerances  Tolerates ceftriaxone, cefepime (Other)    Standing Inpatient Medications  cefTRIAXone   IVPB 1000 milliGRAM(s) IV Intermittent every 24 hours  cefTRIAXone   IVPB      Dakins Solution - 1/4 Strength 1 Application(s) Topical daily  dextrose 5%. 1000 milliLiter(s) IV Continuous <Continuous>  dextrose 5%. 1000 milliLiter(s) IV Continuous <Continuous>  dextrose 5%. 1000 milliLiter(s) IV Continuous <Continuous>  dextrose 5%. 1000 milliLiter(s) IV Continuous <Continuous>  dextrose 50% Injectable 12.5 Gram(s) IV Push once  dextrose 50% Injectable 25 Gram(s) IV Push once  dextrose 50% Injectable 25 Gram(s) IV Push once  dextrose 50% Injectable 12.5 Gram(s) IV Push once  dextrose 50% Injectable 25 Gram(s) IV Push once  dextrose 50% Injectable 25 Gram(s) IV Push once  doxycycline IVPB      doxycycline IVPB 100 milliGRAM(s) IV Intermittent every 12 hours  glucagon  Injectable 1 milliGRAM(s) IntraMuscular once  glucagon  Injectable 1 milliGRAM(s) IntraMuscular once  heparin   Injectable 5000 Unit(s) SubCutaneous every 12 hours  insulin lispro (ADMELOG) corrective regimen sliding scale   SubCutaneous every 6 hours  levalbuterol Inhalation 0.63 milliGRAM(s) Inhalation every 6 hours  metoprolol tartrate 25 milliGRAM(s) Oral two times a day  metroNIDAZOLE  IVPB 500 milliGRAM(s) IV Intermittent every 12 hours  polyethylene glycol 3350 17 Gram(s) Oral daily  senna Syrup 5 milliLiter(s) Oral at bedtime  sodium bicarbonate 1300 milliGRAM(s) Oral three times a day    PRN Inpatient Medications  dextrose Oral Gel 15 Gram(s) Oral once PRN  dextrose Oral Gel 15 Gram(s) Oral once PRN    REVIEW OF SYSTEMS  --------------------------------------------------------------------------------  limited 2/2 mental status     VITALS/PHYSICAL EXAM  --------------------------------------------------------------------------------  T(C): 36.6 (02-05-24 @ 05:35), Max: 36.9 (02-04-24 @ 17:40)  HR: 94 (02-05-24 @ 09:05) (85 - 103)  BP: 135/50 (02-05-24 @ 05:35) (130/54 - 140/77)  RR: 18 (02-05-24 @ 09:05) (18 - 20)  SpO2: 100% (02-05-24 @ 09:05) (97% - 100%)  Wt(kg): --    02-04-24 @ 07:01  -  02-05-24 @ 07:00  --------------------------------------------------------  IN: 5 mL / OUT: 150 mL / NET: -145 mL    Physical Exam:  Gen: Lethargic, ill appearing   HEENT: anicteric  Pulm: coarse breath sounds on HFNC  CV: S1 and S2  Abd: soft  : No suprapubic fullness  UE: contracted  LE: contracted dressing noted, anasarca noted  Neuro: Lethargic, Withdrawing to pain   Psych: calm  Skin: cool to touch    LABS/STUDIES  --------------------------------------------------------------------------------              8.9    13.76 >-----------<  126      [02-05-24 @ 04:00]              26.9     134  |  105  |  54  ----------------------------<  134      [02-05-24 @ 04:00]  3.4   |  12  |  2.78        Ca     8.4     [02-05-24 @ 04:00]      Mg     1.50     [02-05-24 @ 04:00]      Phos  3.8     [02-05-24 @ 04:00]    TPro  5.2  /  Alb  1.9  /  TBili  0.6  /  DBili  x   /  AST  35  /  ALT  15  /  AlkPhos  221  [02-03-24 @ 16:12]    Creatinine Trend:  SCr 2.78 [02-05 @ 04:00]  SCr 2.50 [02-04 @ 11:00]  SCr 2.25 [02-03 @ 16:12]  SCr 2.05 [02-02 @ 22:14]  SCr 1.91 [02-02 @ 08:50]

## 2024-02-05 NOTE — PROGRESS NOTE ADULT - PROBLEM SELECTOR PLAN 1
Patient with acute kidney injury in the setting of hypovolemia and sepsis. Baseline Scr 0.9-1.2. Labs and VS reviewed. Without hypotension. Pt hypoxic on HFNC. On admission Scr elevated at 2.76 but improved with fluids to 1.91 on 2/2. However Scr increased again to 2.78 today. CXR reviewed w/ Mild pulmonary venous congestion. Obtain PVR. Monitor labs and STRICT I/O. Avoid nephrotoxins. Dose medications as per CrCl. Pt overall poor prognosis. GOC as per primary team.

## 2024-02-05 NOTE — PROGRESS NOTE ADULT - SUBJECTIVE AND OBJECTIVE BOX
[facility] Department of Hospital Medicine  Rafaela Varghese DO  Available on MS Teams  Pager: [phone]    Patient is a 89y old  Female who presents with a chief complaint of Abnormal labs (05 Feb 2024 12:35)    Subjective:  Pt seen and examined at bedside sleeping comfortably, non-participatory with exam. No family at bedside.  Discussed with sonToby -- confirmed full code, plan to pursue all interventions. Says he has to live with his brothers for the rest of his life and can not go against what they want. Would like to pursue PEG.  Son plans to visit in next day or two.    VITAL SIGNS:  T(C): 36.6 (02-05-24 @ 05:35), Max: 36.9 (02-04-24 @ 17:40)  T(F): 97.8 (02-05-24 @ 05:35), Max: 98.5 (02-04-24 @ 17:40)  HR: 94 (02-05-24 @ 09:05) (85 - 103)  BP: 135/50 (02-05-24 @ 05:35) (130/54 - 140/77)  BP(mean): --  RR: 18 (02-05-24 @ 09:05) (18 - 18)  SpO2: 100% (02-05-24 @ 09:05) (97% - 100%)  Wt(kg): --    PHYSICAL EXAM:  Constitutional: sleeping comfortably in bed; NAD; non-participatory   Head: NC/AT  Eyes: PERRL, anicteric sclera  ENT: no nasal discharge; MMM; NGT in place  Neck: supple; no JVD  Respiratory: diminished breath sounds throughout/poor inspiratory effort; on HFNC without accessory mm use  Cardiac: +S1/S2; RRR; no M/R/G  Gastrointestinal: soft, NT/ND; no rebound or guarding; +BSx4  Extremities: WWP, no clubbing or cyanosis; 2+ BL LE edema  Neurologic: AAOx0; non-participatory     MEDICATIONS  (STANDING):  cefTRIAXone   IVPB      cefTRIAXone   IVPB 1000 milliGRAM(s) IV Intermittent every 24 hours  Dakins Solution - 1/4 Strength 1 Application(s) Topical daily  dextrose 5%. 1000 milliLiter(s) (50 mL/Hr) IV Continuous <Continuous>  dextrose 5%. 1000 milliLiter(s) (50 mL/Hr) IV Continuous <Continuous>  dextrose 5%. 1000 milliLiter(s) (100 mL/Hr) IV Continuous <Continuous>  dextrose 5%. 1000 milliLiter(s) (100 mL/Hr) IV Continuous <Continuous>  dextrose 50% Injectable 25 Gram(s) IV Push once  dextrose 50% Injectable 25 Gram(s) IV Push once  dextrose 50% Injectable 12.5 Gram(s) IV Push once  dextrose 50% Injectable 12.5 Gram(s) IV Push once  dextrose 50% Injectable 25 Gram(s) IV Push once  dextrose 50% Injectable 25 Gram(s) IV Push once  doxycycline IVPB 100 milliGRAM(s) IV Intermittent every 12 hours  doxycycline IVPB      glucagon  Injectable 1 milliGRAM(s) IntraMuscular once  glucagon  Injectable 1 milliGRAM(s) IntraMuscular once  heparin   Injectable 5000 Unit(s) SubCutaneous every 12 hours  insulin lispro (ADMELOG) corrective regimen sliding scale   SubCutaneous every 6 hours  metoprolol tartrate 25 milliGRAM(s) Oral two times a day  metroNIDAZOLE  IVPB 500 milliGRAM(s) IV Intermittent every 12 hours  polyethylene glycol 3350 17 Gram(s) Oral daily  senna Syrup 5 milliLiter(s) Oral at bedtime  sodium bicarbonate 1300 milliGRAM(s) Oral three times a day    MEDICATIONS  (PRN):  dextrose Oral Gel 15 Gram(s) Oral once PRN Blood Glucose LESS THAN 70 milliGRAM(s)/deciliter  dextrose Oral Gel 15 Gram(s) Oral once PRN Blood Glucose LESS THAN 70 milliGRAM(s)/deciliter    LABS:                        8.9    13.76 )-----------( 126      ( 05 Feb 2024 04:00 )             26.9     02-05    134<L>  |  105  |  54<H>  ----------------------------<  134<H>  3.4<L>   |  12<L>  |  2.78<H>    Ca    8.4      05 Feb 2024 04:00  Phos  3.8     02-05  Mg     1.50     02-05        Urinalysis Basic - ( 05 Feb 2024 04:00 )    Color: x / Appearance: x / SG: x / pH: x  Gluc: 134 mg/dL / Ketone: x  / Bili: x / Urobili: x   Blood: x / Protein: x / Nitrite: x   Leuk Esterase: x / RBC: x / WBC x   Sq Epi: x / Non Sq Epi: x / Bacteria: x      CAPILLARY BLOOD GLUCOSE      POCT Blood Glucose.: 202 mg/dL (05 Feb 2024 12:25)      RADIOLOGY & ADDITIONAL TESTS: Reviewed.

## 2024-02-06 NOTE — PROGRESS NOTE ADULT - SUBJECTIVE AND OBJECTIVE BOX
[facility] Division of Kidney Diseases & Hypertension  FOLLOW UP NOTE  [phone]--------------------------------------------------------------------------------  Chief Complaint: nina, metabolic acidosis     24 hour events/subjective: Pt seen and evaluated bedside this morning. ROS limited 2/2 mental status. Pt on HFNC, lethargic, withdrawing to pain only.     PAST HISTORY  --------------------------------------------------------------------------------  No significant changes to PMH, PSH, FHx, SHx, unless otherwise noted    ALLERGIES & MEDICATIONS  --------------------------------------------------------------------------------  Allergies    Pineapple (Unknown)  contrast dye -  rash (Other)  iodine (Other)  codeine (Vomiting)  penicillin (Rash)  latex (Rash)  Intolerances  Tolerates ceftriaxone, cefepime (Other)    Standing Inpatient Medications  cefTRIAXone   IVPB 1000 milliGRAM(s) IV Intermittent every 24 hours  cefTRIAXone   IVPB      Dakins Solution - 1/4 Strength 1 Application(s) Topical daily  dextrose 5%. 1000 milliLiter(s) IV Continuous <Continuous>  dextrose 5%. 1000 milliLiter(s) IV Continuous <Continuous>  dextrose 5%. 1000 milliLiter(s) IV Continuous <Continuous>  dextrose 5%. 1000 milliLiter(s) IV Continuous <Continuous>  dextrose 50% Injectable 12.5 Gram(s) IV Push once  dextrose 50% Injectable 25 Gram(s) IV Push once  dextrose 50% Injectable 25 Gram(s) IV Push once  dextrose 50% Injectable 25 Gram(s) IV Push once  dextrose 50% Injectable 25 Gram(s) IV Push once  dextrose 50% Injectable 12.5 Gram(s) IV Push once  doxycycline IVPB      doxycycline IVPB 100 milliGRAM(s) IV Intermittent every 12 hours  glucagon  Injectable 1 milliGRAM(s) IntraMuscular once  glucagon  Injectable 1 milliGRAM(s) IntraMuscular once  heparin   Injectable 5000 Unit(s) SubCutaneous every 12 hours  insulin lispro (ADMELOG) corrective regimen sliding scale   SubCutaneous every 6 hours  metoprolol tartrate 25 milliGRAM(s) Oral two times a day  metroNIDAZOLE  IVPB 500 milliGRAM(s) IV Intermittent every 12 hours  pantoprazole   Suspension 40 milliGRAM(s) Oral daily  polyethylene glycol 3350 17 Gram(s) Oral daily  senna Syrup 5 milliLiter(s) Oral at bedtime  sodium bicarbonate 1300 milliGRAM(s) Oral three times a day  sodium zirconium cyclosilicate 5 Gram(s) Oral once  sterile water 1000 milliLiter(s) IV Continuous <Continuous>    PRN Inpatient Medications  dextrose Oral Gel 15 Gram(s) Oral once PRN  dextrose Oral Gel 15 Gram(s) Oral once PRN    REVIEW OF SYSTEMS  --------------------------------------------------------------------------------  limited 2/2 mental status     VITALS/PHYSICAL EXAM  --------------------------------------------------------------------------------  T(C): 36.7 (02-06-24 @ 05:03), Max: 36.9 (02-05-24 @ 14:30)  HR: 91 (02-06-24 @ 05:03) (84 - 91)  BP: 141/60 (02-06-24 @ 05:03) (124/51 - 143/57)  RR: 18 (02-06-24 @ 05:03) (17 - 18)  SpO2: 100% (02-06-24 @ 05:03) (100% - 100%)  Wt(kg): --    Physical Exam:  Gen: Lethargic, ill appearing   HEENT: anicteric  Pulm: coarse breath sounds on HFNC  CV: S1 and S2  Abd: soft  : No suprapubic fullness  UE: contracted  LE: contracted dressing noted, anasarca noted  Neuro: Lethargic, Withdrawing to pain   Skin: cool to touch    LABS/STUDIES  --------------------------------------------------------------------------------              10.1   12.94 >-----------<  110      [02-06-24 @ 03:30]              30.5     138  |  107  |  63  ----------------------------<  184      [02-06-24 @ 03:30]  5.1   |  14  |  3.03        Ca     8.7     [02-06-24 @ 03:30]      Mg     2.00     [02-06-24 @ 03:30]      Phos  3.2     [02-06-24 @ 03:30]    Creatinine Trend:  SCr 3.03 [02-06 @ 03:30]  SCr 2.78 [02-05 @ 04:00]  SCr 2.50 [02-04 @ 11:00]  SCr 2.25 [02-03 @ 16:12]  SCr 2.05 [02-02 @ 22:14]

## 2024-02-06 NOTE — PROGRESS NOTE ADULT - ASSESSMENT
Pt is a 89 yo F with PMH dementia (AOx0, non-verbal), R ankle gangrene, HTN, HLD, CAD, and T2D p/f PJ rehab with hyperNa and acute renal failure, both improved. Course c/b AHRF requiring HFNC, now being weaned to NC. GOC discussion held with son/HCP Toby who rescinded prior DNR/DNI, now plan for full code and full escalation of care and pursuing all interventions. Pending weaning of NC, anticipate plan for PEG (GI consulted) and LTCF placement.

## 2024-02-06 NOTE — PROGRESS NOTE ADULT - PROBLEM SELECTOR PLAN 3
-hypokalemia: improved. Monitor and replete prn to maintain K 3.5-4.0.    -hypomagnesemia: improved. Monitor and replete prn to maintain Mg>2.   -Hyponatremia: Improved. Monitor labs.

## 2024-02-06 NOTE — PROGRESS NOTE ADULT - TIME BILLING
review of laboratory data, radiology results, consultants' recommendations, documentation in [place], discussion with patient/ACP and interdisciplinary staff (such as , social workers, etc). Interventions were performed as documented above.

## 2024-02-06 NOTE — CONSULT NOTE ADULT - NS ATTEND AMEND GEN_ALL_CORE FT
As above.    Patient seen and examined on 2/6/2024.  Discussed with PA earlier in the day.    Impression:    #1.  Patient with dementia, on NG tube feeds, consulted for consideration of PEG placement.  Extensive discussion as documented above.    #2.  Acute hypoxic respiratory failure, requiring supplemental oxygen    #3.  Elevated cardiopulmonary risk for anesthesia due to CAD/CABG, hx of TAVR    #4.  Severe kyphosis, may interfere with anesthesia and endoscopy.      #5.  DNR/DNI rescinded by family, full code.    Recommendation:    #1.  Cardiology evaluation as discussed above    #2.  Will reevaluate for PEG

## 2024-02-06 NOTE — CONSULT NOTE ADULT - ASSESSMENT
89 year old female with h/o HTN, HLD, CAD/CABG, hx of TAVR, DM, dementia, s/p spinal stimulator placement p/w hypernatremia and acute renal failure, both improved. Course c/b AHRF requiring HFNC, now being weaned to NC. GOC discussion held- prior DNR/DNI rescinded, now full code and full escalation of care and pursuing all interventions. Last seen by SLP 1/25- pt did not participate in eval given lethargy. GI consulted for consideration of peg placement.     Currently HDS, on 2L NC. Labs: wbc 12k, normocytic anemia w/ hgb flux 6-10 this admission, most recently hgb 10.1 (last prbc 2/3), plts 110k , na 138, bun/cr 63/3, 2/2/24 bld cxs ngtd, no abd imaging this admission.    # dementia  # lethargy/FTT  - likely iso progression of disease    # acute respiratory failure w/ hypoxia currently on NC  # prior sepsis/L foot wound  - vascular offered definitive management with amputation but family declining, currently on abx   # hx of CAD/CABG/TAVR  # SAE/metabolic acidosis  # Afib - not currently on AC  # normocytic anemia 89 year old female with h/o HTN, HLD, CAD/CABG, hx of TAVR, DM, dementia, s/p spinal stimulator placement p/w hypernatremia and acute renal failure, both improved. Course c/b AHRF requiring HFNC, now being weaned to NC. GOC discussion held- prior DNR/DNI rescinded, now full code and full escalation of care and pursuing all interventions. Last seen by SLP 1/25- pt did not participate in eval given lethargy. GI consulted for consideration of peg placement.     Currently HDS, on 2L NC. Labs: wbc 12k, normocytic anemia w/ hgb flux 6-10 this admission, most recently hgb 10.1 (last prbc 2/3), plts 110k , na 138, bun/cr 63/3, 2/2/24 bld cxs ngtd, no abd imaging this admission.    # dementia  # lethargy/FTT  - likely iso progression of disease; spoke w/ son Toby , discussed that artifical nutrition/hydration (GEORGETTE) via PEG in patients with dementia has shown limited benefit, risks of PEG placement, both related to sedation and placement were reviewed, including, but not limited to infection, bleeding, injury to adjacent organs (or misplacement), leakage, peritonitis if pulled prior to stoma tract healing, aspiration, lack of safe window which would preclude placement, etc and son is agreeable to proceed; given current clinical status, suspect that PEG would not improve QOL for patient, though currently within GOC of family  # acute respiratory failure w/ hypoxia currently on NC  # prior sepsis/L foot wound  - vascular offered definitive management with amputation but family declining, currently on abx   # hx of CAD/?CABG/TAVR  # worsening SAE/metabolic acidosis  # Afib - not currently on AC  # normocytic anemia    Recommendations-  - currently not optimized for PEG given hypoxia, once medically optimized can plan for EGD/PEG as per current GOC  - obtain cardiac risk stratification pre procedure and TTE  - continue NGT feeds in interim  - rest of care as per primary team    ranjeet gi attg  dw son via phone    RADHA Moya PA-C, RD, CDN  available on TEAMS for questions    after 5pm/weekends, please contact the on-call GI fellow at [phone]  89 year old female with h/o HTN, HLD, CAD/CABG, hx of TAVR, DM, dementia, s/p spinal stimulator placement p/w hypernatremia and acute renal failure, both improved. Course complicated by AHRF requiring HFNC, now being weaned to NC. GOC discussion held- prior DNR/DNI rescinded, now full code and full escalation of care and pursuing all interventions. Last seen by SLP 1/25- pt did not participate in eval given lethargy. GI consulted for consideration of peg placement.     Currently HDS, on 2L NC. Labs: wbc 12k, normocytic anemia w/ hgb flux 6-10 this admission, most recently hgb 10.1 (last prbc 2/3), plts 110k , na 138, bun/cr 63/3, 2/2/24 bld cxs ngtd, no abd imaging this admission.    # dementia  # lethargy/FTT  - likely iso progression of disease; spoke w/ son Toby , discussed that artifical nutrition/hydration (GEORGETTE) via PEG in patients with dementia has shown limited benefit, risks of PEG placement, both related to sedation and placement were reviewed, including, but not limited to infection, bleeding, injury to adjacent organs (or misplacement), leakage, peritonitis if pulled prior to stoma tract healing, aspiration, lack of safe window which would preclude placement, etc and son is agreeable to proceed; given current clinical status, suspect that PEG would not improve QOL for patient, though currently within GOC of family  # acute respiratory failure w/ hypoxia currently on NC  # prior sepsis/L foot wound  - vascular offered definitive management with amputation but family declining, currently on abx   # hx of CAD/?CABG/TAVR  # worsening SAE/metabolic acidosis  # Afib - not currently on AC  # normocytic anemia    Recommendations-  - currently not optimized for PEG given hypoxia, once medically optimized can plan for EGD/PEG as per current GOC  - obtain cardiac risk stratification pre procedure and TTE  - continue NGT feeds in interim  - rest of care as per primary team    ranjeet gi attg  dw son via phone    RADHA Moya PA-C, RD, CDN  available on TEAMS for questions    after 5pm/weekends, please contact the on-call GI fellow at [phone]

## 2024-02-06 NOTE — PROGRESS NOTE ADULT - PROBLEM SELECTOR PLAN 1
Patient with acute kidney injury in the setting of hypovolemia and sepsis. Baseline Scr 0.9-1.2. Labs and VS reviewed. Without hypotension. Pt hypoxic on HFNC. On admission Scr elevated at 2.76 but improved with fluids to 1.91 on 2/2. However Scr increased to 3.03 today. CXR reviewed w/ Mild pulmonary venous congestion. Obtain PVR. Fluids as below. Monitor labs and STRICT I/O. Avoid nephrotoxins. Dose medications as per CrCl. Pt overall poor prognosis. GOC as per primary team.

## 2024-02-06 NOTE — PROGRESS NOTE ADULT - SUBJECTIVE AND OBJECTIVE BOX
[facility] Department of Hospital Medicine  Rafaela Varghese DO  Available on MS Teams  Pager: [phone]    Patient is a 89y old  Female who presents with a chief complaint of Abnormal labs (05 Feb 2024 12:35)    Subjective:  Pt seen and examined at bedside sleeping comfortably, non-participatory with exam. No family at bedside. Weaned to NC yesterday.  Spoke with son, Toby, via telephone -- plans to visit tomorrow; something came up today and he could not visit. No questions/concerns. Still agreeable to PEG.    Vital Signs Last 24 Hrs  T(C): 37.1 (06 Feb 2024 12:00), Max: 37.1 (06 Feb 2024 12:00)  T(F): 98.8 (06 Feb 2024 12:00), Max: 98.8 (06 Feb 2024 12:00)  HR: 89 (06 Feb 2024 12:00) (84 - 91)  BP: 139/60 (06 Feb 2024 12:00) (124/51 - 143/57)  BP(mean): --  RR: 18 (06 Feb 2024 12:00) (17 - 18)  SpO2: 98% (06 Feb 2024 12:00) (98% - 100%)    Parameters below as of 06 Feb 2024 12:00  Patient On (Oxygen Delivery Method): nasal cannula  O2 Flow (L/min): 2    PHYSICAL EXAM:  Constitutional: sleeping comfortably in bed; NAD; non-participatory   Head: NC/AT  Eyes: PERRL, anicteric sclera  ENT: no nasal discharge; MMM; NGT in place  Neck: supple; no JVD  Respiratory: diminished breath sounds throughout/poor inspiratory effort; on NC without accessory mm use  Cardiac: +S1/S2; RRR; no M/R/G  Gastrointestinal: soft, NT/ND; no rebound or guarding; +BSx4  Extremities: WWP, no clubbing or cyanosis; 2+ BL LE edema  Neurologic: AAOx0; non-participatory     MEDICATIONS  (STANDING):  cefTRIAXone   IVPB 1000 milliGRAM(s) IV Intermittent every 24 hours  cefTRIAXone   IVPB      Dakins Solution - 1/4 Strength 1 Application(s) Topical daily  dextrose 5%. 1000 milliLiter(s) (100 mL/Hr) IV Continuous <Continuous>  dextrose 5%. 1000 milliLiter(s) (50 mL/Hr) IV Continuous <Continuous>  dextrose 5%. 1000 milliLiter(s) (50 mL/Hr) IV Continuous <Continuous>  dextrose 5%. 1000 milliLiter(s) (100 mL/Hr) IV Continuous <Continuous>  dextrose 50% Injectable 25 Gram(s) IV Push once  dextrose 50% Injectable 12.5 Gram(s) IV Push once  dextrose 50% Injectable 12.5 Gram(s) IV Push once  dextrose 50% Injectable 25 Gram(s) IV Push once  dextrose 50% Injectable 25 Gram(s) IV Push once  dextrose 50% Injectable 25 Gram(s) IV Push once  doxycycline IVPB      doxycycline IVPB 100 milliGRAM(s) IV Intermittent every 12 hours  glucagon  Injectable 1 milliGRAM(s) IntraMuscular once  glucagon  Injectable 1 milliGRAM(s) IntraMuscular once  heparin   Injectable 5000 Unit(s) SubCutaneous every 12 hours  insulin lispro (ADMELOG) corrective regimen sliding scale   SubCutaneous every 6 hours  metoprolol tartrate 25 milliGRAM(s) Oral two times a day  metroNIDAZOLE  IVPB 500 milliGRAM(s) IV Intermittent every 12 hours  pantoprazole   Suspension 40 milliGRAM(s) Oral daily  polyethylene glycol 3350 17 Gram(s) Oral daily  senna Syrup 5 milliLiter(s) Oral at bedtime  sodium bicarbonate 1300 milliGRAM(s) Oral three times a day  sterile water 1000 milliLiter(s) (75 mL/Hr) IV Continuous <Continuous>    MEDICATIONS  (PRN):  acetaminophen   Oral Liquid .. 650 milliGRAM(s) Oral every 6 hours PRN Temp greater or equal to 38C (100.4F), Mild Pain (1 - 3)  dextrose Oral Gel 15 Gram(s) Oral once PRN Blood Glucose LESS THAN 70 milliGRAM(s)/deciliter  dextrose Oral Gel 15 Gram(s) Oral once PRN Blood Glucose LESS THAN 70 milliGRAM(s)/deciliter    LABS:                        10.1   12.94 )-----------( 110      ( 06 Feb 2024 03:30 )             30.5     02-06    138  |  107  |  63<H>  ----------------------------<  184<H>  5.1   |  14<L>  |  3.03<H>    Ca    8.7      06 Feb 2024 03:30  Phos  3.2     02-06  Mg     2.00     02-06        Urinalysis Basic - ( 06 Feb 2024 03:30 )    Color: x / Appearance: x / SG: x / pH: x  Gluc: 184 mg/dL / Ketone: x  / Bili: x / Urobili: x   Blood: x / Protein: x / Nitrite: x   Leuk Esterase: x / RBC: x / WBC x   Sq Epi: x / Non Sq Epi: x / Bacteria: x      CAPILLARY BLOOD GLUCOSE      POCT Blood Glucose.: 210 mg/dL (06 Feb 2024 11:59)      RADIOLOGY & ADDITIONAL TESTS: Reviewed.

## 2024-02-06 NOTE — CONSULT NOTE ADULT - SUBJECTIVE AND OBJECTIVE BOX
Chief Complaint:  Patient is a 89y old  Female who presents with a chief complaint of Abnormal labs (06 Feb 2024 13:39)      HPI: 89 year old female with h/o HTN, HLD, CAD/CABG, hx of TAVR, DM, dementia, s/p spinal stimulator placement p/w hypernatremia and acute renal failure, both improved. Course c/b AHRF requiring HFNC, now being weaned to NC. GOC discussion held with son/HCP Toby who rescinded prior DNR/DNI, now plan for full code and full escalation of care and pursuing all interventions. GI consulted for peg placement.     pt seen and examined. unable to provide meaningful hx. info obtained via chart and medical staff.      currently avs son 2L NC  wbc 12k, normocytic anemia w/ hgb flux 6-10 this admission (last prbc 2/3)  plts 110k   na 138, bun/cr 63/3  2/2 bld cxs ngtd  no abd imaging this admission        Allergies:  Pineapple (Unknown)  contrast dye -  rash (Other)  iodine (Other)  codeine (Vomiting)  Tolerates ceftriaxone, cefepime (Other)  penicillin (Rash)  latex (Rash)      PMHX/PSHX:  Angina    Diabetes Mellitus    Arthritis, Infective, Knee    Detached Retina    Acute Mucous Pneumonia    Spinal Stenosis- lumbar    History of Back Surgery    Basal Cell Cancer    Dementia    Detached Retina, Left    S/P Carpal Tunnel Release    Trigger Finger    S/P Laparoscopic Cholecystectomy    S/P TKR (Total Knee Replacement)          Family history:  FH: HTN (hypertension)        Social History: as per h&p    Home Medications: as per h&p, list reviewed    Hospital Medications:  acetaminophen   Oral Liquid .. 650 milliGRAM(s) Oral every 6 hours PRN  cefTRIAXone   IVPB      cefTRIAXone   IVPB 1000 milliGRAM(s) IV Intermittent every 24 hours  Dakins Solution - 1/4 Strength 1 Application(s) Topical daily  dextrose 5%. 1000 milliLiter(s) IV Continuous <Continuous>  dextrose 5%. 1000 milliLiter(s) IV Continuous <Continuous>  dextrose 5%. 1000 milliLiter(s) IV Continuous <Continuous>  dextrose 5%. 1000 milliLiter(s) IV Continuous <Continuous>  dextrose 50% Injectable 12.5 Gram(s) IV Push once  dextrose 50% Injectable 12.5 Gram(s) IV Push once  dextrose 50% Injectable 25 Gram(s) IV Push once  dextrose 50% Injectable 25 Gram(s) IV Push once  dextrose 50% Injectable 25 Gram(s) IV Push once  dextrose 50% Injectable 25 Gram(s) IV Push once  dextrose Oral Gel 15 Gram(s) Oral once PRN  dextrose Oral Gel 15 Gram(s) Oral once PRN  doxycycline IVPB 100 milliGRAM(s) IV Intermittent every 12 hours  doxycycline IVPB      glucagon  Injectable 1 milliGRAM(s) IntraMuscular once  glucagon  Injectable 1 milliGRAM(s) IntraMuscular once  heparin   Injectable 5000 Unit(s) SubCutaneous every 12 hours  insulin lispro (ADMELOG) corrective regimen sliding scale   SubCutaneous every 6 hours  metoprolol tartrate 25 milliGRAM(s) Oral two times a day  metroNIDAZOLE  IVPB 500 milliGRAM(s) IV Intermittent every 12 hours  pantoprazole   Suspension 40 milliGRAM(s) Oral daily  polyethylene glycol 3350 17 Gram(s) Oral daily  senna Syrup 5 milliLiter(s) Oral at bedtime  sodium bicarbonate 1300 milliGRAM(s) Oral three times a day  sterile water 1000 milliLiter(s) IV Continuous <Continuous>        ROS:   unable to obtain form pt    Vital Signs:  Vital Signs Last 24 Hrs  T(C): 37.1 (06 Feb 2024 12:00), Max: 37.1 (06 Feb 2024 12:00)  T(F): 98.8 (06 Feb 2024 12:00), Max: 98.8 (06 Feb 2024 12:00)  HR: 89 (06 Feb 2024 12:00) (84 - 91)  BP: 139/60 (06 Feb 2024 12:00) (124/51 - 143/57)  BP(mean): --  RR: 18 (06 Feb 2024 12:00) (17 - 18)  SpO2: 98% (06 Feb 2024 12:00) (98% - 100%)    Parameters below as of 06 Feb 2024 12:00  Patient On (Oxygen Delivery Method): nasal cannula  O2 Flow (L/min): 2    Daily     Daily     LABS:                        10.1   12.94 )-----------( 110      ( 06 Feb 2024 03:30 )             30.5     Mean Cell Volume: 89.7 fL (02-06-24 @ 03:30)    02-06    138  |  107  |  63<H>  ----------------------------<  184<H>  5.1   |  14<L>  |  3.03<H>    Ca    8.7      06 Feb 2024 03:30  Phos  3.2     02-06  Mg     2.00     02-06          Urinalysis Basic - ( 06 Feb 2024 03:30 )    Color: x / Appearance: x / SG: x / pH: x  Gluc: 184 mg/dL / Ketone: x  / Bili: x / Urobili: x   Blood: x / Protein: x / Nitrite: x   Leuk Esterase: x / RBC: x / WBC x   Sq Epi: x / Non Sq Epi: x / Bacteria: x                              10.1   12.94 )-----------( 110      ( 06 Feb 2024 03:30 )             30.5                         8.9    13.76 )-----------( 126      ( 05 Feb 2024 04:00 )             26.9                         8.5    13.70 )-----------( 140      ( 04 Feb 2024 11:00 )             25.0                         7.1    13.41 )-----------( 154      ( 03 Feb 2024 16:12 )             21.1       PHYSICAL EXAM:   GENERAL:  Lying in bed in NAD  HEENT:  Normocephalic/atraumatic, no scleral icterus  NECK: Trachea midline  CHEST:  Resp even, non labored   ABDOMEN:  Soft, non-tender, non-distended  EXTREMITIES: WWP, no edema  SKIN:  No rash or jaundice  NEURO:  Alert and oriented x 3, no asterixis    Imaging:  prior imaging 12/2023    ACC: [id number] EXAM:  CT ABDOMEN AND PELVIS   ORDERED BY: LEXII STRINGER     ACC: [id number] EXAM:  CT CHEST   ORDERED BY: LEXII STRINGER     *** ADDENDUM # 1 ***    Addendum:    L3-L5 laminectomies.    --- End of Report ---    *** END OF ADDENDUM # 1 ***      PROCEDURE DATE:  12/15/2023          INTERPRETATION:  CLINICAL INFORMATION: 89 years  Female with bruising   right chest wall , multiple falls.    COMPARISON: CT abdomen and pelvis 4/10/2022 and CT chest abdomen and   pelvis 7/10/2018    CONTRAST/COMPLICATIONS:  IV Contrast: NONE  Oral Contrast: NONE  Complications: None reported at time of study completion    PROCEDURE:  CT of the Chest, Abdomen and Pelvis was performed.  Sagittal and coronal reformats were performed.    LIMITATIONS: Evaluation of the solid organs, vascular structures and GI   tract is limited without oral and IV contrast. Motion artifact. Streak   artifact from patient's arms.    FINDINGS:  CHEST:  LUNGS AND LARGE AIRWAYS: Patent central airways. Stable biapical   reticular scarring. Centrilobular emphysema.  PLEURA: No pleural effusion.  VESSELS: Within normal limits.  HEART: Heart size is normal. TAPVR. Coronary artery calcifications and/or   stents. Dense mitral annular calcification. CABG. No pericardial effusion.  MEDIASTINUM AND GAMALIEL: No lymphadenopathy.  CHEST WALL AND LOWER NECK: Sternotomy.    ABDOMEN AND PELVIS:  LIVER: Within normal limits.  BILE DUCTS: Normal caliber.  GALLBLADDER: Cholecystectomy.  SPLEEN: Within normal limits.  PANCREAS: There is again completely replaced with multiple cystic   lesions. The pancreatic tail is atrophic. No change from prior.  ADRENALS: Within normal limits.  KIDNEYS/URETERS: No hydronephrosis. Renal vascular calcifications limit   evaluation for punctate nonobstructing calculi.    BLADDER: Within normal limits.  REPRODUCTIVE ORGANS: Uterus normal for patient age. Arcuate artery   calcifications.    BOWEL: No bowel obstruction. Appendix  . Sigmoid diverticulosis without   diverticulitis. Questionable asymmetric rectal wall thickening.   PERITONEUM: No ascites.  VESSELS: Atherosclerotic changes.  RETROPERITONEUM/LYMPH NODES: No lymphadenopathy.  ABDOMINAL WALL: Neurostimulator device in the left gluteal subcutaneous   fat. Catheter/electrode tip in the spinal canal at the level of T7.  BONES: Thoracolumbar degenerative changes. Grade 1 L4-5 anterolisthesis.   Stable T11, T12 and L2 compression deformities    IMPRESSION:  Emphysema. Chronic fibrotic changes at the lung apices.    CABG. TAVR. Coronary artery calcifications and/or stents.    Questionable asymmetrical rectal wall thickening. Limited evaluation   without enteric contrast.    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   Atrophic pancreatic tail.            --- End of Report ---    ***Please see the addendum at the top of this report. It may contain   additional important information or changes.****        CHEY LUDWIG MD; Attending Radiologist  This document has been electronically signed. Dec 15 2023 11:43AM  1st Addendum: CHEY LUDWIG MD; Attending Radiologist  The first addendum was electronically signed on: Dec 15 2023 12:08PM.             Chief Complaint:  Patient is a 89y old  Female who presents with a chief complaint of Abnormal labs (06 Feb 2024 13:39)      HPI: 89 year old female with h/o HTN, HLD, CAD/CABG, hx of TAVR, DM, dementia, s/p spinal stimulator placement p/w hypernatremia and acute renal failure, both improved. Course c/b AHRF requiring HFNC, now being weaned to NC. GOC discussion held with son/HCP Toby who rescinded prior DNR/DNI, now plan for full code and full escalation of care and pursuing all interventions. GI consulted for peg placement.     pt seen and examined. unable to provide meaningful hx. info obtained via chart and medical staff. dw with son toby via phone- he is poa/hcp, there are 4 siblings 2 for peg 2 against, he prefers comfort measures but since there is not unanimous decision he is agreeable for peg. hx of ccy. PTA was on asa/plavix- not on here, hx of cardiac stent placement and cardiac valve replacement per son    currently avs son 2L NC  wbc 12k, normocytic anemia w/ hgb flux 6-10 this admission (last prbc 2/3)  plts 110k   na 138, bun/cr 63/3  2/2 bld cxs ngtd  no abd imaging this admission        Allergies:  Pineapple (Unknown)  contrast dye -  rash (Other)  iodine (Other)  codeine (Vomiting)  Tolerates ceftriaxone, cefepime (Other)  penicillin (Rash)  latex (Rash)      PMHX/PSHX:  Angina    Diabetes Mellitus    Arthritis, Infective, Knee    Detached Retina    Acute Mucous Pneumonia    Spinal Stenosis- lumbar    History of Back Surgery    Basal Cell Cancer    Dementia    Detached Retina, Left    S/P Carpal Tunnel Release    Trigger Finger    S/P Laparoscopic Cholecystectomy    S/P TKR (Total Knee Replacement)          Family history:  FH: HTN (hypertension)        Social History: as per h&p    Home Medications: as per h&p, list reviewed    Hospital Medications:  acetaminophen   Oral Liquid .. 650 milliGRAM(s) Oral every 6 hours PRN  cefTRIAXone   IVPB      cefTRIAXone   IVPB 1000 milliGRAM(s) IV Intermittent every 24 hours  Dakins Solution - 1/4 Strength 1 Application(s) Topical daily  dextrose 5%. 1000 milliLiter(s) IV Continuous <Continuous>  dextrose 5%. 1000 milliLiter(s) IV Continuous <Continuous>  dextrose 5%. 1000 milliLiter(s) IV Continuous <Continuous>  dextrose 5%. 1000 milliLiter(s) IV Continuous <Continuous>  dextrose 50% Injectable 12.5 Gram(s) IV Push once  dextrose 50% Injectable 12.5 Gram(s) IV Push once  dextrose 50% Injectable 25 Gram(s) IV Push once  dextrose 50% Injectable 25 Gram(s) IV Push once  dextrose 50% Injectable 25 Gram(s) IV Push once  dextrose 50% Injectable 25 Gram(s) IV Push once  dextrose Oral Gel 15 Gram(s) Oral once PRN  dextrose Oral Gel 15 Gram(s) Oral once PRN  doxycycline IVPB 100 milliGRAM(s) IV Intermittent every 12 hours  doxycycline IVPB      glucagon  Injectable 1 milliGRAM(s) IntraMuscular once  glucagon  Injectable 1 milliGRAM(s) IntraMuscular once  heparin   Injectable 5000 Unit(s) SubCutaneous every 12 hours  insulin lispro (ADMELOG) corrective regimen sliding scale   SubCutaneous every 6 hours  metoprolol tartrate 25 milliGRAM(s) Oral two times a day  metroNIDAZOLE  IVPB 500 milliGRAM(s) IV Intermittent every 12 hours  pantoprazole   Suspension 40 milliGRAM(s) Oral daily  polyethylene glycol 3350 17 Gram(s) Oral daily  senna Syrup 5 milliLiter(s) Oral at bedtime  sodium bicarbonate 1300 milliGRAM(s) Oral three times a day  sterile water 1000 milliLiter(s) IV Continuous <Continuous>        ROS:   unable to obtain form pt    Vital Signs:  Vital Signs Last 24 Hrs  T(C): 37.1 (06 Feb 2024 12:00), Max: 37.1 (06 Feb 2024 12:00)  T(F): 98.8 (06 Feb 2024 12:00), Max: 98.8 (06 Feb 2024 12:00)  HR: 89 (06 Feb 2024 12:00) (84 - 91)  BP: 139/60 (06 Feb 2024 12:00) (124/51 - 143/57)  BP(mean): --  RR: 18 (06 Feb 2024 12:00) (17 - 18)  SpO2: 98% (06 Feb 2024 12:00) (98% - 100%)    Parameters below as of 06 Feb 2024 12:00  Patient On (Oxygen Delivery Method): nasal cannula  O2 Flow (L/min): 2    Daily     Daily     LABS:                        10.1   12.94 )-----------( 110      ( 06 Feb 2024 03:30 )             30.5     Mean Cell Volume: 89.7 fL (02-06-24 @ 03:30)    02-06    138  |  107  |  63<H>  ----------------------------<  184<H>  5.1   |  14<L>  |  3.03<H>    Ca    8.7      06 Feb 2024 03:30  Phos  3.2     02-06  Mg     2.00     02-06          Urinalysis Basic - ( 06 Feb 2024 03:30 )    Color: x / Appearance: x / SG: x / pH: x  Gluc: 184 mg/dL / Ketone: x  / Bili: x / Urobili: x   Blood: x / Protein: x / Nitrite: x   Leuk Esterase: x / RBC: x / WBC x   Sq Epi: x / Non Sq Epi: x / Bacteria: x                              10.1   12.94 )-----------( 110      ( 06 Feb 2024 03:30 )             30.5                         8.9    13.76 )-----------( 126      ( 05 Feb 2024 04:00 )             26.9                         8.5    13.70 )-----------( 140      ( 04 Feb 2024 11:00 )             25.0                         7.1    13.41 )-----------( 154      ( 03 Feb 2024 16:12 )             21.1       PHYSICAL EXAM:   GENERAL: lying in bed, chronically ill appearing  HEENT: NCAT, +NGT  CHEST:  increased wob on nc  ABDOMEN:  soft, non-tender, non-distended  EXTREMITIES: generalized edema  SKIN:  areas of ecchymosis   PSYCH: a&o x 0  NEURO: no tremor noted     Imaging:  prior imaging 12/2023    ACC: [id number] EXAM:  CT ABDOMEN AND PELVIS   ORDERED BY: LEXII STRINGER     ACC: [id number] EXAM:  CT CHEST   ORDERED BY: LEXII STRINGER     *** ADDENDUM # 1 ***    Addendum:    L3-L5 laminectomies.    --- End of Report ---    *** END OF ADDENDUM # 1 ***      PROCEDURE DATE:  12/15/2023          INTERPRETATION:  CLINICAL INFORMATION: 89 years  Female with bruising   right chest wall , multiple falls.    COMPARISON: CT abdomen and pelvis 4/10/2022 and CT chest abdomen and   pelvis 7/10/2018    CONTRAST/COMPLICATIONS:  IV Contrast: NONE  Oral Contrast: NONE  Complications: None reported at time of study completion    PROCEDURE:  CT of the Chest, Abdomen and Pelvis was performed.  Sagittal and coronal reformats were performed.    LIMITATIONS: Evaluation of the solid organs, vascular structures and GI   tract is limited without oral and IV contrast. Motion artifact. Streak   artifact from patient's arms.    FINDINGS:  CHEST:  LUNGS AND LARGE AIRWAYS: Patent central airways. Stable biapical   reticular scarring. Centrilobular emphysema.  PLEURA: No pleural effusion.  VESSELS: Within normal limits.  HEART: Heart size is normal. TAPVR. Coronary artery calcifications and/or   stents. Dense mitral annular calcification. CABG. No pericardial effusion.  MEDIASTINUM AND GAMALIEL: No lymphadenopathy.  CHEST WALL AND LOWER NECK: Sternotomy.    ABDOMEN AND PELVIS:  LIVER: Within normal limits.  BILE DUCTS: Normal caliber.  GALLBLADDER: Cholecystectomy.  SPLEEN: Within normal limits.  PANCREAS: There is again completely replaced with multiple cystic   lesions. The pancreatic tail is atrophic. No change from prior.  ADRENALS: Within normal limits.  KIDNEYS/URETERS: No hydronephrosis. Renal vascular calcifications limit   evaluation for punctate nonobstructing calculi.    BLADDER: Within normal limits.  REPRODUCTIVE ORGANS: Uterus normal for patient age. Arcuate artery   calcifications.    BOWEL: No bowel obstruction. Appendix  . Sigmoid diverticulosis without   diverticulitis. Questionable asymmetric rectal wall thickening.   PERITONEUM: No ascites.  VESSELS: Atherosclerotic changes.  RETROPERITONEUM/LYMPH NODES: No lymphadenopathy.  ABDOMINAL WALL: Neurostimulator device in the left gluteal subcutaneous   fat. Catheter/electrode tip in the spinal canal at the level of T7.  BONES: Thoracolumbar degenerative changes. Grade 1 L4-5 anterolisthesis.   Stable T11, T12 and L2 compression deformities    IMPRESSION:  Emphysema. Chronic fibrotic changes at the lung apices.    CABG. TAVR. Coronary artery calcifications and/or stents.    Questionable asymmetrical rectal wall thickening. Limited evaluation   without enteric contrast.    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   Atrophic pancreatic tail.            --- End of Report ---    ***Please see the addendum at the top of this report. It may contain   additional important information or changes.****        CHEY LUDWIG MD; Attending Radiologist  This document has been electronically signed. Dec 15 2023 11:43AM  1st Addendum: CHEY LUDWIG MD; Attending Radiologist  The first addendum was electronically signed on: Dec 15 2023 12:08PM.

## 2024-02-06 NOTE — PROGRESS NOTE ADULT - PROBLEM SELECTOR PLAN 2
Pt with metabolic acidosis. Recommend 1/2NS w/ 150meq sodium bicarb at 75cc/hr. c/w sodium bicarb tabs. Monitor volume and respiratory status with CXR. Monitor labs.

## 2024-02-06 NOTE — PROGRESS NOTE ADULT - ATTENDING COMMENTS
worsening renal failure, acidosis.  pt unresponsive  recs as outlined above  avoid contrast studies, ACE-I, ARB, or NSAIDs

## 2024-02-07 NOTE — PROGRESS NOTE ADULT - PROBLEM SELECTOR PLAN 3
- on admission  and Cr 2.76. Likely prerenal   - Now improved s/p fluid resuscitation  - CTM SCr    #AGMA   - bicarb down trending to 12 on 2/2   - d/w nephrology, started 1300 sodium bicarb TID  - s/p sterile water 150meq Na bicarb 75cc/h x10h without improvement  - start 1/2 NS 150meq Na bicarb 75cc/h x10h and repeat BMP and lactate 10p

## 2024-02-07 NOTE — PROVIDER CONTACT NOTE (OTHER) - BACKGROUND
Admitting diagnosis of hyperosmolality with hypernatremia.
Patient diagnosed with Hyperosmolality with hypernatremia
Patient admitted for hyperosmolarity, and hypernatremia
Patient diagnosed with Hyperosmolality with hypernatremia
Admitting diagnosis of hyperosmolality with hypercapnia.
Admitting diagnosis of hyperosmolality with hypernatremia.
Patient diagnosed with hyperosmolality with hypernatremia
Admitting diagnosis of hyperosmolality with hypernatremia.
Patient admitted for hyperosmolarity, and hypernatremia
Patient admitted for hyperosmolarity, and hypernatremia
Admitting diagnosis of hyperosmolality with hypernatremia
Patient diagnosed with Hyperosmolality with hypernatremia

## 2024-02-07 NOTE — PROGRESS NOTE ADULT - TIME BILLING
review of laboratory data, radiology results, consultants' recommendations, documentation in [place], discussion with patient/ACP and interdisciplinary staff (such as , social workers, etc). Interventions were performed as documented above.

## 2024-02-07 NOTE — PROGRESS NOTE ADULT - PROBLEM SELECTOR PLAN 1
Patient with acute kidney injury in the setting of hypovolemia and sepsis. Baseline Scr 0.9-1.2.  On admission Scr elevated at 2.76 but improved with fluids to 1.91 on 2/2. However Scr increased to 3.03--->3.35. Fluids as below. Monitor labs and STRICT I/O. Avoid nephrotoxins. Dose medications as per CrCl. Pt overall poor prognosis. GOC as per primary team.

## 2024-02-07 NOTE — PROGRESS NOTE ADULT - PROBLEM SELECTOR PLAN 1
- RRT noted for hypoxia on 2/3   - ABG with normal pH low pCO2 and with bicarb low on BMP which may represent a compensated metabolic acidosis. CXR with sig pulm edema    - unclear if pt was truly hypoxic as reports of poor waveform; remaining vitals stable  - given lack of tachycardia, ease of weaning O2, will defer PE w/u for now  - suspect in setting of worsening deconditioning, poor inspiratory effort, atelectasis   - defer CT chest / V/Q scan for now -- can obtain if develops worsening hypoxia/tachycardia   - continue antibiotics as per ID recs  - weaned HFNC to 1L NC -- trial RA tomorrow

## 2024-02-07 NOTE — PROVIDER CONTACT NOTE (OTHER) - ASSESSMENT
Pt IV site of foot is infiltrated, blister like wound on dorsal part of foot. removed IV. cleaned with NS, pat dry applied abdominal pad and kerlix dressing over wound.

## 2024-02-07 NOTE — CONSULT NOTE ADULT - SUBJECTIVE AND OBJECTIVE BOX
Mp Krishnan MD  Interventional Cardiology / Advance Heart Failure and Cardiac Transplant Specialist  [place] Office : [address] [place] [id number]  Tel:   [place] Office : [address] [facility] [address]  Tel: [phone]      HISTORY OF PRESENTING ILLNESS:  History obtained from chart as patient nonverbal  Pt is a 89 yo F with PMH dementia (AOx0, non-verbal), R ankle gangrene, HTN, HLD, CAD, and T2D p/f PJ rehab with hyperNa and acute renal failure, both improved. Course c/b AHRF requiring HFNC, now weaned to RA. GOC discussion held with son/HCP Toby who rescinded prior DNR/DNI, now plan for full code and full escalation of care and pursuing all interventions. Plan for PEG (GI consulted) and LTCF placement. Cardiology consulted for pre-op assessment    	  MEDICATIONS:  heparin   Injectable 5000 Unit(s) SubCutaneous every 12 hours  metoprolol tartrate 25 milliGRAM(s) Oral two times a day    cefTRIAXone   IVPB 1000 milliGRAM(s) IV Intermittent every 24 hours  cefTRIAXone   IVPB      doxycycline IVPB      doxycycline IVPB 100 milliGRAM(s) IV Intermittent every 12 hours  metroNIDAZOLE  IVPB 500 milliGRAM(s) IV Intermittent every 12 hours      acetaminophen   Oral Liquid .. 650 milliGRAM(s) Oral every 6 hours PRN    pantoprazole   Suspension 40 milliGRAM(s) Oral daily  polyethylene glycol 3350 17 Gram(s) Oral daily  senna Syrup 5 milliLiter(s) Oral at bedtime    dextrose 50% Injectable 25 Gram(s) IV Push once  dextrose 50% Injectable 25 Gram(s) IV Push once  dextrose 50% Injectable 12.5 Gram(s) IV Push once  dextrose 50% Injectable 12.5 Gram(s) IV Push once  dextrose 50% Injectable 25 Gram(s) IV Push once  dextrose 50% Injectable 25 Gram(s) IV Push once  dextrose Oral Gel 15 Gram(s) Oral once PRN  dextrose Oral Gel 15 Gram(s) Oral once PRN  glucagon  Injectable 1 milliGRAM(s) IntraMuscular once  glucagon  Injectable 1 milliGRAM(s) IntraMuscular once  insulin lispro (ADMELOG) corrective regimen sliding scale   SubCutaneous every 6 hours    Dakins Solution - 1/4 Strength 1 Application(s) Topical daily  dextrose 5%. 1000 milliLiter(s) IV Continuous <Continuous>  dextrose 5%. 1000 milliLiter(s) IV Continuous <Continuous>  dextrose 5%. 1000 milliLiter(s) IV Continuous <Continuous>  dextrose 5%. 1000 milliLiter(s) IV Continuous <Continuous>  sodium bicarbonate 1300 milliGRAM(s) Oral three times a day  sodium chloride 0.45% 1000 milliLiter(s) IV Continuous <Continuous>  sterile water 1000 milliLiter(s) IV Continuous <Continuous>      PAST MEDICAL/SURGICAL HISTORY  PAST MEDICAL & SURGICAL HISTORY:  Angina  in 2005, s/p angioplasty with stent placement, no recurrance, no sequalae      Diabetes Mellitus  type 2      Arthritis, Infective, Knee      Detached Retina  right eye      Acute Mucous Pneumonia  4/2010, resolved ; no residual problems      Spinal Stenosis- lumbar      History of Back Surgery  2010      Basal Cell Cancer  removed from left neck 2009      Dementia      Detached Retina, Left  laser surgery in 1995      S/P Carpal Tunnel Release  bilateral hands in 1990      Trigger Finger  release of middle and ring finger of right hand in 1990, and middle finger left hand in 2008      S/P Laparoscopic Cholecystectomy  2008      S/P TKR (Total Knee Replacement)  left knee      Cataract  removal with lens implant right in 2000          SOCIAL HISTORY: Substance Use (street drugs): ( x ) never used  (  ) other:    FAMILY HISTORY:  FH: HTN (hypertension)        REVIEW OF SYSTEMS:  unable to obtain  PHYSICAL EXAM:  T(C): 36.4 (02-07-24 @ 12:30), Max: 37 (02-06-24 @ 17:20)  HR: 82 (02-07-24 @ 12:30) (82 - 90)  BP: 136/56 (02-07-24 @ 12:30) (121/54 - 143/70)  RR: 18 (02-07-24 @ 12:30) (17 - 20)  SpO2: 98% (02-07-24 @ 12:30) (88% - 100%)  Wt(kg): --  I&O's Summary        GENERAL: lethargic  EYES:  conjunctiva and sclera clear  ENMT: No tonsillar erythema, exudates, or enlargement  Cardiovascular: Normal S1 S2, No JVD, No murmurs, No edema  Respiratory: Lungs clear to auscultation	  Gastrointestinal:  Soft  Extremities: No edema                                     7.9    13.39 )-----------( 108      ( 07 Feb 2024 06:25 )             24.8     02-07    137  |  107  |  79<H>  ----------------------------<  178<H>  5.4<H>   |  14<L>  |  3.35<H>    Ca    8.4      07 Feb 2024 03:40  Phos  4.4     02-07  Mg     1.90     02-07      proBNP:   Lipid Profile:   HgA1c:   TSH:     Consultant(s) Notes Reviewed:  [x ] YES  [ ] NO    Care Discussed with Consultants/Other Providers [ x] YES  [ ] NO    Imaging Personally Reviewed independently:  [x] YES  [ ] NO    All labs, radiologic studies, vitals, orders and medications list reviewed. Patient is seen and examined at bedside. Case discussed with medical team.

## 2024-02-07 NOTE — CONSULT NOTE ADULT - CONSULT REASON
Right posterior rib cage, gluteal cleft to right buttock deep tissue pressure injury  Left upper buttock unstageable pressure injury  Right heel, right dorsal foot, left foot pressure injuries complicated by arterial insufficiency.
pre-op assessment
Acidosis
Infected foot wound
L foot wound
peg
goc

## 2024-02-07 NOTE — CONSULT NOTE ADULT - ASSESSMENT
89 yo F with PMH dementia (AOx0, non-verbal), R ankle gangrene, HTN, HLD, CAD s/p CABG, and T2D p/f PJ rehab with hyperNa and acute renal failure,    EKG: Multifocal atrial tachycardia      1. Pre-op assessment  -plan for EGD/PEG  -unable to assess for symptoms as patient a&0, nonverbal  -last TTE in 2017 with moderate AS, please repeat TTE for optimization    2. CAD   -hx of CABG    3. HTN  -controlled  -c/w metoprolol  -continue to monitor BP     4. DVT prophylaxis  -hep subq

## 2024-02-07 NOTE — PROGRESS NOTE ADULT - SUBJECTIVE AND OBJECTIVE BOX
[facility] DIVISION OF KIDNEY DISEASES AND HYPERTENSION -- FOLLOW UP NOTE  MARISELA Fellow pager # [phone]  Nephrology office # [phone]  Available on [employer] teams--> Rozina Tam  -----------------------------------------------------------------------------  Chief Complaint:/subjective:  open eyes. not verbal       24 hour events:            ALLERGIES & MEDICATIONS  --------------------------------------------------------------------------------  Allergies    Pineapple (Unknown)  contrast dye -  rash (Other)  iodine (Other)  codeine (Vomiting)  penicillin (Rash)  latex (Rash)    Intolerances    Tolerates ceftriaxone, cefepime (Other)    Standing Inpatient Medications  cefTRIAXone   IVPB 1000 milliGRAM(s) IV Intermittent every 24 hours  cefTRIAXone   IVPB      Dakins Solution - 1/4 Strength 1 Application(s) Topical daily  dextrose 5%. 1000 milliLiter(s) IV Continuous <Continuous>  dextrose 5%. 1000 milliLiter(s) IV Continuous <Continuous>  dextrose 5%. 1000 milliLiter(s) IV Continuous <Continuous>  dextrose 5%. 1000 milliLiter(s) IV Continuous <Continuous>  dextrose 50% Injectable 25 Gram(s) IV Push once  dextrose 50% Injectable 25 Gram(s) IV Push once  dextrose 50% Injectable 12.5 Gram(s) IV Push once  dextrose 50% Injectable 12.5 Gram(s) IV Push once  dextrose 50% Injectable 25 Gram(s) IV Push once  dextrose 50% Injectable 25 Gram(s) IV Push once  doxycycline IVPB      doxycycline IVPB 100 milliGRAM(s) IV Intermittent every 12 hours  glucagon  Injectable 1 milliGRAM(s) IntraMuscular once  glucagon  Injectable 1 milliGRAM(s) IntraMuscular once  heparin   Injectable 5000 Unit(s) SubCutaneous every 12 hours  insulin lispro (ADMELOG) corrective regimen sliding scale   SubCutaneous every 6 hours  metoprolol tartrate 25 milliGRAM(s) Oral two times a day  metroNIDAZOLE  IVPB 500 milliGRAM(s) IV Intermittent every 12 hours  pantoprazole   Suspension 40 milliGRAM(s) Oral daily  polyethylene glycol 3350 17 Gram(s) Oral daily  senna Syrup 5 milliLiter(s) Oral at bedtime  sodium bicarbonate 1300 milliGRAM(s) Oral three times a day  sterile water 1000 milliLiter(s) IV Continuous <Continuous>    PRN Inpatient Medications  acetaminophen   Oral Liquid .. 650 milliGRAM(s) Oral every 6 hours PRN  dextrose Oral Gel 15 Gram(s) Oral once PRN  dextrose Oral Gel 15 Gram(s) Oral once PRN      REVIEW OF SYSTEMS  --------------------------------------------------------------------------------  unable   VITALS/PHYSICAL EXAM  --------------------------------------------------------------------------------  T(C): 36.3 (02-07-24 @ 05:03), Max: 37 (02-06-24 @ 17:20)  HR: 83 (02-07-24 @ 05:03) (83 - 90)  BP: 121/54 (02-07-24 @ 05:03) (121/54 - 143/70)  RR: 19 (02-07-24 @ 07:18) (17 - 20)  SpO2: 98% (02-07-24 @ 07:18) (88% - 100%)  Wt(kg): --        Physical Exam:  	Gen non-responsive. ill appearing   	HEENT: no JVD  	Pulm: CTABL  	CV: S1S2,  	Abd: Soft, + NGT   	Ext:  + edema B/L LE. contracted   	Neuro: Awake and alert  	Skin: L heel w/ malodorous escharous changes               LABS/STUDIES  --------------------------------------------------------------------------------              7.9    13.39 >-----------<  108      [02-07-24 @ 06:25]              24.8     Hemoglobin: 7.9 g/dL (02-07-24 @ 06:25)  Hemoglobin: 10.1 g/dL (02-06-24 @ 03:30)    Platelet Count - Automated: 108 K/uL (02-07-24 @ 06:25)  Platelet Count - Automated: 110 K/uL (02-06-24 @ 03:30)    137  |  107  |  79  ----------------------------<  178      [02-07-24 @ 03:40]  5.4   |  14  |  3.35        Ca     8.4     [02-07-24 @ 03:40]      Mg     1.90     [02-07-24 @ 03:40]      Phos  4.4     [02-07-24 @ 03:40]      PT/INR: PT 13.2 , INR 1.17       [02-07-24 @ 03:40]  PTT: 27.5       [02-07-24 @ 03:40]      Creatinine: 3.35 mg/dL (02-07-24 @ 03:40)  Creatinine: 3.26 mg/dL (02-06-24 @ 18:25)  Creatinine: 3.03 mg/dL (02-06-24 @ 03:30)  Creatinine: 2.78 mg/dL (02-05-24 @ 04:00)  Creatinine: 2.50 mg/dL (02-04-24 @ 11:00)  Creatinine: 2.25 mg/dL (02-03-24 @ 16:12)  Creatinine: 2.05 mg/dL (02-02-24 @ 22:14)  Creatinine: 1.91 mg/dL (02-02-24 @ 08:50)  Creatinine: 1.97 mg/dL (02-01-24 @ 04:49)  Creatinine: 2.02 mg/dL (01-31-24 @ 05:45)  Creatinine: 1.92 mg/dL (01-30-24 @ 06:38)  Creatinine: 1.95 mg/dL (01-29-24 @ 06:30)    SODIUM TREND:  Sodium 137 [02-07 @ 03:40]  Sodium 138 [02-06 @ 18:25]  Sodium 138 [02-06 @ 03:30]  Sodium 134 [02-05 @ 04:00]  Sodium 135 [02-04 @ 11:00]  Sodium 133 [02-03 @ 16:12]  Sodium 133 [02-02 @ 22:14]  Sodium 133 [02-02 @ 08:50]  Sodium 140 [02-01 @ 04:49]  Sodium 144 [01-31 @ 05:45]        SCr 3.35 [02-07 @ 03:40]  SCr 3.26 [02-06 @ 18:25]  SCr 3.03 [02-06 @ 03:30]  SCr 2.78 [02-05 @ 04:00]  SCr 2.50 [02-04 @ 11:00]    Urinalysis - [02-07-24 @ 03:40]      Color  / Appearance  / SG  / pH       Gluc 178 / Ketone   / Bili  / Urobili        Blood  / Protein  / Leuk Est  / Nitrite       RBC  / WBC  / Hyaline  / Gran  / Sq Epi  / Non Sq Epi  / Bacteria       Iron 45, TIBC 211, %sat 22      [12-16-23 @ 07:30]  Ferritin 365      [12-16-23 @ 07:30]  TSH 1.460      [12-21-23 @ 05:50]

## 2024-02-07 NOTE — PROVIDER CONTACT NOTE (OTHER) - ACTION/TREATMENT ORDERED:
ISIDRA robles made aware. removed IV. cleaned with NS, pat dry applied abdominal pad and kerlix dressing over wound

## 2024-02-07 NOTE — PROGRESS NOTE ADULT - SUBJECTIVE AND OBJECTIVE BOX
[facility] Department of Hospital Medicine  Rafaela Varghese DO  Available on MS Teams  Pager: [phone]    Patient is a 89y old  Female who presents with a chief complaint of Abnormal labs (05 Feb 2024 12:35)    Subjective:  Pt seen and examined at bedside sleeping comfortably, non-participatory with exam.  Spoke with sonToby - plans to visit this evening around 5pm. Updated on Cr, oxygenation, and plan for cards eval for pre-op. In agreement and appreciative of updates.    Vital Signs Last 24 Hrs  T(C): 36.3 (07 Feb 2024 05:03), Max: 37 (06 Feb 2024 17:20)  T(F): 97.3 (07 Feb 2024 05:03), Max: 98.6 (06 Feb 2024 17:20)  HR: 83 (07 Feb 2024 05:03) (83 - 90)  BP: 121/54 (07 Feb 2024 05:03) (121/54 - 143/70)  BP(mean): --  RR: 19 (07 Feb 2024 07:18) (17 - 20)  SpO2: 98% (07 Feb 2024 07:18) (88% - 100%)    Parameters below as of 07 Feb 2024 07:18  Patient On (Oxygen Delivery Method): room air    PHYSICAL EXAM:  Constitutional: sleeping comfortably in bed; NAD; non-participatory   Head: NC/AT  Eyes: PERRL, anicteric sclera  ENT: no nasal discharge; MMM; NGT in place  Neck: supple; no JVD  Respiratory: diminished breath sounds throughout/poor inspiratory effort; on NC without accessory mm use  Cardiac: +S1/S2; RRR; no M/R/G  Gastrointestinal: soft, NT/ND; no rebound or guarding; +BSx4  Extremities: WWP, no clubbing or cyanosis; 2+ BL LE edema  Neurologic: AAOx0; non-participatory     MEDICATIONS  (STANDING):  cefTRIAXone   IVPB 1000 milliGRAM(s) IV Intermittent every 24 hours  cefTRIAXone   IVPB      Dakins Solution - 1/4 Strength 1 Application(s) Topical daily  dextrose 5%. 1000 milliLiter(s) (100 mL/Hr) IV Continuous <Continuous>  dextrose 5%. 1000 milliLiter(s) (50 mL/Hr) IV Continuous <Continuous>  dextrose 5%. 1000 milliLiter(s) (50 mL/Hr) IV Continuous <Continuous>  dextrose 5%. 1000 milliLiter(s) (100 mL/Hr) IV Continuous <Continuous>  dextrose 50% Injectable 25 Gram(s) IV Push once  dextrose 50% Injectable 12.5 Gram(s) IV Push once  dextrose 50% Injectable 12.5 Gram(s) IV Push once  dextrose 50% Injectable 25 Gram(s) IV Push once  dextrose 50% Injectable 25 Gram(s) IV Push once  dextrose 50% Injectable 25 Gram(s) IV Push once  doxycycline IVPB      doxycycline IVPB 100 milliGRAM(s) IV Intermittent every 12 hours  glucagon  Injectable 1 milliGRAM(s) IntraMuscular once  glucagon  Injectable 1 milliGRAM(s) IntraMuscular once  heparin   Injectable 5000 Unit(s) SubCutaneous every 12 hours  insulin lispro (ADMELOG) corrective regimen sliding scale   SubCutaneous every 6 hours  metoprolol tartrate 25 milliGRAM(s) Oral two times a day  metroNIDAZOLE  IVPB 500 milliGRAM(s) IV Intermittent every 12 hours  pantoprazole   Suspension 40 milliGRAM(s) Oral daily  polyethylene glycol 3350 17 Gram(s) Oral daily  senna Syrup 5 milliLiter(s) Oral at bedtime  sodium bicarbonate 1300 milliGRAM(s) Oral three times a day  sterile water 1000 milliLiter(s) (75 mL/Hr) IV Continuous <Continuous>    MEDICATIONS  (PRN):  acetaminophen   Oral Liquid .. 650 milliGRAM(s) Oral every 6 hours PRN Temp greater or equal to 38C (100.4F), Mild Pain (1 - 3)  dextrose Oral Gel 15 Gram(s) Oral once PRN Blood Glucose LESS THAN 70 milliGRAM(s)/deciliter  dextrose Oral Gel 15 Gram(s) Oral once PRN Blood Glucose LESS THAN 70 milliGRAM(s)/deciliter    LABS:                        7.9    13.39 )-----------( 108      ( 07 Feb 2024 06:25 )             24.8     02-07    137  |  107  |  79<H>  ----------------------------<  178<H>  5.4<H>   |  14<L>  |  3.35<H>    Ca    8.4      07 Feb 2024 03:40  Phos  4.4     02-07  Mg     1.90     02-07      PT/INR - ( 07 Feb 2024 03:40 )   PT: 13.2 sec;   INR: 1.17 ratio         PTT - ( 07 Feb 2024 03:40 )  PTT:27.5 sec  Urinalysis Basic - ( 07 Feb 2024 03:40 )    Color: x / Appearance: x / SG: x / pH: x  Gluc: 178 mg/dL / Ketone: x  / Bili: x / Urobili: x   Blood: x / Protein: x / Nitrite: x   Leuk Esterase: x / RBC: x / WBC x   Sq Epi: x / Non Sq Epi: x / Bacteria: x      CAPILLARY BLOOD GLUCOSE      POCT Blood Glucose.: 224 mg/dL (07 Feb 2024 11:57)      RADIOLOGY & ADDITIONAL TESTS: Reviewed.

## 2024-02-07 NOTE — CONSULT NOTE ADULT - PROVIDER SPECIALTY LIST ADULT
Vascular Surgery
Infectious Disease
Gastroenterology
Wound Care
[Negative] : Heme/Lymph
[Ear Pain] : ear pain
Cardiology
[Patient Intake Form Reviewed] : Patient intake form was reviewed
[de-identified] : tinnitus,pressure
Nephrology
[de-identified] : itching
[de-identified] : reflux/heartburn
[FreeTextEntry4] : neck pain
Palliative Care

## 2024-02-07 NOTE — CONSULT NOTE ADULT - CONSULT REQUESTED DATE/TIME
26-Jan-2024 12:57
06-Feb-2024 13:58
07-Feb-2024 14:43
24-Jan-2024 13:48
25-Jan-2024 17:21
03-Feb-2024
25-Jan-2024 13:16

## 2024-02-07 NOTE — PROGRESS NOTE ADULT - ASSESSMENT
Pt is a 89 yo F with PMH dementia (AOx0, non-verbal), R ankle gangrene, HTN, HLD, CAD, and T2D p/f PJ rehab with hyperNa and acute renal failure, both improved. Course c/b AHRF requiring HFNC, now being weaned to NC. GOC discussion held with son/HCP Toby who rescinded prior DNR/DNI, now plan for full code and full escalation of care and pursuing all interventions. Pending weaning of NC, anticipate plan for PEG (GI consulted) and LTCF placement.

## 2024-02-07 NOTE — PROVIDER CONTACT NOTE (OTHER) - RECOMMENDATIONS
Notify MD. Hold PO medication
ACP Madison Morlaes made aware.
MD to be notified
MD to be notified
ISIDRA Shook made aware.
IV metoprolol
Notify MD
ACP Madison Morales made aware.
ISIDRA Shook made aware.
MD to be notified
MD to be notified
MD to be notified, no consent in chart
ISIDRA Hernandez made aware.
MD to be notified
ISIDRA Hernandez made aware.
Notify MD
Yes

## 2024-02-07 NOTE — PROGRESS NOTE ADULT - PROBLEM SELECTOR PLAN 2
PH 7.21. Pt with metabolic acidosis. Recommend 1/2NS w/ 150meq sodium bicarb at 75cc/hr. c/w sodium bicarb tabs. Monitor volume and respiratory status. Monitor labs.  currently on RA  Lactate up

## 2024-02-08 NOTE — PROGRESS NOTE ADULT - PROBLEM SELECTOR PLAN 1
Patient with acute kidney injury in the setting of hypovolemia and sepsis. Baseline Scr 0.9-1.2.  On admission Scr elevated at 2.76 but improved with fluids to 1.91 on 2/2. However Scr increased to 3.03--->3.3. Fluids as below. Monitor labs and STRICT I/O. Avoid nephrotoxins. Dose medications as per CrCl. Pt overall poor prognosis. GOC as per primary team.

## 2024-02-08 NOTE — PROGRESS NOTE ADULT - PROBLEM SELECTOR PLAN 2
- pt met SIRS criteria + source of known foot infection.   - Sepsis now resolved. UA negative. RVP negative. BCx NGTD.   - Recently treated for L foot wound growing E. coli and S. aureus.   - Vascular offering definitive management with amputation but family declines. Local wound care.   - ID consulted for antibiotic recs - changed antibiotics to doxycycline, ceftriaxone and flagyl

## 2024-02-08 NOTE — PROGRESS NOTE ADULT - PROBLEM SELECTOR PLAN 10
- POC checks   - NPO, sliding scale

## 2024-02-08 NOTE — PROGRESS NOTE ADULT - PROBLEM SELECTOR PLAN 2
Pt with metabolic acidosis.   c/w sodium bicarb tabs. Monitor volume and respiratory status. Monitor labs.

## 2024-02-08 NOTE — PROGRESS NOTE ADULT - PROBLEM SELECTOR PLAN 11
- DVT: Heparin  - Diet: S&S evaluated - NPO  - Dispo: Ongoing GOC with family    #GOC, pt with prior MOLST - DNR/DNI but was rescinded on this hospitalization. Palliative consulted. Ongoing GOC conversations. Toby understands the poor prognosis and would like to proceed with comfort measures/DNR/DNI but two out of his three siblings do not agree with comfort measures at this time. He does not feel comfortable transitioning care until his other two brothers are in agreement. We addressed that his mom is declining and that we do not recommend NGT/PEG as this is uncomfortable and would not change her prognosis. Toby expressed understanding but states he wishes to pursue PEG as brothers would not forgive him if he did not.
- DVT: Heparin  - Diet: S&S evaluated - NPO with tube feeds  - Dispo: Ongoing GOC with family    #GOC, pt with prior MOLST - DNR/DNI but was rescinded on this hospitalization. Palliative consulted. Ongoing GOC conversations. Toby understands the poor prognosis and would like to proceed with comfort measures/DNR/DNI but two out of his three siblings do not agree with comfort measures at this time. He does not feel comfortable transitioning care until his other two brothers are in agreement. We addressed that his mom is declining and that we do not recommend NGT/PEG as this is uncomfortable and would not change her prognosis. Toby expressed understanding but states he wishes to pursue PEG as brothers would not forgive him if he did not.
- DVT: Heparin  - Diet: S&S evaluated - NPO with tube feeds  - Dispo: Ongoing GOC with family    #GOC, pt with prior MOLST - DNR/DNI but was rescinded on this hospitalization. Palliative consulted. Ongoing GOC conversations. Toby understands the poor prognosis and would like to proceed with comfort measures/DNR/DNI but two out of his three siblings do not agree with comfort measures at this time. He does not feel comfortable transitioning care until his other two brothers are in agreement. We addressed that his mom is declining and that we do not recommend NGT/PEG as this is uncomfortable and would not change her prognosis. Toby expressed understanding but states he wishes to pursue PEG as brothers would not forgive him if he did not.
- DVT: Heparin  - Diet: S&S evaluated - NPO  - Dispo: Ongoing GOC with family    #GOC, pt with prior MOLST - DNR/DNI but was rescinded on this hospitalization. Palliative consulted. Ongoing GOC conversations. Discussed prognosis and care plan with son Toby via telephone with Dr. Chang from palliative care. Toby understands the poor prognosis and would like to proceed with comfort measures/DNR/DNI but two out of his three siblings do not agree with comfort measures at this time. He does not feel comfortable transitioning care until his other two brothers are in agreement. We addressed that his mom is declining and that we do not recommend NGT/PEG as this is uncomfortable and would not change her prognosis. Toby expressed understanding but needs to speak to his brothers. Currently he would like us to place an NG tube which was placed on 2/3.
- DVT: Heparin  - Diet: S&S evaluated - NPO  - Dispo: Ongoing GOC with family    #GOC, pt with prior MOLST - DNR/DNI but was rescinded on this hospitalization. Palliative consulted. Ongoing GOC conversations. Discussed prognosis and care plan with son Toby via telephone with Dr. Chang from palliative care. Toby understands the poor prognosis and would like to proceed with comfort measures/DNR/DNI but two out of his three siblings do not agree with comfort measures at this time. He does not feel comfortable transitioning care until his other two brothers are in agreement. We addressed that his mom is declining and that we do not recommend NGT/PEG as this is uncomfortable and would not change her prognosis. Toby expressed understanding but needs to speak to his brothers. Currently he would like us to place an NG tube
- DVT: Heparin  - Diet: S&S evaluated - NPO  - Dispo: Ongoing GOC with family    #GOC, pt with prior MOLST - DNR/DNI but was rescinded on this hospitalization. Palliative consulted. Ongoing GOC conversations. Discussed prognosis and care plan with son Toby via telephone with Dr. Chang from palliative care. Toby understands the poor prognosis and would like to proceed with comfort measures/DNR/DNI but two out of his three siblings do not agree with comfort measures at this time. He does not feel comfortable transitioning care until his other two brothers are in agreement. We addressed that his mom is declining and that we do not recommend NGT/PEG as this is uncomfortable and would not change her prognosis. Toby expressed understanding but needs to speak to his brothers. Currently he would like us to place an NG tube
- DVT: Heparin  - Diet: S&S evaluated - NPO  - Dispo: Ongoing GOC with family    #GOC, pt with prior MOLST - DNR/DNI but was rescinded on this hospitalization. Palliative consulted. Ongoing GOC conversations. Toby understands the poor prognosis and would like to proceed with comfort measures/DNR/DNI but two out of his three siblings do not agree with comfort measures at this time. He does not feel comfortable transitioning care until his other two brothers are in agreement. We addressed that his mom is declining and that we do not recommend NGT/PEG as this is uncomfortable and would not change her prognosis. Toby expressed understanding but states he wishes to pursue PEG as brothers would not forgive him if he did not.
- DVT: Heparin  - Diet: S&S evaluated - NPO  - Dispo: Ongoing GOC with family    #GOC, pt with prior MOLST - DNR/DNI but was rescinded on this hospitalization. Palliative consulted. Ongoing GOC conversations. Discussed prognosis and care plan with son Toby via telephone with Dr. Chang from palliative care. Toby understands the poor prognosis and would like to proceed with comfort measures/DNR/DNI but two out of his three siblings do not agree with comfort measures at this time. He does not feel comfortable transitioning care until his other two brothers are in agreement. We addressed that his mom is declining and that we do not recommend NGT/PEG as this is uncomfortable and would not change her prognosis. Toby expressed understanding but needs to speak to his brothers. Currently he would like us to place an NG tube which was placed on 2/3.

## 2024-02-08 NOTE — PROGRESS NOTE ADULT - PROBLEM SELECTOR PLAN 9
- s/p CABG w/ stent in 2005  - Hold home Plavix given NPO status
- s/p CABG w/ stent in 2005
- s/p CABG w/ stent in 2005  - Hold home Plavix given NPO status
- s/p CABG w/ stent in 2005

## 2024-02-08 NOTE — PROGRESS NOTE ADULT - PROBLEM SELECTOR PLAN 1
- RRT noted for hypoxia on 2/3   - ABG with normal pH low pCO2 and with bicarb low on BMP which may represent a compensated metabolic acidosis. CXR with sig pulm edema    - unclear if pt was truly hypoxic as reports of poor waveform; remaining vitals stable  - given lack of tachycardia, ease of weaning O2, will defer PE w/u for now  - suspect in setting of worsening deconditioning, poor inspiratory effort, atelectasis   - defer CT chest / V/Q scan for now -- can obtain if develops worsening hypoxia/tachycardia   - continue antibiotics as per ID recs  - weaned HFNC to RA    #Diarrhea  - noted to have multiple loose BMs today  - dc bowel regimen (miralax, senna -- last dosed 2/7)  - monitor x24h, if continues can send GI w/u

## 2024-02-08 NOTE — PROGRESS NOTE ADULT - PROBLEM SELECTOR PROBLEM 9
Presence of stent of CABG

## 2024-02-08 NOTE — PROGRESS NOTE ADULT - PROBLEM SELECTOR PLAN 3
- on admission  and Cr 2.76. Likely prerenal   - Now improved s/p fluid resuscitation  - CTM SCr    #AGMA   - bicarb down trending to 12 on 2/2 -- improved 2/8  - d/w nephrology, c/w 1300 sodium bicarb TID  - s/p sterile water 150meq Na bicarb 75cc/h x10h without improvement 2/6  - s/p 1/2 NS 150meq Na bicarb 75cc/h x10h with improvement 2/7

## 2024-02-08 NOTE — PROGRESS NOTE ADULT - SUBJECTIVE AND OBJECTIVE BOX
[facility] DIVISION OF KIDNEY DISEASES AND HYPERTENSION -- FOLLOW UP NOTE  MARISELA Fellow pager # [phone]  Nephrology office # [phone]  Available on [employer] teams--> Rozina Tam  -----------------------------------------------------------------------------  Chief Complaint:/subjective:  open eyes. not communicative         24 hour events:            ALLERGIES & MEDICATIONS  --------------------------------------------------------------------------------  Allergies    Pineapple (Unknown)  contrast dye -  rash (Other)  iodine (Other)  codeine (Vomiting)  penicillin (Rash)  latex (Rash)    Intolerances    Tolerates ceftriaxone, cefepime (Other)    Standing Inpatient Medications  cefTRIAXone   IVPB 1000 milliGRAM(s) IV Intermittent every 24 hours  cefTRIAXone   IVPB      Dakins Solution - 1/4 Strength 1 Application(s) Topical daily  dextrose 5%. 1000 milliLiter(s) IV Continuous <Continuous>  dextrose 5%. 1000 milliLiter(s) IV Continuous <Continuous>  dextrose 5%. 1000 milliLiter(s) IV Continuous <Continuous>  dextrose 5%. 1000 milliLiter(s) IV Continuous <Continuous>  dextrose 50% Injectable 25 Gram(s) IV Push once  dextrose 50% Injectable 25 Gram(s) IV Push once  dextrose 50% Injectable 12.5 Gram(s) IV Push once  dextrose 50% Injectable 12.5 Gram(s) IV Push once  dextrose 50% Injectable 25 Gram(s) IV Push once  dextrose 50% Injectable 25 Gram(s) IV Push once  doxycycline IVPB 100 milliGRAM(s) IV Intermittent every 12 hours  doxycycline IVPB      glucagon  Injectable 1 milliGRAM(s) IntraMuscular once  glucagon  Injectable 1 milliGRAM(s) IntraMuscular once  heparin   Injectable 5000 Unit(s) SubCutaneous every 12 hours  insulin lispro (ADMELOG) corrective regimen sliding scale   SubCutaneous every 6 hours  metoprolol tartrate 25 milliGRAM(s) Oral two times a day  metroNIDAZOLE  IVPB 500 milliGRAM(s) IV Intermittent every 12 hours  pantoprazole   Suspension 40 milliGRAM(s) Oral daily  sodium bicarbonate 1300 milliGRAM(s) Oral three times a day  sodium chloride 0.45% 1000 milliLiter(s) IV Continuous <Continuous>  sodium zirconium cyclosilicate 10 Gram(s) Oral daily  sterile water 1000 milliLiter(s) IV Continuous <Continuous>    PRN Inpatient Medications  acetaminophen   Oral Liquid .. 650 milliGRAM(s) Oral every 6 hours PRN  dextrose Oral Gel 15 Gram(s) Oral once PRN  dextrose Oral Gel 15 Gram(s) Oral once PRN      REVIEW OF SYSTEMS  --------------------------------------------------------------------------------  unable     VITALS/PHYSICAL EXAM  --------------------------------------------------------------------------------  T(C): 36.5 (02-08-24 @ 12:00), Max: 37.2 (02-08-24 @ 05:50)  HR: 94 (02-08-24 @ 12:00) (81 - 94)  BP: 146/62 (02-08-24 @ 12:00) (116/54 - 146/62)  RR: 18 (02-08-24 @ 12:00) (18 - 18)  SpO2: 98% (02-08-24 @ 12:00) (95% - 100%)  Wt(kg): --        02-07-24 @ 07:01  -  02-08-24 @ 07:00  --------------------------------------------------------  IN: 180 mL / OUT: 0 mL / NET: 180 mL      Physical Exam:  	Gen NAD, ill appearing. on 02    	HEENT: no JVD  	Pulm: CTABL anteriorly   	CV: S1S2,  	Abd: Soft,   	Ext:  ++ edema B/L LE   	Neuro: Awake and alert  	Skin: Warm and dry               LABS/STUDIES  --------------------------------------------------------------------------------              7.8    13.36 >-----------<  92       [02-08-24 @ 00:07]              22.9     Hemoglobin: 7.8 g/dL (02-08-24 @ 00:07)  Hemoglobin: 7.9 g/dL (02-07-24 @ 06:25)    Platelet Count - Automated: 92 K/uL (02-08-24 @ 00:07)  Platelet Count - Automated: 108 K/uL (02-07-24 @ 06:25)    138  |  107  |  93  ----------------------------<  206      [02-08-24 @ 00:07]  4.7   |  16  |  3.33        Ca     8.4     [02-08-24 @ 00:07]      Mg     1.80     [02-08-24 @ 00:07]      Phos  3.5     [02-08-24 @ 00:07]      PT/INR: PT 13.2 , INR 1.17       [02-07-24 @ 03:40]  PTT: 27.5       [02-07-24 @ 03:40]      Creatinine: 3.33 mg/dL (02-08-24 @ 00:07)  Creatinine: 3.35 mg/dL (02-07-24 @ 03:40)  Creatinine: 3.26 mg/dL (02-06-24 @ 18:25)  Creatinine: 3.03 mg/dL (02-06-24 @ 03:30)  Creatinine: 2.78 mg/dL (02-05-24 @ 04:00)  Creatinine: 2.50 mg/dL (02-04-24 @ 11:00)  Creatinine: 2.25 mg/dL (02-03-24 @ 16:12)  Creatinine: 2.05 mg/dL (02-02-24 @ 22:14)  Creatinine: 1.91 mg/dL (02-02-24 @ 08:50)  Creatinine: 1.97 mg/dL (02-01-24 @ 04:49)  Creatinine: 2.02 mg/dL (01-31-24 @ 05:45)  Creatinine: 1.92 mg/dL (01-30-24 @ 06:38)    SODIUM TREND:  Sodium 138 [02-08 @ 00:07]  Sodium 137 [02-07 @ 03:40]  Sodium 138 [02-06 @ 18:25]  Sodium 138 [02-06 @ 03:30]  Sodium 134 [02-05 @ 04:00]  Sodium 135 [02-04 @ 11:00]  Sodium 133 [02-03 @ 16:12]  Sodium 133 [02-02 @ 22:14]  Sodium 133 [02-02 @ 08:50]  Sodium 140 [02-01 @ 04:49]        SCr 3.33 [02-08 @ 00:07]  SCr 3.35 [02-07 @ 03:40]  SCr 3.26 [02-06 @ 18:25]  SCr 3.03 [02-06 @ 03:30]  SCr 2.78 [02-05 @ 04:00]    Urinalysis - [02-08-24 @ 00:07]      Color  / Appearance  / SG  / pH       Gluc 206 / Ketone   / Bili  / Urobili        Blood  / Protein  / Leuk Est  / Nitrite       RBC  / WBC  / Hyaline  / Gran  / Sq Epi  / Non Sq Epi  / Bacteria       Iron 45, TIBC 211, %sat 22      [12-16-23 @ 07:30]  Ferritin 365      [12-16-23 @ 07:30]  TSH 1.460      [12-21-23 @ 05:50]

## 2024-02-08 NOTE — PROGRESS NOTE ADULT - ASSESSMENT
Pt is a 89 yo F with PMH dementia (AOx0, non-verbal), R ankle gangrene, HTN, HLD, CAD, and T2D p/f PJ rehab with hyperNa and acute renal failure, both improved. Course c/b AHRF requiring HFNC, now weaned to RA. GOC discussion held with son/HCP Toby who rescinded prior DNR/DNI, now plan for full code and full escalation of care and pursuing all interventions. Now weaned to RA, pending TTE for pre-op optimizatoin with cards following in anticipation of plan for PEG (GI consulted) and LTCF placement.

## 2024-02-08 NOTE — PROGRESS NOTE ADULT - SUBJECTIVE AND OBJECTIVE BOX
Mp Krishnan MD  Interventional Cardiology / Advance Heart Failure and Cardiac Transplant Specialist  [place] Office : [phone] [place] [id number]  Tel:   [place] Office : [phone] [facility] [address]  Tel: [phone]      Subjective/Overnight events: Pt is lying in bed lethargic  	  MEDICATIONS:  heparin   Injectable 5000 Unit(s) SubCutaneous every 12 hours  metoprolol tartrate 25 milliGRAM(s) Oral two times a day    cefTRIAXone   IVPB 1000 milliGRAM(s) IV Intermittent every 24 hours  cefTRIAXone   IVPB      doxycycline IVPB      doxycycline IVPB 100 milliGRAM(s) IV Intermittent every 12 hours  metroNIDAZOLE  IVPB 500 milliGRAM(s) IV Intermittent every 12 hours      acetaminophen   Oral Liquid .. 650 milliGRAM(s) Oral every 6 hours PRN    pantoprazole   Suspension 40 milliGRAM(s) Oral daily  polyethylene glycol 3350 17 Gram(s) Oral daily  senna Syrup 5 milliLiter(s) Oral at bedtime    dextrose 50% Injectable 25 Gram(s) IV Push once  dextrose 50% Injectable 25 Gram(s) IV Push once  dextrose 50% Injectable 12.5 Gram(s) IV Push once  dextrose 50% Injectable 12.5 Gram(s) IV Push once  dextrose 50% Injectable 25 Gram(s) IV Push once  dextrose 50% Injectable 25 Gram(s) IV Push once  dextrose Oral Gel 15 Gram(s) Oral once PRN  dextrose Oral Gel 15 Gram(s) Oral once PRN  glucagon  Injectable 1 milliGRAM(s) IntraMuscular once  glucagon  Injectable 1 milliGRAM(s) IntraMuscular once  insulin lispro (ADMELOG) corrective regimen sliding scale   SubCutaneous every 6 hours    Dakins Solution - 1/4 Strength 1 Application(s) Topical daily  dextrose 5%. 1000 milliLiter(s) IV Continuous <Continuous>  dextrose 5%. 1000 milliLiter(s) IV Continuous <Continuous>  dextrose 5%. 1000 milliLiter(s) IV Continuous <Continuous>  dextrose 5%. 1000 milliLiter(s) IV Continuous <Continuous>  magnesium sulfate  IVPB 2 Gram(s) IV Intermittent once  sodium bicarbonate 1300 milliGRAM(s) Oral three times a day  sodium chloride 0.45% 1000 milliLiter(s) IV Continuous <Continuous>  sterile water 1000 milliLiter(s) IV Continuous <Continuous>      PAST MEDICAL/SURGICAL HISTORY  PAST MEDICAL & SURGICAL HISTORY:  Angina  in 2005, s/p angioplasty with stent placement, no recurrance, no sequalae      Diabetes Mellitus  type 2      Arthritis, Infective, Knee      Detached Retina  right eye      Acute Mucous Pneumonia  4/2010, resolved ; no residual problems      Spinal Stenosis- lumbar      History of Back Surgery  2010      Basal Cell Cancer  removed from left neck 2009      Dementia      Detached Retina, Left  laser surgery in 1995      S/P Carpal Tunnel Release  bilateral hands in 1990      Trigger Finger  release of middle and ring finger of right hand in 1990, and middle finger left hand in 2008      S/P Laparoscopic Cholecystectomy  2008      S/P TKR (Total Knee Replacement)  left knee      Cataract  removal with lens implant right in 2000          SOCIAL HISTORY: Substance Use (street drugs): ( x ) never used  (  ) other:    FAMILY HISTORY:  FH: HTN (hypertension)            PHYSICAL EXAM:  T(C): 37.2 (02-08-24 @ 05:50), Max: 37.2 (02-08-24 @ 05:50)  HR: 93 (02-08-24 @ 05:50) (81 - 93)  BP: 120/65 (02-08-24 @ 05:50) (116/54 - 141/60)  RR: 18 (02-08-24 @ 05:50) (18 - 18)  SpO2: 95% (02-08-24 @ 05:50) (95% - 100%)  Wt(kg): --  I&O's Summary    07 Feb 2024 07:01  -  08 Feb 2024 07:00  --------------------------------------------------------  IN: 180 mL / OUT: 0 mL / NET: 180 mL          GENERAL: lethargic  EYES:  conjunctiva and sclera clear  ENMT: No tonsillar erythema, exudates, or enlargement  Cardiovascular: Normal S1 S2, No JVD, No murmurs, No edema  Respiratory: Lungs clear to auscultation	  Gastrointestinal:  Soft  Extremities: No edema                                       7.8    13.36 )-----------( 92       ( 08 Feb 2024 00:07 )             22.9     02-08    138  |  107  |  93<H>  ----------------------------<  206<H>  4.7   |  16<L>  |  3.33<H>    Ca    8.4      08 Feb 2024 00:07  Phos  3.5     02-08  Mg     1.80     02-08      proBNP:   Lipid Profile:   HgA1c:   TSH:     Consultant(s) Notes Reviewed:  [x ] YES  [ ] NO    Care Discussed with Consultants/Other Providers [ x] YES  [ ] NO    Imaging Personally Reviewed independently:  [x] YES  [ ] NO    All labs, radiologic studies, vitals, orders and medications list reviewed. Patient is seen and examined at bedside. Case discussed with medical team.

## 2024-02-08 NOTE — PROGRESS NOTE ADULT - TIME BILLING
review of laboratory data, radiology results, consultants' recommendations, documentation in [place], discussion with patient/ACP and interdisciplinary staff (such as , social workers, etc). Interventions were performed as documented above.

## 2024-02-08 NOTE — PROGRESS NOTE ADULT - SUBJECTIVE AND OBJECTIVE BOX
[facility] Department of Hospital Medicine  Rafaela Varghese DO  Available on MS Teams  Pager: [phone]    Patient is a 89y old  Female who presents with a chief complaint of Abnormal labs (05 Feb 2024 12:35)    Subjective:  Pt seen and examined at bedside sleeping comfortably, non-participatory with exam.  Spoke with son, Edward - visited yesterday evening. Does not have any questions/concerns.    Vital Signs Last 24 Hrs  T(C): 37.2 (08 Feb 2024 05:50), Max: 37.2 (08 Feb 2024 05:50)  T(F): 98.9 (08 Feb 2024 05:50), Max: 98.9 (08 Feb 2024 05:50)  HR: 93 (08 Feb 2024 05:50) (81 - 93)  BP: 120/65 (08 Feb 2024 05:50) (116/54 - 141/60)  BP(mean): --  RR: 18 (08 Feb 2024 05:50) (18 - 18)  SpO2: 95% (08 Feb 2024 05:50) (95% - 100%)    Parameters below as of 08 Feb 2024 05:50  Patient On (Oxygen Delivery Method): room air    PHYSICAL EXAM:  Constitutional: sleeping comfortably in bed; NAD; non-participatory   Head: NC/AT  Eyes: PERRL, anicteric sclera  ENT: no nasal discharge; MMM; NGT in place  Neck: supple; no JVD  Respiratory: diminished breath sounds throughout/poor inspiratory effort; on NC without accessory mm use  Cardiac: +S1/S2; RRR; no M/R/G  Gastrointestinal: soft, NT/ND; no rebound or guarding; +BSx4  Extremities: WWP, no clubbing or cyanosis; 2+ BL LE edema  Neurologic: AAOx0; non-participatory     MEDICATIONS  (STANDING):  cefTRIAXone   IVPB 1000 milliGRAM(s) IV Intermittent every 24 hours  cefTRIAXone   IVPB      Dakins Solution - 1/4 Strength 1 Application(s) Topical daily  dextrose 5%. 1000 milliLiter(s) (100 mL/Hr) IV Continuous <Continuous>  dextrose 5%. 1000 milliLiter(s) (50 mL/Hr) IV Continuous <Continuous>  dextrose 5%. 1000 milliLiter(s) (50 mL/Hr) IV Continuous <Continuous>  dextrose 5%. 1000 milliLiter(s) (100 mL/Hr) IV Continuous <Continuous>  dextrose 50% Injectable 12.5 Gram(s) IV Push once  dextrose 50% Injectable 12.5 Gram(s) IV Push once  dextrose 50% Injectable 25 Gram(s) IV Push once  dextrose 50% Injectable 25 Gram(s) IV Push once  dextrose 50% Injectable 25 Gram(s) IV Push once  dextrose 50% Injectable 25 Gram(s) IV Push once  doxycycline IVPB      doxycycline IVPB 100 milliGRAM(s) IV Intermittent every 12 hours  glucagon  Injectable 1 milliGRAM(s) IntraMuscular once  glucagon  Injectable 1 milliGRAM(s) IntraMuscular once  heparin   Injectable 5000 Unit(s) SubCutaneous every 12 hours  insulin lispro (ADMELOG) corrective regimen sliding scale   SubCutaneous every 6 hours  magnesium sulfate  IVPB 2 Gram(s) IV Intermittent once  metoprolol tartrate 25 milliGRAM(s) Oral two times a day  metroNIDAZOLE  IVPB 500 milliGRAM(s) IV Intermittent every 12 hours  pantoprazole   Suspension 40 milliGRAM(s) Oral daily  sodium bicarbonate 1300 milliGRAM(s) Oral three times a day  sodium chloride 0.45% 1000 milliLiter(s) (75 mL/Hr) IV Continuous <Continuous>  sodium zirconium cyclosilicate 10 Gram(s) Oral daily  sterile water 1000 milliLiter(s) (75 mL/Hr) IV Continuous <Continuous>    MEDICATIONS  (PRN):  acetaminophen   Oral Liquid .. 650 milliGRAM(s) Oral every 6 hours PRN Temp greater or equal to 38C (100.4F), Mild Pain (1 - 3)  dextrose Oral Gel 15 Gram(s) Oral once PRN Blood Glucose LESS THAN 70 milliGRAM(s)/deciliter  dextrose Oral Gel 15 Gram(s) Oral once PRN Blood Glucose LESS THAN 70 milliGRAM(s)/deciliter    LABS:                        7.8    13.36 )-----------( 92       ( 08 Feb 2024 00:07 )             22.9     02-08    138  |  107  |  93<H>  ----------------------------<  206<H>  4.7   |  16<L>  |  3.33<H>    Ca    8.4      08 Feb 2024 00:07  Phos  3.5     02-08  Mg     1.80     02-08      PT/INR - ( 07 Feb 2024 03:40 )   PT: 13.2 sec;   INR: 1.17 ratio         PTT - ( 07 Feb 2024 03:40 )  PTT:27.5 sec  Urinalysis Basic - ( 08 Feb 2024 00:07 )    Color: x / Appearance: x / SG: x / pH: x  Gluc: 206 mg/dL / Ketone: x  / Bili: x / Urobili: x   Blood: x / Protein: x / Nitrite: x   Leuk Esterase: x / RBC: x / WBC x   Sq Epi: x / Non Sq Epi: x / Bacteria: x      CAPILLARY BLOOD GLUCOSE      POCT Blood Glucose.: 202 mg/dL (08 Feb 2024 12:02)      RADIOLOGY & ADDITIONAL TESTS: Reviewed.

## 2024-02-09 NOTE — PROVIDER CONTACT NOTE (OTHER) - ASSESSMENT
Patient is less tachypneic after administration of IV Tylenol, however still febrile with a temperature of 101.2. Patient BP is 117/41, HR 96.

## 2024-02-09 NOTE — PROCEDURE NOTE - ADDITIONAL PROCEDURE DETAILS
Critically ill pt requiring PIV access for medical management and/or pressor support. Under US guidance identified Left upper vein, and successfully placed 20g x 5.7cm Accucath extend dwell angiocath into vessel. Placement confirmed s/p with ultrasound and catheter determined to be in patent lumen of vein. Pt tolerated well w/o complication.
Critically ill pt requiring PIV access for medical management and/or pressor support. Under US guidance identified Right upper vein, and successfully placed 20g x 9.9cm  extend dwell angiocath into vessel. Placement confirmed s/p with ultrasound and catheter determined to be in patent lumen of vein. Pt tolerated well w/o complication.
Critically ill pt requiring PIV access for medical management and/or pressor support. Under US guidance identified Right upper arm vein, and successfully placed 20g x 5.7 cm arrow extend dwell angiocath into vessel. Placement confirmed s/p with ultrasound and catheter determined to be in patent lumen of vein. Pt tolerated well w/o complication.

## 2024-02-09 NOTE — PROGRESS NOTE ADULT - SUBJECTIVE AND OBJECTIVE BOX
VASCULAR SURGERY DAILY PROGRESS NOTE:     SUBJECTIVE/ROS:   Vascular surgery team reconsulted for b/l foot wounds. Per report from the MICU resident, recent rapid was called on the patient for oxygen desaturation necessitating intubation and transfer to the MICU. Patient intubated and sedated at bedside. Patient's family recently rescinded the DNR/DNI status, patient now full code and reportedly amenable to surgical intervention.     OBJECTIVE:  Vital Signs Last 24 Hrs  T(C): 38.4 (2024 05:55), Max: 38.9 (2024 04:00)  T(F): 101.2 (2024 05:55), Max: 102.1 (2024 04:00)  HR: 56 (2024 19:00) (56 - 116)  BP: 104/48 (2024 19:00) (84/47 - 135/68)  BP(mean): 65 (2024 19:00) (59 - 85)  RR: 16 (2024 19:00) (15 - 24)  SpO2: 100% (2024 19:00) (91% - 100%)    Parameters below as of 2024 05:55  Patient On (Oxygen Delivery Method): nasal cannula  O2 Flow (L/min): 2    I&O's Detail    2024 07:01  -  2024 07:00  --------------------------------------------------------  IN:  Total IN: 0 mL    OUT:    Voided (mL): 0 mL  Total OUT: 0 mL    Total NET: 0 mL      2024 07:01  -  2024 20:11  --------------------------------------------------------  IN:    Norepinephrine: 103 mL    Propofol: 63 mL  Total IN: 166 mL    OUT:    Indwelling Catheter - Urethral (mL): 180 mL  Total OUT: 180 mL    Total NET: -14 mL        Daily     Daily   MEDICATIONS  (STANDING):  chlorhexidine 0.12% Liquid 15 milliLiter(s) Oral Mucosa every 12 hours  Dakins Solution - 1/2 Strength 1 Application(s) Topical two times a day  dextrose 5%. 1000 milliLiter(s) (50 mL/Hr) IV Continuous <Continuous>  dextrose 5%. 1000 milliLiter(s) (50 mL/Hr) IV Continuous <Continuous>  dextrose 5%. 1000 milliLiter(s) (100 mL/Hr) IV Continuous <Continuous>  dextrose 5%. 1000 milliLiter(s) (100 mL/Hr) IV Continuous <Continuous>  dextrose 50% Injectable 25 Gram(s) IV Push once  dextrose 50% Injectable 25 Gram(s) IV Push once  dextrose 50% Injectable 12.5 Gram(s) IV Push once  dextrose 50% Injectable 12.5 Gram(s) IV Push once  dextrose 50% Injectable 25 Gram(s) IV Push once  dextrose 50% Injectable 25 Gram(s) IV Push once  doxycycline IVPB      doxycycline IVPB 100 milliGRAM(s) IV Intermittent every 12 hours  glucagon  Injectable 1 milliGRAM(s) IntraMuscular once  glucagon  Injectable 1 milliGRAM(s) IntraMuscular once  heparin   Injectable 5000 Unit(s) SubCutaneous every 12 hours  insulin lispro (ADMELOG) corrective regimen sliding scale   SubCutaneous every 6 hours  metroNIDAZOLE  IVPB 500 milliGRAM(s) IV Intermittent every 12 hours  midodrine 20 milliGRAM(s) Oral every 8 hours  norepinephrine Infusion 0.05 MICROgram(s)/kG/Min (3.79 mL/Hr) IV Continuous <Continuous>  pantoprazole   Suspension 40 milliGRAM(s) Oral daily  propofol Infusion 5 MICROgram(s)/kG/Min (1.21 mL/Hr) IV Continuous <Continuous>  sodium bicarbonate 1300 milliGRAM(s) Oral three times a day  sodium chloride 0.45% 1000 milliLiter(s) (75 mL/Hr) IV Continuous <Continuous>  sodium zirconium cyclosilicate 10 Gram(s) Oral daily  sterile water 1000 milliLiter(s) (75 mL/Hr) IV Continuous <Continuous>    MEDICATIONS  (PRN):  acetaminophen   Oral Liquid .. 650 milliGRAM(s) Oral every 6 hours PRN Temp greater or equal to 38C (100.4F), Mild Pain (1 - 3)  dextrose Oral Gel 15 Gram(s) Oral once PRN Blood Glucose LESS THAN 70 milliGRAM(s)/deciliter  dextrose Oral Gel 15 Gram(s) Oral once PRN Blood Glucose LESS THAN 70 milliGRAM(s)/deciliter  HYDROmorphone  Injectable 1 milliGRAM(s) IV Push every 4 hours PRN Severe Pain (7 - 10)      Labs:                        7.7    13.73 )-----------( 111      ( 2024 13:15 )             23.3     09    141  |  106  |  111<H>  ----------------------------<  180<H>  4.9   |  17<L>  |  3.73<H>    Ca    8.4      2024 13:15  Phos  4.6       Mg     2.10         TPro  5.2<L>  /  Alb  1.6<L>  /  TBili  0.3  /  DBili  x   /  AST  24  /  ALT  14  /  AlkPhos  598<H>        Urinalysis Basic - ( 2024 14:00 )    Color: Dark Yellow / Appearance: Turbid / S.014 / pH: x  Gluc: x / Ketone: Negative mg/dL  / Bili: Negative / Urobili: 0.2 mg/dL   Blood: x / Protein: 300 mg/dL / Nitrite: Negative   Leuk Esterase: Large / RBC: 331 /HPF / WBC 2257 /HPF   Sq Epi: x / Non Sq Epi: 14 /HPF / Bacteria: Many /HPF    Physical Exam:  General: intubated and sedated  HEENT: Atraumatic, normocephalic  Respiratory: intubated  Gastrointestinal: soft, nontender, nondistended  Extremities:   - RLE: Weeping anterior ankle wound with evidence of crepitus. Approximately 15x5cm. Darkened heel wound 3x3cm.   - LLE: Large area of gangrene surrounding the heel and lateral/medial ankle. Ischemic changes throughout foot and 1st toe  Vascular:   - RLE: DP and PT signals dopplerable   - LLE: DP signal dopplerable  Neurological: A+Ox0

## 2024-02-09 NOTE — PROCEDURE NOTE - NSSITEPREP_SKIN_A_CORE
chlorhexidine/povidone-iodine ( under 2 weeks of age or 1500 grams)/Adherence to aseptic technique: hand hygiene prior to donning barriers (gown, gloves), don cap and mask, sterile drape over patient
chlorhexidine/povidone-iodine ( under 2 weeks of age or 1500 grams)/Adherence to aseptic technique: hand hygiene prior to donning barriers (gown, gloves), don cap and mask, sterile drape over patient
chlorhexidine/Adherence to aseptic technique: hand hygiene prior to donning barriers (gown, gloves), don cap and mask, sterile drape over patient

## 2024-02-09 NOTE — PROGRESS NOTE ADULT - NSPROGADDITIONALINFOA_GEN_ALL_CORE

## 2024-02-09 NOTE — CHART NOTE - NSCHARTNOTEFT_GEN_A_CORE
NUTRITION FOLLOW-UP      89 yo F with Hx of HTN, HLD, dementia, DM, extensive pressure injuries & right ankle gangrene, sepsis, SIRS and acute hypoxic respiratory failure.  Intubated/sedated on Propofol (2/9).    Current rate of Propofol observed @ 6.1mL/hr which if maintained x 24hs provides ~161 non -nutritive lipid calories.    Pt. had prolonged NPO status for > 1 week (1/24-2/3).  Was then initiated on tube feeding with Glucerna 1.5 (2/3).  Per extensive chart review, it is unclear as to specific tube feeding rate administered since initiation nor if/when Pt. reached or maintained @ prescribed tube feeding goal prior to transfer to MICU. Hence, unable to quantify adequacy of energy/nutrient intake.  Per [facility] documentation, family wishes to pursue PEG.     Pt. not receiving enteral feeding @ this time.  Is noted/observed with liquid stool output (2/9)  Currently prescribed enteral regimen significantly exceeds estimated calorie needs and advise modification, if/when medically appropriate to resume enteral feeding.   Suggest decreased goal of Glucerna 1.5 to 40mL/hr x 24hrs + protein supplementation with Liquid Protein Supplement (LPS) 1x daily.    This may also assist with improvement in glycemic control, as POCT glucose values persistently elevated.  Adjust insulin, as medically necessary.    Weight gain may be somewhat related to energy intake (if Pt. was at goal), however also in setting of increasing edema and fluid accumulation.    Nutrition recommendations to be communicated to nursing and physicians.      _________________Diet___________________  Diet, NPO with Tube Feed:   Tube Feeding Modality: Nasogastric  Glucerna 1.5 Wan (GLUCERNA1.5RTH)  Total Volume for 24 Hours (mL): 1440  Continuous  Starting Tube Feed Rate {mL per Hour}: 5  Increase Tube Feed Rate by (mL): 10     Every 4 hours  Until Goal Tube Feed Rate (mL per Hour): 60  Tube Feed Duration (in Hours): 24  Tube Feed Start Time: 15:30 (02-03-24 @ 15:28) [Active]    TF provides:  1440mL total volume  2160 kcals  119g protein  1093mL free water       Weight:                    Height:   59" /149.9cm                  Upper 10% Ideal Body Weight: 104.5lbs /47.5kg  55.5kg (2/7 ?)  40.4kg (1/25 on admit)            _____Currently Estimated Energy Needs (based on Upper 10% IBW)_____  25-30 KCals/kg = 5726-6766 KCals/d  1.6-2.0g protein/kg = 76-95g protein/d        Edema:  Skin: Multiple pressure injuries including stage I, II, III and suspected deep tissue injury        ________________________Pertinent Medications____________   MEDICATIONS  (STANDING):  chlorhexidine 0.12% Liquid 15 milliLiter(s) Oral Mucosa every 12 hours  Dakins Solution - 1/2 Strength 1 Application(s) Topical two times a day  dextrose 5%. 1000 milliLiter(s) (50 mL/Hr) IV Continuous <Continuous>  dextrose 5%. 1000 milliLiter(s) (50 mL/Hr) IV Continuous <Continuous>  dextrose 5%. 1000 milliLiter(s) (100 mL/Hr) IV Continuous <Continuous>  dextrose 5%. 1000 milliLiter(s) (100 mL/Hr) IV Continuous <Continuous>  dextrose 50% Injectable 25 Gram(s) IV Push once  dextrose 50% Injectable 25 Gram(s) IV Push once  dextrose 50% Injectable 25 Gram(s) IV Push once  dextrose 50% Injectable 25 Gram(s) IV Push once  dextrose 50% Injectable 12.5 Gram(s) IV Push once  dextrose 50% Injectable 12.5 Gram(s) IV Push once  doxycycline IVPB      doxycycline IVPB 100 milliGRAM(s) IV Intermittent every 12 hours  glucagon  Injectable 1 milliGRAM(s) IntraMuscular once  glucagon  Injectable 1 milliGRAM(s) IntraMuscular once  heparin   Injectable 5000 Unit(s) SubCutaneous every 12 hours  insulin lispro (ADMELOG) corrective regimen sliding scale   SubCutaneous every 6 hours  metroNIDAZOLE  IVPB 500 milliGRAM(s) IV Intermittent every 12 hours  midodrine 20 milliGRAM(s) Oral every 8 hours  norepinephrine Infusion 0.05 MICROgram(s)/kG/Min (3.79 mL/Hr) IV Continuous <Continuous>  pantoprazole   Suspension 40 milliGRAM(s) Oral daily  propofol Infusion 5 MICROgram(s)/kG/Min (1.21 mL/Hr) IV Continuous <Continuous>  sodium bicarbonate 1300 milliGRAM(s) Oral three times a day  sodium chloride 0.45% 1000 milliLiter(s) (75 mL/Hr) IV Continuous <Continuous>  sodium zirconium cyclosilicate 10 Gram(s) Oral daily  sterile water 1000 milliLiter(s) (75 mL/Hr) IV Continuous <Continuous>    MEDICATIONS  (PRN):  acetaminophen   Oral Liquid .. 650 milliGRAM(s) Oral every 6 hours PRN Temp greater or equal to 38C (100.4F), Mild Pain (1 - 3)  dextrose Oral Gel 15 Gram(s) Oral once PRN Blood Glucose LESS THAN 70 milliGRAM(s)/deciliter  dextrose Oral Gel 15 Gram(s) Oral once PRN Blood Glucose LESS THAN 70 milliGRAM(s)/deciliter  HYDROmorphone  Injectable 1 milliGRAM(s) IV Push every 4 hours PRN Severe Pain (7 - 10)          _________________________Pertinent Labs____________________     02-09    141  |  106  |  111<H>  ----------------------------<  180<H>  4.9   |  17<L>  |  3.73<H>    Ca    8.4      09 Feb 2024 13:15  Phos  4.6     02-09  Mg     2.10     02-09    TPro  5.2<L>  /  Alb  1.6<L>  /  TBili  0.3  /  DBili  x   /  AST  24  /  ALT  14  /  AlkPhos  598<H>  02-09                                                                   7.7    13.73 )-----------( 111      ( 09 Feb 2024 13:15 )             23.3         CAPILLARY BLOOD GLUCOSE      POCT Blood Glucose.: 201 mg/dL (09 Feb 2024 11:53)    POCT Blood Glucose.: 201 mg/dL (02-09-24 @ 11:53)  POCT Blood Glucose.: 217 mg/dL (02-09-24 @ 09:43)  POCT Blood Glucose.: 298 mg/dL (02-09-24 @ 07:37)  POCT Blood Glucose.: 272 mg/dL (02-09-24 @ 05:17)  POCT Blood Glucose.: 235 mg/dL (02-08-24 @ 23:27)  POCT Blood Glucose.: 257 mg/dL (02-08-24 @ 16:57)      ________NUTRITION Dx_________    Pt. remains severely malnourished         PLAN/RECOMMENDATIONS:    1) If/when to resume tube feeding, decrease goal of Glucerna 1.5 to 40mL/hr x 24hrs and add 1 packet Liquid Protein Supplement (LPS) per day    would provide:  960mL total volume  1440 kcals (+ Propofol KCals)  79g protein (+ 15g additional protein via LPS)= 94g total protein/d  729mL free water     2) Obtain daily weights  3) Monitor tolerance to TF, GI status, electrolytes, glucose  4) Order multivitamin      RDN remains available and will f/u PRN.          Monica Mcclure, TAMAR, CDN       pager [phone] or MS Teams

## 2024-02-09 NOTE — PROVIDER CONTACT NOTE (OTHER) - REASON
Patient with low diastolic
Patient HR sustaining 130s
rapid Afib
IV site infiltrated
Pt desaturating on venturi mask, demonstrating signs of agonal breathing
/54
Patient with low diastolic
Elevated Temp
Elevated Temp
Patient pending 1 unit of packed red blood cells from 1/27/24
Pt desaturating to 85% and sustaining in 80s on high flow
BP HR sustaining between 110s and 130s
Pt has 3 beats of v-tach
diastolic less than 60
Patient cannot tolerate PO medication
Patient with low diastolic
Patient with low diastolic
Weaning down O2

## 2024-02-09 NOTE — CHART NOTE - NSCHARTNOTEFT_GEN_A_CORE
MICU Accept Note    CHIEF COMPLAINT:     HPI / INTERVAL HISTORY:  89 years old female with h/o HTN, HLD, CAD, DM, dementia, s/p spinal stimulator placement p/w abnormal labs (Na 168, K 2.9). Unable to obtain further history due to patient baseline nonverbal. Patient was transferred from [facility] for hypernatremia on routine labs. They also recently started cefepime due to concern for foot infection. The son did not have any further history regarding the events leading up to hospital admission. As per the son, the patient was in her usual state of health the last time he visited with her.     ED course: Pt arrived to ED w/ , /46, afebrile, requiring 4L NC. Pt recieved Vancomycin, and IVF. Labs showed leukocytosis 14, elevated , normal lactate 1.5, Na 163, Cl 128, SCr 2.76 (baseline ~1-1.2), . UA growing yeast like cells and with pyuria.     On 02/09: RRT     PAST MEDICAL & SURGICAL HISTORY:  Angina  in 2005, s/p angioplasty with stent placement, no recurrance, no sequalae  Diabetes Mellitus type 2  Arthritis, Infective, Knee  Detached Retina right eye  Acute Mucous Pneumonia 4/2010, resolved ; no residual problems  Spinal Stenosis- lumbar  History of Back Surgery 2010  Basal Cell Cancer removed from left neck 2009  Dementia  Detached Retina, Left laser surgery in 1995  S/P Carpal Tunnel Release bilateral hands in 1990  Trigger Finger release of middle and ring finger of right hand in 1990, and middle finger left hand in 2008      S/P Laparoscopic Cholecystectomy  2008  S/P TKR (Total Knee Replacement)  left knee  Cataract  removal with lens implant right in 2000    FAMILY HISTORY:  FH: HTN (hypertension)    HOME MEDICATIONS:      Allergies    Pineapple (Unknown)  contrast dye -  rash (Other)  iodine (Other)  codeine (Vomiting)  penicillin (Rash)  latex (Rash)    Intolerances    Tolerates ceftriaxone, cefepime (Other)        REVIEW OF SYSTEMS:  Constitutional: No fevers, chills, weight loss, weight gain  HEENT: No vision problems, eye pain, nasal congestion, rhinorrhea, sore throat, dysphagia  CV: No chest pain, orthopnea, palpitations  Resp: No cough, dyspnea, wheezing, hemoptysis  GI: No nausea, vomiting, diarrhea, constipation, abdominal pain  : [ ] dysuria [ ] nocturia [ ] hematuria [ ] increased urinary frequency  Musculoskeletal: [ ] back pain [ ] myalgias [ ] arthralgias [ ] fracture  Skin: [ ] rash [ ] itch  Neurological: [ ] headache [ ] dizziness [ ] syncope [ ] weakness [ ] numbness  Psychiatric: [ ] anxiety [ ] depression  Endocrine: [ ] diabetes [ ] thyroid problem  Hematologic/Lymphatic: [ ] anemia [ ] bleeding problem  Allergic/Immunologic: [ ] itchy eyes [ ] nasal discharge [ ] hives [ ] angioedema  [ ] All other systems negative  [ ] Unable to assess ROS because ________    OBJECTIVE:  ICU Vital Signs Last 24 Hrs  T(C): 38.4 (09 Feb 2024 05:55), Max: 38.9 (09 Feb 2024 04:00)  T(F): 101.2 (09 Feb 2024 05:55), Max: 102.1 (09 Feb 2024 04:00)  HR: 96 (09 Feb 2024 05:55) (91 - 116)  BP: 117/41 (09 Feb 2024 05:55) (98/43 - 146/62)  BP(mean): --  ABP: --  ABP(mean): --  RR: 20 (09 Feb 2024 05:55) (18 - 24)  SpO2: 91% (09 Feb 2024 05:55) (91% - 98%)    O2 Parameters below as of 09 Feb 2024 05:55  Patient On (Oxygen Delivery Method): nasal cannula  O2 Flow (L/min): 2      02-08 @ 07:01  -  02-09 @ 07:00  --------------------------------------------------------  IN: 0 mL / OUT: 0 mL / NET: 0 mL      CAPILLARY BLOOD GLUCOSE      POCT Blood Glucose.: 298 mg/dL (09 Feb 2024 07:37)      PHYSICAL EXAM:  General:   HEENT:   Neck:   Chest/Lungs:  Heart:  Abdomen:   Extremities:   Skin:   Neuro:   Psych:     LINES:     HOSPITAL MEDICATIONS:  MEDICATIONS  (STANDING):  cefTRIAXone   IVPB      cefTRIAXone   IVPB 1000 milliGRAM(s) IV Intermittent every 24 hours  Dakins Solution - 1/4 Strength 1 Application(s) Topical daily  dextrose 5%. 1000 milliLiter(s) (100 mL/Hr) IV Continuous <Continuous>  dextrose 5%. 1000 milliLiter(s) (100 mL/Hr) IV Continuous <Continuous>  dextrose 5%. 1000 milliLiter(s) (50 mL/Hr) IV Continuous <Continuous>  dextrose 5%. 1000 milliLiter(s) (50 mL/Hr) IV Continuous <Continuous>  dextrose 50% Injectable 12.5 Gram(s) IV Push once  dextrose 50% Injectable 12.5 Gram(s) IV Push once  dextrose 50% Injectable 25 Gram(s) IV Push once  dextrose 50% Injectable 25 Gram(s) IV Push once  dextrose 50% Injectable 25 Gram(s) IV Push once  dextrose 50% Injectable 25 Gram(s) IV Push once  doxycycline IVPB      doxycycline IVPB 100 milliGRAM(s) IV Intermittent every 12 hours  glucagon  Injectable 1 milliGRAM(s) IntraMuscular once  glucagon  Injectable 1 milliGRAM(s) IntraMuscular once  heparin   Injectable 5000 Unit(s) SubCutaneous every 12 hours  insulin lispro (ADMELOG) corrective regimen sliding scale   SubCutaneous every 6 hours  metoprolol tartrate 25 milliGRAM(s) Oral two times a day  metroNIDAZOLE  IVPB 500 milliGRAM(s) IV Intermittent every 12 hours  pantoprazole   Suspension 40 milliGRAM(s) Oral daily  sodium bicarbonate 1300 milliGRAM(s) Oral three times a day  sodium chloride 0.45% 1000 milliLiter(s) (75 mL/Hr) IV Continuous <Continuous>  sodium zirconium cyclosilicate 10 Gram(s) Oral daily  sterile water 1000 milliLiter(s) (75 mL/Hr) IV Continuous <Continuous>    MEDICATIONS  (PRN):  acetaminophen   Oral Liquid .. 650 milliGRAM(s) Oral every 6 hours PRN Temp greater or equal to 38C (100.4F), Mild Pain (1 - 3)  dextrose Oral Gel 15 Gram(s) Oral once PRN Blood Glucose LESS THAN 70 milliGRAM(s)/deciliter  dextrose Oral Gel 15 Gram(s) Oral once PRN Blood Glucose LESS THAN 70 milliGRAM(s)/deciliter      LABS:                        7.6    12.54 )-----------( 104      ( 09 Feb 2024 04:55 )             23.3     Hgb Trend: 7.6<--, 7.8<--, 7.9<--, 10.1<--, 8.9<--  02-09    140  |  104  |  110<H>  ----------------------------<  235<H>  5.2   |  17<L>  |  3.60<H>    Ca    8.5      09 Feb 2024 04:55  Phos  4.3     02-09  Mg     2.20     02-09      Creatinine Trend: 3.60<--, 3.33<--, 3.35<--, 3.26<--, 3.03<--, 2.78<--    Urinalysis Basic - ( 09 Feb 2024 04:55 )    Color: x / Appearance: x / SG: x / pH: x  Gluc: 235 mg/dL / Ketone: x  / Bili: x / Urobili: x   Blood: x / Protein: x / Nitrite: x   Leuk Esterase: x / RBC: x / WBC x   Sq Epi: x / Non Sq Epi: x / Bacteria: x      Arterial Blood Gas:  02-09 @ 04:55  7.42/28/172/18/99.0/-5.5  ABG lactate: --        MICROBIOLOGY:     RADIOLOGY & ADDITIONAL TESTS:      ASSESSMENT AND PLAN:  Pt is a 89 yo F with PMH dementia (AOx0, non-verbal), R ankle gangrene, HTN, HLD, CAD, and T2D p/f PJ rehab with hyperNa and acute renal failure, both improved. Course c/b AHRF requiring HFNC, now weaned to RA. GOC discussion held with son/HCP Toby who rescinded prior DNR/DNI, now plan for full code and full escalation of care and pursuing all interventions. Now weaned to RA, pending TTE for pre-op optimization with cards following in anticipation of plan for PEG (GI consulted) and LTCF placement.     #Neuro    #Dementia   - pt. A&Ox0 - nonverbal.  - Severe protein calorie malnutrition.   - Failed S&S eval.  Plan:   - Ongoing GOC conversation as below  - anticipate need for PEG -- GI consulted  - cards consulted for pre-op optimization, pending TTE.    #Cardiovascular    #Afib.   - Afib with RVR. Pt. was transferred to a telemetry floor. Discussed with son. switch IV lopressor to PO now that we have oral access   Plan:  - continue lopressor 25mg BID.    #Respiratory    Acute respiratory failure with hypoxia.   - RRT noted for hypoxia on 2/3 ABG with normal pH low pCO2 and with bicarb low on BMP which may represent a compensated metabolic acidosis. CXR with sig pulm edema    - unclear if pt was truly hypoxic as reports of poor waveform; remaining vitals stable  - given lack of tachycardia, ease of weaning O2, will defer PE w/u for now  - suspect in setting of worsening deconditioning, poor inspiratory effort, atelectasis     RRT on 02/09 with emergent intubation due to ??    Plan:   - continue antibiotics as per ID recs  - on volume AC   - CT chest / V/Q scan differed for now -- can obtain if develops worsening hypoxia/tachycardia    #GI/Nutrition    #Diet: _____ through NGT    #Diarrhea  - noted to have multiple loose BMs today  - dc bowel regimen (miralax, senna -- last dosed 2/7)  - monitor x24h, if continues can send GI w/u.    #/Renal    #SAE (acute kidney injury).   - on admission  and Cr 2.76. Likely prerenal  - improved s/p fluid resuscitation   Plan:    - ctm Cr/UOP    #AGMA   - bicarb down trending to 12 on 2/2 -- improved 2/8  - d/w nephrology, c/w 1300 sodium bicarb TID  - s/p sterile water 150meq Na bicarb 75cc/h x10h without improvement 2/6  - s/p 1/2 NS 150meq Na bicarb 75cc/h x10h with improvement 2/7.    #Skin    #ID    #Sepsis.    - pt met SIRS criteria + source of known foot infection.   - Recently treated for L foot wound growing E. coli and S. aureus.   - Vascular offering definitive management with amputation but family declines. Local wound care.   Plan:   - currently on doxycycline, ceftriaxone and flagyl  - ID consulted, f/u recs     #MSK:    #Wound of foot.   - L foot xray: Dorsal forefoot soft tissue swelling. Calcaneal cortex on left foot with possible osteomyelitis  - previously unable to tolerate MRI L ankle when attempted in recent hospitalization  - Recently Tx in Dec 23' for L foot wound growing S. Aureas and E. Coli, amputation L foot offered and deferred at the time as per GOC   - Wound care following  Plan:  - c/w Abx as above  - Vascular consulted, recs: continue local wound care, frequent offloading to B/L lower extremities.    #Endocrine    #Diabetes.   - POC checks   Plan:  - NPO, sliding scale.    #Hematologic    #Anemia   - Hgb ~6 on 1/29. No signs of bleeding. Received 1U PRBC.   - Hgb now improved - stable.  Plan:  -ctm    #DVT ppx    #Ethics/ICU Bundle  - GOC/Code Status: Full Code. pt with prior MOLST - DNR/DNI but was rescinded on this hospitalization. Palliative consulted. Ongoing GOC conversations. Toby understands the poor prognosis and would like to proceed with comfort measures/DNR/DNI but two out of his three siblings do not agree with comfort measures at this time. He does not feel comfortable transitioning care until his other two brothers are in agreement. We addressed that his mom is declining and that we do not recommend NGT/PEG as this is uncomfortable and would not change her prognosis. Toby expressed understanding but states he wishes to pursue PEG as brothers would not forgive him if he did not.  - Diet:  - Access:  - Tubes/Drains:  - Activity/PT/OT: MICU Accept Note    CHIEF COMPLAINT: foot infection leading to sespsis    HPI / INTERVAL HISTORY:  89 years old female with h/o HTN, HLD, CAD, DM, dementia, s/p spinal stimulator placement p/w abnormal labs (Na 168, K 2.9). Unable to obtain further history due to patient baseline nonverbal. Patient was transferred from [facility] for hypernatremia on routine labs. They also recently started cefepime due to concern for foot infection. Pt arrived to ED w/ , /46, afebrile, requiring 4L NC. Pt received Vancomycin, and IVF. Labs showed leukocytosis 14, elevated , normal lactate 1.5, Na 163, Cl 128, SCr 2.76 (baseline ~1-1.2), . UA growing yeast like cells and with pyuria.     On 02/09 patient needed urgent intubation due to     PAST MEDICAL & SURGICAL HISTORY:  Angina  in 2005, s/p angioplasty with stent placement, no recurrance, no sequalae  Diabetes Mellitus type 2  Arthritis, Infective, Knee  Detached Retina right eye  Acute Mucous Pneumonia 4/2010, resolved ; no residual problems  Spinal Stenosis- lumbar  History of Back Surgery 2010  Basal Cell Cancer removed from left neck 2009  Dementia  Detached Retina, Left laser surgery in 1995  S/P Carpal Tunnel Release bilateral hands in 1990  Trigger Finger release of middle and ring finger of right hand in 1990, and middle finger left hand in 2008      S/P Laparoscopic Cholecystectomy  2008  S/P TKR (Total Knee Replacement)  left knee  Cataract  removal with lens implant right in 2000    FAMILY HISTORY:  FH: HTN (hypertension)    HOME MEDICATIONS:      Allergies    Pineapple (Unknown)  contrast dye -  rash (Other)  iodine (Other)  codeine (Vomiting)  penicillin (Rash)  latex (Rash)    Intolerances    Tolerates ceftriaxone, cefepime (Other)        REVIEW OF SYSTEMS:  Constitutional: No fevers, chills, weight loss, weight gain  HEENT: No vision problems, eye pain, nasal congestion, rhinorrhea, sore throat, dysphagia  CV: No chest pain, orthopnea, palpitations  Resp: No cough, dyspnea, wheezing, hemoptysis  GI: No nausea, vomiting, diarrhea, constipation, abdominal pain  : [ ] dysuria [ ] nocturia [ ] hematuria [ ] increased urinary frequency  Musculoskeletal: [ ] back pain [ ] myalgias [ ] arthralgias [ ] fracture  Skin: [ ] rash [ ] itch  Neurological: [ ] headache [ ] dizziness [ ] syncope [ ] weakness [ ] numbness  Psychiatric: [ ] anxiety [ ] depression  Endocrine: [ ] diabetes [ ] thyroid problem  Hematologic/Lymphatic: [ ] anemia [ ] bleeding problem  Allergic/Immunologic: [ ] itchy eyes [ ] nasal discharge [ ] hives [ ] angioedema  [ ] All other systems negative  [ ] Unable to assess ROS because ________    OBJECTIVE:  ICU Vital Signs Last 24 Hrs  T(C): 38.4 (09 Feb 2024 05:55), Max: 38.9 (09 Feb 2024 04:00)  T(F): 101.2 (09 Feb 2024 05:55), Max: 102.1 (09 Feb 2024 04:00)  HR: 96 (09 Feb 2024 05:55) (91 - 116)  BP: 117/41 (09 Feb 2024 05:55) (98/43 - 146/62)  BP(mean): --  ABP: --  ABP(mean): --  RR: 20 (09 Feb 2024 05:55) (18 - 24)  SpO2: 91% (09 Feb 2024 05:55) (91% - 98%)    O2 Parameters below as of 09 Feb 2024 05:55  Patient On (Oxygen Delivery Method): nasal cannula  O2 Flow (L/min): 2      02-08 @ 07:01  -  02-09 @ 07:00  --------------------------------------------------------  IN: 0 mL / OUT: 0 mL / NET: 0 mL      CAPILLARY BLOOD GLUCOSE      POCT Blood Glucose.: 298 mg/dL (09 Feb 2024 07:37)      PHYSICAL EXAM:  GENERAL: intubated, sedated  HEAD:  Atraumatic, Normocephalic  EYES: EOMI, conjunctiva and sclera clear  ENT: Moist mucous membranes  NECK: Supple, No JVD  CHEST/LUNG: Clear to auscultation bilaterally; No rales, rhonchi, wheezing, or rubs. Unlabored respirations  HEART: Regular rate and rhythm; No murmurs, rubs, or gallops  ABDOMEN: BSx4; Soft, nontender, nondistended  EXTREMITIES:  2+ Peripheral Pulses, brisk capillary refill. No clubbing, cyanosis, or edema  NERVOUS SYSTEM:  A&Ox0  SKIN: No rashes or lesions      LINES:     HOSPITAL MEDICATIONS:  MEDICATIONS  (STANDING):  cefTRIAXone   IVPB      cefTRIAXone   IVPB 1000 milliGRAM(s) IV Intermittent every 24 hours  Dakins Solution - 1/4 Strength 1 Application(s) Topical daily  dextrose 5%. 1000 milliLiter(s) (100 mL/Hr) IV Continuous <Continuous>  dextrose 5%. 1000 milliLiter(s) (100 mL/Hr) IV Continuous <Continuous>  dextrose 5%. 1000 milliLiter(s) (50 mL/Hr) IV Continuous <Continuous>  dextrose 5%. 1000 milliLiter(s) (50 mL/Hr) IV Continuous <Continuous>  dextrose 50% Injectable 12.5 Gram(s) IV Push once  dextrose 50% Injectable 12.5 Gram(s) IV Push once  dextrose 50% Injectable 25 Gram(s) IV Push once  dextrose 50% Injectable 25 Gram(s) IV Push once  dextrose 50% Injectable 25 Gram(s) IV Push once  dextrose 50% Injectable 25 Gram(s) IV Push once  doxycycline IVPB      doxycycline IVPB 100 milliGRAM(s) IV Intermittent every 12 hours  glucagon  Injectable 1 milliGRAM(s) IntraMuscular once  glucagon  Injectable 1 milliGRAM(s) IntraMuscular once  heparin   Injectable 5000 Unit(s) SubCutaneous every 12 hours  insulin lispro (ADMELOG) corrective regimen sliding scale   SubCutaneous every 6 hours  metoprolol tartrate 25 milliGRAM(s) Oral two times a day  metroNIDAZOLE  IVPB 500 milliGRAM(s) IV Intermittent every 12 hours  pantoprazole   Suspension 40 milliGRAM(s) Oral daily  sodium bicarbonate 1300 milliGRAM(s) Oral three times a day  sodium chloride 0.45% 1000 milliLiter(s) (75 mL/Hr) IV Continuous <Continuous>  sodium zirconium cyclosilicate 10 Gram(s) Oral daily  sterile water 1000 milliLiter(s) (75 mL/Hr) IV Continuous <Continuous>    MEDICATIONS  (PRN):  acetaminophen   Oral Liquid .. 650 milliGRAM(s) Oral every 6 hours PRN Temp greater or equal to 38C (100.4F), Mild Pain (1 - 3)  dextrose Oral Gel 15 Gram(s) Oral once PRN Blood Glucose LESS THAN 70 milliGRAM(s)/deciliter  dextrose Oral Gel 15 Gram(s) Oral once PRN Blood Glucose LESS THAN 70 milliGRAM(s)/deciliter      LABS:                        7.6    12.54 )-----------( 104      ( 09 Feb 2024 04:55 )             23.3     Hgb Trend: 7.6<--, 7.8<--, 7.9<--, 10.1<--, 8.9<--  02-09    140  |  104  |  110<H>  ----------------------------<  235<H>  5.2   |  17<L>  |  3.60<H>    Ca    8.5      09 Feb 2024 04:55  Phos  4.3     02-09  Mg     2.20     02-09      Creatinine Trend: 3.60<--, 3.33<--, 3.35<--, 3.26<--, 3.03<--, 2.78<--    Urinalysis Basic - ( 09 Feb 2024 04:55 )    Color: x / Appearance: x / SG: x / pH: x  Gluc: 235 mg/dL / Ketone: x  / Bili: x / Urobili: x   Blood: x / Protein: x / Nitrite: x   Leuk Esterase: x / RBC: x / WBC x   Sq Epi: x / Non Sq Epi: x / Bacteria: x      Arterial Blood Gas:  02-09 @ 04:55  7.42/28/172/18/99.0/-5.5  ABG lactate: --        MICROBIOLOGY:     RADIOLOGY & ADDITIONAL TESTS:      ASSESSMENT AND PLAN:  Pt is a 89 yo F with PMH dementia (AOx0, non-verbal), R ankle gangrene, HTN, HLD, CAD, and T2D p/f PJ rehab with hyperNa and acute renal failure, both improved. Course c/b AHRF requiring HFNC, now weaned to RA. GOC discussion held with son/HCP Toby who rescinded prior DNR/DNI, now plan for full code and full escalation of care and pursuing all interventions. Now weaned to RA, pending TTE for pre-op optimization with cards following in anticipation of plan for PEG (GI consulted) and LTCF placement.     #Neuro    #Dementia   - pt. A&Ox0 - nonverbal.  - Severe protein calorie malnutrition.   - Failed S&S eval.  Plan:   - Ongoing GOC conversation as below  - anticipate need for PEG -- GI consulted  - cards consulted for pre-op optimization, pending TTE.    #Cardiovascular    #Afib.   - Afib with RVR. Pt. was transferred to a telemetry floor. Discussed with son. switch IV lopressor to PO now that we have oral access   Plan:  - continue lopressor 25mg BID.    #Respiratory    Acute respiratory failure with hypoxia.   - suspect in setting of worsening deconditioning, poor inspiratory effort, atelectasis   - patient needed emergent intubation due to   - post intubation chest xray shows complete left lung opacity  RRT on 02/09 with emergent intubation due to ??    Plan:  - consider BAL   - continue antibiotics as per ID recs  - on volume AC     #GI/Nutrition    #Diet: tube feeds through OG    #Diarrhea  - noted to have multiple loose BMs today  - dc bowel regimen (miralax, senna -- last dosed 2/7)  - monitor x24h, if continues can send GI w/u.    #/Renal    #SAE (acute kidney injury).   - on admission  and Cr 2.76. Likely prerenal  - improved s/p fluid resuscitation   Plan:    - ctm Cr/UOP    #AGMA   - bicarb down trending to 12 on 2/2 -- improved 2/8  - d/w nephrology, c/w 1300 sodium bicarb TID  - s/p sterile water 150meq Na bicarb 75cc/h x10h without improvement 2/6  - s/p 1/2 NS 150meq Na bicarb 75cc/h x10h with improvement 2/7.    #Skin    #ID    #Sepsis.    - pt met SIRS criteria + source of known foot infection.   - Recently treated for L foot wound growing E. coli and S. aureus.   - Vascular offering definitive management with amputation but family declines. Local wound care.   Plan:   - currently on doxycycline, ceftriaxone and flagyl  - ID consulted, f/u recs     #MSK:    #Wound of foot.   - L foot xray: Dorsal forefoot soft tissue swelling. Calcaneal cortex on left foot with possible osteomyelitis  - previously unable to tolerate MRI L ankle when attempted in recent hospitalization  - Recently Tx in Dec 23' for L foot wound growing S. Aureas and E. Coli, amputation L foot offered and deferred at the time as per GOC   - Wound care following  Plan:  - c/w Abx as above  - Vascular consulted, recs: continue local wound care, frequent offloading to B/L lower extremities.    #Endocrine    #Diabetes.   - POC checks   Plan:  - NPO, sliding scale.    #Hematologic    #Anemia   - Hgb ~6 on 1/29. No signs of bleeding. Received 1U PRBC.   - Hgb now improved - stable.  Plan:  -ctm    #DVT ppx    #Ethics/ICU Bundle  - GOC/Code Status: Full Code. pt with prior MOLST - DNR/DNI but was rescinded on this hospitalization. Palliative consulted. Ongoing GOC conversations. Toby understands the poor prognosis and would like to proceed with comfort measures/DNR/DNI but two out of his three siblings do not agree with comfort measures at this time. He does not feel comfortable transitioning care until his other two brothers are in agreement. We addressed that his mom is declining and that we do not recommend NGT/PEG as this is uncomfortable and would not change her prognosis. Toby expressed understanding but states he wishes to pursue PEG as brothers would not forgive him if he did not.  - Diet:  - Access:  - Tubes/Drains:  - Activity/PT/OT: MICU Accept Note    CHIEF COMPLAINT: foot infection leading to sespsis    HPI / INTERVAL HISTORY:  89 years old female with h/o HTN, HLD, CAD, DM, dementia, s/p spinal stimulator placement p/w abnormal labs (Na 168, K 2.9). Unable to obtain further history due to patient baseline nonverbal. Patient was transferred from [facility] for hypernatremia on routine labs. They also recently started cefepime due to concern for foot infection. Pt arrived to ED w/ , /46, afebrile, requiring 4L NC. Pt received Vancomycin, and IVF. Labs showed leukocytosis 14, elevated , normal lactate 1.5, Na 163, Cl 128, SCr 2.76 (baseline ~1-1.2), . UA growing yeast like cells and with pyuria.     On 02/09 patient needed urgent intubation due to     PAST MEDICAL & SURGICAL HISTORY:  Angina  in 2005, s/p angioplasty with stent placement, no recurrance, no sequalae  Diabetes Mellitus type 2  Arthritis, Infective, Knee  Detached Retina right eye  Acute Mucous Pneumonia 4/2010, resolved ; no residual problems  Spinal Stenosis- lumbar  History of Back Surgery 2010  Basal Cell Cancer removed from left neck 2009  Dementia  Detached Retina, Left laser surgery in 1995  S/P Carpal Tunnel Release bilateral hands in 1990  Trigger Finger release of middle and ring finger of right hand in 1990, and middle finger left hand in 2008      S/P Laparoscopic Cholecystectomy  2008  S/P TKR (Total Knee Replacement)  left knee  Cataract  removal with lens implant right in 2000    FAMILY HISTORY:  FH: HTN (hypertension)    HOME MEDICATIONS:      Allergies    Pineapple (Unknown)  contrast dye -  rash (Other)  iodine (Other)  codeine (Vomiting)  penicillin (Rash)  latex (Rash)    Intolerances    Tolerates ceftriaxone, cefepime (Other)        REVIEW OF SYSTEMS:  Constitutional: No fevers, chills, weight loss, weight gain  HEENT: No vision problems, eye pain, nasal congestion, rhinorrhea, sore throat, dysphagia  CV: No chest pain, orthopnea, palpitations  Resp: No cough, dyspnea, wheezing, hemoptysis  GI: No nausea, vomiting, diarrhea, constipation, abdominal pain  : [ ] dysuria [ ] nocturia [ ] hematuria [ ] increased urinary frequency  Musculoskeletal: [ ] back pain [ ] myalgias [ ] arthralgias [ ] fracture  Skin: [ ] rash [ ] itch  Neurological: [ ] headache [ ] dizziness [ ] syncope [ ] weakness [ ] numbness  Psychiatric: [ ] anxiety [ ] depression  Endocrine: [ ] diabetes [ ] thyroid problem  Hematologic/Lymphatic: [ ] anemia [ ] bleeding problem  Allergic/Immunologic: [ ] itchy eyes [ ] nasal discharge [ ] hives [ ] angioedema  [ ] All other systems negative  [ ] Unable to assess ROS because ________    OBJECTIVE:  ICU Vital Signs Last 24 Hrs  T(C): 38.4 (09 Feb 2024 05:55), Max: 38.9 (09 Feb 2024 04:00)  T(F): 101.2 (09 Feb 2024 05:55), Max: 102.1 (09 Feb 2024 04:00)  HR: 96 (09 Feb 2024 05:55) (91 - 116)  BP: 117/41 (09 Feb 2024 05:55) (98/43 - 146/62)  BP(mean): --  ABP: --  ABP(mean): --  RR: 20 (09 Feb 2024 05:55) (18 - 24)  SpO2: 91% (09 Feb 2024 05:55) (91% - 98%)    O2 Parameters below as of 09 Feb 2024 05:55  Patient On (Oxygen Delivery Method): nasal cannula  O2 Flow (L/min): 2      02-08 @ 07:01  -  02-09 @ 07:00  --------------------------------------------------------  IN: 0 mL / OUT: 0 mL / NET: 0 mL      CAPILLARY BLOOD GLUCOSE      POCT Blood Glucose.: 298 mg/dL (09 Feb 2024 07:37)      PHYSICAL EXAM:  GENERAL: intubated, sedated  HEAD:  Atraumatic, Normocephalic  EYES: EOMI, conjunctiva and sclera clear  ENT: Moist mucous membranes  NECK: Supple, No JVD  CHEST/LUNG: Clear to auscultation bilaterally; No rales, rhonchi, wheezing, or rubs. Unlabored respirations  HEART: Regular rate and rhythm; No murmurs, rubs, or gallops  ABDOMEN: BSx4; Soft, nontender, nondistended  EXTREMITIES:  2+ Peripheral Pulses, brisk capillary refill. No clubbing, cyanosis, or edema  NERVOUS SYSTEM:  A&Ox0  SKIN: No rashes or lesions      LINES:     HOSPITAL MEDICATIONS:  MEDICATIONS  (STANDING):  cefTRIAXone   IVPB      cefTRIAXone   IVPB 1000 milliGRAM(s) IV Intermittent every 24 hours  Dakins Solution - 1/4 Strength 1 Application(s) Topical daily  dextrose 5%. 1000 milliLiter(s) (100 mL/Hr) IV Continuous <Continuous>  dextrose 5%. 1000 milliLiter(s) (100 mL/Hr) IV Continuous <Continuous>  dextrose 5%. 1000 milliLiter(s) (50 mL/Hr) IV Continuous <Continuous>  dextrose 5%. 1000 milliLiter(s) (50 mL/Hr) IV Continuous <Continuous>  dextrose 50% Injectable 12.5 Gram(s) IV Push once  dextrose 50% Injectable 12.5 Gram(s) IV Push once  dextrose 50% Injectable 25 Gram(s) IV Push once  dextrose 50% Injectable 25 Gram(s) IV Push once  dextrose 50% Injectable 25 Gram(s) IV Push once  dextrose 50% Injectable 25 Gram(s) IV Push once  doxycycline IVPB      doxycycline IVPB 100 milliGRAM(s) IV Intermittent every 12 hours  glucagon  Injectable 1 milliGRAM(s) IntraMuscular once  glucagon  Injectable 1 milliGRAM(s) IntraMuscular once  heparin   Injectable 5000 Unit(s) SubCutaneous every 12 hours  insulin lispro (ADMELOG) corrective regimen sliding scale   SubCutaneous every 6 hours  metoprolol tartrate 25 milliGRAM(s) Oral two times a day  metroNIDAZOLE  IVPB 500 milliGRAM(s) IV Intermittent every 12 hours  pantoprazole   Suspension 40 milliGRAM(s) Oral daily  sodium bicarbonate 1300 milliGRAM(s) Oral three times a day  sodium chloride 0.45% 1000 milliLiter(s) (75 mL/Hr) IV Continuous <Continuous>  sodium zirconium cyclosilicate 10 Gram(s) Oral daily  sterile water 1000 milliLiter(s) (75 mL/Hr) IV Continuous <Continuous>    MEDICATIONS  (PRN):  acetaminophen   Oral Liquid .. 650 milliGRAM(s) Oral every 6 hours PRN Temp greater or equal to 38C (100.4F), Mild Pain (1 - 3)  dextrose Oral Gel 15 Gram(s) Oral once PRN Blood Glucose LESS THAN 70 milliGRAM(s)/deciliter  dextrose Oral Gel 15 Gram(s) Oral once PRN Blood Glucose LESS THAN 70 milliGRAM(s)/deciliter      LABS:                        7.6    12.54 )-----------( 104      ( 09 Feb 2024 04:55 )             23.3     Hgb Trend: 7.6<--, 7.8<--, 7.9<--, 10.1<--, 8.9<--  02-09    140  |  104  |  110<H>  ----------------------------<  235<H>  5.2   |  17<L>  |  3.60<H>    Ca    8.5      09 Feb 2024 04:55  Phos  4.3     02-09  Mg     2.20     02-09      Creatinine Trend: 3.60<--, 3.33<--, 3.35<--, 3.26<--, 3.03<--, 2.78<--    Urinalysis Basic - ( 09 Feb 2024 04:55 )    Color: x / Appearance: x / SG: x / pH: x  Gluc: 235 mg/dL / Ketone: x  / Bili: x / Urobili: x   Blood: x / Protein: x / Nitrite: x   Leuk Esterase: x / RBC: x / WBC x   Sq Epi: x / Non Sq Epi: x / Bacteria: x      Arterial Blood Gas:  02-09 @ 04:55  7.42/28/172/18/99.0/-5.5  ABG lactate: --        MICROBIOLOGY:     RADIOLOGY & ADDITIONAL TESTS:      ASSESSMENT AND PLAN:  Pt is a 89 yo F with PMH dementia (AOx0, non-verbal), R ankle gangrene, HTN, HLD, CAD, and T2D p/f PJ rehab with hyperNa and acute renal failure, both improved. Course c/b AHRF requiring HFNC, now weaned to RA. GOC discussion held with son/HCP Toby who rescinded prior DNR/DNI, now plan for full code and full escalation of care and pursuing all interventions. Now weaned to RA, pending TTE for pre-op optimization with cards following in anticipation of plan for PEG (GI consulted) and LTCF placement.     #Neuro    #Dementia   - pt. A&Ox0 - nonverbal.  - Severe protein calorie malnutrition.   - Failed S&S eval.  Plan:   - Ongoing GOC conversation as below  - anticipate need for PEG -- GI consulted  - cards consulted for pre-op optimization, pending TTE.    #Cardiovascular    #Afib.   - Afib with RVR. Pt. was transferred to a telemetry floor. Discussed with son. switch IV lopressor to PO now that we have oral access   Plan:  - continue lopressor 25mg BID.    #Respiratory    Acute respiratory failure with hypoxia.   - suspect in setting of worsening deconditioning, poor inspiratory effort, atelectasis   - patient needed emergent intubation due to   - post intubation chest xray shows complete left lung opacity  RRT on 02/09 with emergent intubation due to ??    Plan:  - consider BAL   - continue antibiotics as per ID recs  - on volume AC     #GI/Nutrition    #Diet: tube feeds through OG    #Diarrhea  - noted to have multiple loose BMs today  - dc bowel regimen (miralax, senna -- last dosed 2/7)  - monitor x24h, if continues can send GI w/u.    #/Renal    #SAE (acute kidney injury).   - on admission  and Cr 2.76. Likely prerenal  - improved s/p fluid resuscitation   Plan:    - ctm Cr/UOP    #AGMA   - bicarb down trending to 12 on 2/2 -- improved 2/8  - d/w nephrology, c/w 1300 sodium bicarb TID  - s/p sterile water 150meq Na bicarb 75cc/h x10h without improvement 2/6  - s/p 1/2 NS 150meq Na bicarb 75cc/h x10h with improvement 2/7.    #Skin    #ID    #Sepsis.    - pt met SIRS criteria + source of known foot infection.   - Recently treated for L foot wound growing E. coli and S. aureus.   - Vascular offering definitive management with amputation but family declines. Local wound care.   Plan:   - currently on doxycycline, ceftriaxone and flagyl  - ID consulted, f/u recs     #MSK:    #Wound of foot.   - L foot xray: Dorsal forefoot soft tissue swelling. Calcaneal cortex on left foot with possible osteomyelitis  - previously unable to tolerate MRI L ankle when attempted in recent hospitalization  - Recently Tx in Dec 23' for L foot wound growing S. Aureas and E. Coli, amputation L foot offered and deferred at the time as per GOC   - Wound care following  Plan:  - c/w Abx as above  - Vascular consulted, recs: continue local wound care, frequent offloading to B/L lower extremities.    #Endocrine    #Diabetes.   - POC checks   Plan:  - NPO, sliding scale.    #Hematologic    #Anemia   - Hgb ~6 on 1/29. No signs of bleeding. Received 1U PRBC.   - Hgb now improved - stable.  Plan:  -ctm    #DVT ppx    #Ethics/ICU Bundle  - GOC/Code Status: Full Code. pt with prior MOLST - DNR/DNI but was rescinded on this hospitalization. Palliative consulted. Ongoing GOC conversations. Toby understands the poor prognosis and would like to proceed with comfort measures/DNR/DNI but two out of his three siblings do not agree with comfort measures at this time. He does not feel comfortable transitioning care until his other two brothers are in agreement. We addressed that his mom is declining and that we do not recommend NGT/PEG as this is uncomfortable and would not change her prognosis. Toby expressed understanding but states he wishes to pursue PEG as brothers would not forgive him if he did not.  - Diet:  - Access:  - Tubes/Drains:  - Activity/PT/OT:      Attending attestation     89 years old female with h/o HTN, HLD, CAD, DM, dementia, s/p spinal stimulator placement p/w abnormal labs (Na 168, K 2.9). Unable to obtain further history due to patient baseline nonverbal who was admitted to the floors found to have necrotizing wound infection as well as SAE.     Patient today with loss of mental status, aspiration, also in shock with hypotension. Patient now found to have necrotizing wound infection in the contralateral leg. Family was offered surgery but refused early in the admission Pending re-eval by vascular.     # Shock  # SAE  # b/l necrotizing foot infections  # acute hypoxemic respiratory failure  - Shock 2/2 to infected necrotizing b/l ext infection, family refused surgery earlier this admission. Will reconsult vascular now given b/l infection and shock to see if surgery is being offered.   - C/W levo, wean as tolerated  - SAE 2/2 likely ATN from ongoing infection  - acute hypoxemic respiratory failure with aspiration c/w abx, check cxr, post intubation gas  - DVT ppx SQH  - Dispo- Is full code. Dismal prognosis, poor candidate for HD and aggressive therapy will follow up with family pending surgery candidacy.       I have personally seen and examined the patient.  I fully participated in the care of this patient.  I have made amendments to the documentation where necessary, and agree with the history, physical exam, and plan as documented by the Resident.     Attending: I have personally and independently provided 44 minutes of critical care services.  This excludes any time spent on separate procedures or teaching

## 2024-02-09 NOTE — PROGRESS NOTE ADULT - SUBJECTIVE AND OBJECTIVE BOX
Mp Krishnan MD  Interventional Cardiology / Advance Heart Failure and Cardiac Transplant Specialist  [place] Office : [phone] [place] [address] [place] [address]  Tel: [phone]  [place] Office : [address] [facility] [place] [address]  Tel: [phone]       Pt is lying in bed intubated   	  MEDICATIONS:  heparin   Injectable 5000 Unit(s) SubCutaneous every 12 hours  midodrine 20 milliGRAM(s) Oral every 8 hours  norepinephrine Infusion 0.05 MICROgram(s)/kG/Min IV Continuous <Continuous>    doxycycline IVPB      doxycycline IVPB 100 milliGRAM(s) IV Intermittent every 12 hours  metroNIDAZOLE  IVPB 500 milliGRAM(s) IV Intermittent every 12 hours      acetaminophen   Oral Liquid .. 650 milliGRAM(s) Oral every 6 hours PRN  HYDROmorphone  Injectable 1 milliGRAM(s) IV Push every 4 hours PRN  propofol Infusion 5 MICROgram(s)/kG/Min IV Continuous <Continuous>    pantoprazole   Suspension 40 milliGRAM(s) Oral daily    dextrose 50% Injectable 25 Gram(s) IV Push once  dextrose 50% Injectable 25 Gram(s) IV Push once  dextrose 50% Injectable 12.5 Gram(s) IV Push once  dextrose 50% Injectable 12.5 Gram(s) IV Push once  dextrose 50% Injectable 25 Gram(s) IV Push once  dextrose 50% Injectable 25 Gram(s) IV Push once  dextrose Oral Gel 15 Gram(s) Oral once PRN  dextrose Oral Gel 15 Gram(s) Oral once PRN  glucagon  Injectable 1 milliGRAM(s) IntraMuscular once  glucagon  Injectable 1 milliGRAM(s) IntraMuscular once  insulin lispro (ADMELOG) corrective regimen sliding scale   SubCutaneous every 6 hours    chlorhexidine 0.12% Liquid 15 milliLiter(s) Oral Mucosa every 12 hours  Dakins Solution - 1/2 Strength 1 Application(s) Topical two times a day  dextrose 5%. 1000 milliLiter(s) IV Continuous <Continuous>  dextrose 5%. 1000 milliLiter(s) IV Continuous <Continuous>  dextrose 5%. 1000 milliLiter(s) IV Continuous <Continuous>  dextrose 5%. 1000 milliLiter(s) IV Continuous <Continuous>  sodium bicarbonate 1300 milliGRAM(s) Oral three times a day  sodium chloride 0.45% 1000 milliLiter(s) IV Continuous <Continuous>  sterile water 1000 milliLiter(s) IV Continuous <Continuous>      PAST MEDICAL/SURGICAL HISTORY  PAST MEDICAL & SURGICAL HISTORY:  Angina  in 2005, s/p angioplasty with stent placement, no recurrance, no sequalae      Diabetes Mellitus  type 2      Arthritis, Infective, Knee      Detached Retina  right eye      Acute Mucous Pneumonia  4/2010, resolved ; no residual problems      Spinal Stenosis- lumbar      History of Back Surgery  2010      Basal Cell Cancer  removed from left neck 2009      Dementia      Detached Retina, Left  laser surgery in 1995      S/P Carpal Tunnel Release  bilateral hands in 1990      Trigger Finger  release of middle and ring finger of right hand in 1990, and middle finger left hand in 2008      S/P Laparoscopic Cholecystectomy  2008      S/P TKR (Total Knee Replacement)  left knee      Cataract  removal with lens implant right in 2000          SOCIAL HISTORY: Substance Use (street drugs): ( x ) never used  (  ) other:    FAMILY HISTORY:  FH: HTN (hypertension)      PHYSICAL EXAM:  T(C): 38.4 (02-09-24 @ 05:55), Max: 38.9 (02-09-24 @ 04:00)  HR: 62 (02-09-24 @ 21:00) (55 - 116)  BP: 113/49 (02-09-24 @ 21:00) (84/47 - 135/68)  RR: 17 (02-09-24 @ 21:00) (15 - 24)  SpO2: 97% (02-09-24 @ 21:00) (91% - 100%)  Wt(kg): --  I&O's Summary    08 Feb 2024 07:01  -  09 Feb 2024 07:00  --------------------------------------------------------  IN: 0 mL / OUT: 0 mL / NET: 0 mL    09 Feb 2024 07:01  -  09 Feb 2024 22:59  --------------------------------------------------------  IN: 166 mL / OUT: 180 mL / NET: -14 mL          General - intubated   EYES:   PERRLA   ENMT:   Moist mucous membranes, Good dentition, No lesions  Cardiovascular: Normal S1 S2, No JVD, No murmurs, No edema  Respiratory: b/l rhonchi   Gastrointestinal:  Soft, Non-tender, + BS	  Extremities: no edema                                    7.7    13.73 )-----------( 111      ( 09 Feb 2024 13:15 )             23.3     02-09    141  |  106  |  111<H>  ----------------------------<  180<H>  4.9   |  17<L>  |  3.73<H>    Ca    8.4      09 Feb 2024 13:15  Phos  4.6     02-09  Mg     2.10     02-09    TPro  5.2<L>  /  Alb  1.6<L>  /  TBili  0.3  /  DBili  x   /  AST  24  /  ALT  14  /  AlkPhos  598<H>  02-09    proBNP:   Lipid Profile:   HgA1c:   TSH:     Consultant(s) Notes Reviewed:  [x ] YES  [ ] NO    Care Discussed with Consultants/Other Providers [ x] YES  [ ] NO    Imaging Personally Reviewed independently:  [x] YES  [ ] NO    All labs, radiologic studies, vitals, orders and medications list reviewed. Patient is seen and examined at bedside. Case discussed with medical team.

## 2024-02-09 NOTE — PROVIDER CONTACT NOTE (OTHER) - SITUATION
Patient HR sustaining 130s
Patient cannot tolerate PO medication
Patient is febrile with temp of 102.1. HR elevated- 116, BP is low 98/43.
Patient with diastolic less than 60
Patient with low diastolic
/54
Pt desaturating to 85% and sustaining in 80s on high flow
Weaning down 02. Pt 02 sat 88%
Patient with low diastolic
Patient with low diastolic
Pt IV site of foot is infiltrated, blister like wound on dorsal part of foot. removed IV. cleaned with NS, pat dry applied abdominal pad and kerlix dressing over wound.
Pt desaturating on venturi mask, demonstrating signs of agonal breathing
Patient still febrile after administration of IV Tylenol. Temperature of 101.2
Patient with low diastolic
BP HR sustaining between 110s and 130s
Patient pending 1 unit of packed red blood cells from 1/27/24
Pt has 3 beats of v-tach
informed by tele tech pt is in rapid AFib

## 2024-02-09 NOTE — PROCEDURE NOTE - NSINDICATIONS_GEN_A_CORE
antibiotic therapy/venous access
respiratory distress/respiratory failure
emergency venous access
emergency venous access

## 2024-02-09 NOTE — PROVIDER CONTACT NOTE (OTHER) - ACTION/TREATMENT ORDERED:
ACP was made aware and came to bedside. Assessed patient and ordered IV tylenol in addition to STAT labs, including blood cultures.

## 2024-02-09 NOTE — RAPID RESPONSE TEAM SUMMARY - NSSITUATIONBACKGROUNDRRT_GEN_ALL_CORE
89 y/o F with PMH HTN, HLD, CAD, DM, dementia s/p spinal stimulator placement who initially presented from [facility] for hypernatremia and concern for foot infection. Per chart review, pt has been seen by ID on CTX, doxy, flagyl IV, septic 2/2 L foot wound growing E coli and S aureus, per Vascular required amputation but declined by family so treated with local wound care. Abx regimen per ID, recommended continued GoC. Seen by Palliative - GoC appears to be that son is HCP, understands pt has been declining and would like DNR/DNI + comfort care however cannot live with guilt of deciding this for pt as his 2 other brothers do not agree.    RRT called for hypoxia. Per primary RN, desatted to 80s on HFNC 50/100%, good wave form. At bedside, pt's lung exam sounded better, decreased WOB compared to before. SpO2 70s-80s, wave form ok on forehead, placed Zoll pulse ox on L hand with SpO2 100% with even better wave form. ABG obtained, 7.38/19/256/11. HFNC to be weaned as tolerated.
89 y/o female with PMHx of dementia and DM sent in by [facility] for abnormal lab results (Na 168 and K 2.9) to r/o sepsis. RRT called for hypoxia in the 60s. On arrival pt saturating >90% on NC and VSS. Per RN, pt desaturated and had to be place on NRB. But now imporving and placed back on NC. Primary team at bedside, pt A&O x0 (at baseline). Recommend primary team f/u with family for further GOC 
89 years old female with h/o HTN, HLD, CAD, DM, dementia, s/p spinal stimulator placement p/w abnormal labs (Na 168, K 2.9). Unable to obtain further history due to patient baseline nonverbal. Patient was transferred from [facility] for hypernatremia on routine labs. They also recently started cefepime due to concern for foot infection. Pt arrived to ED w/ , /46, afebrile, requiring 4L NC. Pt received Vancomycin, and IVF. Labs showed leukocytosis 14, elevated , normal lactate 1.5, Na 163, Cl 128, SCr 2.76 (baseline ~1-1.2), . UA growing yeast like cells and with pyuria.     RRT called for hypoxia and abnormal respiratory rate.   
89 y/o F with PMH HTN, HLD, CAD, DM, dementia s/p spinal stimulator placement who initially presented from [facility] for hypernatremia and concern for foot infection. Per chart review, pt has been seen by ID on CTX, doxy, flagyl IV, septic 2/2 L foot wound growing E coli and S aureus, per Vascular required amputation but declined by family so treated with local wound care. Abx regimen per ID, recommended continued GoC. Seen by Palliative - GoC appears to be that son is HCP, understands pt has been declining and would like DNR/DNI + comfort care however cannot live with guilt of deciding this for pt as his 2 other brothers do not agree.    RRT called for increased WOB. Per primary RN and team at bedside, pt has been persistently AAOx0, does wake up slightly when roused, agonally breathing with increased RR. Initial vital signs were T 96.6 F (rectal), HR 95, /55, RR 28, SpO2 92% on NRB, . Pt suctioned, duoneb x 1 and 2% inhaled hypertonic saline administered to help with secretions. CXR obtained, difficult to tell if any difference from CXR from 1/31 given positioning. Suspect increased copious mucous secretions and aspiration given pt's mental status, possibly toxic metabolic encephalopathy given unresolved foot infection with lack of source control. Pt already on CTX, flagyl, doxy. GoC readdressed with pt's son (HCP) who again reaffirmed that he understands pt's clinical course and would want DNR/DNI + comfort care but does not feel comfortable having a MOLST signed given dissent from his brothers and he does not want to live with the guilt, thus he would like the pt to be full code and would like medical interventions for now. Given pt meets several SIRS criteria, will draw BCx x 2, CBC, CMP, ABG w/ lactate. Recommend frequent suctioning, airway clearance, chest PT, duonebs q6 + hypersal 2% inhaled q12 standing. Can consider placing pt on HFNC for PEEP, pt does not appear to be BIPAP candidate given mental status. C/w current abx regimen and continue GoC, consider ethics consult if deemed appropriate.

## 2024-02-09 NOTE — PROGRESS NOTE ADULT - ASSESSMENT
Assessment/Plan: 89 y/o F with PMH HTN, HLD, CAD, DM, dementia s/p spinal stimulator placement who presented from [facility] for hypernatremia and concern for foot infection.     Wound care f/u for right posterior rib cage, Sacrum to upper buttocks unstageable, right heel, right dorsal foot, left foot pressure injuries complicated by arterial insufficiency.     -Patient lethargic, mild distress, on non rebreather masks,, unable to follow commands, (per RN and primary team has been patient's baseline on this admission, per Palliative care team patient verbal and interactive on previous admission 1 month ago), frail, cachetic, bedbound, incontinent urine and stool. External female urinary  in place.   .Fingertips with ecchymosis, puncture sites from FS?. Warm extremities.   -Currently NPO except meds; failed speech and swallow study, management per primary team.  -Multiple wounds noted:   ·Right posterior rib cage, now presenting as partial thickness wound exposing pink dermis, stable   Sacrum/right and left upper buttocks- unstageable pressure injury- mixed tissue type left upper buttock exposing adherent nonfluctuant yellow slough, darkened and pink dermis, Sacrum exposing black minimally moist eschar, no induration, no crepitus, no s/s of acute soft tissue infection  *Buttock wounds complicated by fecal and urinary incontinence.  -Bilateral lower extremities with pressure injuries complicated by arterial insufficiency, most extensive left lateral malleolus and foot.  ·Right dorsal foot prev deep tissue pressure injury resolved, right heel previous deep tissue pressure injury now Unstageable pressure injury, right latera lower leg stage 1 pressure injury resolved, all complicated by arterial insufficiency  .Right hallux and left lateral lower leg with blanching erythema, no tissue type changes palpable   ·Left lateral malleolus extensive necrosis involving bone, tendon and ligament; central eschar beginning to soften. Warm extremity (prev cool) mottling foot/toes, no palpable pulses. (+) malodor, no purulent drainage. CONCERN FOR ACUTE ISCHEMIC LIMB and Beginning conversion of dry gangrene to wet gangrene; no crepitus noted.  .Vascular surgery recommendations appreciated.  .As per notes, HCP/son declined surgical intervention    ·B/L LE (-) DVT- 1/26  ·Left ankle and foot x-ray without tracking gas collections or additional suspected areas of osteomyelitis; however physical exam concerning for OM, would consider MRI if in line with goals of care.   ·Left ankle cultures 12/22 (+)  Staphylococcus aureus, Escherichia coli, Finegoldia magna  -If in line with goals of care would recommend MIKHAIL/PVR.  -With no surgical intervention left foot ischemia will likely progress, now demarcating.   .New right ankle wound with + crepitus, and purulence at base, ulcer is not open, not spontaneously draining, recommend  urgent Podiatry consult to evaluate.   -Appreciate ID recommendations, currently receiving Ceftriaxone and Doxycyline and flagyl   -Topical recommendations:  .Protect posterior ears from non rebreather masks or nasal cannula, assure tube is not directly over skin. Inspect skin at least twice a day.   .Continue to turn and position as per hospital protocol   .Elevate all extremities with pillows   .Elevate heels with complete cair boots   ·Right posterior rib cage- cleanse wounds with NS, pat dry. Cover with silicone foam with border. Change every other day or prn if soiled.   ·Sacrum and upper buttocks Unstageable pressure injury- cleanse wound and periwound skin with perineal spray, apply TRIAD moisture barrier paste, cover with silicone foam with border, change daily and prn if soiled/compromised.  ·Right heel, right hallux- apply liquid barrier film, allow to dry, cover with abdominal pads and lose kerlix wrap. Change daily.  ·Left lateral malleolus extending to foot- cleanse with Dakins 1/2 strength (as per pharmacy no 1/4 strength solution available at this time) , pat dry. (Per records patient allergic to iodine), apply liquid barrier film to intact periwound skin, allow to dry,  place non adherent silicone layer (adaptic touch) to bases of wound, given foul odor recommend placing moistened gauze 4x4  with Dakins solution 1/2 strength (we to to dry) over areas of necrotic tissue,  Cover with abdominal pads, secure with lose kerlix wrap. Change twice a day or prn if soiled.   If patient transitions to hospice care, then change every day.   -Continue to offload pressure; low airloss support surface, t&p per protocol with use of fluidized positioning devices, offload heels with complete cair boots at all times, pillow between bony prominences.  -Continue incontinence care per protocol and use of single breathable incontinence pad.  -Appreciate Palliative care recommendations. If family opts not to pursue hospice/comfort measures and is not a surgical candidate or family opts out of surgery, would strongly consider Ethics consults in setting of ischemic left foot. Ongoing GOC with family.   -Nutrition recommendations appreciated when medically appropriate.    All findings discussed with MICU MD Rebolledo and MD Ricketts including new findings of right anterior ankle wound with purulence at base and crepitus. Wound surgery service line remains available via MS teams if we can assist/contribute in any way to family meetings and/or discussion regarding goals of care. Patient frail, appears in mild distress, rapid Afib,  bedbound, incontinent urine and stool, unable to make needs known, nutritionally compromised, NPO from 1/24/23 to 2/3/24 now with NG tube in place, multiple wounds including c/f acute left foot ischemia. Wounds unlikely to fully heal. If family in agreement patient appears to be appropriate candidate for hospice referral.     Continue low airloss support surface.  Continue to turn and position every 2 hours with z-mariana fluidized positioning device.  Continue use of Complete Cair pressure offloading boots.  Continue Nutritional management as per RD recommendations.    Upon discharge follow up at outpatient [facility]. [address] [place] [address]. [phone].    Will continue to follow while inpatient. Please reconsult earlier if needed   Thank you.    Salena Lares, EDWARDO-BC, CWN  pager #[phone] or available in MS teams     If after 4PM or before 7:30AM on Mon-Friday or weekend/holiday please contact general surgery for urgent matters.   Team A- [id number]   Team B- [id number]  For non-urgent matters e-mail [email or web address]    I spent 50 minutes face-to-face with this patient of which more than 50% of the time was spent counseling/coordinating care of this patient.   Assessment/Plan: 89 y/o F with PMH HTN, HLD, CAD, DM, dementia s/p spinal stimulator placement who presented from [facility] for hypernatremia and concern for foot infection. Course complicated acute respiratory failure with hypoxia now intubated and transfer to MICU.     Wound care f/u for right posterior rib cage, Sacrum to upper buttocks unstageable, right heel, right dorsal foot, left foot pressure injuries/gangrenous complicated by arterial insufficiency.     -Patient lethargic, severely ill,  sedated on pressors, +anasarca, frail, cachetic, bedbound, incontinent urine and stool.   .RUE ecchymosis and weeping edema   .Fingertips with ecchymosis, puncture sites from FS?. Warm extremities.   -Left NG tube in place, inteact peritubular skin   -Multiple wounds noted:   ·Right posterior rib cage, presenting as partial thickness wound exposing pink dermis, healing, stable   Sacrum/right and left upper buttocks- unstageable pressure injury- mixed tissue type left upper buttock exposing adherent nonfluctuant yellow slough, Sacrum exposing black/gray  minimally moist eschar, purple maroon discoloration to wound edges, no induration, no crepitus, no s/s of acute soft tissue infection  *Buttock wounds complicated by fecal and urinary incontinence.  -Bilateral lower extremities/foot with pressure injuries complicated by arterial insufficiency, most extensive left lateral malleolus and left foot.  · right heel Unstageable pressure injury, right hallux non blanching erythema, and new area of right anterior ankle with purulence at base, all complicated by arterial insufficiency  ·Left lateral malleolus extensive necrosis involving bone, tendon and ligament; cool extremity, mottling foot/toes, no palpable pulses. (+) malodor, no purulent drainage. CONCERN FOR ACUTE ISCHEMIC LIMB and Beginning conversion of dry gangrene to wet gangrene; no crepitus noted.   .Vascular surgery recommendations appreciated.  .As per notes, HCP/son declined surgical intervention    ·B/L LE (-) DVT- 1/26  ·Left ankle and foot x-ray without tracking gas collections or additional suspected areas of osteomyelitis; however physical exam concerning for OM, would consider MRI if in line with goals of care.   ·Left ankle cultures 12/22 (+)  Staphylococcus aureus, Escherichia coli, Finegoldia magna  -If in line with goals of care would recommend MIKHAIL/PVR.  -With no surgical intervention left foot ischemia will likely progress, now demarcating.   .New right anterior ankle wound with + crepitus, and purulence at base, ulcer is not open, not spontaneously draining, most likely secondary to arterial disease,  recommend  urgent Podiatry consult to evaluate.   .Recommend X-rays of right foot   .Recommend ID reconsult, patient with fevers and persistent leukocytosis, known infection to left foot however, new concerns of infection to right foot with crepitus and purulence.   .Wound culture was not obtained as wound is not draining   - currently receiving Doxycyline and flagyl   -Topical recommendations:  .Weeping edema to RUE, consider placing ABD pad and kerlix to contain drainage, change daily or as needed   . Nasogastric Tube- Cleanse nare with NS. Pat dry. Apply Liquid barrier film to periwound skin. Apply Stat-lock NGT montilla over center of nare, not touch any skin circumferentially. Change every three days or prn if soiled or compromised.   .Continue to turn and position as per hospital protocol   .Elevate all extremities with pillows   .Elevate heels with complete cair boots   ·Right posterior rib cage- cleanse wounds with NS, pat dry. Cover with silicone foam with border. Change every other day or prn if soiled.   ·Sacrum and upper buttocks Unstageable pressure injury- cleanse wound and periwound skin with perineal spray, apply a thin layer of TRIAD moisture barrier paste, cover with silicone foam with border, change daily and prn if soiled/compromised.  ·Right heel, right hallux- apply liquid barrier film, allow to dry, cover with abdominal pads and lose kerlix wrap. Change daily.  .Right anterior ankle wound with purulence at base: Clean with NS. Pat dry. Cover with non adherent silicone layer (adaptic touch), cover with ABD pad and kerlix. Change daily. (per records Patient allergic to iodine)  Monitor for tissue type changes. (recommending podiatry eval for further recs)  ·Left medial/lateral malleolus extending to foot- cleanse with Dakins 1/2 strength (as per pharmacy 1/4 strength solution unavailable at this time), pat dry. (Per records patient allergic to iodine), apply liquid barrier film to intact periwound skin, allow to dry,  place non adherent silicone layer (adaptic touch) to bases of wound, given foul odor recommend placing moistened gauze 4x4  with Dakins solution 1/2 strength (we to to dry) over areas of necrotic tissue,  Cover with abdominal pads, secure with lose kerlix wrap. Change twice a day or prn if soiled.   If patient transitions to hospice care, then change every day.   -Continue to offload pressure; low airloss support surface, t&p per protocol with use of fluidized positioning devices, offload heels with complete cair boots at all times, pillow between bony prominences.  -Continue incontinence care per protocol and use of single breathable incontinence pad.  -Appreciate Palliative care recommendations. If family opts not to pursue hospice/comfort measures and is not a surgical candidate or family opts out of surgery, would strongly consider Ethics consults in setting of ischemic left foot and new wound to right foot with concerns of infection.  Ongoing GOC with family.   -Nutrition recommendations appreciated when medically appropriate.    All findings discussed with MICU MD Rebolledo and MD Ricketts including new findings of right anterior ankle wound with purulence at base and crepitus. Wound surgery service line remains available via MS teams if we can assist/contribute in any way to family meetings and/or discussion regarding goals of care. Patient frail, appears in mild distress, rapid Afib,  bedbound, incontinent urine and stool, unable to make needs known, nutritionally compromised, NPO from 1/24/23 to 2/3/24 now with NG tube in place, multiple wounds including c/f acute left foot ischemia. Wounds unlikely to fully heal. If family in agreement patient appears to be appropriate candidate for hospice referral.     Continue low airloss support surface.  Continue to turn and position every 2 hours with z-mariana fluidized positioning device.  Continue use of Complete Cair pressure offloading boots.  Continue Nutritional management as per RD recommendations.    Upon discharge follow up at outpatient [facility]. [address] [place] [address]. [phone].    Will continue to follow while inpatient. Please reconsult earlier if needed   Thank you.    Salena Lares, EDWARDO-BC, CWN  pager #[phone] or available in MS teams     If after 4PM or before 7:30AM on Mon-Friday or weekend/holiday please contact general surgery for urgent matters.   Team A- [id number]   Team B- [id number]  For non-urgent matters e-mail [email or web address]    I spent 50 minutes face-to-face with this patient of which more than 50% of the time was spent counseling/coordinating care of this patient.   Assessment/Plan: 89 y/o F with PMH HTN, HLD, CAD, DM, dementia s/p spinal stimulator placement who presented from [facility] for hypernatremia and concern for foot infection. Course complicated acute respiratory failure with hypoxia now intubated and transfer to MICU.     Wound care f/u for right posterior rib cage, Sacrum to upper buttocks unstageable, right heel, right dorsal foot, left foot pressure injuries/gangrenous complicated by arterial insufficiency.     -Patient lethargic, severely ill,  sedated on pressors, +anasarca, frail, cachetic, bedbound, incontinent urine and stool. Indwelling catheter and bowel management system in place (Flexiseal)  .RUE ecchymosis and weeping edema   .Fingertips with ecchymosis, puncture sites from FS?. Warm extremities.   -Left NG tube in place, inteact peritubular skin   -Multiple wounds noted:   ·Right posterior rib cage, presenting as partial thickness wound exposing pink dermis, healing, stable   Sacrum/right and left upper buttocks- unstageable pressure injury- mixed tissue type left upper buttock exposing adherent nonfluctuant yellow slough, Sacrum exposing black/gray  minimally moist eschar, purple maroon discoloration to wound edges, no induration, no crepitus, no s/s of acute soft tissue infection  *Buttock wounds complicated by fecal and urinary incontinence.  -Bilateral lower extremities/foot with pressure injuries complicated by arterial insufficiency, most extensive left lateral malleolus and left foot.  · right heel Unstageable pressure injury, right hallux non blanching erythema, and new area of right anterior ankle with purulence at base, all complicated by arterial insufficiency  ·Left lateral malleolus extensive necrosis involving bone, tendon and ligament; cool extremity, mottling foot/toes, no palpable pulses. (+) malodor, no purulent drainage. CONCERN FOR ACUTE ISCHEMIC LIMB and Beginning conversion of dry gangrene to wet gangrene; no crepitus noted.   .Vascular surgery recommendations appreciated.  .As per notes, HCP/son declined surgical intervention    ·B/L LE (-) DVT- 1/26  ·Left ankle and foot x-ray without tracking gas collections or additional suspected areas of osteomyelitis; however physical exam concerning for OM, would consider MRI if in line with goals of care.   ·Left ankle cultures 12/22 (+)  Staphylococcus aureus, Escherichia coli, Finegoldia magna  -If in line with goals of care would recommend MIKHAIL/PVR.  -With no surgical intervention left foot ischemia will likely progress, now demarcating.   .New right anterior ankle wound with + crepitus, and purulence at base, ulcer is not open, not spontaneously draining, most likely secondary to arterial disease,  recommend  urgent Podiatry consult to evaluate.   .Recommend X-rays of right foot   .Recommend ID reconsult, patient with fevers and persistent leukocytosis, known infection to left foot however, new concerns of infection to right foot with crepitus and purulence.   .Wound culture was not obtained as wound is not draining   - currently receiving Doxycyline and flagyl   -Topical recommendations:  .Weeping edema to RUE, consider placing ABD pad and kerlix to contain drainage, change daily or as needed   . Nasogastric Tube- Cleanse nare with NS. Pat dry. Apply Liquid barrier film to periwound skin. Apply Stat-lock NGT montilla over center of nare, not touch any skin circumferentially. Change every three days or prn if soiled or compromised.   .Continue to turn and position as per hospital protocol   .Elevate all extremities with pillows   .Elevate heels with complete cair boots   ·Right posterior rib cage- cleanse wounds with NS, pat dry. Cover with silicone foam with border. Change every other day or prn if soiled.   ·Sacrum and upper buttocks Unstageable pressure injury- cleanse wound and periwound skin with perineal spray, apply a thin layer of TRIAD moisture barrier paste, cover with silicone foam with border, change daily and prn if soiled/compromised.  .Bowel management system- Cleanse gaby-rectal area with perineal spray when indicated. Apply liquid barrier film to gaby-rectal skin. Apply 4x4 silicone foam dressing without border and cut to mid dressing with a "Y" shape. Change dressing every and apply liquid barrier film every shift. Check balloon every shift and record volume.   ·Right heel, right hallux- apply liquid barrier film, allow to dry, cover with abdominal pads and lose kerlix wrap. Change daily.  .Right anterior ankle wound with purulence at base: Clean with NS. Pat dry. Cover with non adherent silicone layer (adaptic touch), cover with ABD pad and kerlix. Change daily. (per records Patient allergic to iodine)  Monitor for tissue type changes. (recommending podiatry eval for further recs)  ·Left medial/lateral malleolus extending to foot- cleanse with Dakins 1/2 strength (as per pharmacy 1/4 strength solution unavailable at this time), pat dry. (Per records patient allergic to iodine), apply liquid barrier film to intact periwound skin, allow to dry,  place non adherent silicone layer (adaptic touch) to bases of wound, given foul odor recommend placing moistened gauze 4x4  with Dakins solution 1/2 strength (we to to dry) over areas of necrotic tissue,  Cover with abdominal pads, secure with lose kerlix wrap. Change twice a day or prn if soiled.   If patient transitions to hospice care, then change every day.   -Continue to offload pressure; low airloss support surface, t&p per protocol with use of fluidized positioning devices, offload heels with complete cair boots at all times, pillow between bony prominences.  -Continue incontinence care per protocol and use of single breathable incontinence pad.  -Appreciate Palliative care recommendations. If family opts not to pursue hospice/comfort measures and is not a surgical candidate or family opts out of surgery, would strongly consider Ethics consults in setting of ischemic left foot and new wound to right foot with concerns of infection.  Ongoing GOC with family.   -Nutrition recommendations appreciated when medically appropriate.    All findings discussed with MICU MD Rebolledo and MD Ricketts including new findings of right anterior ankle wound with purulence at base and crepitus. Wound surgery service line remains available via MS teams if we can assist/contribute in any way to family meetings and/or discussion regarding goals of care. Patient frail, appears in mild distress, rapid Afib,  bedbound, incontinent urine and stool, unable to make needs known, nutritionally compromised, NPO from 1/24/23 to 2/3/24 now with NG tube in place, multiple wounds including c/f acute left foot ischemia. Wounds unlikely to fully heal. If family in agreement patient appears to be appropriate candidate for hospice referral.     Continue low airloss support surface.  Continue to turn and position every 2 hours with z-mariana fluidized positioning device.  Continue use of Complete Cair pressure offloading boots.  Continue Nutritional management as per RD recommendations.    Upon discharge follow up at outpatient [facility]. [address] [facility]. [phone].    Will continue to follow while inpatient. Please reconsult earlier if needed   Thank you.    Salena Lares, EDWARDO-BC, CWN  pager #[phone] or available in MS teams     If after 4PM or before 7:30AM on Mon-Friday or weekend/holiday please contact general surgery for urgent matters.   Team A- [id number]   Team B- [id number]  For non-urgent matters e-mail [email or web address]    I spent 50 minutes face-to-face with this patient of which more than 50% of the time was spent counseling/coordinating care of this patient.

## 2024-02-09 NOTE — PROCEDURE NOTE - NSPROCDETAILS_GEN_ALL_CORE
location identified, draped/prepped, sterile technique used/sterile dressing applied/sterile technique, catheter placed
location identified, draped/prepped, sterile technique used/blood seen on insertion/dressing applied/flushes easily/secured in place/sterile technique, catheter placed
location identified, draped/prepped, sterile technique used/blood seen on insertion/dressing applied/flushes easily/secured in place/sterile technique, catheter placed
patient pre-oxygenated, tube inserted, placement confirmed

## 2024-02-09 NOTE — PROGRESS NOTE ADULT - ASSESSMENT
89 yo F w/ PMHx of HTN, HLD, CAD, DM, dementia presents to the ED from rehab with electrolyte abnormalities. Vascular surgery previously consulted for evaluation of chronic L lateral malleolar pressure wound (1/24/24). At the time surgical team recommended definitive treatement with amputation, however patient's family was not amenable to surgical intervention. Reconsulted 2/9/24 for non-healing b/l foot wounds i/s/o ongoing sepsis, c/f wet gangrene of RLE. Of note, patient with recent decompensation and desaturation necessitating intubation and transfer to MICU.     Recommendations:   - Patient will likely need definitive surgical management for source control. Will need to discuss with family to clarify GOC and amenability to surgical intervention  - Continue local wound care, daily dressing changes   - Frequent re-positioning and offloading to B/L lower extremities   - Vascular surgery will follow    Discussed with senior resident on call    Vascular Surgery   [phone]

## 2024-02-09 NOTE — PROGRESS NOTE ADULT - ATTENDING COMMENTS
Pt is critically ill, severe sepsis, multiorgan dysfunction, wet gangrene, bilat feet.  On previous admissions and evaluations, deemed non surgical candidate given mult comorbidities. 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 Pt also with malnutrition (albumin < 2), which is highly predictive of poor surgical outcome/perioperative morbidity/mortality.     fu pall care/hospice

## 2024-02-09 NOTE — PROVIDER CONTACT NOTE (OTHER) - NAME OF MD/NP/PA/DO NOTIFIED:
ACP Hernando
Abdullahi Romero
Hernando Hernandez
ISIDRA Morales
ISIDRA Shook
Lynn Ruano
Abdullahi Romero
ISIDRA Morales
Lynn Deng
Yahaira Shook
ACP Hernando
ISIDRA Shook
Abdullahi Romero
Hernando Hernandez
Lynn Deng
Abdullahi Romero
Lynn Ruano
OLU Brandon
Lynn Deng

## 2024-02-09 NOTE — PROGRESS NOTE ADULT - ASSESSMENT
89 yo F with PMH dementia (AOx0, non-verbal), R ankle gangrene, HTN, HLD, CAD s/p CABG, and T2D p/f [facility] re[facility] with hyperNa and acute renal failure,    EKG: Multifocal atrial tachycardia    1. Septic shock   - on abx   - intubated on midodrine and levophed possible as[itayion pneumonia     2. Pre-op assessment  -plan for EGD/PEG  -unable to assess for symptoms as patient a&0, nonverbal  -last TTE in 2017 with moderate AS, please repeat TTE for optimization    3. CAD   -hx of CABG    4. HTN  - in septic shock on midodrine and levophed

## 2024-02-09 NOTE — PROGRESS NOTE ADULT - SUBJECTIVE AND OBJECTIVE BOX
[facility]-- WOUND TEAM -- FOLLOW UP NOTE  --------------------------------------------------------------------------------    subjective: Patient seen and examined at bedside. Patient unable to provide subjective information, intubated.     Interval HPI/24 hour events:   -->s/p RRT on 02/09 with emergent intubation now transfer to MICU   -->Fevers 102.1F 2/9  -->Persistent leukocytosis   Chart reviewed including labs and relevant images      Diet:  Diet, NPO with Tube Feed:   Tube Feeding Modality: Nasogastric  Glucerna 1.5 Wan (GLUCERNA1.5RTH)  Total Volume for 24 Hours (mL): 1440  Continuous  Starting Tube Feed Rate mL per Hour: 5  Increase Tube Feed Rate by (mL): 10     Every 4 hours  Until Goal Tube Feed Rate (mL per Hour): 60  Tube Feed Duration (in Hours): 24  Tube Feed Start Time: 15:30 (02-03-24 @ 15:28)    ROS: pt unable to offer    ALLERGIES & MEDICATIONS  --------------------------------------------------------------------------------  Allergies    Pineapple (Unknown)  contrast dye -  rash (Other)  iodine (Other)  codeine (Vomiting)  penicillin (Rash)  latex (Rash)    Intolerances    Tolerates ceftriaxone, cefepime (Other)    STANDING INPATIENT MEDICATIONS    cefTRIAXone   IVPB      cefTRIAXone   IVPB 1000 milliGRAM(s) IV Intermittent every 24 hours  chlorhexidine 0.12% Liquid 15 milliLiter(s) Oral Mucosa every 12 hours  Dakins Solution - 1/2 Strength 1 Application(s) Topical two times a day  dextrose 5%. 1000 milliLiter(s) IV Continuous <Continuous>  dextrose 5%. 1000 milliLiter(s) IV Continuous <Continuous>  dextrose 5%. 1000 milliLiter(s) IV Continuous <Continuous>  dextrose 5%. 1000 milliLiter(s) IV Continuous <Continuous>  dextrose 50% Injectable 25 Gram(s) IV Push once  dextrose 50% Injectable 25 Gram(s) IV Push once  dextrose 50% Injectable 25 Gram(s) IV Push once  dextrose 50% Injectable 25 Gram(s) IV Push once  dextrose 50% Injectable 12.5 Gram(s) IV Push once  dextrose 50% Injectable 12.5 Gram(s) IV Push once  doxycycline IVPB      doxycycline IVPB 100 milliGRAM(s) IV Intermittent every 12 hours  glucagon  Injectable 1 milliGRAM(s) IntraMuscular once  glucagon  Injectable 1 milliGRAM(s) IntraMuscular once  heparin   Injectable 5000 Unit(s) SubCutaneous every 12 hours  insulin lispro (ADMELOG) corrective regimen sliding scale   SubCutaneous every 6 hours  metoprolol tartrate 25 milliGRAM(s) Oral two times a day  metroNIDAZOLE  IVPB 500 milliGRAM(s) IV Intermittent every 12 hours  pantoprazole   Suspension 40 milliGRAM(s) Oral daily  sodium bicarbonate 1300 milliGRAM(s) Oral three times a day  sodium chloride 0.45% 1000 milliLiter(s) IV Continuous <Continuous>  sodium zirconium cyclosilicate 10 Gram(s) Oral daily  sterile water 1000 milliLiter(s) IV Continuous <Continuous>    PRN INPATIENT MEDICATION  acetaminophen   Oral Liquid .. 650 milliGRAM(s) Oral every 6 hours PRN  dextrose Oral Gel 15 Gram(s) Oral once PRN  dextrose Oral Gel 15 Gram(s) Oral once PRN    Vital signs:  T(C): 38.4 (02-09-24 @ 05:55), Max: 38.9 (02-09-24 @ 04:00)  HR: 95 (02-09-24 @ 10:08) (91 - 116)  BP: 117/41 (02-09-24 @ 05:55) (98/43 - 134/55)  RR: 20 (02-09-24 @ 05:55) (18 - 24)  SpO2: 100% (02-09-24 @ 10:08) (91% - 100%)    Wt(kg): --55.5 (2-7-24)    02-08-24 @ 07:01  -  02-09-24 @ 07:00  --------------------------------------------------------  IN: 0 mL / OUT: 0 mL / NET: 0 mL    LABS/ CULTURES/ RADIOLOGY:              7.6    12.54 >-----------<  104      [02-09-24 @ 04:55]              23.3     140  |  104  |  110  ----------------------------<  235      [02-09-24 @ 04:55]  5.2   |  17  |  3.60        Ca     8.5     [02-09-24 @ 04:55]      Mg     2.20     [02-09-24 @ 04:55]      Phos  4.3     [02-09-24 @ 04:55]    Constitutional: Frail, pale ill appearing, intubated receiving levophed and propofol, bedbound, cachetic. Anasarca. Right arm weeping edema. Reacts to painful stimuli, withdraws right leg with wound cleansing   (+) low airloss support surface, (+) fluidized positioning devices, (+) complete cair boots, small Z fluidized positioning device in place to offload head and ears   HEENT: NC/AT, nonicteric, oral mucosa dry.   Cardiovascular: irregular rate in the monitor   Respiratory: Intubated   Gastrointestinal: soft NT/ND, incontinent of stool, bowel management system in place (Flexiseal), visible peritubular  skin intact. Left NG tube in place, intact peritubular skin.   : incontinent urine, indwelling catheter in place   Musculoskeletal: Legs and arms in extension, flaccidity    Vascular: B/L upper extremities with edema including in bilateral hands, No temperature changes, no overt ischemia.   B/L LE: Right foot wounds see below, Left foot extensive necrotic wound with mottling noted to all digits, plantar aspect of feet and dorsal aspect of feet cyanosis, see below   RLE: nonpalpable dp/pt pulses, capillary refill > 3 seconds, warm to touch   LLE: nonpalpable dp/pt pulses, capillary refill >3 seconds, cool to touch   Wounds with mixed etiology pressure and arterial insufficiency:  Right heel- Unstageable pressure injury complicated by arterial disease   -2.5cmx2.2lxq0fx (prev 2.5axm1hdy2bd)  -100% black dry eschar    Right anterior ankle large area of reabsorbed blister? with purulence at the base and +Crepitus  -[id number]   -Periwound skin intact, no tissue type changes palpated   -+leg withdrawn when cleanse   -Non adherent silicone layer placed and cover with ABD pad and kerlix.   Right lateral lower leg nonblanching erythema- resolved   Right medial hallux prev blanching erythema now non blanching erythema   -1.5cmx1.7ngv3hw   -No tissue type changes palpable   Left lateral lower leg  to lateral and medial malleolus (slightly above malleolus to left lateral side) extending to left foot/toes- stage 4 pressure injury with extensive necrosis mostly to dorsal/medial/, lateral malleolus and plantar aspect of feet/foot ischemia.  -Left lateral malleolus with necrotic/dessicated bone, tendon, ligament in center with large area of surrounding eschar beginning to soften in center with areas of epidermal separation exposing black eschar  -Anterior/Medial/lateral aspect of foot/malleolus with well demarcated black necrotic eschar   -Toes and plantar aspect with, cyanosis, epidermal separation exposing purple maroon at the base  -No crepitus, no fluctuance.  -Small serofibrinous drainage. Foul odor   -Periwound skin extending from proximal wound edge affecting entire foot and toes with extensive mottling; areas of dry-fixed eschar noted to medial mid foot, medial 1st metatarsal head.  Skin: thin, frail skin, scattered ecchymosis without hematoma, poor skin turgor. Linear midsternal surgical scar, linear left heel surgical scar.  Right shoulder with hypopigmentation.   Fingers tips bilaterally with ecchymosis puncture sites, no associated cellulitis   Right posterior thoracic rib cage- deep tissue pressure injury evolved to Stage 2 pressure injury, healing - two ulcers measure in cluster  by intact epithelial bridge- 1.3cmx1.5cmx0.1cm (prev 2jtb2lla8.1cm )-Tissue type pink moist dermis and re-eithelization. No drainage. No induration.  Gluteal cleft extending to right buttock and sacrum Unstageable pressure injury with mixed tissue type- Prev measured as a cluster of 3 ulcerations, now fused to one wound measuring 0rpv8mgp3.2cm  (prev 8cmx5.5cmx0.2cm), unable to determine true anatomical depth secondary to necrotic tissue, well defined irregular borders, tissue type exposing predominantly  black minimally moist eschar, adhere, approx 20% purple maroon discoloration at wound edges. No crepitus, no fluctuance. No associated cellulitis.   Incontinence/moisture associated dermatitis to posterior inner thighs and inner buttocks as evident by increase moisture and erythema. No open wounds. + erythema more intense in the center, + suspected candidiasis.   Psych: Unable to assess mental status   [facility] MD team and primary RN at the bedside during skin assessment.    CAPILLARY BLOOD GLUCOSE      POCT Blood Glucose.: 201 mg/dL (09 Feb 2024 11:53)  POCT Blood Glucose.: 217 mg/dL (09 Feb 2024 09:43)  POCT Blood Glucose.: 298 mg/dL (09 Feb 2024 07:37)  POCT Blood Glucose.: 272 mg/dL (09 Feb 2024 05:17)  POCT Blood Glucose.: 235 mg/dL (08 Feb 2024 23:27)  POCT Blood Glucose.: 257 mg/dL (08 Feb 2024 16:57)    Culture - Blood (collected 02-02-24 @ 22:14)  Source: .Blood Blood-Peripheral  Final Report (02-08-24 @ 03:00):    No growth at 5 days    Culture - Blood (collected 02-02-24 @ 22:14)  Source: .Blood Blood-Peripheral  Final Report (02-08-24 @ 03:00):    No growth at 5 days      A1C with Estimated Average Glucose Result: 6.6 % (12-16-23 @ 07:30)           [facility]-- WOUND TEAM -- FOLLOW UP NOTE  --------------------------------------------------------------------------------    subjective: Patient seen and examined at bedside. Patient unable to provide subjective information, intubated.     Interval HPI/24 hour events:   -->s/p RRT on 02/09 with emergent intubation now transfer to MICU   -->Fevers 102.1F 2/9  -->Persistent leukocytosis   Chart reviewed including labs and relevant images      Diet:  Diet, NPO with Tube Feed:   Tube Feeding Modality: Nasogastric  Glucerna 1.5 Wan (GLUCERNA1.5RTH)  Total Volume for 24 Hours (mL): 1440  Continuous  Starting Tube Feed Rate mL per Hour: 5  Increase Tube Feed Rate by (mL): 10     Every 4 hours  Until Goal Tube Feed Rate (mL per Hour): 60  Tube Feed Duration (in Hours): 24  Tube Feed Start Time: 15:30 (02-03-24 @ 15:28)    ROS: pt unable to offer    ALLERGIES & MEDICATIONS  --------------------------------------------------------------------------------  Allergies    Pineapple (Unknown)  contrast dye -  rash (Other)  iodine (Other)  codeine (Vomiting)  penicillin (Rash)  latex (Rash)    Intolerances    Tolerates ceftriaxone, cefepime (Other)    STANDING INPATIENT MEDICATIONS    cefTRIAXone   IVPB      cefTRIAXone   IVPB 1000 milliGRAM(s) IV Intermittent every 24 hours  chlorhexidine 0.12% Liquid 15 milliLiter(s) Oral Mucosa every 12 hours  Dakins Solution - 1/2 Strength 1 Application(s) Topical two times a day  dextrose 5%. 1000 milliLiter(s) IV Continuous <Continuous>  dextrose 5%. 1000 milliLiter(s) IV Continuous <Continuous>  dextrose 5%. 1000 milliLiter(s) IV Continuous <Continuous>  dextrose 5%. 1000 milliLiter(s) IV Continuous <Continuous>  dextrose 50% Injectable 25 Gram(s) IV Push once  dextrose 50% Injectable 25 Gram(s) IV Push once  dextrose 50% Injectable 25 Gram(s) IV Push once  dextrose 50% Injectable 25 Gram(s) IV Push once  dextrose 50% Injectable 12.5 Gram(s) IV Push once  dextrose 50% Injectable 12.5 Gram(s) IV Push once  doxycycline IVPB      doxycycline IVPB 100 milliGRAM(s) IV Intermittent every 12 hours  glucagon  Injectable 1 milliGRAM(s) IntraMuscular once  glucagon  Injectable 1 milliGRAM(s) IntraMuscular once  heparin   Injectable 5000 Unit(s) SubCutaneous every 12 hours  insulin lispro (ADMELOG) corrective regimen sliding scale   SubCutaneous every 6 hours  metoprolol tartrate 25 milliGRAM(s) Oral two times a day  metroNIDAZOLE  IVPB 500 milliGRAM(s) IV Intermittent every 12 hours  pantoprazole   Suspension 40 milliGRAM(s) Oral daily  sodium bicarbonate 1300 milliGRAM(s) Oral three times a day  sodium chloride 0.45% 1000 milliLiter(s) IV Continuous <Continuous>  sodium zirconium cyclosilicate 10 Gram(s) Oral daily  sterile water 1000 milliLiter(s) IV Continuous <Continuous>    PRN INPATIENT MEDICATION  acetaminophen   Oral Liquid .. 650 milliGRAM(s) Oral every 6 hours PRN  dextrose Oral Gel 15 Gram(s) Oral once PRN  dextrose Oral Gel 15 Gram(s) Oral once PRN    Vital signs:  T(C): 38.4 (02-09-24 @ 05:55), Max: 38.9 (02-09-24 @ 04:00)  HR: 95 (02-09-24 @ 10:08) (91 - 116)  BP: 117/41 (02-09-24 @ 05:55) (98/43 - 134/55)  RR: 20 (02-09-24 @ 05:55) (18 - 24)  SpO2: 100% (02-09-24 @ 10:08) (91% - 100%)    Wt(kg): --55.5 (2-7-24)    02-08-24 @ 07:01  -  02-09-24 @ 07:00  --------------------------------------------------------  IN: 0 mL / OUT: 0 mL / NET: 0 mL    LABS/ CULTURES/ RADIOLOGY:              7.6    12.54 >-----------<  104      [02-09-24 @ 04:55]              23.3     140  |  104  |  110  ----------------------------<  235      [02-09-24 @ 04:55]  5.2   |  17  |  3.60        Ca     8.5     [02-09-24 @ 04:55]      Mg     2.20     [02-09-24 @ 04:55]      Phos  4.3     [02-09-24 @ 04:55]    Constitutional: Frail, pale ill appearing, intubated receiving levophed and propofol, bedbound, cachetic. Anasarca. Right arm weeping edema. Reacts to painful stimuli, withdraws right leg with wound cleansing   (+) low airloss support surface, (+) fluidized positioning devices, (+) complete cair boots, small Z fluidized positioning device in place to offload head and ears   HEENT: NC/AT, nonicteric, oral mucosa dry.   Cardiovascular: irregular rate in the monitor   Respiratory: Intubated   Gastrointestinal: soft NT/ND, incontinent of stool, bowel management system in place (Flexiseal), visible peritubular  skin intact. Left NG tube in place, intact peritubular skin.   : incontinent urine, indwelling catheter in place   Musculoskeletal: Legs and arms in extension, flaccidity    Vascular: B/L upper extremities with edema including in bilateral hands, No temperature changes, no overt ischemia.   B/L LE: Right foot wounds see below, Left foot extensive necrotic wound with mottling noted to all digits, plantar aspect of feet and dorsal aspect of feet cyanosis, see below   RLE: nonpalpable dp/pt pulses, capillary refill > 3 seconds, warm to touch   LLE: nonpalpable dp/pt pulses, capillary refill >3 seconds, cool to touch   Wounds with mixed etiology pressure and arterial insufficiency:  Right heel- Unstageable pressure injury complicated by arterial disease   -2.5cmx2.7epk3yb (prev 2.1pai1ypb3do)  -100% black dry eschar    Right anterior ankle large area of reabsorbed blister? with purulence at the base and +Crepitus (new finding)  -[id number]   -Periwound skin intact, no tissue type changes palpated   -+leg withdrawn when cleanse   -Non adherent silicone layer placed and cover with ABD pad and kerlix.   Right lateral lower leg nonblanching erythema- resolved   Right medial hallux prev blanching erythema now non blanching erythema   -1.5cmx1.8eib2to   -No tissue type changes palpable   Left lateral lower leg  to lateral and medial malleolus (slightly above malleolus to left lateral side) extending to left foot/toes- stage 4 pressure injury with extensive necrosis mostly to dorsal/medial/, lateral malleolus and plantar aspect of feet/foot ischemia.  -Left lateral malleolus with necrotic/dessicated bone, tendon, ligament in center with large area of surrounding eschar beginning to soften in center with areas of epidermal separation exposing black eschar  -Anterior/Medial/lateral aspect of foot/malleolus with well demarcated black necrotic eschar   -Left lateral leg above malleolus with purple maroon discoloration, no tissue type changes palpable   -Toes and plantar aspect with, cyanosis, plantar aspect of left foot with epidermal separation exposing purple maroon at the base  -No crepitus, no fluctuance.  -Small serofibrinous drainage. Foul odor   -Periwound skin extending from proximal wound edge affecting entire foot and toes with extensive mottling; areas of dry-fixed eschar noted to medial mid foot, medial 1st metatarsal head.  Skin: thin, frail skin, scattered ecchymosis without hematoma, poor skin turgor. Linear midsternal surgical scar, linear left heel surgical scar.  Right shoulder with hypopigmentation.   Fingers tips bilaterally with ecchymosis puncture sites, no associated cellulitis   **[facility] team MD Fernando and primary RN at the bedside during skin assessment. Discussed concerns for new area in right anterior ankle with purulence at base and +crepitus, not draining spontaneously.   Right posterior thoracic rib cage- deep tissue pressure injury evolved to Stage 2 pressure injury, healing - two ulcers measure in cluster  by intact epithelial bridge- 1.3cmx1.5cmx0.1cm (prev 7itn2lgn6.1cm )-Tissue type pink moist dermis and re-eithelization. No drainage. No induration.  Gluteal cleft extending to right buttock and sacrum Unstageable pressure injury with mixed tissue type- Prev measured as a cluster of 3 ulcerations, now fused to one wound measuring 5udo6kfc2.2cm  (prev 8cmx5.5cmx0.2cm), unable to determine true anatomical depth secondary to necrotic tissue, well defined irregular borders, tissue type exposing predominantly  black minimally moist eschar, adhere, approx 20% purple maroon discoloration at wound edges. No crepitus, no fluctuance. No associated cellulitis.   Incontinence/moisture associated dermatitis to posterior inner thighs and inner buttocks as evident by increase moisture and erythema. No open wounds. + erythema more intense in the center, + suspected candidiasis.   Psych: Unable to assess mental status       CAPILLARY BLOOD GLUCOSE      POCT Blood Glucose.: 201 mg/dL (09 Feb 2024 11:53)  POCT Blood Glucose.: 217 mg/dL (09 Feb 2024 09:43)  POCT Blood Glucose.: 298 mg/dL (09 Feb 2024 07:37)  POCT Blood Glucose.: 272 mg/dL (09 Feb 2024 05:17)  POCT Blood Glucose.: 235 mg/dL (08 Feb 2024 23:27)  POCT Blood Glucose.: 257 mg/dL (08 Feb 2024 16:57)    Culture - Blood (collected 02-02-24 @ 22:14)  Source: .Blood Blood-Peripheral  Final Report (02-08-24 @ 03:00):    No growth at 5 days    Culture - Blood (collected 02-02-24 @ 22:14)  Source: .Blood Blood-Peripheral  Final Report (02-08-24 @ 03:00):    No growth at 5 days      A1C with Estimated Average Glucose Result: 6.6 % (12-16-23 @ 07:30)

## 2024-02-09 NOTE — PROGRESS NOTE ADULT - PROBLEM SELECTOR PLAN 1
Patient with acute kidney injury in the setting of hypovolemia and sepsis. Baseline Scr 0.9-1.2.  On admission Scr elevated at 2.76 but improved with fluids to 1.91 on 2/2. However Scr increased to 3.03--->3.3-->3.6. Monitor labs and STRICT I/O. Avoid nephrotoxins. Dose medications as per CrCl. Pt overall poor prognosis. GOC as per primary team.

## 2024-02-09 NOTE — CHART NOTE - NSCHARTNOTEFT_GEN_A_CORE
Late entry:     Notified by RN earlier this AM that pt had labored breathing.   Pt seen and examined bedside. Non-verbal and A&O x0, unable to obtain history. At time of assessment pt was on 2L NC.   Exam notable for increased WOB (40-50 breaths/min). Pt belly-breathing and using accessory muscles. Coarse lung sounds bilaterally.   RRT called for increased WOB.   Pt given duoneb, placed on NRB and suctioned w/o improvement.  GOC discussed with son Toby via telephone by writer and MAR.   Patient intubated for airway protection and transferred to MICU.  Discussed with Dr. Varghese.

## 2024-02-09 NOTE — CHART NOTE - NSCHARTNOTEFT_GEN_A_CORE
ACP NIGHT COVERAGE    CC: fever    HPI: 89 y/o female, with a PmHx of HTN, HLD, CAD, DM, Dementia, Functional quadriplegia, s/p spinal stimulator placement ptresented from [facility] for hypernatremia and concern for L foot infection. Patient is admitted due to Sepsis 2/2 Left foot wound which family has declined amputation for. Notified by RN that patient has a fever of 102 F. Patient seen and examined at bedside, AAOx0 at baseline, ROS not able to be obtained.     REVIEW OF SYSTEMS:  Patient AAOX0, ROS unable to be obtained.     VITAL SIGNS:  T(C): 36.7 (02-08-24 @ 22:20), Max: 37.2 (02-08-24 @ 05:50)  HR: 91 (02-08-24 @ 22:20) (91 - 95)  BP: 134/55 (02-08-24 @ 22:20) (120/65 - 146/62)  RR: 19 (02-08-24 @ 22:20) (18 - 19)  SpO2: 96% (02-08-24 @ 22:20) (95% - 98%)      PHYSICAL EXAM:  GENERAL: in acute distress,  CHEST/LUNG: increased work of breathing CTAB; No wheezes, rales, or rhonchi  HEART: RRR; No murmurs, rubs, or gallops  NEUROLOGY: AAO x O at baseline         ASSESSMENT/PLAN: 89 y/o female, with a PmHx of HTN, HLD, CAD, DM, Dementia, Functional quadriplegia, s/p spinal stimulator placement ptresented from [facility] for hypernatremia and concern for L foot infection. Patient is admitted due to Sepsis 2/2 Left foot wound which family has declined amputation for. Notified by RN that patient has a fever of 102 F. Patient seen and examined at bedside, AAOx0 at baseline, ROS not able to be obtained. Physical exam unsignificant    1. Fever  - RVP f/u results  - Blood cultures x 2 f/u results  - Chest Xray f /u results  - CBC with diff f/u results  - UA- f/u results  - ABG with lactate- f/u results  - Procalcitonin f /u results  - Tylenol 1gm Iv given   - Will continue to monitor patient  Hernando Hernandez PA-C  Pager [phone]

## 2024-02-09 NOTE — CHART NOTE - NSCHARTNOTEFT_GEN_A_CORE
Vascular Surgery Update Note    Patient seen and examined by attending, Dr. Jacques, and myself.    Vascular surgery reconsulted today due to worsening of patient's respiratory status requiring intubation and hypotension requiring pressor support. Patient transferred to MICU. Concerns for sepsis from gangrenous lower extremities. Family now agreeable to surgical intervention if offered.    On exam, bilateral feet are non-salvageable. Left foot with wet gangrene. Right foot with pus-filled forefoot blister. Patient would require bilateral AKAs given chronic bedbound, nonambulatory status. Patient is at extremely high risk of mortality with proximal amputation which is even further complicated by hypotension requiring pressor support and expiratory distress requiring intubation. Additionally, patient with poor nutritional status (Albumin noted to be 1.6) and cachexia. Patient would likely have poor wound healing with multiple complications with any surgical intervention given poor nutritional status. Furthermore, patient already with sacral decubitus ulcer. AKAs cause upward flexion at hips leading to increased pressure on the sacrum likely resulting in worsening of the sacral ulcer.    Overall. prognosis with or without intervention is poor. At this time would not offer surgical intervention in the form of proximal amputation.    Would recommend palliative care evaluation for hospice. Can perform local wound care with betadine topical therapy to bilateral feet.    Discussed with MICU resident team.    Attempted to reach family via phone [Toby (son) [phone]] without answer. Will attempt again      Vascular (C Team) Surgery  [id number]

## 2024-02-10 NOTE — PROGRESS NOTE ADULT - ATTENDING COMMENTS
89 years old female with h/o HTN, HLD, CAD, DM, dementia, s/p spinal stimulator placement p/w abnormal labs (Na 168, K 2.9). Unable to obtain further history due to patient baseline nonverbal who was admitted to the floors found to have necrotizing wound infection as well as SAE.     Patient today with loss of mental status, aspiration, also in shock with hypotension. Patient now found to have necrotizing wound infection in the contralateral leg. Family was offered surgery but refused early in the admission  Patient not a surgical candidate by vascular.     # Shock  # SAE  # b/l necrotizing foot infections  # acute hypoxemic respiratory failure  - Shock 2/2 to infected necrotizing b/l ext infection, family refused surgery earlier this admission. Per vascular not a candidate for surgery.   - Will likely not have recovery without surgical intervention thus prognosis remains poor.   - C/W levo, wean as tolerated  - SAE 2/2 likely ATN from ongoing infection, patient is not a good candidate for RRT. Will d/w nephrology.   - acute hypoxemic respiratory failure with aspiration c/w abx  - DVT ppx SQH  - Dispo- Is full code. Dismal prognosis, poor candidate for HD and aggressive therapies, Not a candidate for surgery. Will limit to 1 pressors given unlikelyhood of recovery.

## 2024-02-10 NOTE — PROGRESS NOTE ADULT - ASSESSMENT
89 yo F with PMH dementia (AOx0, non-verbal), R ankle gangrene, HTN, HLD, CAD s/p CABG, and T2D p/f [facility] rehab with hyperNa and acute renal failure,    EKG: Multifocal atrial tachycardia    1. Septic shock   - on abx   - intubated on midodrine and levophed possible aspiration pneumonia     2. Pre-op assessment  -plan for EGD/PEG  -unable to assess for symptoms as patient a&0, nonverbal  -last TTE in 2017 with moderate AS, TTE this admission with evidence of transcatheter aortic valve replacement, normal LV function    3. CAD   -hx of CABG    4. HTN  - in septic shock on midodrine and levophed

## 2024-02-10 NOTE — PROGRESS NOTE ADULT - SUBJECTIVE AND OBJECTIVE BOX
Mp Krishnan MD  Interventional Cardiology / Advance Heart Failure and Cardiac Transplant Specialist  [place] Office : [address] [place] [id number]  Tel:   [place] Office : [phone] [facility] [address]  Tel: [phone]      Subjective/Overnight events: Pt is lying in bed intubated in iCU  	  MEDICATIONS:  heparin   Injectable 5000 Unit(s) SubCutaneous every 12 hours  midodrine 30 milliGRAM(s) Oral every 8 hours  norepinephrine Infusion 0.05 MICROgram(s)/kG/Min IV Continuous <Continuous>    doxycycline IVPB      doxycycline IVPB 100 milliGRAM(s) IV Intermittent every 12 hours  meropenem  IVPB 500 milliGRAM(s) IV Intermittent every 24 hours  metroNIDAZOLE  IVPB 500 milliGRAM(s) IV Intermittent every 12 hours      acetaminophen   Oral Liquid .. 650 milliGRAM(s) Oral every 6 hours PRN  HYDROmorphone  Injectable 1 milliGRAM(s) IV Push every 4 hours PRN  propofol Infusion 5 MICROgram(s)/kG/Min IV Continuous <Continuous>    pantoprazole   Suspension 40 milliGRAM(s) Oral daily    dextrose 50% Injectable 12.5 Gram(s) IV Push once  dextrose 50% Injectable 25 Gram(s) IV Push once  dextrose 50% Injectable 25 Gram(s) IV Push once  dextrose 50% Injectable 12.5 Gram(s) IV Push once  dextrose 50% Injectable 25 Gram(s) IV Push once  dextrose 50% Injectable 25 Gram(s) IV Push once  dextrose Oral Gel 15 Gram(s) Oral once PRN  dextrose Oral Gel 15 Gram(s) Oral once PRN  glucagon  Injectable 1 milliGRAM(s) IntraMuscular once  glucagon  Injectable 1 milliGRAM(s) IntraMuscular once  insulin lispro (ADMELOG) corrective regimen sliding scale   SubCutaneous every 6 hours    chlorhexidine 0.12% Liquid 15 milliLiter(s) Oral Mucosa every 12 hours  Dakins Solution - 1/2 Strength 1 Application(s) Topical two times a day  dextrose 5%. 1000 milliLiter(s) IV Continuous <Continuous>  dextrose 5%. 1000 milliLiter(s) IV Continuous <Continuous>  dextrose 5%. 1000 milliLiter(s) IV Continuous <Continuous>  dextrose 5%. 1000 milliLiter(s) IV Continuous <Continuous>  sodium bicarbonate 1300 milliGRAM(s) Oral three times a day  sterile water 1000 milliLiter(s) IV Continuous <Continuous>      PAST MEDICAL/SURGICAL HISTORY  PAST MEDICAL & SURGICAL HISTORY:  Angina  in 2005, s/p angioplasty with stent placement, no recurrance, no sequalae      Diabetes Mellitus  type 2      Arthritis, Infective, Knee      Detached Retina  right eye      Acute Mucous Pneumonia  4/2010, resolved ; no residual problems      Spinal Stenosis- lumbar      History of Back Surgery  2010      Basal Cell Cancer  removed from left neck 2009      Dementia      Detached Retina, Left  laser surgery in 1995      S/P Carpal Tunnel Release  bilateral hands in 1990      Trigger Finger  release of middle and ring finger of right hand in 1990, and middle finger left hand in 2008      S/P Laparoscopic Cholecystectomy  2008      S/P TKR (Total Knee Replacement)  left knee      Cataract  removal with lens implant right in 2000          SOCIAL HISTORY: Substance Use (street drugs): ( x ) never used  (  ) other:    FAMILY HISTORY:  FH: HTN (hypertension)          PHYSICAL EXAM:  T(C): 38.9 (02-10-24 @ 08:00), Max: 38.9 (02-10-24 @ 08:00)  HR: 96 (02-10-24 @ 12:00) (55 - 112)  BP: 134/50 (02-10-24 @ 12:00) (104/44 - 136/61)  RR: 22 (02-10-24 @ 12:00) (9 - 29)  SpO2: 99% (02-10-24 @ 12:00) (92% - 100%)  Wt(kg): --  I&O's Summary    09 Feb 2024 07:01  -  10 Feb 2024 07:00  --------------------------------------------------------  IN: 529.4 mL / OUT: 290 mL / NET: 239.4 mL    10 Feb 2024 07:01  -  10 Feb 2024 13:00  --------------------------------------------------------  IN: 143.5 mL / OUT: 10 mL / NET: 133.5 mL          GENERAL: intubated  EYES:  conjunctiva and sclera clear  ENMT: No tonsillar erythema, exudates, or enlargement  Cardiovascular: Normal S1 S2, No JVD, No murmurs, No edema  Respiratory: Lungs clear to auscultation	  Gastrointestinal:  Soft  Extremities: No edema                                     7.8    15.89 )-----------( 127      ( 10 Feb 2024 02:30 )             23.2     02-10    138  |  102  |  111<H>  ----------------------------<  148<H>  5.1   |  16<L>  |  3.76<H>    Ca    8.4      10 Feb 2024 02:30  Phos  4.9     02-10  Mg     2.10     02-10    TPro  5.1<L>  /  Alb  1.6<L>  /  TBili  0.4  /  DBili  x   /  AST  19  /  ALT  10  /  AlkPhos  512<H>  02-10    proBNP:   Lipid Profile:   HgA1c:   TSH:     Consultant(s) Notes Reviewed:  [x ] YES  [ ] NO    Care Discussed with Consultants/Other Providers [ x] YES  [ ] NO    Imaging Personally Reviewed independently:  [x] YES  [ ] NO    All labs, radiologic studies, vitals, orders and medications list reviewed. Patient is seen and examined at bedside. Case discussed with medical team.

## 2024-02-10 NOTE — PROGRESS NOTE ADULT - PROBLEM SELECTOR PLAN 1
Patient with oliguric acute kidney injury in the setting of hypovolemia and sepsis. Baseline Scr 0.9-1.2. On admission Scr elevated at 2.76 but improved with fluids to 1.91 on 2/2. However Scr increased to 3.03 and remains elevated at 3.7. She has had ~230cc of UOP in past 24 hours. Remains intubated, sedated and on IV vasopressors. Pt overall poor prognosis and does not appear to be in appropriate candidate for RRT/HD. GOC discussions in process. Monitor labs and STRICT I/O. Avoid nephrotoxins. Dose medications as per CrCl.

## 2024-02-10 NOTE — PROGRESS NOTE ADULT - ASSESSMENT
Pt is a 89 yo F with PMH dementia (AOx0, non-verbal), R ankle gangrene, HTN, HLD, CAD, and T2D p/f [facility] rehab with hyperNa and acute renal failure, both improved. Course c/b AHRF requiring HFNC, now weaned to RA. GOC discussion held with son/HCP Toby who rescinded prior DNR/DNI, now plan for full code and full escalation of care and pursuing all interventions. Now weaned to RA, pending TTE for pre-op optimization with cards following in anticipation of plan for PEG (GI consulted) and LTCF placement.     #Neuro    #Dementia   - pt. A&Ox0 - nonverbal.  - Severe protein calorie malnutrition.   - Failed S&S eval.  Plan:   - Ongoing GOC conversation as below  - anticipate need for PEG -- GI consulted  - cards consulted for pre-op optimization, pending TTE.    #Cardiovascular    #Afib.   - Afib with RVR. Pt. was transferred to a telemetry floor. Discussed with son. switch IV lopressor to PO now that we have oral access   Plan:  - continue lopressor 25mg BID.    #Respiratory    Acute respiratory failure with hypoxia.   - suspect in setting of worsening deconditioning, poor inspiratory effort, atelectasis   - patient needed emergent intubation due to   - post intubation chest xray shows complete left lung opacity  RRT on 02/09 with emergent intubation due to ??    Plan:  - consider BAL   - continue antibiotics as per ID recs  - on volume AC     #GI/Nutrition    #Diet: tube feeds through OG    #Diarrhea  - noted to have multiple loose BMs today  - dc bowel regimen (miralax, senna -- last dosed 2/7)  - monitor x24h, if continues can send GI w/u.    #/Renal    #SAE (acute kidney injury).   - on admission  and Cr 2.76. Likely prerenal  - improved s/p fluid resuscitation   Plan:    - ctm Cr/UOP    #AGMA   - bicarb down trending to 12 on 2/2 -- improved 2/8  - d/w nephrology, c/w 1300 sodium bicarb TID  - s/p sterile water 150meq Na bicarb 75cc/h x10h without improvement 2/6  - s/p 1/2 NS 150meq Na bicarb 75cc/h x10h with improvement 2/7.    #Skin    #ID    #Sepsis.    - pt met SIRS criteria + source of known foot infection.   - Recently treated for L foot wound growing E. coli and S. aureus.   - Vascular offering definitive management with amputation but family declines. Local wound care.   Plan:   - currently on doxycycline, ceftriaxone and flagyl  - ID consulted, f/u recs     #MSK:    #Wound of foot.   - L foot xray: Dorsal forefoot soft tissue swelling. Calcaneal cortex on left foot with possible osteomyelitis  - previously unable to tolerate MRI L ankle when attempted in recent hospitalization  - Recently Tx in Dec 23' for L foot wound growing S. Aureas and E. Coli, amputation L foot offered and deferred at the time as per GO   - Wound care following  Plan:  - c/w Abx as above  - Vascular consulted, recs: continue local wound care, frequent offloading to B/L lower extremities.    #Endocrine    #Diabetes.   - POC checks   Plan:  - NPO, sliding scale.    #Hematologic    #Anemia   - Hgb ~6 on 1/29. No signs of bleeding. Received 1U PRBC.   - Hgb now improved - stable.  Plan:  -ctm    #DVT ppx    #Ethics/ICU Bundle  - GOC/Code Status: Full Code. pt with prior MOLST - DNR/DNI but was rescinded on this hospitalization. Palliative consulted. Ongoing GOC conversations. Toby understands the poor prognosis and would like to proceed with comfort measures/DNR/DNI but two out of his three siblings do not agree with comfort measures at this time. He does not feel comfortable transitioning care until his other two brothers are in agreement. We addressed that his mom is declining and that we do not recommend NGT/PEG as this is uncomfortable and would not change her prognosis. Toby expressed understanding but states he wishes to pursue PEG as brothers would not forgive him if he did not.  - Diet:  - Access:  - Tubes/Drains:  - Activity/PT/OT:

## 2024-02-10 NOTE — PROGRESS NOTE ADULT - SUBJECTIVE AND OBJECTIVE BOX
Alva Becerril   PGY-1      INTERVAL HPI/OVERNIGHT EVENTS: No acute overnight events.     SUBJECTIVE: Patient seen and examined at bedside.     CONSTITUTIONAL: No weakness, fevers or chills  EYES/ENT: No visual changes;  No vertigo or throat pain   NECK: No pain or stiffness  RESPIRATORY: No cough, wheezing, hemoptysis; No shortness of breath  CARDIOVASCULAR: No chest pain or palpitations  GASTROINTESTINAL: No abdominal or epigastric pain. No nausea, vomiting, or hematemesis; No diarrhea or constipation. No melena or hematochezia.  GENITOURINARY: No dysuria, frequency or hematuria  NEUROLOGICAL: No numbness or weakness  SKIN: No itching, rashes    OBJECTIVE:    VITAL SIGNS:  ICU Vital Signs Last 24 Hrs  T(C): 38.9 (10 Feb 2024 08:00), Max: 38.9 (10 Feb 2024 08:00)  T(F): 102 (10 Feb 2024 08:00), Max: 102 (10 Feb 2024 08:00)  HR: 101 (10 Feb 2024 10:00) (55 - 112)  BP: 133/62 (10 Feb 2024 10:00) (84/47 - 136/61)  BP(mean): 83 (10 Feb 2024 10:00) (59 - 85)  ABP: --  ABP(mean): --  RR: 15 (10 Feb 2024 10:00) (15 - 29)  SpO2: 96% (10 Feb 2024 10:00) (92% - 100%)    O2 Parameters below as of 10 Feb 2024 08:00  Patient On (Oxygen Delivery Method): ventilator          Mode: AC/ CMV (Assist Control/ Continuous Mandatory Ventilation), RR (machine): 16, TV (machine): 350, FiO2: 40, PEEP: 5, ITime: 0.78, MAP: 11, PIP: 25    02-09 @ 07:01  -  02-10 @ 07:00  --------------------------------------------------------  IN: 529.4 mL / OUT: 290 mL / NET: 239.4 mL    02-10 @ 07:01  -  02-10 @ 10:17  --------------------------------------------------------  IN: 50 mL / OUT: 0 mL / NET: 50 mL      CAPILLARY BLOOD GLUCOSE      POCT Blood Glucose.: 183 mg/dL (10 Feb 2024 06:09)      PHYSICAL EXAM:    General: NAD  HEENT: NC/AT; PERRL, clear conjunctiva  Neck: supple  Respiratory: CTA b/l  Cardiovascular: +S1/S2; RRR  Abdomen: soft, NT/ND; +BS x4  Extremities: WWP, 2+ peripheral pulses b/l; no LE edema  Skin: normal color and turgor; no rash  Neurological:    MEDICATIONS:  MEDICATIONS  (STANDING):  chlorhexidine 0.12% Liquid 15 milliLiter(s) Oral Mucosa every 12 hours  Dakins Solution - 1/2 Strength 1 Application(s) Topical two times a day  dextrose 5%. 1000 milliLiter(s) (100 mL/Hr) IV Continuous <Continuous>  dextrose 5%. 1000 milliLiter(s) (50 mL/Hr) IV Continuous <Continuous>  dextrose 5%. 1000 milliLiter(s) (50 mL/Hr) IV Continuous <Continuous>  dextrose 5%. 1000 milliLiter(s) (100 mL/Hr) IV Continuous <Continuous>  dextrose 50% Injectable 12.5 Gram(s) IV Push once  dextrose 50% Injectable 25 Gram(s) IV Push once  dextrose 50% Injectable 25 Gram(s) IV Push once  dextrose 50% Injectable 12.5 Gram(s) IV Push once  dextrose 50% Injectable 25 Gram(s) IV Push once  dextrose 50% Injectable 25 Gram(s) IV Push once  doxycycline IVPB      doxycycline IVPB 100 milliGRAM(s) IV Intermittent every 12 hours  glucagon  Injectable 1 milliGRAM(s) IntraMuscular once  glucagon  Injectable 1 milliGRAM(s) IntraMuscular once  heparin   Injectable 5000 Unit(s) SubCutaneous every 12 hours  insulin lispro (ADMELOG) corrective regimen sliding scale   SubCutaneous every 6 hours  meropenem  IVPB 500 milliGRAM(s) IV Intermittent every 24 hours  metroNIDAZOLE  IVPB 500 milliGRAM(s) IV Intermittent every 12 hours  midodrine 30 milliGRAM(s) Oral every 8 hours  norepinephrine Infusion 0.05 MICROgram(s)/kG/Min (3.79 mL/Hr) IV Continuous <Continuous>  pantoprazole   Suspension 40 milliGRAM(s) Oral daily  propofol Infusion 5 MICROgram(s)/kG/Min (1.21 mL/Hr) IV Continuous <Continuous>  sodium bicarbonate 1300 milliGRAM(s) Oral three times a day  sodium zirconium cyclosilicate 10 Gram(s) Oral daily  sterile water 1000 milliLiter(s) (75 mL/Hr) IV Continuous <Continuous>    MEDICATIONS  (PRN):  acetaminophen   Oral Liquid .. 650 milliGRAM(s) Oral every 6 hours PRN Temp greater or equal to 38C (100.4F), Mild Pain (1 - 3)  dextrose Oral Gel 15 Gram(s) Oral once PRN Blood Glucose LESS THAN 70 milliGRAM(s)/deciliter  dextrose Oral Gel 15 Gram(s) Oral once PRN Blood Glucose LESS THAN 70 milliGRAM(s)/deciliter  HYDROmorphone  Injectable 1 milliGRAM(s) IV Push every 4 hours PRN Severe Pain (7 - 10)      ALLERGIES:  Allergies    Pineapple (Unknown)  contrast dye -  rash (Other)  iodine (Other)  codeine (Vomiting)  penicillin (Rash)  latex (Rash)    Intolerances    Tolerates ceftriaxone, cefepime (Other)      LABS:                        7.8    15.89 )-----------( 127      ( 10 Feb 2024 02:30 )             23.2     02-10    138  |  102  |  111<H>  ----------------------------<  148<H>  5.1   |  16<L>  |  3.76<H>    Ca    8.4      10 Feb 2024 02:30  Phos  4.9     02-10  Mg     2.10     02-10    TPro  5.1<L>  /  Alb  1.6<L>  /  TBili  0.4  /  DBili  x   /  AST  19  /  ALT  10  /  AlkPhos  512<H>  02-10    PT/INR - ( 10 Feb 2024 02:30 )   PT: 14.9 sec;   INR: 1.34 ratio         PTT - ( 10 Feb 2024 02:30 )  PTT:35.0 sec  Urinalysis Basic - ( 10 Feb 2024 02:30 )    Color: x / Appearance: x / SG: x / pH: x  Gluc: 148 mg/dL / Ketone: x  / Bili: x / Urobili: x   Blood: x / Protein: x / Nitrite: x   Leuk Esterase: x / RBC: x / WBC x   Sq Epi: x / Non Sq Epi: x / Bacteria: x        RADIOLOGY & ADDITIONAL TESTS: Reviewed.

## 2024-02-10 NOTE — PROGRESS NOTE ADULT - SUBJECTIVE AND OBJECTIVE BOX
[facility] DIVISION OF KIDNEY DISEASES AND HYPERTENSION   FOLLOW UP NOTE  --------------------------------------------------------------------------------  Chief Complaint: Oliguric SAE    24 hour events/subjective: Pt. was seen and examined in the MICU. She is intubated, on sedation and on IV vasopressors. Remains oliguric. [facility] discussions in process.     PAST HISTORY  --------------------------------------------------------------------------------  No significant changes to PMH, PSH, FHx, SHx, unless otherwise noted    ALLERGIES & MEDICATIONS  --------------------------------------------------------------------------------  Allergies  Pineapple (Unknown)  contrast dye -  rash (Other)  iodine (Other)  codeine (Vomiting)  penicillin (Rash)  latex (Rash)    Intolerances    Tolerates ceftriaxone, cefepime (Other)    Standing Inpatient Medications  chlorhexidine 0.12% Liquid 15 milliLiter(s) Oral Mucosa every 12 hours  Dakins Solution - 1/2 Strength 1 Application(s) Topical two times a day  dextrose 5%. 1000 milliLiter(s) IV Continuous <Continuous>  dextrose 5%. 1000 milliLiter(s) IV Continuous <Continuous>  dextrose 5%. 1000 milliLiter(s) IV Continuous <Continuous>  dextrose 5%. 1000 milliLiter(s) IV Continuous <Continuous>  dextrose 50% Injectable 25 Gram(s) IV Push once  dextrose 50% Injectable 25 Gram(s) IV Push once  dextrose 50% Injectable 12.5 Gram(s) IV Push once  dextrose 50% Injectable 12.5 Gram(s) IV Push once  dextrose 50% Injectable 25 Gram(s) IV Push once  dextrose 50% Injectable 25 Gram(s) IV Push once  doxycycline IVPB      doxycycline IVPB 100 milliGRAM(s) IV Intermittent every 12 hours  glucagon  Injectable 1 milliGRAM(s) IntraMuscular once  glucagon  Injectable 1 milliGRAM(s) IntraMuscular once  heparin   Injectable 5000 Unit(s) SubCutaneous every 12 hours  insulin lispro (ADMELOG) corrective regimen sliding scale   SubCutaneous every 6 hours  meropenem  IVPB 500 milliGRAM(s) IV Intermittent every 24 hours  metroNIDAZOLE  IVPB 500 milliGRAM(s) IV Intermittent every 12 hours  midodrine 30 milliGRAM(s) Oral every 8 hours  norepinephrine Infusion 0.05 MICROgram(s)/kG/Min IV Continuous <Continuous>  pantoprazole   Suspension 40 milliGRAM(s) Oral daily  propofol Infusion 5 MICROgram(s)/kG/Min IV Continuous <Continuous>  sodium bicarbonate 1300 milliGRAM(s) Oral three times a day  sodium zirconium cyclosilicate 10 Gram(s) Oral daily  sterile water 1000 milliLiter(s) IV Continuous <Continuous>    PRN Inpatient Medications  acetaminophen   Oral Liquid .. 650 milliGRAM(s) Oral every 6 hours PRN  dextrose Oral Gel 15 Gram(s) Oral once PRN  dextrose Oral Gel 15 Gram(s) Oral once PRN  HYDROmorphone  Injectable 1 milliGRAM(s) IV Push every 4 hours PRN    REVIEW OF SYSTEMS  --------------------------------------------------------------------------------  Unable to obtain ROS    VITALS/PHYSICAL EXAM  --------------------------------------------------------------------------------  T(C): 37.2 (02-10-24 @ 19:35), Max: 38.9 (02-10-24 @ 08:00)  HR: 87 (02-10-24 @ 19:35) (55 - 112)  BP: 112/47 (02-10-24 @ 19:35) (104/44 - 139/64)  RR: 19 (02-10-24 @ 19:35) (9 - 29)  SpO2: 99% (02-10-24 @ 19:35) (92% - 100%)  Wt(kg): --    02-09-24 @ 07:01  -  02-10-24 @ 07:00  --------------------------------------------------------  IN: 529.4 mL / OUT: 290 mL / NET: 239.4 mL    02-10-24 @ 07:01  -  02-10-24 @ 19:41  --------------------------------------------------------  IN: 697.9 mL / OUT: 10 mL / NET: 687.9 mL    Physical Exam:  	Gen: Intubated/sedated, ill appearing  	HEENT: Anicteric. + ETT  	Pulm: Mechanical breath sounds B/L  	CV: S1S2+  	Abd: Soft, +BS   	Ext: + LE edema B/L  	Neuro: Awake              : Rubén cath+ with small amount of urine  	Skin: Warm and dry    LABS/STUDIES  --------------------------------------------------------------------------------              7.8    15.89 >-----------<  127      [02-10-24 @ 02:30]              23.2     138  |  102  |  111  ----------------------------<  148      [02-10-24 @ 02:30]  5.1   |  16  |  3.76        Ca     8.4     [02-10-24 @ 02:30]      Mg     2.10     [02-10-24 @ 02:30]      Phos  4.9     [02-10-24 @ 02:30]    TPro  5.1  /  Alb  1.6  /  TBili  0.4  /  DBili  x   /  AST  19  /  ALT  10  /  AlkPhos  512  [02-10-24 @ 02:30]    PT/INR: PT 14.9 , INR 1.34       [02-10-24 @ 02:30]  PTT: 35.0       [02-10-24 @ 02:30]    Creatinine Trend:  SCr 3.76 [02-10 @ 02:30]  SCr 3.73 [02-09 @ 13:15]  SCr 3.60 [02-09 @ 04:55]  SCr 3.33 [02-08 @ 00:07]  SCr 3.35 [02-07 @ 03:40]

## 2024-02-10 NOTE — PROGRESS NOTE ADULT - ATTENDING COMMENTS
1. SAE - contributed to by hypovolemia and sepsis.  Remains oliguric and on pressors.  Baseline creatinine 1.2  2. Metabolic acidosis - continue with oral bicarbonate.  3. Hyperkalemia - continue with management.  Prognosis guarded.  Will reassess 2/11.

## 2024-02-11 NOTE — PROGRESS NOTE ADULT - PROBLEM SELECTOR PLAN 1
Patient with oliguric acute kidney injury in the setting of hypovolemia and sepsis. Baseline Scr 0.9-1.2. On admission Scr elevated at 2.76 but improved with fluids to 1.91 on 2/2. However Scr increased to 3.03 and remains elevated at 3.8. She has had ~300cc of UOP in past 24 hours. Remains intubated, sedated and on IV vasopressors. Pt overall poor prognosis and does not appear to be in appropriate candidate for RRT/HD. GOC discussions in process. Monitor labs and STRICT I/O. Avoid nephrotoxins. Dose medications as per CrCl.

## 2024-02-11 NOTE — PROGRESS NOTE ADULT - SUBJECTIVE AND OBJECTIVE BOX
Mp Krishnan MD  Interventional Cardiology / Advance Heart Failure and Cardiac Transplant Specialist  [place] Office : [phone] [place] [address] [place] [address]  Tel:   [place] Office : [address] [facility] [place] [address]  Tel: [phone]       Pt is lying in bed comfortable not in distress, no chest pains no SOB no palpitations  	  MEDICATIONS:  heparin   Injectable 5000 Unit(s) SubCutaneous every 12 hours  midodrine 30 milliGRAM(s) Oral every 8 hours  norepinephrine Infusion 0.05 MICROgram(s)/kG/Min IV Continuous <Continuous>    doxycycline IVPB      doxycycline IVPB 100 milliGRAM(s) IV Intermittent every 12 hours  meropenem  IVPB 500 milliGRAM(s) IV Intermittent every 24 hours      acetaminophen   Oral Liquid .. 650 milliGRAM(s) Oral every 6 hours PRN  fentaNYL   Infusion. 0.5 MICROgram(s)/kG/Hr IV Continuous <Continuous>  HYDROmorphone  Injectable 1 milliGRAM(s) IV Push every 4 hours PRN  propofol Infusion 5 MICROgram(s)/kG/Min IV Continuous <Continuous>    pantoprazole   Suspension 40 milliGRAM(s) Oral daily    dextrose 50% Injectable 12.5 Gram(s) IV Push once  dextrose 50% Injectable 12.5 Gram(s) IV Push once  dextrose 50% Injectable 25 Gram(s) IV Push once  dextrose 50% Injectable 25 Gram(s) IV Push once  dextrose 50% Injectable 25 Gram(s) IV Push once  dextrose 50% Injectable 25 Gram(s) IV Push once  dextrose Oral Gel 15 Gram(s) Oral once PRN  dextrose Oral Gel 15 Gram(s) Oral once PRN  glucagon  Injectable 1 milliGRAM(s) IntraMuscular once  glucagon  Injectable 1 milliGRAM(s) IntraMuscular once  insulin glargine Injectable (LANTUS) 6 Unit(s) SubCutaneous every morning  insulin lispro (ADMELOG) corrective regimen sliding scale   SubCutaneous every 6 hours    chlorhexidine 0.12% Liquid 15 milliLiter(s) Oral Mucosa every 12 hours  Dakins Solution - 1/2 Strength 1 Application(s) Topical two times a day  dextrose 5%. 1000 milliLiter(s) IV Continuous <Continuous>  dextrose 5%. 1000 milliLiter(s) IV Continuous <Continuous>  dextrose 5%. 1000 milliLiter(s) IV Continuous <Continuous>  dextrose 5%. 1000 milliLiter(s) IV Continuous <Continuous>  sodium bicarbonate 1300 milliGRAM(s) Oral three times a day  sodium bicarbonate  Infusion 0.278 mEq/kG/Hr IV Continuous <Continuous>      PAST MEDICAL/SURGICAL HISTORY  PAST MEDICAL & SURGICAL HISTORY:  Angina  in 2005, s/p angioplasty with stent placement, no recurrance, no sequalae      Diabetes Mellitus  type 2      Arthritis, Infective, Knee      Detached Retina  right eye      Acute Mucous Pneumonia  4/2010, resolved ; no residual problems      Spinal Stenosis- lumbar      History of Back Surgery  2010      Basal Cell Cancer  removed from left neck 2009      Dementia      Detached Retina, Left  laser surgery in 1995      S/P Carpal Tunnel Release  bilateral hands in 1990      Trigger Finger  release of middle and ring finger of right hand in 1990, and middle finger left hand in 2008      S/P Laparoscopic Cholecystectomy  2008      S/P TKR (Total Knee Replacement)  left knee      Cataract  removal with lens implant right in 2000          SOCIAL HISTORY: Substance Use (street drugs): ( x ) never used  (  ) other:    FAMILY HISTORY:  FH: HTN (hypertension)        REVIEW OF SYSTEMS:  CONSTITUTIONAL: No fever, weight loss, or fatigue  EYES: No eye pain, visual disturbances, or discharge  ENMT:  No difficulty hearing, tinnitus, vertigo; No sinus or throat pain  BREASTS: No pain, masses, or nipple discharge  GASTROINTESTINAL: No abdominal or epigastric pain. No nausea, vomiting, or hematemesis; No diarrhea or constipation. No melena or hematochezia.  GENITOURINARY: No dysuria, frequency, hematuria, or incontinence  NEUROLOGICAL: No headaches, memory loss, loss of strength, numbness, or tremors  ENDOCRINE: No heat or cold intolerance; No hair loss  MUSCULOSKELETAL: No joint pain or swelling; No muscle, back, or extremity pain  PSYCHIATRIC: No depression, anxiety, mood swings, or difficulty sleeping  HEME/LYMPH: No easy bruising, or bleeding gums       PHYSICAL EXAM:  T(C): 37.3 (02-11-24 @ 12:00), Max: 37.3 (02-11-24 @ 12:00)  HR: 81 (02-11-24 @ 16:00) (74 - 89)  BP: 116/40 (02-11-24 @ 16:00) (87/57 - 119/40)  RR: 26 (02-11-24 @ 16:00) (17 - 30)  SpO2: 100% (02-11-24 @ 16:00) (83% - 100%)  Wt(kg): --  I&O's Summary    10 Feb 2024 07:01  -  11 Feb 2024 07:00  --------------------------------------------------------  IN: 1734 mL / OUT: 85 mL / NET: 1649 mL    11 Feb 2024 07:01  -  11 Feb 2024 16:46  --------------------------------------------------------  IN: 785 mL / OUT: 460 mL / NET: 325 mL          GENERAL: NAD  EYES:   PERRLA   ENMT:   Moist mucous membranes, Good dentition, No lesions  Cardiovascular: Normal S1 S2, No JVD, No murmurs, No edema  Respiratory: Lungs clear to auscultation	  Gastrointestinal:  Soft, Non-tender, + BS	  Extremities: no edema                                    7.5    15.31 )-----------( 133      ( 11 Feb 2024 00:25 )             23.2     02-11    140  |  105  |  112<H>  ----------------------------<  293<H>  4.9   |  14<L>  |  3.84<H>    Ca    7.8<L>      11 Feb 2024 00:25  Phos  4.6     02-11  Mg     2.00     02-11    TPro  4.8<L>  /  Alb  1.4<L>  /  TBili  0.3  /  DBili  x   /  AST  16  /  ALT  9   /  AlkPhos  415<H>  02-11    proBNP:   Lipid Profile:   HgA1c:   TSH:     Consultant(s) Notes Reviewed:  [x ] YES  [ ] NO    Care Discussed with Consultants/Other Providers [ x] YES  [ ] NO    Imaging Personally Reviewed independently:  [x] YES  [ ] NO    All labs, radiologic studies, vitals, orders and medications list reviewed. Patient is seen and examined at bedside. Case discussed with medical team.

## 2024-02-11 NOTE — PROGRESS NOTE ADULT - PROBLEM SELECTOR PLAN 2
Pt with metabolic acidosis. SCO2 low at 14 with pH of 7.39. Can c/w sodium bicarb tabs. Monitor SCO2 and pH.

## 2024-02-11 NOTE — PROGRESS NOTE ADULT - ATTENDING COMMENTS
89 years old female with h/o HTN, HLD, CAD, DM, dementia, s/p spinal stimulator placement p/w abnormal labs (Na 168, K 2.9). Unable to obtain further history due to patient baseline nonverbal who was admitted to the floors found to have necrotizing wound infection as well as SAE.     Patient today with loss of mental status, aspiration, also in shock with hypotension. Patient now found to have necrotizing wound infection in the contralateral leg. Family was offered surgery but refused early in the admission  Patient not a surgical candidate by vascular.     # Shock  # SAE  # b/l necrotizing foot infections  # acute hypoxemic respiratory failure  - Shock 2/2 to infected necrotizing b/l ext infection, family refused surgery earlier this admission. Per vascular not a candidate for surgery.   - C/W fent and propofol for vent desynchrony.   - Will likely not have recovery without surgical intervention thus prognosis remains poor.   - C/W levo, wean as tolerated  - SAE 2/2 likely ATN from ongoing infection, patient is not a good candidate for RRT. Will d/w nephrology.   - acute hypoxemic respiratory failure with aspiration c/w abx  - DVT ppx SQH  - Dispo- Is full code. Dismal prognosis, poor candidate for HD and aggressive therapies, Not a candidate for surgery. Will limit to 1 pressors given unlikelyhood of recovery.

## 2024-02-11 NOTE — PROGRESS NOTE ADULT - ATTENDING COMMENTS
1. SAE - contributed to by hypovolemia and sepsis.  Remains oliguric and on pressors.  Baseline creatinine 1.2  2. Metabolic acidosis - change to IV bicarbonate gtt   3. Hyperkalemia - continue with management.  Prognosis guarded.  poor candidate for dialysis

## 2024-02-11 NOTE — PROGRESS NOTE ADULT - SUBJECTIVE AND OBJECTIVE BOX
[facility] DIVISION OF KIDNEY DISEASES AND HYPERTENSION   FOLLOW UP NOTE  --------------------------------------------------------------------------------  Chief Complaint: Oliguric SAE    24 hour events/subjective: Pt. was seen and examined in the MICU. She remains intubated, on sedation and on IV vasopressors. Remains oliguric with 300cc of UOP.     PAST HISTORY  --------------------------------------------------------------------------------  No significant changes to PMH, PSH, FHx, SHx, unless otherwise noted    ALLERGIES & MEDICATIONS  --------------------------------------------------------------------------------  Allergies  Pineapple (Unknown)  contrast dye -  rash (Other)  iodine (Other)  codeine (Vomiting)  penicillin (Rash)  latex (Rash)    Intolerances  Tolerates ceftriaxone, cefepime (Other)    Standing Inpatient Medications  chlorhexidine 0.12% Liquid 15 milliLiter(s) Oral Mucosa every 12 hours  Dakins Solution - 1/2 Strength 1 Application(s) Topical two times a day  dextrose 5%. 1000 milliLiter(s) IV Continuous <Continuous>  dextrose 5%. 1000 milliLiter(s) IV Continuous <Continuous>  dextrose 5%. 1000 milliLiter(s) IV Continuous <Continuous>  dextrose 5%. 1000 milliLiter(s) IV Continuous <Continuous>  dextrose 50% Injectable 25 Gram(s) IV Push once  dextrose 50% Injectable 25 Gram(s) IV Push once  dextrose 50% Injectable 12.5 Gram(s) IV Push once  dextrose 50% Injectable 12.5 Gram(s) IV Push once  dextrose 50% Injectable 25 Gram(s) IV Push once  dextrose 50% Injectable 25 Gram(s) IV Push once  doxycycline IVPB      doxycycline IVPB 100 milliGRAM(s) IV Intermittent every 12 hours  glucagon  Injectable 1 milliGRAM(s) IntraMuscular once  glucagon  Injectable 1 milliGRAM(s) IntraMuscular once  heparin   Injectable 5000 Unit(s) SubCutaneous every 12 hours  insulin glargine Injectable (LANTUS) 6 Unit(s) SubCutaneous every morning  insulin lispro (ADMELOG) corrective regimen sliding scale   SubCutaneous every 6 hours  meropenem  IVPB 500 milliGRAM(s) IV Intermittent every 24 hours  midodrine 30 milliGRAM(s) Oral every 8 hours  norepinephrine Infusion 0.05 MICROgram(s)/kG/Min IV Continuous <Continuous>  pantoprazole   Suspension 40 milliGRAM(s) Oral daily  propofol Infusion 5 MICROgram(s)/kG/Min IV Continuous <Continuous>  sodium bicarbonate 1300 milliGRAM(s) Oral three times a day  sodium zirconium cyclosilicate 10 Gram(s) Oral daily    PRN Inpatient Medications  acetaminophen   Oral Liquid .. 650 milliGRAM(s) Oral every 6 hours PRN  dextrose Oral Gel 15 Gram(s) Oral once PRN  dextrose Oral Gel 15 Gram(s) Oral once PRN  HYDROmorphone  Injectable 1 milliGRAM(s) IV Push every 4 hours PRN    REVIEW OF SYSTEMS  --------------------------------------------------------------------------------  Unable to obtain ROS    VITALS/PHYSICAL EXAM  --------------------------------------------------------------------------------  T(C): 37.1 (02-11-24 @ 08:00), Max: 37.2 (02-10-24 @ 19:35)  HR: 86 (02-11-24 @ 11:52) (74 - 95)  BP: 115/48 (02-11-24 @ 11:00) (87/57 - 139/64)  RR: 28 (02-11-24 @ 11:00) (15 - 28)  SpO2: 100% (02-11-24 @ 11:52) (83% - 100%)  Wt(kg): --    02-10-24 @ 07:01  -  02-11-24 @ 07:00  --------------------------------------------------------  IN: 1734 mL / OUT: 85 mL / NET: 1649 mL    02-11-24 @ 07:01  -  02-11-24 @ 12:38  --------------------------------------------------------  IN: 374.9 mL / OUT: 310 mL / NET: 64.9 mL    Physical Exam:  	Gen: Intubated/sedated, ill appearing  	HEENT: Anicteric. + ETT  	Pulm: Mechanical breath sounds B/L  	CV: S1S2+  	Abd: Soft, +BS   	Ext: + LE edema B/L  	Neuro: Awake              : [place] cath+ with small amount of urine  	Skin: Warm and dry    LABS/STUDIES  --------------------------------------------------------------------------------              7.5    15.31 >-----------<  133      [02-11-24 @ 00:25]              23.2     140  |  105  |  112  ----------------------------<  293      [02-11-24 @ 00:25]  4.9   |  14  |  3.84        Ca     7.8     [02-11-24 @ 00:25]      Mg     2.00     [02-11-24 @ 00:25]      Phos  4.6     [02-11-24 @ 00:25]    TPro  4.8  /  Alb  1.4  /  TBili  0.3  /  DBili  x   /  AST  16  /  ALT  9   /  AlkPhos  415  [02-11-24 @ 00:25]    PT/INR: PT 14.9 , INR 1.34       [02-10-24 @ 02:30]  PTT: 35.0       [02-10-24 @ 02:30]    Creatinine Trend:  SCr 3.84 [02-11 @ 00:25]  SCr 3.76 [02-10 @ 02:30]  SCr 3.73 [02-09 @ 13:15]  SCr 3.60 [02-09 @ 04:55]  SCr 3.33 [02-08 @ 00:07]

## 2024-02-11 NOTE — PROGRESS NOTE ADULT - ASSESSMENT
89 yo F with PMH dementia (AOx0, non-verbal), R ankle gangrene, HTN, HLD, CAD s/p CABG, and T2D p/f PJ rehab with hyperNa and acute renal failure,    EKG: Multifocal atrial tachycardia    1. Septic shock   - on abx   - intubated on midodrine and levophed possible aspiration pneumonia     2. Pre-op assessment  -plan for EGD/PEG  -unable to assess for symptoms as patient a&0, nonverbal  -last TTE in 2017 with moderate AS, TTE this admission with evidence of transcatheter aortic valve replacement, normal LV function    3. CAD   -hx of CABG    4. HTN  - in septic shock on midodrine and levophed

## 2024-02-11 NOTE — PROGRESS NOTE ADULT - SUBJECTIVE AND OBJECTIVE BOX
Patient is a 89y old  Female who presents with a chief complaint of Abnormal labs (10 Feb 2024 19:41)    HPI:  89 years old female with h/o HTN, HLD, CAD, DM, dementia, s/p spinal stimulator placement p/w abnormal labs (Na 168, K 2.9). Unable to obtain further history due to patient baseline nonverbal. Patient was transferred from [facility] for hypernatremia on routine labs. They also recently started cefepime due to concern for foot infection. The son did not have any further history regarding the events leading up to hospital admission. As per the son, the patient was in her usual state of health the last time he visited with her.     ED course: Pt arrived to ED w/ , /46, afebrile, requiring 4L NC. Pt recieved Vancomycin, and IVF. Labs showed leukocytosis 14, elevated , normal lactate 1.5, Na 163, Cl 128, SCr 2.76 (baseline ~1-1.2), . UA growing yeast like cells and with pyuria.  (2024 17:59)       INTERVAL HPI/OVERNIGHT EVENTS:   No overnight events   Afebrile, hemodynamically stable     Subjective:    ICU Vital Signs Last 24 Hrs  T(C): 36.7 (2024 04:00), Max: 38.9 (10 Feb 2024 08:00)  T(F): 98 (2024 04:00), Max: 102 (10 Feb 2024 08:00)  HR: 74 (2024 06:00) (74 - 103)  BP: 93/43 (2024 06:00) (90/40 - 139/64)  BP(mean): 59 (2024 06:00) (56 - 86)  ABP: --  ABP(mean): --  RR: 23 (2024 06:00) (9 - 29)  SpO2: 83% (2024 06:00) (83% - 100%)    O2 Parameters below as of 2024 06:00  Patient On (Oxygen Delivery Method): ventilator    O2 Concentration (%): 40      I&O's Summary    10 Feb 2024 07:01  -  2024 07:00  --------------------------------------------------------  IN: 1654.7 mL / OUT: 85 mL / NET: 1569.7 mL      Mode: AC/ CMV (Assist Control/ Continuous Mandatory Ventilation)  RR (machine): 16  TV (machine): 350  FiO2: 40  PEEP: 5  ITime: 0.78  MAP: 10  PIP: 26      Daily     Daily Weight in k (2024 00:00)    Adult Advanced Hemodynamics Last 24 Hrs  CVP(mm Hg): --  CVP(cm H2O): --  CO: --  CI: --  PA: --  PA(mean): --  PCWP: --  SVR: --  SVRI: --  PVR: --  PVRI: --    EKG/Telemetry Events:    MEDICATIONS  (STANDING):  chlorhexidine 0.12% Liquid 15 milliLiter(s) Oral Mucosa every 12 hours  Dakins Solution - 1/2 Strength 1 Application(s) Topical two times a day  dextrose 5%. 1000 milliLiter(s) (100 mL/Hr) IV Continuous <Continuous>  dextrose 5%. 1000 milliLiter(s) (100 mL/Hr) IV Continuous <Continuous>  dextrose 5%. 1000 milliLiter(s) (50 mL/Hr) IV Continuous <Continuous>  dextrose 5%. 1000 milliLiter(s) (50 mL/Hr) IV Continuous <Continuous>  dextrose 50% Injectable 12.5 Gram(s) IV Push once  dextrose 50% Injectable 12.5 Gram(s) IV Push once  dextrose 50% Injectable 25 Gram(s) IV Push once  dextrose 50% Injectable 25 Gram(s) IV Push once  dextrose 50% Injectable 25 Gram(s) IV Push once  dextrose 50% Injectable 25 Gram(s) IV Push once  doxycycline IVPB      doxycycline IVPB 100 milliGRAM(s) IV Intermittent every 12 hours  glucagon  Injectable 1 milliGRAM(s) IntraMuscular once  glucagon  Injectable 1 milliGRAM(s) IntraMuscular once  heparin   Injectable 5000 Unit(s) SubCutaneous every 12 hours  insulin glargine Injectable (LANTUS) 6 Unit(s) SubCutaneous every morning  insulin lispro (ADMELOG) corrective regimen sliding scale   SubCutaneous every 6 hours  meropenem  IVPB 500 milliGRAM(s) IV Intermittent every 24 hours  metroNIDAZOLE  IVPB 500 milliGRAM(s) IV Intermittent every 12 hours  midodrine 30 milliGRAM(s) Oral every 8 hours  norepinephrine Infusion 0.05 MICROgram(s)/kG/Min (3.79 mL/Hr) IV Continuous <Continuous>  pantoprazole   Suspension 40 milliGRAM(s) Oral daily  propofol Infusion 5 MICROgram(s)/kG/Min (1.21 mL/Hr) IV Continuous <Continuous>  sodium bicarbonate 1300 milliGRAM(s) Oral three times a day  sodium zirconium cyclosilicate 10 Gram(s) Oral daily  sterile water 1000 milliLiter(s) (75 mL/Hr) IV Continuous <Continuous>    MEDICATIONS  (PRN):  acetaminophen   Oral Liquid .. 650 milliGRAM(s) Oral every 6 hours PRN Temp greater or equal to 38C (100.4F), Mild Pain (1 - 3)  dextrose Oral Gel 15 Gram(s) Oral once PRN Blood Glucose LESS THAN 70 milliGRAM(s)/deciliter  dextrose Oral Gel 15 Gram(s) Oral once PRN Blood Glucose LESS THAN 70 milliGRAM(s)/deciliter  HYDROmorphone  Injectable 1 milliGRAM(s) IV Push every 4 hours PRN Severe Pain (7 - 10)      PHYSICAL EXAM:  GENERAL:   HEAD:  Atraumatic, Normocephalic  EYES: EOMI, PERRLA, conjunctiva and sclera clear  NECK: Supple, No JVD, Normal thyroid, no enlarged nodes  NERVOUS SYSTEM:  Alert & Awake.   CHEST/LUNG: B/L good air entry; No rales, rhonchi, or wheezing  HEART: S1S2 normal, no S3, Regular rate and rhythm; No murmurs  ABDOMEN: Soft, Nontender, Nondistended; Bowel sounds present  EXTREMITIES:  2+ Peripheral Pulses, No clubbing, cyanosis, or edema  LYMPH: No lymphadenopathy noted  SKIN: No rashes or lesions    LABS:                        7.5    15.31 )-----------( 133      ( 2024 00:25 )             23.2     02-11    140  |  105  |  112<H>  ----------------------------<  293<H>  4.9   |  14<L>  |  3.84<H>    Ca    7.8<L>      2024 00:25  Phos  4.6     02-11  Mg     2.00     11    TPro  4.8<L>  /  Alb  1.4<L>  /  TBili  0.3  /  DBili  x   /  AST  16  /  ALT  9   /  AlkPhos  415<H>  11    LIVER FUNCTIONS - ( 2024 00:25 )  Alb: 1.4 g/dL / Pro: 4.8 g/dL / ALK PHOS: 415 U/L / ALT: 9 U/L / AST: 16 U/L / GGT: x           PT/INR - ( 10 Feb 2024 02:30 )   PT: 14.9 sec;   INR: 1.34 ratio         PTT - ( 10 Feb 2024 02:30 )  PTT:35.0 sec  CAPILLARY BLOOD GLUCOSE      POCT Blood Glucose.: 293 mg/dL (2024 05:22)  POCT Blood Glucose.: 288 mg/dL (10 Feb 2024 23:55)  POCT Blood Glucose.: 262 mg/dL (10 Feb 2024 17:27)  POCT Blood Glucose.: 247 mg/dL (10 Feb 2024 13:05)    ABG - ( 2024 00:25 )  pH, Arterial: 7.39  pH, Blood: x     /  pCO2: 27    /  pO2: 157   / HCO3: 16    / Base Excess: -7.8  /  SaO2: 98.5                    Urinalysis Basic - ( 2024 00:25 )    Color: x / Appearance: x / SG: x / pH: x  Gluc: 293 mg/dL / Ketone: x  / Bili: x / Urobili: x   Blood: x / Protein: x / Nitrite: x   Leuk Esterase: x / RBC: x / WBC x   Sq Epi: x / Non Sq Epi: x / Bacteria: x          RADIOLOGY & ADDITIONAL TESTS:  CXR:        Care Discussed with Consultants/Other Providers [ x] YES  [ ] NO           Patient is a 89y old  Female who presents with a chief complaint of Abnormal labs (10 Feb 2024 19:41)    HPI:  89 years old female with h/o HTN, HLD, CAD, DM, dementia, s/p spinal stimulator placement p/w abnormal labs (Na 168, K 2.9). Unable to obtain further history due to patient baseline nonverbal. Patient was transferred from [facility] for hypernatremia on routine labs. They also recently started cefepime due to concern for foot infection. The son did not have any further history regarding the events leading up to hospital admission. As per the son, the patient was in her usual state of health the last time he visited with her.     ED course: Pt arrived to ED w/ , /46, afebrile, requiring 4L NC. Pt recieved Vancomycin, and IVF. Labs showed leukocytosis 14, elevated , normal lactate 1.5, Na 163, Cl 128, SCr 2.76 (baseline ~1-1.2), . UA growing yeast like cells and with pyuria.  (2024 17:59)       INTERVAL HPI/OVERNIGHT EVENTS:   No overnight events   Afebrile, on pressors.    Subjective:    ICU Vital Signs Last 24 Hrs  T(C): 36.7 (2024 04:00), Max: 38.9 (10 Feb 2024 08:00)  T(F): 98 (2024 04:00), Max: 102 (10 Feb 2024 08:00)  HR: 74 (2024 06:00) (74 - 103)  BP: 93/43 (2024 06:00) (90/40 - 139/64)  BP(mean): 59 (2024 06:00) (56 - 86)  ABP: --  ABP(mean): --  RR: 23 (2024 06:00) (9 - 29)  SpO2: 83% (2024 06:00) (83% - 100%)    O2 Parameters below as of 2024 06:00  Patient On (Oxygen Delivery Method): ventilator    O2 Concentration (%): 40      I&O's Summary    10 Feb 2024 07:01  -  2024 07:00  --------------------------------------------------------  IN: 1654.7 mL / OUT: 85 mL / NET: 1569.7 mL      Mode: AC/ CMV (Assist Control/ Continuous Mandatory Ventilation)  RR (machine): 16  TV (machine): 350  FiO2: 40  PEEP: 5  ITime: 0.78  MAP: 10  PIP: 26      Daily     Daily Weight in k (2024 00:00)    EKG/Telemetry Events:    MEDICATIONS  (STANDING):  chlorhexidine 0.12% Liquid 15 milliLiter(s) Oral Mucosa every 12 hours  Dakins Solution - 1/2 Strength 1 Application(s) Topical two times a day  dextrose 5%. 1000 milliLiter(s) (100 mL/Hr) IV Continuous <Continuous>  dextrose 5%. 1000 milliLiter(s) (100 mL/Hr) IV Continuous <Continuous>  dextrose 5%. 1000 milliLiter(s) (50 mL/Hr) IV Continuous <Continuous>  dextrose 5%. 1000 milliLiter(s) (50 mL/Hr) IV Continuous <Continuous>  dextrose 50% Injectable 12.5 Gram(s) IV Push once  dextrose 50% Injectable 12.5 Gram(s) IV Push once  dextrose 50% Injectable 25 Gram(s) IV Push once  dextrose 50% Injectable 25 Gram(s) IV Push once  dextrose 50% Injectable 25 Gram(s) IV Push once  dextrose 50% Injectable 25 Gram(s) IV Push once  doxycycline IVPB      doxycycline IVPB 100 milliGRAM(s) IV Intermittent every 12 hours  glucagon  Injectable 1 milliGRAM(s) IntraMuscular once  glucagon  Injectable 1 milliGRAM(s) IntraMuscular once  heparin   Injectable 5000 Unit(s) SubCutaneous every 12 hours  insulin glargine Injectable (LANTUS) 6 Unit(s) SubCutaneous every morning  insulin lispro (ADMELOG) corrective regimen sliding scale   SubCutaneous every 6 hours  meropenem  IVPB 500 milliGRAM(s) IV Intermittent every 24 hours  metroNIDAZOLE  IVPB 500 milliGRAM(s) IV Intermittent every 12 hours  midodrine 30 milliGRAM(s) Oral every 8 hours  norepinephrine Infusion 0.05 MICROgram(s)/kG/Min (3.79 mL/Hr) IV Continuous <Continuous>  pantoprazole   Suspension 40 milliGRAM(s) Oral daily  propofol Infusion 5 MICROgram(s)/kG/Min (1.21 mL/Hr) IV Continuous <Continuous>  sodium bicarbonate 1300 milliGRAM(s) Oral three times a day  sodium zirconium cyclosilicate 10 Gram(s) Oral daily  sterile water 1000 milliLiter(s) (75 mL/Hr) IV Continuous <Continuous>    MEDICATIONS  (PRN):  acetaminophen   Oral Liquid .. 650 milliGRAM(s) Oral every 6 hours PRN Temp greater or equal to 38C (100.4F), Mild Pain (1 - 3)  dextrose Oral Gel 15 Gram(s) Oral once PRN Blood Glucose LESS THAN 70 milliGRAM(s)/deciliter  dextrose Oral Gel 15 Gram(s) Oral once PRN Blood Glucose LESS THAN 70 milliGRAM(s)/deciliter  HYDROmorphone  Injectable 1 milliGRAM(s) IV Push every 4 hours PRN Severe Pain (7 - 10)      PHYSICAL EXAM:  GENERAL:   HEAD:  Atraumatic, Normocephalic  EYES: EOMI, PERRLA, conjunctiva and sclera clear  NECK: Supple, No JVD, Normal thyroid, no enlarged nodes  NERVOUS SYSTEM:  Alert & Awake.   CHEST/LUNG: B/L good air entry; No rales, rhonchi, or wheezing  HEART: S1S2 normal, no S3, Regular rate and rhythm; No murmurs  ABDOMEN: Soft, Nontender, Nondistended; Bowel sounds present  EXTREMITIES:  2+ Peripheral Pulses, No clubbing, cyanosis, or edema  LYMPH: No lymphadenopathy noted  SKIN: No rashes or lesions    LABS:                        7.5    15.31 )-----------( 133      ( 2024 00:25 )             23.2     02-11    140  |  105  |  112<H>  ----------------------------<  293<H>  4.9   |  14<L>  |  3.84<H>    Ca    7.8<L>      2024 00:25  Phos  4.6     02-11  Mg     2.00     02-11    TPro  4.8<L>  /  Alb  1.4<L>  /  TBili  0.3  /  DBili  x   /  AST  16  /  ALT  9   /  AlkPhos  415<H>  02-11    LIVER FUNCTIONS - ( 2024 00:25 )  Alb: 1.4 g/dL / Pro: 4.8 g/dL / ALK PHOS: 415 U/L / ALT: 9 U/L / AST: 16 U/L / GGT: x           PT/INR - ( 10 Feb 2024 02:30 )   PT: 14.9 sec;   INR: 1.34 ratio         PTT - ( 10 Feb 2024 02:30 )  PTT:35.0 sec  CAPILLARY BLOOD GLUCOSE      POCT Blood Glucose.: 293 mg/dL (2024 05:22)  POCT Blood Glucose.: 288 mg/dL (10 Feb 2024 23:55)  POCT Blood Glucose.: 262 mg/dL (10 Feb 2024 17:27)  POCT Blood Glucose.: 247 mg/dL (10 Feb 2024 13:05)    ABG - ( 2024 00:25 )  pH, Arterial: 7.39  pH, Blood: x     /  pCO2: 27    /  pO2: 157   / HCO3: 16    / Base Excess: -7.8  /  SaO2: 98.5                    Urinalysis Basic - ( 2024 00:25 )    Color: x / Appearance: x / SG: x / pH: x  Gluc: 293 mg/dL / Ketone: x  / Bili: x / Urobili: x   Blood: x / Protein: x / Nitrite: x   Leuk Esterase: x / RBC: x / WBC x   Sq Epi: x / Non Sq Epi: x / Bacteria: x          RADIOLOGY & ADDITIONAL TESTS:  CXR:        Care Discussed with Consultants/Other Providers [ x] YES  [ ] NO           Patient is a 89y old  Female who presents with a chief complaint of Abnormal labs (10 Feb 2024 19:41)    HPI:  89 years old female with h/o HTN, HLD, CAD, DM, dementia, s/p spinal stimulator placement p/w abnormal labs (Na 168, K 2.9). Unable to obtain further history due to patient baseline nonverbal. Patient was transferred from [facility] for hypernatremia on routine labs. They also recently started cefepime due to concern for foot infection. The son did not have any further history regarding the events leading up to hospital admission. As per the son, the patient was in her usual state of health the last time he visited with her.     ED course: Pt arrived to ED w/ , /46, afebrile, requiring 4L NC. Pt recieved Vancomycin, and IVF. Labs showed leukocytosis 14, elevated , normal lactate 1.5, Na 163, Cl 128, SCr 2.76 (baseline ~1-1.2), . UA growing yeast like cells and with pyuria.  (2024 17:59)       INTERVAL HPI/OVERNIGHT EVENTS:   No overnight events   Afebrile, on pressors.    Subjective:    ICU Vital Signs Last 24 Hrs  T(C): 36.7 (2024 04:00), Max: 38.9 (10 Feb 2024 08:00)  T(F): 98 (2024 04:00), Max: 102 (10 Feb 2024 08:00)  HR: 74 (2024 06:00) (74 - 103)  BP: 93/43 (2024 06:00) (90/40 - 139/64)  BP(mean): 59 (2024 06:00) (56 - 86)  ABP: --  ABP(mean): --  RR: 23 (2024 06:00) (9 - 29)  SpO2: 83% (2024 06:00) (83% - 100%)    O2 Parameters below as of 2024 06:00  Patient On (Oxygen Delivery Method): ventilator    O2 Concentration (%): 40      I&O's Summary    10 Feb 2024 07:01  -  2024 07:00  --------------------------------------------------------  IN: 1654.7 mL / OUT: 85 mL / NET: 1569.7 mL      Mode: AC/ CMV (Assist Control/ Continuous Mandatory Ventilation)  RR (machine): 16  TV (machine): 350  FiO2: 40  PEEP: 5  ITime: 0.78  MAP: 10  PIP: 26      Daily     Daily Weight in k (2024 00:00)    EKG/Telemetry Events:    MEDICATIONS  (STANDING):  chlorhexidine 0.12% Liquid 15 milliLiter(s) Oral Mucosa every 12 hours  Dakins Solution - 1/2 Strength 1 Application(s) Topical two times a day  dextrose 5%. 1000 milliLiter(s) (100 mL/Hr) IV Continuous <Continuous>  dextrose 5%. 1000 milliLiter(s) (100 mL/Hr) IV Continuous <Continuous>  dextrose 5%. 1000 milliLiter(s) (50 mL/Hr) IV Continuous <Continuous>  dextrose 5%. 1000 milliLiter(s) (50 mL/Hr) IV Continuous <Continuous>  dextrose 50% Injectable 12.5 Gram(s) IV Push once  dextrose 50% Injectable 12.5 Gram(s) IV Push once  dextrose 50% Injectable 25 Gram(s) IV Push once  dextrose 50% Injectable 25 Gram(s) IV Push once  dextrose 50% Injectable 25 Gram(s) IV Push once  dextrose 50% Injectable 25 Gram(s) IV Push once  doxycycline IVPB      doxycycline IVPB 100 milliGRAM(s) IV Intermittent every 12 hours  glucagon  Injectable 1 milliGRAM(s) IntraMuscular once  glucagon  Injectable 1 milliGRAM(s) IntraMuscular once  heparin   Injectable 5000 Unit(s) SubCutaneous every 12 hours  insulin glargine Injectable (LANTUS) 6 Unit(s) SubCutaneous every morning  insulin lispro (ADMELOG) corrective regimen sliding scale   SubCutaneous every 6 hours  meropenem  IVPB 500 milliGRAM(s) IV Intermittent every 24 hours  metroNIDAZOLE  IVPB 500 milliGRAM(s) IV Intermittent every 12 hours  midodrine 30 milliGRAM(s) Oral every 8 hours  norepinephrine Infusion 0.05 MICROgram(s)/kG/Min (3.79 mL/Hr) IV Continuous <Continuous>  pantoprazole   Suspension 40 milliGRAM(s) Oral daily  propofol Infusion 5 MICROgram(s)/kG/Min (1.21 mL/Hr) IV Continuous <Continuous>  sodium bicarbonate 1300 milliGRAM(s) Oral three times a day  sodium zirconium cyclosilicate 10 Gram(s) Oral daily  sterile water 1000 milliLiter(s) (75 mL/Hr) IV Continuous <Continuous>    MEDICATIONS  (PRN):  acetaminophen   Oral Liquid .. 650 milliGRAM(s) Oral every 6 hours PRN Temp greater or equal to 38C (100.4F), Mild Pain (1 - 3)  dextrose Oral Gel 15 Gram(s) Oral once PRN Blood Glucose LESS THAN 70 milliGRAM(s)/deciliter  dextrose Oral Gel 15 Gram(s) Oral once PRN Blood Glucose LESS THAN 70 milliGRAM(s)/deciliter  HYDROmorphone  Injectable 1 milliGRAM(s) IV Push every 4 hours PRN Severe Pain (7 - 10)      PHYSICAL EXAM:  GENERAL: intubated, sedated  HEAD:  Atraumatic, Normocephalic  EYES: EOMI, conjunctiva and sclera clear  ENT: Moist mucous membranes  CHEST/LUNG: Clear to auscultation bilaterally; No rales, rhonchi, wheezing, or rubs. 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 fluctuant edema with crepitus on right anterior foot  NERVOUS SYSTEM:  A&Ox0  SKIN: No rashes or lesions      LABS:                        7.5    15.31 )-----------( 133      ( 2024 00:25 )             23.2     02-11    140  |  105  |  112<H>  ----------------------------<  293<H>  4.9   |  14<L>  |  3.84<H>    Ca    7.8<L>      2024 00:25  Phos  4.6     02-11  Mg     2.00     02-11    TPro  4.8<L>  /  Alb  1.4<L>  /  TBili  0.3  /  DBili  x   /  AST  16  /  ALT  9   /  AlkPhos  415<H>  02-11    LIVER FUNCTIONS - ( 2024 00:25 )  Alb: 1.4 g/dL / Pro: 4.8 g/dL / ALK PHOS: 415 U/L / ALT: 9 U/L / AST: 16 U/L / GGT: x           PT/INR - ( 10 Feb 2024 02:30 )   PT: 14.9 sec;   INR: 1.34 ratio         PTT - ( 10 Feb 2024 02:30 )  PTT:35.0 sec  CAPILLARY BLOOD GLUCOSE      POCT Blood Glucose.: 293 mg/dL (2024 05:22)  POCT Blood Glucose.: 288 mg/dL (10 Feb 2024 23:55)  POCT Blood Glucose.: 262 mg/dL (10 Feb 2024 17:27)  POCT Blood Glucose.: 247 mg/dL (10 Feb 2024 13:05)    ABG - ( 2024 00:25 )  pH, Arterial: 7.39  pH, Blood: x     /  pCO2: 27    /  pO2: 157   / HCO3: 16    / Base Excess: -7.8  /  SaO2: 98.5                    Urinalysis Basic - ( 2024 00:25 )    Color: x / Appearance: x / SG: x / pH: x  Gluc: 293 mg/dL / Ketone: x  / Bili: x / Urobili: x   Blood: x / Protein: x / Nitrite: x   Leuk Esterase: x / RBC: x / WBC x   Sq Epi: x / Non Sq Epi: x / Bacteria: x          RADIOLOGY & ADDITIONAL TESTS:  CXR:        Care Discussed with Consultants/Other Providers [ x] YES  [ ] NO

## 2024-02-11 NOTE — PROGRESS NOTE ADULT - ASSESSMENT
Pt is a 89 yo F with PMH dementia (AOx0, non-verbal), R ankle gangrene, HTN, HLD, CAD, and T2D p/f PJ rehab with hyperNa and acute renal failure, both improved. Course c/b AHRF requiring HFNC, now weaned to RA. GOC discussion held with son/HCP Toby who rescinded prior DNR/DNI, now plan for full code and full escalation of care and pursuing all interventions. Now weaned to RA, pending TTE for pre-op optimization with cards following in anticipation of plan for PEG (GI consulted) and LTCF placement.     #Neuro    #Dementia   - pt. A&Ox0 - nonverbal.  - Severe protein calorie malnutrition.   - Failed S&S eval.  Plan:   - Ongoing GOC conversation as below  - anticipate need for PEG -- GI consulted  - cards consulted for pre-op optimization, pending TTE.    #Cardiovascular    #Afib.   - Afib with RVR. Pt. was transferred to a telemetry floor. Discussed with son. switch IV lopressor to PO now that we have oral access   Plan:  - continue lopressor 25mg BID.    #Respiratory    Acute respiratory failure with hypoxia.   - suspect in setting of worsening deconditioning, poor inspiratory effort, atelectasis   - patient needed emergent intubation due to   - post intubation chest xray shows complete left lung opacity  RRT on 02/09 with emergent intubation due to ??    Plan:  - consider BAL   - continue antibiotics as per ID recs  - on volume AC     #GI/Nutrition    #Diet: tube feeds through OG    #Diarrhea  - noted to have multiple loose BMs today  - dc bowel regimen (miralax, senna -- last dosed 2/7)  - monitor x24h, if continues can send GI w/u.    #/Renal    #SAE (acute kidney injury).   - on admission  and Cr 2.76. Likely prerenal  - improved s/p fluid resuscitation   Plan:    - ctm Cr/UOP    #AGMA   - bicarb down trending to 12 on 2/2 -- improved 2/8  - d/w nephrology, c/w 1300 sodium bicarb TID  - s/p sterile water 150meq Na bicarb 75cc/h x10h without improvement 2/6  - s/p 1/2 NS 150meq Na bicarb 75cc/h x10h with improvement 2/7.    #Skin    #ID    #Sepsis.    - pt met SIRS criteria + source of known foot infection.   - Recently treated for L foot wound growing E. coli and S. aureus.   - Vascular offering definitive management with amputation but family declines. Local wound care.   Plan:   - currently on doxycycline, ceftriaxone and flagyl  - ID consulted, f/u recs     #MSK:    #Wound of foot.   - L foot xray: Dorsal forefoot soft tissue swelling. Calcaneal cortex on left foot with possible osteomyelitis  - previously unable to tolerate MRI L ankle when attempted in recent hospitalization  - Recently Tx in Dec 23' for L foot wound growing S. Aureas and E. Coli, amputation L foot offered and deferred at the time as per GO   - Wound care following  Plan:  - c/w Abx as above  - Vascular consulted, recs: continue local wound care, frequent offloading to B/L lower extremities.    #Endocrine    #Diabetes.   - POC checks   Plan:  - NPO, sliding scale.    #Hematologic    #Anemia   - Hgb ~6 on 1/29. No signs of bleeding. Received 1U PRBC.   - Hgb now improved - stable.  Plan:  -ctm    #DVT ppx    #Ethics/ICU Bundle  - GOC/Code Status: Full Code. pt with prior MOLST - DNR/DNI but was rescinded on this hospitalization. Palliative consulted. Ongoing GOC conversations. Toby understands the poor prognosis and would like to proceed with comfort measures/DNR/DNI but two out of his three siblings do not agree with comfort measures at this time. He does not feel comfortable transitioning care until his other two brothers are in agreement. We addressed that his mom is declining and that we do not recommend NGT/PEG as this is uncomfortable and would not change her prognosis. Toby expressed understanding but states he wishes to pursue PEG as brothers would not forgive him if he did not.  - Diet:  - Access:  - Tubes/Drains:  - Activity/PT/OT:   Pt is a 89 yo F with PMH dementia (AOx0, non-verbal), R ankle gangrene, HTN, HLD, CAD, and T2D p/f [facility] rehab with hyperNa and acute renal failure, both improved. Course c/b AHRF requiring HFNC, now weaned to RA. GOC discussion held with son/HCP Tboy who rescinded prior DNR/DNI, now plan for full code and full escalation of care and pursuing all interventions. Now weaned to RA, pending TTE for pre-op optimization with cards following in anticipation of plan for PEG (GI consulted) and LTCF placement.     #Neuro    #Pain  -     #Dementia   - pt. A&Ox0 - nonverbal.  - Severe protein calorie malnutrition.   - Failed S&S eval.  Plan:   - Ongoing GOC conversation as below  - anticipate need for PEG -- GI consulted  - cards consulted for pre-op optimization, pending TTE.    #Cardiovascular    #Afib.   - Afib with RVR. Pt. was transferred to a telemetry floor. Discussed with son. switch IV lopressor to PO now that we have oral access   Plan:  - continue lopressor 25mg BID.    #Respiratory    Acute respiratory failure with hypoxia.   - suspect in setting of worsening deconditioning, poor inspiratory effort, atelectasis   - patient needed emergent intubation due to   - post intubation chest xray shows complete left lung opacity  RRT on 02/09 with emergent intubation due to ??    Plan:  - consider BAL   - continue antibiotics as per ID recs  - on volume AC     #GI/Nutrition    #Diet: tube feeds through OG    #Diarrhea  - noted to have multiple loose BMs today  - dc bowel regimen (miralax, senna -- last dosed 2/7)  - monitor x24h, if continues can send GI w/u.    #/Renal    #SAE (acute kidney injury).   - on admission  and Cr 2.76. Likely prerenal  - improved s/p fluid resuscitation   Plan:    - ctm Cr/UOP    #AGMA   - bicarb down trending to 12 on 2/2 -- improved 2/8  - d/w nephrology, c/w 1300 sodium bicarb TID  - s/p sterile water 150meq Na bicarb 75cc/h x10h without improvement 2/6  - s/p 1/2 NS 150meq Na bicarb 75cc/h x10h with improvement 2/7.    #Skin    #ID    #Sepsis.    - pt met SIRS criteria + source of known foot infection.   - Recently treated for L foot wound growing E. coli and S. aureus.   - Vascular offering definitive management with amputation but family declines. Local wound care.   Plan:   - currently on doxycycline, ceftriaxone and flagyl  - ID consulted, f/u recs     #MSK:    #Wound of foot.   - L foot xray: Dorsal forefoot soft tissue swelling. Calcaneal cortex on left foot with possible osteomyelitis  - previously unable to tolerate MRI L ankle when attempted in recent hospitalization  - Recently Tx in Dec 23' for L foot wound growing S. Aureas and E. Coli, amputation L foot offered and deferred at the time as per GOC   - Wound care following  Plan:  - c/w Abx as above  - Vascular consulted, recs: continue local wound care, frequent offloading to B/L lower extremities.    #Endocrine    #Diabetes.   - POC checks   Plan:  - NPO, sliding scale.    #Hematologic    #Anemia   - Hgb ~6 on 1/29. No signs of bleeding. Received 1U PRBC.   - Hgb now improved - stable.  Plan:  -ctm    #DVT ppx    #Ethics/ICU Bundle  - GOC/Code Status: Full Code. pt with prior MOLST - DNR/DNI but was rescinded on this hospitalization. Palliative consulted. Ongoing GOC conversations. Toby understands the poor prognosis and would like to proceed with comfort measures/DNR/DNI but two out of his three siblings do not agree with comfort measures at this time. He does not feel comfortable transitioning care until his other two brothers are in agreement. We addressed that his mom is declining and that we do not recommend NGT/PEG as this is uncomfortable and would not change her prognosis. Toby expressed understanding but states he wishes to pursue PEG as brothers would not forgive him if he did not.  - Diet:  - Access:  - Tubes/Drains:  - Activity/PT/OT:   Pt is a 89 yo F with PMH dementia (AOx0, non-verbal), R ankle gangrene, HTN, HLD, CAD, and T2D p/f [facility] rehab with hyperNa and acute renal failure, both improved. Course c/b AHRF requiring HFNC, now weaned to RA. GOC discussion held with son/HCP Toby who rescinded prior DNR/DNI, now plan for full code and full escalation of care and pursuing all interventions. Now weaned to RA, pending TTE for pre-op optimization with cards following in anticipation of plan for PEG (GI consulted) and LTCF placement.     #Neuro    #Pain  - propofol ggt at 5  - fentanyl ggt 0.5    #Dementia   - pt. A&Ox0 - nonverbal.  - Severe protein calorie malnutrition.   - Failed S&S eval.  Plan:   - Ongoing GOC conversation as below  - anticipate need for PEG -- GI consulted  - cards consulted for pre-op optimization, pending TTE.    #Cardiovascular    #Shock  -most likely septic shock in setting of gangrenous infection  Plan:  -continue levophed, wean as tolerated    #Afib.   - Afib with RVR. Pt. was transferred to a telemetry floor. Discussed with son. switch IV lopressor to PO now that we have oral access   Plan:  - hold lopressor in setting of shock    #Respiratory    Acute respiratory failure with hypoxia.   - suspect in setting of worsening deconditioning, poor inspiratory effort, atelectasis   - patient needed emergent intubation due to   - post intubation chest xray shows complete left lung opacity  RRT on 02/09 with emergent intubation due to increased work of breathing    Plan:  - continue antibiotics as per ID recs  - on volume AC, continue current vent settings     #GI/Nutrition    #Diet: tube feeds through OG at rate of 40    #Diarrhea  - noted to have multiple loose BMs today  - dc bowel regimen (miralax, senna -- last dosed 2/7)    #/Renal    #SAE (acute kidney injury).   - on admission  and Cr 2.76. Likely prerenal  - improved s/p fluid resuscitation   Plan:    - ctm Cr/UOP  - not candidate for dialysis    #AGMA   - likely in setting of lactic acidosis  - 1300 sodium bicarb TID and 150meq Na bicarb 75cc/h  for 24 hours    #Skin    #ID    #Sepsis.    - pt met SIRS criteria + source of known foot infection.   - Recently treated for L foot wound growing E. coli and S. aureus.   - Vascular offering definitive management with amputation but family declines. Local wound care.   Plan:   - currently on doxycycline, meropenem  - ID consulted, f/u recs     #MSK:    #Wound of foot.   - L foot xray: Dorsal forefoot soft tissue swelling. Calcaneal cortex on left foot with possible osteomyelitis  - previously unable to tolerate MRI L ankle when attempted in recent hospitalization  - Recently Tx in Dec 23' for L foot wound growing S. Aureas and E. Coli, amputation L foot offered and deferred at the time as per GOC     Plan:  - c/w Abx as above  - Wound care following. appreciate recs  - not candidate for vascular surgery    #Endocrine    #Diabetes.   - POC checks   Plan:  - NPO, sliding scale.    #Hematologic    #Anemia   - Hgb ~6 on 1/29. No signs of bleeding. Received 1U PRBC.   - Hgb now improved - stable.  Plan:  -ctm    #DVT ppx    #Ethics/ICU Bundle  - GOC/Code Status: Full Code. pt with prior MOLST - DNR/DNI but was rescinded on this hospitalization. Palliative consulted. Ongoing GOC conversations. Toby understands the poor prognosis and would like to proceed with comfort measures/DNR/DNI but two out of his three siblings do not agree with comfort measures at this time. He does not feel comfortable transitioning care until his other two brothers are in agreement. We addressed that his mom is declining and that we do not recommend NGT/PEG as this is uncomfortable and would not change her prognosis. Toby expressed understanding but states he wishes to pursue PEG as brothers would not forgive him if he did not.  - Diet:  - Access:  - Tubes/Drains:  - Activity/PT/OT:

## 2024-02-12 NOTE — PROGRESS NOTE ADULT - PROBLEM SELECTOR PLAN 8
- DVT: Heparin  - Diet: S&S evaluated - NPO  - Dispo: Ongoing GOC with family
- DVT: Heparin  - Diet: S&S evaluated - NPO  - Dispo: Pending [facility]
- DVT: Heparin  - Diet: Pending S&S eval  - Dispo: Pending GO
- DVT: Heparin  - Diet: S&S evaluated - NPO  - Dispo: Pending [facility]
- DVT: Heparin  - Diet: S&S evaluated - NPO  - Dispo: Pending [facility]
- Home regimen: Amlodipine 10mg QD, Metoprolol 50mg BID  - restarted metoprolol 25 BID
- DVT: Heparin  - Diet: S&S evaluated - NPO  - Dispo: Pending [facility]
- Home regimen: Amlodipine 10mg QD, Metoprolol 50mg BID  - Pt w/o PO access, hold
- Home regimen: Amlodipine 10mg QD, Metoprolol 50mg BID  - restarted metoprolol 25 BID
Thank you for allowing us to participate in your patient's care. Toby has palliative office contact number should any goals change. At this time, goals are clear. Palliative team signing off.     Please page [phone] for any q's or c's. The [facility] and Palliative Medicine service has coverage 24 hours a day/ 7 days a week to provide medical recommendations regarding symptom management needs via telephone.    Brianna Chang D.O.   Palliative Medicine.
- DVT: Heparin  - Diet: S&S evaluated - NPO  - Dispo: Pending [facility]
- Home regimen: Amlodipine 10mg QD, Metoprolol 50mg BID  - Pt w/o PO access, hold
- Home regimen: Amlodipine 10mg QD, Metoprolol 50mg BID  - restarted metoprolol 25 BID

## 2024-02-12 NOTE — PROGRESS NOTE ADULT - PROBLEM SELECTOR PROBLEM 7
Encounter for palliative care
Wound of foot
Diabetes
Wound of foot
SAE (acute kidney injury)
Encounter for palliative care
Diabetes
Encounter for palliative care
Wound of foot
Advanced care planning/counseling discussion
Diabetes
Wound of foot
Diabetes
Diabetes
Wound of foot
Wound of foot
Hypernatremia
Encounter for palliative care

## 2024-02-12 NOTE — PROGRESS NOTE ADULT - SUBJECTIVE AND OBJECTIVE BOX
INTERVAL HPI/OVERNIGHT EVENTS:    SUBJECTIVE: Patient seen and examined at bedside.     ROS: All negative except as listed above.    VITAL SIGNS:  ICU Vital Signs Last 24 Hrs  T(C): 37.1 (12 Feb 2024 04:00), Max: 37.3 (11 Feb 2024 12:00)  T(F): 98.7 (12 Feb 2024 04:00), Max: 99.2 (11 Feb 2024 12:00)  HR: 77 (12 Feb 2024 06:00) (73 - 96)  BP: 93/36 (12 Feb 2024 06:00) (69/24 - 119/40)  BP(mean): 54 (12 Feb 2024 06:00) (37 - 68)  ABP: --  ABP(mean): --  RR: 16 (12 Feb 2024 06:00) (16 - 30)  SpO2: 100% (12 Feb 2024 06:00) (90% - 100%)    O2 Parameters below as of 12 Feb 2024 06:00  Patient On (Oxygen Delivery Method): ventilator    O2 Concentration (%): 40      Mode: AC/ CMV (Assist Control/ Continuous Mandatory Ventilation), RR (machine): 16, TV (machine): 0.35, FiO2: 40, PEEP: 5, ITime: 0.7, MAP: 10, PIP: 27  Plateau pressure:   P/F ratio:     02-11 @ 07:01  -  02-12 @ 07:00  --------------------------------------------------------  IN: 2805.1 mL / OUT: 460 mL / NET: 2345.1 mL      CAPILLARY BLOOD GLUCOSE      POCT Blood Glucose.: 201 mg/dL (12 Feb 2024 05:19)      ECG: reviewed.    PHYSICAL EXAM:    GENERAL: NAD, lying in bed comfortably  HEAD:  Atraumatic, normocephalic  EYES: EOMI, PERRLA, conjunctiva and sclera clear  NECK: Supple, trachea midline, no JVD  HEART: Regular rate and rhythm, no murmurs, rubs, or gallops  LUNGS: Unlabored respirations.  Clear to auscultation bilaterally, no crackles, wheezing, or rhonchi  ABDOMEN: Soft, nontender, nondistended, +BS  EXTREMITIES: 2+ peripheral pulses bilaterally, cap refill<2 secs. No clubbing, cyanosis, or edema  NERVOUS SYSTEM:  A&Ox3, following commands, moving all extremities, no focal deficits   SKIN: No rashes or lesions    MEDICATIONS:  MEDICATIONS  (STANDING):  chlorhexidine 0.12% Liquid 15 milliLiter(s) Oral Mucosa every 12 hours  Dakins Solution - 1/2 Strength 1 Application(s) Topical two times a day  dextrose 5%. 1000 milliLiter(s) (100 mL/Hr) IV Continuous <Continuous>  dextrose 5%. 1000 milliLiter(s) (100 mL/Hr) IV Continuous <Continuous>  dextrose 5%. 1000 milliLiter(s) (50 mL/Hr) IV Continuous <Continuous>  dextrose 5%. 1000 milliLiter(s) (50 mL/Hr) IV Continuous <Continuous>  dextrose 50% Injectable 12.5 Gram(s) IV Push once  dextrose 50% Injectable 25 Gram(s) IV Push once  dextrose 50% Injectable 25 Gram(s) IV Push once  dextrose 50% Injectable 12.5 Gram(s) IV Push once  dextrose 50% Injectable 25 Gram(s) IV Push once  dextrose 50% Injectable 25 Gram(s) IV Push once  doxycycline IVPB      doxycycline IVPB 100 milliGRAM(s) IV Intermittent every 12 hours  fentaNYL   Infusion. 0.5 MICROgram(s)/kG/Hr (2.02 mL/Hr) IV Continuous <Continuous>  glucagon  Injectable 1 milliGRAM(s) IntraMuscular once  glucagon  Injectable 1 milliGRAM(s) IntraMuscular once  heparin   Injectable 5000 Unit(s) SubCutaneous every 12 hours  insulin glargine Injectable (LANTUS) 6 Unit(s) SubCutaneous every morning  insulin lispro (ADMELOG) corrective regimen sliding scale   SubCutaneous every 6 hours  meropenem  IVPB 500 milliGRAM(s) IV Intermittent every 24 hours  midodrine 30 milliGRAM(s) Oral every 8 hours  norepinephrine Infusion 0.05 MICROgram(s)/kG/Min (3.79 mL/Hr) IV Continuous <Continuous>  pantoprazole   Suspension 40 milliGRAM(s) Oral daily  propofol Infusion 5 MICROgram(s)/kG/Min (1.21 mL/Hr) IV Continuous <Continuous>  sodium bicarbonate 1300 milliGRAM(s) Oral three times a day  sodium bicarbonate  Infusion 0.278 mEq/kG/Hr (75 mL/Hr) IV Continuous <Continuous>  sodium zirconium cyclosilicate 10 Gram(s) Oral daily    MEDICATIONS  (PRN):  acetaminophen   Oral Liquid .. 650 milliGRAM(s) Oral every 6 hours PRN Temp greater or equal to 38C (100.4F), Mild Pain (1 - 3)  dextrose Oral Gel 15 Gram(s) Oral once PRN Blood Glucose LESS THAN 70 milliGRAM(s)/deciliter  dextrose Oral Gel 15 Gram(s) Oral once PRN Blood Glucose LESS THAN 70 milliGRAM(s)/deciliter  HYDROmorphone  Injectable 1 milliGRAM(s) IV Push every 4 hours PRN Severe Pain (7 - 10)      ALLERGIES:  Allergies    Pineapple (Unknown)  contrast dye -  rash (Other)  iodine (Other)  codeine (Vomiting)  penicillin (Rash)  latex (Rash)    Intolerances    Tolerates ceftriaxone, cefepime (Other)      LABS:                        7.2    17.11 )-----------( 125      ( 12 Feb 2024 00:15 )             22.3     02-12    140  |  100  |  119<H>  ----------------------------<  240<H>  4.5   |  18<L>  |  3.81<H>    Ca    7.2<L>      12 Feb 2024 00:15  Phos  4.4     02-12  Mg     1.90     02-12    TPro  5.0<L>  /  Alb  1.5<L>  /  TBili  0.3  /  DBili  x   /  AST  17  /  ALT  8   /  AlkPhos  366<H>  02-12      Urinalysis Basic - ( 12 Feb 2024 00:15 )    Color: x / Appearance: x / SG: x / pH: x  Gluc: 240 mg/dL / Ketone: x  / Bili: x / Urobili: x   Blood: x / Protein: x / Nitrite: x   Leuk Esterase: x / RBC: x / WBC x   Sq Epi: x / Non Sq Epi: x / Bacteria: x      ABG:      vBG:    Micro:    Culture - Blood (collected 02-09-24 @ 13:10)  Source: .Blood Blood  Preliminary Report (02-11-24 @ 19:01):    No growth at 48 Hours    Culture - Blood (collected 02-09-24 @ 04:47)  Source: .Blood Blood-Peripheral  Preliminary Report (02-11-24 @ 09:02):    No growth at 48 Hours    Culture - Blood (collected 02-09-24 @ 04:32)  Source: .Blood Blood-Venous  Preliminary Report (02-11-24 @ 09:02):    No growth at 48 Hours    Culture - Blood (collected 02-02-24 @ 22:14)  Source: .Blood Blood-Peripheral  Final Report (02-08-24 @ 03:00):    No growth at 5 days    Culture - Blood (collected 02-02-24 @ 22:14)  Source: .Blood Blood-Peripheral  Final Report (02-08-24 @ 03:00):    No growth at 5 days    Culture - Blood (collected 01-24-24 @ 11:50)  Source: .Blood Blood-Peripheral  Final Report (01-29-24 @ 16:00):    No growth at 5 days    Culture - Blood (collected 01-24-24 @ 11:40)  Source: .Blood Blood-Peripheral  Final Report (01-29-24 @ 16:00):    No growth at 5 days          RADIOLOGY & ADDITIONAL TESTS: Reviewed.   INTERVAL HPI/OVERNIGHT EVENTS:    SUBJECTIVE: Patient seen and examined at bedside.     ROS: All negative except as listed above.    VITAL SIGNS:  ICU Vital Signs Last 24 Hrs  T(C): 37.1 (12 Feb 2024 04:00), Max: 37.3 (11 Feb 2024 12:00)  T(F): 98.7 (12 Feb 2024 04:00), Max: 99.2 (11 Feb 2024 12:00)  HR: 77 (12 Feb 2024 06:00) (73 - 96)  BP: 93/36 (12 Feb 2024 06:00) (69/24 - 119/40)  BP(mean): 54 (12 Feb 2024 06:00) (37 - 68)  ABP: --  ABP(mean): --  RR: 16 (12 Feb 2024 06:00) (16 - 30)  SpO2: 100% (12 Feb 2024 06:00) (90% - 100%)    O2 Parameters below as of 12 Feb 2024 06:00  Patient On (Oxygen Delivery Method): ventilator    O2 Concentration (%): 40      Mode: AC/ CMV (Assist Control/ Continuous Mandatory Ventilation), RR (machine): 16, TV (machine): 0.35, FiO2: 40, PEEP: 5, ITime: 0.7, MAP: 10, PIP: 27  Plateau pressure:   P/F ratio:     02-11 @ 07:01  -  02-12 @ 07:00  --------------------------------------------------------  IN: 2805.1 mL / OUT: 460 mL / NET: 2345.1 mL      CAPILLARY BLOOD GLUCOSE      POCT Blood Glucose.: 201 mg/dL (12 Feb 2024 05:19)      ECG: reviewed.    PHYSICAL EXAM:    GENERAL: intubated, sedated  HEAD:  Atraumatic, Normocephalic  EYES: EOMI, conjunctiva and sclera clear  ENT: Moist mucous membranes  CHEST/LUNG: Clear to auscultation bilaterally; No rales, rhonchi, wheezing, or rubs. 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 fluctuant edema with crepitus on right anterior foot  NERVOUS SYSTEM:  A&Ox0  SKIN: No rashes or lesions    MEDICATIONS:  MEDICATIONS  (STANDING):  chlorhexidine 0.12% Liquid 15 milliLiter(s) Oral Mucosa every 12 hours  Dakins Solution - 1/2 Strength 1 Application(s) Topical two times a day  dextrose 5%. 1000 milliLiter(s) (100 mL/Hr) IV Continuous <Continuous>  dextrose 5%. 1000 milliLiter(s) (100 mL/Hr) IV Continuous <Continuous>  dextrose 5%. 1000 milliLiter(s) (50 mL/Hr) IV Continuous <Continuous>  dextrose 5%. 1000 milliLiter(s) (50 mL/Hr) IV Continuous <Continuous>  dextrose 50% Injectable 12.5 Gram(s) IV Push once  dextrose 50% Injectable 25 Gram(s) IV Push once  dextrose 50% Injectable 25 Gram(s) IV Push once  dextrose 50% Injectable 12.5 Gram(s) IV Push once  dextrose 50% Injectable 25 Gram(s) IV Push once  dextrose 50% Injectable 25 Gram(s) IV Push once  doxycycline IVPB      doxycycline IVPB 100 milliGRAM(s) IV Intermittent every 12 hours  fentaNYL   Infusion. 0.5 MICROgram(s)/kG/Hr (2.02 mL/Hr) IV Continuous <Continuous>  glucagon  Injectable 1 milliGRAM(s) IntraMuscular once  glucagon  Injectable 1 milliGRAM(s) IntraMuscular once  heparin   Injectable 5000 Unit(s) SubCutaneous every 12 hours  insulin glargine Injectable (LANTUS) 6 Unit(s) SubCutaneous every morning  insulin lispro (ADMELOG) corrective regimen sliding scale   SubCutaneous every 6 hours  meropenem  IVPB 500 milliGRAM(s) IV Intermittent every 24 hours  midodrine 30 milliGRAM(s) Oral every 8 hours  norepinephrine Infusion 0.05 MICROgram(s)/kG/Min (3.79 mL/Hr) IV Continuous <Continuous>  pantoprazole   Suspension 40 milliGRAM(s) Oral daily  propofol Infusion 5 MICROgram(s)/kG/Min (1.21 mL/Hr) IV Continuous <Continuous>  sodium bicarbonate 1300 milliGRAM(s) Oral three times a day  sodium bicarbonate  Infusion 0.278 mEq/kG/Hr (75 mL/Hr) IV Continuous <Continuous>  sodium zirconium cyclosilicate 10 Gram(s) Oral daily    MEDICATIONS  (PRN):  acetaminophen   Oral Liquid .. 650 milliGRAM(s) Oral every 6 hours PRN Temp greater or equal to 38C (100.4F), Mild Pain (1 - 3)  dextrose Oral Gel 15 Gram(s) Oral once PRN Blood Glucose LESS THAN 70 milliGRAM(s)/deciliter  dextrose Oral Gel 15 Gram(s) Oral once PRN Blood Glucose LESS THAN 70 milliGRAM(s)/deciliter  HYDROmorphone  Injectable 1 milliGRAM(s) IV Push every 4 hours PRN Severe Pain (7 - 10)      ALLERGIES:  Allergies    Pineapple (Unknown)  contrast dye -  rash (Other)  iodine (Other)  codeine (Vomiting)  penicillin (Rash)  latex (Rash)    Intolerances    Tolerates ceftriaxone, cefepime (Other)      LABS:                        7.2    17.11 )-----------( 125      ( 12 Feb 2024 00:15 )             22.3     02-12    140  |  100  |  119<H>  ----------------------------<  240<H>  4.5   |  18<L>  |  3.81<H>    Ca    7.2<L>      12 Feb 2024 00:15  Phos  4.4     02-12  Mg     1.90     02-12    TPro  5.0<L>  /  Alb  1.5<L>  /  TBili  0.3  /  DBili  x   /  AST  17  /  ALT  8   /  AlkPhos  366<H>  02-12      Urinalysis Basic - ( 12 Feb 2024 00:15 )    Color: x / Appearance: x / SG: x / pH: x  Gluc: 240 mg/dL / Ketone: x  / Bili: x / Urobili: x   Blood: x / Protein: x / Nitrite: x   Leuk Esterase: x / RBC: x / WBC x   Sq Epi: x / Non Sq Epi: x / Bacteria: x      ABG:      vBG:    Micro:    Culture - Blood (collected 02-09-24 @ 13:10)  Source: .Blood Blood  Preliminary Report (02-11-24 @ 19:01):    No growth at 48 Hours    Culture - Blood (collected 02-09-24 @ 04:47)  Source: .Blood Blood-Peripheral  Preliminary Report (02-11-24 @ 09:02):    No growth at 48 Hours    Culture - Blood (collected 02-09-24 @ 04:32)  Source: .Blood Blood-Venous  Preliminary Report (02-11-24 @ 09:02):    No growth at 48 Hours    Culture - Blood (collected 02-02-24 @ 22:14)  Source: .Blood Blood-Peripheral  Final Report (02-08-24 @ 03:00):    No growth at 5 days    Culture - Blood (collected 02-02-24 @ 22:14)  Source: .Blood Blood-Peripheral  Final Report (02-08-24 @ 03:00):    No growth at 5 days    Culture - Blood (collected 01-24-24 @ 11:50)  Source: .Blood Blood-Peripheral  Final Report (01-29-24 @ 16:00):    No growth at 5 days    Culture - Blood (collected 01-24-24 @ 11:40)  Source: .Blood Blood-Peripheral  Final Report (01-29-24 @ 16:00):    No growth at 5 days          RADIOLOGY & ADDITIONAL TESTS: Reviewed.

## 2024-02-12 NOTE — PROGRESS NOTE ADULT - PROBLEM SELECTOR PLAN 6
Patient's son, Toby Burnham ([phone]) is hcp and POA. Toby has 4 siblings that are coming to visit patient this weekend before hopefully re-establishing advanced directives.   > FULL CODE   > Discussed referral for hospice, which son, Toby, would be amenable to after his siblings visit patient this weekend pending ongoing [facility] discussions.  > 1/29: Called Toby who stated family was unable to visit her this weekend as one of his siblings had covid but plan is to visit her tomorrow around 1-2pm before establishing advanced directives.
- s/p CABG w/ stent in 2005    Plan:  - Hold home Plavix i/s/o remote Hx of stent placement, NPO
- s/p CABG w/ stent in 2005    Plan:  - Hold home Plavix i/s/o remote Hx of stent placement, NPO
- Na 163 on admission  - now resolved with fluids   - CTM BMP, trend Na
Patient's son, Toby Burnham ([phone]) is hcp and POA. Toby has 4 siblings that are coming to visit patient this weekend before hopefully re-establishing advanced directives.   > FULL CODE   > Discussed referral for hospice, which son, Toby, would be amenable to after his siblings visit patient this weekend pending ongoing goc discussions.
- s/p CABG w/ stent in 2005    Plan:  - Hold home Plavix i/s/o remote Hx of stent placement, NPO
- Hgb ~6 on 1/29. No signs of bleeding. Received 1U PRBC.   - Hgb now improved - stable.
- s/p CABG w/ stent in 2005    Plan:  - Hold home Plavix i/s/o remote Hx of stent placement, NPO
- Hgb ~6 on 1/29. No signs of bleeding. Received 1U PRBC.   - Hgb now improved - stable.
- s/p CABG w/ stent in 2005    Plan:  - Hold home Plavix i/s/o remote Hx of stent placement, NPO
- Hgb ~6 on 1/29. No signs of bleeding. Received 1U PRBC.   - Hgb now improved - stable.
- L foot xray: Dorsal forefoot soft tissue swelling. Calcaneal cortex on left foot with possible osteomyelitis  - previously unable to tolerate MRI L ankle when attempted in recent hospitalization  - Recently Tx in Dec 23' for L foot wound growing S. Aureas and E. Coli, amputation L foot offered and deferred at the time as per GOC   - Wound care following  - c/w Abx as above  - Vascular consulted, recs: continue local wound care, frequent offloading to B/L lower extremities
- Hgb ~6 on 1/29. No signs of bleeding. Received 1U PRBC.   - Hgb now improved - stable.
- s/p CABG w/ stent in 2005    Plan:  - Hold home Plavix i/s/o remote Hx of stent placement, NPO
- s/p CABG w/ stent in 2005    Plan:  - Hold home Plavix i/s/o remote Hx of stent placement, NPO
Patient with multiple wounds noted- non-verbal signs of discomfort despite barely examining patient   > Son, Edward was amenable to symptom meds   > Continue 0.25mg IV dilaudid q4h prn severe pain. Can add special instructions to pre-medicate prior to wound care changes   > bowel regimen while on opioids   > narcan prn.
- Hgb ~6 on 1/29. No signs of bleeding. Received 1U PRBC.   - Hgb now improved - stable.
- Hgb ~6 on 1/29. No signs of bleeding. Received 1U PRBC.   - Hgb now improved - stable.
Patient's son, Toby Burnham ([phone]) is hcp and POA. Toby has 4 siblings that are coming to visit patient this weekend before hopefully re-establishing advanced directives.   > FULL CODE   > Discussed referral for hospice, which son, Toby, would be amenable to after his siblings visit patient this weekend pending ongoing goc discussions.  > 1/29: Called Toby who stated family was unable to visit her this weekend as one of his siblings had covid but plan is to visit her tomorrow around 1-2pm before establishing advanced directives.  > 1/31: See above goc- will follow up in at 3:15pm
Patient's son, Toby Burnham ([phone]) is hcp and POA. Toby has 4 siblings that are coming to visit patient this weekend before hopefully re-establishing advanced directives.   > FULL CODE   > 1/29: Called Toby who stated family was unable to visit her this weekend as one of his siblings had covid but plan is to visit her tomorrow around 1-2pm before establishing advanced directives.  > 1/31: See above goc- will follow up in at 3:15pm  > 2/1- see goc- FULL CODE, discussed hospice again but goals remain unchanged. continue medical management

## 2024-02-12 NOTE — PROGRESS NOTE ADULT - ASSESSMENT
89 year old female PMH of dementia, HTN, HLD, CAD and DM transferred from rehab to [facility] for electrolyte abnormalities. Palliative consulted for complex decision making regarding goc in setting of advanced illness.

## 2024-02-12 NOTE — PROGRESS NOTE ADULT - SUBJECTIVE AND OBJECTIVE BOX
[facility] Pall[facility]  Brianna Chang, Palliative Care Attending  Contact Info: Page [phone] (including Nights/Weekends), message on Microsoft Teams (Brianna Chang), or leave  at Palliative Office [phone] (non-urgent)   Date of [id number] @ 14:55    SUBJECTIVE AND OBJECTIVE: Patient seen around noon. Patient intubated, sedated on pressors.     Indication for Geriatrics and Palliative Care Services/INTERVAL HPI: goc in setting of advanced malignancy     OVERNIGHT EVENTS:  > 2/1: Patient with intermittent rapid a.fib requiring IV metoprolol   > 2/12: Palliative team reconsulted for goc. Patient now intubated, sedated and on pressors.     DNR on chart:  Allergies    Pineapple (Unknown)  contrast dye -  rash (Other)  iodine (Other)  codeine (Vomiting)  penicillin (Rash)  latex (Rash)    Intolerances    Tolerates ceftriaxone, cefepime (Other)  MEDICATIONS  (STANDING):  chlorhexidine 0.12% Liquid 15 milliLiter(s) Oral Mucosa every 12 hours  Dakins Solution - 1/2 Strength 1 Application(s) Topical two times a day  dextrose 5%. 1000 milliLiter(s) (100 mL/Hr) IV Continuous <Continuous>  dextrose 5%. 1000 milliLiter(s) (50 mL/Hr) IV Continuous <Continuous>  dextrose 5%. 1000 milliLiter(s) (50 mL/Hr) IV Continuous <Continuous>  dextrose 5%. 1000 milliLiter(s) (100 mL/Hr) IV Continuous <Continuous>  dextrose 50% Injectable 25 Gram(s) IV Push once  dextrose 50% Injectable 25 Gram(s) IV Push once  dextrose 50% Injectable 12.5 Gram(s) IV Push once  dextrose 50% Injectable 12.5 Gram(s) IV Push once  dextrose 50% Injectable 25 Gram(s) IV Push once  dextrose 50% Injectable 25 Gram(s) IV Push once  doxycycline IVPB      doxycycline IVPB 100 milliGRAM(s) IV Intermittent every 12 hours  fentaNYL   Infusion. 0.5 MICROgram(s)/kG/Hr (2.02 mL/Hr) IV Continuous <Continuous>  glucagon  Injectable 1 milliGRAM(s) IntraMuscular once  glucagon  Injectable 1 milliGRAM(s) IntraMuscular once  heparin   Injectable 5000 Unit(s) SubCutaneous every 12 hours  insulin glargine Injectable (LANTUS) 6 Unit(s) SubCutaneous every morning  insulin lispro (ADMELOG) corrective regimen sliding scale   SubCutaneous every 6 hours  meropenem  IVPB 500 milliGRAM(s) IV Intermittent every 24 hours  midodrine 30 milliGRAM(s) Oral every 8 hours  norepinephrine Infusion 0.05 MICROgram(s)/kG/Min (3.79 mL/Hr) IV Continuous <Continuous>  pantoprazole   Suspension 40 milliGRAM(s) Oral daily  propofol Infusion 5 MICROgram(s)/kG/Min (1.21 mL/Hr) IV Continuous <Continuous>  sodium bicarbonate 1300 milliGRAM(s) Oral three times a day  sodium bicarbonate  Infusion 0.278 mEq/kG/Hr (75 mL/Hr) IV Continuous <Continuous>  sodium zirconium cyclosilicate 10 Gram(s) Oral daily    MEDICATIONS  (PRN):  acetaminophen   Oral Liquid .. 650 milliGRAM(s) Oral every 6 hours PRN Temp greater or equal to 38C (100.4F), Mild Pain (1 - 3)  dextrose Oral Gel 15 Gram(s) Oral once PRN Blood Glucose LESS THAN 70 milliGRAM(s)/deciliter  dextrose Oral Gel 15 Gram(s) Oral once PRN Blood Glucose LESS THAN 70 milliGRAM(s)/deciliter  HYDROmorphone  Injectable 1 milliGRAM(s) IV Push every 4 hours PRN Severe Pain (7 - 10)      ITEMS UNCHECKED ARE NOT PRESENT    PRESENT SYMPTOMS: [x ]Unable to self-report - see [ ] CPOT [ x] PAINADS [x ] RDOS  Source if other than patient:  [ ]Family   [ x]Team     Pain:  [ ]yes [ ]no  QOL impact -   Location -                    Aggravating factors -  Quality -  Radiation -  Timing-  Severity (0-10 scale):  Minimal acceptable level/ pain goal (0-10 scale):     CPOT:    https://www.sccm.org/getattachment/oly03s98-3m4h-2z3t-5b8n-9158b6175t2t/Critical-Care-Pain-Observation-Tool-(CPOT)    Dyspnea:                           [ ]Mild [ ]Moderate [ ]Severe  Anxiety:                             [ ]Mild [ ]Moderate [ ]Severe  Fatigue:                             [ ]Mild [ ]Moderate [ ]Severe  Nausea:                             [ ]Mild [ ]Moderate [ ]Severe  Loss of appetite:              [ ]Mild [ ]Moderate [ ]Severe  Constipation:                    [ ]Mild [ ]Moderate [ ]Severe  Other Symptoms:  [ ]All other review of systems negative     PCSSQ[Palliative Care Spiritual Screening Question]   Severity (0-10):  Score of 4 or > indicate consideration of Chaplaincy referral.  Chaplaincy Referral: [ ] yes [ ] refused [ ] following [ x] deferred    Caregiver [place]? : [ ] yes [ ] no [ x] Deferred [ ] Declined             Social work referral [ ] Patient & Family Centered Care Referral [ ]  Anticipatory Grief present?:  [ ] yes [ ] no  [ x] Deferred                  Social work referral [ ] Patient & Family Centered Care Referral [ ]      PHYSICAL EXAM:  Vital Signs Last 24 Hrs  T(C): 37 (12 Feb 2024 08:00), Max: 37.2 (11 Feb 2024 19:00)  T(F): 98.6 (12 Feb 2024 08:00), Max: 98.9 (11 Feb 2024 19:00)  HR: 77 (12 Feb 2024 12:35) (73 - 96)  BP: 82/54 (12 Feb 2024 12:00) (69/24 - 119/40)  BP(mean): 64 (12 Feb 2024 12:00) (37 - 82)  RR: 20 (12 Feb 2024 12:00) (14 - 26)  SpO2: 97% (12 Feb 2024 12:35) (97% - 100%)    Parameters below as of 12 Feb 2024 12:00  Patient On (Oxygen Delivery Method): ventilator    O2 Concentration (%): 40 I&O's Summary    11 Feb 2024 07:01  -  12 Feb 2024 07:00  --------------------------------------------------------  IN: 2845.1 mL / OUT: 460 mL / NET: 2385.1 mL    12 Feb 2024 07:01  -  12 Feb 2024 14:55  --------------------------------------------------------  IN: 761 mL / OUT: 200 mL / NET: 561 mL       GENERAL: [ ]Cachexia    [ ]Alert  [ ]Oriented x   [ ]Lethargic  [x ]Unarousable  [ ]Verbal  [ ]Non-Verbal  Behavioral:   [ ]Anxiety  [ ]Delirium [ ]Agitation [x ]Other  HEENT:  [ ]Normal   [ ]Dry mouth   [x ]ET Tube/Trach  [ ]Oral lesions  PULMONARY:   [ ]Clear [ x]Tachypnea  [ ]Audible excessive secretions   [ ]Rhonchi        [ ]Right [ ]Left [ ]Bilateral  [ ]Crackles        [ ]Right [ ]Left [ ]Bilateral  [ ]Wheezing     [ ]Right [ ]Left [ ]Bilateral  [ ]Diminished BS [ ] Right [ ]Left [ ]Bilateral  CARDIOVASCULAR:    [x ]Regular [ ]Irregular [ ]Tachy  [ ]Jack [ ]Murmur [ ]Other  GASTROINTESTINAL:  [ x]Soft  [ ]Distended   [ ]+BS  [x ]Non tender [ ]Tender  [ ]Other [ ]PEG [ ]OGT/ NGT   Last BM: 2/12  GENITOURINARY:  [ ]Normal [ ]Incontinent   [ ]Oliguria/Anuria   [x ][place]  MUSCULOSKELETAL:   [ ]Normal   [ ]Weakness  [x ]Bed/Wheelchair bound [ ]Edema  NEUROLOGIC:   [ ]No focal deficits  [ x] Cognitive impairment  [ ] Dysphagia [ ]Dysarthria [ ] Paresis [ ]Other   SKIN: Please see flowsheets   [ ]Normal  [ ]Rash  [x ]Other- Pt with multiple wounds on body   [ ]Pressure ulcer(s) [ ]y [ ]n present on admission    CRITICAL CARE:  [ ]Shock Present  [ ]Septic [ ]Cardiogenic [ ]Neurologic [ ]Hypovolemic  [ ]Vasopressors [ ]Inotropes  [ ]Respiratory failure present [x ]Mechanical Ventilation [ ]Non-invasive ventilatory support [ ]High-Flow Mode: AC/ CMV (Assist Control/ Continuous Mandatory Ventilation), RR (machine): 16, TV (machine): 350, FiO2: 40, PEEP: 5, ITime: 0.7, MAP: 10, PIP: 24  [ ]Acute  [ ]Chronic [ ]Hypoxic  [ ]Hypercarbic [ ]Other  [ ]Other organ failure     LABS:                        7.2    17.11 )-----------( 125      ( 12 Feb 2024 00:15 )             22.3   02-12    140  |  100  |  119<H>  ----------------------------<  240<H>  4.5   |  18<L>  |  3.81<H>    Ca    7.2<L>      12 Feb 2024 00:15  Phos  4.4     02-12  Mg     1.90     02-12    TPro  5.0<L>  /  Alb  1.5<L>  /  TBili  0.3  /  DBili  x   /  AST  17  /  ALT  8   /  AlkPhos  366<H>  02-12      Urinalysis Basic - ( 12 Feb 2024 00:15 )    Color: x / Appearance: x / SG: x / pH: x  Gluc: 240 mg/dL / Ketone: x  / Bili: x / Urobili: x   Blood: x / Protein: x / Nitrite: x   Leuk Esterase: x / RBC: x / WBC x   Sq Epi: x / Non Sq Epi: x / Bacteria: x      RADIOLOGY & ADDITIONAL STUDIES:   < from: Xray Chest 1 View- PORTABLE-Urgent (Xray Chest 1 View- PORTABLE-Urgent .) (02.09.24 @ 10:58) >  FINDINGS:    Since the last exam, an endotracheal and enteric tubes have been placed   with the tip of the endotracheal tube above the tony.  Rotation into an GRIMES projection limits evaluation of the left lung.  Hazy left hemithorax obscuring the hemidiaphragm likely layering effusion.  Sternotomy wires and clips including TAVR are present.  Spine stimulator unchanged.  The enteric tube again seen with tip in the second portion of the   duodenum.  The right lung is clear.    < end of copied text >      Protein Calorie Malnutrition Present: [ ]mild [ ]moderate [ ]severe [ ]underweight [ ]morbid obesity  https://www.andeal.org/vault/2440/web/files/ONC/Table_Clinical%20Characteristics%20to%20Document%20Malnutrition-White%20JV%20et%20al%202012.pdf    Height (cm): 149.9 (01-24-24 @ 10:06), 149.9 (12-15-23 @ 09:00), 149.9 (10-30-23 @ 16:08)  Weight (kg): 40.4 (01-25-24 @ 15:55), 54.4 (12-15-23 @ 09:00), 59 (10-30-23 @ 16:08)  BMI (kg/m2): 18 (01-25-24 @ 15:55), 24.2 (01-24-24 @ 10:06), 24.2 (12-15-23 @ 09:00)    [x ]PPSV2 < or = 30%  [ ]significant weight loss [ ]poor nutritional intake [ ]anasarca[ ]Artificial Nutrition    Other REFERRALS:  [ ]Hospice  [ ]Child Life  [ ]Social Work  [ ]Case management [ ]Holistic Therapy

## 2024-02-12 NOTE — PROGRESS NOTE ADULT - SUBJECTIVE AND OBJECTIVE BOX
Mp Krishnan MD  Interventional Cardiology / Endovascular Specialist  [place] Office : [phone] [place] [id number] Tel:   [place] Office : [phone] [facility] [address] [address]  Tel: [phone]      Subjective/Overnight events: Patient lying in bed MICU remains intubated  	  MEDICATIONS:  heparin   Injectable 5000 Unit(s) SubCutaneous every 12 hours  midodrine 30 milliGRAM(s) Oral every 8 hours  norepinephrine Infusion 0.05 MICROgram(s)/kG/Min IV Continuous <Continuous>    doxycycline IVPB      doxycycline IVPB 100 milliGRAM(s) IV Intermittent every 12 hours  meropenem  IVPB 500 milliGRAM(s) IV Intermittent every 24 hours      acetaminophen   Oral Liquid .. 650 milliGRAM(s) Oral every 6 hours PRN  fentaNYL   Infusion. 0.5 MICROgram(s)/kG/Hr IV Continuous <Continuous>  HYDROmorphone  Injectable 1 milliGRAM(s) IV Push every 4 hours PRN  propofol Infusion 5 MICROgram(s)/kG/Min IV Continuous <Continuous>    pantoprazole   Suspension 40 milliGRAM(s) Oral daily    dextrose 50% Injectable 12.5 Gram(s) IV Push once  dextrose 50% Injectable 12.5 Gram(s) IV Push once  dextrose 50% Injectable 25 Gram(s) IV Push once  dextrose 50% Injectable 25 Gram(s) IV Push once  dextrose 50% Injectable 25 Gram(s) IV Push once  dextrose 50% Injectable 25 Gram(s) IV Push once  dextrose Oral Gel 15 Gram(s) Oral once PRN  dextrose Oral Gel 15 Gram(s) Oral once PRN  glucagon  Injectable 1 milliGRAM(s) IntraMuscular once  glucagon  Injectable 1 milliGRAM(s) IntraMuscular once  insulin glargine Injectable (LANTUS) 6 Unit(s) SubCutaneous every morning  insulin lispro (ADMELOG) corrective regimen sliding scale   SubCutaneous every 6 hours    chlorhexidine 0.12% Liquid 15 milliLiter(s) Oral Mucosa every 12 hours  Dakins Solution - 1/2 Strength 1 Application(s) Topical two times a day  dextrose 5%. 1000 milliLiter(s) IV Continuous <Continuous>  dextrose 5%. 1000 milliLiter(s) IV Continuous <Continuous>  dextrose 5%. 1000 milliLiter(s) IV Continuous <Continuous>  dextrose 5%. 1000 milliLiter(s) IV Continuous <Continuous>  sodium bicarbonate 1300 milliGRAM(s) Oral three times a day  sodium bicarbonate  Infusion 0.278 mEq/kG/Hr IV Continuous <Continuous>      PAST MEDICAL/SURGICAL HISTORY  PAST MEDICAL & SURGICAL HISTORY:  Angina  in 2005, s/p angioplasty with stent placement, no recurrance, no sequalae      Diabetes Mellitus  type 2      Arthritis, Infective, Knee      Detached Retina  right eye      Acute Mucous Pneumonia  4/2010, resolved ; no residual problems      Spinal Stenosis- lumbar      History of Back Surgery  2010      Basal Cell Cancer  removed from left neck 2009      Dementia      Detached Retina, Left  laser surgery in 1995      S/P Carpal Tunnel Release  bilateral hands in 1990      Trigger Finger  release of middle and ring finger of right hand in 1990, and middle finger left hand in 2008      S/P Laparoscopic Cholecystectomy  2008      S/P TKR (Total Knee Replacement)  left knee      Cataract  removal with lens implant right in 2000          SOCIAL HISTORY: Substance Use (street drugs): ( x ) never used  (  ) other:    FAMILY HISTORY:  FH: HTN (hypertension)        PHYSICAL EXAM:  T(C): 37 (02-12-24 @ 08:00), Max: 37.2 (02-11-24 @ 19:00)  HR: 77 (02-12-24 @ 12:35) (73 - 96)  BP: 82/54 (02-12-24 @ 12:00) (69/24 - 116/40)  RR: 20 (02-12-24 @ 12:00) (14 - 26)  SpO2: 97% (02-12-24 @ 12:35) (97% - 100%)  Wt(kg): --  I&O's Summary    11 Feb 2024 07:01  -  12 Feb 2024 07:00  --------------------------------------------------------  IN: 2845.1 mL / OUT: 460 mL / NET: 2385.1 mL    12 Feb 2024 07:01  -  12 Feb 2024 15:24  --------------------------------------------------------  IN: 761 mL / OUT: 200 mL / NET: 561 mL          GENERAL: NAD  EYES:  conjunctiva and sclera clear  ENMT: No tonsillar erythema, exudates, or enlargement  Cardiovascular: Normal S1 S2, No JVD, No murmurs, No edema  Respiratory: Lungs clear to auscultation	  Gastrointestinal:  Soft,   Extremities: No edema                                     7.2    17.11 )-----------( 125      ( 12 Feb 2024 00:15 )             22.3     02-12    140  |  100  |  119<H>  ----------------------------<  240<H>  4.5   |  18<L>  |  3.81<H>    Ca    7.2<L>      12 Feb 2024 00:15  Phos  4.4     02-12  Mg     1.90     02-12    TPro  5.0<L>  /  Alb  1.5<L>  /  TBili  0.3  /  DBili  x   /  AST  17  /  ALT  8   /  AlkPhos  366<H>  02-12    proBNP:   Lipid Profile:   HgA1c:   TSH:     Consultant(s) Notes Reviewed:  [x ] YES  [ ] NO    Care Discussed with Consultants/Other Providers [ x] YES  [ ] NO    Imaging Personally Reviewed independently:  [x] YES  [ ] NO    All labs, radiologic studies, vitals, orders and medications list reviewed. Patient is seen and examined at bedside. Case discussed with medical team.

## 2024-02-12 NOTE — PROGRESS NOTE ADULT - PROBLEM SELECTOR PLAN 4
- Afib with RVR. Pt. was transferred to a telemetry floor. Discussed with son. switch IV lopressor to PO now that we have oral access   - continue lopressor 25mg BID
- SCr 2.7 on admission, baseline 1-1.2    Plan:  - c/w IVF  - CTM SCr
PPSV 10%   Patient requires assistance with all ADLs.  Patient bedbound, nonverbal, with multiple wounds.  S&S recs appreciated
- SCr 2.7 on admission, baseline 1-1.2    Plan:  - c/w IVF  - CTM SCr
PPSV 10%   Patient requires assistance with all ADLs.  Patient bedbound, nonverbal, with multiple wounds.  S&S recs appreciated
- SCr 2.7 on admission, baseline 1-1.2    Plan:  - c/w IVF  - CTM SCr
- pt. A&Ox0 - nonverbal.  -  Poor PO intake.   - Severe protein calorie malnutrition.   - Currently NPO.   - Failed S&S eval.   - Ongoing GOC conversation. Poor prognosis overall. Not a good candidate for PEG tube. Will hold off on NGT for now - this is going to be uncomfortable for the patient and cause more harm. Awaiting decision from family for definitive management.
- SCr 2.7 on admission, baseline 1-1.2    Plan:  - c/w IVF  - CTM SCr
PPSV 10%   Patient requires assistance with all ADLs.  Patient bedbound, nonverbal, with multiple wounds.  S&S recs appreciated
PPSV 10%   Patient requires assistance with all ADLs.  Patient bedbound, nonverbal, with multiple wounds.  S&S recs appreciated
- SCr 2.7 on admission, baseline 1-1.2    Plan:  - c/w IVF  - CTM SCr
- Afib with RVR. Pt. was transferred to a telemetry floor. Discussed with son. switch IV lopressor to PO now that we have oral access   - continue lopressor 25mg BID
- Afib with RVR. Pt. was transferred to a telemetry floor. Discussed with son. switch IV lopressor to PO now that we have oral access   - continue 25 BID  - can dc tele 2/6 if all remains stable and O2 is weaned
- Afib with RVR. Pt. was transferred to a telemetry floor. Discussed with son. switch IV lopressor to PO now that we have oral access   - continue 25 BID
- Hgb ~6 on 1/29. No signs of bleeding. Received 1U PRBC.   - Hgb now improved - stable.
- Afib with RVR. Pt. was transferred to a telemetry floor. Discussed with son. switch IV lopressor to PO now that we have oral access   - continue 25 BID
X-ray (1/24): Dorsal forefoot soft tissue swelling. Questionable focal slightly thinned indistinct appearing calcaneal cortex on left foot frontal/oblique view raising concern for possible osteomyelitis, MRI would be helpful to better assess. No tracking gas collections or additional suspected areas of osteomyelitis. No fractures or dislocations. 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 Tarsometatarsal alignment maintained without evidence for a Lisfranc injury. Small calcaneal enthesophytes. Generalized osteopenia otherwise no discrete lytic or blastic lesions.  > Appreciate vascular recs - family declined surgery and now patient not a candidate for surgery   > Patient on IV abx.  > appreciate ID recs  > Pt s/p 1u PRBCs
- Afib with RVR. Pt. was transferred to a telemetry floor. Discussed with son. switch IV lopressor to PO now that we have oral access   - continue 25 BID  - can dc tele 2/6 if all remains stable and O2 is weaned

## 2024-02-12 NOTE — PROGRESS NOTE ADULT - CONVERSATION DETAILS
Referral for complex decision making and symptom management in setting of advanced illness c/b declining clinical condition. Explained that palliative team reconsulted for ongoing goc discussions. Toby familiar with palliative care team and shared that he spoke to his brothers today and "they are not budging". Discussed that patient is in MICU, currently intubated, sedated and on pressors. Explained that patient continues to decline despite life support measures and explained that introducing aggressive measures is not likely to change patient's poor prognosis. Explained that continuing these interventions may be more harmful and prolong patient's suffering. Discussed medical futility of introducing cpr and escalating pressors. Toby expressed understanding that these interventions may pose more burden to patient and gives him "the fuel to change his brother's minds". For now, patient remains a Full code. Offered to speak to his brothers but he declined the offer. He states they are hoping for a miracle.

## 2024-02-12 NOTE — PROGRESS NOTE ADULT - ATTENDING COMMENTS
1. SAE - contributed to by hypovolemia and sepsis.  Remains oliguric and on pressors.    2. Metabolic acidosis - on bicarbonate   3. Hyperkalemia - continue with management.  GOC discussion ongoing.   Prognosis guarded.  poor candidate for dialysis

## 2024-02-12 NOTE — PROGRESS NOTE ADULT - PROBLEM SELECTOR PROBLEM 4
Functional quadriplegia
Functional quadriplegia
SAE (acute kidney injury)
Afib
Afib
Anemia
SAE (acute kidney injury)
Functional quadriplegia
SAE (acute kidney injury)
Afib
Afib
Dementia
Afib
Functional quadriplegia
SAE (acute kidney injury)
SAE (acute kidney injury)
Wound of foot
Afib

## 2024-02-12 NOTE — PROGRESS NOTE ADULT - PROBLEM SELECTOR PLAN 5
Patient with multiple wounds noted- non-verbal signs of discomfort despite barely examining patient   > Son, Toby was amenable to symptom meds   > Can add 0.25mg IV dilaudid q4h prn severe pain. Can add special instructions to pre-medicate prior to wound care changes   > bowel regimen while on opioids   > narcan prn.
- On Amlodipine 10mg QD, Metoprolol 50mg BID    Plan:  - Pt w/o PO access, hold
- pt. A&Ox0 - nonverbal.  - Severe protein calorie malnutrition.   - Failed S&S eval.   - Ongoing GOC conversation as below  - anticipate need for PEG -- GI consulted  - cards consulted for pre-op optimization, pending TTE
- L foot xray: Dorsal forefoot soft tissue swelling. Calcaneal cortex on left foot with possible osteomyelitis  - previously unable to tolerate MRI L ankle when attempted in recent hospitalization  - Recently Tx in Dec 23' for L foot wound growing S. Aureas and E. Coli, amputation L foot offered and deferred at the time as per GOC   - Wound care following  - c/w Abx as above  - Vascular consulted, recs: continue local wound care, frequent offloading to B/L lower extremities
- On Amlodipine 10mg QD, Metoprolol 50mg BID    Plan:  - Pt w/o PO access, hold
- Hgb ~6 on 1/29. No signs of bleeding. Received 1U PRBC.   - Hgb now improved - stable.
- On Amlodipine 10mg QD, Metoprolol 50mg BID    Plan:  - Pt w/o PO access, hold
- On Amlodipine 10mg QD, Metoprolol 50mg BID    Plan:  - Pt w/o PO access, hold
Patient with multiple wounds noted- non-verbal signs of discomfort despite barely examining patient   > Son, Edward was amenable to symptom meds   > Continue 0.25mg IV dilaudid q4h prn severe pain. Can add special instructions to pre-medicate prior to wound care changes   > bowel regimen while on opioids   > narcan prn.
- pt. A&Ox0 - nonverbal.  - Severe protein calorie malnutrition.   - Failed S&S eval.   - Ongoing GOC conversation as below  - anticipate need for PEG -- GI consulted  - cards consulted for pre-op optimization
- On Amlodipine 10mg QD, Metoprolol 50mg BID    Plan:  - Pt w/o PO access, hold
- pt. A&Ox0 - nonverbal.  - Severe protein calorie malnutrition.   - Failed S&S eval.   - Ongoing GOC conversation as below  - anticipate need for PEG -- GI consulted
Patient with multiple wounds noted- non-verbal signs of discomfort despite barely examining patient   > Son, Edward was amenable to symptom meds   > Continue 0.25mg IV dilaudid q4h prn severe pain. Can add special instructions to pre-medicate prior to wound care changes   > bowel regimen while on opioids   > narcan prn.
- On Amlodipine 10mg QD, Metoprolol 50mg BID    Plan:  - Pt w/o PO access, hold
- On Amlodipine 10mg QD, Metoprolol 50mg BID    Plan:  - Pt w/o PO access, hold
- pt. A&Ox0 - nonverbal.  - Severe protein calorie malnutrition.   - Failed S&S eval.   - Ongoing GOC conversation as below
- pt. A&Ox0 - nonverbal.  - Severe protein calorie malnutrition.   - Failed S&S eval.   - Ongoing GOC conversation as below
- pt. A&Ox0 - nonverbal.  - Severe protein calorie malnutrition.   - Failed S&S eval.   - Ongoing GOC conversation as below  - anticipate need for PEG -- GI consulted
Patient with multiple wounds noted- non-verbal signs of discomfort despite barely examining patient   > Son, Edward was amenable to symptom meds   > Continue 0.25mg IV dilaudid q4h prn severe pain. Can add special instructions to pre-medicate prior to wound care changes   > bowel regimen while on opioids   > narcan prn.
PPSV 10%   Patient requires assistance with all ADLs.  Patient bedbound, nonverbal, with multiple wounds.  S&S recs appreciated

## 2024-02-12 NOTE — PROGRESS NOTE ADULT - PAIN ASSESSMENT ADVANCED DEMENTIA: BODY LANGUAGE
Relaxed
Relaxed
Tense. Distressed pacing. Fidgeting.
Rigid. Fists clenched. Knees pulled up. Pulling or pushing away. Striking out.

## 2024-02-12 NOTE — PROGRESS NOTE ADULT - ASSESSMENT
Pt is a 89 yo F with PMH dementia (AOx0, non-verbal), R ankle gangrene, HTN, HLD, CAD, and T2D p/f [facility] rehab with hyperNa and acute renal failure, both improved. Course c/b AHRF requiring HFNC, now weaned to RA. GOC discussion held with son/HCP Toby who rescinded prior DNR/DNI, now plan for full code and full escalation of care and pursuing all interventions. Now weaned to RA, pending TTE for pre-op optimization with cards following in anticipation of plan for PEG (GI consulted) and LTCF placement.     #Neuro    #Pain  - propofol ggt at 5  - fentanyl ggt 0.5    #Dementia   - pt. A&Ox0 - nonverbal.  - Severe protein calorie malnutrition.   - Failed S&S eval.  Plan:   - Ongoing GOC conversation as below  - anticipate need for PEG -- GI consulted  - cards consulted for pre-op optimization, pending TTE.    #Cardiovascular    #Shock  -most likely septic shock in setting of gangrenous infection  Plan:  -continue levophed, wean as tolerated    #Afib.   - Afib with RVR. Pt. was transferred to a telemetry floor. Discussed with son. switch IV lopressor to PO now that we have oral access   Plan:  - hold lopressor in setting of shock    #Respiratory    Acute respiratory failure with hypoxia.   - suspect in setting of worsening deconditioning, poor inspiratory effort, atelectasis   - patient needed emergent intubation due to   - post intubation chest xray shows complete left lung opacity  RRT on 02/09 with emergent intubation due to increased work of breathing    Plan:  - continue antibiotics as per ID recs  - on volume AC, continue current vent settings     #GI/Nutrition    #Diet: tube feeds through OG at rate of 40    #Diarrhea  - noted to have multiple loose BMs today  - dc bowel regimen (miralax, senna -- last dosed 2/7)    #/Renal    #SAE (acute kidney injury).   - on admission  and Cr 2.76. Likely prerenal  - improved s/p fluid resuscitation   Plan:    - ctm Cr/UOP  - not candidate for dialysis    #AGMA   - likely in setting of lactic acidosis  - 1300 sodium bicarb TID and 150meq Na bicarb 75cc/h  for 24 hours    #Skin    #ID    #Sepsis.    - pt met SIRS criteria + source of known foot infection.   - Recently treated for L foot wound growing E. coli and S. aureus.   - Vascular offering definitive management with amputation but family declines. Local wound care.   Plan:   - currently on doxycycline, meropenem  - ID consulted, f/u recs     #MSK:    #Wound of foot.   - L foot xray: Dorsal forefoot soft tissue swelling. Calcaneal cortex on left foot with possible osteomyelitis  - previously unable to tolerate MRI L ankle when attempted in recent hospitalization  - Recently Tx in Dec 23' for L foot wound growing S. Aureas and E. Coli, amputation L foot offered and deferred at the time as per GOC     Plan:  - c/w Abx as above  - Wound care following. appreciate recs  - not candidate for vascular surgery    #Endocrine    #Diabetes.   - POC checks   Plan:  - NPO, sliding scale.    #Hematologic    #Anemia   - Hgb ~6 on 1/29. No signs of bleeding. Received 1U PRBC.   - Hgb now improved - stable.  Plan:  -ctm    #DVT ppx    #Ethics/ICU Bundle  - GOC/Code Status: Full Code. pt with prior MOLST - DNR/DNI but was rescinded on this hospitalization. Palliative consulted. Ongoing GOC conversations. Toby understands the poor prognosis and would like to proceed with comfort measures/DNR/DNI but two out of his three siblings do not agree with comfort measures at this time. He does not feel comfortable transitioning care until his other two brothers are in agreement. We addressed that his mom is declining and that we do not recommend NGT/PEG as this is uncomfortable and would not change her prognosis. Toby expressed understanding but states he wishes to pursue PEG as brothers would not forgive him if he did not.  - Diet:  - Access:  - Tubes/Drains:  - Activity/PT/OT:   Pt is a 89 yo F with PMH dementia (AOx0, non-verbal), R ankle gangrene, HTN, HLD, CAD, and T2D p/f [facility] re[facility] with hyperNa and acute renal failure, both improved. Course c/b AHRF requiring HFNC, s/p RTT now intubated. GOC discussion held with son/HCP Toby who rescinded prior DNR/DNI, now plan for full code and full escalation of care and pursuing all interventions. Intubated, pending TTE for pre-op optimization with cards following in anticipation of plan for PEG (GI consulted) and LTCF placement.     #Neuro    #Pain  - propofol ggt at 5  - fentanyl ggt 0.5    #Dementia   - pt. A&Ox0 - nonverbal.  - Severe protein calorie malnutrition.   - Failed S&S eval.  Plan:   - Ongoing GOC conversation as below  Palliative re-consulted, called family, no change in GOC as per proxy. Family deferring changes until they visit pt during the weekend.   - anticipate need for PEG -- GI consulted, deferring until medically stable  - cards consulted for pre-op optimization, pending TTE.    #Cardiovascular    #Shock  -most likely septic shock in setting of gangrenous infection  Plan:  -continue levophed, wean as tolerated. Now maxed at 0.28 mcg/hr    #Afib.   - Afib with RVR. Pt. was transferred to a telemetry floor. Discussed with son. switch IV lopressor to PO now that we have oral access   Plan:  - hold lopressor in setting of shock    #Respiratory    Acute respiratory failure with hypoxia.   - suspect in setting of worsening deconditioning, poor inspiratory effort, atelectasis   - patient needed emergent intubation due to   - post intubation chest xray shows complete left lung opacity  RRT on 02/09 with emergent intubation due to increased work of breathing    Plan:  - continue antibiotics as per ID recs  - on volume AC, continue current vent settings     #GI/Nutrition    #Diet: tube feeds through OG at rate of 40    #Diarrhea  - noted to have multiple loose BMs today  - dc bowel regimen (miralax, senna -- last dosed 2/7)    #/Renal    #SAE (acute kidney injury).   - on admission  and Cr 2.76. Likely prerenal  - improved s/p fluid resuscitation   Plan:    - ctm Cr/UOP  - not candidate for dialysis  - not a candidate for RTT at this time.     #AGMA   - likely in setting of lactic acidosis  - 1300 sodium bicarb TID and 150meq Na bicarb 75cc/h  for 24 hours    #Skin    #ID    #Sepsis.    - pt met SIRS criteria + source of known foot infection.   - Recently treated for L foot wound growing E. coli and S. aureus.   - Vascular offering definitive management with amputation but family declines. Local wound care.   Plan:   - currently on doxycycline, meropenem  - ID consulted, f/u recs     #MSK:    #Wound of foot.   - L foot xray: Dorsal forefoot soft tissue swelling. Calcaneal cortex on left foot with possible osteomyelitis  - previously unable to tolerate MRI L ankle when attempted in recent hospitalization  - Recently Tx in Dec 23' for L foot wound growing S. Aureas and E. Coli, amputation L foot offered and deferred at the time as per GOC     Plan:  - c/w Abx as above  - Wound care following. appreciate recs  - not candidate for vascular surgery    #Endocrine    #Diabetes.   - POC checks   Plan:  - NPO, sliding scale.    #Hematologic    #Anemia   - Hgb ~6 on 1/29. No signs of bleeding. Received 1U PRBC.   - Hgb now improved - stable.  Plan:  -ctm    #DVT ppx    #Ethics/ICU Bundle  - GOC/Code Status: Full Code. pt with prior MOLST - DNR/DNI but was rescinded on this hospitalization. Palliative consulted. Ongoing GOC conversations. Toby understands the poor prognosis and would like to proceed with comfort measures/DNR/DNI but two out of his three siblings do not agree with comfort measures at this time. He does not feel comfortable transitioning care until his other two brothers are in agreement and until they have visited the pt which they state will be this weekend. We addressed that his mom is declining and that we do not recommend NGT/PEG as this is uncomfortable and would not change her prognosis. Toby expressed understanding but states he wishes to pursue PEG as brothers would not forgive him if he did not.  - Diet:  - Access:  - Tubes/Drains:  - Activity/PT/OT:

## 2024-02-12 NOTE — PROGRESS NOTE ADULT - RESPIRATORY DISTRESS OBSERVATION: HEART RATE
Greater than or equal to 110 beats
Less than 90 beats
Greater than or equal to 110 beats
Less than 90 beats

## 2024-02-12 NOTE — PROGRESS NOTE ADULT - PROBLEM SELECTOR PROBLEM 6
Presence of stent of CABG
Anemia
Anemia
Presence of stent of CABG
Advanced care planning/counseling discussion
Advanced care planning/counseling discussion
Presence of stent of CABG
Anemia
Advanced care planning/counseling discussion
Presence of stent of CABG
Anemia
Hypernatremia
Acute pain
Anemia
Presence of stent of CABG
Wound of foot
Advanced care planning/counseling discussion
Anemia

## 2024-02-12 NOTE — PROGRESS NOTE ADULT - SUBJECTIVE AND OBJECTIVE BOX
[facility] Division of Kidney Diseases & Hypertension  FOLLOW UP NOTE  [phone]--------------------------------------------------------------------------------  Chief Complaint:Hyperosmolality with hypernatremia        24 hour events/subjective:        PAST HISTORY  --------------------------------------------------------------------------------  No significant changes to PMH, PSH, FHx, SHx, unless otherwise noted    ALLERGIES & MEDICATIONS  --------------------------------------------------------------------------------  Allergies    Pineapple (Unknown)  contrast dye -  rash (Other)  iodine (Other)  codeine (Vomiting)  penicillin (Rash)  latex (Rash)    Intolerances    Tolerates ceftriaxone, cefepime (Other)    Standing Inpatient Medications  chlorhexidine 0.12% Liquid 15 milliLiter(s) Oral Mucosa every 12 hours  Dakins Solution - 1/2 Strength 1 Application(s) Topical two times a day  dextrose 5%. 1000 milliLiter(s) IV Continuous <Continuous>  dextrose 5%. 1000 milliLiter(s) IV Continuous <Continuous>  dextrose 5%. 1000 milliLiter(s) IV Continuous <Continuous>  dextrose 5%. 1000 milliLiter(s) IV Continuous <Continuous>  dextrose 50% Injectable 25 Gram(s) IV Push once  dextrose 50% Injectable 25 Gram(s) IV Push once  dextrose 50% Injectable 12.5 Gram(s) IV Push once  dextrose 50% Injectable 25 Gram(s) IV Push once  dextrose 50% Injectable 25 Gram(s) IV Push once  dextrose 50% Injectable 12.5 Gram(s) IV Push once  doxycycline IVPB      doxycycline IVPB 100 milliGRAM(s) IV Intermittent every 12 hours  fentaNYL   Infusion. 0.5 MICROgram(s)/kG/Hr IV Continuous <Continuous>  glucagon  Injectable 1 milliGRAM(s) IntraMuscular once  glucagon  Injectable 1 milliGRAM(s) IntraMuscular once  heparin   Injectable 5000 Unit(s) SubCutaneous every 12 hours  insulin glargine Injectable (LANTUS) 6 Unit(s) SubCutaneous every morning  insulin lispro (ADMELOG) corrective regimen sliding scale   SubCutaneous every 6 hours  meropenem  IVPB 500 milliGRAM(s) IV Intermittent every 24 hours  midodrine 30 milliGRAM(s) Oral every 8 hours  norepinephrine Infusion 0.05 MICROgram(s)/kG/Min IV Continuous <Continuous>  pantoprazole   Suspension 40 milliGRAM(s) Oral daily  propofol Infusion 5 MICROgram(s)/kG/Min IV Continuous <Continuous>  sodium bicarbonate 1300 milliGRAM(s) Oral three times a day  sodium bicarbonate  Infusion 0.278 mEq/kG/Hr IV Continuous <Continuous>  sodium zirconium cyclosilicate 10 Gram(s) Oral daily    PRN Inpatient Medications  acetaminophen   Oral Liquid .. 650 milliGRAM(s) Oral every 6 hours PRN  dextrose Oral Gel 15 Gram(s) Oral once PRN  dextrose Oral Gel 15 Gram(s) Oral once PRN  HYDROmorphone  Injectable 1 milliGRAM(s) IV Push every 4 hours PRN    REVIEW OF SYSTEMS  --------------------------------------------------------------------------------  limited 2/2     VITALS/PHYSICAL EXAM  --------------------------------------------------------------------------------  T(C): 37 (02-12-24 @ 08:00), Max: 37.3 (02-11-24 @ 12:00)  HR: 86 (02-12-24 @ 09:00) (73 - 96)  BP: 92/44 (02-12-24 @ 09:00) (69/24 - 119/40)  RR: 14 (02-12-24 @ 09:00) (14 - 30)  SpO2: 97% (02-12-24 @ 09:00) (97% - 100%)  Wt(kg): --    02-11-24 @ 07:01  -  02-12-24 @ 07:00  --------------------------------------------------------  IN: 2845.1 mL / OUT: 460 mL / NET: 2385.1 mL    02-12-24 @ 07:01  -  02-12-24 @ 10:23  --------------------------------------------------------  IN: 264.4 mL / OUT: 100 mL / NET: 164.4 mL    Physical Exam:  	Gen: NAD, well-appearing  	HEENT: PERRL, supple neck, clear oropharynx  	Pulm: CTA B/L  	CV: RRR, S1S2;  	Back: No spinal or CVA tenderness  	Abd: +BS, soft, nontender/nondistended  	: No suprapubic tenderness                      Extremities: no bilateral LE edema noted.                       Neuro: No focal deficits, intact gait  	Skin: Warm, without rashes  	Vascular access:    LABS/STUDIES  --------------------------------------------------------------------------------              7.2    17.11 >-----------<  125      [02-12-24 @ 00:15]              22.3     140  |  100  |  119  ----------------------------<  240      [02-12-24 @ 00:15]  4.5   |  18  |  3.81        Ca     7.2     [02-12-24 @ 00:15]      Mg     1.90     [02-12-24 @ 00:15]      Phos  4.4     [02-12-24 @ 00:15]    TPro  5.0  /  Alb  1.5  /  TBili  0.3  /  DBili  x   /  AST  17  /  ALT  8   /  AlkPhos  366  [02-12-24 @ 00:15]    Creatinine Trend:  SCr 3.81 [02-12 @ 00:15]  SCr 3.84 [02-11 @ 00:25]  SCr 3.76 [02-10 @ 02:30]  SCr 3.73 [02-09 @ 13:15]  SCr 3.60 [02-09 @ 04:55]   [facility] Division of Kidney Diseases & Hypertension  FOLLOW UP NOTE  [phone]--------------------------------------------------------------------------------  Chief Complaint: SAE, metabolic acidosis     24 hour events/subjective: Pt. was seen and examined in the MICU. She remains intubated, on sedation and on IV vasopressors. Pt anuric with documented 24hr UOP 10cc.      PAST HISTORY  --------------------------------------------------------------------------------  No significant changes to PMH, PSH, FHx, SHx, unless otherwise noted    ALLERGIES & MEDICATIONS  --------------------------------------------------------------------------------  Allergies    Pineapple (Unknown)  contrast dye -  rash (Other)  iodine (Other)  codeine (Vomiting)  penicillin (Rash)  latex (Rash)    Intolerances    Tolerates ceftriaxone, cefepime (Other)    Standing Inpatient Medications  chlorhexidine 0.12% Liquid 15 milliLiter(s) Oral Mucosa every 12 hours  Dakins Solution - 1/2 Strength 1 Application(s) Topical two times a day  dextrose 5%. 1000 milliLiter(s) IV Continuous <Continuous>  dextrose 5%. 1000 milliLiter(s) IV Continuous <Continuous>  dextrose 5%. 1000 milliLiter(s) IV Continuous <Continuous>  dextrose 5%. 1000 milliLiter(s) IV Continuous <Continuous>  dextrose 50% Injectable 25 Gram(s) IV Push once  dextrose 50% Injectable 25 Gram(s) IV Push once  dextrose 50% Injectable 12.5 Gram(s) IV Push once  dextrose 50% Injectable 25 Gram(s) IV Push once  dextrose 50% Injectable 25 Gram(s) IV Push once  dextrose 50% Injectable 12.5 Gram(s) IV Push once  doxycycline IVPB      doxycycline IVPB 100 milliGRAM(s) IV Intermittent every 12 hours  fentaNYL   Infusion. 0.5 MICROgram(s)/kG/Hr IV Continuous <Continuous>  glucagon  Injectable 1 milliGRAM(s) IntraMuscular once  glucagon  Injectable 1 milliGRAM(s) IntraMuscular once  heparin   Injectable 5000 Unit(s) SubCutaneous every 12 hours  insulin glargine Injectable (LANTUS) 6 Unit(s) SubCutaneous every morning  insulin lispro (ADMELOG) corrective regimen sliding scale   SubCutaneous every 6 hours  meropenem  IVPB 500 milliGRAM(s) IV Intermittent every 24 hours  midodrine 30 milliGRAM(s) Oral every 8 hours  norepinephrine Infusion 0.05 MICROgram(s)/kG/Min IV Continuous <Continuous>  pantoprazole   Suspension 40 milliGRAM(s) Oral daily  propofol Infusion 5 MICROgram(s)/kG/Min IV Continuous <Continuous>  sodium bicarbonate 1300 milliGRAM(s) Oral three times a day  sodium bicarbonate  Infusion 0.278 mEq/kG/Hr IV Continuous <Continuous>  sodium zirconium cyclosilicate 10 Gram(s) Oral daily    PRN Inpatient Medications  acetaminophen   Oral Liquid .. 650 milliGRAM(s) Oral every 6 hours PRN  dextrose Oral Gel 15 Gram(s) Oral once PRN  dextrose Oral Gel 15 Gram(s) Oral once PRN  HYDROmorphone  Injectable 1 milliGRAM(s) IV Push every 4 hours PRN    REVIEW OF SYSTEMS  --------------------------------------------------------------------------------  limited 2/2     VITALS/PHYSICAL EXAM  --------------------------------------------------------------------------------  T(C): 37 (02-12-24 @ 08:00), Max: 37.3 (02-11-24 @ 12:00)  HR: 86 (02-12-24 @ 09:00) (73 - 96)  BP: 92/44 (02-12-24 @ 09:00) (69/24 - 119/40)  RR: 14 (02-12-24 @ 09:00) (14 - 30)  SpO2: 97% (02-12-24 @ 09:00) (97% - 100%)  Wt(kg): --    02-11-24 @ 07:01  -  02-12-24 @ 07:00  --------------------------------------------------------  IN: 2845.1 mL / OUT: 460 mL / NET: 2385.1 mL    02-12-24 @ 07:01  -  02-12-24 @ 10:23  --------------------------------------------------------  IN: 264.4 mL / OUT: 100 mL / NET: 164.4 mL    Physical Exam:  	Gen: Intubated/sedated, ill appearing  	HEENT: Anicteric. + ETT  	Pulm: Mechanical breath sounds B/L  	CV: S1S2+  	Abd: Soft, +BS, +rectal tube               : +[place]   	Ext: + LE edema B/L  	Neuro: Awake              : [place] cath+ with small amount of urine  	Skin: Warm and dry    LABS/STUDIES  --------------------------------------------------------------------------------              7.2    17.11 >-----------<  125      [02-12-24 @ 00:15]              22.3     140  |  100  |  119  ----------------------------<  240      [02-12-24 @ 00:15]  4.5   |  18  |  3.81        Ca     7.2     [02-12-24 @ 00:15]      Mg     1.90     [02-12-24 @ 00:15]      Phos  4.4     [02-12-24 @ 00:15]    TPro  5.0  /  Alb  1.5  /  TBili  0.3  /  DBili  x   /  AST  17  /  ALT  8   /  AlkPhos  366  [02-12-24 @ 00:15]    Creatinine Trend:  SCr 3.81 [02-12 @ 00:15]  SCr 3.84 [02-11 @ 00:25]  SCr 3.76 [02-10 @ 02:30]  SCr 3.73 [02-09 @ 13:15]  SCr 3.60 [02-09 @ 04:55]

## 2024-02-12 NOTE — PROGRESS NOTE ADULT - PROBLEM SELECTOR PROBLEM 8
HTN (hypertension)
HTN (hypertension)
Prophylactic measure
Encounter for palliative care
Prophylactic measure
HTN (hypertension)
Prophylactic measure
HTN (hypertension)
Prophylactic measure
HTN (hypertension)

## 2024-02-12 NOTE — PROGRESS NOTE ADULT - PROBLEM SELECTOR PLAN 2
Pt with HAGMA in the setting of renal failure. SCO2 low at 18 today, and venous pH WNL at 7.39 on 2/11. Pt in both sodium bicarb infusion and sodium bicarb tabs. Pt now anuric, please proceed with bicarb infusion with caution. Monitor volume status closely. Continue with sodium bicarb tabs. Monitor SCO2 and pH.

## 2024-02-12 NOTE — PROGRESS NOTE ADULT - PROBLEM SELECTOR PLAN 3
Pt with hyperkalemia in the setting of renal failure. Today Serum K WNL 4.5. Can continue Lokelma 10gm qd for now. Monitor serum potassium closely.

## 2024-02-12 NOTE — PROGRESS NOTE ADULT - ATTENDING COMMENTS
89 years old female with h/o HTN, HLD, CAD, DM, dementia, s/p spinal stimulator placement p/w abnormal labs (Na 168, K 2.9) found to have necrotizing wound infection (E.Coli, Staph Aureus) as well as SAE. Hospital course c/b progressive encephalopathy, aspiration and AHRF requring intubation and mechanical ventilation, bilateral necrotizing infection not surgical candidate.     N: Toxic metabolic encephalopathy iso renal failure, septic shock  Baseline dementia  Spinal Stimulator  Pain  - Fentanyl and prop for sedation, RASS -2  CV: Shock- distributive  CAD, HTN, HLD  AF  - Levo with MAP >65  P: Aspiration,   AHRF s/p intubation (2/9)  - Lung protective ventilaiton, trend BG and adjust as able  - SBT daily, failing 2/2 apnea  GI:   - TF to goal  Heme: Acute on chronic anemia  R: Oligouric renal failure, not candidate for RRT  Hypernatremia- resolved  Uremia  Lactic acidosis (>2)  - Trend BG  - Trend UOP, creatinine, replete lytes as able  ID: Bilateral necrotizing wound infection not surgical candidate  Aspiration pneumonia  - Collin, doxy   - Will likely not have recovery without surgical intervention thus prognosis remains poor.     Full code for now. Will call palliative to assist with GOC. With ongoing sepsis, inability to obtain source control, renal failure prognosis poor.

## 2024-02-12 NOTE — PROGRESS NOTE ADULT - TIME BILLING
Time spent for extensive review of the physical chart, electronic medical record, and documentation to obtain collateral information including but not limited to:  [x ] Inpatient records (ED, H&P, primary team, and consultants if applicable, care coordination)  [x ] Inpatient values/results (biomarkers, immunoassays, imaging, and microbiology results)  [x ] Current or proposed treatment plans  [x  ] Discussion with the primary team  [x ] Discussion with the patient, surrogate decision maker, or family  [x] 30 mins spent discussing goc     Time spent: >82

## 2024-02-12 NOTE — PROGRESS NOTE ADULT - PROBLEM SELECTOR PLAN 1
Patient with anuric acute kidney injury in the setting of hypovolemia and sepsis. Baseline Scr 0.9-1.2. On admission Scr elevated at 2.76 but improved with fluids to 1.91 on 2/2. However now Scr rising. Scr elevated at 3.81 today. She has had ~10cc of UOP and Net +2.4L in past 24 hours. Remains intubated, sedated and on IV vasopressors. Pt overall poor prognosis and is not an appropriate candidate for RRT/HD. GOC discussions in progress. Monitor labs and STRICT I/O. Avoid nephrotoxins. Dose medications as per CrCl.

## 2024-02-12 NOTE — PROGRESS NOTE ADULT - CONVERSATION/DISCUSSION
Diagnosis/Prognosis/MOLST Discussed/Treatment Options
Diagnosis/MOLST Discussed/Treatment Options
Diagnosis/Prognosis/MOLST Discussed/Hospice Referral

## 2024-02-12 NOTE — PROGRESS NOTE ADULT - PROBLEM SELECTOR PLAN 7
Patient's son, Toby Burnham ([phone]) is hcp and POA. Toby has 4 siblings that are coming to visit patient this weekend before hopefully re-establishing advanced directives.   > FULL CODE   > 1/29: Called Toby who stated family was unable to visit her this weekend as one of his siblings had covid but plan is to visit her tomorrow around 1-2pm before establishing advanced directives.  > 1/31: See above goc- will follow up in at 3:15pm  > 2/1- see goc- FULL CODE, discussed hospice again but goals remain unchanged. continue medical management  > 2/12: FULL CODE- see above- encouraged Toby to continue goc

## 2024-02-12 NOTE — PROGRESS NOTE ADULT - PROBLEM SELECTOR PROBLEM 5
HTN (hypertension)
Acute pain
Dementia
Dementia
Acute pain
Acute pain
Dementia
HTN (hypertension)
Acute pain
HTN (hypertension)
Anemia
Wound of foot
Dementia
Functional quadriplegia

## 2024-02-13 NOTE — PROGRESS NOTE ADULT - PROBLEM SELECTOR PROBLEM 1
SAE (acute kidney injury)
Sepsis
Acute respiratory failure with hypoxia
SAE (acute kidney injury)
Dementia
Sepsis
Acute respiratory failure with hypoxia
Dementia
SAE (acute kidney injury)
Sepsis
SAE (acute kidney injury)
Acute respiratory failure with hypoxia
Sepsis
Sepsis
Acute respiratory failure with hypoxia
Dementia
Sepsis
Dementia
Dementia
Acute respiratory failure with hypoxia
Acute respiratory failure with hypoxia

## 2024-02-13 NOTE — PROGRESS NOTE ADULT - PROBLEM SELECTOR PLAN 1
Patient with anuric SAE in the setting of hypovolemia and sepsis. Baseline Scr 0.9-1.2. On admission Scr elevated at 2.76 but improved with fluids to 1.91 on 2/2. However now Scr rising. Scr peaked to 3.81 on 2/12. Scr elevated/stable at 3.74 today. She has had ~30cc of UOP and Net +2.7L in past 24 hours. Remains intubated, sedated and on IV vasopressor. Pt overall poor prognosis and is not an appropriate candidate for RRT/HD. GOC discussions in progress. Monitor labs and STRICT I/O. Avoid nephrotoxins. Dose medications as per CrCl.

## 2024-02-13 NOTE — PROGRESS NOTE ADULT - PROBLEM SELECTOR PROBLEM 3
Electrolyte imbalance
Hypernatremia
Electrolyte imbalance
Electrolyte imbalance
Hypernatremia
Electrolyte imbalance
SAE (acute kidney injury)
Hypernatremia
Electrolyte imbalance
Hypernatremia
SAE (acute kidney injury)
Hypernatremia
SAE (acute kidney injury)
Hypernatremia
SAE (acute kidney injury)
SAE (acute kidney injury)
Hypernatremia
SAE (acute kidney injury)
Afib
SAE (acute kidney injury)
Dementia
Hypernatremia
Hypernatremia

## 2024-02-13 NOTE — PROGRESS NOTE ADULT - SUBJECTIVE AND OBJECTIVE BOX
[facility] Division of Kidney Diseases & Hypertension  FOLLOW UP NOTE  [phone]--------------------------------------------------------------------------------  Chief Complaint: SAE, metabolic acidosis     24 hour events/subjective: Pt. was seen and examined in the MICU. She remains intubated, on sedation and on IV vasopressors. Pt anuric with documented 24hr UOP 30cc.    PAST HISTORY  --------------------------------------------------------------------------------  No significant changes to PMH, PSH, FHx, SHx, unless otherwise noted    ALLERGIES & MEDICATIONS  --------------------------------------------------------------------------------  Allergies    Pineapple (Unknown)  contrast dye -  rash (Other)  iodine (Other)  codeine (Vomiting)  penicillin (Rash)  latex (Rash)    Intolerances    Tolerates ceftriaxone, cefepime (Other)    Standing Inpatient Medications  chlorhexidine 0.12% Liquid 15 milliLiter(s) Oral Mucosa every 12 hours  Dakins Solution - 1/2 Strength 1 Application(s) Topical two times a day  dextrose 5%. 1000 milliLiter(s) IV Continuous <Continuous>  dextrose 5%. 1000 milliLiter(s) IV Continuous <Continuous>  dextrose 5%. 1000 milliLiter(s) IV Continuous <Continuous>  dextrose 5%. 1000 milliLiter(s) IV Continuous <Continuous>  dextrose 50% Injectable 12.5 Gram(s) IV Push once  dextrose 50% Injectable 12.5 Gram(s) IV Push once  dextrose 50% Injectable 25 Gram(s) IV Push once  dextrose 50% Injectable 25 Gram(s) IV Push once  dextrose 50% Injectable 25 Gram(s) IV Push once  dextrose 50% Injectable 25 Gram(s) IV Push once  doxycycline IVPB      doxycycline IVPB 100 milliGRAM(s) IV Intermittent every 12 hours  fentaNYL   Infusion. 0.5 MICROgram(s)/kG/Hr IV Continuous <Continuous>  glucagon  Injectable 1 milliGRAM(s) IntraMuscular once  glucagon  Injectable 1 milliGRAM(s) IntraMuscular once  heparin   Injectable 5000 Unit(s) SubCutaneous every 12 hours  insulin glargine Injectable (LANTUS) 6 Unit(s) SubCutaneous every morning  insulin lispro (ADMELOG) corrective regimen sliding scale   SubCutaneous every 6 hours  meropenem  IVPB 500 milliGRAM(s) IV Intermittent every 24 hours  midodrine 30 milliGRAM(s) Oral every 8 hours  norepinephrine Infusion 0.05 MICROgram(s)/kG/Min IV Continuous <Continuous>  pantoprazole   Suspension 40 milliGRAM(s) Oral daily  propofol Infusion 5 MICROgram(s)/kG/Min IV Continuous <Continuous>  sodium bicarbonate 1300 milliGRAM(s) Oral three times a day  sodium zirconium cyclosilicate 10 Gram(s) Oral daily    PRN Inpatient Medications  acetaminophen   Oral Liquid .. 650 milliGRAM(s) Oral every 6 hours PRN  dextrose Oral Gel 15 Gram(s) Oral once PRN  dextrose Oral Gel 15 Gram(s) Oral once PRN  HYDROmorphone  Injectable 1 milliGRAM(s) IV Push every 4 hours PRN    REVIEW OF SYSTEMS  --------------------------------------------------------------------------------  limited 2/2 baseline mental status and pt sedated     VITALS/PHYSICAL EXAM  --------------------------------------------------------------------------------  T(C): 37.8 (02-13-24 @ 04:00), Max: 37.8 (02-13-24 @ 04:00)  HR: 91 (02-13-24 @ 08:49) (75 - 99)  BP: 52/13 (02-13-24 @ 08:00) (52/13 - 90/42)  RR: 16 (02-13-24 @ 08:00) (14 - 20)  SpO2: 100% (02-13-24 @ 08:49) (97% - 100%)  Wt(kg): --    02-12-24 @ 07:01  -  02-13-24 @ 07:00  --------------------------------------------------------  IN: 3017.3 mL / OUT: 330 mL / NET: 2687.3 mL    02-13-24 @ 07:01  -  02-13-24 @ 09:40  --------------------------------------------------------  IN: 71.2 mL / OUT: 0 mL / NET: 71.2 mL    Physical Exam:  	Gen: Intubated/sedated, ill appearing  	HEENT: Anicteric. + ETT  	Pulm: Mechanical breath sounds B/L  	CV: S1S2+  	Abd: Soft, +BS, +rectal tube               : +[place]   	Ext: + LE edema B/L  	Neuro: Awake  	Skin: Warm and dry    LABS/STUDIES  --------------------------------------------------------------------------------              7.3    16.68 >-----------<  125      [02-13-24 @ 00:10]              22.7     140  |  97  |  113  ----------------------------<  206      [02-13-24 @ 00:10]  4.8   |  22  |  3.74        Ca     6.3     [02-13-24 @ 00:10]      Mg     1.80     [02-13-24 @ 00:10]      Phos  4.5     [02-13-24 @ 00:10]    TPro  4.6  /  Alb  1.4  /  TBili  0.3  /  DBili  x   /  AST  19  /  ALT  9   /  AlkPhos  424  [02-13-24 @ 00:10]    PT/INR: PT 14.0 , INR 1.26       [02-13-24 @ 00:10]    Creatinine Trend:  SCr 3.74 [02-13 @ 00:10]  SCr 3.81 [02-12 @ 00:15]  SCr 3.84 [02-11 @ 00:25]  SCr 3.76 [02-10 @ 02:30]  SCr 3.73 [02-09 @ 13:15]

## 2024-02-13 NOTE — PROGRESS NOTE ADULT - ATTENDING COMMENTS
89 years old female with h/o HTN, HLD, CAD, DM, dementia, s/p spinal stimulator placement p/w abnormal labs (Na 168, K 2.9) found to have necrotizing wound infection (E.Coli, Staph Aureus) as well as SAE. Hospital course c/b progressive encephalopathy, aspiration and AHRF requring intubation and mechanical ventilation, bilateral necrotizing infection not surgical candidate.     N: Toxic metabolic encephalopathy iso renal failure, septic shock  Baseline dementia  Spinal Stimulator  Pain, agitation  - Fentanyl and prop for sedation, RASS -2  CV: Shock- distributive  CAD, HTN, HLD  AF  - Levo with MAP >65  P: Aspiration,   AHRF s/p intubation (2/9)  - Lung protective ventilation, trend BG and adjust as able  - SBT daily, failing 2/2 apnea  GI:   - TF to goal  Heme: Acute on chronic anemia  R: Oligouric renal failure, not candidate for RRT  Hypernatremia- resolved  Uremia  Lactic acidosis (>2)  - Trend BG  - Trend UOP, creatinine, replete lytes as able  ID: Bilateral necrotizing wound infection not surgical candidate  Aspiration pneumonia  - Collin, doxy   - Will likely not have recovery without surgical intervention thus prognosis remains poor.     Full code for now. With ongoing sepsis, inability to obtain source control, renal failure prognosis poor. Updated family on clinical decline, they report they will come however no family member has come to bedside 89 years old female with h/o HTN, HLD, CAD, DM, dementia, s/p spinal stimulator placement p/w abnormal labs (Na 168, K 2.9) found to have necrotizing wound infection (E.Coli, Staph Aureus) as well as SAE. Hospital course c/b progressive encephalopathy, aspiration and AHRF requring intubation and mechanical ventilation, bilateral necrotizing infection not surgical candidate.     N: Toxic metabolic encephalopathy iso renal failure, septic shock  Baseline dementia  Spinal Stimulator  Pain, agitation  - Fentanyl and prop for sedation, RASS -2  CV: Shock- distributive  CAD, HTN, HLD  AF  - Levo with MAP >65  P: Aspiration,   AHRF s/p intubation (2/9)  - Lung protective ventilation, trend BG and adjust as able  - SBT daily, failing 2/2 apnea  GI:   - TF to goal  Heme: Acute on chronic anemia  R: Oligouric renal failure, not candidate for RRT  Hypernatremia- resolved  Uremia  Lactic acidosis (>2)  - Trend UOP, creatinine, replete lytes as able  ID: Bilateral necrotizing wound infection not surgical candidate  Aspiration pneumonia  - Collin, doxy   - Will likely not have recovery without surgical intervention thus prognosis remains poor.     Full code for now. With ongoing sepsis, inability to obtain source control, renal failure prognosis poor. Updated family on clinical decline and is actively in the dying process. Family report they will come however no family member has come to bedside. We will space labs to every other day considering entirety of clinical picture.

## 2024-02-13 NOTE — PROGRESS NOTE ADULT - PROBLEM SELECTOR PLAN 3
Pt with hyperkalemia in the setting of renal failure. Today Serum K WNL 4.8. Can continue Lokelma 10gm qd for now. Monitor serum potassium closely.    Final recommendations pending attending signature.  If you have any questions, please feel free to contact me  Ghanshyam Wilson  Nephrology Fellow  [employer]/Page [phone]  (After 5pm or on weekends please page the on-call fellow)

## 2024-02-13 NOTE — PROGRESS NOTE ADULT - PROBLEM SELECTOR PROBLEM 2
Sepsis
Metabolic acidosis
Wound of foot
Wound of foot
Metabolic acidosis
Respiratory failure
Metabolic acidosis
Sepsis
Wound of foot
Metabolic acidosis
Metabolic acidosis
Wound of foot
Sepsis
Wound of foot
Sepsis
Sepsis
Wound of foot
Wound of foot
SAE (acute kidney injury)
Wound of foot
Afib
Wound of foot
Sepsis

## 2024-02-13 NOTE — PROGRESS NOTE ADULT - PROBLEM SELECTOR PLAN 2
Pt with HAGMA in the setting of renal failure. SCO2 WNL at 22 today, and arterial pH WNL at 7.39 today. Pt now off sodium bicarb infusion. Continue with sodium bicarb tabs. Monitor SCO2 and pH.

## 2024-02-13 NOTE — PROGRESS NOTE ADULT - ASSESSMENT
Pt is a 89 yo F with PMH dementia (AOx0, non-verbal), R ankle gangrene, HTN, HLD, CAD, and T2D p/f [facility] rehab with hyperNa and acute renal failure, both improved. Course c/b AHRF requiring HFNC, s/p RTT now intubated. GOC discussion held with son/HCP Toby who rescinded prior DNR/DNI, now plan for full code and full escalation of care and pursuing all interventions. Intubated, pending TTE for pre-op optimization with cards following in anticipation of plan for PEG (GI consulted) and LTCF placement.     #Neuro    #Pain  - propofol ggt at 5  - fentanyl ggt 0.5    #Dementia   - pt. A&Ox0 - nonverbal.  - Severe protein calorie malnutrition.   - Failed S&S eval.  Plan:   - Ongoing GOC conversation as below  Palliative re-consulted, called family, no change in GOC as per proxy. Family deferring changes until they visit pt during the weekend.   - anticipate need for PEG -- GI consulted, deferring until medically stable  - cards consulted for pre-op optimization, pending TTE.    #Cardiovascular    #Shock  -most likely septic shock in setting of gangrenous infection  Plan:  -continue levophed, wean as tolerated. Now maxed at 0.28 mcg/hr    #Afib.   - Afib with RVR. Pt. was transferred to a telemetry floor. Discussed with son. switch IV lopressor to PO now that we have oral access   Plan:  - hold lopressor in setting of shock    #Respiratory    Acute respiratory failure with hypoxia.   - suspect in setting of worsening deconditioning, poor inspiratory effort, atelectasis   - patient needed emergent intubation due to   - post intubation chest xray shows complete left lung opacity  RRT on 02/09 with emergent intubation due to increased work of breathing    Plan:  - continue antibiotics as per ID recs  - on volume AC, continue current vent settings     #GI/Nutrition    #Diet: tube feeds through OG at rate of 40    #Diarrhea  - noted to have multiple loose BMs today  - dc bowel regimen (miralax, senna -- last dosed 2/7)    #/Renal    #SAE (acute kidney injury).   - on admission  and Cr 2.76. Likely prerenal  - improved s/p fluid resuscitation   Plan:    - ctm Cr/UOP  - not candidate for dialysis  - not a candidate for RTT at this time.     #AGMA   - likely in setting of lactic acidosis  - 1300 sodium bicarb TID and 150meq Na bicarb 75cc/h  for 24 hours    #Skin    #ID    #Sepsis.    - pt met SIRS criteria + source of known foot infection.   - Recently treated for L foot wound growing E. coli and S. aureus.   - Vascular offering definitive management with amputation but family declines. Local wound care.   Plan:   - currently on doxycycline, meropenem  - ID consulted, f/u recs     #MSK:    #Wound of foot.   - L foot xray: Dorsal forefoot soft tissue swelling. Calcaneal cortex on left foot with possible osteomyelitis  - previously unable to tolerate MRI L ankle when attempted in recent hospitalization  - Recently Tx in Dec 23' for L foot wound growing S. Aureas and E. Coli, amputation L foot offered and deferred at the time as per GOC     Plan:  - c/w Abx as above  - Wound care following. appreciate recs  - not candidate for vascular surgery    #Endocrine    #Diabetes.   - POC checks   Plan:  - NPO, sliding scale.    #Hematologic    #Anemia   - Hgb ~6 on 1/29. No signs of bleeding. Received 1U PRBC.   - Hgb now improved - stable.  Plan:  -ctm    #DVT ppx    #Ethics/ICU Bundle  - GOC/Code Status: Full Code. pt with prior MOLST - DNR/DNI but was rescinded on this hospitalization. Palliative consulted. Ongoing GOC conversations. Toby understands the poor prognosis and would like to proceed with comfort measures/DNR/DNI but two out of his three siblings do not agree with comfort measures at this time. He does not feel comfortable transitioning care until his other two brothers are in agreement and until they have visited the pt which they state will be this weekend. We addressed that his mom is declining and that we do not recommend NGT/PEG as this is uncomfortable and would not change her prognosis. Toby expressed understanding but states he wishes to pursue PEG as brothers would not forgive him if he did not.  - Diet:  - Access:  - Tubes/Drains:  - Activity/PT/OT:   Pt is a 89 yo F with PMH dementia (AOx0, non-verbal), R ankle gangrene, HTN, HLD, CAD, and T2D p/f [facility] rehab with hyperNa and acute renal failure, both improved. Course c/b AHRF requiring HFNC, s/p RTT now intubated. GOC discussion held with son/HCP Toby who rescinded prior DNR/DNI, now plan for full code and full escalation of care and pursuing all interventions. Intubated, pending TTE for pre-op optimization with cards following in anticipation of plan for PEG (GI consulted) and LTCF placement.     #Neuro    #Pain  - propofol ggt at 5  - fentanyl ggt 0.5    #Dementia   - pt. A&Ox0 - nonverbal.  - Severe protein calorie malnutrition.   - Failed S&S eval.  Plan:   - Ongoing GOC conversation as below  Palliative re-consulted, called family, no change in GOC as per proxy. Family deferring changes until they visit pt during the weekend.   - anticipate need for PEG -- GI deferring until medically stable      #Cardiovascular    #Shock  -most likely septic shock in setting of gangrenous infection  Plan:  -continue levophed, wean as tolerated. Now maxed at 0.28 mcg/hr    #Afib.   - Afib with RVR. Pt. was transferred to a telemetry floor. Discussed with son. switch IV lopressor to PO now that we have oral access   Plan:  - hold lopressor in setting of shock    #Respiratory    Acute respiratory failure with hypoxia.   - suspect in setting of worsening deconditioning, poor inspiratory effort, atelectasis   - patient needed emergent intubation due to   - post intubation chest xray shows complete left lung opacity  RRT on 02/09 with emergent intubation due to increased work of breathing    Plan:  - continue antibiotics as per ID recs  - on volume AC, continue current vent settings     #GI/Nutrition    #Diet: tube feeds through OG at rate of 40    #Diarrhea  - noted to have multiple loose BMs today  - dc bowel regimen (miralax, senna -- last dosed 2/7)    #/Renal  - Ca repletion     #SAE (acute kidney injury).   - on admission  and Cr 2.76. Likely prerenal  - improved s/p fluid resuscitation     Plan:    - ctm Cr/UOP  - not candidate for dialysis  - not a candidate for RTT at this time.     #AGMA   - likely in setting of lactic acidosis  - 1300 sodium bicarb TID and 150meq Na bicarb 75cc/h  for 24 hours    #Skin    #ID    #Sepsis.    - pt met SIRS criteria + source of known foot infection.   - Recently treated for L foot wound growing E. coli and S. aureus.   - Vascular offering definitive management with amputation but family declines. Local wound care.   Plan:   - currently on doxycycline, meropenem  - ID consulted, f/u recs     #MSK:    #Wound of foot.   - L foot xray: Dorsal forefoot soft tissue swelling. Calcaneal cortex on left foot with possible osteomyelitis  - previously unable to tolerate MRI L ankle when attempted in recent hospitalization  - Recently Tx in Dec 23' for L foot wound growing S. Aureas and E. Coli, amputation L foot offered and deferred at the time as per GOC     Plan:  - c/w Abx as above  - Wound care following. appreciate recs  - not candidate for vascular surgery    #Endocrine    #Diabetes.   - POC checks   Plan:  - NPO, sliding scale.    #Hematologic    #Anemia   - Hgb ~6 on 1/29. No signs of bleeding. Received 1U PRBC.   - Hgb now improved - stable.  Plan:  -ctm    #DVT ppx    #Ethics/ICU Bundle  - GOC/Code Status: Full Code. pt with prior MOLST - DNR/DNI but was rescinded on this hospitalization. Palliative consulted. Ongoing GOC conversations. Toby understands the poor prognosis and would like to proceed with comfort measures/DNR/DNI but two out of his three siblings do not agree with comfort measures at this time. He does not feel comfortable transitioning care until his other two brothers are in agreement and until they have visited the pt which they state will be this weekend. We addressed that his mom is declining and that we do not recommend NGT/PEG as this is uncomfortable and would not change her prognosis. Toby expressed understanding but states he wishes to pursue PEG as brothers would not forgive him if he did not.  - Diet: TF  - Access: ET tube,   - Tubes/Drains:  - Activity/PT/OT:   Pt is a 89 yo F with PMH dementia (AOx0, non-verbal), R ankle gangrene, HTN, HLD, CAD, and T2D p/f [facility] rehab with hyperNa and acute renal failure, both improved. Course c/b AHRF requiring HFNC, s/p RTT now intubated. GOC discussion held with son/HCP Toby who rescinded prior DNR/DNI, now plan for full code and full escalation of care and pursuing all interventions. Intubated, pending TTE for pre-op optimization with cards following in anticipation of plan for PEG (GI consulted) and LTCF placement.     #Neuro    #Pain  - propofol ggt at 5  - fentanyl ggt 0.5    #Dementia   - pt. A&Ox0 - nonverbal.  - Severe protein calorie malnutrition.   - Failed S&S eval.  Plan:   - Ongoing GOC conversation as below  Palliative re-consulted, called family, no change in GOC as per proxy. Family deferring changes until they visit pt during the weekend.   - anticipate need for PEG -- GI deferring until medically stable      #Cardiovascular    #Shock  -most likely septic shock in setting of gangrenous infection  Plan:  -continue levophed, wean as tolerated. Now maxed at 0.28 mcg/hr    #Afib.   - Afib with RVR. Pt. was transferred to a telemetry floor. Discussed with son. switch IV lopressor to PO now that we have oral access   Plan:  - hold lopressor in setting of shock    #Respiratory    Acute respiratory failure with hypoxia.   - suspect in setting of worsening deconditioning, poor inspiratory effort, atelectasis   - patient needed emergent intubation due to   - post intubation chest xray shows complete left lung opacity  RRT on 02/09 with emergent intubation due to increased work of breathing    Plan:  - continue antibiotics as per ID recs  - on volume AC, continue current vent settings     #GI/Nutrition    #Diet: tube feeds through OG at rate of 40    #Diarrhea  - noted to have multiple loose BMs today  - dc bowel regimen (miralax, senna -- last dosed 2/7)    #/Renal  - Ca repletion     #SAE (acute kidney injury).   - on admission  and Cr 2.76. Likely prerenal  - improved s/p fluid resuscitation     Plan:    - ctm Cr/UOP  - not candidate for dialysis  - not a candidate for RTT at this time.     #AGMA   - likely in setting of lactic acidosis  - 1300 sodium bicarb TID and 150meq Na bicarb 75cc/h  for 24 hours    #Skin    #ID    #Sepsis.    - pt met SIRS criteria + source of known foot infection.   - Recently treated for L foot wound growing E. coli and S. aureus.   - Vascular offering definitive management with amputation but family declines. Local wound care.   Plan:   - currently on doxycycline, meropenem  - ID consulted, f/u recs     #MSK:    #Wound of foot.   - L foot xray: Dorsal forefoot soft tissue swelling. Calcaneal cortex on left foot with possible osteomyelitis  - previously unable to tolerate MRI L ankle when attempted in recent hospitalization  - Recently Tx in Dec 23' for L foot wound growing S. Aureas and E. Coli, amputation L foot offered and deferred at the time as per GOC     Plan:  - c/w Abx as above  - Wound care following. appreciate recs  - not candidate for vascular surgery    #Endocrine    #Diabetes.   - POC checks   Plan:  - NPO, sliding scale.    #Hematologic    #Anemia   - Hgb ~6 on 1/29. No signs of bleeding. Received 1U PRBC.   - Hgb now improved - stable.  Plan:  -ctm    #DVT ppx    #Ethics/ICU Bundle  - GOC/Code Status: Full Code. pt with prior MOLST - DNR/DNI but was rescinded on this hospitalization. Palliative consulted. Ongoing GOC conversations. Toby understands the poor prognosis and would like to proceed with comfort measures/DNR/DNI but two out of his three siblings do not agree with comfort measures at this time. He does not feel comfortable transitioning care until his other two brothers are in agreement and until they have visited the pt which they state will be this weekend. We addressed that his mom is declining and that we do not recommend NGT/PEG as this is uncomfortable and would not change her prognosis. Toby expressed understanding but states he wishes to pursue PEG as brothers would not forgive him if he did not.  - Diet: TF  - Access: ET tube, PIV  - Tubes/Drains: Rectal tube  - Activity/PT/OT:

## 2024-02-13 NOTE — PROGRESS NOTE ADULT - SUBJECTIVE AND OBJECTIVE BOX
INTERVAL HPI/OVERNIGHT EVENTS:    SUBJECTIVE: Patient seen and examined at bedside.     ROS: All negative except as listed above.    VITAL SIGNS:  ICU Vital Signs Last 24 Hrs  T(C): 37.8 (13 Feb 2024 04:00), Max: 37.8 (13 Feb 2024 04:00)  T(F): 100 (13 Feb 2024 04:00), Max: 100 (13 Feb 2024 04:00)  HR: 93 (13 Feb 2024 04:00) (75 - 99)  BP: 72/19 (13 Feb 2024 04:00) (59/26 - 105/69)  BP(mean): 35 (13 Feb 2024 04:00) (33 - 82)  ABP: --  ABP(mean): --  RR: 16 (13 Feb 2024 04:00) (14 - 20)  SpO2: 100% (13 Feb 2024 04:00) (97% - 100%)    O2 Parameters below as of 13 Feb 2024 04:00  Patient On (Oxygen Delivery Method): ventilator    O2 Concentration (%): 40      Mode: AC/ CMV (Assist Control/ Continuous Mandatory Ventilation), RR (machine): 16, TV (machine): 350, FiO2: 40, PEEP: 5, ITime: 0.7, MAP: 9, PIP: 22  Plateau pressure:   P/F ratio:     02-12 @ 07:01  -  02-13 @ 07:00  --------------------------------------------------------  IN: 2977.3 mL / OUT: 330 mL / NET: 2647.3 mL      CAPILLARY BLOOD GLUCOSE      POCT Blood Glucose.: 179 mg/dL (13 Feb 2024 05:06)      ECG: reviewed.    PHYSICAL EXAM:    GENERAL: NAD, lying in bed comfortably  HEAD:  Atraumatic, normocephalic  EYES: EOMI, PERRLA, conjunctiva and sclera clear  NECK: Supple, trachea midline, no JVD  HEART: Regular rate and rhythm, no murmurs, rubs, or gallops  LUNGS: Unlabored respirations.  Clear to auscultation bilaterally, no crackles, wheezing, or rhonchi  ABDOMEN: Soft, nontender, nondistended, +BS  EXTREMITIES: 2+ peripheral pulses bilaterally, cap refill<2 secs. No clubbing, cyanosis, or edema  NERVOUS SYSTEM:  A&Ox3, following commands, moving all extremities, no focal deficits   SKIN: No rashes or lesions    MEDICATIONS:  MEDICATIONS  (STANDING):  chlorhexidine 0.12% Liquid 15 milliLiter(s) Oral Mucosa every 12 hours  Dakins Solution - 1/2 Strength 1 Application(s) Topical two times a day  dextrose 5%. 1000 milliLiter(s) (50 mL/Hr) IV Continuous <Continuous>  dextrose 5%. 1000 milliLiter(s) (50 mL/Hr) IV Continuous <Continuous>  dextrose 5%. 1000 milliLiter(s) (100 mL/Hr) IV Continuous <Continuous>  dextrose 5%. 1000 milliLiter(s) (100 mL/Hr) IV Continuous <Continuous>  dextrose 50% Injectable 25 Gram(s) IV Push once  dextrose 50% Injectable 25 Gram(s) IV Push once  dextrose 50% Injectable 25 Gram(s) IV Push once  dextrose 50% Injectable 25 Gram(s) IV Push once  dextrose 50% Injectable 12.5 Gram(s) IV Push once  dextrose 50% Injectable 12.5 Gram(s) IV Push once  doxycycline IVPB      doxycycline IVPB 100 milliGRAM(s) IV Intermittent every 12 hours  fentaNYL   Infusion. 0.5 MICROgram(s)/kG/Hr (2.02 mL/Hr) IV Continuous <Continuous>  glucagon  Injectable 1 milliGRAM(s) IntraMuscular once  glucagon  Injectable 1 milliGRAM(s) IntraMuscular once  heparin   Injectable 5000 Unit(s) SubCutaneous every 12 hours  insulin glargine Injectable (LANTUS) 6 Unit(s) SubCutaneous every morning  insulin lispro (ADMELOG) corrective regimen sliding scale   SubCutaneous every 6 hours  meropenem  IVPB 500 milliGRAM(s) IV Intermittent every 24 hours  midodrine 30 milliGRAM(s) Oral every 8 hours  norepinephrine Infusion 0.05 MICROgram(s)/kG/Min (3.79 mL/Hr) IV Continuous <Continuous>  pantoprazole   Suspension 40 milliGRAM(s) Oral daily  propofol Infusion 5 MICROgram(s)/kG/Min (1.21 mL/Hr) IV Continuous <Continuous>  sodium bicarbonate 1300 milliGRAM(s) Oral three times a day  sodium zirconium cyclosilicate 10 Gram(s) Oral daily    MEDICATIONS  (PRN):  acetaminophen   Oral Liquid .. 650 milliGRAM(s) Oral every 6 hours PRN Temp greater or equal to 38C (100.4F), Mild Pain (1 - 3)  dextrose Oral Gel 15 Gram(s) Oral once PRN Blood Glucose LESS THAN 70 milliGRAM(s)/deciliter  dextrose Oral Gel 15 Gram(s) Oral once PRN Blood Glucose LESS THAN 70 milliGRAM(s)/deciliter  HYDROmorphone  Injectable 1 milliGRAM(s) IV Push every 4 hours PRN Severe Pain (7 - 10)      ALLERGIES:  Allergies    Pineapple (Unknown)  contrast dye -  rash (Other)  iodine (Other)  codeine (Vomiting)  penicillin (Rash)  latex (Rash)    Intolerances    Tolerates ceftriaxone, cefepime (Other)      LABS:                        7.3    16.68 )-----------( 125      ( 13 Feb 2024 00:10 )             22.7     02-13    140  |  97<L>  |  113<H>  ----------------------------<  206<H>  4.8   |  22  |  3.74<H>    Ca    6.3<LL>      13 Feb 2024 00:10  Phos  4.5     02-13  Mg     1.80     02-13    TPro  4.6<L>  /  Alb  1.4<L>  /  TBili  0.3  /  DBili  x   /  AST  19  /  ALT  9   /  AlkPhos  424<H>  02-13    PT/INR - ( 13 Feb 2024 00:10 )   PT: 14.0 sec;   INR: 1.26 ratio           Urinalysis Basic - ( 13 Feb 2024 00:10 )    Color: x / Appearance: x / SG: x / pH: x  Gluc: 206 mg/dL / Ketone: x  / Bili: x / Urobili: x   Blood: x / Protein: x / Nitrite: x   Leuk Esterase: x / RBC: x / WBC x   Sq Epi: x / Non Sq Epi: x / Bacteria: x      ABG:  pH, Arterial: 7.39 (02-13-24 @ 00:10)  pCO2, Arterial: 38 mmHg (02-13-24 @ 00:10)  pO2, Arterial: 118 mmHg (02-13-24 @ 00:10)      vBG:    Micro:    Culture - Blood (collected 02-09-24 @ 13:10)  Source: .Blood Blood  Preliminary Report (02-12-24 @ 19:01):    No growth at 72 Hours    Culture - Blood (collected 02-09-24 @ 04:47)  Source: .Blood Blood-Peripheral  Preliminary Report (02-12-24 @ 09:01):    No growth at 72 Hours    Culture - Blood (collected 02-09-24 @ 04:32)  Source: .Blood Blood-Venous  Preliminary Report (02-12-24 @ 09:01):    No growth at 72 Hours    Culture - Blood (collected 02-02-24 @ 22:14)  Source: .Blood Blood-Peripheral  Final Report (02-08-24 @ 03:00):    No growth at 5 days    Culture - Blood (collected 02-02-24 @ 22:14)  Source: .Blood Blood-Peripheral  Final Report (02-08-24 @ 03:00):    No growth at 5 days    Culture - Blood (collected 01-24-24 @ 11:50)  Source: .Blood Blood-Peripheral  Final Report (01-29-24 @ 16:00):    No growth at 5 days    Culture - Blood (collected 01-24-24 @ 11:40)  Source: .Blood Blood-Peripheral  Final Report (01-29-24 @ 16:00):    No growth at 5 days          RADIOLOGY & ADDITIONAL TESTS: Reviewed.   INTERVAL HPI/OVERNIGHT EVENTS:  No acute events overnight, persistently low MAPs in 40's and 30's    SUBJECTIVE: Patient seen and examined at bedside.     ROS: All negative except as listed above.    VITAL SIGNS:  ICU Vital Signs Last 24 Hrs  T(C): 37.8 (13 Feb 2024 04:00), Max: 37.8 (13 Feb 2024 04:00)  T(F): 100 (13 Feb 2024 04:00), Max: 100 (13 Feb 2024 04:00)  HR: 93 (13 Feb 2024 04:00) (75 - 99)  BP: 72/19 (13 Feb 2024 04:00) (59/26 - 105/69)  BP(mean): 35 (13 Feb 2024 04:00) (33 - 82)  ABP: --  ABP(mean): --  RR: 16 (13 Feb 2024 04:00) (14 - 20)  SpO2: 100% (13 Feb 2024 04:00) (97% - 100%)    O2 Parameters below as of 13 Feb 2024 04:00  Patient On (Oxygen Delivery Method): ventilator    O2 Concentration (%): 40      Mode: AC/ CMV (Assist Control/ Continuous Mandatory Ventilation), RR (machine): 16, TV (machine): 350, FiO2: 40, PEEP: 5, ITime: 0.7, MAP: 9, PIP: 22  Plateau pressure:   P/F ratio:     02-12 @ 07:01  -  02-13 @ 07:00  --------------------------------------------------------  IN: 2977.3 mL / OUT: 330 mL / NET: 2647.3 mL      CAPILLARY BLOOD GLUCOSE      POCT Blood Glucose.: 179 mg/dL (13 Feb 2024 05:06)      ECG: reviewed.    PHYSICAL EXAM:    GENERAL: intubated, sedated  HEAD:  Atraumatic, Normocephalic  EYES: EOMI, conjunctiva and sclera clear  ENT: Moist mucous membranes  CHEST/LUNG: Clear to auscultation bilaterally; No rales, rhonchi, wheezing, or rubs. 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 fluctuant edema with crepitus on right anterior foot  NERVOUS SYSTEM:  A&Ox0  SKIN: No rashes or lesions      MEDICATIONS:  MEDICATIONS  (STANDING):  chlorhexidine 0.12% Liquid 15 milliLiter(s) Oral Mucosa every 12 hours  Dakins Solution - 1/2 Strength 1 Application(s) Topical two times a day  dextrose 5%. 1000 milliLiter(s) (50 mL/Hr) IV Continuous <Continuous>  dextrose 5%. 1000 milliLiter(s) (50 mL/Hr) IV Continuous <Continuous>  dextrose 5%. 1000 milliLiter(s) (100 mL/Hr) IV Continuous <Continuous>  dextrose 5%. 1000 milliLiter(s) (100 mL/Hr) IV Continuous <Continuous>  dextrose 50% Injectable 25 Gram(s) IV Push once  dextrose 50% Injectable 25 Gram(s) IV Push once  dextrose 50% Injectable 25 Gram(s) IV Push once  dextrose 50% Injectable 25 Gram(s) IV Push once  dextrose 50% Injectable 12.5 Gram(s) IV Push once  dextrose 50% Injectable 12.5 Gram(s) IV Push once  doxycycline IVPB      doxycycline IVPB 100 milliGRAM(s) IV Intermittent every 12 hours  fentaNYL   Infusion. 0.5 MICROgram(s)/kG/Hr (2.02 mL/Hr) IV Continuous <Continuous>  glucagon  Injectable 1 milliGRAM(s) IntraMuscular once  glucagon  Injectable 1 milliGRAM(s) IntraMuscular once  heparin   Injectable 5000 Unit(s) SubCutaneous every 12 hours  insulin glargine Injectable (LANTUS) 6 Unit(s) SubCutaneous every morning  insulin lispro (ADMELOG) corrective regimen sliding scale   SubCutaneous every 6 hours  meropenem  IVPB 500 milliGRAM(s) IV Intermittent every 24 hours  midodrine 30 milliGRAM(s) Oral every 8 hours  norepinephrine Infusion 0.05 MICROgram(s)/kG/Min (3.79 mL/Hr) IV Continuous <Continuous>  pantoprazole   Suspension 40 milliGRAM(s) Oral daily  propofol Infusion 5 MICROgram(s)/kG/Min (1.21 mL/Hr) IV Continuous <Continuous>  sodium bicarbonate 1300 milliGRAM(s) Oral three times a day  sodium zirconium cyclosilicate 10 Gram(s) Oral daily    MEDICATIONS  (PRN):  acetaminophen   Oral Liquid .. 650 milliGRAM(s) Oral every 6 hours PRN Temp greater or equal to 38C (100.4F), Mild Pain (1 - 3)  dextrose Oral Gel 15 Gram(s) Oral once PRN Blood Glucose LESS THAN 70 milliGRAM(s)/deciliter  dextrose Oral Gel 15 Gram(s) Oral once PRN Blood Glucose LESS THAN 70 milliGRAM(s)/deciliter  HYDROmorphone  Injectable 1 milliGRAM(s) IV Push every 4 hours PRN Severe Pain (7 - 10)      ALLERGIES:  Allergies    Pineapple (Unknown)  contrast dye -  rash (Other)  iodine (Other)  codeine (Vomiting)  penicillin (Rash)  latex (Rash)    Intolerances    Tolerates ceftriaxone, cefepime (Other)      LABS:                        7.3    16.68 )-----------( 125      ( 13 Feb 2024 00:10 )             22.7     02-13    140  |  97<L>  |  113<H>  ----------------------------<  206<H>  4.8   |  22  |  3.74<H>    Ca    6.3<LL>      13 Feb 2024 00:10  Phos  4.5     02-13  Mg     1.80     02-13    TPro  4.6<L>  /  Alb  1.4<L>  /  TBili  0.3  /  DBili  x   /  AST  19  /  ALT  9   /  AlkPhos  424<H>  02-13    PT/INR - ( 13 Feb 2024 00:10 )   PT: 14.0 sec;   INR: 1.26 ratio           Urinalysis Basic - ( 13 Feb 2024 00:10 )    Color: x / Appearance: x / SG: x / pH: x  Gluc: 206 mg/dL / Ketone: x  / Bili: x / Urobili: x   Blood: x / Protein: x / Nitrite: x   Leuk Esterase: x / RBC: x / WBC x   Sq Epi: x / Non Sq Epi: x / Bacteria: x      ABG:  pH, Arterial: 7.39 (02-13-24 @ 00:10)  pCO2, Arterial: 38 mmHg (02-13-24 @ 00:10)  pO2, Arterial: 118 mmHg (02-13-24 @ 00:10)      vBG:    Micro:    Culture - Blood (collected 02-09-24 @ 13:10)  Source: .Blood Blood  Preliminary Report (02-12-24 @ 19:01):    No growth at 72 Hours    Culture - Blood (collected 02-09-24 @ 04:47)  Source: .Blood Blood-Peripheral  Preliminary Report (02-12-24 @ 09:01):    No growth at 72 Hours    Culture - Blood (collected 02-09-24 @ 04:32)  Source: .Blood Blood-Venous  Preliminary Report (02-12-24 @ 09:01):    No growth at 72 Hours    Culture - Blood (collected 02-02-24 @ 22:14)  Source: .Blood Blood-Peripheral  Final Report (02-08-24 @ 03:00):    No growth at 5 days    Culture - Blood (collected 02-02-24 @ 22:14)  Source: .Blood Blood-Peripheral  Final Report (02-08-24 @ 03:00):    No growth at 5 days    Culture - Blood (collected 01-24-24 @ 11:50)  Source: .Blood Blood-Peripheral  Final Report (01-29-24 @ 16:00):    No growth at 5 days    Culture - Blood (collected 01-24-24 @ 11:40)  Source: .Blood Blood-Peripheral  Final Report (01-29-24 @ 16:00):    No growth at 5 days          RADIOLOGY & ADDITIONAL TESTS: Reviewed.

## 2024-02-13 NOTE — PROGRESS NOTE ADULT - ATTENDING COMMENTS
1. SAE - contributed to by hypovolemia and sepsis.  Remains oliguric and on pressors.    2. Metabolic acidosis - on bicarbonate   3. Hyperkalemia - continue with management.  keep on [facility] discussion ongoing.   Prognosis guarded.  poor candidate for dialysis

## 2024-02-13 NOTE — PROGRESS NOTE ADULT - ASSESSMENT
89 yo F with PMH dementia (AOx0, non-verbal), R ankle gangrene, HTN, HLD, CAD s/p CABG, and T2D p/f [facility] with hyperNa and acute renal failure,    EKG: Multifocal atrial tachycardia    1. Septic shock   - on abx   - intubated on midodrine and levophed possible aspiration pneumonia. poor prognosis, remains hypotension on levo, actively dying, ongoing GOC with family per MICU    2. Pre-op assessment  -plan for PEG cancelled,    3. CAD   -hx of CABG    4. HTN  - in septic shock on midodrine and levophed

## 2024-02-14 NOTE — PROGRESS NOTE ADULT - ASSESSMENT
Pt is a 89 yo F with PMH dementia (AOx0, non-verbal), R ankle gangrene, HTN, HLD, CAD, and T2D p/f [facility] with hyperNa and acute renal failure, both improved. Course c/b AHRF requiring HFNC, s/p RTT now intubated. GOC discussion held with son/HCP Toby who rescinded prior DNR/DNI, now plan for full code and full escalation of care and pursuing all interventions. Intubated, pending TTE for pre-op optimization with cards following in anticipation of plan for PEG (GI consulted) and LTCF placement.     #Neuro    #Pain  - propofol ggt at 5  - fentanyl ggt 0.5    #Dementia   - pt. A&Ox0 - nonverbal.  - Severe protein calorie malnutrition.   - Failed S&S eval.  Plan:   - Ongoing GOC conversation as below  - Attempted to call multiple family members multiple times 2/14, no response   Palliative re-consulted, called family, no change in GOC as per proxy. Family deferring changes until they visit pt during the weekend.   - anticipate need for PEG -- GI deferring until medically stable      #Cardiovascular    #Shock  -most likely septic shock in setting of gangrenous infection  Plan:  -continue levophed, wean as tolerated. Now maxed at 0.28 mcg/hr    #Afib.   - Afib with RVR. Pt. was transferred to a telemetry floor. Discussed with son. switch IV lopressor to PO now that we have oral access   Plan:  - hold lopressor in setting of shock    #Respiratory    Acute respiratory failure with hypoxia.   - suspect in setting of worsening deconditioning, poor inspiratory effort, atelectasis   - patient needed emergent intubation due to   - post intubation chest xray shows complete left lung opacity  RRT on 02/09 with emergent intubation due to increased work of breathing    Plan:  - continue antibiotics as per ID recs  - on volume AC, continue current vent settings     #GI/Nutrition    #Diet: tube feeds through OG at rate of 40    #Diarrhea  - noted to have multiple loose BMs today  - dc bowel regimen (miralax, senna -- last dosed 2/7)    #/Renal  - Ca repletion     #SAE (acute kidney injury).   - on admission  and Cr 2.76. Likely prerenal  - improved s/p fluid resuscitation     Plan:    - ctm Cr/UOP  - not candidate for dialysis  - not a candidate for RTT at this time.     #AGMA   - likely in setting of lactic acidosis  - 1300 sodium bicarb TID and 150meq Na bicarb 75cc/h  for 24 hours    #Skin    #ID    #Sepsis.    - pt met SIRS criteria + source of known foot infection.   - Recently treated for L foot wound growing E. coli and S. aureus.   - Vascular offering definitive management with amputation but family declines. Local wound care.   Plan:   - currently on doxycycline, meropenem  - ID consulted, f/u recs     #MSK:    #Wound of foot.   - L foot xray: Dorsal forefoot soft tissue swelling. Calcaneal cortex on left foot with possible osteomyelitis  - previously unable to tolerate MRI L ankle when attempted in recent hospitalization  - Recently Tx in Dec 23' for L foot wound growing S. Aureas and E. Coli, amputation L foot offered and deferred at the time as per GOC     Plan:  - c/w Abx as above  - Wound care following. appreciate recs  - not candidate for vascular surgery    #Endocrine    #Diabetes.   - POC checks   Plan:  - NPO, sliding scale.    #Hematologic    #Anemia   - Hgb ~6 on 1/29. No signs of bleeding. Received 1U PRBC.   - Hgb now improved - stable.  Plan:  -ctm    #DVT ppx    #Ethics/ICU Bundle  - GOC/Code Status: Full Code. pt with prior MOLST - DNR/DNI but was rescinded on this hospitalization. Palliative consulted. Ongoing GOC conversations. Toby understands the poor prognosis and would like to proceed with comfort measures/DNR/DNI but two out of his three siblings do not agree with comfort measures at this time. He does not feel comfortable transitioning care until his other two brothers are in agreement and until they have visited the pt which they state will be this weekend. We addressed that his mom is declining and that we do not recommend NGT/PEG as this is uncomfortable and would not change her prognosis. Toby expressed understanding but states he wishes to pursue PEG as brothers would not forgive him if he did not.  - Diet: TF  - Access: ET tube, PIV  - Tubes/Drains: Rectal tube  - Activity/PT/OT:   Pt is a 89 yo F with PMH dementia (AOx0, non-verbal), R ankle gangrene, HTN, HLD, CAD, and T2D p/f [facility] with hyperNa and acute renal failure, both improved. Course c/b AHRF requiring HFNC, s/p RTT now intubated. GOC discussion held with son/HCP Toby who rescinded prior DNR/DNI, now plan for full code and full escalation of care and pursuing all interventions. Intubated, pending TTE for pre-op optimization with cards following in anticipation of plan for PEG (GI consulted) and LTCF placement.     #Neuro    #Pain  - propofol ggt at 5  - fentanyl ggt 0.5    #Dementia   - pt. A&Ox0 - nonverbal.  - Severe protein calorie malnutrition.   - Failed S&S eval.  Plan:   - Ongoing GOC conversation as below  - Attempted to call multiple family members multiple times 2/14, no response   Palliative re-consulted, called family, no change in GOC as per proxy. Family deferring changes until they visit pt during the weekend.   - anticipate need for PEG -- GI deferring until medically stable      #Cardiovascular    #Shock  -most likely septic shock in setting of gangrenous infection  Plan:  -continue levophed, wean as tolerated. Now maxed at 0.28 mcg/hr    #Afib.   - Afib with RVR. Pt. was transferred to a telemetry floor. Discussed with son. switch IV lopressor to PO now that we have oral access   Plan:  - hold lopressor in setting of shock    #Respiratory    Acute respiratory failure with hypoxia.   - suspect in setting of worsening deconditioning, poor inspiratory effort, atelectasis   - patient needed emergent intubation due to   - post intubation chest xray shows complete left lung opacity  RRT on 02/09 with emergent intubation due to increased work of breathing    Plan:  - continue antibiotics as per ID recs  - on volume AC, continue current vent settings     #GI/Nutrition    #Diet: tube feeds through OG at rate of 40    #Diarrhea  - noted to have multiple loose BMs today  - dc bowel regimen (miralax, senna -- last dosed 2/7)    #/Renal  - Ca repletion     #SAE (acute kidney injury).   - on admission  and Cr 2.76. Likely prerenal  - improved s/p fluid resuscitation       Plan:    - ctm Cr/UOP  - not candidate for dialysis  - not a candidate for RTT at this time.     #AGMA   - likely in setting of lactic acidosis  - 1300 sodium bicarb TID and 150meq Na bicarb 75cc/h  for 24 hours    #Skin    #ID    #Sepsis.    - pt met SIRS criteria + source of known foot infection.   - Recently treated for L foot wound growing E. coli and S. aureus.   - Vascular offering definitive management with amputation but family declines. Local wound care.   Plan:   - currently on doxycycline, meropenem  - ID consulted, f/u recs     #MSK:    #Wound of foot.   - L foot xray: Dorsal forefoot soft tissue swelling. Calcaneal cortex on left foot with possible osteomyelitis  - previously unable to tolerate MRI L ankle when attempted in recent hospitalization  - Recently Tx in Dec 23' for L foot wound growing S. Aureas and E. Coli, amputation L foot offered and deferred at the time as per GOC     Plan:  - c/w Abx as above  - Wound care following. appreciate recs  - not candidate for vascular surgery    #Endocrine    #Diabetes.   - POC checks   Plan:  - NPO, sliding scale.    #Hematologic    #Anemia   - Hgb 6.3 on 2/14. Rpt CBC, if <7, will transfuse  - Hgb now improved - stable.  Plan:  -ctm    #DVT ppx    #Ethics/ICU Bundle  - GOC/Code Status: Full Code. pt with prior MOLST - DNR/DNI but was rescinded on this hospitalization. Palliative consulted. Ongoing GOC conversations. Toby understands the poor prognosis and would like to proceed with comfort measures/DNR/DNI but two out of his three siblings do not agree with comfort measures at this time. He does not feel comfortable transitioning care until his other two brothers are in agreement and until they have visited the pt which they state will be this weekend. We addressed that his mom is declining and that we do not recommend NGT/PEG as this is uncomfortable and would not change her prognosis. Toby expressed understanding but states he wishes to pursue PEG as brothers would not forgive him if he did not.  - Diet: TF  - Access: ET tube, PIV  - Tubes/Drains: Rectal tube  - Activity/PT/OT:

## 2024-02-14 NOTE — PROGRESS NOTE ADULT - SUBJECTIVE AND OBJECTIVE BOX
INTERVAL HPI/OVERNIGHT EVENTS:    SUBJECTIVE: Patient seen and examined at bedside.     ROS: All negative except as listed above.    VITAL SIGNS:  ICU Vital Signs Last 24 Hrs  T(C): 37.1 (14 Feb 2024 04:00), Max: 38.2 (13 Feb 2024 17:55)  T(F): 98.7 (14 Feb 2024 04:00), Max: 100.7 (13 Feb 2024 17:55)  HR: 89 (14 Feb 2024 04:00) (86 - 105)  BP: 40/21 (14 Feb 2024 04:00) (37/16 - 52/13)  BP(mean): 28 (14 Feb 2024 04:00) (23 - 34)  ABP: --  ABP(mean): --  RR: 16 (14 Feb 2024 04:00) (16 - 16)  SpO2: 100% (14 Feb 2024 04:00) (98% - 100%)    O2 Parameters below as of 14 Feb 2024 04:00  Patient On (Oxygen Delivery Method): ventilator    O2 Concentration (%): 40      Mode: AC/ CMV (Assist Control/ Continuous Mandatory Ventilation), RR (machine): 16, TV (machine): 350, FiO2: 40, PEEP: 5, ITime: 0.76, MAP: 9, PIP: 23  Plateau pressure:   P/F ratio:     02-13 @ 07:01  -  02-14 @ 07:00  --------------------------------------------------------  IN: 1886.4 mL / OUT: 135 mL / NET: 1751.4 mL      CAPILLARY BLOOD GLUCOSE      POCT Blood Glucose.: 104 mg/dL (14 Feb 2024 05:05)      ECG: reviewed.    PHYSICAL EXAM:    GENERAL: NAD, lying in bed comfortably  HEAD:  Atraumatic, normocephalic  EYES: EOMI, PERRLA, conjunctiva and sclera clear  NECK: Supple, trachea midline, no JVD  HEART: Regular rate and rhythm, no murmurs, rubs, or gallops  LUNGS: Unlabored respirations.  Clear to auscultation bilaterally, no crackles, wheezing, or rhonchi  ABDOMEN: Soft, nontender, nondistended, +BS  EXTREMITIES: 2+ peripheral pulses bilaterally, cap refill<2 secs. No clubbing, cyanosis, or edema  NERVOUS SYSTEM:  A&Ox3, following commands, moving all extremities, no focal deficits   SKIN: No rashes or lesions    MEDICATIONS:  MEDICATIONS  (STANDING):  chlorhexidine 0.12% Liquid 15 milliLiter(s) Oral Mucosa every 12 hours  chlorhexidine 2% Cloths 1 Application(s) Topical daily  Dakins Solution - 1/2 Strength 1 Application(s) Topical two times a day  dextrose 5%. 1000 milliLiter(s) (100 mL/Hr) IV Continuous <Continuous>  dextrose 5%. 1000 milliLiter(s) (50 mL/Hr) IV Continuous <Continuous>  dextrose 5%. 1000 milliLiter(s) (50 mL/Hr) IV Continuous <Continuous>  dextrose 5%. 1000 milliLiter(s) (100 mL/Hr) IV Continuous <Continuous>  dextrose 50% Injectable 12.5 Gram(s) IV Push once  dextrose 50% Injectable 12.5 Gram(s) IV Push once  dextrose 50% Injectable 25 Gram(s) IV Push once  dextrose 50% Injectable 25 Gram(s) IV Push once  dextrose 50% Injectable 25 Gram(s) IV Push once  dextrose 50% Injectable 25 Gram(s) IV Push once  doxycycline IVPB      doxycycline IVPB 100 milliGRAM(s) IV Intermittent every 12 hours  fentaNYL   Infusion. 0.5 MICROgram(s)/kG/Hr (2.02 mL/Hr) IV Continuous <Continuous>  glucagon  Injectable 1 milliGRAM(s) IntraMuscular once  glucagon  Injectable 1 milliGRAM(s) IntraMuscular once  heparin   Injectable 5000 Unit(s) SubCutaneous every 12 hours  insulin glargine Injectable (LANTUS) 6 Unit(s) SubCutaneous every morning  insulin lispro (ADMELOG) corrective regimen sliding scale   SubCutaneous every 6 hours  meropenem  IVPB 500 milliGRAM(s) IV Intermittent every 24 hours  midodrine 30 milliGRAM(s) Oral every 8 hours  norepinephrine Infusion 0.05 MICROgram(s)/kG/Min (3.79 mL/Hr) IV Continuous <Continuous>  pantoprazole   Suspension 40 milliGRAM(s) Oral daily  propofol Infusion 5 MICROgram(s)/kG/Min (1.21 mL/Hr) IV Continuous <Continuous>  sodium bicarbonate 1300 milliGRAM(s) Oral three times a day  sodium zirconium cyclosilicate 10 Gram(s) Oral daily    MEDICATIONS  (PRN):  acetaminophen   Oral Liquid .. 650 milliGRAM(s) Oral every 6 hours PRN Temp greater or equal to 38C (100.4F), Mild Pain (1 - 3)  dextrose Oral Gel 15 Gram(s) Oral once PRN Blood Glucose LESS THAN 70 milliGRAM(s)/deciliter  dextrose Oral Gel 15 Gram(s) Oral once PRN Blood Glucose LESS THAN 70 milliGRAM(s)/deciliter  HYDROmorphone  Injectable 1 milliGRAM(s) IV Push every 4 hours PRN Severe Pain (7 - 10)      ALLERGIES:  Allergies    Pineapple (Unknown)  contrast dye -  rash (Other)  iodine (Other)  codeine (Vomiting)  penicillin (Rash)  latex (Rash)    Intolerances    Tolerates ceftriaxone, cefepime (Other)      LABS:                        6.2    21.02 )-----------( 165      ( 14 Feb 2024 00:15 )             19.1     02-14    138  |  96<L>  |  117<H>  ----------------------------<  234<H>  5.2   |  20<L>  |  3.81<H>    Ca    6.0<LL>      14 Feb 2024 00:15  Phos  5.4     02-14  Mg     1.80     02-14    TPro  4.6<L>  /  Alb  1.3<L>  /  TBili  0.3  /  DBili  x   /  AST  27  /  ALT  7   /  AlkPhos  409<H>  02-14    PT/INR - ( 14 Feb 2024 00:15 )   PT: 13.6 sec;   INR: 1.21 ratio           Urinalysis Basic - ( 14 Feb 2024 00:15 )    Color: x / Appearance: x / SG: x / pH: x  Gluc: 234 mg/dL / Ketone: x  / Bili: x / Urobili: x   Blood: x / Protein: x / Nitrite: x   Leuk Esterase: x / RBC: x / WBC x   Sq Epi: x / Non Sq Epi: x / Bacteria: x      ABG:      vBG:    Micro:    Culture - Blood (collected 02-09-24 @ 13:10)  Source: .Blood Blood  Preliminary Report (02-13-24 @ 19:01):    No growth at 4 days    Culture - Blood (collected 02-09-24 @ 04:47)  Source: .Blood Blood-Peripheral  Preliminary Report (02-13-24 @ 09:01):    No growth at 4 days    Culture - Blood (collected 02-09-24 @ 04:32)  Source: .Blood Blood-Venous  Preliminary Report (02-13-24 @ 09:01):    No growth at 4 days    Culture - Blood (collected 02-02-24 @ 22:14)  Source: .Blood Blood-Peripheral  Final Report (02-08-24 @ 03:00):    No growth at 5 days    Culture - Blood (collected 02-02-24 @ 22:14)  Source: .Blood Blood-Peripheral  Final Report (02-08-24 @ 03:00):    No growth at 5 days    Culture - Blood (collected 01-24-24 @ 11:50)  Source: .Blood Blood-Peripheral  Final Report (01-29-24 @ 16:00):    No growth at 5 days    Culture - Blood (collected 01-24-24 @ 11:40)  Source: .Blood Blood-Peripheral  Final Report (01-29-24 @ 16:00):    No growth at 5 days          RADIOLOGY & ADDITIONAL TESTS: Reviewed.   INTERVAL HPI/OVERNIGHT EVENTS:    SUBJECTIVE: Patient seen and examined at bedside.     ROS: All negative except as listed above.    VITAL SIGNS:  ICU Vital Signs Last 24 Hrs  T(C): 37.1 (14 Feb 2024 04:00), Max: 38.2 (13 Feb 2024 17:55)  T(F): 98.7 (14 Feb 2024 04:00), Max: 100.7 (13 Feb 2024 17:55)  HR: 89 (14 Feb 2024 04:00) (86 - 105)  BP: 40/21 (14 Feb 2024 04:00) (37/16 - 52/13)  BP(mean): 28 (14 Feb 2024 04:00) (23 - 34)  ABP: --  ABP(mean): --  RR: 16 (14 Feb 2024 04:00) (16 - 16)  SpO2: 100% (14 Feb 2024 04:00) (98% - 100%)    O2 Parameters below as of 14 Feb 2024 04:00  Patient On (Oxygen Delivery Method): ventilator    O2 Concentration (%): 40      Mode: AC/ CMV (Assist Control/ Continuous Mandatory Ventilation), RR (machine): 16, TV (machine): 350, FiO2: 40, PEEP: 5, ITime: 0.76, MAP: 9, PIP: 23  Plateau pressure:   P/F ratio:     02-13 @ 07:01  -  02-14 @ 07:00  --------------------------------------------------------  IN: 1886.4 mL / OUT: 135 mL / NET: 1751.4 mL      CAPILLARY BLOOD GLUCOSE      POCT Blood Glucose.: 104 mg/dL (14 Feb 2024 05:05)      ECG: reviewed.    PHYSICAL EXAM:    GENERAL: intubated, sedated  HEAD:  Atraumatic, Normocephalic  EYES: EOMI, conjunctiva and sclera clear  ENT: Moist mucous membranes  CHEST/LUNG: Clear to auscultation bilaterally; No rales, rhonchi, wheezing, or rubs. 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 fluctuant edema with crepitus on right anterior foot  NERVOUS SYSTEM:  A&Ox0  SKIN: No rashes or lesions  MEDICATIONS:  MEDICATIONS  (STANDING):  chlorhexidine 0.12% Liquid 15 milliLiter(s) Oral Mucosa every 12 hours  chlorhexidine 2% Cloths 1 Application(s) Topical daily  Dakins Solution - 1/2 Strength 1 Application(s) Topical two times a day  dextrose 5%. 1000 milliLiter(s) (100 mL/Hr) IV Continuous <Continuous>  dextrose 5%. 1000 milliLiter(s) (50 mL/Hr) IV Continuous <Continuous>  dextrose 5%. 1000 milliLiter(s) (50 mL/Hr) IV Continuous <Continuous>  dextrose 5%. 1000 milliLiter(s) (100 mL/Hr) IV Continuous <Continuous>  dextrose 50% Injectable 12.5 Gram(s) IV Push once  dextrose 50% Injectable 12.5 Gram(s) IV Push once  dextrose 50% Injectable 25 Gram(s) IV Push once  dextrose 50% Injectable 25 Gram(s) IV Push once  dextrose 50% Injectable 25 Gram(s) IV Push once  dextrose 50% Injectable 25 Gram(s) IV Push once  doxycycline IVPB      doxycycline IVPB 100 milliGRAM(s) IV Intermittent every 12 hours  fentaNYL   Infusion. 0.5 MICROgram(s)/kG/Hr (2.02 mL/Hr) IV Continuous <Continuous>  glucagon  Injectable 1 milliGRAM(s) IntraMuscular once  glucagon  Injectable 1 milliGRAM(s) IntraMuscular once  heparin   Injectable 5000 Unit(s) SubCutaneous every 12 hours  insulin glargine Injectable (LANTUS) 6 Unit(s) SubCutaneous every morning  insulin lispro (ADMELOG) corrective regimen sliding scale   SubCutaneous every 6 hours  meropenem  IVPB 500 milliGRAM(s) IV Intermittent every 24 hours  midodrine 30 milliGRAM(s) Oral every 8 hours  norepinephrine Infusion 0.05 MICROgram(s)/kG/Min (3.79 mL/Hr) IV Continuous <Continuous>  pantoprazole   Suspension 40 milliGRAM(s) Oral daily  propofol Infusion 5 MICROgram(s)/kG/Min (1.21 mL/Hr) IV Continuous <Continuous>  sodium bicarbonate 1300 milliGRAM(s) Oral three times a day  sodium zirconium cyclosilicate 10 Gram(s) Oral daily    MEDICATIONS  (PRN):  acetaminophen   Oral Liquid .. 650 milliGRAM(s) Oral every 6 hours PRN Temp greater or equal to 38C (100.4F), Mild Pain (1 - 3)  dextrose Oral Gel 15 Gram(s) Oral once PRN Blood Glucose LESS THAN 70 milliGRAM(s)/deciliter  dextrose Oral Gel 15 Gram(s) Oral once PRN Blood Glucose LESS THAN 70 milliGRAM(s)/deciliter  HYDROmorphone  Injectable 1 milliGRAM(s) IV Push every 4 hours PRN Severe Pain (7 - 10)      ALLERGIES:  Allergies    Pineapple (Unknown)  contrast dye -  rash (Other)  iodine (Other)  codeine (Vomiting)  penicillin (Rash)  latex (Rash)    Intolerances    Tolerates ceftriaxone, cefepime (Other)      LABS:                        6.2    21.02 )-----------( 165      ( 14 Feb 2024 00:15 )             19.1     02-14    138  |  96<L>  |  117<H>  ----------------------------<  234<H>  5.2   |  20<L>  |  3.81<H>    Ca    6.0<LL>      14 Feb 2024 00:15  Phos  5.4     02-14  Mg     1.80     02-14    TPro  4.6<L>  /  Alb  1.3<L>  /  TBili  0.3  /  DBili  x   /  AST  27  /  ALT  7   /  AlkPhos  409<H>  02-14    PT/INR - ( 14 Feb 2024 00:15 )   PT: 13.6 sec;   INR: 1.21 ratio           Urinalysis Basic - ( 14 Feb 2024 00:15 )    Color: x / Appearance: x / SG: x / pH: x  Gluc: 234 mg/dL / Ketone: x  / Bili: x / Urobili: x   Blood: x / Protein: x / Nitrite: x   Leuk Esterase: x / RBC: x / WBC x   Sq Epi: x / Non Sq Epi: x / Bacteria: x      ABG:      vBG:    Micro:    Culture - Blood (collected 02-09-24 @ 13:10)  Source: .Blood Blood  Preliminary Report (02-13-24 @ 19:01):    No growth at 4 days    Culture - Blood (collected 02-09-24 @ 04:47)  Source: .Blood Blood-Peripheral  Preliminary Report (02-13-24 @ 09:01):    No growth at 4 days    Culture - Blood (collected 02-09-24 @ 04:32)  Source: .Blood Blood-Venous  Preliminary Report (02-13-24 @ 09:01):    No growth at 4 days    Culture - Blood (collected 02-02-24 @ 22:14)  Source: .Blood Blood-Peripheral  Final Report (02-08-24 @ 03:00):    No growth at 5 days    Culture - Blood (collected 02-02-24 @ 22:14)  Source: .Blood Blood-Peripheral  Final Report (02-08-24 @ 03:00):    No growth at 5 days    Culture - Blood (collected 01-24-24 @ 11:50)  Source: .Blood Blood-Peripheral  Final Report (01-29-24 @ 16:00):    No growth at 5 days    Culture - Blood (collected 01-24-24 @ 11:40)  Source: .Blood Blood-Peripheral  Final Report (01-29-24 @ 16:00):    No growth at 5 days          RADIOLOGY & ADDITIONAL TESTS: Reviewed.

## 2024-02-14 NOTE — PROGRESS NOTE ADULT - SUBJECTIVE AND OBJECTIVE BOX
Mp Krishnan MD  Interventional Cardiology / Endovascular Specialist  [place] Office : [phone] [place] [address] [place] [address]  Tel:   [place] Office : [phone] [facility] [place] [address]  Tel: [phone]    Subjective/Overnight events: Patient lying in bed remains intubated on pressor  	  MEDICATIONS:  heparin   Injectable 5000 Unit(s) SubCutaneous every 12 hours  midodrine 30 milliGRAM(s) Oral every 8 hours  norepinephrine Infusion 0.05 MICROgram(s)/kG/Min IV Continuous <Continuous>    doxycycline IVPB      doxycycline IVPB 100 milliGRAM(s) IV Intermittent every 12 hours  meropenem  IVPB 500 milliGRAM(s) IV Intermittent every 24 hours      acetaminophen   Oral Liquid .. 650 milliGRAM(s) Oral every 6 hours PRN  fentaNYL   Infusion. 0.5 MICROgram(s)/kG/Hr IV Continuous <Continuous>  HYDROmorphone  Injectable 1 milliGRAM(s) IV Push every 4 hours PRN  propofol Infusion 5 MICROgram(s)/kG/Min IV Continuous <Continuous>    pantoprazole   Suspension 40 milliGRAM(s) Oral daily    dextrose 50% Injectable 12.5 Gram(s) IV Push once  dextrose 50% Injectable 12.5 Gram(s) IV Push once  dextrose 50% Injectable 25 Gram(s) IV Push once  dextrose 50% Injectable 25 Gram(s) IV Push once  dextrose 50% Injectable 25 Gram(s) IV Push once  dextrose 50% Injectable 25 Gram(s) IV Push once  dextrose Oral Gel 15 Gram(s) Oral once PRN  dextrose Oral Gel 15 Gram(s) Oral once PRN  glucagon  Injectable 1 milliGRAM(s) IntraMuscular once  glucagon  Injectable 1 milliGRAM(s) IntraMuscular once  insulin glargine Injectable (LANTUS) 6 Unit(s) SubCutaneous every morning  insulin lispro (ADMELOG) corrective regimen sliding scale   SubCutaneous every 6 hours    chlorhexidine 0.12% Liquid 15 milliLiter(s) Oral Mucosa every 12 hours  chlorhexidine 2% Cloths 1 Application(s) Topical daily  Dakins Solution - 1/2 Strength 1 Application(s) Topical two times a day  dextrose 5%. 1000 milliLiter(s) IV Continuous <Continuous>  dextrose 5%. 1000 milliLiter(s) IV Continuous <Continuous>  dextrose 5%. 1000 milliLiter(s) IV Continuous <Continuous>  dextrose 5%. 1000 milliLiter(s) IV Continuous <Continuous>  sodium bicarbonate 1300 milliGRAM(s) Oral three times a day      PAST MEDICAL/SURGICAL HISTORY  PAST MEDICAL & SURGICAL HISTORY:  Angina  in 2005, s/p angioplasty with stent placement, no recurrance, no sequalae      Diabetes Mellitus  type 2      Arthritis, Infective, Knee      Detached Retina  right eye      Acute Mucous Pneumonia  4/2010, resolved ; no residual problems      Spinal Stenosis- lumbar      History of Back Surgery  2010      Basal Cell Cancer  removed from left neck 2009      Dementia      Detached Retina, Left  laser surgery in 1995      S/P Carpal Tunnel Release  bilateral hands in 1990      Trigger Finger  release of middle and ring finger of right hand in 1990, and middle finger left hand in 2008      S/P Laparoscopic Cholecystectomy  2008      S/P TKR (Total Knee Replacement)  left knee      Cataract  removal with lens implant right in 2000          SOCIAL HISTORY: Substance Use (street drugs): ( x ) never used  (  ) other:    FAMILY HISTORY:  FH: HTN (hypertension)    PHYSICAL EXAM:  T(C): 37.3 (02-14-24 @ 16:00), Max: 37.3 (02-14-24 @ 12:00)  HR: 64 (02-14-24 @ 19:59) (62 - 120)  BP: 91/24 (02-14-24 @ 16:00) (40/21 - 91/24)  RR: 16 (02-14-24 @ 16:00) (16 - 16)  SpO2: 100% (02-14-24 @ 19:59) (98% - 100%)  Wt(kg): --  I&O's Summary    13 Feb 2024 07:01  -  14 Feb 2024 07:00  --------------------------------------------------------  IN: 1886.4 mL / OUT: 135 mL / NET: 1751.4 mL    14 Feb 2024 07:01  -  14 Feb 2024 20:41  --------------------------------------------------------  IN: 933.2 mL / OUT: 50 mL / NET: 883.2 mL        GENERAL: intubated   EYES:  conjunctiva and sclera clear  ENMT: No tonsillar erythema, exudates, or enlargement  Cardiovascular: Normal S1 S2, No JVD, No murmurs, No edema  Respiratory: Lungs clear to auscultation	  Gastrointestinal:  Soft,   Extremities: No edema                               6.2    21.02 )-----------( 165      ( 14 Feb 2024 00:15 )             19.1     02-14    138  |  96<L>  |  117<H>  ----------------------------<  234<H>  5.2   |  20<L>  |  3.81<H>    Ca    6.0<LL>      14 Feb 2024 00:15  Phos  5.4     02-14  Mg     1.80     02-14    TPro  4.6<L>  /  Alb  1.3<L>  /  TBili  0.3  /  DBili  x   /  AST  27  /  ALT  7   /  AlkPhos  409<H>  02-14    proBNP:   Lipid Profile:   HgA1c:   TSH:     Consultant(s) Notes Reviewed:  [x ] YES  [ ] NO    Care Discussed with Consultants/Other Providers [ x] YES  [ ] NO    Imaging Personally Reviewed independently:  [x] YES  [ ] NO    All labs, radiologic studies, vitals, orders and medications list reviewed. Patient is seen and examined at bedside. Case discussed with medical team.

## 2024-02-14 NOTE — PROGRESS NOTE ADULT - ATTENDING COMMENTS
89 years old female with h/o HTN, HLD, CAD, DM, dementia, s/p spinal stimulator placement p/w abnormal labs (Na 168, K 2.9) found to have necrotizing wound infection (E.Coli, Staph Aureus) as well as SAE. Hospital course c/b progressive encephalopathy, aspiration and AHRF requring intubation and mechanical ventilation, bilateral necrotizing infection not surgical candidate.     N: Toxic metabolic encephalopathy iso renal failure, septic shock  Baseline dementia  Spinal Stimulator  Pain, agitation  - Fentanyl and prop for sedation  CV: Shock- distributive, septic  CAD, HTN, HLD  AF  - Levo with MAP >65  P: Aspiration,   AHRF s/p intubation (2/9)  - Lung protective ventilation, trend BG and adjust as able  - SBT daily, failing 2/2 apnea  GI:   - TF to goal  Heme: Acute on chronic anemia  R: Oligouric renal failure, not candidate for RRT  Hypernatremia- resolved  Uremia  Lactic acidosis (>2)  - Trend UOP, creatinine, replete lytes as able  ID: Bilateral necrotizing wound infection not surgical candidate  Aspiration pneumonia  - Collin, doxy   - Will likely not have recovery without surgical intervention thus prognosis remains poor.     Full code for now. With ongoing sepsis, inability to obtain source control, renal failure prognosis poor. She is actively in the dying process with multiple lab abnormalities, her MAP ~ 30 for multiple days. Will discuss with family ongoing care. We will space labs to every other day considering entirety of clinical picture.

## 2024-02-15 NOTE — PROGRESS NOTE ADULT - ASSESSMENT
Pt is a 89 yo F with PMH dementia (AOx0, non-verbal), R ankle gangrene, HTN, HLD, CAD, and T2D p/f [facility] rehab with hyperNa and acute renal failure, both improved. Course c/b AHRF requiring HFNC, s/p RTT now intubated. GOC discussion held with son/HCP Toby who rescinded prior DNR/DNI, now plan for full code and full escalation of care and pursuing all interventions. Intubated, pending TTE for pre-op optimization with cards following in anticipation of plan for PEG (GI consulted) and LTCF placement.     #Neuro    #Pain  - propofol ggt at 5  - fentanyl ggt 0.5    #Dementia   - pt. A&Ox0 - nonverbal.  - Severe protein calorie malnutrition.   - Failed S&S eval.  Plan:   - Ongoing GOC conversation as below  - Attempted to call multiple family members multiple times 2/14, no response   Palliative re-consulted, called family, no change in GOC as per proxy. Family deferring changes until they visit pt during the weekend.   - anticipate need for PEG -- GI deferring until medically stable      #Cardiovascular    #Shock  -most likely septic shock in setting of gangrenous infection  Plan:  -continue levophed, wean as tolerated. Now maxed at 0.28 mcg/hr    #Afib.   - Afib with RVR. Pt. was transferred to a telemetry floor. Discussed with son. switch IV lopressor to PO now that we have oral access   Plan:  - hold lopressor in setting of shock    #Respiratory    Acute respiratory failure with hypoxia.   - suspect in setting of worsening deconditioning, poor inspiratory effort, atelectasis   - patient needed emergent intubation due to   - post intubation chest xray shows complete left lung opacity  RRT on 02/09 with emergent intubation due to increased work of breathing    Plan:  - continue antibiotics as per ID recs  - on volume AC, continue current vent settings     #GI/Nutrition    #Diet: tube feeds through OG at rate of 40    #Diarrhea  - noted to have multiple loose BMs today  - dc bowel regimen (miralax, senna -- last dosed 2/7)    #/Renal  - Ca repletion     #SAE (acute kidney injury).   - on admission  and Cr 2.76. Likely prerenal  - improved s/p fluid resuscitation       Plan:    - ctm Cr/UOP  - not candidate for dialysis  - not a candidate for RTT at this time.     #AGMA   - likely in setting of lactic acidosis  - 1300 sodium bicarb TID and 150meq Na bicarb 75cc/h  for 24 hours    #Skin    #ID    #Sepsis.    - pt met SIRS criteria + source of known foot infection.   - Recently treated for L foot wound growing E. coli and S. aureus.   - Vascular offering definitive management with amputation but family declines. Local wound care.   Plan:   - currently on doxycycline, meropenem  - ID consulted, f/u recs     #MSK:    #Wound of foot.   - L foot xray: Dorsal forefoot soft tissue swelling. Calcaneal cortex on left foot with possible osteomyelitis  - previously unable to tolerate MRI L ankle when attempted in recent hospitalization  - Recently Tx in Dec 23' for L foot wound growing S. Aureas and E. Coli, amputation L foot offered and deferred at the time as per GOC     Plan:  - c/w Abx as above  - Wound care following. appreciate recs  - not candidate for vascular surgery    #Endocrine    #Diabetes.   - POC checks   Plan:  - NPO, sliding scale.    #Hematologic    #Anemia   - Hgb 6.3 on 2/14. Rpt CBC, if <7, will transfuse  - Hgb now improved - stable.  Plan:  -ctm    #DVT ppx    #Ethics/ICU Bundle  - GOC/Code Status: Full Code. pt with prior MOLST - DNR/DNI but was rescinded on this hospitalization. Palliative consulted. Ongoing GOC conversations. Toby understands the poor prognosis and would like to proceed with comfort measures/DNR/DNI but two out of his three siblings do not agree with comfort measures at this time. He does not feel comfortable transitioning care until his other two brothers are in agreement and until they have visited the pt which they state will be this weekend. We addressed that his mom is declining and that we do not recommend NGT/PEG as this is uncomfortable and would not change her prognosis. Toby expressed understanding but states he wishes to pursue PEG as brothers would not forgive him if he did not.  - Diet: TF  - Access: ET tube, PIV  - Tubes/Drains: Rectal tube  - Activity/PT/OT:   Pt is a 89 yo F with PMH dementia (AOx0, non-verbal), R ankle gangrene, HTN, HLD, CAD, and T2D p/f [facility] rehab with hyperNa and acute renal failure, both improved. Course c/b AHRF requiring HFNC, s/p RTT now intubated. GOC discussion held with son/HCP Toby who rescinded prior DNR/DNI, now plan for full code and full escalation of care and pursuing all interventions. Intubated, pending TTE for pre-op optimization with cards following in anticipation of plan for PEG (GI consulted) and LTCF placement.     #Neuro    #Pain  - propofol ggt at 5  - fentanyl ggt 0.5    #Dementia   - pt. A&Ox0 - nonverbal.  - Severe protein calorie malnutrition.   - Failed S&S eval.  Plan:   - Ongoing GOC conversation as below  - Attempted to call multiple family members multiple times 2/14, no response.  - Attempted to call multiple family members multiple times 2/14, no response.  - Family deferring changes in GOC plan until they visit pt during the weekend.   - anticipate need for PEG -- GI deferring until medically stable      #Cardiovascular    #Shock  -most likely septic shock in setting of gangrenous infection  Plan:  -continue levophed, wean as tolerated. Now maxed at 0.28 mcg/hr  - DCed midodrine     #Afib.   - Afib with RVR. Pt. was transferred to a telemetry floor. Discussed with son. switch IV lopressor to PO now that we have oral access   Plan:  - hold lopressor in setting of shock    #Respiratory    Acute respiratory failure with hypoxia.   - suspect in setting of worsening deconditioning, poor inspiratory effort, atelectasis   - patient needed emergent intubation due to   - post intubation chest xray shows complete left lung opacity  RRT on 02/09 with emergent intubation due to increased work of breathing    Plan:  - continue antibiotics as per ID recs  - on volume AC, continue current vent settings     #GI/Nutrition    #Diet: tube feeds through OG at rate of 40    #Diarrhea  - noted to have multiple loose BMs today  - dc bowel regimen (miralax, senna -- last dosed 2/7)    #/Renal  - Ca repletion     #SAE (acute kidney injury).   - on admission  and Cr 2.76. Likely prerenal  - improved s/p fluid resuscitation       Plan:    - ctm Cr/UOP  - not candidate for dialysis  - not a candidate for RTT at this time.     #Skin    #ID    #Sepsis.    - pt met SIRS criteria + source of known foot infection.   - Recently treated for L foot wound growing E. coli and S. aureus.   - Vascular offering definitive management with amputation but family declines. Local wound care.   Plan:   - currently on doxycycline, meropenem      #MSK:    #Wound of foot.   - L foot xray: Dorsal forefoot soft tissue swelling. Calcaneal cortex on left foot with possible osteomyelitis  - previously unable to tolerate MRI L ankle when attempted in recent hospitalization  - Recently Tx in Dec 23' for L foot wound growing S. Aureas and E. Coli, amputation L foot offered and deferred at the time as per GOC     Plan:  - c/w Abx as above  - Wound care following. appreciate recs  - not candidate for vascular surgery    #Endocrine    #Diabetes.   - DCed POC checks  - DCed lantus  Plan:  - NPO, sliding scale.    #Hematologic    #Anemia   - Hgb 6.3 on 2/14. Q48 labs. Will not transfuse as transfusion will not change prognosis  Plan:  -ctm    #DVT ppx    #Ethics/ICU Bundle  - GOC/Code Status: Full Code. pt with prior MOLST - DNR/DNI but was rescinded on this hospitalization. Palliative consulted. Ongoing GOC conversations. Toby understands the poor prognosis and would like to proceed with comfort measures/DNR/DNI but two out of his three siblings do not agree with comfort measures at this time. He does not feel comfortable transitioning care until his other two brothers are in agreement and until they have visited the pt which they state will be this weekend. We addressed that his mom is declining and that we do not recommend NGT/PEG as this is uncomfortable and would not change her prognosis. Toby expressed understanding but states he wishes to pursue PEG as brothers would not forgive him if he did not.  - Diet: TF  - Access: ET tube, PIV  - Tubes/Drains: Rectal tube  - Activity/PT/OT:

## 2024-02-15 NOTE — PROGRESS NOTE ADULT - ATTENDING SUPERVISION STATEMENT
Resident
Fellow
Resident

## 2024-02-15 NOTE — PROGRESS NOTE ADULT - SUBJECTIVE AND OBJECTIVE BOX
Mp Krishnan MD  Interventional Cardiology / Endovascular Specialist  [place] Office : [phone] [place] [address] [place] [address]  Tel:   [place] Office : [address] [place] [address]  Tel: [phone]    Subjective/Overnight events: Patient lying in bed remains intubated on pressor  	  MEDICATIONS:  heparin   Injectable 5000 Unit(s) SubCutaneous every 12 hours  norepinephrine Infusion 0.05 MICROgram(s)/kG/Min IV Continuous <Continuous>    doxycycline IVPB      doxycycline IVPB 100 milliGRAM(s) IV Intermittent every 12 hours  meropenem  IVPB 500 milliGRAM(s) IV Intermittent every 24 hours      acetaminophen   Oral Liquid .. 650 milliGRAM(s) Oral every 6 hours PRN  fentaNYL   Infusion. 0.5 MICROgram(s)/kG/Hr IV Continuous <Continuous>  HYDROmorphone  Injectable 1 milliGRAM(s) IV Push every 4 hours PRN  propofol Infusion 5 MICROgram(s)/kG/Min IV Continuous <Continuous>    pantoprazole   Suspension 40 milliGRAM(s) Oral daily      chlorhexidine 0.12% Liquid 15 milliLiter(s) Oral Mucosa every 12 hours  chlorhexidine 2% Cloths 1 Application(s) Topical daily  Dakins Solution - 1/2 Strength 1 Application(s) Topical two times a day  sodium bicarbonate 1300 milliGRAM(s) Oral three times a day      PAST MEDICAL/SURGICAL HISTORY  PAST MEDICAL & SURGICAL HISTORY:  Angina  in 2005, s/p angioplasty with stent placement, no recurrance, no sequalae      Diabetes Mellitus  type 2      Arthritis, Infective, Knee      Detached Retina  right eye      Acute Mucous Pneumonia  4/2010, resolved ; no residual problems      Spinal Stenosis- lumbar      History of Back Surgery  2010      Basal Cell Cancer  removed from left neck 2009      Dementia      Detached Retina, Left  laser surgery in 1995      S/P Carpal Tunnel Release  bilateral hands in 1990      Trigger Finger  release of middle and ring finger of right hand in 1990, and middle finger left hand in 2008      S/P Laparoscopic Cholecystectomy  2008      S/P TKR (Total Knee Replacement)  left knee      Cataract  removal with lens implant right in 2000          SOCIAL HISTORY: Substance Use (street drugs): ( x ) never used  (  ) other:    FAMILY HISTORY:  FH: HTN (hypertension)      PHYSICAL EXAM:  T(C): 37.4 (02-15-24 @ 12:00), Max: 37.7 (02-15-24 @ 04:00)  HR: 70 (02-15-24 @ 14:41) (57 - 103)  BP: 61/19 (02-15-24 @ 12:00) (61/16 - 78/21)  RR: 16 (02-15-24 @ 12:00) (16 - 16)  SpO2: 98% (02-15-24 @ 14:41) (97% - 100%)  Wt(kg): --  I&O's Summary    14 Feb 2024 07:01  -  15 Feb 2024 07:00  --------------------------------------------------------  IN: 1418.8 mL / OUT: 60 mL / NET: 1358.8 mL    15 Feb 2024 07:01  -  15 Feb 2024 16:10  --------------------------------------------------------  IN: 598.4 mL / OUT: 0 mL / NET: 598.4 mL      GENERAL: intubated   EYES:  conjunctiva and sclera clear  ENMT: No tonsillar erythema, exudates, or enlargement  Cardiovascular: Normal S1 S2, No JVD, No murmurs, No edema  Respiratory: Lungs clear to auscultation	  Gastrointestinal:  Soft,   Extremities: No edema                               6.2    21.02 )-----------( 165      ( 14 Feb 2024 00:15 )             19.1     02-14    138  |  96<L>  |  117<H>  ----------------------------<  234<H>  5.2   |  20<L>  |  3.81<H>    Ca    6.0<LL>      14 Feb 2024 00:15  Phos  5.4     02-14  Mg     1.80     02-14    TPro  4.6<L>  /  Alb  1.3<L>  /  TBili  0.3  /  DBili  x   /  AST  27  /  ALT  7   /  AlkPhos  409<H>  02-14    proBNP:   Lipid Profile:   HgA1c:   TSH:     Consultant(s) Notes Reviewed:  [x ] YES  [ ] NO    Care Discussed with Consultants/Other Providers [ x] YES  [ ] NO    Imaging Personally Reviewed independently:  [x] YES  [ ] NO    All labs, radiologic studies, vitals, orders and medications list reviewed. Patient is seen and examined at bedside. Case discussed with medical team.

## 2024-02-15 NOTE — CHART NOTE - NSCHARTNOTEFT_GEN_A_CORE
Attempted to see patient today for follow up of multiple pressure injuries. Upon encounter patient intubated, sedated, hypotensive on vasopressor support. Per primary RN patient pt hemodynamically unstable, unable to tolerate turning and postioning at this time. Patient appears comfortable in NAD. Decision made to hold wound re-evaluation at this time. If patient should improve will follow up when/if appropriate.   Please reconsult as needed,  Thank you.  EDWARDO Corcoran-BC  Wound Surgery - NP  #[phone]

## 2024-02-15 NOTE — PROGRESS NOTE ADULT - ASSESSMENT
89 yo F with PMH dementia (AOx0, non-verbal), R ankle gangrene, HTN, HLD, CAD s/p CABG, and T2D p/f PJ rehab with hyperNa and acute renal failure,    EKG: Multifocal atrial tachycardia    1. Septic shock   - on abx   - intubated on midodrine and levophed possible aspiration pneumonia. poor prognosis, remains hypotension on levo, actively dying, ongoing GOC with family per MICU    2. Pre-op assessment  -plan for PEG cancelled,    3. CAD   -hx of CABG    4. HTN  - in septic shock on midodrine and levophed

## 2024-02-15 NOTE — PROGRESS NOTE ADULT - ATTENDING COMMENTS
89 years old female with h/o HTN, HLD, CAD, DM, dementia, s/p spinal stimulator placement p/w abnormal labs (Na 168, K 2.9) found to have necrotizing wound infection (E.Coli, Staph Aureus) as well as SAE. Hospital course c/b progressive encephalopathy, aspiration and AHRF requring intubation and mechanical ventilation, bilateral necrotizing infection not surgical candidate.       N: Toxic metabolic encephalopathy iso renal failure, septic shock  Baseline dementia  Spinal Stimulator  Pain, agitation  - Fentanyl and prop for sedation  CV: Shock- distributive, septic  CAD, HTN, HLD  AF  - Levo with MAP >65  - DC midodrine not benefiting from it  P: Aspiration,   AHRF s/p intubation (2/9)  - Lung protective ventilation, trend BG and adjust as able  - SBT daily, failing 2/2 apnea  GI:   - TF to goal  Heme: Acute on chronic anemia  - Hgb <7. No evidence of acute blood loss. Will not transfuse at this time  R: Oligouric renal failure, not candidate for RRT  Hypernatremia- resolved  Uremia  Lactic acidosis (>2)  - Trend UOP, creatinine, replete lytes as able  ID: Bilateral necrotizing wound infection not surgical candidate  Aspiration pneumonia  - Collin, doxy   - Will likely not have recovery without surgical intervention thus prognosis remains poor.     Full code for now. She is actively in the dying process with multiple lab abnormalities, her MAP ~ 30 for multiple days. We are unable to get in touch with family to discuss next steps, we called >4 times over the last 24 hours without response. We will continue to balance providing supportive care without doing additional harm.

## 2024-02-15 NOTE — DISCHARGE NOTE ADULT - NS MD DC FALL RISK RISK
Her/She
For information on Fall & Injury Prevention, visit [email or web address]

## 2024-02-16 NOTE — PROGRESS NOTE ADULT - PROVIDER SPECIALTY LIST ADULT
Cardiology
Hospitalist
Hospitalist
Infectious Disease
MICU
Palliative Care
Vascular Surgery
Cardiology
Infectious Disease
Internal Medicine
MICU
Nephrology
Wound Care
Cardiology
MICU
Nephrology
Infectious Disease
Nephrology
Wound Care
Cardiology
Internal Medicine
Cardiology
Hospitalist
Hospitalist
Nephrology
Internal Medicine
Internal Medicine
Nephrology
Nephrology
Palliative Care
Internal Medicine
Palliative Care
Internal Medicine
Hospitalist
Internal Medicine
Hospitalist
Palliative Care
Palliative Care

## 2024-02-16 NOTE — PROGRESS NOTE ADULT - SUBJECTIVE AND OBJECTIVE BOX
INTERVAL HPI/OVERNIGHT EVENTS:    SUBJECTIVE: Patient seen and examined at bedside.     ROS: All negative except as listed above.    VITAL SIGNS:  ICU Vital Signs Last 24 Hrs  T(C): 36.9 (16 Feb 2024 04:00), Max: 37.7 (15 Feb 2024 08:00)  T(F): 98.4 (16 Feb 2024 04:00), Max: 99.9 (15 Feb 2024 08:00)  HR: 81 (16 Feb 2024 04:00) (31 - 103)  BP: 63/14 (16 Feb 2024 04:00) (57/31 - 94/27)  BP(mean): 29 (16 Feb 2024 04:00) (29 - 55)  ABP: --  ABP(mean): --  RR: 16 (16 Feb 2024 04:00) (16 - 16)  SpO2: 100% (16 Feb 2024 04:00) (79% - 100%)    O2 Parameters below as of 16 Feb 2024 04:00  Patient On (Oxygen Delivery Method): ventilator    O2 Concentration (%): 40      Mode: AC/ CMV (Assist Control/ Continuous Mandatory Ventilation), RR (machine): 16, TV (machine): 350, FiO2: 40, PEEP: 5, ITime: 0.7, MAP: 10, PIP: 23  Plateau pressure:   P/F ratio:     02-15 @ 07:01  -  02-16 @ 07:00  --------------------------------------------------------  IN: 1886.4 mL / OUT: 20 mL / NET: 1866.4 mL      CAPILLARY BLOOD GLUCOSE      POCT Blood Glucose.: 225 mg/dL (15 Feb 2024 04:58)      ECG: reviewed.    PHYSICAL EXAM:    GENERAL: NAD, lying in bed comfortably  HEAD:  Atraumatic, normocephalic  EYES: EOMI, PERRLA, conjunctiva and sclera clear  NECK: Supple, trachea midline, no JVD  HEART: Regular rate and rhythm, no murmurs, rubs, or gallops  LUNGS: Unlabored respirations.  Clear to auscultation bilaterally, no crackles, wheezing, or rhonchi  ABDOMEN: Soft, nontender, nondistended, +BS  EXTREMITIES: 2+ peripheral pulses bilaterally, cap refill<2 secs. No clubbing, cyanosis, or edema  NERVOUS SYSTEM:  A&Ox3, following commands, moving all extremities, no focal deficits   SKIN: No rashes or lesions    MEDICATIONS:  MEDICATIONS  (STANDING):  chlorhexidine 0.12% Liquid 15 milliLiter(s) Oral Mucosa every 12 hours  chlorhexidine 2% Cloths 1 Application(s) Topical daily  Dakins Solution - 1/2 Strength 1 Application(s) Topical two times a day  doxycycline IVPB      doxycycline IVPB 100 milliGRAM(s) IV Intermittent every 12 hours  fentaNYL   Infusion. 0.5 MICROgram(s)/kG/Hr (2.02 mL/Hr) IV Continuous <Continuous>  heparin   Injectable 5000 Unit(s) SubCutaneous every 12 hours  meropenem  IVPB 500 milliGRAM(s) IV Intermittent every 24 hours  norepinephrine Infusion 0.05 MICROgram(s)/kG/Min (3.79 mL/Hr) IV Continuous <Continuous>  pantoprazole   Suspension 40 milliGRAM(s) Oral daily  propofol Infusion 5 MICROgram(s)/kG/Min (1.21 mL/Hr) IV Continuous <Continuous>  sodium bicarbonate 1300 milliGRAM(s) Oral three times a day    MEDICATIONS  (PRN):  acetaminophen   Oral Liquid .. 650 milliGRAM(s) Oral every 6 hours PRN Temp greater or equal to 38C (100.4F), Mild Pain (1 - 3)  HYDROmorphone  Injectable 1 milliGRAM(s) IV Push every 4 hours PRN Severe Pain (7 - 10)      ALLERGIES:  Allergies    Pineapple (Unknown)  contrast dye -  rash (Other)  iodine (Other)  codeine (Vomiting)  penicillin (Rash)  latex (Rash)    Intolerances    Tolerates ceftriaxone, cefepime (Other)      LABS:              ABG:      vBG:    Micro:    Culture - Blood (collected 02-09-24 @ 13:10)  Source: .Blood Blood  Final Report (02-14-24 @ 19:01):    No growth at 5 days    Culture - Blood (collected 02-09-24 @ 04:47)  Source: .Blood Blood-Peripheral  Final Report (02-14-24 @ 09:00):    No growth at 5 days    Culture - Blood (collected 02-09-24 @ 04:32)  Source: .Blood Blood-Venous  Final Report (02-14-24 @ 09:00):    No growth at 5 days    Culture - Blood (collected 02-02-24 @ 22:14)  Source: .Blood Blood-Peripheral  Final Report (02-08-24 @ 03:00):    No growth at 5 days    Culture - Blood (collected 02-02-24 @ 22:14)  Source: .Blood Blood-Peripheral  Final Report (02-08-24 @ 03:00):    No growth at 5 days    Culture - Blood (collected 01-24-24 @ 11:50)  Source: .Blood Blood-Peripheral  Final Report (01-29-24 @ 16:00):    No growth at 5 days    Culture - Blood (collected 01-24-24 @ 11:40)  Source: .Blood Blood-Peripheral  Final Report (01-29-24 @ 16:00):    No growth at 5 days          RADIOLOGY & ADDITIONAL TESTS: Reviewed.

## 2024-02-16 NOTE — PROGRESS NOTE ADULT - NUTRITIONAL ASSESSMENT
This patient has been assessed with a concern for Malnutrition and has been determined to have a diagnosis/diagnoses of Severe protein-calorie malnutrition.    This patient is being managed with:   Diet NPO with Tube Feed-  Tube Feeding Modality: Nasogastric  Glucerna 1.5 Wan (GLUCERNA1.5RTH)  Total Volume for 24 Hours (mL): 960  Continuous  Starting Tube Feed Rate {mL per Hour}: 5  Increase Tube Feed Rate by (mL): 10     Every 4 hours  Until Goal Tube Feed Rate (mL per Hour): 40  Tube Feed Duration (in Hours): 24  Tube Feed Start Time: 15:30  Entered: Feb 11 2024  2:00PM  
This patient has been assessed with a concern for Malnutrition and has been determined to have a diagnosis/diagnoses of Severe protein-calorie malnutrition.    This patient is being managed with:   Diet NPO with Tube Feed-  Tube Feeding Modality: Nasogastric  Glucerna 1.5 Wan (GLUCERNA1.5RTH)  Total Volume for 24 Hours (mL): 960  Continuous  Starting Tube Feed Rate {mL per Hour}: 5  Increase Tube Feed Rate by (mL): 10     Every 4 hours  Until Goal Tube Feed Rate (mL per Hour): 40  Tube Feed Duration (in Hours): 24  Tube Feed Start Time: 15:30  Entered: Feb 11 2024  2:00PM  
This patient has been assessed with a concern for Malnutrition and has been determined to have a diagnosis/diagnoses of Severe protein-calorie malnutrition.    This patient is being managed with:   Diet NPO with Tube Feed-  Tube Feeding Modality: Nasogastric  Glucerna 1.5 Wan (GLUCERNA1.5RTH)  Total Volume for 24 Hours (mL): 1440  Continuous  Starting Tube Feed Rate {mL per Hour}: 5  Increase Tube Feed Rate by (mL): 10     Every 4 hours  Until Goal Tube Feed Rate (mL per Hour): 60  Tube Feed Duration (in Hours): 24  Tube Feed Start Time: 15:30  Entered: Feb  3 2024  3:28PM  
This patient has been assessed with a concern for Malnutrition and has been determined to have a diagnosis/diagnoses of Severe protein-calorie malnutrition.    This patient is being managed with:   Diet NPO with Tube Feed-  Tube Feeding Modality: Nasogastric  Glucerna 1.5 Wan (GLUCERNA1.5RTH)  Total Volume for 24 Hours (mL): 960  Continuous  Starting Tube Feed Rate {mL per Hour}: 5  Increase Tube Feed Rate by (mL): 10     Every 4 hours  Until Goal Tube Feed Rate (mL per Hour): 40  Tube Feed Duration (in Hours): 24  Tube Feed Start Time: 15:30  Entered: Feb 11 2024  2:00PM  
This patient has been assessed with a concern for Malnutrition and has been determined to have a diagnosis/diagnoses of Severe protein-calorie malnutrition.    This patient is being managed with:   Diet NPO with Tube Feed-  Tube Feeding Modality: Nasogastric  Glucerna 1.5 Wan (GLUCERNA1.5RTH)  Total Volume for 24 Hours (mL): 1440  Continuous  Starting Tube Feed Rate {mL per Hour}: 5  Increase Tube Feed Rate by (mL): 10     Every 4 hours  Until Goal Tube Feed Rate (mL per Hour): 60  Tube Feed Duration (in Hours): 24  Tube Feed Start Time: 15:30  Entered: Feb  3 2024  3:28PM  
This patient has been assessed with a concern for Malnutrition and has been determined to have a diagnosis/diagnoses of Severe protein-calorie malnutrition.    This patient is being managed with:   Diet NPO with Tube Feed-  Tube Feeding Modality: Nasogastric  Glucerna 1.5 Wan (GLUCERNA1.5RTH)  Total Volume for 24 Hours (mL): 960  Continuous  Starting Tube Feed Rate {mL per Hour}: 5  Increase Tube Feed Rate by (mL): 10     Every 4 hours  Until Goal Tube Feed Rate (mL per Hour): 40  Tube Feed Duration (in Hours): 24  Tube Feed Start Time: 15:30  Entered: Feb 11 2024  2:00PM  
This patient has been assessed with a concern for Malnutrition and has been determined to have a diagnosis/diagnoses of Severe protein-calorie malnutrition.    This patient is being managed with:   Diet NPO with Tube Feed-  Tube Feeding Modality: Nasogastric  Glucerna 1.5 Wan (GLUCERNA1.5RTH)  Total Volume for 24 Hours (mL): 960  Continuous  Starting Tube Feed Rate {mL per Hour}: 5  Increase Tube Feed Rate by (mL): 10     Every 4 hours  Until Goal Tube Feed Rate (mL per Hour): 40  Tube Feed Duration (in Hours): 24  Tube Feed Start Time: 15:30  Entered: Feb 11 2024  2:00PM  
This patient has been assessed with a concern for Malnutrition and has been determined to have a diagnosis/diagnoses of Severe protein-calorie malnutrition.    This patient is being managed with:   Diet NPO-  Except Medications  Entered: Jan 24 2024  7:10PM  
This patient has been assessed with a concern for Malnutrition and has been determined to have a diagnosis/diagnoses of Severe protein-calorie malnutrition.    This patient is being managed with:   Diet NPO with Tube Feed-  Tube Feeding Modality: Nasogastric  Glucerna 1.5 Wan (GLUCERNA1.5RTH)  Total Volume for 24 Hours (mL): 1440  Continuous  Starting Tube Feed Rate {mL per Hour}: 5  Increase Tube Feed Rate by (mL): 10     Every 4 hours  Until Goal Tube Feed Rate (mL per Hour): 60  Tube Feed Duration (in Hours): 24  Tube Feed Start Time: 15:30  Entered: Feb  3 2024  3:28PM  
This patient has been assessed with a concern for Malnutrition and has been determined to have a diagnosis/diagnoses of Severe protein-calorie malnutrition.    This patient is being managed with:   Diet NPO-  Except Medications  Entered: Jan 24 2024  7:10PM  
This patient has been assessed with a concern for Malnutrition and has been determined to have a diagnosis/diagnoses of Severe protein-calorie malnutrition.    This patient is being managed with:   Diet NPO with Tube Feed-  Tube Feeding Modality: Nasogastric  Glucerna 1.5 Wan (GLUCERNA1.5RTH)  Total Volume for 24 Hours (mL): 1440  Continuous  Starting Tube Feed Rate {mL per Hour}: 5  Increase Tube Feed Rate by (mL): 10     Every 4 hours  Until Goal Tube Feed Rate (mL per Hour): 60  Tube Feed Duration (in Hours): 24  Tube Feed Start Time: 15:30  Entered: Feb  3 2024  3:28PM  
This patient has been assessed with a concern for Malnutrition and has been determined to have a diagnosis/diagnoses of Severe protein-calorie malnutrition.    This patient is being managed with:   Diet NPO-  Except Medications  Entered: Jan 24 2024  7:10PM  
This patient has been assessed with a concern for Malnutrition and has been determined to have a diagnosis/diagnoses of Severe protein-calorie malnutrition.    This patient is being managed with:   Diet NPO with Tube Feed-  Tube Feeding Modality: Nasogastric  Glucerna 1.5 Wan (GLUCERNA1.5RTH)  Total Volume for 24 Hours (mL): 1440  Continuous  Starting Tube Feed Rate {mL per Hour}: 5  Increase Tube Feed Rate by (mL): 10     Every 4 hours  Until Goal Tube Feed Rate (mL per Hour): 60  Tube Feed Duration (in Hours): 24  Tube Feed Start Time: 15:30  Entered: Feb  3 2024  3:28PM  
This patient has been assessed with a concern for Malnutrition and has been determined to have a diagnosis/diagnoses of Severe protein-calorie malnutrition.    This patient is being managed with:   Diet NPO with Tube Feed-  Tube Feeding Modality: Nasogastric  Glucerna 1.5 Wan (GLUCERNA1.5RTH)  Total Volume for 24 Hours (mL): 1440  Continuous  Starting Tube Feed Rate {mL per Hour}: 5  Increase Tube Feed Rate by (mL): 10     Every 4 hours  Until Goal Tube Feed Rate (mL per Hour): 60  Tube Feed Duration (in Hours): 24  Tube Feed Start Time: 15:30  Entered: Feb  3 2024  3:28PM  
This patient has been assessed with a concern for Malnutrition and has been determined to have a diagnosis/diagnoses of Severe protein-calorie malnutrition.    This patient is being managed with:   Diet NPO-  Except Medications  Entered: Jan 24 2024  7:10PM  
This patient has been assessed with a concern for Malnutrition and has been determined to have a diagnosis/diagnoses of Severe protein-calorie malnutrition.    This patient is being managed with:   Diet NPO with Tube Feed-  Tube Feeding Modality: Nasogastric  Glucerna 1.5 Wan (GLUCERNA1.5RTH)  Total Volume for 24 Hours (mL): 1440  Continuous  Starting Tube Feed Rate {mL per Hour}: 5  Increase Tube Feed Rate by (mL): 10     Every 4 hours  Until Goal Tube Feed Rate (mL per Hour): 60  Tube Feed Duration (in Hours): 24  Tube Feed Start Time: 15:30  Entered: Feb  3 2024  3:28PM  
This patient has been assessed with a concern for Malnutrition and has been determined to have a diagnosis/diagnoses of Severe protein-calorie malnutrition.    This patient is being managed with:   Diet NPO with Tube Feed-  Tube Feeding Modality: Nasogastric  Glucerna 1.5 Wan (GLUCERNA1.5RTH)  Total Volume for 24 Hours (mL): 1440  Continuous  Starting Tube Feed Rate {mL per Hour}: 5  Increase Tube Feed Rate by (mL): 10     Every 4 hours  Until Goal Tube Feed Rate (mL per Hour): 60  Tube Feed Duration (in Hours): 24  Tube Feed Start Time: 15:30  Entered: Feb  3 2024  3:28PM  
This patient has been assessed with a concern for Malnutrition and has been determined to have a diagnosis/diagnoses of Severe protein-calorie malnutrition.    This patient is being managed with:   Diet NPO-  Except Medications  Entered: Jan 24 2024  7:10PM

## 2024-02-16 NOTE — PROGRESS NOTE ADULT - ASSESSMENT
Pt is a 89 yo F with PMH dementia (AOx0, non-verbal), R ankle gangrene, HTN, HLD, CAD, and T2D p/f [facility] rehab with hyperNa and acute renal failure, both improved. Course c/b AHRF requiring HFNC, s/p RTT now intubated. GOC discussion held with son/HCP Toby who rescinded prior DNR/DNI, now plan for full code and full escalation of care and pursuing all interventions. Intubated, pending TTE for pre-op optimization with cards following in anticipation of plan for PEG (GI consulted) and LTCF placement.     #Neuro    #Pain  - propofol ggt at 5  - fentanyl ggt 0.5    #Dementia   - pt. A&Ox0 - nonverbal.  - Severe protein calorie malnutrition.   - Failed S&S eval.  Plan:   - Ongoing GOC conversation as below  - Attempted to call multiple family members multiple times 2/14, no response.  - Attempted to call multiple family members multiple times 2/14, no response.  - Family deferring changes in GOC plan until they visit pt during the weekend.   - anticipate need for PEG -- GI deferring until medically stable      #Cardiovascular    #Shock  -most likely septic shock in setting of gangrenous infection  Plan:  -continue levophed, wean as tolerated. Now maxed at 0.28 mcg/hr  - DCed midodrine     #Afib.   - Afib with RVR. Pt. was transferred to a telemetry floor. Discussed with son. switch IV lopressor to PO now that we have oral access   Plan:  - hold lopressor in setting of shock    #Respiratory    Acute respiratory failure with hypoxia.   - suspect in setting of worsening deconditioning, poor inspiratory effort, atelectasis   - patient needed emergent intubation due to   - post intubation chest xray shows complete left lung opacity  RRT on 02/09 with emergent intubation due to increased work of breathing    Plan:  - continue antibiotics as per ID recs  - on volume AC, continue current vent settings     #GI/Nutrition    #Diet: tube feeds through OG at rate of 40    #Diarrhea  - noted to have multiple loose BMs today  - dc bowel regimen (miralax, senna -- last dosed 2/7)    #/Renal  - Ca repletion     #SAE (acute kidney injury).   - on admission  and Cr 2.76. Likely prerenal  - improved s/p fluid resuscitation       Plan:    - ctm Cr/UOP  - not candidate for dialysis  - not a candidate for RTT at this time.     #Skin    #ID    #Sepsis.    - pt met SIRS criteria + source of known foot infection.   - Recently treated for L foot wound growing E. coli and S. aureus.   - Vascular offering definitive management with amputation but family declines. Local wound care.   Plan:   - currently on doxycycline, meropenem      #MSK:    #Wound of foot.   - L foot xray: Dorsal forefoot soft tissue swelling. Calcaneal cortex on left foot with possible osteomyelitis  - previously unable to tolerate MRI L ankle when attempted in recent hospitalization  - Recently Tx in Dec 23' for L foot wound growing S. Aureas and E. Coli, amputation L foot offered and deferred at the time as per GOC     Plan:  - c/w Abx as above  - Wound care following. appreciate recs  - not candidate for vascular surgery    #Endocrine    #Diabetes.   - DCed POC checks  - DCed lantus  Plan:  - NPO, sliding scale.    #Hematologic    #Anemia   - Hgb 6.3 on 2/14. Q48 labs. Will not transfuse as transfusion will not change prognosis  Plan:  -ctm    #DVT ppx    #Ethics/ICU Bundle  - GOC/Code Status: Full Code. pt with prior [facility] - DNR/DNI but was rescinded on this hospitalization. Palliative consulted. Ongoing GOC conversations. Toby understands the poor prognosis and would like to proceed with comfort measures/DNR/DNI but two out of his three siblings do not agree with comfort measures at this time. He does not feel comfortable transitioning care until his other two brothers are in agreement and until they have visited the pt which they state will be this weekend. We addressed that his mom is declining and that we do not recommend NGT/PEG as this is uncomfortable and would not change her prognosis. Toby expressed understanding but states he wishes to pursue PEG as brothers would not forgive him if he did not.  - Diet: TF  - Access: ET tube, PIV  - Tubes/Drains: Rectal tube  - Activity/PT/OT:

## 2024-02-16 NOTE — PROGRESS NOTE ADULT - REASON FOR ADMISSION
Abnormal labs

## 2024-02-16 NOTE — DISCHARGE NOTE FOR THE EXPIRED PATIENT - HOSPITAL COURSE
89 years old female with h/o HTN, HLD, CAD, DM, dementia, s/p spinal stimulator placement p/w abnormal labs (Na 168, K 2.9) found to have necrotizing wound infection (E.Coli, Staph Aureus) as well as SAE. Hospital course c/b progressive encephalopathy, aspiration and AHRF requring intubation and mechanical ventilation, bilateral necrotizing infection not surgical candidate. Wound managed medically with IV doxy and meropenem. However, pt continued to decline with declining blood pressures despite vasopressor support, along with worsening SAE and acidosis. Patient ultimately passed this morning.

## 2024-02-16 NOTE — CHART NOTE - NSCHARTNOTEFT_GEN_A_CORE
1330  Blood drawn from PICC line line.  Good blood return noted.  Flushed without difficulty  PICC line dressing change done using sterile technique.  Tolerated well  No signs of infection noted.  Stat lock device, bio patch, and tegaderm dressing reapplied.         DEATH NOTE    Called to bedside to evaluate the patient for asystole    On physical exam, patient did not respond to verbal or noxious stimuli.  No spontaneous respirations.  Absent heart and breath sounds.  Absent radial and carotid pulses.   Pupils are fixed and dilated, no corneal reflex.  EKG rhythm strip shows asystole.   Patient pronounced dead at 9:05. Attending notified.  Family ____ autopsy. DEATH NOTE    Called to bedside to evaluate the patient for asystole    On physical exam, patient did not respond to verbal or noxious stimuli.  No spontaneous respirations.  Absent heart and breath sounds.  Absent radial and carotid pulses.   Pupils are fixed and dilated, no corneal reflex.  EKG rhythm strip shows asystole.   Patient pronounced dead at 9:05. Attending notified.  Attempted to reach family multiple times without a response. Left a voice message to call the MICU urgently.

## 2024-02-21 NOTE — PHYSICAL THERAPY INITIAL EVALUATION ADULT - LIVES WITH, PROFILE
Reviewed images and discussed with Dr. Sarah    Cta with expected appearance of the two flow diverters and overlapping wallstent. Luminal narrowing is at the junction of the overlapping stents and likely related to metal artifact.  No localizing neuro deficits                 Cont aspirin 81 mg daily and clopidogrel 75 mg daily - minimum 6 months dual antiplatelets. Will closely followup the finding to ensure stability and patency of the stents    Eliseo Sellers MD   children/2 sons

## 2024-08-07 NOTE — PROGRESS NOTE ADULT - ASSESSMENT
Detail Level: Detailed 89 years old female with h/o HTN, HLD, CAD, DM, dementia, s/p spinal stimulator placement p/w abnormal labs (Na 168, K 2.9)

## 2024-08-14 NOTE — BH CONSULTATION LIAISON ASSESSMENT NOTE - CURRENT INTENT:
EMERGENCY DEPARTMENT PROVIDER NOTE    Patient : Bettina Dodson Age: 76 year old Sex: female   MRN: [id number] Encounter Date: 8/1/2024    CLINICAL IMPRESSION     ED Diagnosis   1. Atypical chest pain           MEDICAL DECISION MAKING     Vitals:    08/01/24 1400 08/01/24 1430 08/01/24 1455 08/01/24 1525   BP: (!) 148/68 (!) 158/70 (!) 150/66 (!) 155/64   BP Location:       Patient Position:       Pulse: 74 75 78 80   Resp:  17     Temp:       TempSrc:       SpO2: 97% 97% 97% 97%   Weight:       Height:         75 yo F pw chest pain. States intermittent chest pain more of pressure in anterior chest. Non radiating. Ongoing since June. Some nausea no vomiting. No cough. No sob. Some pain in back. Hx of lung mass. On blood thinner for afib. Non toxic on arrival. Reassuring exam. EKG wo acute ischemia. Labs reassuring. Imaging wo dissection. Known lung mass. Patient now pain free. Considered admission but given atypical nature and reassuring asher I feel appropriate for outpt fu. Return precautions given.        Chart Review: I personally reviewed relevant recent encounters in [facility] Everywhere.     HEART Score for Major Cardiac Events:   History: Slightly suspicious   EKG Non Specific repolarization disturbance   AGE Equal or greater than 65   RISK FACTOR 1-2 risk factors   TROPONIN: Equal or less than normal limit   TOTAL SCORE 4     Additional Information    Moderate Risk HEART Score Results:   Discussion and Patient Decision:     The patient's HEART Score is considered to be at moderate risk for a Major Adverse Cardiac Event (MACE).  The calculated risk is 12-16% at 6 weeks.    HISTORY     Chief Complaint   Patient presents with    Nausea    Chest Pain       HPI As above.    Past/Family/Social History     Allergies   Allergen Reactions    Amoxicillin RASH    Morphine Sulfate Nausea & Vomiting    Esomeprazole RASH       No current facility-administered medications for this encounter.     Current Outpatient  Medications   Medication Sig    doxycycline monohydrate (MONODOX) 100 MG capsule Take 1 capsule by mouth in the morning and 1 capsule in the evening. Do all this for 5 days.    ondansetron (ZOFRAN ODT) 4 MG disintegrating tablet Place 1 tablet onto the tongue every 8 hours as needed for Nausea.    roflumilast (DALIRESP) 500 MCG Tab Take 1 tablet by mouth daily.    albuterol 108 (90 Base) MCG/ACT inhaler Inhale 1-2 puffs into the lungs 4 times daily as needed.    omeprazole (PriLOSEC) 40 MG capsule Take 1 capsule by mouth in the morning and 1 capsule in the evening.    neomycin-polymyxin B-dexamethasone (MAXITROL) 3.5-05388-9.1 ophthalmic ointment Apply a small amount to right eye twice daily for 3 days    losartan (COZAAR) 25 MG tablet Take 1 tablet by mouth daily.    spironolactone (ALDACTONE) 25 MG tablet Take 1 tablet by mouth daily.    empagliflozin (Jardiance) 10 MG tablet Take 1 tablet by mouth daily.    metoPROLOL succinate (TOPROL-XL) 50 MG 24 hr tablet Take 1 tablet by mouth daily.    dilTIAZem (CARDIZEM CD) 120 MG 24 hr capsule Take 1 capsule by mouth daily.    Specialty Vitamins Products (MG Plus Protein) 133 MG Tab Take 1 tablet by mouth daily.    apixaBAN (Eliquis) 5 MG Tab Take 1 tablet by mouth in the morning and 1 tablet in the evening.    dofetilide (TIKOSYN) 250 MCG capsule Take 1 capsule by mouth every 12 hours.    bumetanide (BUMEX) 1 MG tablet Take 1 tablet by mouth 2 times daily.    Budeson-Glycopyrrol-Formoterol (Breztri Aerosphere) 160-9-4.8 MCG/ACT Aerosol Inhale 2 puffs into the lungs in the morning and 2 puffs in the evening. Rinse and gargle after use.    traMADol (ULTRAM) 50 MG tablet Take 1 tablet by mouth every 6 hours as needed.    bisacodyl 5 MG EC tablet Take 2 tablets by mouth at bedtime 2 days before the procedure    PEG 3350-KCl-NaBcb-NaCl-NaSulf (PEG-3350 and electrolytes) 236 g powder for oral solution Drink 1/2 the solution at 7pm the night before the procedure and the rest 6  hours prior to arrival time. (Patient not taking: Reported on 7/12/2024)    potassium CHLORIDE (KLOR-CON M) 20 MEQ jusytn ER tablet Take 1 tablet by mouth in the morning and 1 tablet in the evening.    triamterene-hydrochlorothiazide (DYAZIDE) 37.5-25 MG per capsule Take 1 capsule by mouth daily.    amLODIPine (NORVASC) 5 MG tablet Take 1 tablet by mouth every morning.    latanoprost (XALATAN) 0.005 % ophthalmic solution Place 1 drop into both eyes nightly.    cycloSPORINE (RESTASIS) 0.05 % ophthalmic emulsion Place 1 drop into both eyes 2 times daily.    triamcinolone (ARISTOCORT) 0.1 % cream Apply sparingly to affected areas on legs and rub in twice daily as directed. Update in 4-6 weeks.    doxycycline monohydrate (ADOXA) 100 MG tablet Take 1 tablet by mouth 2 times daily for 7 days.    cyclobenzaprine (FLEXERIL) 5 MG tablet 1-2 tablets at bedtime for muscle spasms.  May cause drowsiness.    atorvastatin (LIPITOR) 20 MG tablet Take 1 tablet by mouth daily.    tobramycin-dexamethasone (TOBRADEX) ophthalmic suspension Apply 1 Drop to each eye 4 times daily for 7 days.    Cholecalciferol (VITAMIN D3) 2000 units capsule Take 2,000 Units by mouth.    LORazepam (ATIVAN) 2 MG tablet Take 1 tablet 45 minutes prior to surgical procedure.    buPROPion (WELLBUTRIN XL) 150 MG 24 hr tablet Take 1 tablet by mouth every morning. (Patient not taking: Reported on 7/23/2024)    atorvastatin (LIPITOR) 20 MG tablet Take 1 tablet by mouth daily.    DISPENSE aerochamber to use with MDI    GLUCOS-CHONDROIT-MSM-C-HYAL PO Take  by mouth daily.    Calcium-Magnesium-Vitamin D (CITRACAL CALCIUM+D PO) Take  by mouth daily.       Past Medical History:   Diagnosis Date    Abdominal pain, left lower quadrant 5/19/08    Colonoscopy/King    Diverticulitis     Hypertension     Tobacco dependence syndrome     Vocal cord polyp 2015       Past Surgical History:   Procedure Laterality Date    ----------mammogram----------  5/2014    abnl:f/u US: lymph  node     Colonoscopy diagnostic  5/19/08    Dr. King/diagnostic/recall 5/2018    Past surgical history      colectomy for perf colon and takedown    Past surgical history  1969    chest surgery - remove benign mass?    Umbilical hernia repair  09/07/07       Family History   Problem Relation Age of Onset    Stroke Father     Neurological Disorder Mother        Social History     Tobacco Use    Smoking status: Former     Average packs/day: 1 pack/day for 20.5 years (20.5 ttl pk-yrs)     Types: Cigarettes     Start date: 1/3/2002    Smokeless tobacco: Never   Vaping Use    Vaping status: Unknown   Substance Use Topics    Alcohol use: Yes     Alcohol/week: 2.0 - 3.0 standard drinks of alcohol     Types: 2 - 3 Standard drinks or equivalent per week    Drug use: No          REVIEW OF SYSTEMS   Review of Symptoms     Review of Systems       PHYSICAL EXAM     Physical Exam     ED Triage Vitals [08/01/24 1249]   ED Triage Vitals Group      Temp 97.8 °F (36.6 °C)      Heart Rate 81      Resp 18      BP (!) 144/67      SpO2 97 %      EtCO2 mmHg       Height 5' 1\" (1.549 m)      Weight 130 lb 11.7 oz (59.3 kg)      Weight Scale Used Scale in bed      BMI (Calculated) 24.7      IBW/kg (Calculated) 47.8       GEN: Well appearing.  Lungs: Normal effort. No acute distress.  Heart: Regular rate, rhythm.  Abdomen: Does not appear distended.   Extremities: Moving per baseline.   Neuro: Alert. No obvious focal deficits.     Physical Exam       PROCEDURES   ED Procedures     Procedures       ED COURSE   ED Lab   Results for orders placed or performed during the hospital encounter of 08/01/24   Comprehensive Metabolic Panel   Result Value Ref Range    Fasting Status      Sodium 139 135 - 145 mmol/L    Potassium 3.6 3.4 - 5.1 mmol/L    Chloride 101 97 - 110 mmol/L    Carbon Dioxide 28 21 - 32 mmol/L    Anion Gap 14 7 - 19 mmol/L    Glucose 115 (H) 70 - 99 mg/dL    BUN 25 (H) 6 - 20 mg/dL    Creatinine 1.02 (H) 0.51 - 0.95 mg/dL     Glomerular Filtration Rate 57 (L) >=60    BUN/Cr 25 7 - 25    Calcium 9.4 8.4 - 10.2 mg/dL    Bilirubin, Total 0.4 0.2 - 1.0 mg/dL    GOT/AST 6 <=37 Units/L    GPT/ALT 17 <64 Units/L    Alkaline Phosphatase 96 45 - 117 Units/L    Albumin 3.0 (L) 3.6 - 5.1 g/dL    Protein, Total 7.2 6.4 - 8.2 g/dL    Globulin 4.2 (H) 2.0 - 4.0 g/dL    A/G Ratio 0.7 (L) 1.0 - 2.4   Lipase   Result Value Ref Range    Lipase 67 15 - 77 Units/L   D Dimer, Quantitative   Result Value Ref Range    D Dimer, Quantitative 0.56 <0.57 mg/L (FEU)   TROPONIN I, HIGH SENSITIVITY   Result Value Ref Range    Troponin I, High Sensitivity 9 <52 ng/L   CBC with Automated Differential (performable only)   Result Value Ref Range    WBC 10.4 4.2 - 11.0 K/mcL    RBC 4.15 4.00 - 5.20 mil/mcL    HGB 11.4 (L) 12.0 - 15.5 g/dL    HCT 36.8 36.0 - 46.5 %    MCV 88.7 78.0 - 100.0 fl    MCH 27.5 26.0 - 34.0 pg    MCHC 31.0 (L) 32.0 - 36.5 g/dL    RDW-CV 14.6 11.0 - 15.0 %    RDW-SD 46.5 39.0 - 50.0 fL     140 - 450 K/mcL    NRBC 0 <=0 /100 WBC    Neutrophil, Percent 66 %    Lymphocytes, Percent 21 %    Mono, Percent 8 %    Eosinophils, Percent 3 %    Basophils, Percent 1 %    Immature Granulocytes 1 %    Absolute Neutrophils 7.0 1.8 - 7.7 K/mcL    Absolute Lymphocytes 2.2 1.0 - 4.0 K/mcL    Absolute Monocytes 0.9 0.3 - 0.9 K/mcL    Absolute Eosinophils  0.3 0.0 - 0.5 K/mcL    Absolute Basophils 0.1 0.0 - 0.3 K/mcL    Absolute Immature Granulocytes 0.1 0.0 - 0.2 K/mcL   Gold Top   Result Value Ref Range    Extra Tube Hold for Add Ons    TROPONIN I, HIGH SENSITIVITY   Result Value Ref Range    Troponin I, High Sensitivity 12 <52 ng/L          ED Radiology Results     Imaging Results              CTA CHEST (Edited Result - FINAL)  Result time 08/12/24 11:18:21      Addendum (preliminary) 1 of 1    Addendum:    Additional post-processed 3D images have been added to this study, but the  report remains unchanged.       Electronically Signed by: Titus Lock,  MD  Signed on: 8/12/2024 11:18 AM  Created on Workstation ID: [id number]  Signed on Workstation ID: [id number]                 Final result                   Impression:    IMPRESSION:    1. No evidence of aortic dissection or pulmonary embolism.         2. 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 RIGHT infrahilar lymph node measuring 1.8 cm with additional RIGHT  paratracheal lymph node measuring 2.1 cm likely metastatic in origin.          Electronically Signed by: Titus Lock MD  Signed on: 8/1/2024 3:43 PM  Created on Workstation ID: [id number]  Signed on Workstation ID: [id number]               Narrative:    EXAMINATION:  CT ANGIOGRAM OF THE CHEST    CLINICAL HISTORY:  75 years old Female presents  with chest into back pain,  hypertensive.    COMPARISON: 3 weeks prior.    TECHNIQUE: Contiguous post-contrast images of the chest were obtained using  pulmonary arterial angiogram technique after IV administration of 125    mL of Omnipaque 350    Postprocessing MIPS were performed by the technologist at the CT scanner  under supervision of the radiologist.    One or more of the following dose reduction techniques were used: automated  exposure control, adjustment of the mA and/or kV according to patient size,  use of iterative reconstruction.    FINDINGS:    PULMONARY ARTERIES AND AORTA:    There are no filling defects within the pulmonary arterial system to  indicate pulmonary embolism.    AORTA: Ascending aorta ectasia 3.6 cm with moderate atherosclerosis without  evidence for dissection    No pulmonary arterial enlargement.    HEART AND MEDIASTINUM:    The heart is normal size.      There are coronary calcifications.    No significant pericardial effusion.    LYMPH NODES:    RIGHT infrahilar lymph node measuring 1.8 cm with additional RIGHT  paratracheal lymph node  measuring 2.1 cm likely metastatic in origin.    No suspicious thyroid lesions    LUNGS AND PLEURAL SPACES:    NODULES: Known apical tumor on the RIGHT side measuring approximately 2.6 x  2.8 cm is redemonstrated, see recent chest CT from March and PET/CT studies  for more definitive assessment        There is no evidence for pneumonia, pleural effusion or acute lung disease.        UPPER ABDOMINAL CONTENTS:    The partially imaged UPPER ABDOMINAL CONTENTS demonstrate no acute  pathology.    OSSEOUS STRUCTURES:    No suspicious lytic or blastic osseous lesion.    No acute vertebral compression fractures                                          XR CHEST PA AND LATERAL 2 VIEWS (Final result)  Result time 08/01/24 14:27:07      Final result                   Impression:    IMPRESSION: No significant interval change. Persistent right apical lung  density.    Electronically Signed by: Tim Gillis MD  Signed on: 8/1/2024 2:27 PM  Created on Workstation ID: [id number]  Signed on Workstation ID: [id number]               Narrative:    EXAM: XR CHEST PA AND LATERAL 2 VIEWS    CLINICAL HISTORY:  Chest pain.    COMPARISON: 7/12/2024.    FINDINGS: There is a persistent ovoid 2 x 1.5 cm density again projecting  over the lateral right lung apex, best visualized on the frontal view.  Elsewhere there is mild reticular prominence in the lungs, greatest in the  mid and lower lung zones.    There is no evidence for pneumothorax or pleural effusion. The heart size  and pulmonary vascularity are within normal limits. No significant osseous  abnormality.                                           ED Medications     ED Medication Orders (From admission, onward)      Ordered Start     Status Ordering Provider    08/01/24 1442 08/01/24 1443  acetaminophen (TYLENOL) tablet 650 mg  ONCE         Last MAR action: Given MARLEN TAPIA    08/01/24 1435 08/01/24 1436  alum-mag hydroxide+simethicone/lidocaine viscous (2:1) (MAGIC MOUTHWASH/GI  COCKTAIL) (compounded) oral suspension 15 mL  ONCE         Last MAR action: Given MARLEN RODRIGUEZ                 DISPOSITION       Clinical Impression and Diagnosis  7:27 PM       ED Diagnosis       Diagnosis Comment Associated Orders       Final diagnosis    Atypical chest pain -- --            Follow Up:  Bharati Lau MD  [facility] [address] [phone]    In 3 days            Summary of your Discharge Medications      You have not been prescribed any medications.         Pt is discharged to home/self care in stable condition.            Discharge 8/1/2024  4:09 PM  Bettina Dodson discharge to home/self care.                   Marlen Rodriguez, DO  08/13/24 1927     None known

## 2024-10-25 NOTE — PROGRESS NOTE ADULT - SUBJECTIVE AND OBJECTIVE BOX
56.7 Patient is a 89y old  Female who presents with a chief complaint of AMS (13 Apr 2022 12:45)    INTERVAL HPI/OVERNIGHT EVENTS:  Pt was seen and examined, no acute events.    MEDICATIONS  (STANDING):  aspirin  chewable 81 milliGRAM(s) Oral daily  clopidogrel Tablet 75 milliGRAM(s) Oral daily  dextrose 5%. 1000 milliLiter(s) (50 mL/Hr) IV Continuous <Continuous>  dextrose 5%. 1000 milliLiter(s) (100 mL/Hr) IV Continuous <Continuous>  dextrose 50% Injectable 25 Gram(s) IV Push once  dextrose 50% Injectable 12.5 Gram(s) IV Push once  dextrose 50% Injectable 25 Gram(s) IV Push once  glucagon  Injectable 1 milliGRAM(s) IntraMuscular once  heparin   Injectable 5000 Unit(s) SubCutaneous every 12 hours  insulin lispro (ADMELOG) corrective regimen sliding scale   SubCutaneous three times a day before meals  lisinopril 5 milliGRAM(s) Oral daily  metoprolol tartrate 50 milliGRAM(s) Oral two times a day  simvastatin 20 milliGRAM(s) Oral at bedtime    MEDICATIONS  (PRN):  acetaminophen     Tablet .. 650 milliGRAM(s) Oral every 6 hours PRN Temp greater or equal to 38C (100.4F), Moderate Pain (4 - 6)  dextrose Oral Gel 15 Gram(s) Oral once PRN Blood Glucose LESS THAN 70 milliGRAM(s)/deciliter      Allergies  codeine (Vomiting)  contrast dye -  rash (Other)  iodine (Other)  latex (Rash)  penicillin (Rash)      Vital Signs Last 24 Hrs  T(C): 36.8 (14 Apr 2022 17:19), Max: 37.5 (14 Apr 2022 14:04)  T(F): 98.3 (14 Apr 2022 17:19), Max: 99.5 (14 Apr 2022 14:04)  HR: 68 (14 Apr 2022 17:19) (68 - 70)  BP: 173/97 (14 Apr 2022 17:19) (132/74 - 173/97)  BP(mean): --  RR: 17 (14 Apr 2022 17:19) (17 - 18)  SpO2: 96% (14 Apr 2022 17:19) (93% - 96%)    PHYSICAL EXAM:  GENERAL: NAD  HEAD:  Atraumatic  EYES: PERRLA  NERVOUS SYSTEM:  Awake, confused.  CHEST/LUNG: Clear  HEART: RRR  ABDOMEN: Soft, non tender  EXTREMITIES:  no edema      LABS:                        11.8   8.65  )-----------( 231      ( 14 Apr 2022 07:57 )             35.1     04-14    140  |  106  |  28<H>  ----------------------------<  159<H>  4.1   |  26  |  1.37<H>    Ca    9.2      14 Apr 2022 07:57    TPro  6.6  /  Alb  2.9<L>  /  TBili  0.3  /  DBili  x   /  AST  27  /  ALT  21  /  AlkPhos  54  04-13        CAPILLARY BLOOD GLUCOSE      POCT Blood Glucose.: 182 mg/dL (14 Apr 2022 21:47)  POCT Blood Glucose.: 180 mg/dL (14 Apr 2022 17:10)  POCT Blood Glucose.: 263 mg/dL (14 Apr 2022 11:47)  POCT Blood Glucose.: 174 mg/dL (14 Apr 2022 07:46)  POCT Blood Glucose.: 145 mg/dL (13 Apr 2022 22:56)      Culture - Urine (collected 11 Apr 2022 00:54)  Source: Clean Catch Clean Catch (Midstream)  Final Report (13 Apr 2022 10:24):    >100,000 CFU/ml Escherichia coli  Organism: Escherichia coli (13 Apr 2022 10:24)  Organism: Escherichia coli (13 Apr 2022 10:24)    Culture - Blood (collected 11 Apr 2022 00:41)  Source: .Blood Blood-Peripheral  Preliminary Report (12 Apr 2022 01:01):    No growth to date.    Culture - Blood (collected 11 Apr 2022 00:41)  Source: .Blood Blood-Peripheral  Preliminary Report (12 Apr 2022 01:01):    No growth to date.      RADIOLOGY & ADDITIONAL TESTS:    Imaging Personally Reviewed:  [ ] YES  [ ] NO    Consultant(s) Notes Reviewed:  [ ] YES  [ ] NO    Care Discussed with Consultants/Other Providers [ ] YES  [ ] NO

## 2025-01-13 NOTE — RAPID RESPONSE TEAM SUMMARY - NSADDTLFINDINGSRRT_GEN_ALL_CORE
On arrival, patient on NRB saturating 97%, HR 80s, BP normotensive, afebrile. Mental status AOx0, unresponsive, tachypneic, agonal breaths on exam. Attempted deep suctioning several times without improvement in patient's condition. Patient with multiple RRTs for hypoxia before this. Given that patient is chronically aspirating and her respiratory failure is likely irreversible, attempted multiple discussions with health care proxy during RRT explaining that the next step is intubation, however our belief is that she would not improve with this intervention and would require a ventilator for the remainder of her life. Her son, Toby, does not believe the patient would want this but wants additional time to speak with his family to convince them of this. Therefore, the decision was made to intubate the patient and transfer to MICU. Furthermore, the HCP understands he may have to make the decision to transition to a more symptom-directed approach without his family's approval which he explains he may be prepared to do soon. Patient intubated and transferred to MICU. 
[id number]

## 2025-03-25 NOTE — CONSULT NOTE ADULT - SUBJECTIVE AND OBJECTIVE BOX
PLAN [place] / Product Type: *No Product type* /   Insurance ID: [id number] - (Medicaid Managed)  Secondary Insurance (if applicable):    Treatment Diagnosis:   Tx and LBP with impacted function, B ankle Pain with weakness and restricted mobility   Medical Diagnosis:  Low back pain, unspecified [M54.50]  Other chronic pain [G89.29]   Referring Physician: Pedro Stone MD  PCP: Pedro Stone MD     Plan of care signed (Y/N):     Date of Patient follow up with Physician:      Plan of Care Report: YES, Date Range for this report: eval to 3/25  POC update due: (10 visits /OR AUTH LIMITS, whichever is less)  4/24/2025                                             Medical History:  Comorbidities:  Diabetes (Type I or II)  Anxiety  Depression  Relevant Medical History:                                          Precautions/ Contra-indications:           Latex allergy:  NO  Pacemaker:    NO  Contraindications for Manipulation: None  Date of Surgery:   Other:    Red Flags:  None    Suicide Screening:   The patient did not verbalize a primary behavioral concern, suicidal ideation, suicidal intent, or demonstrate suicidal behaviors.    Preferred Language for Healthcare:   [] English       [x] other: Bengali - no male . Son is a pharmacist and is allowed to interpret.    SUBJECTIVE EXAMINATION     History: Patient reports midline LBP is recent in last 3 months; denies numbness, but sometimes tingling in L LE intermittent WITH ONLY ASSOCIATED KNEE PAIN.  .   R ankle pain 10 years ago after a fall ; did pT back in 2020 for R ankle pain.      Subjective:3/25: PN/POC: see comments above   Test used Initial score  2/6/25 03/25/2025   Pain Summary VAS 7/10 4-5/10 L LB and L LE to ankle   Functional questionnaire Modified Oswestry 14 /31% 10; 50%   Other:              Pain:  Pain location: lower ribs, central LX, ankles  Patient describes pain to be intermittent, aching, squeezing, and shooting  Pain decreases with: Resting  Pain  Chief Complaint:  Patient is a 86y old  Female who presents with a chief complaint of Syncope (08 Feb 2017 13:28)      HPI:  85 yo with HTN, CAD, CABG, Type 2 DM, Moderate Aortic Stenosis now with acute episode of syncope in the bathroom (08 Feb 2017 13:28)    Allergies:        Allergies:  	penicillin: Drug, Rash  	codeine: Drug, Vomiting  	contrast dye -  rash: Miscellaneous, Other, contrast dye -  rash  	iodine: Miscellaneous, Other, iodine    Home Medications:   · 	azithromycin 250 mg oral tablet: Last Dose Taken:  ,  orally   · 	lisinopril 5 mg oral tablet: Last Dose Taken:  , 1 tab(s) orally once a day  · 	spironolactone 25 mg oral tablet: Last Dose Taken:  ,  orally once a day  · 	pioglitazone 15 mg oral tablet: Last Dose Taken:  , 1 tab(s) orally once a day  · 	simvastatin 20 mg oral tablet: Last Dose Taken:  , 1 tab(s) orally once a day (at bedtime)  · 	pantoprazole 40 mg oral delayed release tablet: Last Dose Taken:  ,  orally   · 	metoprolol tartrate 50 mg oral tablet: Last Dose Taken:  , 40 milligram(s) orally once a day  · 	clopidogrel 75 mg oral tablet: Last Dose Taken:  , 1 tab(s) orally once a day  · 	aspirin 81 mg oral tablet: Last Dose Taken:  , 1 tab(s) orally once a day  · 	nitrofurantoin: Last Dose Taken:  ,  orally   · 	traMADol 50 mg oral tablet: Last Dose Taken:  ,  orally prn      Social History:  · Marital Status		    Review of Systems:  General:  No wt loss, fevers, chills, night sweats  Eyes:  Good vision, no reported pain  ENT:  No sore throat, pain, runny nose, dysphagia  CV:  No pain, palpitations hypo/hypertension  Resp:  No dyspnea, cough, tachypnea, wheezing  GI:  No pain, nausea, vomiting, diarrhea, constipation  :  No pain, bleeding, incontinence, nocturia  Muscle:  No pain, weakness  Breast:  No pain, abscess, mass, discharge  Neuro:  No weakness, tingling, memory problems  Psych:  No fatigue, insomnia, mood problems, depression  Endocrine:  No polyuria, polydipsia cold/heat intolerance  Heme:  No petechiae, ecchymosis, easy bruisability  Skin:  No rash, edema    Relevant Family History:       Relevant Social History:      Physical Exam:    Vital Signs:  Vital Signs Last 24 Hrs  T(C): 37.1, Max: 37.3 (02-11 @ 00:24)  T(F): 98.8, Max: 99.2 (02-11 @ 00:24)  HR: 73 (66 - 80)  BP: 96/50 (95/48 - 143/54)  RR: 18 (16 - 18)  SpO2: 95% (95% - 100%)    I & Os for current day (as of 11 Feb 2017 17:05)  =============================================  IN: 1000 ml / OUT: 0 ml / NET: 1000 ml    Tele:   General:  Appears stated age, well-groomed, well-nourished, no distress  HEENT:  NC/AT, patent nares w/ pink mucosa, OP clear w/o lesions, EOMI, conjunctivae clear, no thyromegaly, no JVD  Chest:  Full & symmetric excursion, no increased effort, breath sounds clear  Cardiovascular:  Regular rhythm, S1, S2, no murmur/rub/S3/S4, no carotid bruit, radial/pedal pulses 2+, no edema  Abdomen:  Soft, non-tender, non-distended, normoactive bowel sounds  Extremities:  No edema.  Skin:  No rash/erythema. Skin is warm/dry  Musculoskeletal:  Full ROM in all joints w/o swelling/tenderness/effusion  Neuro/Psych:  Alert, oriented, normal    Laboratory:    Hemoglobin A1C, Whole Blood (02.09.17 @ 08:27)    Hemoglobin A1C, Whole Blood: 7.7:    CAPILLARY BLOOD GLUCOSE  193 (11 Feb 2017 12:42)  128 (11 Feb 2017 08:34)  258 (10 Feb 2017 17:58)    Comprehensive Metabolic Panel (02.07.17 @ 13:42)    Sodium, Serum: 138 mmol/L    Potassium, Serum: 4.9 mmol/L    Chloride, Serum: 102 mmol/L    Carbon Dioxide, Serum: 28 mmol/L    Anion Gap, Serum: 8 mmol/L    Blood Urea Nitrogen, Serum: 57 mg/dL    Creatinine, Serum: 2.01 mg/dL    Glucose, Serum: 213 mg/dL    Calcium, Total Serum: 8.5 mg/dL    Protein Total, Serum: 7.1 gm/dL    Albumin, Serum: 3.6 g/dL    Bilirubin Total, Serum: 0.4 mg/dL    Alkaline Phosphatase, Serum: 57 U/L    Aspartate Aminotransferase (AST/SGOT): 24 U/L    bnp 660.    Imaging:  cxr: IMPRESSION:  Clear  lungs.  No acute airspace disease suggested.    ECG:    Echo:    1. Left ventricular ejection fraction, by visual estimation, is 55 to   60%.   2. Spectral Doppler shows impaired relaxation pattern of left   ventricular myocardial filling (Grade I diastolic dysfunction).   3. Moderate mitral annular calcification.   4. Peak transaortic gradient equals 26.2 mmHg, mean transaortic gradient   equals 15.7 mmHg, the calculated aortic valve area equals 1.20 cm² by the   continuity equation consistent with moderate aortic stenosis.    Assessment:85 yo with HTN, CAD, CABG, Type 2 DM, Moderate Aortic Stenosis now with acute episode of syncope in the bathroom     Plan:   Tele monitor.  Con't with:  heparin  Injectable 5000Unit(s) SubCutaneous every 12 hours  sodium chloride 0.9% lock flush 3milliLiter(s) IV Push every 8 hours  spironolactone 25milliGRAM(s) Oral daily  aspirin  chewable 81milliGRAM(s) Oral daily  lisinopril 5milliGRAM(s) Oral daily  simvastatin 20milliGRAM(s) Oral at bedtime  clopidogrel Tablet 75milliGRAM(s) Oral daily  pantoprazole    Tablet 40milliGRAM(s) Oral before breakfast  metoprolol 25milliGRAM(s) Oral two times a day  insulin lispro (HumaLOG) corrective regimen sliding scale  SubCutaneous three times a day before meals    Supportive care.    Ashok Christianson MD, FACC, FASE, FASNC, FACP  Director, Heart Failure Services  [facility]  , Department of Cardiology  [facility] Chief Complaint:  Patient is a 86y old  Female who presents with a chief complaint of Syncope (08 Feb 2017 13:28)      HPI:  85 yo with HTN, CAD, CABG, Type 2 DM, Moderate Aortic Stenosis now with acute episode of syncope in the bathroom (08 Feb 2017 13:28) Dry cough. Does not recall events leading up to syncope except she was using her walker and passed out while walking into the bathroom. Son placed her in wheelchair and took her to ED. No issues now.    Allergies:        Allergies:  	penicillin: Drug, Rash  	codeine: Drug, Vomiting  	contrast dye -  rash: Miscellaneous, Other, contrast dye -  rash  	iodine: Miscellaneous, Other, iodine    Home Medications:   · 	azithromycin 250 mg oral tablet: Last Dose Taken:  ,  orally   · 	lisinopril 5 mg oral tablet: Last Dose Taken:  , 1 tab(s) orally once a day  · 	spironolactone 25 mg oral tablet: Last Dose Taken:  ,  orally once a day  · 	pioglitazone 15 mg oral tablet: Last Dose Taken:  , 1 tab(s) orally once a day  · 	simvastatin 20 mg oral tablet: Last Dose Taken:  , 1 tab(s) orally once a day (at bedtime)  · 	pantoprazole 40 mg oral delayed release tablet: Last Dose Taken:  ,  orally   · 	metoprolol tartrate 50 mg oral tablet: Last Dose Taken:  , 40 milligram(s) orally once a day  · 	clopidogrel 75 mg oral tablet: Last Dose Taken:  , 1 tab(s) orally once a day  · 	aspirin 81 mg oral tablet: Last Dose Taken:  , 1 tab(s) orally once a day  · 	nitrofurantoin: Last Dose Taken:  ,  orally   · 	traMADol 50 mg oral tablet: Last Dose Taken:  ,  orally prn      Social History:  · Marital Status		    Review of Systems:  General:  No wt loss, fevers, chills, night sweats  Eyes:  Good vision, no reported pain  ENT:  No sore throat, pain, runny nose, dysphagia  CV:  No pain, palpitations hypo/hypertension  Resp:  No dyspnea, cough, tachypnea, wheezing  GI:  No pain, nausea, vomiting, diarrhea, constipation  :  No pain, bleeding, incontinence, nocturia  Muscle:  No pain, weakness  Breast:  No pain, abscess, mass, discharge  Neuro:  No weakness, tingling, memory problems  Psych:  No fatigue, insomnia, mood problems, depression  Endocrine:  No polyuria, polydipsia cold/heat intolerance  Heme:  No petechiae, ecchymosis, easy bruisability  Skin:  No rash, edema    Relevant Family History:   Noncontributory.    Relevant Social History:  No tobacco    Physical Exam:    Vital Signs:  Vital Signs Last 24 Hrs  T(C): 37.1, Max: 37.3 (02-11 @ 00:24)  T(F): 98.8, Max: 99.2 (02-11 @ 00:24)  HR: 73 (66 - 80)  BP: 96/50 (95/48 - 143/54)  RR: 18 (16 - 18)  SpO2: 95% (95% - 100%)    I & Os for current day (as of 11 Feb 2017 17:05)  =============================================  IN: 1000 ml / OUT: 0 ml / NET: 1000 ml    Tele: off tele now. Was SR.  General:  Appears stated age, well-groomed, well-nourished, no distress  HEENT:  NC/AT, patent nares w/ pink mucosa, OP clear w/o lesions, EOMI, conjunctivae clear, no thyromegaly, no JVD  Chest:  Full & symmetric excursion, no increased effort, breath sounds clear  Cardiovascular:  Regular rhythm, S1, S2, no murmur/rub/S3/S4, no carotid bruit, radial/pedal pulses 2+, no edema  Abdomen:  Soft, non-tender, non-distended, normoactive bowel sounds  Extremities:  No edema.  Skin:  No rash/erythema. Skin is warm/dry  Musculoskeletal:  Full ROM in all joints w/o swelling/tenderness/effusion  Neuro/Psych:  Alert, oriented, normal    Laboratory:    Hemoglobin A1C, Whole Blood (02.09.17 @ 08:27)    Hemoglobin A1C, Whole Blood: 7.7:    CAPILLARY BLOOD GLUCOSE  193 (11 Feb 2017 12:42)  128 (11 Feb 2017 08:34)  258 (10 Feb 2017 17:58)    Comprehensive Metabolic Panel (02.07.17 @ 13:42)    Sodium, Serum: 138 mmol/L    Potassium, Serum: 4.9 mmol/L    Chloride, Serum: 102 mmol/L    Carbon Dioxide, Serum: 28 mmol/L    Anion Gap, Serum: 8 mmol/L    Blood Urea Nitrogen, Serum: 57 mg/dL    Creatinine, Serum: 2.01 mg/dL    Glucose, Serum: 213 mg/dL    Calcium, Total Serum: 8.5 mg/dL    Protein Total, Serum: 7.1 gm/dL    Albumin, Serum: 3.6 g/dL    Bilirubin Total, Serum: 0.4 mg/dL    Alkaline Phosphatase, Serum: 57 U/L    Aspartate Aminotransferase (AST/SGOT): 24 U/L    bnp 660.    Imaging:  cxr: IMPRESSION:  Clear  lungs.  No acute airspace disease suggested.    ECG: SR otherwise wnl.    Echo:    1. Left ventricular ejection fraction, by visual estimation, is 55 to   60%.   2. Spectral Doppler shows impaired relaxation pattern of left   ventricular myocardial filling (Grade I diastolic dysfunction).   3. Moderate mitral annular calcification.   4. Peak transaortic gradient equals 26.2 mmHg, mean transaortic gradient   equals 15.7 mmHg, the calculated aortic valve area equals 1.20 cm² by the   continuity equation consistent with moderate aortic stenosis.    Assessment:85 yo with HTN, CAD, CABG, Type 2 DM, Moderate Aortic Stenosis now with acute episode of syncope in the bathroom     Plan:  D/c spirinolactone given marginal BP at times. Add benzonatate for cough.  Con't with:  heparin  Injectable 5000Unit(s) SubCutaneous every 12 hours  sodium chloride 0.9% lock flush 3milliLiter(s) IV Push every 8 hours  aspirin  chewable 81milliGRAM(s) Oral daily  lisinopril 5milliGRAM(s) Oral daily  simvastatin 20milliGRAM(s) Oral at bedtime  clopidogrel Tablet 75milliGRAM(s) Oral daily  pantoprazole    Tablet 40milliGRAM(s) Oral before breakfast  metoprolol 25milliGRAM(s) Oral two times a day  insulin lispro (HumaLOG) corrective regimen sliding scale  SubCutaneous three times a day before meals    Supportive care.    Ashok Christianson MD, FACC, FASE, FASNC, FACP  Director, Heart Failure Services  [facility]  , Department of Cardiology  [facility] [place] [facility]

## 2025-05-14 NOTE — ED ADULT NURSE NOTE - NSFALLCONCLUSION_ED_ALL_ED
Last Appointment   4/28/2025  Next Appointment  6/23/2025    Pharmacy requesting 90 day supply   Fall with Harm Risk